# Patient Record
Sex: FEMALE | Race: WHITE | NOT HISPANIC OR LATINO
[De-identification: names, ages, dates, MRNs, and addresses within clinical notes are randomized per-mention and may not be internally consistent; named-entity substitution may affect disease eponyms.]

---

## 2017-09-11 ENCOUNTER — LABORATORY RESULT (OUTPATIENT)
Age: 47
End: 2017-09-11

## 2017-09-11 ENCOUNTER — RESULT REVIEW (OUTPATIENT)
Age: 47
End: 2017-09-11

## 2017-09-12 ENCOUNTER — APPOINTMENT (OUTPATIENT)
Dept: PLASTIC SURGERY | Facility: CLINIC | Age: 47
End: 2017-09-12
Payer: COMMERCIAL

## 2017-09-12 ENCOUNTER — APPOINTMENT (OUTPATIENT)
Dept: DERMATOLOGY | Facility: CLINIC | Age: 47
End: 2017-09-12

## 2017-09-12 VITALS
BODY MASS INDEX: 32.44 KG/M2 | WEIGHT: 214.03 LBS | TEMPERATURE: 98.4 F | DIASTOLIC BLOOD PRESSURE: 80 MMHG | SYSTOLIC BLOOD PRESSURE: 135 MMHG | RESPIRATION RATE: 16 BRPM | OXYGEN SATURATION: 98 % | HEART RATE: 96 BPM | HEIGHT: 68 IN

## 2017-09-12 PROCEDURE — 69100 BIOPSY OF EXTERNAL EAR: CPT

## 2017-09-13 ENCOUNTER — INPATIENT (INPATIENT)
Facility: HOSPITAL | Age: 47
LOS: 13 days | Discharge: HOME HEALTH | DRG: 872 | End: 2017-09-27
Attending: HOSPITALIST | Admitting: HOSPITALIST
Payer: COMMERCIAL

## 2017-09-13 VITALS
DIASTOLIC BLOOD PRESSURE: 70 MMHG | TEMPERATURE: 103 F | HEIGHT: 69 IN | SYSTOLIC BLOOD PRESSURE: 108 MMHG | HEART RATE: 126 BPM | WEIGHT: 199.96 LBS | OXYGEN SATURATION: 95 % | RESPIRATION RATE: 26 BRPM

## 2017-09-13 DIAGNOSIS — Z98.51 TUBAL LIGATION STATUS: Chronic | ICD-10-CM

## 2017-09-13 DIAGNOSIS — A41: ICD-10-CM

## 2017-09-13 DIAGNOSIS — A41.59 OTHER GRAM-NEGATIVE SEPSIS: ICD-10-CM

## 2017-09-13 DIAGNOSIS — Z90.49 ACQUIRED ABSENCE OF OTHER SPECIFIED PARTS OF DIGESTIVE TRACT: Chronic | ICD-10-CM

## 2017-09-13 DIAGNOSIS — N20.0 CALCULUS OF KIDNEY: Chronic | ICD-10-CM

## 2017-09-13 LAB
ALBUMIN SERPL ELPH-MCNC: 3.5 G/DL — SIGNIFICANT CHANGE UP (ref 3.3–5.2)
ALP SERPL-CCNC: 78 U/L — SIGNIFICANT CHANGE UP (ref 40–120)
ALT FLD-CCNC: 14 U/L — SIGNIFICANT CHANGE UP
ANION GAP SERPL CALC-SCNC: 19 MMOL/L — HIGH (ref 5–17)
ANISOCYTOSIS BLD QL: SLIGHT — SIGNIFICANT CHANGE UP
APPEARANCE UR: CLEAR — SIGNIFICANT CHANGE UP
AST SERPL-CCNC: 18 U/L — SIGNIFICANT CHANGE UP
BILIRUB SERPL-MCNC: 1.7 MG/DL — SIGNIFICANT CHANGE UP (ref 0.4–2)
BILIRUB UR-MCNC: NEGATIVE — SIGNIFICANT CHANGE UP
BUN SERPL-MCNC: 33 MG/DL — HIGH (ref 8–20)
CALCIUM SERPL-MCNC: 8.8 MG/DL — SIGNIFICANT CHANGE UP (ref 8.6–10.2)
CHLORIDE SERPL-SCNC: 96 MMOL/L — LOW (ref 98–107)
CO2 SERPL-SCNC: 19 MMOL/L — LOW (ref 22–29)
COLOR SPEC: YELLOW — SIGNIFICANT CHANGE UP
CREAT SERPL-MCNC: 3.07 MG/DL — HIGH (ref 0.5–1.3)
DIFF PNL FLD: ABNORMAL
GLUCOSE SERPL-MCNC: 120 MG/DL — HIGH (ref 70–115)
GLUCOSE UR QL: NEGATIVE MG/DL — SIGNIFICANT CHANGE UP
HCT VFR BLD CALC: 41.1 % — SIGNIFICANT CHANGE UP (ref 37–47)
HGB BLD-MCNC: 14.5 G/DL — SIGNIFICANT CHANGE UP (ref 12–16)
HYPERCHROMIA BLD QL AUTO: SLIGHT — SIGNIFICANT CHANGE UP
KETONES UR-MCNC: NEGATIVE — SIGNIFICANT CHANGE UP
LEUKOCYTE ESTERASE UR-ACNC: ABNORMAL
LYMPHOCYTES # BLD AUTO: 5 % — LOW (ref 20–55)
MACROCYTES BLD QL: SLIGHT — SIGNIFICANT CHANGE UP
MCHC RBC-ENTMCNC: 33.8 PG — HIGH (ref 27–31)
MCHC RBC-ENTMCNC: 35.3 G/DL — SIGNIFICANT CHANGE UP (ref 32–36)
MCV RBC AUTO: 95.8 FL — SIGNIFICANT CHANGE UP (ref 81–99)
MICROCYTES BLD QL: SLIGHT — SIGNIFICANT CHANGE UP
MONOCYTES NFR BLD AUTO: 1 % — LOW (ref 3–10)
NEUTROPHILS NFR BLD AUTO: 94 % — HIGH (ref 37–73)
NITRITE UR-MCNC: POSITIVE
PH UR: 5 — SIGNIFICANT CHANGE UP (ref 5–8)
PLAT MORPH BLD: NORMAL — SIGNIFICANT CHANGE UP
PLATELET # BLD AUTO: 162 K/UL — SIGNIFICANT CHANGE UP (ref 150–400)
POIKILOCYTOSIS BLD QL AUTO: SLIGHT — SIGNIFICANT CHANGE UP
POLYCHROMASIA BLD QL SMEAR: SLIGHT — SIGNIFICANT CHANGE UP
POTASSIUM SERPL-MCNC: 3.7 MMOL/L — SIGNIFICANT CHANGE UP (ref 3.5–5.3)
POTASSIUM SERPL-SCNC: 3.7 MMOL/L — SIGNIFICANT CHANGE UP (ref 3.5–5.3)
PROT SERPL-MCNC: 7.8 G/DL — SIGNIFICANT CHANGE UP (ref 6.6–8.7)
PROT UR-MCNC: 100 MG/DL
RBC # BLD: 4.29 M/UL — LOW (ref 4.4–5.2)
RBC # FLD: 13.1 % — SIGNIFICANT CHANGE UP (ref 11–15.6)
RBC BLD AUTO: ABNORMAL
SODIUM SERPL-SCNC: 134 MMOL/L — LOW (ref 135–145)
SP GR SPEC: 1.01 — SIGNIFICANT CHANGE UP (ref 1.01–1.02)
UROBILINOGEN FLD QL: NEGATIVE MG/DL — SIGNIFICANT CHANGE UP
WBC # BLD: 18.8 K/UL — HIGH (ref 4.8–10.8)
WBC # FLD AUTO: 18.8 K/UL — HIGH (ref 4.8–10.8)

## 2017-09-13 PROCEDURE — 71010: CPT | Mod: 26

## 2017-09-13 PROCEDURE — 99291 CRITICAL CARE FIRST HOUR: CPT

## 2017-09-13 RX ORDER — VANCOMYCIN HCL 1 G
1000 VIAL (EA) INTRAVENOUS ONCE
Qty: 0 | Refills: 0 | Status: COMPLETED | OUTPATIENT
Start: 2017-09-13 | End: 2017-09-13

## 2017-09-13 RX ORDER — PIPERACILLIN AND TAZOBACTAM 4; .5 G/20ML; G/20ML
3.38 INJECTION, POWDER, LYOPHILIZED, FOR SOLUTION INTRAVENOUS ONCE
Qty: 0 | Refills: 0 | Status: COMPLETED | OUTPATIENT
Start: 2017-09-13 | End: 2017-09-13

## 2017-09-13 RX ORDER — SODIUM CHLORIDE 9 MG/ML
2800 INJECTION INTRAMUSCULAR; INTRAVENOUS; SUBCUTANEOUS ONCE
Qty: 0 | Refills: 0 | Status: COMPLETED | OUTPATIENT
Start: 2017-09-13 | End: 2017-09-13

## 2017-09-13 RX ORDER — MORPHINE SULFATE 50 MG/1
4 CAPSULE, EXTENDED RELEASE ORAL ONCE
Qty: 0 | Refills: 0 | Status: DISCONTINUED | OUTPATIENT
Start: 2017-09-13 | End: 2017-09-13

## 2017-09-13 RX ORDER — METRONIDAZOLE 500 MG
500 TABLET ORAL ONCE
Qty: 0 | Refills: 0 | Status: COMPLETED | OUTPATIENT
Start: 2017-09-13 | End: 2017-09-13

## 2017-09-13 RX ORDER — FENTANYL CITRATE 50 UG/ML
25 INJECTION INTRAVENOUS ONCE
Qty: 0 | Refills: 0 | Status: DISCONTINUED | OUTPATIENT
Start: 2017-09-13 | End: 2017-09-14

## 2017-09-13 RX ORDER — ACETAMINOPHEN 500 MG
650 TABLET ORAL ONCE
Qty: 0 | Refills: 0 | Status: COMPLETED | OUTPATIENT
Start: 2017-09-13 | End: 2017-09-13

## 2017-09-13 RX ORDER — SODIUM CHLORIDE 9 MG/ML
3 INJECTION INTRAMUSCULAR; INTRAVENOUS; SUBCUTANEOUS ONCE
Qty: 0 | Refills: 0 | Status: COMPLETED | OUTPATIENT
Start: 2017-09-13 | End: 2017-09-13

## 2017-09-13 RX ORDER — SODIUM CHLORIDE 9 MG/ML
1000 INJECTION INTRAMUSCULAR; INTRAVENOUS; SUBCUTANEOUS ONCE
Qty: 0 | Refills: 0 | Status: COMPLETED | OUTPATIENT
Start: 2017-09-13 | End: 2017-09-13

## 2017-09-13 RX ORDER — ONDANSETRON 8 MG/1
4 TABLET, FILM COATED ORAL ONCE
Qty: 0 | Refills: 0 | Status: COMPLETED | OUTPATIENT
Start: 2017-09-13 | End: 2017-09-14

## 2017-09-13 RX ORDER — ONDANSETRON 8 MG/1
8 TABLET, FILM COATED ORAL ONCE
Qty: 0 | Refills: 0 | Status: COMPLETED | OUTPATIENT
Start: 2017-09-13 | End: 2017-09-13

## 2017-09-13 RX ORDER — VANCOMYCIN HCL 1 G
2300 VIAL (EA) INTRAVENOUS ONCE
Qty: 0 | Refills: 0 | Status: DISCONTINUED | OUTPATIENT
Start: 2017-09-13 | End: 2017-09-13

## 2017-09-13 RX ADMIN — Medication 250 MILLIGRAM(S): at 20:52

## 2017-09-13 RX ADMIN — SODIUM CHLORIDE 3 MILLILITER(S): 9 INJECTION INTRAMUSCULAR; INTRAVENOUS; SUBCUTANEOUS at 19:00

## 2017-09-13 RX ADMIN — ONDANSETRON 8 MILLIGRAM(S): 8 TABLET, FILM COATED ORAL at 20:07

## 2017-09-13 RX ADMIN — SODIUM CHLORIDE 1000 MILLILITER(S): 9 INJECTION INTRAMUSCULAR; INTRAVENOUS; SUBCUTANEOUS at 23:42

## 2017-09-13 RX ADMIN — SODIUM CHLORIDE 2800 MILLILITER(S): 9 INJECTION INTRAMUSCULAR; INTRAVENOUS; SUBCUTANEOUS at 21:56

## 2017-09-13 RX ADMIN — Medication 650 MILLIGRAM(S): at 19:14

## 2017-09-13 RX ADMIN — PIPERACILLIN AND TAZOBACTAM 200 GRAM(S): 4; .5 INJECTION, POWDER, LYOPHILIZED, FOR SOLUTION INTRAVENOUS at 19:14

## 2017-09-13 RX ADMIN — Medication 100 MILLIGRAM(S): at 22:12

## 2017-09-13 NOTE — ED PROVIDER NOTE - PSH
[facility]  H/O tubal ligation  (1996)  Kidney calculi    S/P cholecystectomy  (2013)  Tubal ligation  1995

## 2017-09-13 NOTE — ED PROVIDER NOTE - CARE PLAN
Principal Discharge DX:	Sepsis  Secondary Diagnosis:	Acute renal failure  Secondary Diagnosis:	Dehydration

## 2017-09-13 NOTE — ED PROVIDER NOTE - CHPI ED SYMPTOMS NEG
no vomiting/no decreased eating/drinking/no rash/no shortness of breath/no headache/no abdominal pain/no diarrhea

## 2017-09-13 NOTE — ED PROVIDER NOTE - OBJECTIVE STATEMENT
The patient is 46 year old female presents with fever for 2 days with chill and nausea.  The patient has cough and also back pain. No dysuria and No frequency, No insect bites, No travel, No CP, No SOB, No ABD Pain

## 2017-09-13 NOTE — ED PROVIDER NOTE - PMH
Arthropathy NOS - shoulder    GERD (gastroesophageal reflux disease)    Hyperlipidemia    Kidney polycystic disease    Kidney stones    Kidney stones    Obesity    Polycystic kidney disease

## 2017-09-13 NOTE — H&P ADULT - HISTORY OF PRESENT ILLNESS
47 y/o female smoker with h/o C. dif, polycystic kidney diease, s/p oral surgery 2 weeks ago followed by 1 week treatment with amoxicillin, started to have headache 2 days ago then fever, nausea, vomiting  and body aches yesterday. today, in the Er patient developed watery diarrhea. no blood in the stools.

## 2017-09-13 NOTE — H&P ADULT - PMH
Arthropathy NOS - shoulder    GERD (gastroesophageal reflux disease)    Hyperlipidemia    Kidney stones    Polycystic kidney disease

## 2017-09-13 NOTE — ED ADULT NURSE NOTE - OBJECTIVE STATEMENT
pt c/o fever with chills and nausea x 2 days. Pt states she had a temperature since yesterday and since her temperature was getting higher she came to ED. Pt states she only coughed a couple times, denies productive cough, c/o nausea and denies vomiting or diarrhea. Pt seen & evaluated by MD waiting for lab results.

## 2017-09-13 NOTE — H&P ADULT - FAMILY HISTORY
Family history of hypertension     Father  Still living? Unknown  Family history of heart disease, Age at diagnosis: Age Unknown

## 2017-09-13 NOTE — ED ADULT NURSE NOTE - PSH
[facility] [place] [facility]  H/O tubal ligation  (1996)  Kidney calculi    S/P cholecystectomy  (2013)  Tubal ligation  1995

## 2017-09-13 NOTE — H&P ADULT - PSH
[facility] [place] [facility]  H/O tubal ligation  (1996)  Kidney calculi    S/P cholecystectomy  (2013)  Tubal ligation  1995

## 2017-09-14 DIAGNOSIS — R19.7 DIARRHEA, UNSPECIFIED: ICD-10-CM

## 2017-09-14 DIAGNOSIS — N39.0 URINARY TRACT INFECTION, SITE NOT SPECIFIED: ICD-10-CM

## 2017-09-14 DIAGNOSIS — N28.9 DISORDER OF KIDNEY AND URETER, UNSPECIFIED: ICD-10-CM

## 2017-09-14 DIAGNOSIS — E87.6 HYPOKALEMIA: ICD-10-CM

## 2017-09-14 DIAGNOSIS — Q61.3 POLYCYSTIC KIDNEY, UNSPECIFIED: ICD-10-CM

## 2017-09-14 DIAGNOSIS — E83.42 HYPOMAGNESEMIA: ICD-10-CM

## 2017-09-14 LAB
ANION GAP SERPL CALC-SCNC: 16 MMOL/L — SIGNIFICANT CHANGE UP (ref 5–17)
BACTERIA # UR AUTO: ABNORMAL
BUN SERPL-MCNC: 37 MG/DL — HIGH (ref 8–20)
C DIFF BY PCR RESULT: DETECTED
C DIFF TOX GENS STL QL NAA+PROBE: SIGNIFICANT CHANGE UP
CALCIUM SERPL-MCNC: 7.4 MG/DL — LOW (ref 8.6–10.2)
CHLORIDE SERPL-SCNC: 104 MMOL/L — SIGNIFICANT CHANGE UP (ref 98–107)
CO2 SERPL-SCNC: 18 MMOL/L — LOW (ref 22–29)
COMMENT - URINE: SIGNIFICANT CHANGE UP
CREAT SERPL-MCNC: 3.6 MG/DL — HIGH (ref 0.5–1.3)
EPI CELLS # UR: ABNORMAL
GLUCOSE SERPL-MCNC: 156 MG/DL — HIGH (ref 70–115)
LACTATE SERPL-SCNC: 2.3 MMOL/L — HIGH (ref 0.5–2)
MAGNESIUM SERPL-MCNC: 1.5 MG/DL — LOW (ref 1.6–2.6)
PHOSPHATE SERPL-MCNC: 3.9 MG/DL — SIGNIFICANT CHANGE UP (ref 2.4–4.7)
POTASSIUM SERPL-MCNC: 3 MMOL/L — LOW (ref 3.5–5.3)
POTASSIUM SERPL-SCNC: 3 MMOL/L — LOW (ref 3.5–5.3)
RBC CASTS # UR COMP ASSIST: ABNORMAL /HPF (ref 0–4)
SODIUM SERPL-SCNC: 138 MMOL/L — SIGNIFICANT CHANGE UP (ref 135–145)
WBC UR QL: ABNORMAL

## 2017-09-14 PROCEDURE — 99233 SBSQ HOSP IP/OBS HIGH 50: CPT

## 2017-09-14 PROCEDURE — 99255 IP/OBS CONSLTJ NEW/EST HI 80: CPT

## 2017-09-14 PROCEDURE — 74176 CT ABD & PELVIS W/O CONTRAST: CPT | Mod: 26

## 2017-09-14 RX ORDER — MAGNESIUM OXIDE 400 MG ORAL TABLET 241.3 MG
400 TABLET ORAL
Qty: 0 | Refills: 0 | Status: COMPLETED | OUTPATIENT
Start: 2017-09-14 | End: 2017-09-16

## 2017-09-14 RX ORDER — MORPHINE SULFATE 50 MG/1
2 CAPSULE, EXTENDED RELEASE ORAL EVERY 6 HOURS
Qty: 0 | Refills: 0 | Status: DISCONTINUED | OUTPATIENT
Start: 2017-09-14 | End: 2017-09-16

## 2017-09-14 RX ORDER — SODIUM CHLORIDE 9 MG/ML
1000 INJECTION, SOLUTION INTRAVENOUS
Qty: 0 | Refills: 0 | Status: DISCONTINUED | OUTPATIENT
Start: 2017-09-14 | End: 2017-09-14

## 2017-09-14 RX ORDER — SODIUM BICARBONATE 1 MEQ/ML
0.25 SYRINGE (ML) INTRAVENOUS
Qty: 150 | Refills: 0 | Status: DISCONTINUED | OUTPATIENT
Start: 2017-09-14 | End: 2017-09-16

## 2017-09-14 RX ORDER — INFLUENZA VIRUS VACCINE 15; 15; 15; 15 UG/.5ML; UG/.5ML; UG/.5ML; UG/.5ML
0.5 SUSPENSION INTRAMUSCULAR ONCE
Qty: 0 | Refills: 0 | Status: COMPLETED | OUTPATIENT
Start: 2017-09-14 | End: 2017-09-14

## 2017-09-14 RX ORDER — ACETAMINOPHEN 500 MG
650 TABLET ORAL EVERY 6 HOURS
Qty: 0 | Refills: 0 | Status: DISCONTINUED | OUTPATIENT
Start: 2017-09-14 | End: 2017-09-27

## 2017-09-14 RX ORDER — CEFTRIAXONE 500 MG/1
1 INJECTION, POWDER, FOR SOLUTION INTRAMUSCULAR; INTRAVENOUS EVERY 24 HOURS
Qty: 0 | Refills: 0 | Status: DISCONTINUED | OUTPATIENT
Start: 2017-09-14 | End: 2017-09-15

## 2017-09-14 RX ORDER — VANCOMYCIN HCL 1 G
125 VIAL (EA) INTRAVENOUS EVERY 6 HOURS
Qty: 0 | Refills: 0 | Status: DISCONTINUED | OUTPATIENT
Start: 2017-09-14 | End: 2017-09-15

## 2017-09-14 RX ORDER — POTASSIUM CHLORIDE 20 MEQ
40 PACKET (EA) ORAL EVERY 4 HOURS
Qty: 0 | Refills: 0 | Status: COMPLETED | OUTPATIENT
Start: 2017-09-14 | End: 2017-09-14

## 2017-09-14 RX ORDER — DEXTROSE MONOHYDRATE, SODIUM CHLORIDE, AND POTASSIUM CHLORIDE 50; .745; 4.5 G/1000ML; G/1000ML; G/1000ML
1000 INJECTION, SOLUTION INTRAVENOUS
Qty: 0 | Refills: 0 | Status: DISCONTINUED | OUTPATIENT
Start: 2017-09-14 | End: 2017-09-14

## 2017-09-14 RX ORDER — ONDANSETRON 8 MG/1
4 TABLET, FILM COATED ORAL ONCE
Qty: 0 | Refills: 0 | Status: COMPLETED | OUTPATIENT
Start: 2017-09-14 | End: 2017-09-14

## 2017-09-14 RX ORDER — FAMOTIDINE 10 MG/ML
20 INJECTION INTRAVENOUS DAILY
Qty: 0 | Refills: 0 | Status: DISCONTINUED | OUTPATIENT
Start: 2017-09-14 | End: 2017-09-15

## 2017-09-14 RX ORDER — MAGNESIUM SULFATE 500 MG/ML
2 VIAL (ML) INJECTION ONCE
Qty: 0 | Refills: 0 | Status: COMPLETED | OUTPATIENT
Start: 2017-09-14 | End: 2017-09-14

## 2017-09-14 RX ORDER — ENOXAPARIN SODIUM 100 MG/ML
30 INJECTION SUBCUTANEOUS DAILY
Qty: 0 | Refills: 0 | Status: DISCONTINUED | OUTPATIENT
Start: 2017-09-14 | End: 2017-09-14

## 2017-09-14 RX ORDER — HEPARIN SODIUM 5000 [USP'U]/ML
5000 INJECTION INTRAVENOUS; SUBCUTANEOUS EVERY 12 HOURS
Qty: 0 | Refills: 0 | Status: DISCONTINUED | OUTPATIENT
Start: 2017-09-14 | End: 2017-09-27

## 2017-09-14 RX ADMIN — FENTANYL CITRATE 25 MICROGRAM(S): 50 INJECTION INTRAVENOUS at 00:10

## 2017-09-14 RX ADMIN — MORPHINE SULFATE 2 MILLIGRAM(S): 50 CAPSULE, EXTENDED RELEASE ORAL at 12:04

## 2017-09-14 RX ADMIN — Medication 650 MILLIGRAM(S): at 17:49

## 2017-09-14 RX ADMIN — Medication 150 MEQ/KG/HR: at 19:45

## 2017-09-14 RX ADMIN — DEXTROSE MONOHYDRATE, SODIUM CHLORIDE, AND POTASSIUM CHLORIDE 100 MILLILITER(S): 50; .745; 4.5 INJECTION, SOLUTION INTRAVENOUS at 04:00

## 2017-09-14 RX ADMIN — Medication 650 MILLIGRAM(S): at 23:41

## 2017-09-14 RX ADMIN — MORPHINE SULFATE 2 MILLIGRAM(S): 50 CAPSULE, EXTENDED RELEASE ORAL at 19:45

## 2017-09-14 RX ADMIN — Medication 40 MILLIEQUIVALENT(S): at 13:50

## 2017-09-14 RX ADMIN — Medication 650 MILLIGRAM(S): at 12:36

## 2017-09-14 RX ADMIN — CEFTRIAXONE 100 GRAM(S): 500 INJECTION, POWDER, FOR SOLUTION INTRAMUSCULAR; INTRAVENOUS at 12:00

## 2017-09-14 RX ADMIN — ONDANSETRON 4 MILLIGRAM(S): 8 TABLET, FILM COATED ORAL at 04:49

## 2017-09-14 RX ADMIN — Medication 650 MILLIGRAM(S): at 10:51

## 2017-09-14 RX ADMIN — MAGNESIUM OXIDE 400 MG ORAL TABLET 400 MILLIGRAM(S): 241.3 TABLET ORAL at 17:49

## 2017-09-14 RX ADMIN — Medication 650 MILLIGRAM(S): at 19:35

## 2017-09-14 RX ADMIN — ONDANSETRON 4 MILLIGRAM(S): 8 TABLET, FILM COATED ORAL at 00:10

## 2017-09-14 RX ADMIN — DEXTROSE MONOHYDRATE, SODIUM CHLORIDE, AND POTASSIUM CHLORIDE 100 MILLILITER(S): 50; .745; 4.5 INJECTION, SOLUTION INTRAVENOUS at 04:49

## 2017-09-14 RX ADMIN — MORPHINE SULFATE 2 MILLIGRAM(S): 50 CAPSULE, EXTENDED RELEASE ORAL at 12:36

## 2017-09-14 RX ADMIN — Medication 125 MILLIGRAM(S): at 23:41

## 2017-09-14 RX ADMIN — Medication 40 MILLIEQUIVALENT(S): at 17:49

## 2017-09-14 RX ADMIN — MORPHINE SULFATE 2 MILLIGRAM(S): 50 CAPSULE, EXTENDED RELEASE ORAL at 05:25

## 2017-09-14 RX ADMIN — MORPHINE SULFATE 2 MILLIGRAM(S): 50 CAPSULE, EXTENDED RELEASE ORAL at 20:20

## 2017-09-14 RX ADMIN — MORPHINE SULFATE 2 MILLIGRAM(S): 50 CAPSULE, EXTENDED RELEASE ORAL at 04:49

## 2017-09-14 RX ADMIN — FENTANYL CITRATE 25 MICROGRAM(S): 50 INJECTION INTRAVENOUS at 00:25

## 2017-09-14 RX ADMIN — Medication 125 MILLIGRAM(S): at 17:50

## 2017-09-14 NOTE — PROGRESS NOTE ADULT - SUBJECTIVE AND OBJECTIVE BOX
Renal :    EMR  Reviewed,    DX : C. Difficile colitis,  STEPHENIE    ADPKD      P : Continue current management,    MR Brain, TTE ( Exclude cerebral Aneurysm, Valvular Heart Disease )

## 2017-09-14 NOTE — ED ADULT NURSE REASSESSMENT NOTE - NS ED NURSE REASSESS COMMENT FT1
Pt refusing repeat rectal temp @ this time, pt with jacket on and blankets, pt educated on sepsis protocol asked to remove blankets, pt states "do not take them off of me", pt educated on risks and benefits of refusing rectal temp, pt verbalized understanding. IVF infusing, family @ bedside, pt baseline mental status. Will continue to monitor, pending admission orders, pending CT
Report received from offgoing RN, chart as noted, pt a&ox3 lying right side on stretcher with IVF infusing, #20G IV noted to Right AC, site asmyp. Zosyn infusing per MD orders. Pt c/o nausea and dry heaving, no emesis noted @ time of RN assessment, Pt medicated with IV zofran per MD orders, updated on POC, results pending, family @ bedside, rr even and unlabored, lungs CTA romeo. MAEx4. Will continue to monitor and reassess
Pt baseline mental status, c.o 6/10 pain to lower back, medicated per MD orders with IV Fentanyl and iv zofran, family @ bedside, lights dimmed to promote rest, Transport in ED to take pt to CT @ this time

## 2017-09-14 NOTE — PROGRESS NOTE ADULT - SUBJECTIVE AND OBJECTIVE BOX
Patient is a 46y old  Female who presents with a chief complaint of nausea, vomiting, diarrhea, fever and body aches  she is c/o swelling of her face , right flank pain this am   no overnight events   chart reviewed         Allergies    Demerol HCl (Hives)  Demerol HCl (Hives; Rash)  Phenergan (Hives; Rash)  Phenergan Fortis (Hives)    Intolerances        REVIEW OF SYSTEMS:      Vital Signs Last 24 Hrs  T(C): 37.2 (14 Sep 2017 04:27), Max: 39.5 (13 Sep 2017 18:32)  T(F): 99 (14 Sep 2017 04:27), Max: 103.1 (13 Sep 2017 18:32)  HR: 90 (14 Sep 2017 04:) (69 - 126)  BP: 119/62 (14 Sep 2017 04:) (89/56 - 119/62)  BP(mean): --  RR: 17 (14 Sep 2017 04:27) (16 - 26)  SpO2: 97% (14 Sep 2017 04:) (90% - 100%)    PHYSICAL EXAM:    GENERAL: NAD, well-groomed, well-developed  NECK: Supple, No JVD, Normal thyroid  CHEST/LUNG: Clear to percussion bilaterally; No rales, rhonchi, wheezing, or rubs  HEART: Regular rate and rhythm; No murmurs, rubs, or gallops  ABDOMEN: Soft, RUQ , right flank tenderness on percussion , Nondistended; Bowel sounds present  EXTREMITIES:  2+ Peripheral Pulses, No clubbing, cyanosis, or edema  SKIN: No rashes or lesions      LABS:                        14.5   18.8  )-----------( 162      ( 13 Sep 2017 19:01 )             41.1     -14    138  |  104  |  37.0<H>  ----------------------------<  156<H>  3.0<L>   |  18.0<L>  |  3.60<H>    Ca    7.4<L>      14 Sep 2017 09:02  Phos  3.9     -14  Mg     1.5     -    TPro  7.8  /  Alb  3.5  /  TBili  1.7  /  DBili  x   /  AST  18  /  ALT  14  /  AlkPhos  78        Urinalysis Basic - ( 13 Sep 2017 23:44 )    Color: Yellow / Appearance: Clear / S.015 / pH: x  Gluc: x / Ketone: Negative  / Bili: Negative / Urobili: Negative mg/dL   Blood: x / Protein: 100 mg/dL / Nitrite: Positive   Leuk Esterase: Moderate / RBC: 3-5 /HPF / WBC 6-10   Sq Epi: x / Non Sq Epi: Moderate / Bacteria: Few        RADIOLOGY & ADDITIONAL TESTS:

## 2017-09-15 DIAGNOSIS — A04.7 ENTEROCOLITIS DUE TO CLOSTRIDIUM DIFFICILE: ICD-10-CM

## 2017-09-15 DIAGNOSIS — N17.9 ACUTE KIDNEY FAILURE, UNSPECIFIED: ICD-10-CM

## 2017-09-15 DIAGNOSIS — G44.52 NEW DAILY PERSISTENT HEADACHE (NDPH): ICD-10-CM

## 2017-09-15 LAB
ALBUMIN SERPL ELPH-MCNC: 2.8 G/DL — LOW (ref 3.3–5.2)
ANION GAP SERPL CALC-SCNC: 15 MMOL/L — SIGNIFICANT CHANGE UP (ref 5–17)
ANION GAP SERPL CALC-SCNC: 16 MMOL/L — SIGNIFICANT CHANGE UP (ref 5–17)
APPEARANCE UR: CLEAR — SIGNIFICANT CHANGE UP
BACTERIA # UR AUTO: ABNORMAL
BILIRUB UR-MCNC: NEGATIVE — SIGNIFICANT CHANGE UP
BUN SERPL-MCNC: 27 MG/DL — HIGH (ref 8–20)
BUN SERPL-MCNC: 32 MG/DL — HIGH (ref 8–20)
CALCIUM SERPL-MCNC: 7.7 MG/DL — LOW (ref 8.6–10.2)
CALCIUM SERPL-MCNC: 8 MG/DL — LOW (ref 8.6–10.2)
CHLORIDE SERPL-SCNC: 102 MMOL/L — SIGNIFICANT CHANGE UP (ref 98–107)
CHLORIDE SERPL-SCNC: 99 MMOL/L — SIGNIFICANT CHANGE UP (ref 98–107)
CO2 SERPL-SCNC: 23 MMOL/L — SIGNIFICANT CHANGE UP (ref 22–29)
CO2 SERPL-SCNC: 24 MMOL/L — SIGNIFICANT CHANGE UP (ref 22–29)
COLOR SPEC: YELLOW — SIGNIFICANT CHANGE UP
CREAT SERPL-MCNC: 3.35 MG/DL — HIGH (ref 0.5–1.3)
CREAT SERPL-MCNC: 3.38 MG/DL — HIGH (ref 0.5–1.3)
DIFF PNL FLD: ABNORMAL
EPI CELLS # UR: SIGNIFICANT CHANGE UP
GLUCOSE SERPL-MCNC: 123 MG/DL — HIGH (ref 70–115)
GLUCOSE SERPL-MCNC: 139 MG/DL — HIGH (ref 70–115)
GLUCOSE UR QL: NEGATIVE MG/DL — SIGNIFICANT CHANGE UP
HCT VFR BLD CALC: 29.6 % — LOW (ref 37–47)
HCT VFR BLD CALC: 30.8 % — LOW (ref 37–47)
HGB BLD-MCNC: 10 G/DL — LOW (ref 12–16)
HGB BLD-MCNC: 10.4 G/DL — LOW (ref 12–16)
KETONES UR-MCNC: NEGATIVE — SIGNIFICANT CHANGE UP
LDH SERPL L TO P-CCNC: 143 U/L — SIGNIFICANT CHANGE UP (ref 98–192)
LDH SERPL L TO P-CCNC: 144 U/L — SIGNIFICANT CHANGE UP (ref 98–192)
LEUKOCYTE ESTERASE UR-ACNC: ABNORMAL
MCHC RBC-ENTMCNC: 32.6 PG — HIGH (ref 27–31)
MCHC RBC-ENTMCNC: 32.7 PG — HIGH (ref 27–31)
MCHC RBC-ENTMCNC: 33.8 G/DL — SIGNIFICANT CHANGE UP (ref 32–36)
MCHC RBC-ENTMCNC: 33.8 G/DL — SIGNIFICANT CHANGE UP (ref 32–36)
MCV RBC AUTO: 96.4 FL — SIGNIFICANT CHANGE UP (ref 81–99)
MCV RBC AUTO: 96.9 FL — SIGNIFICANT CHANGE UP (ref 81–99)
NITRITE UR-MCNC: NEGATIVE — SIGNIFICANT CHANGE UP
PH UR: 7 — SIGNIFICANT CHANGE UP (ref 5–8)
PHOSPHATE SERPL-MCNC: 2 MG/DL — LOW (ref 2.4–4.7)
PLATELET # BLD AUTO: 142 K/UL — LOW (ref 150–400)
PLATELET # BLD AUTO: 145 K/UL — LOW (ref 150–400)
POTASSIUM SERPL-MCNC: 3 MMOL/L — LOW (ref 3.5–5.3)
POTASSIUM SERPL-MCNC: 3 MMOL/L — LOW (ref 3.5–5.3)
POTASSIUM SERPL-SCNC: 3 MMOL/L — LOW (ref 3.5–5.3)
POTASSIUM SERPL-SCNC: 3 MMOL/L — LOW (ref 3.5–5.3)
PROT UR-MCNC: 30 MG/DL
RBC # BLD: 3.07 M/UL — LOW (ref 4.4–5.2)
RBC # BLD: 3.18 M/UL — LOW (ref 4.4–5.2)
RBC # FLD: 12.5 % — SIGNIFICANT CHANGE UP (ref 11–15.6)
RBC # FLD: 12.8 % — SIGNIFICANT CHANGE UP (ref 11–15.6)
RBC CASTS # UR COMP ASSIST: ABNORMAL /HPF (ref 0–4)
SODIUM SERPL-SCNC: 139 MMOL/L — SIGNIFICANT CHANGE UP (ref 135–145)
SODIUM SERPL-SCNC: 140 MMOL/L — SIGNIFICANT CHANGE UP (ref 135–145)
SP GR SPEC: 1 — LOW (ref 1.01–1.02)
UROBILINOGEN FLD QL: NEGATIVE MG/DL — SIGNIFICANT CHANGE UP
WBC # BLD: 10.3 K/UL — SIGNIFICANT CHANGE UP (ref 4.8–10.8)
WBC # BLD: 9.7 K/UL — SIGNIFICANT CHANGE UP (ref 4.8–10.8)
WBC # FLD AUTO: 10.3 K/UL — SIGNIFICANT CHANGE UP (ref 4.8–10.8)
WBC # FLD AUTO: 9.7 K/UL — SIGNIFICANT CHANGE UP (ref 4.8–10.8)
WBC UR QL: ABNORMAL

## 2017-09-15 PROCEDURE — 99233 SBSQ HOSP IP/OBS HIGH 50: CPT

## 2017-09-15 PROCEDURE — 93306 TTE W/DOPPLER COMPLETE: CPT | Mod: 26

## 2017-09-15 PROCEDURE — 70544 MR ANGIOGRAPHY HEAD W/O DYE: CPT | Mod: 26

## 2017-09-15 PROCEDURE — 76775 US EXAM ABDO BACK WALL LIM: CPT | Mod: 26

## 2017-09-15 PROCEDURE — 99223 1ST HOSP IP/OBS HIGH 75: CPT

## 2017-09-15 RX ORDER — PSEUDOEPHEDRINE HCL 30 MG
30 TABLET ORAL EVERY 6 HOURS
Qty: 0 | Refills: 0 | Status: DISCONTINUED | OUTPATIENT
Start: 2017-09-15 | End: 2017-09-15

## 2017-09-15 RX ORDER — FAMOTIDINE 10 MG/ML
20 INJECTION INTRAVENOUS DAILY
Qty: 0 | Refills: 0 | Status: DISCONTINUED | OUTPATIENT
Start: 2017-09-15 | End: 2017-09-27

## 2017-09-15 RX ORDER — AZTREONAM 2 G
2000 VIAL (EA) INJECTION ONCE
Qty: 0 | Refills: 0 | Status: COMPLETED | OUTPATIENT
Start: 2017-09-15 | End: 2017-09-15

## 2017-09-15 RX ORDER — SODIUM CHLORIDE 9 MG/ML
1000 INJECTION, SOLUTION INTRAVENOUS ONCE
Qty: 0 | Refills: 0 | Status: COMPLETED | OUTPATIENT
Start: 2017-09-15 | End: 2017-09-15

## 2017-09-15 RX ORDER — POTASSIUM CHLORIDE 20 MEQ
40 PACKET (EA) ORAL EVERY 4 HOURS
Qty: 0 | Refills: 0 | Status: COMPLETED | OUTPATIENT
Start: 2017-09-15 | End: 2017-09-15

## 2017-09-15 RX ORDER — AZTREONAM 2 G
VIAL (EA) INJECTION
Qty: 0 | Refills: 0 | Status: DISCONTINUED | OUTPATIENT
Start: 2017-09-15 | End: 2017-09-27

## 2017-09-15 RX ORDER — POTASSIUM CHLORIDE 20 MEQ
10 PACKET (EA) ORAL
Qty: 0 | Refills: 0 | Status: DISCONTINUED | OUTPATIENT
Start: 2017-09-15 | End: 2017-09-15

## 2017-09-15 RX ORDER — ACETAMINOPHEN 500 MG
1000 TABLET ORAL ONCE
Qty: 0 | Refills: 0 | Status: COMPLETED | OUTPATIENT
Start: 2017-09-15 | End: 2017-09-15

## 2017-09-15 RX ORDER — SODIUM CHLORIDE 9 MG/ML
500 INJECTION INTRAMUSCULAR; INTRAVENOUS; SUBCUTANEOUS ONCE
Qty: 0 | Refills: 0 | Status: COMPLETED | OUTPATIENT
Start: 2017-09-15 | End: 2017-09-15

## 2017-09-15 RX ORDER — AZTREONAM 2 G
2000 VIAL (EA) INJECTION EVERY 12 HOURS
Qty: 0 | Refills: 0 | Status: DISCONTINUED | OUTPATIENT
Start: 2017-09-16 | End: 2017-09-27

## 2017-09-15 RX ORDER — OXYCODONE AND ACETAMINOPHEN 5; 325 MG/1; MG/1
1 TABLET ORAL EVERY 4 HOURS
Qty: 0 | Refills: 0 | Status: DISCONTINUED | OUTPATIENT
Start: 2017-09-15 | End: 2017-09-21

## 2017-09-15 RX ORDER — SODIUM CHLORIDE 9 MG/ML
1000 INJECTION, SOLUTION INTRAVENOUS
Qty: 0 | Refills: 0 | Status: DISCONTINUED | OUTPATIENT
Start: 2017-09-15 | End: 2017-09-20

## 2017-09-15 RX ORDER — LACTOBACILLUS ACIDOPHILUS 100MM CELL
1 CAPSULE ORAL
Qty: 0 | Refills: 0 | Status: DISCONTINUED | OUTPATIENT
Start: 2017-09-15 | End: 2017-09-27

## 2017-09-15 RX ORDER — VANCOMYCIN HCL 1 G
250 VIAL (EA) INTRAVENOUS EVERY 6 HOURS
Qty: 0 | Refills: 0 | Status: DISCONTINUED | OUTPATIENT
Start: 2017-09-15 | End: 2017-09-27

## 2017-09-15 RX ADMIN — Medication 1 TABLET(S): at 18:08

## 2017-09-15 RX ADMIN — Medication 650 MILLIGRAM(S): at 00:04

## 2017-09-15 RX ADMIN — Medication 400 MILLIGRAM(S): at 15:11

## 2017-09-15 RX ADMIN — Medication 650 MILLIGRAM(S): at 22:39

## 2017-09-15 RX ADMIN — MORPHINE SULFATE 2 MILLIGRAM(S): 50 CAPSULE, EXTENDED RELEASE ORAL at 07:08

## 2017-09-15 RX ADMIN — Medication 250 MILLIGRAM(S): at 22:40

## 2017-09-15 RX ADMIN — MAGNESIUM OXIDE 400 MG ORAL TABLET 400 MILLIGRAM(S): 241.3 TABLET ORAL at 09:28

## 2017-09-15 RX ADMIN — SODIUM CHLORIDE 100 MILLILITER(S): 9 INJECTION, SOLUTION INTRAVENOUS at 22:38

## 2017-09-15 RX ADMIN — Medication 40 MILLIEQUIVALENT(S): at 09:28

## 2017-09-15 RX ADMIN — Medication 40 MILLIEQUIVALENT(S): at 14:31

## 2017-09-15 RX ADMIN — Medication 150 MEQ/KG/HR: at 15:35

## 2017-09-15 RX ADMIN — SODIUM CHLORIDE 1000 MILLILITER(S): 9 INJECTION, SOLUTION INTRAVENOUS at 16:43

## 2017-09-15 RX ADMIN — Medication 250 MILLIGRAM(S): at 18:09

## 2017-09-15 RX ADMIN — MORPHINE SULFATE 2 MILLIGRAM(S): 50 CAPSULE, EXTENDED RELEASE ORAL at 08:06

## 2017-09-15 RX ADMIN — MAGNESIUM OXIDE 400 MG ORAL TABLET 400 MILLIGRAM(S): 241.3 TABLET ORAL at 12:00

## 2017-09-15 RX ADMIN — OXYCODONE AND ACETAMINOPHEN 1 TABLET(S): 5; 325 TABLET ORAL at 14:44

## 2017-09-15 RX ADMIN — CEFTRIAXONE 100 GRAM(S): 500 INJECTION, POWDER, FOR SOLUTION INTRAMUSCULAR; INTRAVENOUS at 12:00

## 2017-09-15 RX ADMIN — Medication 150 MEQ/KG/HR: at 02:00

## 2017-09-15 RX ADMIN — Medication 1 TABLET(S): at 13:13

## 2017-09-15 RX ADMIN — Medication 125 MILLIGRAM(S): at 11:10

## 2017-09-15 RX ADMIN — OXYCODONE AND ACETAMINOPHEN 1 TABLET(S): 5; 325 TABLET ORAL at 15:38

## 2017-09-15 RX ADMIN — MAGNESIUM OXIDE 400 MG ORAL TABLET 400 MILLIGRAM(S): 241.3 TABLET ORAL at 18:08

## 2017-09-15 RX ADMIN — Medication 1 TABLET(S): at 11:09

## 2017-09-15 RX ADMIN — SODIUM CHLORIDE 1000 MILLILITER(S): 9 INJECTION INTRAMUSCULAR; INTRAVENOUS; SUBCUTANEOUS at 11:11

## 2017-09-15 RX ADMIN — Medication 650 MILLIGRAM(S): at 06:13

## 2017-09-15 RX ADMIN — Medication 125 MILLIGRAM(S): at 07:06

## 2017-09-15 RX ADMIN — Medication 100 MILLIGRAM(S): at 18:08

## 2017-09-15 RX ADMIN — OXYCODONE AND ACETAMINOPHEN 1 TABLET(S): 5; 325 TABLET ORAL at 21:08

## 2017-09-15 RX ADMIN — Medication 150 MEQ/KG/HR: at 22:38

## 2017-09-15 RX ADMIN — Medication 650 MILLIGRAM(S): at 05:18

## 2017-09-15 RX ADMIN — OXYCODONE AND ACETAMINOPHEN 1 TABLET(S): 5; 325 TABLET ORAL at 19:44

## 2017-09-15 RX ADMIN — FAMOTIDINE 20 MILLIGRAM(S): 10 INJECTION INTRAVENOUS at 11:10

## 2017-09-15 RX ADMIN — Medication 650 MILLIGRAM(S): at 23:48

## 2017-09-15 NOTE — PROGRESS NOTE ADULT - SUBJECTIVE AND OBJECTIVE BOX
Patient is a 46y old  Female who presents with a chief complaint of nausea, vomiting, diarrhea, fever and body aches, C diff positive , reports 4 loose BM overnight , denies fever,chills, + flank pain , c/o vertex headache intermittent since last week       Allergies    Demerol HCl (Hives)  Demerol HCl (Hives; Rash)  Phenergan (Hives; Rash)  Phenergan Fortis (Hives)    Intolerances        REVIEW OF SYSTEMS:      Vital Signs Last 24 Hrs  Vital Signs Last 24 Hrs  T(C): 37.8 (15 Sep 2017 09:31), Max: 37.8 (15 Sep 2017 09:31)  T(F): 100.1 (15 Sep 2017 09:31), Max: 100.1 (15 Sep 2017 09:31)  HR: 85 (15 Sep 2017 09:31) (71 - 85)  BP: 91/55 (15 Sep 2017 09:31) (91/55 - 107/60)  BP(mean): --  RR: 18 (15 Sep 2017 09:31) (17 - 20)  SpO2: 95% (15 Sep 2017 09:31) (95% - 97%)  PHYSICAL EXAM:    GENERAL: NAD, well-groomed, well-developed  NECK: Supple, No JVD, Normal thyroid  CHEST/LUNG: Clear to percussion bilaterally; No rales, rhonchi, wheezing, or rubs  HEART: Regular rate and rhythm; No murmurs, rubs, or gallops  ABDOMEN: Soft, RUQ , right flank tenderness on percussion , Nondistended; Bowel sounds present  EXTREMITIES:  2+ Peripheral Pulses, No clubbing, cyanosis, or edema  SKIN: No rashes or lesions      LABS:                        14.5   18.8  )-----------( 162      ( 13 Sep 2017 19:01 )             41.1   09-15    140  |  102  |  32.0<H>  ----------------------------<  123<H>  3.0<L>   |  23.0  |  3.38<H>    Ca    8.0<L>      15 Sep 2017 07:14  Phos  3.9     09-14  Mg     1.5     09-14    TPro  7.8  /  Alb  3.5  /  TBili  1.7  /  DBili  x   /  AST  18  /  ALT  14  /  AlkPhos  78  09-13     	  RADIOLOGY & ADDITIONAL TESTS:

## 2017-09-15 NOTE — PROGRESS NOTE ADULT - ASSESSMENT
47 y/o female with HTNm , s/p oral surgery 2 weeks ago admitted for gastroenteritis, ARF on cronic , stool for C diff PCR positive , electrolyte imbalance potassium and magnesium supplemented

## 2017-09-15 NOTE — CONSULT NOTE ADULT - ASSESSMENT
IMP  2 SEPERATE ISSUES  PRIMARY IS PROB INFECTED RENAL CYST WITH ECOLI - R.O ESBL  SXS OF FEVER 48 HRS C/W DX    CDIFF ON ADMIT - FEVER 48 HRS PRIOR TO DIARRHEA NOT C/W DISEASE    STEPHENIE - SEPSIS  C/W INFECTED CYST - CHK RENAL SONO  ? NEEDS INDIUM SCAN IF CONT FEBRILE  MEDS ADJ

## 2017-09-16 DIAGNOSIS — A41.9 SEPSIS, UNSPECIFIED ORGANISM: ICD-10-CM

## 2017-09-16 DIAGNOSIS — I67.1 CEREBRAL ANEURYSM, NONRUPTURED: ICD-10-CM

## 2017-09-16 LAB
-  AMIKACIN: SIGNIFICANT CHANGE UP
-  AMPICILLIN/SULBACTAM: SIGNIFICANT CHANGE UP
-  AMPICILLIN: SIGNIFICANT CHANGE UP
-  AZTREONAM: SIGNIFICANT CHANGE UP
-  CEFAZOLIN: SIGNIFICANT CHANGE UP
-  CEFEPIME: SIGNIFICANT CHANGE UP
-  CEFOXITIN: SIGNIFICANT CHANGE UP
-  CEFTAZIDIME: SIGNIFICANT CHANGE UP
-  CEFTRIAXONE: SIGNIFICANT CHANGE UP
-  CIPROFLOXACIN: SIGNIFICANT CHANGE UP
-  ERTAPENEM: SIGNIFICANT CHANGE UP
-  GENTAMICIN: SIGNIFICANT CHANGE UP
-  IMIPENEM: SIGNIFICANT CHANGE UP
-  LEVOFLOXACIN: SIGNIFICANT CHANGE UP
-  MEROPENEM: SIGNIFICANT CHANGE UP
-  NITROFURANTOIN: SIGNIFICANT CHANGE UP
-  PIPERACILLIN/TAZOBACTAM: SIGNIFICANT CHANGE UP
-  TOBRAMYCIN: SIGNIFICANT CHANGE UP
-  TRIMETHOPRIM/SULFAMETHOXAZOLE: SIGNIFICANT CHANGE UP
ANION GAP SERPL CALC-SCNC: 12 MMOL/L — SIGNIFICANT CHANGE UP (ref 5–17)
APPEARANCE UR: CLEAR — SIGNIFICANT CHANGE UP
BACTERIA # UR AUTO: ABNORMAL
BILIRUB UR-MCNC: NEGATIVE — SIGNIFICANT CHANGE UP
BUN SERPL-MCNC: 28 MG/DL — HIGH (ref 8–20)
CALCIUM SERPL-MCNC: 7.6 MG/DL — LOW (ref 8.6–10.2)
CHLORIDE SERPL-SCNC: 100 MMOL/L — SIGNIFICANT CHANGE UP (ref 98–107)
CO2 SERPL-SCNC: 25 MMOL/L — SIGNIFICANT CHANGE UP (ref 22–29)
COLOR SPEC: YELLOW — SIGNIFICANT CHANGE UP
CREAT SERPL-MCNC: 3.06 MG/DL — HIGH (ref 0.5–1.3)
CULTURE RESULTS: SIGNIFICANT CHANGE UP
DIFF PNL FLD: ABNORMAL
EPI CELLS # UR: SIGNIFICANT CHANGE UP
GLUCOSE SERPL-MCNC: 117 MG/DL — HIGH (ref 70–115)
GLUCOSE UR QL: NEGATIVE MG/DL — SIGNIFICANT CHANGE UP
HCG UR QL: NEGATIVE — SIGNIFICANT CHANGE UP
HCT VFR BLD CALC: 28.9 % — LOW (ref 37–47)
HGB BLD-MCNC: 9.6 G/DL — LOW (ref 12–16)
KETONES UR-MCNC: NEGATIVE — SIGNIFICANT CHANGE UP
LACTATE SERPL-SCNC: 1.8 MMOL/L — SIGNIFICANT CHANGE UP (ref 0.5–2)
LEUKOCYTE ESTERASE UR-ACNC: ABNORMAL
MCHC RBC-ENTMCNC: 32 PG — HIGH (ref 27–31)
MCHC RBC-ENTMCNC: 33.2 G/DL — SIGNIFICANT CHANGE UP (ref 32–36)
MCV RBC AUTO: 96.3 FL — SIGNIFICANT CHANGE UP (ref 81–99)
METHOD TYPE: SIGNIFICANT CHANGE UP
NITRITE UR-MCNC: NEGATIVE — SIGNIFICANT CHANGE UP
ORGANISM # SPEC MICROSCOPIC CNT: SIGNIFICANT CHANGE UP
ORGANISM # SPEC MICROSCOPIC CNT: SIGNIFICANT CHANGE UP
PH UR: 8 — SIGNIFICANT CHANGE UP (ref 5–8)
PLATELET # BLD AUTO: 134 K/UL — LOW (ref 150–400)
POTASSIUM SERPL-MCNC: 3 MMOL/L — LOW (ref 3.5–5.3)
POTASSIUM SERPL-SCNC: 3 MMOL/L — LOW (ref 3.5–5.3)
PROT UR-MCNC: 30 MG/DL
RBC # BLD: 3 M/UL — LOW (ref 4.4–5.2)
RBC # FLD: 13 % — SIGNIFICANT CHANGE UP (ref 11–15.6)
RBC CASTS # UR COMP ASSIST: ABNORMAL /HPF (ref 0–4)
SODIUM SERPL-SCNC: 137 MMOL/L — SIGNIFICANT CHANGE UP (ref 135–145)
SP GR SPEC: 1.01 — SIGNIFICANT CHANGE UP (ref 1.01–1.02)
SPECIMEN SOURCE: SIGNIFICANT CHANGE UP
UROBILINOGEN FLD QL: NEGATIVE MG/DL — SIGNIFICANT CHANGE UP
WBC # BLD: 8.8 K/UL — SIGNIFICANT CHANGE UP (ref 4.8–10.8)
WBC # FLD AUTO: 8.8 K/UL — SIGNIFICANT CHANGE UP (ref 4.8–10.8)
WBC UR QL: ABNORMAL

## 2017-09-16 PROCEDURE — 99233 SBSQ HOSP IP/OBS HIGH 50: CPT

## 2017-09-16 PROCEDURE — 74176 CT ABD & PELVIS W/O CONTRAST: CPT | Mod: 26

## 2017-09-16 RX ORDER — POTASSIUM PHOSPHATE, MONOBASIC POTASSIUM PHOSPHATE, DIBASIC 236; 224 MG/ML; MG/ML
15 INJECTION, SOLUTION INTRAVENOUS ONCE
Qty: 0 | Refills: 0 | Status: COMPLETED | OUTPATIENT
Start: 2017-09-16 | End: 2017-09-16

## 2017-09-16 RX ORDER — HYDROMORPHONE HYDROCHLORIDE 2 MG/ML
2 INJECTION INTRAMUSCULAR; INTRAVENOUS; SUBCUTANEOUS EVERY 4 HOURS
Qty: 0 | Refills: 0 | Status: DISCONTINUED | OUTPATIENT
Start: 2017-09-16 | End: 2017-09-23

## 2017-09-16 RX ORDER — POTASSIUM CHLORIDE 20 MEQ
40 PACKET (EA) ORAL EVERY 4 HOURS
Qty: 0 | Refills: 0 | Status: COMPLETED | OUTPATIENT
Start: 2017-09-16 | End: 2017-09-16

## 2017-09-16 RX ORDER — ACETAMINOPHEN 500 MG
1000 TABLET ORAL ONCE
Qty: 0 | Refills: 0 | Status: COMPLETED | OUTPATIENT
Start: 2017-09-16 | End: 2017-09-16

## 2017-09-16 RX ADMIN — MAGNESIUM OXIDE 400 MG ORAL TABLET 400 MILLIGRAM(S): 241.3 TABLET ORAL at 11:36

## 2017-09-16 RX ADMIN — OXYCODONE AND ACETAMINOPHEN 1 TABLET(S): 5; 325 TABLET ORAL at 11:36

## 2017-09-16 RX ADMIN — Medication 40 MILLIEQUIVALENT(S): at 18:52

## 2017-09-16 RX ADMIN — OXYCODONE AND ACETAMINOPHEN 1 TABLET(S): 5; 325 TABLET ORAL at 02:11

## 2017-09-16 RX ADMIN — HYDROMORPHONE HYDROCHLORIDE 2 MILLIGRAM(S): 2 INJECTION INTRAMUSCULAR; INTRAVENOUS; SUBCUTANEOUS at 12:45

## 2017-09-16 RX ADMIN — MORPHINE SULFATE 2 MILLIGRAM(S): 50 CAPSULE, EXTENDED RELEASE ORAL at 09:40

## 2017-09-16 RX ADMIN — OXYCODONE AND ACETAMINOPHEN 1 TABLET(S): 5; 325 TABLET ORAL at 22:38

## 2017-09-16 RX ADMIN — OXYCODONE AND ACETAMINOPHEN 1 TABLET(S): 5; 325 TABLET ORAL at 06:47

## 2017-09-16 RX ADMIN — Medication 40 MILLIEQUIVALENT(S): at 09:40

## 2017-09-16 RX ADMIN — OXYCODONE AND ACETAMINOPHEN 1 TABLET(S): 5; 325 TABLET ORAL at 03:00

## 2017-09-16 RX ADMIN — MAGNESIUM OXIDE 400 MG ORAL TABLET 400 MILLIGRAM(S): 241.3 TABLET ORAL at 08:25

## 2017-09-16 RX ADMIN — POTASSIUM PHOSPHATE, MONOBASIC POTASSIUM PHOSPHATE, DIBASIC 62.5 MILLIMOLE(S): 236; 224 INJECTION, SOLUTION INTRAVENOUS at 09:40

## 2017-09-16 RX ADMIN — Medication 1000 MILLIGRAM(S): at 21:00

## 2017-09-16 RX ADMIN — Medication 250 MILLIGRAM(S): at 22:38

## 2017-09-16 RX ADMIN — Medication 150 MEQ/KG/HR: at 06:47

## 2017-09-16 RX ADMIN — HYDROMORPHONE HYDROCHLORIDE 2 MILLIGRAM(S): 2 INJECTION INTRAMUSCULAR; INTRAVENOUS; SUBCUTANEOUS at 19:03

## 2017-09-16 RX ADMIN — Medication 250 MILLIGRAM(S): at 06:48

## 2017-09-16 RX ADMIN — Medication 100 MILLIGRAM(S): at 06:47

## 2017-09-16 RX ADMIN — HYDROMORPHONE HYDROCHLORIDE 2 MILLIGRAM(S): 2 INJECTION INTRAMUSCULAR; INTRAVENOUS; SUBCUTANEOUS at 12:29

## 2017-09-16 RX ADMIN — SODIUM CHLORIDE 100 MILLILITER(S): 9 INJECTION, SOLUTION INTRAVENOUS at 09:39

## 2017-09-16 RX ADMIN — OXYCODONE AND ACETAMINOPHEN 1 TABLET(S): 5; 325 TABLET ORAL at 23:15

## 2017-09-16 RX ADMIN — OXYCODONE AND ACETAMINOPHEN 1 TABLET(S): 5; 325 TABLET ORAL at 12:30

## 2017-09-16 RX ADMIN — Medication 250 MILLIGRAM(S): at 18:52

## 2017-09-16 RX ADMIN — OXYCODONE AND ACETAMINOPHEN 1 TABLET(S): 5; 325 TABLET ORAL at 07:25

## 2017-09-16 RX ADMIN — Medication 400 MILLIGRAM(S): at 20:30

## 2017-09-16 RX ADMIN — FAMOTIDINE 20 MILLIGRAM(S): 10 INJECTION INTRAVENOUS at 11:36

## 2017-09-16 RX ADMIN — HYDROMORPHONE HYDROCHLORIDE 2 MILLIGRAM(S): 2 INJECTION INTRAMUSCULAR; INTRAVENOUS; SUBCUTANEOUS at 18:52

## 2017-09-16 RX ADMIN — Medication 1 TABLET(S): at 08:25

## 2017-09-16 RX ADMIN — Medication 100 MILLIGRAM(S): at 18:52

## 2017-09-16 RX ADMIN — MORPHINE SULFATE 2 MILLIGRAM(S): 50 CAPSULE, EXTENDED RELEASE ORAL at 10:40

## 2017-09-16 NOTE — PROGRESS NOTE ADULT - ASSESSMENT
PYELONEPHRITIS AND POSS INFECTED CYST R   ULTRASOUND  POSS NC MRI RENAL TO R/O LARGE CYSTS WITH FLUID    CDIFF  BETTER  CONT CURRENT TX

## 2017-09-16 NOTE — PROGRESS NOTE ADULT - SUBJECTIVE AND OBJECTIVE BOX
Patient is a 46y old  Female who presents with diarrhea , fever chills right flank pain diagnosed with  C diff infection and UTI , febrile yesterday   She is c/o headache  and right abdominal flank pain , few loose BM today reported   MRI  result reviewed with the patient , discussed with Dr Alan   Allergies    Demerol HCl (Hives)  Demerol HCl (Hives; Rash)  Phenergan (Hives; Rash)  Phenergan Fortis (Hives)    Intolerances        REVIEW OF SYSTEMS:    Vital Signs Last 24 Hrs  T(C): 37.1 (16 Sep 2017 05:08), Max: 38.9 (15 Sep 2017 14:47)  T(F): 98.8 (16 Sep 2017 05:08), Max: 102 (15 Sep 2017 14:47)  HR: 65 (16 Sep 2017 05:08) (65 - 76)  BP: 108/66 (16 Sep 2017 05:08) (104/55 - 108/66)  BP(mean): --  RR: 18 (16 Sep 2017 05:08) (18 - 20)  SpO2: 96% (15 Sep 2017 22:48) (96% - 96%)  PHYSICAL EXAM:    GENERAL: NAD, well-groomed, well-developed  NECK: Supple, No JVD, Normal thyroid  CHEST/LUNG: Clear to percussion bilaterally; No rales, rhonchi, wheezing, or rubs  HEART: Regular rate and rhythm; No murmurs, rubs, or gallops  ABDOMEN: Soft, RUQ , right flank tenderness on percussion , Nondistended; Bowel sounds present  EXTREMITIES:  2+ Peripheral Pulses, No clubbing, cyanosis, or edema  SKIN: No rashes or lesions      LABS:                        9.6    8.8   )-----------( 134      ( 16 Sep 2017 06:32 )             28.9   09-16    137  |  100  |  28.0<H>  ----------------------------<  117<H>  3.0<L>   |  25.0  |  3.06<H>    Ca    7.6<L>      16 Sep 2017 06:32  Phos  2.0     09-15    TPro  x   /  Alb  2.8<L>  /  TBili  x   /  DBili  x   /  AST  x   /  ALT  x   /  AlkPhos  x   09-15

## 2017-09-16 NOTE — PROGRESS NOTE ADULT - SUBJECTIVE AND OBJECTIVE BOX
Renal :    Patient seen and examined    I&O's Summary    15 Sep 2017 07:01  -  16 Sep 2017 07:00  --------------------------------------------------------  IN: 340 mL / OUT: 0 mL / NET: 340 mL    REVIEW OF SYSTEMS:    GI : Less diarrhoea,  RLQ Pain,      CONSTITUTIONAL: In Distress, Restless,  RESPIRATORY: No cough or SOB  CARDIOVASCULAR: No CP/palpitations,    GASTROINTESTINAL: Distended,  GENITOURINARY: Dysuria,  NEUROLOGICAL: No headaches/wk/numbness  MUSCULOSKELETAL:  No joint pain/swelling;   EXTREMITIES : no swelling,    Vital Signs Last 24 Hrs  T(C): 37.3 (16 Sep 2017 11:56), Max: 38.9 (15 Sep 2017 14:47)  T(F): 99.1 (16 Sep 2017 11:56), Max: 102 (15 Sep 2017 14:47)  HR: 77 (16 Sep 2017 11:56) (65 - 77)  BP: 112/62 (16 Sep 2017 11:56) (104/55 - 112/62)  BP(mean): --  RR: 18 (16 Sep 2017 11:56) (18 - 20)  SpO2: 96% (16 Sep 2017 11:56) (96% - 96%)    PHYSICAL EXAM:    GENERAL: In Distress, Pale,  EYES:  conjunctiva and sclera clear  NECK: Supple, No JVD/Bruit  NERVOUS SYSTEM:  A/O x3,   CHEST:  CTA ,No rales or rhonchi  HEART:  RRR, No murmurs  ABDOMEN: Distended, Tender, Large kidneys,  EXTREMITIES:  No Edema;  SKIN: No rashes    LABS:                        9.6    8.8   )-----------( 134      ( 16 Sep 2017 06:32 )             28.9     09-16    137  |  100  |  28.0<H>  ----------------------------<  117<H>  3.0<L>   |  25.0  |  3.06<H>    Ca    7.6<L>      16 Sep 2017 06:32  Phos  2.0     09-15    TPro  x   /  Alb  2.8<L>  /  TBili  x   /  DBili  x   /  AST  x   /  ALT  x   /  AlkPhos  x   09-15      MEDICATIONS  (STANDING):  aluminum hydroxide/magnesium hydroxide/simethicone Suspension PRN  heparin  Injectable  acetaminophen   Tablet. PRN  famotidine    Tablet  acetaminophen 325 mG/butalbital 50 mG/caffeine 40 mG PRN  oxyCODONE    5 mG/acetaminophen 325 mG PRN  vancomycin    Solution  aztreonam  IVPB  lactated ringers  lactobacillus acidophilus  aztreonam  IVPB  potassium chloride    Tablet ER  HYDROmorphone  Injectable PRN  acetaminophen  IVPB.

## 2017-09-16 NOTE — PROGRESS NOTE ADULT - SUBJECTIVE AND OBJECTIVE BOX
[facility] [place] HILARIA WATKINS MD FACP   AUGUSTUS HARRIS MD  Diplomates American Board of Internal Medicine and Infectious Diseases      ROLLY MEDEIROS  [id number]  46y    INTERVAL HPI/OVERNIGHT EVENTS:  AFEBRILE  LESS DIARRHEA  SL LESS R FLANK APIN    US PENDING     Vital Signs Last 24 Hrs  T(C): 37.1 (16 Sep 2017 05:08), Max: 38.9 (15 Sep 2017 14:47)  T(F): 98.8 (16 Sep 2017 05:08), Max: 102 (15 Sep 2017 14:47)  HR: 65 (16 Sep 2017 05:08) (65 - 85)  BP: 108/66 (16 Sep 2017 05:08) (91/55 - 108/66)  BP(mean): --  RR: 18 (16 Sep 2017 05:08) (18 - 20)  SpO2: 96% (15 Sep 2017 22:48) (95% - 96%)    ANTIBIOTICS  vancomycin    Solution 250 milliGRAM(s) Oral every 6 hours  aztreonam  IVPB      aztreonam  IVPB 2000 milliGRAM(s) IV Intermittent every 12 hours      Allergies    Demerol HCl (Hives)  Demerol HCl (Hives; Rash)  Phenergan (Hives; Rash)  Phenergan Fortis (Hives)    Intolerances        REVIEW OF SYSTEMS:    PHYSICAL EXAM:  Vital Signs Last 24 Hrs  T(C): 37.1 (16 Sep 2017 05:08), Max: 38.9 (15 Sep 2017 14:47)  T(F): 98.8 (16 Sep 2017 05:08), Max: 102 (15 Sep 2017 14:47)  HR: 65 (16 Sep 2017 05:08) (65 - 85)  BP: 108/66 (16 Sep 2017 05:08) (91/55 - 108/66)  BP(mean): --  RR: 18 (16 Sep 2017 05:08) (18 - 20)  SpO2: 96% (15 Sep 2017 22:48) (95% - 96%)      GEN: NAD, pleasant  HEENT: normocephalic and atraumatic. EOMI. DWAYNE. Moist mucosa. Clear Posterior pharynx.  NECK: Supple. No carotid bruits.  No lymphadenopathy or thyromegaly.  LUNGS: Clear to auscultation.  HEART: Regular rate and rhythm without murmur.  ABDOMEN: Soft, nontender, and nondistended.  Positive bowel sounds.  No hepatosplenomegaly was noted.   EXTREMITIES: Without any cyanosis, clubbing, rash, lesions or edema.  NEUROLOGIC: Cranial nerves II through XII are grossly intact.  MUSCULOSKELETAL:  SKIN: No ulceration or induration present    LABS:                        9.6    8.8   )-----------( 134      ( 16 Sep 2017 06:32 )             28.9       09-16    137  |  100  |  28.0<H>  ----------------------------<  117<H>  3.0<L>   |  25.0  |  3.06<H>    Ca    7.6<L>      16 Sep 2017 06:32  Phos  2.0     09-15  Mg     1.5     09-14    TPro  x   /  Alb  2.8<L>  /  TBili  x   /  DBili  x   /  AST  x   /  ALT  x   /  AlkPhos  x   09-15        09-16 @ 06:32  hct 28.9 % hgb 9.6 g/dL    09-16 @ 06:32  plat 134 K/uL wbc 8.8 K/uL    09-15 @ 15:50  hct 29.6 % hgb 10.0 g/dL    09-15 @ 15:50  plat 145 K/uL wbc 10.3 K/uL    09-15 @ 07:14  hct 30.8 % hgb 10.4 g/dL    09-15 @ 07:14  plat 142 K/uL wbc 9.7 K/uL    09-13 @ 19:01  hct 41.1 % hgb 14.5 g/dL    09-13 @ 19:01  plat 162 K/uL wbc 18.8 K/uL      09-16-17  creat 3.06 mg/dL gfr 20 mL/min/1.73M2 bun 28.0 mg/dL k 3.0 mmol/L  09-15-17  creat 3.35 mg/dL gfr 18 mL/min/1.73M2 bun 27.0 mg/dL k 3.0 mmol/L  09-15-17  creat 3.38 mg/dL gfr 18 mL/min/1.73M2 bun 32.0 mg/dL k 3.0 mmol/L  09-14-17  creat 3.60 mg/dL gfr 17 mL/min/1.73M2 bun 37.0 mg/dL k 3.0 mmol/L  09-13-17  creat 3.07 mg/dL gfr 20 mL/min/1.73M2 bun 33.0 mg/dL k 3.7 mmol/L      MICROBIOLOGY:        RADIOLOGY & ADDITIONAL STUDIES:          JOSE WATKINS MD FACP

## 2017-09-16 NOTE — PROCEDURE NOTE - NSPROCDETAILS_GEN_ALL_CORE
secured in place/ultrasound utilization/dressing applied/location identified, draped/prepped, sterile technique used/blood seen on insertion/flushes easily/sterile technique, catheter placed

## 2017-09-16 NOTE — PROGRESS NOTE ADULT - ASSESSMENT
1.ADPKD ,  STEPHENIE ( C. Difficile Colitis )  2.CKD - Stage ?  3.STEPHENIE  4.Cerebral Aneurysm      P : ON IVF & Antibiotics,    Discussed w. the family @ Length,    Rec :  NS Opinion re : Aneurysm  , CT abdomen ( No IV C )    Conferred w. Dr. Murray,

## 2017-09-16 NOTE — CONSULT NOTE ADULT - SUBJECTIVE AND OBJECTIVE BOX
Renal :       Admission Date: 31-Mar-2015.   ·  Arrived From home .  ·  Source of Information  Patient.    Patient is a 46y old  Female who presents with a chief complaint of nausea, vomiting, diarrhea, fever and body aches  she is c/o swelling of her face , right flank pain this am     chart reviewed     Allergies    Demerol HCl (Hives)  Demerol HCl (Hives; Rash)  Phenergan (Hives; Rash)  Phenergan Fortis (Hives)    REVIEW OF SYSTEMS: See Below,    Vital Signs Last 24 Hrs  T(C): 37.2 (14 Sep 2017 04:27), Max: 39.5 (13 Sep 2017 18:32)  T(F): 99 (14 Sep 2017 04:27), Max: 103.1 (13 Sep 2017 18:32)  HR: 90 (14 Sep 2017 04:27) (69 - 126)  BP: 119/62 (14 Sep 2017 04:27) (89/56 - 119/62)  BP(mean): --  RR: 17 (14 Sep 2017 04:27) (16 - 26)  SpO2: 97% (14 Sep 2017 04:27) (90% - 100%)    PHYSICAL EXAM:    GENERAL: NAD, well-groomed, well-developed  NECK: Supple, No JVD, Normal thyroid  CHEST/LUNG: Clear to percussion bilaterally; No rales, rhonchi, wheezing, or rubs  HEART: Regular rate and rhythm; No murmurs, rubs, or gallops  ABDOMEN: Soft, RUQ , right flank tenderness on percussion , Nondistended; Bowel sounds present  EXTREMITIES:  2+ Peripheral Pulses, No clubbing, cyanosis, or edema  SKIN: No rashes or lesions    LABS:                        14.5   18.8  )-----------( 162      ( 13 Sep 2017 19:01 )             41.1     09-14    138  |  104  |  37.0<H>  ----------------------------<  156<H>  3.0<L>   |  18.0<L>  |  3.60<H>    Ca    7.4<L>      14 Sep 2017 09:02  Phos  3.9     -  Mg     1.5     -    TPro  7.8  /  Alb  3.5  /  TBili  1.7  /  DBili  x   /  AST  18  /  ALT  14  /  AlkPhos  78  09-13      Urinalysis Basic - ( 13 Sep 2017 23:44 )    Color: Yellow / Appearance: Clear / S.015 / pH: x  Gluc: x / Ketone: Negative  / Bili: Negative / Urobili: Negative mg/dL   Blood: x / Protein: 100 mg/dL / Nitrite: Positive   Leuk Esterase: Moderate / RBC: 3-5 /HPF / WBC 6-10   Sq Epi: x / Non Sq Epi: Moderate / Bacteria: Few    RADIOLOGY & ADDITIONAL TESTS:      Assessment and Plan:   		  45 y/o female with HTN , s/p oral surgery 2 weeks ago. no fever    Problem/Plan - 1:  ·  Problem: Diarrhea, unspecified type.  Plan: will start clear and if tolerates advance   c diff result +,    Problem/Plan - 2:  ·  Problem: Renal insufficiency.  Plan: continue iv fluid ; Modified,   repeat labs ordered.     Problem/Plan - 3:  ·  Problem: Polycystic kidney disease.  Plan: outpatient follow up - in the past ( Dr.Hsu Gibson  )     Problem/Plan - 4:  ·  Problem: Urinary tract infection without hematuria, site unspecified.  Plan: possible pyelo, add rocephin pending cx and stool studies.     Problem/Plan - 5:  ·  Problem: Hypomagnesemia.  Plan: replete iv mag.     Problem/Plan - 6:  Problem: Hypokalemia. Plan: replete ordered.  		  Home Medications:     * Patient Currently Takes Medications as of 31-Mar-2015 14:03 documented in Prescription Writer,    · 	acetaminophen-HYDROcodone 300 mg-5 mg oral tablet:  , 1 tab(s) orally every 8 hours  · 	allopurinol:  , 300 milligram(s) orally once a day  · 	fentaNYL:  , 25 microgram(s) transdermal every 3 days  · 	hydrochlorothiazide:  , 25 milligram(s) orally once a day  · 	PriLOSEC:  , 40 milligram(s) orally once a day  · 	potassium citrate 10 mEq oral tablet, extended release:  ,  orally 2 times a day      Past Medical History:  GERD (gastroesophageal reflux disease)    Kidney polycystic disease    Kidney stones.    Past Surgical History:  H/O tubal ligation  ()  Kidney calculi    S/P cholecystectomy  ().    Family History:  Mother  Still living? Yes, Estimated age: Age Unknown  Family history of heart disease, Age at diagnosis: Age Unknown     Father  Still living? Yes, Estimated age: Age Unknown  Family history of hypertension, Age at diagnosis: Age Unknown.    Social History:  ·  Marital Status		  ·  Occupation	RN	  ·  Lives With	children	  ·  Substance Use	caffeine	  ·  Caffeine Type	pop/soda	  ·  Caffeine Amount/Frequency	3-4 cups/cans per day	  ·  Caffeine Withdrawal Pattern	None	    Tobacco Usage:  ·  Tobacco Usage: Former smoker	  ·  Tobacco Type: cigarettes	  ·  Number of Packs per Day: 1.5	  ·  Number of yrs: 20	  ·  Pack yrs: 30	  ·  Longest Period Tobacco-Free: 4 days	        Transfusion History:  ·  Blood Avoidance/Restrictions	none	  ·  Previous Blood Transfusion	no	        Hepatitis C Screening:  ·  Hepatitis C Status	Negative	    HIV Status:  ·  HIV Status: Negative/Unknown	      Devices:  ·  Implants/Medical Devices	None	    Health Management:   Health Management:  ·  Health Management/Screening	mammogram	  ·  Last Mammogram	never	  ·  Pap smear test done in past 3 years	yes	  ·  PAP Smear Results	normal	    Reproductive, Female:  ·  Is Patient Pregnant?	no	  ·  Is Patient Lactating?	no	  ·  Last Menstrual Date	LMP 3-18-15	    Review of Systems:   ·  General	negative	  ·  Skin/Breast	negative	  ·  Ophthalmologic	negative	  ·  ENMT	negative	  ·  Respiratory and Thorax	negative	  ·  Cardiovascular	negative	  ·  Gastrointestinal Symptoms	nausea; vomiting; abdominal pain	  ·  Gastrointestinal Comments	with pain	  ·  Genitourinary	negative	  ·  Musculoskeletal	negative	  ·  Neurological	negative	  ·  Psychiatric	negative	  ·  Hematology/Lymphatics	negative	  ·  Endocrine	negative	  ·  Allergic/Immunologic	negative	      Height/Weight/BSA/BMI:  ·  Height (in)	68 Inch(s)	  ·  Dosing Weight  (lbs)	219 Pound(s)	  ·  Dosing Weight (Kg)	99.3 kg	    T/HR/RR/BP/SPO2:  ·  Temp (F)	98.2 Degrees F	  ·  Temp (C)	36.8 Degrees C	  ·  Heart Rate	79 /min	  ·  Respiration Rate (breaths/min)	16 /min	  ·  BP Systolic	141 mm Hg	  ·  BP Diastolic	94 mm Hg	  ·  BP Noninvasive Mean	109 mm Hg	    Physical Exam:  ·  Constitutional	Well-developed, well nourished	  ·  Eyes	EOMI; PERRL; no drainage or redness	  ·  ENMT	No oral lesions; no gross abnormalities	  ·  Neck	No bruits; no thyromegaly or nodules	  ·  Breasts	not examined	  ·  Back	No deformity or limitation of movement	  ·  Respiratory	Breath Sounds equal & clear to percussion & auscultation, no accessory muscle use	  ·  Cardiovascular	Regular rate & rhythm, normal S1, S2; no murmurs, gallops or rubs; no S3, S4	  ·  Gastrointestinal	detailed exam	  ·  GI Normal	soft; bowel sounds normal; no rebound tenderness	  ·  Gastrointestinal Comments	noted ventral hernia	  ·  Genitourinary	patient refused	  ·  Rectal	patient refused	  ·  Extremities	No cyanosis, clubbing or edema	  ·  Vascular	Equal and normal pulses (carotid, femoral, dorsalis pedis)	  ·  Neurological	Alert & oriented; no sensory, motor or coordination deficits, normal reflexes	  ·  Skin	No lesions; no rash	  ·  Lymph Nodes	No lymphadedenopathy	  ·  Musculoskeletal	No joint pain, swelling or deformity; no limitation of movement	  ·  Psychiatric	Affect and characteristics of appearance, verbalizations, behaviors are appropriate	      Diagnosis:        PMH:  · 	GERD (gastroesophageal reflux disease): Entered Date: 05-Sep-2014 03:39, Status: Active, Scope: General, Entered By: Luiz Manriquez, Last Modified By: Luiz Manriquez  · 	Kidney stones: Entered Date: 05-Sep-2014 03:39, Status: Active, Scope: General, Entered By: Luiz Manriquez, Last Modified By: Luiz Manriquez  · 	Kidney polycystic disease: Entered Date: 05-Sep-2014 03:39, Status: Active, Scope: General, Entered By: Luiz Manriquez, Last Modified By: Luiz Manriquez       PSH:  · 	Kidney calculi: Entered Date: 25-Mar-2015 10:35, Status: Active, Scope: General, Entered By: Jolene Fischer, Last Modified By: Jolene Fischer  · 	S/P cholecystectomy: Entered Date: 25-Mar-2015 10:35, Status: Active, Scope: General, Entered By: Jolene Fischer, Last Modified By: Jolene Fischer    Assessment and Plan:     ·  Plan: Laparoscopic ventral hernia repair with mesh possible open.     DX :  ADPKD ,    STEPHENIE
HISTORY OF PRESENT ILLNESS:   Patient is a 45 y/o F with a history of PCKD C. diff, 3 weeks s/p oral surgery on August 25th followed by 1 week treatment with amoxicillin. She was admitted 9/13/17 and found to have c.diff, UTI and acute on chronic renal failure. The patient reports that on Monday, 9/11, prior to admission, she was at work and started to develop a holocephalic pressure and band like headache. This persisted throughout the day and slightly progressed.    The headache has persisted since that time, maintaining at a 3 but severely exacerbated by valsalva maneuvers and positional changes. She says that now she can feel pressure behind her eyes and hears "swooshing sounds" bilaterally. She has tried tylenol and received morphine since admission without relief. She had percocet earlier and reports that gave her some mild relief. She denies any previous history of migraines or chronic headaches. She denies that the headache started prior to 9/11 and not following amoxicillin use or surgery. She has used amoxicillin before without issue. She denies vision changes, focal numbness or weakness. She had 12 bowel movements today but has been receiving fluids regularly. Otherwise, no new complaints.    Due to daily persistent headache, nephrology ordered MRA due to patient's risk for dissection, ruptured aneurysms, etc with her PCKD history. 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     PAST MEDICAL & SURGICAL HISTORY:  GERD (gastroesophageal reflux disease)  Polycystic kidney disease  Kidney stones  Arthropathy NOS - shoulder  Hyperlipidemia  H/O tubal ligation: (1996)  Kidney calculi  S/P cholecystectomy: (2013)  Tubal ligation: 1995  Ankle fracture - Left: [facility] barajas procedure 2000    FAMILY HISTORY:  Family history of hypertension  Family history of heart disease (Father)      SOCIAL HISTORY:  +tobacco  occasional alcohol    ALLERGIES  Demerol HCl (Hives)  Demerol HCl (Hives; Rash)  Phenergan (Hives; Rash)  Phenergan Fortis (Hives)        REVIEW OF SYSTEMS  ROS negative other than as stated in HPI      MEDICATIONS:  Antibiotics:  vancomycin    Solution 250 milliGRAM(s) Oral every 6 hours  aztreonam  IVPB      aztreonam  IVPB 2000 milliGRAM(s) IV Intermittent every 12 hours    Neuro:  acetaminophen   Tablet. 650 milliGRAM(s) Oral every 6 hours PRN  acetaminophen 325 mG/butalbital 50 mG/caffeine 40 mG 1 Tablet(s) Oral every 6 hours PRN  oxyCODONE    5 mG/acetaminophen 325 mG 1 Tablet(s) Oral every 4 hours PRN  HYDROmorphone  Injectable 2 milliGRAM(s) IV Push every 4 hours PRN  acetaminophen  IVPB. 1000 milliGRAM(s) IV Intermittent once    Anticoagulation:  heparin  Injectable 5000 Unit(s) SubCutaneous every 12 hours    OTHER:  aluminum hydroxide/magnesium hydroxide/simethicone Suspension 30 milliLiter(s) Oral every 4 hours PRN  famotidine    Tablet 20 milliGRAM(s) Oral daily  lactobacillus acidophilus 1 Tablet(s) Oral two times a day with meals    IVF:  lactated ringers 1000 milliLiter(s) IV Continuous <Continuous>  potassium chloride    Tablet ER 40 milliEquivalent(s) Oral every 4 hours      Vital Signs Last 24 Hrs  T(C): 37.7 (16 Sep 2017 16:17), Max: 37.7 (16 Sep 2017 16:17)  T(F): 99.8 (16 Sep 2017 16:17), Max: 99.8 (16 Sep 2017 16:17)  HR: 61 (16 Sep 2017 16:17) (61 - 77)  BP: 112/70 (16 Sep 2017 16:17) (106/58 - 112/70)  BP(mean): --  RR: 20 (16 Sep 2017 16:17) (18 - 20)  SpO2: 96% (16 Sep 2017 11:56) (96% - 96%)      PHYSICAL EXAM:  GENERAL: NAD, well-groomed, well-developed  HEAD:  Atraumatic, normocephalic  EYES: Conjunctiva and sclera clear  ENMT: moist mucous membranes, good dentition, no lesions  NECK: Supple, no JVD. Negative Kernig's or brudzinski's.  MENTAL STATUS: AAO x3; no noted aphasia, follows linear conversation, good attention, able to name high and low frequency objects.   CRANIAL NERVES: PERRL, EOMI, no noted facial asymmetry; facial sensation grossly intact to light touch b/l;  tongue midline  MOTOR: generalized weakness but symmetric  SENSATION: grossly intact to light touch all extremities  MUSCLE STRETCH REFLEXES: no Boucher's b/l; no clonus b/l  MUSCULOSKELETAL: paracervical spasm and tenderness to palpation  CHEST/LUNG: Clear to auscultation bilaterally  HEART: Regular rate and rhythm  ABDOMEN: Soft, nontender, nondistended  EXTREMITIES:  2+ peripheral pulses, no clubbing, cyanosis, or edema    LABS:                        9.6    8.8   )-----------( 134      ( 16 Sep 2017 06:32 )             28.9     09-16    137  |  100  |  28.0<H>  ----------------------------<  117<H>  3.0<L>   |  25.0  |  3.06<H>    Ca    7.6<L>      16 Sep 2017 06:32  Phos  2.0     09-15      RADIOLOGY & ADDITIONAL STUDIES:  MRA Head w/o Cont (09.15.17 @ 13:19)  IMPRESSION: Right cavernous carotid artery 3 mm aneurysm.
[facility] [place] HILARIA WATKINS MD FACP   AUGUSTUS HARRIS MD  Diplomates American Board of Internal Medicine and Infecctious Diseases      [id number]  ROLLY MEDEIROS is a 46y  Female     CC:  ADMITTED WITH FEVER X 48 HRS AND DIARRHEA ON ADMIT TO [facility]  H/O POLYCYSTIC KIDNEYS WITH COMPLICATED UTI IN PAST  ALSO C DIFF 2 YRS AGO  HX OF FEVERS X 48 HRS PTA WITH NO DIARRHEA  NO  SXS BUT R FLANK PAIN  ON ADMIT DEVELOPED DIARRHEA THAT IS CDIFF POS  TX MULT IV AND PO ABS AND STILL FEBRILE  URINE >100K ECOLI - SENSI PENDING  CT WITH L SIDED TERAN COLITIS (ON REVIEW WITH DIFFERENT RADIOLOGIST - ADDENDUM TO BE SENT    HAD DENTAL WORK END AUGUST AND TX PO AMOX AFTER X 7 DAYS    Past Medical & Surgical Hx:  PAST MEDICAL & SURGICAL HISTORY:  GERD (gastroesophageal reflux disease)  Polycystic kidney disease  Kidney stones  Arthropathy NOS - shoulder  Hyperlipidemia  H/O tubal ligation: ()  Kidney calculi  S/P cholecystectomy: ()  Tubal ligation:   Ankle fracture - Left: [facility] procedure       Problem List:  HEALTH ISSUES - PROBLEM Dx:  New daily persistent headache: New daily persistent headache  Acute renal failure superimposed on chronic kidney disease, unspecified CKD stage, unspecified acute renal failure type: Acute renal failure superimposed on chronic kidney disease, unspecified CKD stage, unspecified acute renal failure type  Clostridium difficile diarrhea: Clostridium difficile diarrhea  Hypokalemia: Hypokalemia  Hypomagnesemia: Hypomagnesemia  Urinary tract infection without hematuria, site unspecified: Urinary tract infection without hematuria, site unspecified  Polycystic kidney disease: Polycystic kidney disease  Renal insufficiency: Renal insufficiency  Diarrhea, unspecified type: Diarrhea, unspecified type            Allergies    Demerol HCl (Hives)  Demerol HCl (Hives; Rash)  Phenergan (Hives; Rash)  Phenergan Fortis (Hives)    Intolerances          ANTIBIOTICS:   vancomycin    Solution 250 milliGRAM(s) Oral every 6 hours  aztreonam  IVPB      aztreonam  IVPB 2000 milliGRAM(s) IV Intermittent Q 12     Review of Systems: - Negative except as mentioned below  R FLANK PAIN  MILD DIARRHEA    Physical Exam:    Vital Signs Last 24 Hrs  T(C): 38.1 (15 Sep 2017 16:16), Max: 38.9 (15 Sep 2017 14:47)  T(F): 100.6 (15 Sep 2017 16:16), Max: 102 (15 Sep 2017 14:47)  HR: 76 (15 Sep 2017 16:16) (72 - 85)  BP: 105/57 (15 Sep 2017 16:16) (91/55 - 107/60)  BP(mean): --  RR: 20 (15 Sep 2017 16:16) (17 - 20)  SpO2: 95% (15 Sep 2017 09:31) (95% - 97%)        GEN: NAD, pleasant  HEENT: normocephalic and atraumatic. EOMI. DWAYNE. Moist mucosa. Clear Posterior pharynx.  NECK: Supple. No carotid bruits.  No lymphadenopathy or thyromegaly.  LUNGS: Clear to auscultation.  HEART: Regular rate and rhythm without murmur.  ABDOMEN: R FLANK PAIN, ACTIVE BSS WITH MILD PAIN  EXTREMITIES: Without any cyanosis, clubbing, rash, lesions or edema.  NEUROLOGIC: Cranial nerves II through XII are grossly intact.  MUSCULOSKELETAL:  SKIN: No ulceration or induration present.      Labs:                        10.0   10.3  )-----------( 145      ( 15 Sep 2017 15:50 )             29.6     09-15    139  |  99  |  27.0<H>  ----------------------------<  139<H>  3.0<L>   |  24.0  |  3.35<H>    Ca    7.7<L>      15 Sep 2017 15:50  Phos  2.0     09-15  Mg     1.5         TPro  x   /  Alb  2.8<L>  /  TBili  x   /  DBili  x   /  AST  x   /  ALT  x   /  AlkPhos  x   09-15      Urinalysis Basic - ( 13 Sep 2017 23:44 )    Color: Yellow / Appearance: Clear / S.015 / pH: x  Gluc: x / Ketone: Negative  / Bili: Negative / Urobili: Negative mg/dL   Blood: x / Protein: 100 mg/dL / Nitrite: Positive   Leuk Esterase: Moderate / RBC: 3-5 /HPF / WBC 6-10   Sq Epi: x / Non Sq Epi: Moderate / Bacteria: Few        Hemoglobin: 10.0 g/dL (09-15 @ 15:50)  Platelet Count - Automated: 145 K/uL (09-15 @ 15:50)  WBC Count: 10.3 K/uL (09-15 @ 15:50)  Hematocrit: 29.6 % (09-15 @ 15:50)  Hematocrit: 30.8 % (09-15 @ 07:14)  Hemoglobin: 10.4 g/dL (09-15 @ 07:14)  Platelet Count - Automated: 142 K/uL (09-15 @ 07:14)  WBC Count: 9.7 K/uL (09-15 @ 07:14)  Hemoglobin: 14.5 g/dL ( @ 19:01)  Platelet Count - Automated: 162 K/uL ( @ 19:01)  WBC Count: 18.8 K/uL ( @ 19:01)  Hematocrit: 41.1 % ( 19:01)    Sodium, Serum: 139 mmol/L (09-15 @ 15:50)  Potassium, Serum: 3.0 mmol/L <L> (09-15 @ 15:50)  Blood Urea Nitrogen, Serum: 27.0 mg/dL <H> (09-15 @ 15:50)  Sodium, Serum: 140 mmol/L (09-15 @ 07:14)  Potassium, Serum: 3.0 mmol/L <L> (09-15 @ :14)  Blood Urea Nitrogen, Serum: 32.0 mg/dL <H> (09-15 @ 07:14)  Potassium, Serum: 3.0 mmol/L <L> ( @ 09:02)  Blood Urea Nitrogen, Serum: 37.0 mg/dL <H> ( @ 09:02)  Sodium, Serum: 138 mmol/L ( @ 09:02)  Potassium, Serum: 3.7 mmol/L ( @ 19:01)  Blood Urea Nitrogen, Serum: 33.0 mg/dL <H> ( @ 19:01)  Sodium, Serum: 134 mmol/L <L> ( @ 19:01)          MICROBIOLOGY        GEORGE WATKINS MD [facility]

## 2017-09-16 NOTE — CONSULT NOTE ADULT - ASSESSMENT
46 y/oF with PCKD presents with 5 days of persistent headache, band like, creating pressure behind the eyes, refractory to medications. MRA with R cavernous carotid artery 3mm aneurysm. Will need to rule out occult SAH. Other differentials include status migrainosis.    PLAN:  Discussed with Dr. Aceves  Will need to discuss with nephrology if we can order CTA H/N with patient's renal function. Team is unsure of baseline.  Lumbar puncture to r/o SAH  Headache management per team  Will continue to follow 46 y/oF with PCKD presents with 5 days of persistent headache, band like, creating pressure behind the eyes, refractory to medications. MRA with R cavernous carotid artery 3mm aneurysm. Will need to rule out occult SAH. Other differentials include status migrainosis.    PLAN:  Discussed with Dr. Aceves  Will need to discuss with nephrology if we can order CTA H/N with patient's renal function. Team is unsure of baseline.  Lumbar puncture to r/o SAH       - patient is hesitant about LP. Potentially can wait for CTA results to order LP if CTA is obtainable  Headache management per team  Will continue to follow

## 2017-09-17 DIAGNOSIS — N20.0 CALCULUS OF KIDNEY: ICD-10-CM

## 2017-09-17 DIAGNOSIS — E55.9 VITAMIN D DEFICIENCY, UNSPECIFIED: ICD-10-CM

## 2017-09-17 LAB
24R-OH-CALCIDIOL SERPL-MCNC: 9.7 NG/ML — LOW (ref 30–100)
ANION GAP SERPL CALC-SCNC: 13 MMOL/L — SIGNIFICANT CHANGE UP (ref 5–17)
APPEARANCE CSF: CLEAR — SIGNIFICANT CHANGE UP
BUN SERPL-MCNC: 26 MG/DL — HIGH (ref 8–20)
CALCIUM SERPL-MCNC: 8 MG/DL — LOW (ref 8.4–10.5)
CALCIUM SERPL-MCNC: 8.5 MG/DL — LOW (ref 8.6–10.2)
CHLORIDE SERPL-SCNC: 100 MMOL/L — SIGNIFICANT CHANGE UP (ref 98–107)
CO2 SERPL-SCNC: 27 MMOL/L — SIGNIFICANT CHANGE UP (ref 22–29)
COLOR CSF: SIGNIFICANT CHANGE UP
CREAT SERPL-MCNC: 2.83 MG/DL — HIGH (ref 0.5–1.3)
CULTURE RESULTS: NO GROWTH — SIGNIFICANT CHANGE UP
CULTURE RESULTS: NO GROWTH — SIGNIFICANT CHANGE UP
CULTURE RESULTS: SIGNIFICANT CHANGE UP
GLUCOSE CSF-MCNC: 55 MG/DL — SIGNIFICANT CHANGE UP (ref 40–70)
GLUCOSE SERPL-MCNC: 85 MG/DL — SIGNIFICANT CHANGE UP (ref 70–115)
GRAM STN FLD: SIGNIFICANT CHANGE UP
INR BLD: 1.16 RATIO — SIGNIFICANT CHANGE UP (ref 0.88–1.16)
MAGNESIUM SERPL-MCNC: 1.7 MG/DL — SIGNIFICANT CHANGE UP (ref 1.6–2.6)
NEUTROPHILS # CSF: SIGNIFICANT CHANGE UP %
NRBC NFR CSF: 0 /UL — SIGNIFICANT CHANGE UP (ref 0–5)
PHOSPHATE SERPL-MCNC: 3.1 MG/DL — SIGNIFICANT CHANGE UP (ref 2.4–4.7)
POTASSIUM SERPL-MCNC: 4.2 MMOL/L — SIGNIFICANT CHANGE UP (ref 3.5–5.3)
POTASSIUM SERPL-SCNC: 4.2 MMOL/L — SIGNIFICANT CHANGE UP (ref 3.5–5.3)
PROT CSF-MCNC: 20 MG/DL — SIGNIFICANT CHANGE UP (ref 15–45)
PROTHROM AB SERPL-ACNC: 12.8 SEC — HIGH (ref 9.8–12.7)
PTH-INTACT FLD-MCNC: 232 PG/ML — HIGH (ref 15–65)
RBC # CSF: 2 /CMM — HIGH (ref 0–1)
SODIUM SERPL-SCNC: 140 MMOL/L — SIGNIFICANT CHANGE UP (ref 135–145)
SPECIMEN SOURCE: SIGNIFICANT CHANGE UP
TUBE TYPE: SIGNIFICANT CHANGE UP

## 2017-09-17 PROCEDURE — 99233 SBSQ HOSP IP/OBS HIGH 50: CPT

## 2017-09-17 PROCEDURE — 99232 SBSQ HOSP IP/OBS MODERATE 35: CPT

## 2017-09-17 PROCEDURE — 62270 DX LMBR SPI PNXR: CPT

## 2017-09-17 PROCEDURE — 77003 FLUOROGUIDE FOR SPINE INJECT: CPT | Mod: 26

## 2017-09-17 RX ORDER — CALCITRIOL 0.5 UG/1
0.25 CAPSULE ORAL DAILY
Qty: 0 | Refills: 0 | Status: DISCONTINUED | OUTPATIENT
Start: 2017-09-17 | End: 2017-09-27

## 2017-09-17 RX ORDER — ERGOCALCIFEROL 1.25 MG/1
50000 CAPSULE ORAL
Qty: 0 | Refills: 0 | Status: DISCONTINUED | OUTPATIENT
Start: 2017-09-17 | End: 2017-09-27

## 2017-09-17 RX ORDER — ACETAMINOPHEN 500 MG
650 TABLET ORAL ONCE
Qty: 0 | Refills: 0 | Status: COMPLETED | OUTPATIENT
Start: 2017-09-17 | End: 2017-09-17

## 2017-09-17 RX ADMIN — HYDROMORPHONE HYDROCHLORIDE 2 MILLIGRAM(S): 2 INJECTION INTRAMUSCULAR; INTRAVENOUS; SUBCUTANEOUS at 15:27

## 2017-09-17 RX ADMIN — HYDROMORPHONE HYDROCHLORIDE 2 MILLIGRAM(S): 2 INJECTION INTRAMUSCULAR; INTRAVENOUS; SUBCUTANEOUS at 10:09

## 2017-09-17 RX ADMIN — HYDROMORPHONE HYDROCHLORIDE 2 MILLIGRAM(S): 2 INJECTION INTRAMUSCULAR; INTRAVENOUS; SUBCUTANEOUS at 06:35

## 2017-09-17 RX ADMIN — OXYCODONE AND ACETAMINOPHEN 1 TABLET(S): 5; 325 TABLET ORAL at 17:35

## 2017-09-17 RX ADMIN — HYDROMORPHONE HYDROCHLORIDE 2 MILLIGRAM(S): 2 INJECTION INTRAMUSCULAR; INTRAVENOUS; SUBCUTANEOUS at 21:11

## 2017-09-17 RX ADMIN — OXYCODONE AND ACETAMINOPHEN 1 TABLET(S): 5; 325 TABLET ORAL at 14:00

## 2017-09-17 RX ADMIN — Medication 650 MILLIGRAM(S): at 10:09

## 2017-09-17 RX ADMIN — Medication 650 MILLIGRAM(S): at 05:46

## 2017-09-17 RX ADMIN — OXYCODONE AND ACETAMINOPHEN 1 TABLET(S): 5; 325 TABLET ORAL at 05:11

## 2017-09-17 RX ADMIN — OXYCODONE AND ACETAMINOPHEN 1 TABLET(S): 5; 325 TABLET ORAL at 05:45

## 2017-09-17 RX ADMIN — Medication 1 TABLET(S): at 17:36

## 2017-09-17 RX ADMIN — HYDROMORPHONE HYDROCHLORIDE 2 MILLIGRAM(S): 2 INJECTION INTRAMUSCULAR; INTRAVENOUS; SUBCUTANEOUS at 20:56

## 2017-09-17 RX ADMIN — OXYCODONE AND ACETAMINOPHEN 1 TABLET(S): 5; 325 TABLET ORAL at 12:57

## 2017-09-17 RX ADMIN — SODIUM CHLORIDE 100 MILLILITER(S): 9 INJECTION, SOLUTION INTRAVENOUS at 05:11

## 2017-09-17 RX ADMIN — Medication 250 MILLIGRAM(S): at 23:31

## 2017-09-17 RX ADMIN — OXYCODONE AND ACETAMINOPHEN 1 TABLET(S): 5; 325 TABLET ORAL at 21:50

## 2017-09-17 RX ADMIN — Medication 250 MILLIGRAM(S): at 17:36

## 2017-09-17 RX ADMIN — Medication 100 MILLIGRAM(S): at 17:36

## 2017-09-17 RX ADMIN — OXYCODONE AND ACETAMINOPHEN 1 TABLET(S): 5; 325 TABLET ORAL at 18:13

## 2017-09-17 RX ADMIN — Medication 250 MILLIGRAM(S): at 05:11

## 2017-09-17 RX ADMIN — HYDROMORPHONE HYDROCHLORIDE 2 MILLIGRAM(S): 2 INJECTION INTRAMUSCULAR; INTRAVENOUS; SUBCUTANEOUS at 16:39

## 2017-09-17 RX ADMIN — ERGOCALCIFEROL 50000 UNIT(S): 1.25 CAPSULE ORAL at 17:36

## 2017-09-17 RX ADMIN — HYDROMORPHONE HYDROCHLORIDE 2 MILLIGRAM(S): 2 INJECTION INTRAMUSCULAR; INTRAVENOUS; SUBCUTANEOUS at 10:25

## 2017-09-17 RX ADMIN — OXYCODONE AND ACETAMINOPHEN 1 TABLET(S): 5; 325 TABLET ORAL at 22:50

## 2017-09-17 RX ADMIN — Medication 650 MILLIGRAM(S): at 11:00

## 2017-09-17 RX ADMIN — HYDROMORPHONE HYDROCHLORIDE 2 MILLIGRAM(S): 2 INJECTION INTRAMUSCULAR; INTRAVENOUS; SUBCUTANEOUS at 06:19

## 2017-09-17 RX ADMIN — Medication 100 MILLIGRAM(S): at 05:10

## 2017-09-17 NOTE — PROGRESS NOTE ADULT - ASSESSMENT
case d/w Dr Aceves and images reviewed   no immediate NSx intervention indicated at this time  neuro-checks q4 HR   HOB > 30 degrees  hold all AC/ ASA   SBP < 150mmHg  plan: LP w IR r/o xanthochromia; Dr Aceves d/w DR Aguilar and Dr Silva updated/ coags to be ordered  will f/y results/ further plan as per Attending

## 2017-09-17 NOTE — PROGRESS NOTE ADULT - SUBJECTIVE AND OBJECTIVE BOX
[facility] [place] HILARIA WATKINS MD FACP   AUGUSTUS HARRIS MD  Diplomates American Board of Internal Medicine and Infectious Diseases      ROLLY MEDEIROS  [id number]  46y    INTERVAL HPI/OVERNIGHT EVENTS:  T   MIN DIARRHEA  PAIN R FLANK    SONO WITH CYSTS  MRI POSS ANEURYSM - FOR LP  NO POS BLOODS    Vital Signs Last 24 Hrs  T(C): 37.7 (17 Sep 2017 09:56), Max: 38.3 (17 Sep 2017 05:24)  T(F): 99.9 (17 Sep 2017 09:56), Max: 101 (17 Sep 2017 05:24)  HR: 69 (17 Sep 2017 09:56) (61 - 77)  BP: 122/74 (17 Sep 2017 09:56) (112/62 - 125/54)  BP(mean): --  RR: 18 (17 Sep 2017 09:56) (18 - 20)  SpO2: 99% (17 Sep 2017 05:24) (96% - 99%)    ANTIBIOTICS  vancomycin    Solution 250 milliGRAM(s) Oral every 6 hours  aztreonam  IVPB      aztreonam  IVPB 2000 milliGRAM(s) IV Intermittent every 12 hours      Allergies    Demerol HCl (Hives)  Demerol HCl (Hives; Rash)  Phenergan (Hives; Rash)  Phenergan Fortis (Hives)    Intolerances        REVIEW OF SYSTEMS:PAIN R SIDE  HA    PHYSICAL EXAM:  Vital Signs Last 24 Hrs  T(C): 37.7 (17 Sep 2017 09:56), Max: 38.3 (17 Sep 2017 05:24)  T(F): 99.9 (17 Sep 2017 09:56), Max: 101 (17 Sep 2017 05:24)  HR: 69 (17 Sep 2017 09:56) (61 - 77)  BP: 122/74 (17 Sep 2017 09:56) (112/62 - 125/54)  BP(mean): --  RR: 18 (17 Sep 2017 09:56) (18 - 20)  SpO2: 99% (17 Sep 2017 05:24) (96% - 99%)      GEN: NAD, pleasant  HEENT: normocephalic and atraumatic. EOMI. DWAYNE. Moist mucosa. Clear Posterior pharynx.  NECK: Supple. No carotid bruits.  No lymphadenopathy or thyromegaly.  LUNGS: Clear to auscultation.  HEART: Regular rate and rhythm without murmur.  ABDOMEN: Soft, nontender, and nondistended.  Positive bowel sounds.  No hepatosplenomegaly was noted.  EXTREMITIES: Without any cyanosis, clubbing, rash, lesions or edema.  NEUROLOGIC: Cranial nerves II through XII are grossly intact.  MUSCULOSKELETAL:  SKIN: No ulceration or induration present    LABS:                        9.6    8.8   )-----------( 134      ( 16 Sep 2017 06:32 )             28.9       09-17    140  |  100  |  26.0<H>  ----------------------------<  85  4.2   |  27.0  |  2.83<H>    Ca    8.5<L>      17 Sep 2017 06:05  Phos  3.1     09-17  Mg     1.7     09-17    TPro  x   /  Alb  2.8<L>  /  TBili  x   /  DBili  x   /  AST  x   /  ALT  x   /  AlkPhos  x   09-15        09-16 @ 06:32  hct 28.9 % hgb 9.6 g/dL    09-16 @ 06:32  plat 134 K/uL wbc 8.8 K/uL    09-15 @ 15:50  hct 29.6 % hgb 10.0 g/dL    09-15 @ 15:50  plat 145 K/uL wbc 10.3 K/uL    09-15 @ 07:14  hct 30.8 % hgb 10.4 g/dL    09-15 @ 07:14  plat 142 K/uL wbc 9.7 K/uL      09-17-17  creat 2.83 mg/dL gfr 22 mL/min/1.73M2 bun 26.0 mg/dL k 4.2 mmol/L  09-16-17  creat 3.06 mg/dL gfr 20 mL/min/1.73M2 bun 28.0 mg/dL k 3.0 mmol/L  09-15-17  creat 3.35 mg/dL gfr 18 mL/min/1.73M2 bun 27.0 mg/dL k 3.0 mmol/L  09-15-17  creat 3.38 mg/dL gfr 18 mL/min/1.73M2 bun 32.0 mg/dL k 3.0 mmol/L      MICROBIOLOGY:  Culture - Urine (09.13.17 @ 23:45)    -  Amikacin: S <=16    -  Ampicillin: R >16    -  Ampicillin/Sulbactam: S <=8/4    -  Aztreonam: S <=4    -  Cefazolin: S <=8    -  Cefepime: S <=4    -  Cefoxitin: S <=8    -  Ceftazidime: S <=1    -  Ceftriaxone: S <=1    -  Ciprofloxacin: S <=1    -  Ertapenem: S <=1    -  Gentamicin: S <=4    -  Imipenem: S <=1    -  Levofloxacin: S <=2    -  Meropenem: S <=1    -  Nitrofurantoin: S <=32    -  Piperacillin/Tazobactam: S <=16    -  Tobramycin: S <=4    -  Trimethoprim/Sulfamethoxazole: S <=2/38    Specimen Source: .Urine Clean Catch (Midstream)    Culture Results:   >100,000 CFU/ml Klebsiella pneumoniae            RADIOLOGY & ADDITIONAL STUDIES:          JOSE WATKINS MD FACP

## 2017-09-17 NOTE — PROGRESS NOTE ADULT - SUBJECTIVE AND OBJECTIVE BOX
NEUROSURGERY PROGRESS NOTE:  17 Patient is a 45 y/o F with a history of PCKD C. diff, s/p oral surgery on  followed by 1 week treatment with amoxicillin.   She was admitted 17 and found to have c.diff, UTI and acute on chronic renal failure.   The patient reports that on Monday, , prior to admission, she was at work and started to develop a holocephalic pressure and band like headache. This persisted throughout the day and slightly progressed.  The headache has persisted since that time, maintaining at a 3 but severely exacerbated by valsalva maneuvers and positional changes. She says that now she can feel pressure behind her eyes and hears "swooshing sounds" bilaterally. She has tried tylenol and received morphine since admission without relief. She had percocet earlier and reports that gave her some mild relief. She denies any previous history of migraines or chronic headaches. She denies that the headache started prior to  and not following amoxicillin use or surgery. She has used amoxicillin before without issue. She denies vision changes, focal numbness or weakness. She had 12 bowel movements today but has been receiving fluids regularly. Otherwise, no new complaints.  Due to daily persistent headache, nephrology ordered MRA due to patient's risk for dissection, ruptured aneurysms, etc with her PCKD history. 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     PAST MEDICAL & SURGICAL HISTORY:  GERD (gastroesophageal reflux disease)  Polycystic kidney disease  Kidney stones  Arthropathy NOS - shoulder  Hyperlipidemia  H/O tubal ligation: ()  Kidney calculi  S/P cholecystectomy: ()  Ankle fracture - Left: [facility] procedure 17  pt seen and evaluated in bed w Dr Aceves, pt's mother at bedside.  pt c/o + headache 3/10 on the pain scale    - Nausea / - Vomiting today, + nausea yesterday  denies weakness  denies numbness/ tingling  denies visual changes, + mild photophobia   denies C/T/LS  Spine pain  +  void  + BM - loose stools decreased in frequency from ~10 to 3x today   + OOB  + diet, NPO since this AM - for poss IR/ LP   + venodynes b/l when in bed, OOB and refused heparin injections       MEDICATIONS  (STANDING):  heparin  Injectable 5000 Unit(s) SubCutaneous every 12 hours - refused since admit   famotidine    Tablet 20 milliGRAM(s) Oral daily  vancomycin    Solution 250 milliGRAM(s) Oral every 6 hours  aztreonam  IVPB      lactated ringers 1000 milliLiter(s) (100 mL/Hr) IV Continuous <Continuous>  lactobacillus acidophilus 1 Tablet(s) Oral two times a day with meals  aztreonam  IVPB 2000 milliGRAM(s) IV Intermittent every 12 hours  ergocalciferol 12763 Unit(s) Oral every week    MEDICATIONS  (PRN):  aluminum hydroxide/magnesium hydroxide/simethicone Suspension 30 milliLiter(s) Oral every 4 hours PRN Dyspepsia  acetaminophen   Tablet. 650 milliGRAM(s) Oral every 6 hours PRN Moderate Pain (4 - 6)  acetaminophen 325 mG/butalbital 50 mG/caffeine 40 mG 1 Tablet(s) Oral every 6 hours PRN Headache  oxyCODONE    5 mG/acetaminophen 325 mG 1 Tablet(s) Oral every 4 hours PRN pain  HYDROmorphone  Injectable 2 milliGRAM(s) IV Push every 4 hours PRN Severe Pain (7 - 10)      Allergies  Demerol HCl (Hives)  Demerol HCl (Hives; Rash)  Phenergan (Hives; Rash)  Phenergan Fortis (Hives)      Intolerances  Vital Signs Last 24 Hrs  T(C): 37.7 (17 Sep 2017 09:56), Max: 38.3 (17 Sep 2017 05:24)  T(F): 99.9 (17 Sep 2017 09:56), Max: 101 (17 Sep 2017 05:24)  HR: 69 (17 Sep 2017 09:56) (61 - 77)  BP: 122/74 (17 Sep 2017 09:56) (112/62 - 125/54)  BP(mean): --  RR: 18 (17 Sep 2017 09:56) (18 - 20)  SpO2: 99% (17 Sep 2017 05:24) (96% - 99%)    PHYSICAL EXAM:  GENERAL: NAD, well-groomed, well-developed  HEAD: Normocephalic  EYES: b/l EOMI, PERRLA, conjunctiva and sclera clear  NECK: Supple, nontender on palpation   NERVOUS SYSTEM:  Alert & Oriented X3, Good concentration, speech clear;  Motor Strength 5/5 B/L upper and lower extremities; sensation at baseline and no sensory changes reported. No pronators/ falcon's appreciated b/l.  CHEST/LUNG: Clear to auscultation bilaterally; No rales, rhonchi, wheezing, or rubs  HEART: Regular rate and rhythm; +S1 & +S2  EXTREMITIES: No clubbing, cyanosis, or edema      LABS:                        9.6    8.8   )-----------( 134      ( 16 Sep 2017 06:32 )             28.9         140  |  100  |  26.0<H>  ----------------------------<  85  4.2   |  27.0  |  2.83<H>    Ca    8.5<L>      17 Sep 2017 06:05  Phos  3.1       Mg     1.7         TPro  x   /  Alb  2.8<L>  /  TBili  x   /  DBili  x   /  AST  x   /  ALT  x   /  AlkPhos  x   09-15      Urinalysis Basic - ( 16 Sep 2017 13:20 )    Color: Yellow / Appearance: Clear / S.010 / pH: x  Gluc: x / Ketone: Negative  / Bili: Negative / Urobili: Negative mg/dL   Blood: x / Protein: 30 mg/dL / Nitrite: Negative   Leuk Esterase: Small / RBC: 11-25 /HPF / WBC 6-10   Sq Epi: x / Non Sq Epi: Few / Bacteria: Few        RADIOLOGY & ADDITIONAL TESTS:  < from: CT Abdomen and Pelvis w/ Oral Cont (17 @ 18:20) >   EXAM:  CT ABDOMEN AND PELVIS OC                        PROCEDURE DATE:  2017      INTERPRETATION:  Clinical information: Right lower quadrant pain which is   acute and severe. Nausea. Evaluate for kidney stones or colitis.  Comparison: CT scan of the abdomen pelvis dated 2017  PROCEDURE:   CT of the Abdomen and Pelvis was performed without intravenous contrast.  Oral contrast was administered.      FINDINGS:  LOWER CHEST: There is a partially imaged subpleural ground glass opacity   in the right upper lobe. There is been interval development of a   subpleural opacity in the right middle lobe which may be infectious or   inflammatory. There is atelectasis at the lung bases.    ABDOMEN:  LIVER: There is a 1.0 cm left lobe cyst.  BILE DUCTS: normal caliber.  GALLBLADDER: Removed.  PANCREAS: within normal limits.  SPLEEN: within normal limits.  ADRENALS: There is left adrenal gland thickening.  KIDNEYS: Enlarged polycystic kidneys again noted. There is a 5 mm   nonobstructing left upper pole renal calculus and 2 mm nonobstructing   left mid renal calculus. There are nonobstructing right renal calculi   measuring up to 3 mm. There is perinephric stranding involving the right   kidney new since the prior examination.    PELVIS:  REPRODUCTIVE ORGANS: no pelvic masses.  URETERS: within normal limits.  BLADDER: within normal limits.      BOWEL: There is mild thickening versus under distention of sigmoid colon which is unchanged. There is no bowel obstruction. Noenlarged mesenteric lymph nodes.  PERITONEUM: no ascites or free air, no fluid collection.  VESSELS: Atherosclerotic changes      RETROPERITONEUM: within normal limits.    ABDOMINAL WALL: There is a small fat-containing umbilical hernia.  BONES: within normal limits.    IMPRESSION: Bilateral polycystic kidneys. Non-obstructing bilateral renal calculi.  Since the prior examination dated 2017, interval development of nonspecific perinephric stranding involving the right kidney.   Mild thickening versus under distention of the sigmoid colon again noted. In the appropriate clinical setting, this could be associated with mild colitis.  Interval development of opacity right middle lobe which could be infectious or inflammatory.    GURWINDER GANT M.D., ATTENDING RADIOLOGIST  This document has been electronically signed. Sep 17 2017  8:34AM  < end of copied text >

## 2017-09-17 NOTE — PROGRESS NOTE ADULT - SUBJECTIVE AND OBJECTIVE BOX
CC : flank pain , abdominal pain   neurosurgery and ID input appreciated '  CT scan result lab result discussed with the patient     Demerol HCl (Hives)  Demerol HCl (Hives; Rash)  Phenergan (Hives; Rash)  Phenergan Fortis (Hives)    Intolerances        REVIEW OF SYSTEMS:  as above otherwise negative  Vital Signs Last 24 Hrs  Vital Signs Last 24 Hrs  T(C): 37.7 (17 Sep 2017 09:56), Max: 38.3 (17 Sep 2017 05:24)  T(F): 99.9 (17 Sep 2017 09:56), Max: 101 (17 Sep 2017 05:24)  HR: 69 (17 Sep 2017 09:56) (61 - 77)  BP: 122/74 (17 Sep 2017 09:56) (112/62 - 125/54)  BP(mean): --  RR: 18 (17 Sep 2017 09:56) (18 - 20)  SpO2: 99% (17 Sep 2017 05:24) (96% - 99%)  PHYSICAL EXAM:    GENERAL: NAD, well-groomed, well-developed  NECK: Supple, No JVD, Normal thyroid  CHEST/LUNG: Clear to auscultation  bilaterally; No rales, rhonchi, wheezing, or rubs  HEART: Regular rate and rhythm; No murmurs, rubs, or gallops  ABDOMEN: Soft, RUQ , right flank tenderness on percussion , Nondistended; Bowel sounds present.lower abdomen tenderness R>left  EXTREMITIES:  2+ Peripheral Pulses, No clubbing, cyanosis, or edema  SKIN: No rashes or lesions      LABS:                                         9.6    8.8   )-----------( 134      ( 16 Sep 2017 06:32 )             28.9   09-17    140  |  100  |  26.0<H>  ----------------------------<  85  4.2   |  27.0  |  2.83<H>    Ca    8.5<L>      17 Sep 2017 06:05  Phos  3.1     09-17  Mg     1.7     09-17    TPro  x   /  Alb  2.8<L>  /  TBili  x   /  DBili  x   /  AST  x   /  ALT  x   /  AlkPhos  x   09-15  < from: CT Abdomen and Pelvis w/ Oral Cont (09.16.17 @ 18:20) >  IMPRESSION: Bilateral polycystic kidneys. Non-obstructing bilateral renal   calculi.    Since the prior examination dated 9/14/2017, interval development of   nonspecific perinephric stranding involving the right kidney.     Mild thickening versus under distention of the sigmoid colon again noted.   In the appropriate clinical setting, this could be associated with mild   colitis.    Interval development of opacity right middle lobe which could be   infectious or inflammatory.    < end of copied text >

## 2017-09-17 NOTE — PROGRESS NOTE ADULT - SUBJECTIVE AND OBJECTIVE BOX
Renal :      Patient not in the room,    EMR  & clinical course reviewed,    ID , HM & NS  Evaluations reviewed,    Patient without any significant change, Still w. intermittent fever,    Vital Signs Last 24 Hrs  T(C): 36.8 (09-17-17 @ 13:44), Max: 38.3 (09-17-17 @ 05:24)  T(F): 98.2 (09-17-17 @ 13:44), Max: 101 (09-17-17 @ 05:24)  HR: 68 (09-17-17 @ 13:44) (61 - 76)  BP: 111/69 (09-17-17 @ 13:44) (111/69 - 125/54)  BP(mean): --  RR: 18 (09-17-17 @ 13:44) (18 - 20)  SpO2: 96% (09-17-17 @ 13:44) (96% - 99%)    Labs and Meds reviewed    Continue same treatment    17 Sep 2017 06:05    140    |  100    |  26.0   ----------------------------<  85     4.2     |  27.0   |  2.83     Ca    8.5        17 Sep 2017 06:05  Phos  3.1       17 Sep 2017 06:05  Mg     1.7       17 Sep 2017 06:05    TPro  x      /  Alb  2.8    /  TBili  x      /  DBili  x      /  AST  x      /  ALT  x      /  AlkPhos  x      15 Sep 2017 15:50                            9.6    8.8   )-----------( 134      ( 16 Sep 2017 06:32 )             28.9       47 y/o female with HTN , s/p oral surgery 2 weeks ago admitted for gastroenteritis, ARF - CKD ,C. Dfficele  PCR    positive , electrolyte imbalance , K + & Mg++   supplemented ,    CT of abdomen , renal calculi, colitis pt is with persistent fever , diarrhea  improving       MRI of kidney r/o infected cyst.

## 2017-09-17 NOTE — PROGRESS NOTE ADULT - ASSESSMENT
47 y/o female with HTNm , s/p oral surgery 2 weeks ago admitted for gastroenteritis, ARF on cronic , stool for C diff PCR positive , electrolyte imbalance potassium and magnesium supplemented   CT of abdomen , renal calculi, colitis pt is with persistent fever , diarrhea  improving

## 2017-09-17 NOTE — BRIEF OPERATIVE NOTE - POST-OP DX
Aneurysm of cavernous portion of right internal carotid artery  09/17/2017    Active  Gregorio Braun

## 2017-09-17 NOTE — BRIEF OPERATIVE NOTE - PROCEDURE
<<-----Click on this checkbox to enter Procedure Lumbar puncture with fluoroscopic guidance for drainage of cerebrospinal fluid  09/17/2017    Active  IR

## 2017-09-17 NOTE — PROGRESS NOTE ADULT - ASSESSMENT
IMP  PERSISTENT FEVER    R/O INFECTED RENAL CYST  CDIFF DIARRHEA - IMPROVING  CHOW WITH LP PER NS    PLAN  MRI KIDNEYS NC TO R/O FLUID IN CYSY  MAY NEED GALLIUM IF LP NEG AND MRI NEG AND FEVER PERSISTS  CONT CURRENT TX

## 2017-09-18 DIAGNOSIS — R50.9 FEVER, UNSPECIFIED: ICD-10-CM

## 2017-09-18 LAB
ANION GAP SERPL CALC-SCNC: 12 MMOL/L — SIGNIFICANT CHANGE UP (ref 5–17)
BUN SERPL-MCNC: 28 MG/DL — HIGH (ref 8–20)
CALCIUM SERPL-MCNC: 8.6 MG/DL — SIGNIFICANT CHANGE UP (ref 8.6–10.2)
CHLORIDE SERPL-SCNC: 100 MMOL/L — SIGNIFICANT CHANGE UP (ref 98–107)
CO2 SERPL-SCNC: 27 MMOL/L — SIGNIFICANT CHANGE UP (ref 22–29)
CREAT SERPL-MCNC: 2.84 MG/DL — HIGH (ref 0.5–1.3)
CRYPTOC AG CSF-ACNC: NEGATIVE — SIGNIFICANT CHANGE UP
CULTURE RESULTS: SIGNIFICANT CHANGE UP
CULTURE RESULTS: SIGNIFICANT CHANGE UP
GLUCOSE SERPL-MCNC: 100 MG/DL — SIGNIFICANT CHANGE UP (ref 70–115)
HCT VFR BLD CALC: 30.6 % — LOW (ref 37–47)
HGB BLD-MCNC: 9.8 G/DL — LOW (ref 12–16)
LACTATE SERPL-SCNC: 1.2 MMOL/L — SIGNIFICANT CHANGE UP (ref 0.5–2)
LDH SERPL L TO P-CCNC: 12 U/L — SIGNIFICANT CHANGE UP
MAGNESIUM SERPL-MCNC: 1.8 MG/DL — SIGNIFICANT CHANGE UP (ref 1.6–2.6)
MCHC RBC-ENTMCNC: 31.9 PG — HIGH (ref 27–31)
MCHC RBC-ENTMCNC: 32 G/DL — SIGNIFICANT CHANGE UP (ref 32–36)
MCV RBC AUTO: 99.7 FL — HIGH (ref 81–99)
NIGHT BLUE STAIN TISS: SIGNIFICANT CHANGE UP
PHOSPHATE SERPL-MCNC: 3 MG/DL — SIGNIFICANT CHANGE UP (ref 2.4–4.7)
PLATELET # BLD AUTO: 195 K/UL — SIGNIFICANT CHANGE UP (ref 150–400)
POTASSIUM SERPL-MCNC: 3.8 MMOL/L — SIGNIFICANT CHANGE UP (ref 3.5–5.3)
POTASSIUM SERPL-SCNC: 3.8 MMOL/L — SIGNIFICANT CHANGE UP (ref 3.5–5.3)
RBC # BLD: 3.07 M/UL — LOW (ref 4.4–5.2)
RBC # FLD: 12.5 % — SIGNIFICANT CHANGE UP (ref 11–15.6)
SODIUM SERPL-SCNC: 139 MMOL/L — SIGNIFICANT CHANGE UP (ref 135–145)
SPECIMEN SOURCE: SIGNIFICANT CHANGE UP
WBC # BLD: 11 K/UL — HIGH (ref 4.8–10.8)
WBC # FLD AUTO: 11 K/UL — HIGH (ref 4.8–10.8)

## 2017-09-18 PROCEDURE — 74181 MRI ABDOMEN W/O CONTRAST: CPT | Mod: 26

## 2017-09-18 PROCEDURE — 99233 SBSQ HOSP IP/OBS HIGH 50: CPT

## 2017-09-18 RX ORDER — OXYCODONE AND ACETAMINOPHEN 5; 325 MG/1; MG/1
1 TABLET ORAL ONCE
Qty: 0 | Refills: 0 | Status: DISCONTINUED | OUTPATIENT
Start: 2017-09-18 | End: 2017-09-18

## 2017-09-18 RX ORDER — ACETAMINOPHEN 500 MG
1000 TABLET ORAL ONCE
Qty: 0 | Refills: 0 | Status: COMPLETED | OUTPATIENT
Start: 2017-09-18 | End: 2017-09-18

## 2017-09-18 RX ORDER — ACETAMINOPHEN 500 MG
650 TABLET ORAL ONCE
Qty: 0 | Refills: 0 | Status: COMPLETED | OUTPATIENT
Start: 2017-09-18 | End: 2017-09-18

## 2017-09-18 RX ORDER — ACETYLCYSTEINE 200 MG/ML
1200 VIAL (ML) MISCELLANEOUS EVERY 12 HOURS
Qty: 0 | Refills: 0 | Status: DISCONTINUED | OUTPATIENT
Start: 2017-09-18 | End: 2017-09-19

## 2017-09-18 RX ORDER — OXYCODONE AND ACETAMINOPHEN 5; 325 MG/1; MG/1
2 TABLET ORAL EVERY 6 HOURS
Qty: 0 | Refills: 0 | Status: DISCONTINUED | OUTPATIENT
Start: 2017-09-18 | End: 2017-09-21

## 2017-09-18 RX ORDER — MAGNESIUM OXIDE 400 MG ORAL TABLET 241.3 MG
400 TABLET ORAL
Qty: 0 | Refills: 0 | Status: COMPLETED | OUTPATIENT
Start: 2017-09-18 | End: 2017-09-20

## 2017-09-18 RX ADMIN — HYDROMORPHONE HYDROCHLORIDE 2 MILLIGRAM(S): 2 INJECTION INTRAMUSCULAR; INTRAVENOUS; SUBCUTANEOUS at 15:01

## 2017-09-18 RX ADMIN — HYDROMORPHONE HYDROCHLORIDE 2 MILLIGRAM(S): 2 INJECTION INTRAMUSCULAR; INTRAVENOUS; SUBCUTANEOUS at 20:48

## 2017-09-18 RX ADMIN — OXYCODONE AND ACETAMINOPHEN 1 TABLET(S): 5; 325 TABLET ORAL at 11:15

## 2017-09-18 RX ADMIN — Medication 650 MILLIGRAM(S): at 05:00

## 2017-09-18 RX ADMIN — OXYCODONE AND ACETAMINOPHEN 2 TABLET(S): 5; 325 TABLET ORAL at 17:29

## 2017-09-18 RX ADMIN — Medication 250 MILLIGRAM(S): at 17:33

## 2017-09-18 RX ADMIN — Medication 250 MILLIGRAM(S): at 05:03

## 2017-09-18 RX ADMIN — MAGNESIUM OXIDE 400 MG ORAL TABLET 400 MILLIGRAM(S): 241.3 TABLET ORAL at 13:04

## 2017-09-18 RX ADMIN — OXYCODONE AND ACETAMINOPHEN 1 TABLET(S): 5; 325 TABLET ORAL at 13:04

## 2017-09-18 RX ADMIN — HYDROMORPHONE HYDROCHLORIDE 2 MILLIGRAM(S): 2 INJECTION INTRAMUSCULAR; INTRAVENOUS; SUBCUTANEOUS at 06:24

## 2017-09-18 RX ADMIN — OXYCODONE AND ACETAMINOPHEN 2 TABLET(S): 5; 325 TABLET ORAL at 18:41

## 2017-09-18 RX ADMIN — OXYCODONE AND ACETAMINOPHEN 1 TABLET(S): 5; 325 TABLET ORAL at 06:00

## 2017-09-18 RX ADMIN — Medication 100 MILLIGRAM(S): at 18:28

## 2017-09-18 RX ADMIN — Medication 1 TABLET(S): at 17:32

## 2017-09-18 RX ADMIN — MAGNESIUM OXIDE 400 MG ORAL TABLET 400 MILLIGRAM(S): 241.3 TABLET ORAL at 17:32

## 2017-09-18 RX ADMIN — OXYCODONE AND ACETAMINOPHEN 1 TABLET(S): 5; 325 TABLET ORAL at 12:04

## 2017-09-18 RX ADMIN — Medication 100 MILLIGRAM(S): at 05:03

## 2017-09-18 RX ADMIN — Medication 1 TABLET(S): at 08:38

## 2017-09-18 RX ADMIN — HYDROMORPHONE HYDROCHLORIDE 2 MILLIGRAM(S): 2 INJECTION INTRAMUSCULAR; INTRAVENOUS; SUBCUTANEOUS at 20:33

## 2017-09-18 RX ADMIN — SODIUM CHLORIDE 100 MILLILITER(S): 9 INJECTION, SOLUTION INTRAVENOUS at 18:28

## 2017-09-18 RX ADMIN — FAMOTIDINE 20 MILLIGRAM(S): 10 INJECTION INTRAVENOUS at 12:07

## 2017-09-18 RX ADMIN — Medication 250 MILLIGRAM(S): at 12:07

## 2017-09-18 RX ADMIN — HYDROMORPHONE HYDROCHLORIDE 2 MILLIGRAM(S): 2 INJECTION INTRAMUSCULAR; INTRAVENOUS; SUBCUTANEOUS at 06:41

## 2017-09-18 RX ADMIN — OXYCODONE AND ACETAMINOPHEN 1 TABLET(S): 5; 325 TABLET ORAL at 10:18

## 2017-09-18 RX ADMIN — Medication 400 MILLIGRAM(S): at 20:48

## 2017-09-18 RX ADMIN — CALCITRIOL 0.25 MICROGRAM(S): 0.5 CAPSULE ORAL at 12:07

## 2017-09-18 RX ADMIN — HYDROMORPHONE HYDROCHLORIDE 2 MILLIGRAM(S): 2 INJECTION INTRAMUSCULAR; INTRAVENOUS; SUBCUTANEOUS at 14:28

## 2017-09-18 RX ADMIN — OXYCODONE AND ACETAMINOPHEN 1 TABLET(S): 5; 325 TABLET ORAL at 05:00

## 2017-09-18 NOTE — PROGRESS NOTE ADULT - SUBJECTIVE AND OBJECTIVE BOX
[facility] [place] HILARIA WATKINS MD FACP   AUGUSTUS HARRIS MD  Diplomates American Board of Internal Medicine and Infectious Diseases      ROLLY MEDEIROS  [id number]  46y    INTERVAL HPI/OVERNIGHT EVENTS:  TEMP PERSISTS  DIARRHEA BETTER  CT ABD NEG EXCEPT FOR MILD COLITIS AND MULT RENAL CYSTS    Vital Signs Last 24 Hrs  T(C): 36.9 (18 Sep 2017 09:03), Max: 38.8 (18 Sep 2017 04:34)  T(F): 98.4 (18 Sep 2017 09:03), Max: 101.9 (18 Sep 2017 04:34)  HR: 72 (18 Sep 2017 04:34) (68 - 76)  BP: 116/62 (18 Sep 2017 04:34) (111/69 - 125/71)  BP(mean): --  RR: 18 (18 Sep 2017 04:34) (18 - 20)  SpO2: 92% (18 Sep 2017 04:34) (92% - 98%)    ANTIBIOTICS  vancomycin    Solution 250 milliGRAM(s) Oral every 6 hours  aztreonam  IVPB      aztreonam  IVPB 2000 milliGRAM(s) IV Intermittent every 12 hours      Allergies    Demerol HCl (Hives)  Demerol HCl (Hives; Rash)  Phenergan (Hives; Rash)  Phenergan Fortis (Hives)    Intolerances        REVIEW OF SYSTEMS:HA  NO  SXS  PHYSICAL EXAM:  Vital Signs Last 24 Hrs  T(C): 36.9 (18 Sep 2017 09:03), Max: 38.8 (18 Sep 2017 04:34)  T(F): 98.4 (18 Sep 2017 09:03), Max: 101.9 (18 Sep 2017 04:34)  HR: 72 (18 Sep 2017 04:34) (68 - 76)  BP: 116/62 (18 Sep 2017 04:34) (111/69 - 125/71)  BP(mean): --  RR: 18 (18 Sep 2017 04:34) (18 - 20)  SpO2: 92% (18 Sep 2017 04:34) (92% - 98%)      GEN: NAD, pleasant  HEENT: normocephalic and atraumatic. EOMI. DWAYNE. Moist mucosa. Clear Posterior pharynx.  NECK: Supple. No carotid bruits.  No lymphadenopathy or thyromegaly.  LUNGS: Clear to auscultation.  HEART: Regular rate and rhythm without murmur.  ABDOMEN: Soft, nontender, and nondistended.  Positive bowel sounds.  No hepatosplenomegaly was noted.  EXTREMITIES: Without any cyanosis, clubbing, rash, lesions or edema.  NEUROLOGIC: Cranial nerves II through XII are grossly intact.  MUSCULOSKELETAL:  SKIN: No ulceration or induration present    LABS:      09-17    140  |  100  |  26.0<H>  ----------------------------<  85  4.2   |  27.0  |  2.83<H>    Ca    8.5<L>      17 Sep 2017 06:05  Phos  3.1     09-17  Mg     1.7     09-17 09-16 @ 06:32  hct 28.9 % hgb 9.6 g/dL    09-16 @ 06:32  plat 134 K/uL wbc 8.8 K/uL    09-15 @ 15:50  hct 29.6 % hgb 10.0 g/dL    09-15 @ 15:50  plat 145 K/uL wbc 10.3 K/uL      09-17-17  creat 2.83 mg/dL gfr 22 mL/min/1.73M2 bun 26.0 mg/dL k 4.2 mmol/L  09-16-17  creat 3.06 mg/dL gfr 20 mL/min/1.73M2 bun 28.0 mg/dL k 3.0 mmol/L  09-15-17  creat 3.35 mg/dL gfr 18 mL/min/1.73M2 bun 27.0 mg/dL k 3.0 mmol/L      MICROBIOLOGY:  Culture - Urine (09.13.17 @ 23:45)    -  Amikacin: S <=16    -  Ampicillin: R >16    -  Ampicillin/Sulbactam: S <=8/4    -  Aztreonam: S <=4    -  Cefazolin: S <=8    -  Cefepime: S <=4    -  Cefoxitin: S <=8    -  Ceftazidime: S <=1    -  Ceftriaxone: S <=1    -  Ciprofloxacin: S <=1    -  Ertapenem: S <=1    -  Gentamicin: S <=4    -  Imipenem: S <=1    -  Levofloxacin: S <=2    -  Meropenem: S <=1    -  Nitrofurantoin: S <=32    -  Piperacillin/Tazobactam: S <=16    -  Tobramycin: S <=4    -  Trimethoprim/Sulfamethoxazole: S <=2/38    Specimen Source: .Urine Clean Catch (Midstream)    Culture Results:   >100,000 CFU/ml Klebsiella pneumoniae    Organism Identification: Klebsiella pneumoniae    Organism: Klebsiella pneumoniae    Method Type: TITI      BLOODS C/S NEG  F/UP URINE NEG    RADIOLOGY & ADDITIONAL STUDIES:          JOSE WATKINS MD FACP

## 2017-09-18 NOTE — PROGRESS NOTE ADULT - SUBJECTIVE AND OBJECTIVE BOX
Pt is seen examined , she is still having fever , flank pain and abdominal pain better   diarrhea improving x5 over 24 hours   She denies any new symptoms   discussed with neurosurgery and Dr Waqas Humphrey        REVIEW OF SYSTEMS:  as above otherwise negative  Vital Signs Last 24 Hrs  T(C): 36.9 (18 Sep 2017 09:03), Max: 38.8 (18 Sep 2017 04:34)  T(F): 98.4 (18 Sep 2017 09:03), Max: 101.9 (18 Sep 2017 04:34)  HR: 72 (18 Sep 2017 04:34) (68 - 76)  BP: 116/62 (18 Sep 2017 04:34) (111/69 - 125/71)  BP(mean): --  RR: 18 (18 Sep 2017 04:34) (18 - 20)  SpO2: 92% (18 Sep 2017 04:34) (92% - 98%)  GENERAL: NAD, well-groomed, well-developed  CHEST/LUNG: Clear to auscultation  bilaterally; No rales, rhonchi, wheezing, or rubs  HEART: Regular rate and rhythm; No murmurs, rubs, or gallops  ABDOMEN: Soft, RUQ , right flank tenderness on percussion , Nondistended; Bowel sounds present, no tenderness   EXTREMITIES:  2+ Peripheral Pulses, No clubbing, cyanosis, or edema  SKIN: No rashes or lesions      LABS:             pending today

## 2017-09-18 NOTE — PROGRESS NOTE ADULT - ASSESSMENT
46 y/oF with PCKD presents with 5 days of persistent headache, band like, creating pressure behind the eyes, refractory to medications. MRA with R cavernous carotid artery 3mm aneurysm. Underwent LP which showed no evidence of xanthochromia.   In light of absence of SAH, no further work up from our standpoint.     case d/w Dr. Aceves, Dr. Alan, Dr. Braun of Radiology and Dr. Aceves IM    PLAN:  Headache management per team  SBP < 150mmHg  no NSx intervention indicated at this time  neuro-checks q4 HR   HOB > 30 degrees  Recommend consulting with Cerebrovascular Neurosurgery [facility] for recommendations regarding acuity of CV work up.

## 2017-09-18 NOTE — PROGRESS NOTE ADULT - SUBJECTIVE AND OBJECTIVE BOX
Renal :    Discussed w. NS,    CTA  Brain w. Iodixanol , in AM,    In patients with significantly impaired renal function, the total clearance of iodixanol is reduced and the half-life in plasma phase is prolonged. In a study of 16 adult patients who were scheduled for renal transplant, the elimination of iodixanol 320 mg /mL was studied. The patients' baseline mean creatinine levels were 6.3 mg/dL (±1.5) and mean creatinine clearances were 13.61 mL/min (±4.67). In these patients, the plasma half-life was increased to 23 hours (normal t1/2 = 2 hours). In these patients, levels of iodixanol were detected 5 days after dosing.   Contrast enhancement time in kidneys increased from 6 hours to at least 24 hours.   Dose adjustments in patients with renal impairment have not been studied. (See PHARMACODYNAMICS section for renal failure and blood-brain barrier interactions.)    Visipaque has been shown to be dialyzable. In an in vitro hemodialysis study, after 4 hours of dialysis with a cellulose membrane, approximately 36% of iodixanol was removed from the plasma. After 4 hours of dialysis with polysulfone membranes, approximately 49% of iodixanol was removed.    Will optimize,

## 2017-09-18 NOTE — PROGRESS NOTE ADULT - SUBJECTIVE AND OBJECTIVE BOX
Renal :    Discussed @ Length Terri Gan &  Poonam RN,    CTA on Hold,    Will discuss w. the patient , this PM,

## 2017-09-18 NOTE — PROGRESS NOTE ADULT - ASSESSMENT
45 y/o female with HTNm , s/p oral surgery 2 weeks ago admitted for gastroenteritis, ARF on cronic baseline cr 1.5 in 2015 , diagnosed with C diff colitis , UTI , MRI of brain performed showed brain aneurism seen by neurosurgery s/p LP by IR radiology , renal function improving , recurrent fever despite iv therapy and po vanco , being followed by ID specialist daily, MRI of kidney ordered r/o cyst infection

## 2017-09-18 NOTE — PROGRESS NOTE ADULT - SUBJECTIVE AND OBJECTIVE BOX
Renal :    139    |  100    |  28.0<H>  ----------------------------<  100    Ca:8.6   (18 Sep 2017 10:48)  3.8     |  27.0   |  2.84<H>      eGFR if Non : 19 <L>  eGFR if : 22 <L>                          9.8<L>  11.0<H> )-----------( 195      ( 18 Sep 2017 10:49 )                  30.6<L>    Phos:3.0 mg/dL M.8 mg/dL PTH:-- Uric acid:-- Serum Osm:--  Ferritin:-- Iron:-- TIBC:-- Tsat:--  B12:-- TSH:-- ( @ 10:49)    Urinalysis Basic - ( 16 Sep 2017 13:20 )  Color: Yellow / Appearance: Clear / S.010 / pH: x  Gluc: x / Ketone: Negative  / Bili: Negative / Urobili: Negative mg/dL   Blood: x / Protein: 30 mg/dL<!> / Nitrite: Negative   Leuk Esterase: Small<!> / RBC: 11-25 /HPF<!> / WBC 6-10<!>   Sq Epi: x / Non Sq Epi: Few / Bacteria: Few<!>    Pt  seen examined , she is still having fever , flank  and abdominal pain better   diarrhea improving x 5 over 24 hours   She denies any new symptoms,  Son @ Bedside,     REVIEW OF SYSTEMS:  as above otherwise negative,    Vital Signs Last 24 Hrs  T(C): 36.9 (18 Sep 2017 09:03), Max: 38.8 (18 Sep 2017 04:34)  T(F): 98.4 (18 Sep 2017 09:03), Max: 101.9 (18 Sep 2017 04:34)  HR: 72 (18 Sep 2017 04:34) (68 - 76)  BP: 116/62 (18 Sep 2017 04:34) (111/69 - 125/71)  BP(mean): --  RR: 18 (18 Sep 2017 04:34) (18 - 20)  SpO2: 92% (18 Sep 2017 04:34) (92% - 98%)    GENERAL: NAD, well-groomed, well-developed  CHEST/LUNG: Clear to auscultation  bilaterally; No rales, rhonchi, wheezing, or rubs  HEART: Regular rate and rhythm; No murmurs, rubs, or gallops  ABDOMEN: Soft, RUQ , right flank tenderness on percussion , Nondistended; Bowel sounds present, no tenderness   EXTREMITIES:  2+ Peripheral Pulses, No clubbing, cyanosis, or edema  SKIN: No rashes or lesions    LABS: Reviewed,    MRI - P :             Assessment and Plan:     47 y/o female with HTN , s/p oral surgery 2 weeks ago admitted for gastroenteritis, ARF on CKD ( Baseline Scr 1.5 mg.,  in 2015 , diagnosed with C diff colitis , UTI , MRI of brain performed showed brain aneurism, seen by neurosurgery s/p LP by IR radiology , renal function improving , recurrent fever despite iv therapy and po vancomycin  , being followed by ID specialist daily,     MRI of kidney ordered r/o cyst infection       Problem/Plan - 1:    ·  Problem: Fever, unspecified fever cause.      Plan: repeat blood cx, ID follow up ,    continue iv aztreonam for UTI ,     MRI of kidney r/o cyst infection if negative may need  gadolinium scan.     Problem/Plan - 2:    ·  Problem: Clostridium difficile diarrhea.      Plan: continue po vancomycin ,diarrhea improving , tolerating diet.     Problem/Plan - 3:    ·  Problem: Urinary tract infection without hematuria, site unspecified.      Plan: continue aztreonam ,     MRI of kidney r/o infected cyst.

## 2017-09-18 NOTE — PROGRESS NOTE ADULT - SUBJECTIVE AND OBJECTIVE BOX
HPI   Patient is a 45 y/o F with a history of PCKD C. diff, s/p oral surgery on August 25th followed by 1 week treatment with amoxicillin.   She was admitted 9/13/17 and found to have c.diff, UTI and acute on chronic renal failure.   The patient reports that on Monday, 9/11, prior to admission, she was at work and started to develop a holocephalic pressure and band like headache. This persisted throughout the day and slightly progressed.  The headache has persisted since that time, maintaining at a 3 but severely exacerbated by valsalva maneuvers and positional changes. She says that now she can feel pressure behind her eyes and hears "swooshing sounds" bilaterally. She has tried tylenol and received morphine since admission without relief. She had percocet earlier and reports that gave her some mild relief. She denies any previous history of migraines or chronic headaches. She denies that the headache started prior to 9/11 and not following amoxicillin use or surgery. She has used amoxicillin before without issue. She denies vision changes, focal numbness or weakness. She had 12 bowel movements today but has been receiving fluids regularly. Otherwise, no new complaints.  Due to daily persistent headache, nephrology ordered MRA due to patient's risk for dissection, ruptured aneurysms, etc with her PCKD history. 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     SUBJECTIVE 9/18/17:  No events overnight. Woke up out of sleep with 10/10 headache of same distribution. Denies vision changes. "Swooshing" sounds persist. Relieved with percocet - 3/10 pain at time of my assessment. Only 3 BMs today. Tmax 101.9F, ID following.  denies weakness  denies numbness/ tingling  denies visual changes, + mild photophobia   denies C/T/LS  Spine pain      MEDICATIONS  (STANDING):  heparin  Injectable 5000 Unit(s) SubCutaneous every 12 hours - refused since admit   famotidine    Tablet 20 milliGRAM(s) Oral daily  vancomycin    Solution 250 milliGRAM(s) Oral every 6 hours  aztreonam  IVPB      lactated ringers 1000 milliLiter(s) (100 mL/Hr) IV Continuous <Continuous>  lactobacillus acidophilus 1 Tablet(s) Oral two times a day with meals  aztreonam  IVPB 2000 milliGRAM(s) IV Intermittent every 12 hours  ergocalciferol 19557 Unit(s) Oral every week    MEDICATIONS  (PRN):  aluminum hydroxide/magnesium hydroxide/simethicone Suspension 30 milliLiter(s) Oral every 4 hours PRN Dyspepsia  acetaminophen   Tablet. 650 milliGRAM(s) Oral every 6 hours PRN Moderate Pain (4 - 6)  acetaminophen 325 mG/butalbital 50 mG/caffeine 40 mG 1 Tablet(s) Oral every 6 hours PRN Headache  oxyCODONE    5 mG/acetaminophen 325 mG 1 Tablet(s) Oral every 4 hours PRN pain  HYDROmorphone  Injectable 2 milliGRAM(s) IV Push every 4 hours PRN Severe Pain (7 - 10)    Vital Signs Last 24 Hrs  T(C): 36.9 (18 Sep 2017 09:03), Max: 38.8 (18 Sep 2017 04:34)  T(F): 98.4 (18 Sep 2017 09:03), Max: 101.9 (18 Sep 2017 04:34)  HR: 72 (18 Sep 2017 04:34) (68 - 76)  BP: 116/62 (18 Sep 2017 04:34) (111/69 - 125/71)  RR: 18 (18 Sep 2017 04:34) (18 - 20)  SpO2: 92% (18 Sep 2017 04:34) (92% - 98%)    PHYSICAL EXAM:  GENERAL: NAD, well-groomed, well-developed  HEAD: Normocephalic  EYES: b/l EOMI, PERRLA, conjunctiva and sclera clear  NECK: Supple, nontender on palpation   NERVOUS SYSTEM:  Alert & Oriented X3, Good concentration, speech clear; EOMI, PERRL, no facial asymmetry, tongue is midline, facial sensation intact in upper and lower MN distributions. Motor Strength 5/5 B/L upper and lower extremities; sensation intact to light touch and temperature. No dysmetria.   HEART: Regular rate and rhythm  EXTREMITIES: No clubbing, cyanosis, or edema    LABS:  09-17  140  |  100  |  26.0<H>  ----------------------------<  85  4.2   |  27.0  |  2.83<H>  Ca    8.5<L>      17 Sep 2017 06:05  Phos  3.1     09-17  Mg     1.7     09-17    RADIOLOGY & ADDITIONAL TESTS:  CT Abdomen and Pelvis w/ Oral Cont (09.16.17 @ 18:20)  EXAM:  CT ABDOMEN AND PELVIS OC                        PROCEDURE DATE:  09/16/2017      FINDINGS:  LOWER CHEST: There is a partially imaged subpleural ground glass opacity   in the right upper lobe. There is been interval development of a   subpleural opacity in the right middle lobe which may be infectious or   inflammatory. There is atelectasis at the lung bases.    ABDOMEN:  LIVER: There is a 1.0 cm left lobe cyst.  BILE DUCTS: normal caliber.  GALLBLADDER: Removed.  PANCREAS: within normal limits.  SPLEEN: within normal limits.  ADRENALS: There is left adrenal gland thickening.  KIDNEYS: Enlarged polycystic kidneys again noted. There is a 5 mm   nonobstructing left upper pole renal calculus and 2 mm nonobstructing   left mid renal calculus. There are nonobstructing right renal calculi   measuring up to 3 mm. There is perinephric stranding involving the right   kidney new since the prior examination.    PELVIS:  REPRODUCTIVE ORGANS: no pelvic masses.  URETERS: within normal limits.  BLADDER: within normal limits.      BOWEL: There is mild thickening versus under distention of sigmoid colon which is unchanged. There is no bowel obstruction. Noenlarged mesenteric lymph nodes.  PERITONEUM: no ascites or free air, no fluid collection.  VESSELS: Atherosclerotic changes      RETROPERITONEUM: within normal limits.    ABDOMINAL WALL: There is a small fat-containing umbilical hernia.  BONES: within normal limits.    IMPRESSION: Bilateral polycystic kidneys. Non-obstructing bilateral renal calculi.  Since the prior examination dated 9/14/2017, interval development of nonspecific perinephric stranding involving the right kidney.   Mild thickening versus under distention of the sigmoid colon again noted. In the appropriate clinical setting, this could be associated with mild colitis.  Interval development of opacity right middle lobe which could be infectious or inflammatory.    GURWINDER GANT M.D., ATTENDING RADIOLOGIST

## 2017-09-18 NOTE — PROGRESS NOTE ADULT - ASSESSMENT
1. Discussed w. Radiology  ( MRA  Brain , CSF  Analysis Reviewed ) , Neurosurgery PA & Dr. Murray,    2. All The data reviewed w. the patient & the son (  EMR Trended data -  paul. w. the son @ the Computer terminal )    3. RN Arthur Covington' updated re : Conversations  w. the  Patient & family,      P : Continue current management & W.U Per ID,      ( Briefly went over the Course , prognosis & Treatment of PKD &  Renal Replacement Therapy  options in the future )

## 2017-09-18 NOTE — PROGRESS NOTE ADULT - ASSESSMENT
UNCLEAR SOURCE OF FEVER  POSS INF CYCST  MAY NEED GALLLIUM IF MRI UNREVEALING  CDIFF IMPROVING  NEVER BACTEREMIC    PLAN  MRI RENAL TODAY  IF FEBRILE AM GALLIUM  NO INDICATION FOR AB CHANGE - SENSITIVE

## 2017-09-19 DIAGNOSIS — A41.9 SEPSIS, UNSPECIFIED ORGANISM: ICD-10-CM

## 2017-09-19 LAB — VDRL CSF-TITR: NEGATIVE — SIGNIFICANT CHANGE UP

## 2017-09-19 PROCEDURE — 99233 SBSQ HOSP IP/OBS HIGH 50: CPT

## 2017-09-19 RX ORDER — IBUPROFEN 200 MG
400 TABLET ORAL ONCE
Qty: 0 | Refills: 0 | Status: DISCONTINUED | OUTPATIENT
Start: 2017-09-19 | End: 2017-09-20

## 2017-09-19 RX ORDER — DIPHENHYDRAMINE HCL 50 MG
25 CAPSULE ORAL ONCE
Qty: 0 | Refills: 0 | Status: COMPLETED | OUTPATIENT
Start: 2017-09-19 | End: 2017-09-19

## 2017-09-19 RX ORDER — ACETAMINOPHEN 500 MG
650 TABLET ORAL EVERY 6 HOURS
Qty: 0 | Refills: 0 | Status: DISCONTINUED | OUTPATIENT
Start: 2017-09-19 | End: 2017-09-27

## 2017-09-19 RX ADMIN — HYDROMORPHONE HYDROCHLORIDE 2 MILLIGRAM(S): 2 INJECTION INTRAMUSCULAR; INTRAVENOUS; SUBCUTANEOUS at 10:36

## 2017-09-19 RX ADMIN — Medication 250 MILLIGRAM(S): at 16:48

## 2017-09-19 RX ADMIN — HYDROMORPHONE HYDROCHLORIDE 2 MILLIGRAM(S): 2 INJECTION INTRAMUSCULAR; INTRAVENOUS; SUBCUTANEOUS at 03:48

## 2017-09-19 RX ADMIN — Medication 250 MILLIGRAM(S): at 00:32

## 2017-09-19 RX ADMIN — OXYCODONE AND ACETAMINOPHEN 2 TABLET(S): 5; 325 TABLET ORAL at 07:58

## 2017-09-19 RX ADMIN — Medication 1 TABLET(S): at 18:32

## 2017-09-19 RX ADMIN — Medication 1 TABLET(S): at 17:47

## 2017-09-19 RX ADMIN — HYDROMORPHONE HYDROCHLORIDE 2 MILLIGRAM(S): 2 INJECTION INTRAMUSCULAR; INTRAVENOUS; SUBCUTANEOUS at 17:00

## 2017-09-19 RX ADMIN — HYDROMORPHONE HYDROCHLORIDE 2 MILLIGRAM(S): 2 INJECTION INTRAMUSCULAR; INTRAVENOUS; SUBCUTANEOUS at 21:48

## 2017-09-19 RX ADMIN — OXYCODONE AND ACETAMINOPHEN 2 TABLET(S): 5; 325 TABLET ORAL at 12:59

## 2017-09-19 RX ADMIN — Medication 1 TABLET(S): at 09:40

## 2017-09-19 RX ADMIN — Medication 1 TABLET(S): at 16:48

## 2017-09-19 RX ADMIN — Medication 100 MILLIGRAM(S): at 06:33

## 2017-09-19 RX ADMIN — Medication 100 MILLIGRAM(S): at 16:48

## 2017-09-19 RX ADMIN — MAGNESIUM OXIDE 400 MG ORAL TABLET 400 MILLIGRAM(S): 241.3 TABLET ORAL at 16:48

## 2017-09-19 RX ADMIN — HYDROMORPHONE HYDROCHLORIDE 2 MILLIGRAM(S): 2 INJECTION INTRAMUSCULAR; INTRAVENOUS; SUBCUTANEOUS at 16:40

## 2017-09-19 RX ADMIN — Medication 250 MILLIGRAM(S): at 06:32

## 2017-09-19 RX ADMIN — Medication 250 MILLIGRAM(S): at 23:42

## 2017-09-19 RX ADMIN — MAGNESIUM OXIDE 400 MG ORAL TABLET 400 MILLIGRAM(S): 241.3 TABLET ORAL at 13:38

## 2017-09-19 RX ADMIN — OXYCODONE AND ACETAMINOPHEN 2 TABLET(S): 5; 325 TABLET ORAL at 00:31

## 2017-09-19 RX ADMIN — HYDROMORPHONE HYDROCHLORIDE 2 MILLIGRAM(S): 2 INJECTION INTRAMUSCULAR; INTRAVENOUS; SUBCUTANEOUS at 04:05

## 2017-09-19 RX ADMIN — OXYCODONE AND ACETAMINOPHEN 2 TABLET(S): 5; 325 TABLET ORAL at 13:30

## 2017-09-19 RX ADMIN — MAGNESIUM OXIDE 400 MG ORAL TABLET 400 MILLIGRAM(S): 241.3 TABLET ORAL at 09:40

## 2017-09-19 RX ADMIN — OXYCODONE AND ACETAMINOPHEN 1 TABLET(S): 5; 325 TABLET ORAL at 23:42

## 2017-09-19 RX ADMIN — HYDROMORPHONE HYDROCHLORIDE 2 MILLIGRAM(S): 2 INJECTION INTRAMUSCULAR; INTRAVENOUS; SUBCUTANEOUS at 22:03

## 2017-09-19 RX ADMIN — OXYCODONE AND ACETAMINOPHEN 2 TABLET(S): 5; 325 TABLET ORAL at 19:23

## 2017-09-19 RX ADMIN — FAMOTIDINE 20 MILLIGRAM(S): 10 INJECTION INTRAVENOUS at 12:58

## 2017-09-19 RX ADMIN — OXYCODONE AND ACETAMINOPHEN 2 TABLET(S): 5; 325 TABLET ORAL at 06:41

## 2017-09-19 RX ADMIN — HYDROMORPHONE HYDROCHLORIDE 2 MILLIGRAM(S): 2 INJECTION INTRAMUSCULAR; INTRAVENOUS; SUBCUTANEOUS at 11:00

## 2017-09-19 RX ADMIN — Medication 25 MILLIGRAM(S): at 23:43

## 2017-09-19 RX ADMIN — Medication 1 TABLET(S): at 10:40

## 2017-09-19 RX ADMIN — OXYCODONE AND ACETAMINOPHEN 2 TABLET(S): 5; 325 TABLET ORAL at 01:30

## 2017-09-19 RX ADMIN — CALCITRIOL 0.25 MICROGRAM(S): 0.5 CAPSULE ORAL at 12:58

## 2017-09-19 RX ADMIN — Medication 250 MILLIGRAM(S): at 12:58

## 2017-09-19 RX ADMIN — SODIUM CHLORIDE 100 MILLILITER(S): 9 INJECTION, SOLUTION INTRAVENOUS at 13:38

## 2017-09-19 RX ADMIN — OXYCODONE AND ACETAMINOPHEN 2 TABLET(S): 5; 325 TABLET ORAL at 18:53

## 2017-09-19 NOTE — PROGRESS NOTE ADULT - PROBLEM SELECTOR PLAN 1
repeat blood cx performed no growth    iv antibiotics regimen per ID   indium scan ordered to evaluate source of infection

## 2017-09-19 NOTE — PROGRESS NOTE ADULT - ASSESSMENT
UNCLEAR SOURCE OF FEVER  PROB INFECTED/HEMORRHAGIC CYSTS  MRI C/W DX  CDIFF IMPROVING  NEVER BACTEREMIC      IF FEBRILE AM INDIUM  - DOUBT ANY SIGNIF EFFICACY  NO INDICATION FOR AB CHANGE - SENSITIVE  D/W PT - CONT CURRENT TX

## 2017-09-19 NOTE — DIETITIAN INITIAL EVALUATION ADULT. - OTHER INFO
Pt admitted for sepsis and CDIFF. Currently tolerating low fiber diet, intake fair. Pt wants to continue with Ensure Clears as she tolerates well. No current n/v.

## 2017-09-19 NOTE — PROGRESS NOTE ADULT - SUBJECTIVE AND OBJECTIVE BOX
Renal :    139    |  100    |  28.0<H>  ----------------------------<  100    Ca:8.6   (18 Sep 2017 10:48)  3.8     |  27.0   |  2.84<H>    eGFR if Non : 19 <L>                          9.8<L>  11.0<H> )-----------( 195      ( 18 Sep 2017 10:49 )                  30.6<L>    Phos:3.0 mg/dL M.8 mg/dL PTH:-- Uric acid:-- Serum Osm:--  Ferritin:-- Iron:-- TIBC:-- Tsat:--  B12:-- TSH:-- ( @ 10:49)    Urinalysis Basic - ( 16 Sep 2017 13:20 )  Color: Yellow / Appearance: Clear / S.010 / pH: x  Gluc: x / Ketone: Negative  / Bili: Negative / Urobili: Negative mg/dL   Blood: x / Protein: 30 mg/dL<!> / Nitrite: Negative   Leuk Esterase: Small<!> / RBC: 11-25 /HPF<!> / WBC 6-10<!>   Sq Epi: x / Non Sq Epi: Few / Bacteria: Few<!>    Pt  seen examined , she is still having fever , flank  and abdominal pain  much better   Diarrhea improving x 5 over 24 hours   She denies any new symptoms,    Discussed w. Yobany Willett &  Terri,    REVIEW OF SYSTEMS:  as above otherwise negative,    Vital Signs Last 24 Hrs  T(C): 36.9 (18 Sep 2017 09:03), Max: 38.8 (18 Sep 2017 04:34)  T(F): 98.4 (18 Sep 2017 09:03), Max: 101.9 (18 Sep 2017 04:34)  HR: 72 (18 Sep 2017 04:34) (68 - 76)  BP: 116/62 (18 Sep 2017 04:34) (111/69 - 125/71)  BP(mean): --  RR: 18 (18 Sep 2017 04:34) (18 - 20)  SpO2: 92% (18 Sep 2017 04:34) (92% - 98%)    GENERAL: NAD, well-groomed, well-developed  CHEST/LUNG: Clear to auscultation  bilaterally; No rales, rhonchi, wheezing, or rubs  HEART: Regular rate and rhythm; No murmurs, rubs, or gallops  ABDOMEN: Soft, RUQ , right flank tenderness on percussion , Nondistended; Bowel sounds present, no tenderness   EXTREMITIES:  2+ Peripheral Pulses, No clubbing, cyanosis, or edema  SKIN: No rashes or lesions    LABS: Reviewed,    MRI Abdomen w/o Cont (17 @ 20:11)   EXAM:  ABDOMEN (MRI) W O CON                          PROCEDURE DATE:  2017      INTERPRETATION:  Exam Type: ABDOMEN (MRI) W O CON   Exam Date and Time:  2017 8:11 PM   Indication:  Abdominal pain, polycystic kidney disease, fever   Comparison: CT 2017, ultrasound 2017, MRI 2013     Procedure: MRI of the abdomen without contrast was performed on a 1.5   Lidia magnet. Contrast was not administered secondary to renal   insufficiency.    Findings:    Innumerable cystic lesions throughout bilateral enlarged kidneys as seen   on multiple prior exams compatible with known polycystic kidney disease.   Multiple bilateral hemorrhagic renal cyst identified. Limited evaluation   of solid masses given lack of IV contrast however no definite findings to   suggest solid lesions. Minimal to mild infiltrative stranding is seen   surrounding the proximal right ureter as seen on CT of prior day. No   hydronephrosis bilaterally. No bilateral perinephric collections.   Aortocaval lymph node measuring 1.4 cm unchanged. Several left renal   calcifications as seen on CT.    Several left hepatic cysts are unchanged. No hepatic steatosis.   Subcentimeter peggy hepatis and portacaval lymph nodes likely reactive in   nature. Spleen is enlarged measuring 14 cm. Prior cholecystectomy. No   intrahepatic or extra hepatic biliary dilatation. Pancreas is of normal   signal intensity with tiny less than 2 mm pancreatic body and pancreatic   tail cyst. Pancreatic duct is of normal caliber.  Thickening of the   bilateral adrenal glands without focal mass.    Patchy opacities in the right lung base may represent atelectasis or   pneumonia, clinically correlate. Small umbilical fat-containing hernia.   Mild degenerative changes.      IMPRESSION:    Findings compatible with polycystic kidney disease as seen on prior   exams. No hydronephrosis bilaterally. Minimal to mild infiltrative   changes surrounding the proximal right ureter as seen on recent CT. No   perinephric collections.    Patchy opacities in the right lung base may represent atelectasis or   pneumonia, clinically correlate      MONICA DUNLAP M.D., ATTENDING RADIOLOGIST  This document has been electronically signed. Sep 19 2017 10:45AM                     Assessment and Plan:     47 y/o female with HTN , s/p oral surgery 2 weeks ago admitted for gastroenteritis, ARF on CKD ( Baseline Scr 1.5 mg.,  in  , diagnosed with C diff colitis , UTI , MRI of brain performed showed brain aneurism, seen by neurosurgery s/p LP by IR radiology , renal function improving , recurrent fever despite iv therapy and po vancomycin  , being followed by ID,     MRI of kidney - Reviewed,      Problem/Plan - 1:    ·  Problem: Fever, unspecified fever cause.      Plan: repeat blood cx, ID follow up ,    continue iv aztreonam for UTI ,     MRI of kidney; Hemorrhagic     Problem/Plan - 2:    ·  Problem: Clostridium difficile diarrhea.  Improving, Never Bacteremic,    Plan: continue po vancomycin ,diarrhea improving , tolerating diet.     Problem/Plan - 3:    ·  Problem: ?  Urinary tract infection without hematuria, site unspecified.      Plan: continue aztreonam , [per ID,    ? INFECTED/HEMORRHAGIC CYSTS    Indium scan in AM,

## 2017-09-19 NOTE — CHART NOTE - NSCHARTNOTEFT_GEN_A_CORE
Called by RN to assess pt c/o pruritus.  Pt states she feels "itchy all over", no new meds as per RN.  No rash noted.  VSS.  Benadryl 25 mg po ordered.  Observe and reassess prn.

## 2017-09-19 NOTE — PROGRESS NOTE ADULT - SUBJECTIVE AND OBJECTIVE BOX
[facility] HILARIA WATKINS MD FACP   AUGUSTSU HARRIS MD  Diplomates American Board of Internal Medicine and Infectious Diseases      ROLLY MEDEIROS  [id number]  46y    INTERVAL HPI/OVERNIGHT EVENTS:  TEMP PERSISTS  LIZY  DIARRHEA GONE  MRI MULT HEMORRHAGIC CYSTSTS    Vital Signs Last 24 Hrs  T(C): 36.9 (18 Sep 2017 09:03), Max: 38.8 (18 Sep 2017 04:34)  T(F): 98.4 (18 Sep 2017 09:03), Max: 101.9 (18 Sep 2017 04:34)  HR: 72 (18 Sep 2017 04:34) (68 - 76)  BP: 116/62 (18 Sep 2017 04:34) (111/69 - 125/71)  BP(mean): --  RR: 18 (18 Sep 2017 04:34) (18 - 20)  SpO2: 92% (18 Sep 2017 04:34) (92% - 98%)    ANTIBIOTICS  vancomycin    Solution 250 milliGRAM(s) Oral every 6 hours  aztreonam  IVPB      aztreonam  IVPB 2000 milliGRAM(s) IV Intermittent every 12 hours      Allergies    Demerol HCl (Hives)  Demerol HCl (Hives; Rash)  Phenergan (Hives; Rash)  Phenergan Fortis (Hives)    Intolerances        REVIEW OF SYSTEMS:HA  NO  SXS  PHYSICAL EXAM:  Vital Signs Last 24 Hrs  T(C): 36.9 (18 Sep 2017 09:03), Max: 38.8 (18 Sep 2017 04:34)  T(F): 98.4 (18 Sep 2017 09:03), Max: 101.9 (18 Sep 2017 04:34)  HR: 72 (18 Sep 2017 04:34) (68 - 76)  BP: 116/62 (18 Sep 2017 04:34) (111/69 - 125/71)  BP(mean): --  RR: 18 (18 Sep 2017 04:34) (18 - 20)  SpO2: 92% (18 Sep 2017 04:34) (92% - 98%)      GEN: NAD, pleasant  HEENT: normocephalic and atraumatic. EOMI. DWAYNE. Moist mucosa. Clear Posterior pharynx.  NECK: Supple. No carotid bruits.  No lymphadenopathy or thyromegaly.  LUNGS: Clear to auscultation.  HEART: Regular rate and rhythm without murmur.  ABDOMEN: Soft, nontender, and nondistended.  Positive bowel sounds.  No hepatosplenomegaly was noted.  EXTREMITIES: Without any cyanosis, clubbing, rash, lesions or edema.  NEUROLOGIC: Cranial nerves II through XII are grossly intact.  MUSCULOSKELETAL:  SKIN: No ulceration or induration present    LABS:      09-17    140  |  100  |  26.0<H>  ----------------------------<  85  4.2   |  27.0  |  2.83<H>    Ca    8.5<L>      17 Sep 2017 06:05  Phos  3.1     09-17  Mg     1.7     09-17 09-16 @ 06:32  hct 28.9 % hgb 9.6 g/dL    09-16 @ 06:32  plat 134 K/uL wbc 8.8 K/uL    09-15 @ 15:50  hct 29.6 % hgb 10.0 g/dL    09-15 @ 15:50  plat 145 K/uL wbc 10.3 K/uL      09-17-17  creat 2.83 mg/dL gfr 22 mL/min/1.73M2 bun 26.0 mg/dL k 4.2 mmol/L  09-16-17  creat 3.06 mg/dL gfr 20 mL/min/1.73M2 bun 28.0 mg/dL k 3.0 mmol/L  09-15-17  creat 3.35 mg/dL gfr 18 mL/min/1.73M2 bun 27.0 mg/dL k 3.0 mmol/L      MICROBIOLOGY:  Culture - Urine (09.13.17 @ 23:45)    -  Amikacin: S <=16    -  Ampicillin: R >16    -  Ampicillin/Sulbactam: S <=8/4    -  Aztreonam: S <=4    -  Cefazolin: S <=8    -  Cefepime: S <=4    -  Cefoxitin: S <=8    -  Ceftazidime: S <=1    -  Ceftriaxone: S <=1    -  Ciprofloxacin: S <=1    -  Ertapenem: S <=1    -  Gentamicin: S <=4    -  Imipenem: S <=1    -  Levofloxacin: S <=2    -  Meropenem: S <=1    -  Nitrofurantoin: S <=32    -  Piperacillin/Tazobactam: S <=16    -  Tobramycin: S <=4    -  Trimethoprim/Sulfamethoxazole: S <=2/38    Specimen Source: .Urine Clean Catch (Midstream)    Culture Results:   >100,000 CFU/ml Klebsiella pneumoniae    Organism Identification: Klebsiella pneumoniae    Organism: Klebsiella pneumoniae    Method Type: TITI      BLOODS C/S NEG  F/UP URINE NEG    RADIOLOGY & ADDITIONAL STUDIES:          JOSE WATKINS MD FACP

## 2017-09-19 NOTE — PROGRESS NOTE ADULT - SUBJECTIVE AND OBJECTIVE BOX
Pt is seen examined , she is with recurrent fever , c/o right sided haeadche 4/10, mild flank and abdominal pain '  diarrhea improving less watery 2-3 BM   She denies any new symptoms   discussed Dr Waqas Humphrey        REVIEW OF SYSTEMS:  as above otherwise negative  Vital Signs Last 24 Hrs  T(C): 37.3 (19 Sep 2017 09:32), Max: 38.4 (18 Sep 2017 18:34)  T(F): 99.1 (19 Sep 2017 09:32), Max: 101.2 (18 Sep 2017 18:34)  HR: 91 (19 Sep 2017 09:32) (59 - 91)  BP: 109/65 (19 Sep 2017 09:32) (107/63 - 121/70)  BP(mean): --  RR: 14 (19 Sep 2017 09:32) (14 - 19)  SpO2: 97% (19 Sep 2017 09:32) (96% - 97%)  GENERAL: NAD, well-groomed, well-developed  CHEST/LUNG: Clear to auscultation  bilaterally; No rales, rhonchi, wheezing, or rubs  HEART: Regular rate and rhythm; No murmurs, rubs, or gallops  ABDOMEN: Soft, RUQ , right flank tenderness on percussion , Nondistended; Bowel sounds present, no tenderness   EXTREMITIES:  2+ Peripheral Pulses, No clubbing, cyanosis, or edema  SKIN: No rashes or lesions      LABS:             not ordered  today

## 2017-09-19 NOTE — PROGRESS NOTE ADULT - PROBLEM SELECTOR PLAN 4
neurosurgery follow up s/p LP for  xantchromia ruled out on admission due to UTI and C diff colitis   resolving , no organ involvement

## 2017-09-19 NOTE — PROGRESS NOTE ADULT - ASSESSMENT
47 y/o female with HTNm , s/p oral surgery 2 weeks ago admitted for gastroenteritis, ARF on cronic baseline cr 1.5 in 2015 , diagnosed with C diff colitis , UTI , MRI of brain performed showed brain aneurism seen by neurosurgery s/p LP by IR radiology , renal function improving , recurrent fever , MRI of kidney no abscess , on iv azreonam and po  vanco . 47 y/o female with HTNm , s/p oral surgery 2 weeks ago admitted for gastroenteritis, ARF on cronic baseline cr 1.5 in 2015 , diagnosed with C diff colitis , UTI , MRI of brain performed showed brain aneurism seen by neurosurgery s/p LP by IR radiology , renal function improving , recurrent fever , MRI of kidney no abscess , on iv azreonam and po  vanco

## 2017-09-19 NOTE — PROGRESS NOTE ADULT - PROBLEM SELECTOR PROBLEM 5
Acute renal failure superimposed on chronic kidney disease, unspecified CKD stage, unspecified acute renal failure type Brain aneurysm

## 2017-09-19 NOTE — DIETITIAN INITIAL EVALUATION ADULT. - ORAL INTAKE PTA
eats ~2 meals/day 2/2 constant nausea, gerd, likely due to enlarged cystic kidneys that are "pushing on stomach"/fair

## 2017-09-19 NOTE — PROGRESS NOTE ADULT - PROBLEM SELECTOR PROBLEM 6
Polycystic kidney disease Acute renal failure superimposed on chronic kidney disease, unspecified CKD stage, unspecified acute renal failure type

## 2017-09-19 NOTE — PROGRESS NOTE ADULT - ASSESSMENT
1. Discussed w. Radiology  ( MRA  Brain , CSF  Analysis Reviewed ) ,  & Dr. Murray,    2. All The data reviewed w. the patient & the son (  EMR Trended data -  paul. w. the son @ the Computer terminal )    3. RN Arthur Covington' updated re : Conversations  w. the  Patient & family,      P : Continue current management & W.U Per ID,      ( Briefly went over the Course , prognosis & Treatment of PKD &  Renal Replacement Therapy  options in the future )

## 2017-09-20 LAB
ANION GAP SERPL CALC-SCNC: 12 MMOL/L — SIGNIFICANT CHANGE UP (ref 5–17)
BUN SERPL-MCNC: 31 MG/DL — HIGH (ref 8–20)
CALCIUM SERPL-MCNC: 9.1 MG/DL — SIGNIFICANT CHANGE UP (ref 8.6–10.2)
CHLORIDE SERPL-SCNC: 96 MMOL/L — LOW (ref 98–107)
CO2 SERPL-SCNC: 27 MMOL/L — SIGNIFICANT CHANGE UP (ref 22–29)
CREAT SERPL-MCNC: 3.01 MG/DL — HIGH (ref 0.5–1.3)
CULTURE RESULTS: SIGNIFICANT CHANGE UP
CULTURE RESULTS: SIGNIFICANT CHANGE UP
GLUCOSE SERPL-MCNC: 86 MG/DL — SIGNIFICANT CHANGE UP (ref 70–115)
HCT VFR BLD CALC: 32.4 % — LOW (ref 37–47)
HGB BLD-MCNC: 10.3 G/DL — LOW (ref 12–16)
MCHC RBC-ENTMCNC: 31.8 G/DL — LOW (ref 32–36)
MCHC RBC-ENTMCNC: 31.9 PG — HIGH (ref 27–31)
MCV RBC AUTO: 100.3 FL — HIGH (ref 81–99)
PLATELET # BLD AUTO: 228 K/UL — SIGNIFICANT CHANGE UP (ref 150–400)
POTASSIUM SERPL-MCNC: 4.6 MMOL/L — SIGNIFICANT CHANGE UP (ref 3.5–5.3)
POTASSIUM SERPL-SCNC: 4.6 MMOL/L — SIGNIFICANT CHANGE UP (ref 3.5–5.3)
RBC # BLD: 3.23 M/UL — LOW (ref 4.4–5.2)
RBC # FLD: 12.5 % — SIGNIFICANT CHANGE UP (ref 11–15.6)
SODIUM SERPL-SCNC: 135 MMOL/L — SIGNIFICANT CHANGE UP (ref 135–145)
SPECIMEN SOURCE: SIGNIFICANT CHANGE UP
SPECIMEN SOURCE: SIGNIFICANT CHANGE UP
WBC # BLD: 10.8 K/UL — SIGNIFICANT CHANGE UP (ref 4.8–10.8)
WBC # FLD AUTO: 10.8 K/UL — SIGNIFICANT CHANGE UP (ref 4.8–10.8)

## 2017-09-20 PROCEDURE — 99233 SBSQ HOSP IP/OBS HIGH 50: CPT

## 2017-09-20 PROCEDURE — 99232 SBSQ HOSP IP/OBS MODERATE 35: CPT

## 2017-09-20 RX ORDER — HYDROXYZINE HCL 10 MG
25 TABLET ORAL EVERY 6 HOURS
Qty: 0 | Refills: 0 | Status: DISCONTINUED | OUTPATIENT
Start: 2017-09-20 | End: 2017-09-27

## 2017-09-20 RX ORDER — ALPRAZOLAM 0.25 MG
0.25 TABLET ORAL
Qty: 0 | Refills: 0 | Status: DISCONTINUED | OUTPATIENT
Start: 2017-09-20 | End: 2017-09-23

## 2017-09-20 RX ADMIN — HYDROMORPHONE HYDROCHLORIDE 2 MILLIGRAM(S): 2 INJECTION INTRAMUSCULAR; INTRAVENOUS; SUBCUTANEOUS at 05:11

## 2017-09-20 RX ADMIN — Medication 250 MILLIGRAM(S): at 18:01

## 2017-09-20 RX ADMIN — HYDROMORPHONE HYDROCHLORIDE 2 MILLIGRAM(S): 2 INJECTION INTRAMUSCULAR; INTRAVENOUS; SUBCUTANEOUS at 23:00

## 2017-09-20 RX ADMIN — MAGNESIUM OXIDE 400 MG ORAL TABLET 400 MILLIGRAM(S): 241.3 TABLET ORAL at 10:08

## 2017-09-20 RX ADMIN — Medication 100 MILLIGRAM(S): at 05:12

## 2017-09-20 RX ADMIN — Medication 0.25 MILLIGRAM(S): at 12:27

## 2017-09-20 RX ADMIN — CALCITRIOL 0.25 MICROGRAM(S): 0.5 CAPSULE ORAL at 12:27

## 2017-09-20 RX ADMIN — HYDROMORPHONE HYDROCHLORIDE 2 MILLIGRAM(S): 2 INJECTION INTRAMUSCULAR; INTRAVENOUS; SUBCUTANEOUS at 23:17

## 2017-09-20 RX ADMIN — HYDROMORPHONE HYDROCHLORIDE 2 MILLIGRAM(S): 2 INJECTION INTRAMUSCULAR; INTRAVENOUS; SUBCUTANEOUS at 18:50

## 2017-09-20 RX ADMIN — FAMOTIDINE 20 MILLIGRAM(S): 10 INJECTION INTRAVENOUS at 12:27

## 2017-09-20 RX ADMIN — HYDROMORPHONE HYDROCHLORIDE 2 MILLIGRAM(S): 2 INJECTION INTRAMUSCULAR; INTRAVENOUS; SUBCUTANEOUS at 15:02

## 2017-09-20 RX ADMIN — Medication 25 MILLIGRAM(S): at 20:10

## 2017-09-20 RX ADMIN — Medication 250 MILLIGRAM(S): at 23:00

## 2017-09-20 RX ADMIN — HYDROMORPHONE HYDROCHLORIDE 2 MILLIGRAM(S): 2 INJECTION INTRAMUSCULAR; INTRAVENOUS; SUBCUTANEOUS at 10:00

## 2017-09-20 RX ADMIN — OXYCODONE AND ACETAMINOPHEN 2 TABLET(S): 5; 325 TABLET ORAL at 13:22

## 2017-09-20 RX ADMIN — Medication 1 TABLET(S): at 18:01

## 2017-09-20 RX ADMIN — OXYCODONE AND ACETAMINOPHEN 1 TABLET(S): 5; 325 TABLET ORAL at 20:08

## 2017-09-20 RX ADMIN — HYDROMORPHONE HYDROCHLORIDE 2 MILLIGRAM(S): 2 INJECTION INTRAMUSCULAR; INTRAVENOUS; SUBCUTANEOUS at 10:22

## 2017-09-20 RX ADMIN — Medication 250 MILLIGRAM(S): at 05:13

## 2017-09-20 RX ADMIN — Medication 1 TABLET(S): at 11:02

## 2017-09-20 RX ADMIN — Medication 0.25 MILLIGRAM(S): at 23:16

## 2017-09-20 RX ADMIN — SODIUM CHLORIDE 100 MILLILITER(S): 9 INJECTION, SOLUTION INTRAVENOUS at 05:14

## 2017-09-20 RX ADMIN — Medication 250 MILLIGRAM(S): at 13:16

## 2017-09-20 RX ADMIN — HYDROMORPHONE HYDROCHLORIDE 2 MILLIGRAM(S): 2 INJECTION INTRAMUSCULAR; INTRAVENOUS; SUBCUTANEOUS at 05:26

## 2017-09-20 RX ADMIN — OXYCODONE AND ACETAMINOPHEN 1 TABLET(S): 5; 325 TABLET ORAL at 00:42

## 2017-09-20 RX ADMIN — HYDROMORPHONE HYDROCHLORIDE 2 MILLIGRAM(S): 2 INJECTION INTRAMUSCULAR; INTRAVENOUS; SUBCUTANEOUS at 14:44

## 2017-09-20 RX ADMIN — Medication 650 MILLIGRAM(S): at 07:41

## 2017-09-20 RX ADMIN — Medication 100 MILLIGRAM(S): at 18:01

## 2017-09-20 RX ADMIN — Medication 25 MILLIGRAM(S): at 13:24

## 2017-09-20 RX ADMIN — OXYCODONE AND ACETAMINOPHEN 2 TABLET(S): 5; 325 TABLET ORAL at 14:20

## 2017-09-20 RX ADMIN — OXYCODONE AND ACETAMINOPHEN 1 TABLET(S): 5; 325 TABLET ORAL at 21:00

## 2017-09-20 RX ADMIN — HYDROMORPHONE HYDROCHLORIDE 2 MILLIGRAM(S): 2 INJECTION INTRAMUSCULAR; INTRAVENOUS; SUBCUTANEOUS at 19:20

## 2017-09-20 RX ADMIN — Medication 1 TABLET(S): at 10:09

## 2017-09-20 RX ADMIN — Medication 1 TABLET(S): at 10:07

## 2017-09-20 NOTE — PROGRESS NOTE ADULT - SUBJECTIVE AND OBJECTIVE BOX
she is with recurrent fever , low grade this am, no diarrhea , flank pain and  headache , she is feeling anxious   c/o ithching of skin           REVIEW OF SYSTEMS:  as above otherwise negative    Vital Signs Last 24 Hrs  T(C): 38 (20 Sep 2017 07:37), Max: 38.8 (19 Sep 2017 18:58)  T(F): 100.4 (20 Sep 2017 07:37), Max: 101.8 (19 Sep 2017 18:58)  HR: 71 (20 Sep 2017 07:37) (66 - 75)  BP: 114/68 (20 Sep 2017 07:37) (106/65 - 131/83)  BP(mean): --  RR: 18 (20 Sep 2017 07:37) (17 - 20)  SpO2: 94% (20 Sep 2017 07:37) (94% - 97%)  GENERAL: NAD, well-groomed, well-developed  CHEST/LUNG: Clear to auscultation  bilaterally; No rales, rhonchi, wheezing, or rubs  HEART: Regular rate and rhythm; No murmurs, rubs, or gallops  ABDOMEN: Soft, RUQ , right flank tenderness on percussion , Nondistended; Bowel sounds present, no tenderness   EXTREMITIES:  2+ Peripheral Pulses, No clubbing, cyanosis, or edema  SKIN: No rashes or lesions      LABS:                                   10.3   10.8  )-----------( 228      ( 20 Sep 2017 05:54 )             32.4   09-20    135  |  96<L>  |  31.0<H>  ----------------------------<  86  4.6   |  27.0  |  3.01<H>    Ca    9.1      20 Sep 2017 05:53

## 2017-09-20 NOTE — PROGRESS NOTE ADULT - ASSESSMENT
PROB INFECTED/HEMORRHAGIC CYSTS  MRI C/W DX  CDIFF IMPROVING  NEVER BACTEREMIC      IF FEBRILE AM INDIUM  - DOUBT ANY SIGNIF EFFICACY  NO INDICATION FOR AB CHANGE - SENSITIVE  D/W PT - CONT CURRENT TX  ONCE AFEBRILE - CAN CHANGE TO PO PLUS VANCO FOR 2 WEEKS POST HSP TX

## 2017-09-20 NOTE — PROGRESS NOTE ADULT - SUBJECTIVE AND OBJECTIVE BOX
[facility] [place] HILARIA WATKINS MD FACP   AUGUSTUS HARRIS MD  Diplomates American Board of Internal Medicine and Infectious Diseases      ROLLY MEDEIROS  [id number]  46y    INTERVAL HPI/OVERNIGHT EVENTS:  TEMP PERSISTS  MAX AND LOWER - FEELS BETTER  DIARRHEA GONE  MRI MULT HEMORRHAGIC CYSTSTS    Vital Signs Last 24 Hrs  T(C): 36.9 (18 Sep 2017 09:03), Max: 38.8 (18 Sep 2017 04:34)  T(F): 98.4 (18 Sep 2017 09:03), Max: 101.9 (18 Sep 2017 04:34)  HR: 72 (18 Sep 2017 04:34) (68 - 76)  BP: 116/62 (18 Sep 2017 04:34) (111/69 - 125/71)  BP(mean): --  RR: 18 (18 Sep 2017 04:34) (18 - 20)  SpO2: 92% (18 Sep 2017 04:34) (92% - 98%)    ANTIBIOTICS  vancomycin    Solution 250 milliGRAM(s) Oral every 6 hours  aztreonam  IVPB      aztreonam  IVPB 2000 milliGRAM(s) IV Intermittent every 12 hours      Allergies    Demerol HCl (Hives)  Demerol HCl (Hives; Rash)  Phenergan (Hives; Rash)  Phenergan Fortis (Hives)    Intolerances        REVIEW OF SYSTEMS:HA  NO  SXS  PHYSICAL EXAM:  Vital Signs Last 24 Hrs  T(C): 36.9 (18 Sep 2017 09:03), Max: 38.8 (18 Sep 2017 04:34)  T(F): 98.4 (18 Sep 2017 09:03), Max: 101.9 (18 Sep 2017 04:34)  HR: 72 (18 Sep 2017 04:34) (68 - 76)  BP: 116/62 (18 Sep 2017 04:34) (111/69 - 125/71)  BP(mean): --  RR: 18 (18 Sep 2017 04:34) (18 - 20)  SpO2: 92% (18 Sep 2017 04:34) (92% - 98%)      GEN: NAD, pleasant  HEENT: normocephalic and atraumatic. EOMI. DWAYNE. Moist mucosa. Clear Posterior pharynx.  NECK: Supple. No carotid bruits.  No lymphadenopathy or thyromegaly.  LUNGS: Clear to auscultation.  HEART: Regular rate and rhythm without murmur.  ABDOMEN: Soft, nontender, and nondistended.  Positive bowel sounds.  No hepatosplenomegaly was noted.  EXTREMITIES: Without any cyanosis, clubbing, rash, lesions or edema.  NEUROLOGIC: Cranial nerves II through XII are grossly intact.  MUSCULOSKELETAL:  SKIN: No ulceration or induration present    LABS:      09-17    140  |  100  |  26.0<H>  ----------------------------<  85  4.2   |  27.0  |  2.83<H>    Ca    8.5<L>      17 Sep 2017 06:05  Phos  3.1     09-17  Mg     1.7     09-17 09-16 @ 06:32  hct 28.9 % hgb 9.6 g/dL    09-16 @ 06:32  plat 134 K/uL wbc 8.8 K/uL    09-15 @ 15:50  hct 29.6 % hgb 10.0 g/dL    09-15 @ 15:50  plat 145 K/uL wbc 10.3 K/uL      09-17-17  creat 2.83 mg/dL gfr 22 mL/min/1.73M2 bun 26.0 mg/dL k 4.2 mmol/L  09-16-17  creat 3.06 mg/dL gfr 20 mL/min/1.73M2 bun 28.0 mg/dL k 3.0 mmol/L  09-15-17  creat 3.35 mg/dL gfr 18 mL/min/1.73M2 bun 27.0 mg/dL k 3.0 mmol/L      MICROBIOLOGY:  Culture - Urine (09.13.17 @ 23:45)    -  Amikacin: S <=16    -  Ampicillin: R >16    -  Ampicillin/Sulbactam: S <=8/4    -  Aztreonam: S <=4    -  Cefazolin: S <=8    -  Cefepime: S <=4    -  Cefoxitin: S <=8    -  Ceftazidime: S <=1    -  Ceftriaxone: S <=1    -  Ciprofloxacin: S <=1    -  Ertapenem: S <=1    -  Gentamicin: S <=4    -  Imipenem: S <=1    -  Levofloxacin: S <=2    -  Meropenem: S <=1    -  Nitrofurantoin: S <=32    -  Piperacillin/Tazobactam: S <=16    -  Tobramycin: S <=4    -  Trimethoprim/Sulfamethoxazole: S <=2/38    Specimen Source: .Urine Clean Catch (Midstream)    Culture Results:   >100,000 CFU/ml Klebsiella pneumoniae    Organism Identification: Klebsiella pneumoniae    Organism: Klebsiella pneumoniae    Method Type: TITI      BLOODS C/S NEG  F/UP URINE NEG    RADIOLOGY & ADDITIONAL STUDIES:          JOSE WATKINS MD FACP

## 2017-09-20 NOTE — PROGRESS NOTE ADULT - ASSESSMENT
47 y/o female with HTN , s/p oral surgery 2 weeks ago admitted for gastroenteritis, ARF on CKD ( Baseline Scr 1.5 mg.,  in 2015 , diagnosed with C diff colitis , UTI , MRI of brain performed showed brain aneurism, seen by neurosurgery s/p LP by IR radiology , renal function improving , recurrent fever despite iv therapy and po vancomycin  , being followed by ID,     MRI of kidney - Reviewed,      Problem/Plan - 1:    ·  Problem: Fever, unspecified fever cause.      Plan: repeat blood cx, ID follow up ,    continue iv aztreonam for UTI ,     MRI of kidney; Hemorrhagic     Problem/Plan - 2:    ·  Problem: Clostridium difficile diarrhea.  Improving, Never Bacteremic,    Plan: continue po vancomycin ,diarrhea improving , tolerating diet.     Problem/Plan - 3:    ·  Problem: ?  Urinary tract infection without hematuria, site unspecified.      Plan: continue aztreonam , per ID,    Indium scan in AM,        P : Continue current management & W.U Per ID,      ( Briefly went over the Course , prognosis & Treatment of ADPKD &  Renal Replacement Therapy  options in the future )

## 2017-09-20 NOTE — PROGRESS NOTE ADULT - ASSESSMENT
47 y/o female with HTNm , s/p oral surgery 2 weeks ago admitted for gastroenteritis, ARF on cronic baseline cr 1.5 in 2015 , diagnosed with C diff colitis , UTI , MRI of brain performed showed brain aneurism seen by neurosurgery s/p LP by IR radiology , renal function improving , recurrent fever , MRI of kidney no abscess , on iv azreonam and po  vanco , intermittent fever , creatinine  3 stable , blood cx negative

## 2017-09-21 LAB
CULTURE RESULTS: SIGNIFICANT CHANGE UP
SPECIMEN SOURCE: SIGNIFICANT CHANGE UP

## 2017-09-21 PROCEDURE — 78807: CPT | Mod: 26

## 2017-09-21 PROCEDURE — 99233 SBSQ HOSP IP/OBS HIGH 50: CPT

## 2017-09-21 PROCEDURE — 78806: CPT | Mod: 26

## 2017-09-21 RX ORDER — OXYCODONE HYDROCHLORIDE 5 MG/1
5 TABLET ORAL EVERY 4 HOURS
Qty: 0 | Refills: 0 | Status: DISCONTINUED | OUTPATIENT
Start: 2017-09-21 | End: 2017-09-24

## 2017-09-21 RX ORDER — OXYCODONE HYDROCHLORIDE 5 MG/1
10 TABLET ORAL EVERY 4 HOURS
Qty: 0 | Refills: 0 | Status: DISCONTINUED | OUTPATIENT
Start: 2017-09-21 | End: 2017-09-24

## 2017-09-21 RX ADMIN — HYDROMORPHONE HYDROCHLORIDE 2 MILLIGRAM(S): 2 INJECTION INTRAMUSCULAR; INTRAVENOUS; SUBCUTANEOUS at 11:33

## 2017-09-21 RX ADMIN — FAMOTIDINE 20 MILLIGRAM(S): 10 INJECTION INTRAVENOUS at 11:10

## 2017-09-21 RX ADMIN — Medication 0.25 MILLIGRAM(S): at 20:29

## 2017-09-21 RX ADMIN — HYDROMORPHONE HYDROCHLORIDE 2 MILLIGRAM(S): 2 INJECTION INTRAMUSCULAR; INTRAVENOUS; SUBCUTANEOUS at 06:42

## 2017-09-21 RX ADMIN — CALCITRIOL 0.25 MICROGRAM(S): 0.5 CAPSULE ORAL at 11:10

## 2017-09-21 RX ADMIN — HYDROMORPHONE HYDROCHLORIDE 2 MILLIGRAM(S): 2 INJECTION INTRAMUSCULAR; INTRAVENOUS; SUBCUTANEOUS at 16:17

## 2017-09-21 RX ADMIN — OXYCODONE HYDROCHLORIDE 10 MILLIGRAM(S): 5 TABLET ORAL at 18:38

## 2017-09-21 RX ADMIN — Medication 1 TABLET(S): at 10:17

## 2017-09-21 RX ADMIN — HYDROMORPHONE HYDROCHLORIDE 2 MILLIGRAM(S): 2 INJECTION INTRAMUSCULAR; INTRAVENOUS; SUBCUTANEOUS at 03:01

## 2017-09-21 RX ADMIN — Medication 650 MILLIGRAM(S): at 06:42

## 2017-09-21 RX ADMIN — Medication 250 MILLIGRAM(S): at 18:06

## 2017-09-21 RX ADMIN — HYDROMORPHONE HYDROCHLORIDE 2 MILLIGRAM(S): 2 INJECTION INTRAMUSCULAR; INTRAVENOUS; SUBCUTANEOUS at 03:30

## 2017-09-21 RX ADMIN — HYDROMORPHONE HYDROCHLORIDE 2 MILLIGRAM(S): 2 INJECTION INTRAMUSCULAR; INTRAVENOUS; SUBCUTANEOUS at 20:45

## 2017-09-21 RX ADMIN — Medication 100 MILLIGRAM(S): at 06:42

## 2017-09-21 RX ADMIN — Medication 650 MILLIGRAM(S): at 14:27

## 2017-09-21 RX ADMIN — Medication 1 TABLET(S): at 07:23

## 2017-09-21 RX ADMIN — HYDROMORPHONE HYDROCHLORIDE 2 MILLIGRAM(S): 2 INJECTION INTRAMUSCULAR; INTRAVENOUS; SUBCUTANEOUS at 16:01

## 2017-09-21 RX ADMIN — Medication 250 MILLIGRAM(S): at 06:42

## 2017-09-21 RX ADMIN — Medication 25 MILLIGRAM(S): at 20:52

## 2017-09-21 RX ADMIN — HYDROMORPHONE HYDROCHLORIDE 2 MILLIGRAM(S): 2 INJECTION INTRAMUSCULAR; INTRAVENOUS; SUBCUTANEOUS at 20:29

## 2017-09-21 RX ADMIN — OXYCODONE AND ACETAMINOPHEN 1 TABLET(S): 5; 325 TABLET ORAL at 11:15

## 2017-09-21 RX ADMIN — OXYCODONE HYDROCHLORIDE 10 MILLIGRAM(S): 5 TABLET ORAL at 15:10

## 2017-09-21 RX ADMIN — Medication 100 MILLIGRAM(S): at 18:05

## 2017-09-21 RX ADMIN — Medication 250 MILLIGRAM(S): at 11:10

## 2017-09-21 RX ADMIN — Medication 1 TABLET(S): at 11:00

## 2017-09-21 RX ADMIN — Medication 1 TABLET(S): at 18:05

## 2017-09-21 RX ADMIN — HYDROMORPHONE HYDROCHLORIDE 2 MILLIGRAM(S): 2 INJECTION INTRAMUSCULAR; INTRAVENOUS; SUBCUTANEOUS at 11:12

## 2017-09-21 RX ADMIN — HYDROMORPHONE HYDROCHLORIDE 2 MILLIGRAM(S): 2 INJECTION INTRAMUSCULAR; INTRAVENOUS; SUBCUTANEOUS at 07:12

## 2017-09-21 RX ADMIN — OXYCODONE HYDROCHLORIDE 10 MILLIGRAM(S): 5 TABLET ORAL at 19:38

## 2017-09-21 RX ADMIN — OXYCODONE HYDROCHLORIDE 10 MILLIGRAM(S): 5 TABLET ORAL at 14:33

## 2017-09-21 RX ADMIN — OXYCODONE AND ACETAMINOPHEN 1 TABLET(S): 5; 325 TABLET ORAL at 10:16

## 2017-09-21 RX ADMIN — Medication 0.25 MILLIGRAM(S): at 12:06

## 2017-09-21 NOTE — PROGRESS NOTE ADULT - SUBJECTIVE AND OBJECTIVE BOX
she is tired , low grade fever, still having right flank pain , no diarrhea   denies nausea, tolerating diet             REVIEW OF SYSTEMS:  as above otherwise negative  Vital Signs Last 24 Hrs  T(C): 37.3 (21 Sep 2017 07:20), Max: 38.1 (21 Sep 2017 06:40)  T(F): 99.1 (21 Sep 2017 07:20), Max: 100.6 (21 Sep 2017 06:40)  HR: 73 (21 Sep 2017 07:20) (58 - 73)  BP: 109/63 (21 Sep 2017 07:20) (102/63 - 118/67)  BP(mean): --  RR: 18 (21 Sep 2017 07:20) (17 - 18)  SpO2: 95% (21 Sep 2017 07:20) (95% - 96%)    GENERAL: NAD, well-groomed, well-developed  CHEST/LUNG: Clear to auscultation  bilaterally; No rales, rhonchi, wheezing, or rubs  HEART: Regular rate and rhythm; No murmurs, rubs, or gallops  ABDOMEN: Soft, RUQ , right flank tenderness on percussion , Nondistended; Bowel sounds present, no tenderness   EXTREMITIES:  2+ Peripheral Pulses, No clubbing, cyanosis, or edema  SKIN: No rashes or lesions      LABS:                                  10.3   10.8  )-----------( 228      ( 20 Sep 2017 05:54 )             32.4   09-20    135  |  96<L>  |  31.0<H>  ----------------------------<  86  4.6   |  27.0  |  3.01<H>    Ca    9.1      20 Sep 2017 05:53

## 2017-09-21 NOTE — PROGRESS NOTE ADULT - ASSESSMENT
45 y/o female with HTNm , s/p oral surgery 2 weeks ago admitted for gastroenteritis, ARF on cronic baseline cr 1.5 in 2015 , diagnosed with C diff colitis , UTI , MRI of brain performed showed brain aneurism seen by neurosurgery s/p LP by IR radiology , renal function improving , recurrent fever , MRI of kidney no abscess , on iv azreonam and po  vanco , intermittent fever , creatinine  3 stable , blood cx negative , indium scan pending result

## 2017-09-22 DIAGNOSIS — A49.8 OTHER BACTERIAL INFECTIONS OF UNSPECIFIED SITE: ICD-10-CM

## 2017-09-22 DIAGNOSIS — N10 ACUTE PYELONEPHRITIS: ICD-10-CM

## 2017-09-22 LAB
ANION GAP SERPL CALC-SCNC: 16 MMOL/L — SIGNIFICANT CHANGE UP (ref 5–17)
BASOPHILS # BLD AUTO: 0 K/UL — SIGNIFICANT CHANGE UP (ref 0–0.2)
BASOPHILS NFR BLD AUTO: 0.2 % — SIGNIFICANT CHANGE UP (ref 0–2)
BUN SERPL-MCNC: 35 MG/DL — HIGH (ref 8–20)
CALCIUM SERPL-MCNC: 9.2 MG/DL — SIGNIFICANT CHANGE UP (ref 8.6–10.2)
CHLORIDE SERPL-SCNC: 98 MMOL/L — SIGNIFICANT CHANGE UP (ref 98–107)
CO2 SERPL-SCNC: 24 MMOL/L — SIGNIFICANT CHANGE UP (ref 22–29)
CREAT SERPL-MCNC: 3.66 MG/DL — HIGH (ref 0.5–1.3)
EOSINOPHIL # BLD AUTO: 0.3 K/UL — SIGNIFICANT CHANGE UP (ref 0–0.5)
EOSINOPHIL NFR BLD AUTO: 2.6 % — SIGNIFICANT CHANGE UP (ref 0–6)
GLUCOSE SERPL-MCNC: 93 MG/DL — SIGNIFICANT CHANGE UP (ref 70–115)
HCT VFR BLD CALC: 32.3 % — LOW (ref 37–47)
HGB BLD-MCNC: 10.4 G/DL — LOW (ref 12–16)
LACTATE BLDV-MCNC: 1.3 MMOL/L — SIGNIFICANT CHANGE UP (ref 0.5–2)
LYMPHOCYTES # BLD AUTO: 1.5 K/UL — SIGNIFICANT CHANGE UP (ref 1–4.8)
LYMPHOCYTES # BLD AUTO: 13.8 % — LOW (ref 20–55)
MAGNESIUM SERPL-MCNC: 2.1 MG/DL — SIGNIFICANT CHANGE UP (ref 1.6–2.6)
MCHC RBC-ENTMCNC: 31.9 PG — HIGH (ref 27–31)
MCHC RBC-ENTMCNC: 32.2 G/DL — SIGNIFICANT CHANGE UP (ref 32–36)
MCV RBC AUTO: 99.1 FL — HIGH (ref 81–99)
MONOCYTES # BLD AUTO: 0.8 K/UL — SIGNIFICANT CHANGE UP (ref 0–0.8)
MONOCYTES NFR BLD AUTO: 7.6 % — SIGNIFICANT CHANGE UP (ref 3–10)
NEUTROPHILS # BLD AUTO: 8 K/UL — SIGNIFICANT CHANGE UP (ref 1.8–8)
NEUTROPHILS NFR BLD AUTO: 75.2 % — HIGH (ref 37–73)
PLATELET # BLD AUTO: 274 K/UL — SIGNIFICANT CHANGE UP (ref 150–400)
POTASSIUM SERPL-MCNC: 4.2 MMOL/L — SIGNIFICANT CHANGE UP (ref 3.5–5.3)
POTASSIUM SERPL-SCNC: 4.2 MMOL/L — SIGNIFICANT CHANGE UP (ref 3.5–5.3)
PROCALCITONIN SERPL-MCNC: 0.83 NG/ML — HIGH (ref 0–0.04)
RBC # BLD: 3.26 M/UL — LOW (ref 4.4–5.2)
RBC # FLD: 13 % — SIGNIFICANT CHANGE UP (ref 11–15.6)
SODIUM SERPL-SCNC: 138 MMOL/L — SIGNIFICANT CHANGE UP (ref 135–145)
WBC # BLD: 10.6 K/UL — SIGNIFICANT CHANGE UP (ref 4.8–10.8)
WBC # FLD AUTO: 10.6 K/UL — SIGNIFICANT CHANGE UP (ref 4.8–10.8)

## 2017-09-22 PROCEDURE — 99233 SBSQ HOSP IP/OBS HIGH 50: CPT

## 2017-09-22 RX ORDER — IBUPROFEN 200 MG
400 TABLET ORAL ONCE
Qty: 0 | Refills: 0 | Status: COMPLETED | OUTPATIENT
Start: 2017-09-22 | End: 2017-09-22

## 2017-09-22 RX ORDER — SODIUM CHLORIDE 9 MG/ML
1000 INJECTION INTRAMUSCULAR; INTRAVENOUS; SUBCUTANEOUS
Qty: 0 | Refills: 0 | Status: DISCONTINUED | OUTPATIENT
Start: 2017-09-22 | End: 2017-09-27

## 2017-09-22 RX ADMIN — OXYCODONE HYDROCHLORIDE 10 MILLIGRAM(S): 5 TABLET ORAL at 19:43

## 2017-09-22 RX ADMIN — Medication 100 MILLIGRAM(S): at 06:12

## 2017-09-22 RX ADMIN — HYDROMORPHONE HYDROCHLORIDE 2 MILLIGRAM(S): 2 INJECTION INTRAMUSCULAR; INTRAVENOUS; SUBCUTANEOUS at 03:05

## 2017-09-22 RX ADMIN — Medication 1 TABLET(S): at 09:46

## 2017-09-22 RX ADMIN — CALCITRIOL 0.25 MICROGRAM(S): 0.5 CAPSULE ORAL at 11:08

## 2017-09-22 RX ADMIN — OXYCODONE HYDROCHLORIDE 10 MILLIGRAM(S): 5 TABLET ORAL at 16:18

## 2017-09-22 RX ADMIN — OXYCODONE HYDROCHLORIDE 10 MILLIGRAM(S): 5 TABLET ORAL at 15:18

## 2017-09-22 RX ADMIN — Medication 400 MILLIGRAM(S): at 01:34

## 2017-09-22 RX ADMIN — Medication 100 MILLIGRAM(S): at 17:02

## 2017-09-22 RX ADMIN — Medication 25 MILLIGRAM(S): at 17:10

## 2017-09-22 RX ADMIN — HYDROMORPHONE HYDROCHLORIDE 2 MILLIGRAM(S): 2 INJECTION INTRAMUSCULAR; INTRAVENOUS; SUBCUTANEOUS at 22:00

## 2017-09-22 RX ADMIN — Medication 250 MILLIGRAM(S): at 06:12

## 2017-09-22 RX ADMIN — HYDROMORPHONE HYDROCHLORIDE 2 MILLIGRAM(S): 2 INJECTION INTRAMUSCULAR; INTRAVENOUS; SUBCUTANEOUS at 02:50

## 2017-09-22 RX ADMIN — OXYCODONE HYDROCHLORIDE 10 MILLIGRAM(S): 5 TABLET ORAL at 20:30

## 2017-09-22 RX ADMIN — Medication 250 MILLIGRAM(S): at 00:03

## 2017-09-22 RX ADMIN — OXYCODONE HYDROCHLORIDE 10 MILLIGRAM(S): 5 TABLET ORAL at 11:08

## 2017-09-22 RX ADMIN — Medication 250 MILLIGRAM(S): at 11:08

## 2017-09-22 RX ADMIN — Medication 0.25 MILLIGRAM(S): at 19:52

## 2017-09-22 RX ADMIN — Medication 650 MILLIGRAM(S): at 00:05

## 2017-09-22 RX ADMIN — OXYCODONE HYDROCHLORIDE 10 MILLIGRAM(S): 5 TABLET ORAL at 23:47

## 2017-09-22 RX ADMIN — OXYCODONE HYDROCHLORIDE 10 MILLIGRAM(S): 5 TABLET ORAL at 06:21

## 2017-09-22 RX ADMIN — OXYCODONE HYDROCHLORIDE 10 MILLIGRAM(S): 5 TABLET ORAL at 01:04

## 2017-09-22 RX ADMIN — SODIUM CHLORIDE 85 MILLILITER(S): 9 INJECTION INTRAMUSCULAR; INTRAVENOUS; SUBCUTANEOUS at 11:08

## 2017-09-22 RX ADMIN — OXYCODONE HYDROCHLORIDE 10 MILLIGRAM(S): 5 TABLET ORAL at 00:04

## 2017-09-22 RX ADMIN — Medication 650 MILLIGRAM(S): at 15:18

## 2017-09-22 RX ADMIN — FAMOTIDINE 20 MILLIGRAM(S): 10 INJECTION INTRAVENOUS at 11:08

## 2017-09-22 RX ADMIN — Medication 250 MILLIGRAM(S): at 17:02

## 2017-09-22 RX ADMIN — HYDROMORPHONE HYDROCHLORIDE 2 MILLIGRAM(S): 2 INJECTION INTRAMUSCULAR; INTRAVENOUS; SUBCUTANEOUS at 16:12

## 2017-09-22 RX ADMIN — Medication 1 TABLET(S): at 17:02

## 2017-09-22 RX ADMIN — OXYCODONE HYDROCHLORIDE 10 MILLIGRAM(S): 5 TABLET ORAL at 07:18

## 2017-09-22 RX ADMIN — HYDROMORPHONE HYDROCHLORIDE 2 MILLIGRAM(S): 2 INJECTION INTRAMUSCULAR; INTRAVENOUS; SUBCUTANEOUS at 16:45

## 2017-09-22 RX ADMIN — Medication 250 MILLIGRAM(S): at 23:47

## 2017-09-22 RX ADMIN — Medication 650 MILLIGRAM(S): at 21:44

## 2017-09-22 RX ADMIN — HYDROMORPHONE HYDROCHLORIDE 2 MILLIGRAM(S): 2 INJECTION INTRAMUSCULAR; INTRAVENOUS; SUBCUTANEOUS at 21:44

## 2017-09-22 RX ADMIN — OXYCODONE HYDROCHLORIDE 10 MILLIGRAM(S): 5 TABLET ORAL at 12:08

## 2017-09-22 NOTE — PROGRESS NOTE ADULT - SUBJECTIVE AND OBJECTIVE BOX
[facility]  INFECTIOUS DISEASES AND INTERNAL MEDICINE OF [place]  =======================================================  Thomas Pope MD  Diplomates American Board of Internal Medicine and Infectious Diseases  =======================================================    ROLLY MEDEIROS [id number]    Follow up: Pyelonephritis, infected PCKD cyst, C Diff colitis     Still with fevers and Rt flank pain    Allergies:  Demerol HCl (Hives)  Demerol HCl (Hives; Rash)  Phenergan (Hives; Rash)  Phenergan Fortis (Hives)      Antibiotics:  vancomycin    Solution 250 milliGRAM(s) Oral every 6 hours  aztreonam  IVPB 2000 milliGRAM(s) IV Intermittent every 12 hours       REVIEW OF SYSTEMS:  CONSTITUTIONAL:  + Fever or chills  HEENT:   No diplopia or blurred vision.  No earache, sore throat or runny nose.  CARDIOVASCULAR:  No pressure, squeezing, strangling, tightness, heaviness or aching about the chest, neck, axilla or epigastrium.  RESPIRATORY:  No cough, shortness of breath  GASTROINTESTINAL:  No nausea, vomiting or diarrhea.  GENITOURINARY:  No dysuria, frequency or urgency. + Rt flank pain  MUSCULOSKELETAL:  no joint aches, no muscle pain  SKIN:  No change in skin, hair or nails.  NEUROLOGIC:  No paresthesias, fasciculations  PSYCHIATRIC:  No disorder of thought or mood.  ENDOCRINE:  No heat or cold intolerance  HEMATOLOGICAL:  No easy bruising or bleeding.       Physical Exam:  Vital Signs Last 24 Hrs  T(C): 37.4 (22 Sep 2017 09:44), Max: 39.2 (21 Sep 2017 23:59)  T(F): 99.3 (22 Sep 2017 09:44), Max: 102.5 (21 Sep 2017 23:59)  HR: 64 (22 Sep 2017 09:44) (55 - 84)  BP: 114/65 (22 Sep 2017 09:44) (101/50 - 116/60)  RR: 18 (22 Sep 2017 09:44) (16 - 18)  SpO2: 20% (22 Sep 2017 09:44) (20% - 96%)      GEN: NAD, pleasant  HEENT: normocephalic and atraumatic. EOMI. PERRL.    NECK: Supple.   LUNGS: Clear to auscultation.  HEART: Regular rate and rhythm   ABDOMEN: Soft, nontender, and nondistended.  Positive bowel sounds.    : + Rt CVA tenderness  EXTREMITIES: Without any edema.  MSK: no joint swelling  NEUROLOGIC: Cranial nerves II through XII are grossly intact.  PSYCHIATRIC: Appropriate affect .  SKIN: No ulceration or induration present.        Labs:  09-22    138  |  98  |  35.0<H>  ----------------------------<  93  4.2   |  24.0  |  3.66<H>    Ca    9.2      22 Sep 2017 08:23  Mg     2.1     09-22                10.4   10.6  )-----------( 274      ( 22 Sep 2017 08:23 )             32.3         RECENT CULTURES:  Culture - Acid Fast - CSF (09.18.17 @ 12:18)    Specimen Source: .CSF CSF    Acid Fast Bacilli Smear:   No acid fast bacilli seen by fluorochrome stain      Culture - Blood (09.18.17 @ 10:50)    Specimen Source: .Blood Blood-Peripheral    Culture Results:   No growth at 48 hours      Culture - Blood (09.18.17 @ 10:03)    Specimen Source: .Blood Blood-Peripheral    Culture Results:   No growth at 48 hours      Culture - Fungal, CSF (09.17.17 @ 14:52)    Specimen Source: .CSF CSF    Culture Results:   No growth to date  Culture in progress      Culture - CSF with Gram Stain . (09.17.17 @ 14:48)    Gram Stain:   No White blood cells  No organisms seen    Specimen Source: .CSF CSF    Culture Results:   No growth at 3 days.      Culture - Urine (09.16.17 @ 13:20)    Specimen Source: .Urine Clean Catch (Midstream)    Culture Results:   No growth      Culture - Urine (09.15.17 @ 19:43)    Specimen Source: .Urine Clean Catch (Midstream)    Culture Results:   No growth      Culture - Blood (09.15.17 @ 17:27)    Specimen Source: .Blood Blood-Peripheral    Culture Results:   No growth at 5 days.      Culture - Blood (09.15.17 @ 15:55)    Specimen Source: .Blood Blood-Peripheral    Culture Results:   No growth at 5 days.      Culture - Urine (09.13.17 @ 23:45)    -  Amikacin: S <=16    -  Ampicillin: R >16    -  Ampicillin/Sulbactam: S <=8/4    -  Aztreonam: S <=4    -  Cefazolin: S <=8    -  Cefepime: S <=4    -  Cefoxitin: S <=8    -  Ceftazidime: S <=1    -  Ceftriaxone: S <=1    -  Ciprofloxacin: S <=1    -  Ertapenem: S <=1    -  Gentamicin: S <=4    -  Imipenem: S <=1    -  Levofloxacin: S <=2    -  Meropenem: S <=1    -  Nitrofurantoin: S <=32    -  Piperacillin/Tazobactam: S <=16    -  Tobramycin: S <=4    -  Trimethoprim/Sulfamethoxazole: S <=2/38    Specimen Source: .Urine Clean Catch (Midstream)    Culture Results:   >100,000 CFU/ml Klebsiella pneumoniae    Organism Identification: Klebsiella pneumoniae    Organism: Klebsiella pneumoniae    Method Type: TITI        EXAM:  ABDOMEN (MRI) W O CON                        PROCEDURE DATE:  09/18/2017    INTERPRETATION:  Exam Type: ABDOMEN (MRI) W O CON   Exam Date and Time:  9/18/2017 8:11 PM   Indication:  Abdominal pain, polycystic kidney disease, fever   Comparison: CT 9/16/2017, ultrasound 9/13/2017, MRI 8/18/2013   Procedure: MRI of the abdomen without contrast was performed on a 1.5   Lidia magnet. Contrast was not administered secondary to renal   insufficiency.  Findings:  Innumerable cystic lesions throughout bilateral enlarged kidneys as seen   on multiple prior exams compatible with known polycystic kidney disease.   Multiple bilateral hemorrhagic renal cyst identified. Limited evaluation   of solid masses given lack of IV contrast however no definite findings to   suggest solid lesions. Minimal to mild infiltrative stranding is seen   surrounding the proximal right ureter as seen on CT of prior day. No   hydronephrosis bilaterally. No bilateral perinephric collections.   Aortocaval lymph node measuring 1.4 cm unchanged. Several left renal   calcifications as seen on CT.  Several left hepatic cysts are unchanged. No hepatic steatosis.   Subcentimeter peggy hepatis and portacaval lymph nodes likely reactive in   nature. Spleen is enlarged measuring 14 cm. Prior cholecystectomy. No   intrahepatic or extra hepatic biliary dilatation. Pancreas is of normal   signal intensity with tiny less than 2 mm pancreatic body and pancreatic   tail cyst. Pancreatic duct is of normal caliber.  Thickening of the   bilateral adrenal glands without focal mass.  Patchy opacities in the right lung base may represent atelectasis or   pneumonia, clinically correlate. Small umbilical fat-containing hernia.   Mild degenerative changes.  IMPRESSION:  Findings compatible with polycystic kidney disease as seen on prior   exams. No hydronephrosis bilaterally. Minimal to mild infiltrative   changes surrounding the proximal right ureter as seen on recent CT. No   perinephric collections.  Patchy opacities in the right lung base may represent atelectasis or   pneumonia, clinically correlate      EXAM:  NM MULTI DAY PROCEDURE                        EXAM:  NM INFLAMMATORY LOC WBC WB                        EXAM:  NM INFLAMMATORY LOC SPECT                        PROCEDURE DATE:  09/21/2017    INTERPRETATION:  RADIOPHARMACEUTICAL:  0.5 mCi indium labeled autologous   leukocytes, I.V. injected on 9/20/2017.  CLINICAL INFORMATION: 46-year-old female with history of polycystic   kidney disease, presents with recurrent fever, UTI, C. difficile   infection, and abdominal pain, to evaluate for infection.  TECHNIQUE:  Whole-body images were obtained in the anterior and posterior   projections approximately 24 hours after radiotracer administration.   SPECT of the abdomen was also performed.  COMPARISON: No prior labeled leukocyte study available.  FINDINGS: There is labeled leukocyte accumulation in the right mid   abdomen, in the region of the right kidney.  IMPRESSION:   Abnormal indium labeled leukocyte scan.  Findings suspicious for infection of the right kidney

## 2017-09-22 NOTE — PROCEDURE NOTE - NSSITEPREP_SKIN_A_CORE
Adherence to aseptic technique: hand hygiene prior to donning barriers (gown, gloves), don cap and mask, sterile drape over patient/alcohol/chlorhexidine

## 2017-09-22 NOTE — PROGRESS NOTE ADULT - SUBJECTIVE AND OBJECTIVE BOX
Renal :    Chart reviewed ,overnight events Temp 102 last night noted   Patient feels frustrated, worried , still having right flank pain , diarrhea resolved    SCR.,  up to 3.66 mg.,  today     REVIEW OF SYSTEMS:  as above otherwise negative,    Vital Signs Last 24 Hrs  T(C): 37.4 (22 Sep 2017 09:44), Max: 39.2 (21 Sep 2017 23:59)  T(F): 99.3 (22 Sep 2017 09:44), Max: 102.5 (21 Sep 2017 23:59)  HR: 64 (22 Sep 2017 09:44) (55 - 84)  BP: 114/65 (22 Sep 2017 09:44) (101/50 - 116/60)  BP(mean): --  RR: 18 (22 Sep 2017 09:44) (16 - 18)  SpO2: 20% (22 Sep 2017 09:44) (20% - 96%)    GENERAL: NAD, well-groomed, well-developed  CHEST/LUNG: Clear to auscultation  bilaterally; No rales, rhonchi, wheezing, or rubs  HEART: Regular rate and rhythm; No murmurs, rubs, or gallops  ABDOMEN: Soft,  right flank tenderness on percussion , Nondistended; Bowel sounds present, no abdominal tenderness   EXTREMITIES:  2+ Peripheral Pulses, No clubbing, cyanosis, or edema  SKIN: No rashes or lesions      LABS:                                      10.4   10.6  )-----------( 274      ( 22 Sep 2017 08:23 )             32.3   09-22    138  |  98  |  35.0<H>  ----------------------------<  93  4.2   |  24.0  |  3.66<H>    Ca    9.2      22 Sep 2017 08:23  Mg     2.1     09-22        Assessment and Plan:     47 y/o female with HTN , s/p oral surgery 2 weeks ago admitted for gastroenteritis, ARF on CKD -  baseline cr 1.5 mg.,  in 2015 , diagnosed with C diff colitis , UTI , MRI of brain performed showed brain aneurism seen by neurosurgery s/p LP by IR xanthochromia ruled out , LP fluid culture no growth . Patient is with persistent flank pain and   recurrent fever ; repeat CT of abdomen ordered mild colitis , MRI of kidney performed to r/o kidney abscess negative  .Per DR Willett continue aztreonam, leukocytosis  improved, diarrhea gradually resolved , on iv aztreonam and po  vancomycin  blood cx all negative, repeat  urine cx negative. Due to recurrent fever indium scan done + Right kidney infection . She is with cr 3.66 now iv fluid reordered, ID follow up re antibiotics regimen need to change the treatment       Problem/Plan - 1:  ·  Problem: Fever, unspecified fever cause.  Plan: indium scan showed R kidney infection , Reevaluate antibiotics regimen.     Problem/Plan - 2:  ·  Problem: Clostridium difficile diarrhea.  Plan: continue po vancomycin  duration per ID   diarrhea resolved.     Problem/Plan - 3:  ·  Problem: Urinary tract infection without hematuria, site unspecified.  Plan: with kidney infection on iv antibiotics , ID follow up closely.     Problem/Plan - 4:  ·  Problem: Sepsis, due to unspecified organism.  Plan: on admission due to UTI and C diff colitis   resolved , no organ involvement.     Problem/Plan - 5:  ·  Problem: Brain aneurysm.  Plan: neurosurgery follow up s/p LP : xanthochromia  ruled out  follow up outpatient.     Problem/Plan - 6:  Problem: Acute renal failure superimposed on chronic kidney disease, unspecified CKD stage 5, unspecified acute renal failure type. Plan: worsening today ,  will restart iv fuid.

## 2017-09-22 NOTE — PROCEDURE NOTE - ADDITIONAL PROCEDURE DETAILS
Patient felt warm to touch, called RN, temp>102, tylenol administered. Arm bands cut on left wrist, new ones transferred by RN. Dr Sethi to request midline.

## 2017-09-22 NOTE — PROGRESS NOTE ADULT - PROBLEM SELECTOR PLAN 1
Patient has Flank pain and findings of  pyelonephritis vs infected renal cysts  Continue Aztreonam  Urine cx with E coli sensitive to Aztreonam  Blood cultures no growth  If continues to have fever will need repeat blood cultures

## 2017-09-22 NOTE — PROGRESS NOTE ADULT - ASSESSMENT
47 y/o female with HTNm , s/p oral surgery 2 weeks ago admitted for gastroenteritis, ARF on cronic baseline cr 1.5 in 2015 , diagnosed with C diff colitis , UTI , MRI of brain performed showed brain aneurism seen by neurosurgery s/p LP by IR xanthocromia ruled out , LP fluid culture no growth . Patient is with persistent flank pain and   recurrent fever ; repeat CT of abdomen ordered mild colitis , MRI of kidney performed to r/o kidney abscess negative  .Per DR Willett continue aztreonam, leukocystosis improved, diarrhea gradually resolved , on iv azreonam and po  vanco blood cx all negative, repaet urine cx negative. Due to recurrent fever indium scan done + Right kidney infection . She is with cr 3.66 now iv fluid reordered, ID follow up re antibiotics regimen need to change the treatment

## 2017-09-22 NOTE — PROGRESS NOTE ADULT - SUBJECTIVE AND OBJECTIVE BOX
Chart reviewed ,overnight events Temp 102 last night noted   Patient feels frustrated, worried , still having right flank pain , diarrhea resolved    CR up to 3.66 today   discussed with Dr Villavicencio and Dr Alan             REVIEW OF SYSTEMS:  as above otherwise negative  Vital Signs Last 24 Hrs  T(C): 37.4 (22 Sep 2017 09:44), Max: 39.2 (21 Sep 2017 23:59)  T(F): 99.3 (22 Sep 2017 09:44), Max: 102.5 (21 Sep 2017 23:59)  HR: 64 (22 Sep 2017 09:44) (55 - 84)  BP: 114/65 (22 Sep 2017 09:44) (101/50 - 116/60)  BP(mean): --  RR: 18 (22 Sep 2017 09:44) (16 - 18)  SpO2: 20% (22 Sep 2017 09:44) (20% - 96%)  GENERAL: NAD, well-groomed, well-developed  CHEST/LUNG: Clear to auscultation  bilaterally; No rales, rhonchi, wheezing, or rubs  HEART: Regular rate and rhythm; No murmurs, rubs, or gallops  ABDOMEN: Soft,  right flank tenderness on percussion , Nondistended; Bowel sounds present, no abdominal tenderness   EXTREMITIES:  2+ Peripheral Pulses, No clubbing, cyanosis, or edema  SKIN: No rashes or lesions      LABS:                                      10.4   10.6  )-----------( 274      ( 22 Sep 2017 08:23 )             32.3   09-22    138  |  98  |  35.0<H>  ----------------------------<  93  4.2   |  24.0  |  3.66<H>    Ca    9.2      22 Sep 2017 08:23  Mg     2.1     09-22

## 2017-09-23 LAB
ANION GAP SERPL CALC-SCNC: 15 MMOL/L — SIGNIFICANT CHANGE UP (ref 5–17)
BASOPHILS # BLD AUTO: 0 K/UL — SIGNIFICANT CHANGE UP (ref 0–0.2)
BASOPHILS NFR BLD AUTO: 0.2 % — SIGNIFICANT CHANGE UP (ref 0–2)
BUN SERPL-MCNC: 32 MG/DL — HIGH (ref 8–20)
CALCIUM SERPL-MCNC: 8.4 MG/DL — LOW (ref 8.6–10.2)
CHLORIDE SERPL-SCNC: 98 MMOL/L — SIGNIFICANT CHANGE UP (ref 98–107)
CO2 SERPL-SCNC: 22 MMOL/L — SIGNIFICANT CHANGE UP (ref 22–29)
CREAT SERPL-MCNC: 3.29 MG/DL — HIGH (ref 0.5–1.3)
CULTURE RESULTS: SIGNIFICANT CHANGE UP
CULTURE RESULTS: SIGNIFICANT CHANGE UP
EOSINOPHIL # BLD AUTO: 0.1 K/UL — SIGNIFICANT CHANGE UP (ref 0–0.5)
EOSINOPHIL NFR BLD AUTO: 1.3 % — SIGNIFICANT CHANGE UP (ref 0–6)
GLUCOSE SERPL-MCNC: 119 MG/DL — HIGH (ref 70–115)
HCT VFR BLD CALC: 28.4 % — LOW (ref 37–47)
HGB BLD-MCNC: 9.3 G/DL — LOW (ref 12–16)
LYMPHOCYTES # BLD AUTO: 0.9 K/UL — LOW (ref 1–4.8)
LYMPHOCYTES # BLD AUTO: 8.9 % — LOW (ref 20–55)
MAGNESIUM SERPL-MCNC: 1.9 MG/DL — SIGNIFICANT CHANGE UP (ref 1.6–2.6)
MCHC RBC-ENTMCNC: 32 PG — HIGH (ref 27–31)
MCHC RBC-ENTMCNC: 32.7 G/DL — SIGNIFICANT CHANGE UP (ref 32–36)
MCV RBC AUTO: 97.6 FL — SIGNIFICANT CHANGE UP (ref 81–99)
MONOCYTES # BLD AUTO: 0.8 K/UL — SIGNIFICANT CHANGE UP (ref 0–0.8)
MONOCYTES NFR BLD AUTO: 7.7 % — SIGNIFICANT CHANGE UP (ref 3–10)
NEUTROPHILS # BLD AUTO: 8.6 K/UL — HIGH (ref 1.8–8)
NEUTROPHILS NFR BLD AUTO: 81.4 % — HIGH (ref 37–73)
PHOSPHATE SERPL-MCNC: 3.7 MG/DL — SIGNIFICANT CHANGE UP (ref 2.4–4.7)
PLATELET # BLD AUTO: 260 K/UL — SIGNIFICANT CHANGE UP (ref 150–400)
POTASSIUM SERPL-MCNC: 3.7 MMOL/L — SIGNIFICANT CHANGE UP (ref 3.5–5.3)
POTASSIUM SERPL-SCNC: 3.7 MMOL/L — SIGNIFICANT CHANGE UP (ref 3.5–5.3)
RBC # BLD: 2.91 M/UL — LOW (ref 4.4–5.2)
RBC # FLD: 13 % — SIGNIFICANT CHANGE UP (ref 11–15.6)
SODIUM SERPL-SCNC: 135 MMOL/L — SIGNIFICANT CHANGE UP (ref 135–145)
SPECIMEN SOURCE: SIGNIFICANT CHANGE UP
SPECIMEN SOURCE: SIGNIFICANT CHANGE UP
WBC # BLD: 10.6 K/UL — SIGNIFICANT CHANGE UP (ref 4.8–10.8)
WBC # FLD AUTO: 10.6 K/UL — SIGNIFICANT CHANGE UP (ref 4.8–10.8)

## 2017-09-23 PROCEDURE — 99233 SBSQ HOSP IP/OBS HIGH 50: CPT

## 2017-09-23 RX ORDER — HYDROMORPHONE HYDROCHLORIDE 2 MG/ML
2 INJECTION INTRAMUSCULAR; INTRAVENOUS; SUBCUTANEOUS EVERY 4 HOURS
Qty: 0 | Refills: 0 | Status: DISCONTINUED | OUTPATIENT
Start: 2017-09-23 | End: 2017-09-27

## 2017-09-23 RX ADMIN — Medication 0.25 MILLIGRAM(S): at 05:24

## 2017-09-23 RX ADMIN — HYDROMORPHONE HYDROCHLORIDE 2 MILLIGRAM(S): 2 INJECTION INTRAMUSCULAR; INTRAVENOUS; SUBCUTANEOUS at 14:39

## 2017-09-23 RX ADMIN — Medication 100 MILLIGRAM(S): at 17:00

## 2017-09-23 RX ADMIN — Medication 650 MILLIGRAM(S): at 16:27

## 2017-09-23 RX ADMIN — OXYCODONE HYDROCHLORIDE 10 MILLIGRAM(S): 5 TABLET ORAL at 18:16

## 2017-09-23 RX ADMIN — HYDROMORPHONE HYDROCHLORIDE 2 MILLIGRAM(S): 2 INJECTION INTRAMUSCULAR; INTRAVENOUS; SUBCUTANEOUS at 20:31

## 2017-09-23 RX ADMIN — Medication 1 TABLET(S): at 09:02

## 2017-09-23 RX ADMIN — HYDROMORPHONE HYDROCHLORIDE 2 MILLIGRAM(S): 2 INJECTION INTRAMUSCULAR; INTRAVENOUS; SUBCUTANEOUS at 17:32

## 2017-09-23 RX ADMIN — OXYCODONE HYDROCHLORIDE 10 MILLIGRAM(S): 5 TABLET ORAL at 05:24

## 2017-09-23 RX ADMIN — HYDROMORPHONE HYDROCHLORIDE 2 MILLIGRAM(S): 2 INJECTION INTRAMUSCULAR; INTRAVENOUS; SUBCUTANEOUS at 17:02

## 2017-09-23 RX ADMIN — FAMOTIDINE 20 MILLIGRAM(S): 10 INJECTION INTRAVENOUS at 11:18

## 2017-09-23 RX ADMIN — OXYCODONE HYDROCHLORIDE 10 MILLIGRAM(S): 5 TABLET ORAL at 19:15

## 2017-09-23 RX ADMIN — Medication 250 MILLIGRAM(S): at 05:25

## 2017-09-23 RX ADMIN — OXYCODONE HYDROCHLORIDE 10 MILLIGRAM(S): 5 TABLET ORAL at 11:18

## 2017-09-23 RX ADMIN — HYDROMORPHONE HYDROCHLORIDE 2 MILLIGRAM(S): 2 INJECTION INTRAMUSCULAR; INTRAVENOUS; SUBCUTANEOUS at 06:54

## 2017-09-23 RX ADMIN — HYDROMORPHONE HYDROCHLORIDE 2 MILLIGRAM(S): 2 INJECTION INTRAMUSCULAR; INTRAVENOUS; SUBCUTANEOUS at 20:45

## 2017-09-23 RX ADMIN — Medication 250 MILLIGRAM(S): at 17:00

## 2017-09-23 RX ADMIN — Medication 0.25 MILLIGRAM(S): at 20:26

## 2017-09-23 RX ADMIN — Medication 100 MILLIGRAM(S): at 05:24

## 2017-09-23 RX ADMIN — HYDROMORPHONE HYDROCHLORIDE 2 MILLIGRAM(S): 2 INJECTION INTRAMUSCULAR; INTRAVENOUS; SUBCUTANEOUS at 06:35

## 2017-09-23 RX ADMIN — SODIUM CHLORIDE 85 MILLILITER(S): 9 INJECTION INTRAMUSCULAR; INTRAVENOUS; SUBCUTANEOUS at 05:16

## 2017-09-23 RX ADMIN — Medication 1 TABLET(S): at 17:02

## 2017-09-23 RX ADMIN — CALCITRIOL 0.25 MICROGRAM(S): 0.5 CAPSULE ORAL at 11:18

## 2017-09-23 RX ADMIN — OXYCODONE HYDROCHLORIDE 10 MILLIGRAM(S): 5 TABLET ORAL at 00:45

## 2017-09-23 RX ADMIN — OXYCODONE HYDROCHLORIDE 10 MILLIGRAM(S): 5 TABLET ORAL at 12:18

## 2017-09-23 RX ADMIN — Medication 250 MILLIGRAM(S): at 11:18

## 2017-09-23 RX ADMIN — SODIUM CHLORIDE 85 MILLILITER(S): 9 INJECTION INTRAMUSCULAR; INTRAVENOUS; SUBCUTANEOUS at 17:01

## 2017-09-23 RX ADMIN — HYDROMORPHONE HYDROCHLORIDE 2 MILLIGRAM(S): 2 INJECTION INTRAMUSCULAR; INTRAVENOUS; SUBCUTANEOUS at 13:08

## 2017-09-23 RX ADMIN — OXYCODONE HYDROCHLORIDE 10 MILLIGRAM(S): 5 TABLET ORAL at 06:15

## 2017-09-23 RX ADMIN — Medication 30 MILLILITER(S): at 17:18

## 2017-09-23 NOTE — PROGRESS NOTE ADULT - ASSESSMENT
47 y/o female with PMH of PCKD, HTN , s/p oral surgery 2 weeks ago admitted for Pyelonephritis, infected PCKD cyst, C Diff colitis. MRI of brain performed showed brain aneurism seen by neurosurgery s/p LP by IR xanthocromia/ SAH ruled out , LP fluid culture no growth . Patient is with persistent flank pain and   recurrent fever ; repeat CT of abdomen ordered-mild colitis , MRI of kidney performed to r/o kidney abscess .Per DR Willett continue aztreonam, leukocystosis improved, diarrhea gradually resolved , on iv azreonam and po  vanco blood cx all negative, repaet urine cx negative. Due to recurrent fever indium scan done + Right kidney infection . She is with cr 3.66 now iv fluid reordered, ID follow up re antibiotics regimen need to change the treatment

## 2017-09-23 NOTE — PROGRESS NOTE ADULT - ASSESSMENT
P : Discussed w. ID,    2 week course of antibiotics,    Hold Radical approach for now ( R - Nephrectomy )      Advanced CKD - 5, STEPHENIE    Cerebral Aneurysm,    HTN

## 2017-09-23 NOTE — PROGRESS NOTE ADULT - PROBLEM SELECTOR PLAN 1
due to pyelonephritis vs infected renal cysts  Continue Aztreonam  Urine cx with Klebsiella pansensitive to Aztreonam  Blood cultures no growth  Repeat blood cx ordered in light of Tmax of 102 yesterday, If continues to have fever will need repeat blood cultures.   Indium scan showed R kidney infection , discussed with ID to reevaluate antibiotics regimen

## 2017-09-23 NOTE — PROGRESS NOTE ADULT - SUBJECTIVE AND OBJECTIVE BOX
[facility]  INFECTIOUS DISEASES AND INTERNAL MEDICINE OF [place]  =======================================================  Thomas Pope MD  Diplomates American Board of Internal Medicine and Infectious Diseases  =======================================================    ROLLY MEDEIROS [id number]    Follow up: Pyelonephritis, infected PCKD cyst, C Diff colitis     Still with fevers and Rt flank pain    Allergies:  Demerol HCl (Hives)  Demerol HCl (Hives; Rash)  Phenergan (Hives; Rash)  Phenergan Fortis (Hives)      Antibiotics:  vancomycin    Solution 250 milliGRAM(s) Oral every 6 hours  aztreonam  IVPB 2000 milliGRAM(s) IV Intermittent every 12 hours       REVIEW OF SYSTEMS:  CONSTITUTIONAL:  + Fever or chills  HEENT:   No diplopia or blurred vision.  No earache, sore throat or runny nose.  CARDIOVASCULAR:  No pressure, squeezing, strangling, tightness, heaviness or aching about the chest, neck, axilla or epigastrium.  RESPIRATORY:  No cough, shortness of breath  GASTROINTESTINAL:  No nausea, vomiting or diarrhea.  GENITOURINARY:  No dysuria, frequency or urgency. + Rt flank pain  MUSCULOSKELETAL:  no joint aches, no muscle pain  SKIN:  No change in skin, hair or nails.  NEUROLOGIC:  No paresthesias, fasciculations  PSYCHIATRIC:  No disorder of thought or mood.  ENDOCRINE:  No heat or cold intolerance  HEMATOLOGICAL:  No easy bruising or bleeding.       Physical Exam:  Vital Signs Last 24 Hrs  T(C): 39.1 (23 Sep 2017 16:18), Max: 39.1 (23 Sep 2017 16:18)  T(F): 102.3 (23 Sep 2017 16:18), Max: 102.3 (23 Sep 2017 16:18)  HR: 92 (23 Sep 2017 16:18) (67 - 92)  BP: 108/63 (23 Sep 2017 16:18) (103/54 - 124/64)  RR: 19 (23 Sep 2017 16:18) (17 - 97)  SpO2: 99% (23 Sep 2017 08:58) (18% - 99%)    GEN: NAD, pleasant  HEENT: normocephalic and atraumatic. EOMI. PERRL.    NECK: Supple.   LUNGS: Clear to auscultation.  HEART: Regular rate and rhythm   ABDOMEN: Soft, nontender, and nondistended.  Positive bowel sounds.    : + Rt CVA tenderness  EXTREMITIES: Without any edema.  MSK: no joint swelling  NEUROLOGIC: Cranial nerves II through XII are grossly intact.  PSYCHIATRIC: Appropriate affect .  SKIN: No ulceration or induration present.        Labs:  09-23    135  |  98  |  32.0<H>  ----------------------------<  119<H>  3.7   |  22.0  |  3.29<H>    Ca    8.4<L>      23 Sep 2017 11:01  Phos  3.7     09-23  Mg     1.9     09-23                            9.3    10.6  )-----------( 260      ( 23 Sep 2017 11:02 )             28.4           RECENT CULTURES:  Culture - Acid Fast - CSF (09.18.17 @ 12:18)    Specimen Source: .CSF CSF    Acid Fast Bacilli Smear:   No acid fast bacilli seen by fluorochrome stain      Culture - Blood (09.18.17 @ 10:50)    Specimen Source: .Blood Blood-Peripheral    Culture Results:   No growth at 5 days.      Culture - Blood (09.18.17 @ 10:03)    Specimen Source: .Blood Blood-Peripheral    Culture Results:   No growth at 5 days.      Culture - Fungal, CSF (09.17.17 @ 14:52)    Specimen Source: .CSF CSF    Culture Results:   No growth to date  Culture in progress      Culture - CSF with Gram Stain . (09.17.17 @ 14:48)    Gram Stain:   No White blood cells  No organisms seen    Specimen Source: .CSF CSF    Culture Results:   No growth at 3 days.      Culture - Urine (09.16.17 @ 13:20)    Specimen Source: .Urine Clean Catch (Midstream)    Culture Results:   No growth      Culture - Urine (09.15.17 @ 19:43)    Specimen Source: .Urine Clean Catch (Midstream)    Culture Results:   No growth      Culture - Blood (09.15.17 @ 17:27)    Specimen Source: .Blood Blood-Peripheral    Culture Results:   No growth at 5 days.      Culture - Blood (09.15.17 @ 15:55)    Specimen Source: .Blood Blood-Peripheral    Culture Results:   No growth at 5 days.      Culture - Urine (09.13.17 @ 23:45)    -  Amikacin: S <=16    -  Ampicillin: R >16    -  Ampicillin/Sulbactam: S <=8/4    -  Aztreonam: S <=4    -  Cefazolin: S <=8    -  Cefepime: S <=4    -  Cefoxitin: S <=8    -  Ceftazidime: S <=1    -  Ceftriaxone: S <=1    -  Ciprofloxacin: S <=1    -  Ertapenem: S <=1    -  Gentamicin: S <=4    -  Imipenem: S <=1    -  Levofloxacin: S <=2    -  Meropenem: S <=1    -  Nitrofurantoin: S <=32    -  Piperacillin/Tazobactam: S <=16    -  Tobramycin: S <=4    -  Trimethoprim/Sulfamethoxazole: S <=2/38    Specimen Source: .Urine Clean Catch (Midstream)    Culture Results:   >100,000 CFU/ml Klebsiella pneumoniae    Organism Identification: Klebsiella pneumoniae    Organism: Klebsiella pneumoniae    Method Type: TITI        EXAM:  ABDOMEN (MRI) W O CON                        PROCEDURE DATE:  09/18/2017    INTERPRETATION:  Exam Type: ABDOMEN (MRI) W O CON   Exam Date and Time:  9/18/2017 8:11 PM   Indication:  Abdominal pain, polycystic kidney disease, fever   Comparison: CT 9/16/2017, ultrasound 9/13/2017, MRI 8/18/2013   Procedure: MRI of the abdomen without contrast was performed on a 1.5   Ldiia magnet. Contrast was not administered secondary to renal   insufficiency.  Findings:  Innumerable cystic lesions throughout bilateral enlarged kidneys as seen   on multiple prior exams compatible with known polycystic kidney disease.   Multiple bilateral hemorrhagic renal cyst identified. Limited evaluation   of solid masses given lack of IV contrast however no definite findings to   suggest solid lesions. Minimal to mild infiltrative stranding is seen   surrounding the proximal right ureter as seen on CT of prior day. No   hydronephrosis bilaterally. No bilateral perinephric collections.   Aortocaval lymph node measuring 1.4 cm unchanged. Several left renal   calcifications as seen on CT.  Several left hepatic cysts are unchanged. No hepatic steatosis.   Subcentimeter peggy hepatis and portacaval lymph nodes likely reactive in   nature. Spleen is enlarged measuring 14 cm. Prior cholecystectomy. No   intrahepatic or extra hepatic biliary dilatation. Pancreas is of normal   signal intensity with tiny less than 2 mm pancreatic body and pancreatic   tail cyst. Pancreatic duct is of normal caliber.  Thickening of the   bilateral adrenal glands without focal mass.  Patchy opacities in the right lung base may represent atelectasis or   pneumonia, clinically correlate. Small umbilical fat-containing hernia.   Mild degenerative changes.  IMPRESSION:  Findings compatible with polycystic kidney disease as seen on prior   exams. No hydronephrosis bilaterally. Minimal to mild infiltrative   changes surrounding the proximal right ureter as seen on recent CT. No   perinephric collections.  Patchy opacities in the right lung base may represent atelectasis or   pneumonia, clinically correlate      EXAM:  NM MULTI DAY PROCEDURE                        EXAM:  NM INFLAMMATORY LOC WBC WB                        EXAM:  NM INFLAMMATORY LOC SPECT                        PROCEDURE DATE:  09/21/2017    INTERPRETATION:  RADIOPHARMACEUTICAL:  0.5 mCi indium labeled autologous   leukocytes, I.V. injected on 9/20/2017.  CLINICAL INFORMATION: 46-year-old female with history of polycystic   kidney disease, presents with recurrent fever, UTI, C. difficile   infection, and abdominal pain, to evaluate for infection.  TECHNIQUE:  Whole-body images were obtained in the anterior and posterior   projections approximately 24 hours after radiotracer administration.   SPECT of the abdomen was also performed.  COMPARISON: No prior labeled leukocyte study available.  FINDINGS: There is labeled leukocyte accumulation in the right mid   abdomen, in the region of the right kidney.  IMPRESSION:   Abnormal indium labeled leukocyte scan.  Findings suspicious for infection of the right kidney

## 2017-09-23 NOTE — PROGRESS NOTE ADULT - SUBJECTIVE AND OBJECTIVE BOX
Renal :    138    |  98     |  35.0<H>  ----------------------------<  93     Ca:9.2   (22 Sep 2017 08:23)  4.2     |  24.0   |  3.66<H>                        10.4<L>  10.6  )-----------( 274      ( 22 Sep 2017 08:23 )             32.3<L>    Phos:-- M.1 mg/dL PTH:-- Uric acid:-- Serum Osm:--  Ferritin:-- Iron:-- TIBC:-- Tsat:--  B12:-- TSH:-- ( @ 08:23)      F.U :    Hemorrhagic  , ? infected   ADPKD  cysts - R Kidney,     C Diff colitis     Still with fever and Rt flank pain    Allergies:  Demerol HCl (Hives)  Demerol HCl (Hives; Rash)  Phenergan (Hives; Rash)  Phenergan Fortis (Hives)    Antibiotics:  vancomycin    Solution 250 milliGRAM(s) Oral every 6 hours  aztreonam  IVPB 2000 milliGRAM(s) IV Intermittent every 12 hours       REVIEW OF SYSTEMS:  CONSTITUTIONAL:  + Fever or chills  HEENT:   No diplopia or blurred vision.  No earache, sore throat or runny nose.  CARDIOVASCULAR:  No pressure, squeezing, strangling, tightness, heaviness or aching about the chest, neck, axilla or epigastrium.  RESPIRATORY:  No cough, shortness of breath  GASTROINTESTINAL:  No nausea, vomiting or diarrhea.  GENITOURINARY:  No dysuria, frequency or urgency. + Rt flank pain  MUSCULOSKELETAL:  no joint aches, no muscle pain  SKIN:  No change in skin, hair or nails.  NEUROLOGIC:  No paresthesias, fasciculations  PSYCHIATRIC:  No disorder of thought or mood.  ENDOCRINE:  No heat or cold intolerance  HEMATOLOGICAL:  No easy bruising or bleeding.     Physical Exam:  Vital Signs Last 24 Hrs  T(C): 37.4 (22 Sep 2017 09:44), Max: 39.2 (21 Sep 2017 23:59)  T(F): 99.3 (22 Sep 2017 09:44), Max: 102.5 (21 Sep 2017 23:59)  HR: 64 (22 Sep 2017 09:44) (55 - 84)  BP: 114/65 (22 Sep 2017 09:44) (101/50 - 116/60)  RR: 18 (22 Sep 2017 09:44) (16 - 18)  SpO2: 20% (22 Sep 2017 09:44) (20% - 96%)    GEN: NAD, pleasant  HEENT: normocephalic and atraumatic. EOMI. PERRL.    NECK: Supple.   LUNGS: Clear to auscultation.  HEART: Regular rate and rhythm   ABDOMEN: Soft, palpably enlarged Kidneys , Positive bowel sounds.    : + Rt CVA tenderness  EXTREMITIES: Without any edema.  MSK: no joint swelling  NEUROLOGIC: Cranial nerves II through XII are grossly intact.  PSYCHIATRIC: Appropriate affect .  SKIN: No ulceration or induration present.    Labs:        138  |  98  |  35.0<H>  ----------------------------<  93  4.2   |  24.0  |  3.66<H>    Ca    9.2      22 Sep 2017 08:23  Mg     2.1                     10.4   10.6  )-----------( 274      ( 22 Sep 2017 08:23 )             32.3     RECENT CULTURES:  Culture - Acid Fast - CSF (17 @ 12:18)    Specimen Source: .CSF CSF    Acid Fast Bacilli Smear:   No acid fast bacilli seen by fluorochrome stain    Culture - Blood (17 @ 10:50)    Specimen Source: .Blood Blood-Peripheral    Culture Results:   No growth at 48 hours      Culture - Blood (17 @ 10:03)    Specimen Source: .Blood Blood-Peripheral    Culture Results:   No growth at 48 hours      Culture - Fungal, CSF (17 @ 14:52)    Specimen Source: .CSF CSF    Culture Results:   No growth to date  Culture in progress      Culture - CSF with Gram Stain . (17 @ 14:48)    Gram Stain:   No White blood cells  No organisms seen    Specimen Source: .CSF CSF    Culture Results:   No growth at 3 days.      Culture - Urine (17 @ 13:20)    Specimen Source: .Urine Clean Catch (Midstream)    Culture Results:   No growth      Culture - Urine (09.15.17 @ 19:43)    Specimen Source: .Urine Clean Catch (Midstream)    Culture Results:   No growth      Culture - Blood (09.15.17 @ 17:27)    Specimen Source: .Blood Blood-Peripheral    Culture Results:   No growth at 5 days.      Culture - Blood (09.15.17 @ 15:55)    Specimen Source: .Blood Blood-Peripheral    Culture Results:   No growth at 5 days.      Culture - Urine (17 @ 23:45)    -  Amikacin: S <=16    -  Ampicillin: R >16    -  Ampicillin/Sulbactam: S <=8/4    -  Aztreonam: S <=4    -  Cefazolin: S <=8    -  Cefepime: S <=4    -  Cefoxitin: S <=8    -  Ceftazidime: S <=1    -  Ceftriaxone: S <=1    -  Ciprofloxacin: S <=1    -  Ertapenem: S <=1    -  Gentamicin: S <=4    -  Imipenem: S <=1    -  Levofloxacin: S <=2    -  Meropenem: S <=1    -  Nitrofurantoin: S <=32    -  Piperacillin/Tazobactam: S <=16    -  Tobramycin: S <=4    -  Trimethoprim/Sulfamethoxazole: S <=2/38    Specimen Source: .Urine Clean Catch (Midstream)    Culture Results:   >100,000 CFU/ml Klebsiella pneumoniae    Organism Identification: Klebsiella pneumoniae    Organism: Klebsiella pneumoniae    Method Type: TITI    PROCEDURE DATE:  2017    INTERPRETATION:  Exam Type: ABDOMEN (MRI) W O CON   Exam Date and Time:  2017 8:11 PM   Indication:  Abdominal pain, polycystic kidney disease, fever   Comparison: CT 2017, ultrasound 2017, MRI 2013   Procedure: MRI of the abdomen without contrast was performed on a 1.5   Lidia magnet. Contrast was not administered secondary to renal   insufficiency.  Findings:  Innumerable cystic lesions throughout bilateral enlarged kidneys as seen   on multiple prior exams compatible with known polycystic kidney disease.   Multiple bilateral hemorrhagic renal cyst identified. Limited evaluation   of solid masses given lack of IV contrast however no definite findings to   suggest solid lesions. Minimal to mild infiltrative stranding is seen   surrounding the proximal right ureter as seen on CT of prior day. No   hydronephrosis bilaterally. No bilateral perinephric collections.   Aortocaval lymph node measuring 1.4 cm unchanged. Several left renal   calcifications as seen on CT.  Several left hepatic cysts are unchanged. No hepatic steatosis.   Subcentimeter peggy hepatis and portacaval lymph nodes likely reactive in   nature. Spleen is enlarged measuring 14 cm. Prior cholecystectomy. No   intrahepatic or extra hepatic biliary dilatation. Pancreas is of normal   signal intensity with tiny less than 2 mm pancreatic body and pancreatic   tail cyst. Pancreatic duct is of normal caliber.  Thickening of the   bilateral adrenal glands without focal mass.  Patchy opacities in the right lung base may represent atelectasis or   pneumonia, clinically correlate. Small umbilical fat-containing hernia.   Mild degenerative changes.    IMPRESSION:  Findings compatible with polycystic kidney disease as seen on prior   exams. No hydronephrosis bilaterally. Minimal to mild infiltrative   changes surrounding the proximal right ureter as seen on recent CT. No   perinephric collections.  Patchy opacities in the right lung base may represent atelectasis or   pneumonia, clinically correlate      EXAM:  NM MULTI DAY PROCEDURE                        EXAM:  NM INFLAMMATORY LOC WBC WB                                              PROCEDURE DATE:  2017      INTERPRETATION:      RADIOPHARMACEUTICAL:  0.5 mCi indium labeled autologous   leukocytes, I.V. injected on 2017.  CLINICAL INFORMATION: 46-year-old female with history of polycystic   kidney disease, presents with recurrent fever, UTI, C. difficile   infection, and abdominal pain, to evaluate for infection.  TECHNIQUE:  Whole-body images were obtained in the anterior and posterior   projections approximately 24 hours after radiotracer administration.   SPECT of the abdomen was also performed.  COMPARISON: No prior labeled leukocyte study available.  FINDINGS: There is labeled leukocyte accumulation in the right mid   abdomen, in the region of the right kidney.    IMPRESSION:     Abnormal indium labeled leukocyte scan.  Findings suspicious for infection of the right kidney        Assessment and Plan:   47 y/o F with Pyelonephritis, infected PCKD cyst, C Diff colitis    Problem/Plan - 1:  ·  Problem: E coli infection.  Plan: Patient has Flank pain and findings of  pyelonephritis vs infected renal cysts  Continue Aztreonam  Urine cx with E coli sensitive to Aztreonam  Blood cultures no growth  If continues to have fever will need repeat blood cultures.     Problem/Plan - 2:  ·  Problem: Pyelonephritis, acute.  Plan: Continue antibiotics as above.     Problem/Plan - 3:  ·  Problem: Clostridium difficile diarrhea.  Plan: Continue PO Vancomycin  No Diarrhea.     Problem/Plan - 4:  ·  Problem: Polycystic kidney disease.  Plan: Antibiotics adjusted to renal dosing.

## 2017-09-23 NOTE — PROGRESS NOTE ADULT - PROBLEM SELECTOR PLAN 5
s/p LP : xantchromia/ SAH ruled out  In light of absence of SAH, no further work up as per neurosurgery  Headache- tylenol  SBP < 150mmHg  no NSx intervention indicated at this time  neuro-checks q4 HR   Recommend consulting with Cerebrovascular Neurosurgery [facility] for recommendations regarding acuity of CV work up.

## 2017-09-23 NOTE — PROGRESS NOTE ADULT - PROBLEM SELECTOR PLAN 1
Patient has Flank pain and findings of  pyelonephritis vs Hemorrhagic cysts vs infected renal cysts  D/W Dr Alan will treat for full course of pyelonephritis before considering hemorrhagic or infected cysts since those diagnosis will require more invasive and risky treatments and end up with patient needing HD.   Continue Aztreonam  Urine cx with E coli sensitive to Aztreonam  Blood cultures no growth  If continues to have fever will need repeat blood cultures

## 2017-09-23 NOTE — PROGRESS NOTE ADULT - SUBJECTIVE AND OBJECTIVE BOX
CHIEF COMPLAINT/INTERVAL HISTORY:    Patient is a 46y old  Female who presents with a chief complaint of nausea, vomiting, diarrhea, fever and body aches (13 Sep 2017 22:54)      HPI:  47 y/o female smoker with h/o C. dif, polycystic kidney diease, s/p oral surgery 2 weeks ago followed by 1 week treatment with amoxicillin, started to have headache 2 days ago then fever, nausea, vomiting  and body aches yesterday. today, in the Er patient developed watery diarrhea. no blood in the stools. (13 Sep 2017 22:54)      SUBJECTIVE & OBJECTIVE: Pt seen and examined at bedside. c/o 3/10 band like HA & nausea, although better than before. Right flank pain persists. Had Tmax of 102.9 yesterday. No chest pain, palpitations, sob, light headedness/dizziness, difficulty breathing/cough, fevers/chills, abdominal pain, diarrhea/constipation, dysuria or increased urinary frequency. Diarrhea has resolved. last 3 BMs have been formed    ICU Vital Signs Last 24 Hrs  T(C): 37.6 (23 Sep 2017 11:16), Max: 39.4 (22 Sep 2017 15:29)  T(F): 99.7 (23 Sep 2017 11:16), Max: 102.9 (22 Sep 2017 15:29)  HR: 68 (23 Sep 2017 08:58) (67 - 82)  BP: 103/54 (23 Sep 2017 08:58) (103/54 - 124/64)  BP(mean): --  ABP: --  ABP(mean): --  RR: 18 (23 Sep 2017 08:58) (17 - 97)  SpO2: 99% (23 Sep 2017 08:58) (18% - 99%)        MEDICATIONS  (STANDING):  heparin  Injectable 5000 Unit(s) SubCutaneous every 12 hours  famotidine    Tablet 20 milliGRAM(s) Oral daily  vancomycin    Solution 250 milliGRAM(s) Oral every 6 hours  aztreonam  IVPB      lactobacillus acidophilus 1 Tablet(s) Oral two times a day with meals  aztreonam  IVPB 2000 milliGRAM(s) IV Intermittent every 12 hours  ergocalciferol [id number] Unit(s) Oral every week  calcitriol   Capsule 0.25 MICROGram(s) Oral daily  sodium chloride 0.9%. 1000 milliLiter(s) (85 mL/Hr) IV Continuous <Continuous>    MEDICATIONS  (PRN):  aluminum hydroxide/magnesium hydroxide/simethicone Suspension 30 milliLiter(s) Oral every 4 hours PRN Dyspepsia  acetaminophen   Tablet. 650 milliGRAM(s) Oral every 6 hours PRN Moderate Pain (4 - 6)  acetaminophen 325 mG/butalbital 50 mG/caffeine 40 mG 1 Tablet(s) Oral every 6 hours PRN Headache  HYDROmorphone  Injectable 2 milliGRAM(s) IV Push every 4 hours PRN Severe Pain (7 - 10)  acetaminophen   Tablet 650 milliGRAM(s) Oral every 6 hours PRN For Temp greater than 38 C (100.4 F)  ALPRAZolam 0.25 milliGRAM(s) Oral two times a day PRN anxiety  hydrOXYzine hydrochloride 25 milliGRAM(s) Oral every 6 hours PRN Itching  oxyCODONE    IR 5 milliGRAM(s) Oral every 4 hours PRN Moderate Pain (4 - 6)  oxyCODONE    IR 10 milliGRAM(s) Oral every 4 hours PRN Severe Pain (7 - 10)      LABS:                        9.3    10.6  )-----------( 260      ( 23 Sep 2017 11:02 )             28.4     09-23    135  |  98  |  32.0<H>  ----------------------------<  119<H>  3.7   |  22.0  |  3.29<H>    Ca    8.4<L>      23 Sep 2017 11:01  Phos  3.7     09-23  Mg     1.9     09-23            CAPILLARY BLOOD GLUCOSE          RECENT CULTURES:      RADIOLOGY & ADDITIONAL TESTS:      PHYSICAL EXAM:  GENERAL: NAD, well-groomed, well-developed  CHEST/LUNG: Clear to auscultation  bilaterally; No rales, rhonchi, wheezing, or rubs  HEART: Regular rate and rhythm; No murmurs, rubs, or gallops  ABDOMEN: Soft,  right flank tenderness, Nondistended; Bowel sounds present, no abdominal tenderness   EXTREMITIES:  2+ Peripheral Pulses, No clubbing, cyanosis, or edema  SKIN: No rashes or lesions

## 2017-09-24 LAB
ANION GAP SERPL CALC-SCNC: 14 MMOL/L — SIGNIFICANT CHANGE UP (ref 5–17)
BASOPHILS # BLD AUTO: 0 K/UL — SIGNIFICANT CHANGE UP (ref 0–0.2)
BASOPHILS NFR BLD AUTO: 0.2 % — SIGNIFICANT CHANGE UP (ref 0–2)
BUN SERPL-MCNC: 32 MG/DL — HIGH (ref 8–20)
CALCIUM SERPL-MCNC: 8.8 MG/DL — SIGNIFICANT CHANGE UP (ref 8.6–10.2)
CHLORIDE SERPL-SCNC: 99 MMOL/L — SIGNIFICANT CHANGE UP (ref 98–107)
CO2 SERPL-SCNC: 24 MMOL/L — SIGNIFICANT CHANGE UP (ref 22–29)
CREAT SERPL-MCNC: 3.32 MG/DL — HIGH (ref 0.5–1.3)
EOSINOPHIL # BLD AUTO: 0.2 K/UL — SIGNIFICANT CHANGE UP (ref 0–0.5)
EOSINOPHIL NFR BLD AUTO: 1.4 % — SIGNIFICANT CHANGE UP (ref 0–6)
GLUCOSE SERPL-MCNC: 107 MG/DL — SIGNIFICANT CHANGE UP (ref 70–115)
HCT VFR BLD CALC: 32.7 % — LOW (ref 37–47)
HGB BLD-MCNC: 10.2 G/DL — LOW (ref 12–16)
LYMPHOCYTES # BLD AUTO: 1.4 K/UL — SIGNIFICANT CHANGE UP (ref 1–4.8)
LYMPHOCYTES # BLD AUTO: 10.4 % — LOW (ref 20–55)
MAGNESIUM SERPL-MCNC: 2 MG/DL — SIGNIFICANT CHANGE UP (ref 1.6–2.6)
MCHC RBC-ENTMCNC: 31.2 G/DL — LOW (ref 32–36)
MCHC RBC-ENTMCNC: 31.2 PG — HIGH (ref 27–31)
MCV RBC AUTO: 100 FL — HIGH (ref 81–99)
MONOCYTES # BLD AUTO: 0.9 K/UL — HIGH (ref 0–0.8)
MONOCYTES NFR BLD AUTO: 6.6 % — SIGNIFICANT CHANGE UP (ref 3–10)
NEUTROPHILS # BLD AUTO: 10.7 K/UL — HIGH (ref 1.8–8)
NEUTROPHILS NFR BLD AUTO: 80.9 % — HIGH (ref 37–73)
PHOSPHATE SERPL-MCNC: 3.7 MG/DL — SIGNIFICANT CHANGE UP (ref 2.4–4.7)
PLATELET # BLD AUTO: 336 K/UL — SIGNIFICANT CHANGE UP (ref 150–400)
POTASSIUM SERPL-MCNC: 4 MMOL/L — SIGNIFICANT CHANGE UP (ref 3.5–5.3)
POTASSIUM SERPL-SCNC: 4 MMOL/L — SIGNIFICANT CHANGE UP (ref 3.5–5.3)
RBC # BLD: 3.27 M/UL — LOW (ref 4.4–5.2)
RBC # FLD: 12.7 % — SIGNIFICANT CHANGE UP (ref 11–15.6)
SODIUM SERPL-SCNC: 137 MMOL/L — SIGNIFICANT CHANGE UP (ref 135–145)
WBC # BLD: 13.2 K/UL — HIGH (ref 4.8–10.8)
WBC # FLD AUTO: 13.2 K/UL — HIGH (ref 4.8–10.8)

## 2017-09-24 PROCEDURE — 99233 SBSQ HOSP IP/OBS HIGH 50: CPT

## 2017-09-24 RX ORDER — METOCLOPRAMIDE HCL 10 MG
10 TABLET ORAL EVERY 8 HOURS
Qty: 0 | Refills: 0 | Status: COMPLETED | OUTPATIENT
Start: 2017-09-24 | End: 2017-09-25

## 2017-09-24 RX ORDER — OXYCODONE AND ACETAMINOPHEN 5; 325 MG/1; MG/1
2 TABLET ORAL EVERY 4 HOURS
Qty: 0 | Refills: 0 | Status: DISCONTINUED | OUTPATIENT
Start: 2017-09-24 | End: 2017-09-25

## 2017-09-24 RX ORDER — ERYTHROPOIETIN 10000 [IU]/ML
10000 INJECTION, SOLUTION INTRAVENOUS; SUBCUTANEOUS
Qty: 0 | Refills: 0 | Status: DISCONTINUED | OUTPATIENT
Start: 2017-09-24 | End: 2017-09-27

## 2017-09-24 RX ADMIN — OXYCODONE HYDROCHLORIDE 10 MILLIGRAM(S): 5 TABLET ORAL at 12:22

## 2017-09-24 RX ADMIN — OXYCODONE HYDROCHLORIDE 10 MILLIGRAM(S): 5 TABLET ORAL at 11:12

## 2017-09-24 RX ADMIN — CALCITRIOL 0.25 MICROGRAM(S): 0.5 CAPSULE ORAL at 12:52

## 2017-09-24 RX ADMIN — Medication 250 MILLIGRAM(S): at 05:45

## 2017-09-24 RX ADMIN — OXYCODONE AND ACETAMINOPHEN 2 TABLET(S): 5; 325 TABLET ORAL at 14:52

## 2017-09-24 RX ADMIN — Medication 1 TABLET(S): at 18:00

## 2017-09-24 RX ADMIN — OXYCODONE HYDROCHLORIDE 10 MILLIGRAM(S): 5 TABLET ORAL at 06:40

## 2017-09-24 RX ADMIN — Medication 250 MILLIGRAM(S): at 17:59

## 2017-09-24 RX ADMIN — Medication 250 MILLIGRAM(S): at 00:02

## 2017-09-24 RX ADMIN — FAMOTIDINE 20 MILLIGRAM(S): 10 INJECTION INTRAVENOUS at 11:12

## 2017-09-24 RX ADMIN — OXYCODONE HYDROCHLORIDE 10 MILLIGRAM(S): 5 TABLET ORAL at 00:02

## 2017-09-24 RX ADMIN — HYDROMORPHONE HYDROCHLORIDE 2 MILLIGRAM(S): 2 INJECTION INTRAMUSCULAR; INTRAVENOUS; SUBCUTANEOUS at 09:30

## 2017-09-24 RX ADMIN — OXYCODONE AND ACETAMINOPHEN 2 TABLET(S): 5; 325 TABLET ORAL at 19:57

## 2017-09-24 RX ADMIN — HYDROMORPHONE HYDROCHLORIDE 2 MILLIGRAM(S): 2 INJECTION INTRAMUSCULAR; INTRAVENOUS; SUBCUTANEOUS at 17:59

## 2017-09-24 RX ADMIN — HYDROMORPHONE HYDROCHLORIDE 2 MILLIGRAM(S): 2 INJECTION INTRAMUSCULAR; INTRAVENOUS; SUBCUTANEOUS at 04:20

## 2017-09-24 RX ADMIN — Medication 1 TABLET(S): at 09:15

## 2017-09-24 RX ADMIN — SODIUM CHLORIDE 85 MILLILITER(S): 9 INJECTION INTRAMUSCULAR; INTRAVENOUS; SUBCUTANEOUS at 18:00

## 2017-09-24 RX ADMIN — OXYCODONE HYDROCHLORIDE 10 MILLIGRAM(S): 5 TABLET ORAL at 05:44

## 2017-09-24 RX ADMIN — Medication 100 MILLIGRAM(S): at 17:59

## 2017-09-24 RX ADMIN — HYDROMORPHONE HYDROCHLORIDE 2 MILLIGRAM(S): 2 INJECTION INTRAMUSCULAR; INTRAVENOUS; SUBCUTANEOUS at 09:15

## 2017-09-24 RX ADMIN — HYDROMORPHONE HYDROCHLORIDE 2 MILLIGRAM(S): 2 INJECTION INTRAMUSCULAR; INTRAVENOUS; SUBCUTANEOUS at 18:17

## 2017-09-24 RX ADMIN — HYDROMORPHONE HYDROCHLORIDE 2 MILLIGRAM(S): 2 INJECTION INTRAMUSCULAR; INTRAVENOUS; SUBCUTANEOUS at 22:50

## 2017-09-24 RX ADMIN — OXYCODONE AND ACETAMINOPHEN 2 TABLET(S): 5; 325 TABLET ORAL at 15:15

## 2017-09-24 RX ADMIN — OXYCODONE AND ACETAMINOPHEN 2 TABLET(S): 5; 325 TABLET ORAL at 20:30

## 2017-09-24 RX ADMIN — Medication 100 MILLIGRAM(S): at 05:41

## 2017-09-24 RX ADMIN — HYDROMORPHONE HYDROCHLORIDE 2 MILLIGRAM(S): 2 INJECTION INTRAMUSCULAR; INTRAVENOUS; SUBCUTANEOUS at 22:21

## 2017-09-24 RX ADMIN — Medication 10 MILLIGRAM(S): at 22:21

## 2017-09-24 RX ADMIN — Medication 250 MILLIGRAM(S): at 11:11

## 2017-09-24 RX ADMIN — HYDROMORPHONE HYDROCHLORIDE 2 MILLIGRAM(S): 2 INJECTION INTRAMUSCULAR; INTRAVENOUS; SUBCUTANEOUS at 04:03

## 2017-09-24 RX ADMIN — OXYCODONE HYDROCHLORIDE 10 MILLIGRAM(S): 5 TABLET ORAL at 01:00

## 2017-09-24 RX ADMIN — SODIUM CHLORIDE 85 MILLILITER(S): 9 INJECTION INTRAMUSCULAR; INTRAVENOUS; SUBCUTANEOUS at 05:41

## 2017-09-24 RX ADMIN — Medication 10 MILLIGRAM(S): at 12:57

## 2017-09-24 RX ADMIN — ERGOCALCIFEROL 50000 UNIT(S): 1.25 CAPSULE ORAL at 12:52

## 2017-09-24 NOTE — PROGRESS NOTE ADULT - ASSESSMENT
45 y/o female with PMH of PCKD, HTN , s/p oral surgery 2 weeks ago admitted for Pyelonephritis, infected PCKD cyst, C Diff colitis. MRI of brain performed showed brain aneurism seen by neurosurgery s/p LP by IR xanthocromia/ SAH ruled out , LP fluid culture no growth . Patient is with persistent flank pain and   recurrent fever ; repeat CT of abdomen ordered-mild colitis , MRI of kidney performed to r/o kidney abscess .Per DR Willett continue aztreonam, leukocystosis improved, diarrhea gradually resolved , on iv azreonam and po  vanco blood cx all negative, repaet urine cx negative. Due to recurrent fever indium scan done + Right kidney infection . She is with cr 3.66 now iv fluid reordered, ID follow up re antibiotics regimen need to change the treatment

## 2017-09-24 NOTE — PROGRESS NOTE ADULT - PROBLEM SELECTOR PLAN 5
s/p LP : xantchromia/ SAH ruled out  In light of absence of SAH, no further work up as per neurosurgery  Headache- tylenol  SBP < 150mmHg  no NSx intervention indicated at this time  neuro-checks   Recommend consulting with Cerebrovascular Neurosurgery [facility] for recommendations regarding acuity of CV work up. s/p LP : xantchromia/ SAH ruled out  In light of absence of SAH, no further work up as per neurosurgery  Headache/Migraine- brain bleed ruled out, no change in HA since admission, tylenol, opiates not working, cannot given triptans, will try reglan IV  SBP < 150mmHg  no NSx intervention indicated at this time  neuro-checks   Recommend consulting with Cerebrovascular Neurosurgery [facility] for recommendations regarding acuity of CV work up.

## 2017-09-24 NOTE — PROGRESS NOTE ADULT - PROBLEM SELECTOR PLAN 1
due to pyelonephritis vs Hemorrhagic cysts vs infected renal cysts  c/w treatement of pyelonephritis total 14 days, before considering hemorrhagic or infected cysts since those diagnosis will require more invasive and risky treatments and end up with patient needing HD.   Continue Aztreonam per ID  Urine cx with Klebsiella pansensitive to Aztreonam  Blood cultures no growth  Repeat blood cx ordered in light of Tmax of 102 yesterday  Indium scan showed R kidney infection , discussed with ID to reevaluate antibiotics regimen due to pyelonephritis vs Hemorrhagic cysts vs infected renal cysts  c/w treatement of pyelonephritis total 14 days likely, before considering hemorrhagic or infected cysts since those diagnosis will require more invasive and risky treatments and end up with patient needing HD.   Continue Aztreonam per ID  Urine cx with Klebsiella pansensitive to Aztreonam  Blood cultures no growth  Repeat blood cx ordered in light of Tmax of 102 yesterday  Indium scan showed R kidney infection , discussed with ID to reevaluate antibiotics regimen

## 2017-09-24 NOTE — PROGRESS NOTE ADULT - SUBJECTIVE AND OBJECTIVE BOX
CHIEF COMPLAINT/INTERVAL HISTORY:    Patient is a 46y old  Female who presents with a chief complaint of nausea, vomiting, diarrhea, fever and body aches (13 Sep 2017 22:54)      HPI:  45 y/o female smoker with h/o C. dif, polycystic kidney diease, s/p oral surgery 2 weeks ago followed by 1 week treatment with amoxicillin, started to have headache 2 days ago then fever, nausea, vomiting  and body aches yesterday. today, in the Er patient developed watery diarrhea. no blood in the stools. (13 Sep 2017 22:54)      SUBJECTIVE & OBJECTIVE: Pt seen and examined at bedside. c/o unremitting b/l pounding HA, with photophobia / phonophobia & nausea. THis has been present on admission as per her and has not got worse, but not any better either. No chest pain, palpitations, sob, light headedness/dizziness, difficulty breathing/cough, fevers/chills, diarrhea/constipation, dysuria or increased urinary frequency, focal deficits.  c/o mild right flank pain.     ICU Vital Signs Last 24 Hrs  T(C): 37.1 (24 Sep 2017 09:22), Max: 39.1 (23 Sep 2017 16:18)  T(F): 98.8 (24 Sep 2017 09:22), Max: 102.3 (23 Sep 2017 16:18)  HR: 67 (24 Sep 2017 09:22) (66 - 92)  BP: 113/61 (24 Sep 2017 09:22) (104/60 - 114/58)  BP(mean): --  ABP: --  ABP(mean): --  RR: 18 (24 Sep 2017 09:22) (16 - 19)  SpO2: 96% (24 Sep 2017 09:22) (95% - 96%)        MEDICATIONS  (STANDING):  heparin  Injectable 5000 Unit(s) SubCutaneous every 12 hours  famotidine    Tablet 20 milliGRAM(s) Oral daily  vancomycin    Solution 250 milliGRAM(s) Oral every 6 hours  aztreonam  IVPB      lactobacillus acidophilus 1 Tablet(s) Oral two times a day with meals  aztreonam  IVPB 2000 milliGRAM(s) IV Intermittent every 12 hours  ergocalciferol 47276 Unit(s) Oral every week  calcitriol   Capsule 0.25 MICROGram(s) Oral daily  sodium chloride 0.9%. 1000 milliLiter(s) (85 mL/Hr) IV Continuous <Continuous>  epoetin tutu Injectable 06019 Unit(s) SubCutaneous <User Schedule>  metoclopramide Injectable 10 milliGRAM(s) IV Push every 8 hours    MEDICATIONS  (PRN):  aluminum hydroxide/magnesium hydroxide/simethicone Suspension 30 milliLiter(s) Oral every 4 hours PRN Dyspepsia  acetaminophen   Tablet. 650 milliGRAM(s) Oral every 6 hours PRN Moderate Pain (4 - 6)  acetaminophen 325 mG/butalbital 50 mG/caffeine 40 mG 1 Tablet(s) Oral every 6 hours PRN Headache  acetaminophen   Tablet 650 milliGRAM(s) Oral every 6 hours PRN For Temp greater than 38 C (100.4 F)  ALPRAZolam 0.25 milliGRAM(s) Oral two times a day PRN anxiety  hydrOXYzine hydrochloride 25 milliGRAM(s) Oral every 6 hours PRN Itching  oxyCODONE    IR 5 milliGRAM(s) Oral every 4 hours PRN Moderate Pain (4 - 6)  oxyCODONE    IR 10 milliGRAM(s) Oral every 4 hours PRN Severe Pain (7 - 10)  HYDROmorphone  Injectable 2 milliGRAM(s) IV Push every 4 hours PRN Severe Pain (7 - 10)      LABS:                        10.2   13.2  )-----------( 336      ( 24 Sep 2017 05:38 )             32.7     09-24    137  |  99  |  32.0<H>  ----------------------------<  107  4.0   |  24.0  |  3.32<H>    Ca    8.8      24 Sep 2017 05:38  Phos  3.7     09-24  Mg     2.0     09-24            CAPILLARY BLOOD GLUCOSE          RECENT CULTURES:      RADIOLOGY & ADDITIONAL TESTS:      PHYSICAL EXAM:    PHYSICAL EXAM:  GENERAL: NAD, well-groomed, well-developed  NECK: supple  CHEST/LUNG: Clear to auscultation  bilaterally; No rales, rhonchi, wheezing, or rubs  HEART: Regular rate and rhythm; No murmurs, rubs, or gallops  ABDOMEN: Soft,  right flank tenderness, Nondistended; Bowel sounds present  EXTREMITIES:  2+ Peripheral Pulses, No clubbing, cyanosis, or edema  SKIN: No rashes or lesions

## 2017-09-25 DIAGNOSIS — B96.1 KLEBSIELLA PNEUMONIAE [K. PNEUMONIAE] AS THE CAUSE OF DISEASES CLASSIFIED ELSEWHERE: ICD-10-CM

## 2017-09-25 LAB
ANION GAP SERPL CALC-SCNC: 14 MMOL/L — SIGNIFICANT CHANGE UP (ref 5–17)
BASOPHILS # BLD AUTO: 0 K/UL — SIGNIFICANT CHANGE UP (ref 0–0.2)
BASOPHILS NFR BLD AUTO: 0.2 % — SIGNIFICANT CHANGE UP (ref 0–2)
BUN SERPL-MCNC: 31 MG/DL — HIGH (ref 8–20)
CALCIUM SERPL-MCNC: 8.7 MG/DL — SIGNIFICANT CHANGE UP (ref 8.6–10.2)
CHLORIDE SERPL-SCNC: 103 MMOL/L — SIGNIFICANT CHANGE UP (ref 98–107)
CO2 SERPL-SCNC: 23 MMOL/L — SIGNIFICANT CHANGE UP (ref 22–29)
CREAT SERPL-MCNC: 3.01 MG/DL — HIGH (ref 0.5–1.3)
EOSINOPHIL # BLD AUTO: 0.2 K/UL — SIGNIFICANT CHANGE UP (ref 0–0.5)
EOSINOPHIL NFR BLD AUTO: 2.3 % — SIGNIFICANT CHANGE UP (ref 0–6)
FERRITIN SERPL-MCNC: 555.7 NG/ML — HIGH (ref 11–306)
GLUCOSE SERPL-MCNC: 98 MG/DL — SIGNIFICANT CHANGE UP (ref 70–115)
HCT VFR BLD CALC: 28.7 % — LOW (ref 37–47)
HGB BLD-MCNC: 9.2 G/DL — LOW (ref 12–16)
IRON SATN MFR SERPL: 11 % — LOW (ref 14–50)
IRON SATN MFR SERPL: 18 UG/DL — LOW (ref 37–145)
LYMPHOCYTES # BLD AUTO: 1 K/UL — SIGNIFICANT CHANGE UP (ref 1–4.8)
LYMPHOCYTES # BLD AUTO: 11.2 % — LOW (ref 20–55)
MAGNESIUM SERPL-MCNC: 1.9 MG/DL — SIGNIFICANT CHANGE UP (ref 1.6–2.6)
MCHC RBC-ENTMCNC: 31.3 PG — HIGH (ref 27–31)
MCHC RBC-ENTMCNC: 32.1 G/DL — SIGNIFICANT CHANGE UP (ref 32–36)
MCV RBC AUTO: 97.6 FL — SIGNIFICANT CHANGE UP (ref 81–99)
MONOCYTES # BLD AUTO: 0.7 K/UL — SIGNIFICANT CHANGE UP (ref 0–0.8)
MONOCYTES NFR BLD AUTO: 8 % — SIGNIFICANT CHANGE UP (ref 3–10)
NEUTROPHILS # BLD AUTO: 7.1 K/UL — SIGNIFICANT CHANGE UP (ref 1.8–8)
NEUTROPHILS NFR BLD AUTO: 77.9 % — HIGH (ref 37–73)
PHOSPHATE SERPL-MCNC: 4.2 MG/DL — SIGNIFICANT CHANGE UP (ref 2.4–4.7)
PLATELET # BLD AUTO: 291 K/UL — SIGNIFICANT CHANGE UP (ref 150–400)
POTASSIUM SERPL-MCNC: 4 MMOL/L — SIGNIFICANT CHANGE UP (ref 3.5–5.3)
POTASSIUM SERPL-SCNC: 4 MMOL/L — SIGNIFICANT CHANGE UP (ref 3.5–5.3)
RBC # BLD: 2.94 M/UL — LOW (ref 4.4–5.2)
RBC # FLD: 13 % — SIGNIFICANT CHANGE UP (ref 11–15.6)
SODIUM SERPL-SCNC: 140 MMOL/L — SIGNIFICANT CHANGE UP (ref 135–145)
TIBC SERPL-MCNC: 167 UG/DL — LOW (ref 220–430)
TRANSFERRIN SERPL-MCNC: 117 MG/DL — LOW (ref 192–382)
WBC # BLD: 9.1 K/UL — SIGNIFICANT CHANGE UP (ref 4.8–10.8)
WBC # FLD AUTO: 9.1 K/UL — SIGNIFICANT CHANGE UP (ref 4.8–10.8)

## 2017-09-25 PROCEDURE — 99233 SBSQ HOSP IP/OBS HIGH 50: CPT

## 2017-09-25 RX ORDER — IRON SUCROSE 20 MG/ML
100 INJECTION, SOLUTION INTRAVENOUS DAILY
Qty: 0 | Refills: 0 | Status: DISCONTINUED | OUTPATIENT
Start: 2017-09-25 | End: 2017-09-27

## 2017-09-25 RX ORDER — OXYCODONE AND ACETAMINOPHEN 5; 325 MG/1; MG/1
2 TABLET ORAL EVERY 4 HOURS
Qty: 0 | Refills: 0 | Status: DISCONTINUED | OUTPATIENT
Start: 2017-09-25 | End: 2017-09-27

## 2017-09-25 RX ADMIN — OXYCODONE AND ACETAMINOPHEN 2 TABLET(S): 5; 325 TABLET ORAL at 23:21

## 2017-09-25 RX ADMIN — Medication 1 TABLET(S): at 07:24

## 2017-09-25 RX ADMIN — Medication 250 MILLIGRAM(S): at 00:14

## 2017-09-25 RX ADMIN — Medication 250 MILLIGRAM(S): at 11:38

## 2017-09-25 RX ADMIN — HYDROMORPHONE HYDROCHLORIDE 2 MILLIGRAM(S): 2 INJECTION INTRAMUSCULAR; INTRAVENOUS; SUBCUTANEOUS at 20:20

## 2017-09-25 RX ADMIN — HYDROMORPHONE HYDROCHLORIDE 2 MILLIGRAM(S): 2 INJECTION INTRAMUSCULAR; INTRAVENOUS; SUBCUTANEOUS at 06:20

## 2017-09-25 RX ADMIN — Medication 1 TABLET(S): at 08:18

## 2017-09-25 RX ADMIN — OXYCODONE AND ACETAMINOPHEN 2 TABLET(S): 5; 325 TABLET ORAL at 23:50

## 2017-09-25 RX ADMIN — Medication 650 MILLIGRAM(S): at 17:34

## 2017-09-25 RX ADMIN — SODIUM CHLORIDE 85 MILLILITER(S): 9 INJECTION INTRAMUSCULAR; INTRAVENOUS; SUBCUTANEOUS at 23:21

## 2017-09-25 RX ADMIN — Medication 10 MILLIGRAM(S): at 13:35

## 2017-09-25 RX ADMIN — Medication 250 MILLIGRAM(S): at 23:22

## 2017-09-25 RX ADMIN — OXYCODONE AND ACETAMINOPHEN 2 TABLET(S): 5; 325 TABLET ORAL at 16:03

## 2017-09-25 RX ADMIN — Medication 250 MILLIGRAM(S): at 17:33

## 2017-09-25 RX ADMIN — HYDROMORPHONE HYDROCHLORIDE 2 MILLIGRAM(S): 2 INJECTION INTRAMUSCULAR; INTRAVENOUS; SUBCUTANEOUS at 19:49

## 2017-09-25 RX ADMIN — OXYCODONE AND ACETAMINOPHEN 2 TABLET(S): 5; 325 TABLET ORAL at 01:50

## 2017-09-25 RX ADMIN — IRON SUCROSE 210 MILLIGRAM(S): 20 INJECTION, SOLUTION INTRAVENOUS at 22:12

## 2017-09-25 RX ADMIN — HYDROMORPHONE HYDROCHLORIDE 2 MILLIGRAM(S): 2 INJECTION INTRAMUSCULAR; INTRAVENOUS; SUBCUTANEOUS at 11:51

## 2017-09-25 RX ADMIN — CALCITRIOL 0.25 MICROGRAM(S): 0.5 CAPSULE ORAL at 11:38

## 2017-09-25 RX ADMIN — Medication 250 MILLIGRAM(S): at 05:51

## 2017-09-25 RX ADMIN — OXYCODONE AND ACETAMINOPHEN 2 TABLET(S): 5; 325 TABLET ORAL at 00:17

## 2017-09-25 RX ADMIN — Medication 650 MILLIGRAM(S): at 18:08

## 2017-09-25 RX ADMIN — HYDROMORPHONE HYDROCHLORIDE 2 MILLIGRAM(S): 2 INJECTION INTRAMUSCULAR; INTRAVENOUS; SUBCUTANEOUS at 12:27

## 2017-09-25 RX ADMIN — Medication 100 MILLIGRAM(S): at 05:50

## 2017-09-25 RX ADMIN — ERYTHROPOIETIN 10000 UNIT(S): 10000 INJECTION, SOLUTION INTRAVENOUS; SUBCUTANEOUS at 10:21

## 2017-09-25 RX ADMIN — Medication 10 MILLIGRAM(S): at 05:48

## 2017-09-25 RX ADMIN — SODIUM CHLORIDE 85 MILLILITER(S): 9 INJECTION INTRAMUSCULAR; INTRAVENOUS; SUBCUTANEOUS at 11:38

## 2017-09-25 RX ADMIN — Medication 100 MILLIGRAM(S): at 17:34

## 2017-09-25 RX ADMIN — OXYCODONE AND ACETAMINOPHEN 2 TABLET(S): 5; 325 TABLET ORAL at 15:27

## 2017-09-25 RX ADMIN — Medication 1 TABLET(S): at 17:34

## 2017-09-25 RX ADMIN — FAMOTIDINE 20 MILLIGRAM(S): 10 INJECTION INTRAVENOUS at 11:38

## 2017-09-25 RX ADMIN — OXYCODONE AND ACETAMINOPHEN 2 TABLET(S): 5; 325 TABLET ORAL at 10:21

## 2017-09-25 RX ADMIN — HYDROMORPHONE HYDROCHLORIDE 2 MILLIGRAM(S): 2 INJECTION INTRAMUSCULAR; INTRAVENOUS; SUBCUTANEOUS at 05:47

## 2017-09-25 RX ADMIN — OXYCODONE AND ACETAMINOPHEN 2 TABLET(S): 5; 325 TABLET ORAL at 11:00

## 2017-09-25 NOTE — PROVIDER CONTACT NOTE (MEDICATION) - ACTION/TREATMENT ORDERED:
As per MD Busby, current Percocet order is for breakthrough pain for headache, ok to give following Dilaudid as a breakthrough pain medication

## 2017-09-25 NOTE — CHART NOTE - NSCHARTNOTEFT_GEN_A_CORE
Source: Patient [x ]  Family [ ]   other [ ]    Current Diet:   Regular    Patient reports [ ] nausea  [ ] vomiting [ ] diarrhea [ ] constipation  [ ]chewing problems [ ] swallowing issues  [ ] other:     PO intake:  < 50% [ ]   50-75%  [ ]   %  [x ]  other :    Source for PO intake [ x] Patient [ ] family [ ] chart [ ] staff [ ] other    Enteral /Parenteral Nutrition:     Current Weight:     % Weight Change     Pertinent Medications: MEDICATIONS  (STANDING):  heparin  Injectable 5000 Unit(s) SubCutaneous every 12 hours  famotidine    Tablet 20 milliGRAM(s) Oral daily  vancomycin    Solution 250 milliGRAM(s) Oral every 6 hours  aztreonam  IVPB      lactobacillus acidophilus 1 Tablet(s) Oral two times a day with meals  aztreonam  IVPB 2000 milliGRAM(s) IV Intermittent every 12 hours  ergocalciferol 28785 Unit(s) Oral every week  calcitriol   Capsule 0.25 MICROGram(s) Oral daily  sodium chloride 0.9%. 1000 milliLiter(s) (85 mL/Hr) IV Continuous <Continuous>  epoetin tutu Injectable 46657 Unit(s) SubCutaneous <User Schedule>    MEDICATIONS  (PRN):  aluminum hydroxide/magnesium hydroxide/simethicone Suspension 30 milliLiter(s) Oral every 4 hours PRN Dyspepsia  acetaminophen   Tablet. 650 milliGRAM(s) Oral every 6 hours PRN Moderate Pain (4 - 6)  acetaminophen 325 mG/butalbital 50 mG/caffeine 40 mG 1 Tablet(s) Oral every 6 hours PRN Headache  acetaminophen   Tablet 650 milliGRAM(s) Oral every 6 hours PRN For Temp greater than 38 C (100.4 F)  ALPRAZolam 0.25 milliGRAM(s) Oral two times a day PRN anxiety  hydrOXYzine hydrochloride 25 milliGRAM(s) Oral every 6 hours PRN Itching  HYDROmorphone  Injectable 2 milliGRAM(s) IV Push every 4 hours PRN Severe Pain (7 - 10)  oxyCODONE    5 mG/acetaminophen 325 mG 2 Tablet(s) Oral every 4 hours PRN Moderate breakthrough Pain (4 - 6)    Pertinent Labs: CBC Full  -  ( 25 Sep 2017 06:30 )  WBC Count : 9.1 K/uL  Hemoglobin : 9.2 g/dL  Hematocrit : 28.7 %  Platelet Count - Automated : 291 K/uL  Mean Cell Volume : 97.6 fl  Mean Cell Hemoglobin : 31.3 pg  Mean Cell Hemoglobin Concentration : 32.1 g/dL  Auto Neutrophil # : 7.1 K/uL  Auto Lymphocyte # : 1.0 K/uL  Auto Monocyte # : 0.7 K/uL  Auto Eosinophil # : 0.2 K/uL  Auto Basophil # : 0.0 K/uL  Auto Neutrophil % : 77.9 %  Auto Lymphocyte % : 11.2 %  Auto Monocyte % : 8.0 %  Auto Eosinophil % : 2.3 %  Auto Basophil % : 0.2 %          Skin: intact  09-25 Na140 mmol/L Glu 98 mg/dL K+ 4.0 mmol/L Cr  3.01 mg/dL<H> BUN 31.0 mg/dL<H> Phos 4.2 mg/dL Alb n/a   PAB n/a           Nutrition focused physical exam conducted - found signs of malnutrition [x ]absent [ ]present    Subcutaneous fat loss: [ ] Orbital fat pads region, [ ]Buccal fat region, [ ]Triceps region,  [ ]Ribs region    Muscle wasting: [ ]Temples region, [ ]Clavicle region, [ ]Shoulder region, [ ]Scapula region, [ ]Interosseous region,  [ ]thigh region, [ ]Calf region    Estimated Needs:   [x ] no change since previous assessment  [ ] recalculated:     Current Nutrition Diagnosis:  Pt with impaired nutrient utilization related to renal failure as evidenced by high BUN, high Creat. Pt now c diff negative. Pt tolerating regular diet well. Stools now more formed and diarrhea improved. Isolation now d/c'd. Spoke with pt today regarding service recovery 2/2 pt felt no one wanted to enter room due to isolation. Apologies were made and card provided with phone number. Pt no longer on isolation so pt feels better.   Recommendations: Continue regular diet.    Monitoring and Evaluation:   [x ] PO intake [x ] Tolerance to diet prescription [X] Weights  [X] Follow up per protocol [X] Labs:

## 2017-09-25 NOTE — PROGRESS NOTE ADULT - SUBJECTIVE AND OBJECTIVE BOX
CHIEF COMPLAINT/INTERVAL HISTORY:    Patient is a 46y old  Female who presents with a chief complaint of nausea, vomiting, diarrhea, fever and body aches (13 Sep 2017 22:54)      HPI:  45 y/o female smoker with h/o C. dif, polycystic kidney diease, s/p oral surgery 2 weeks ago followed by 1 week treatment with amoxicillin, started to have headache 2 days ago then fever, nausea, vomiting  and body aches yesterday. today, in the Er patient developed watery diarrhea. no blood in the stools. (13 Sep 2017 22:54)      SUBJECTIVE & OBJECTIVE: Pt seen and examined at bedside. c/o 4/10 HA today. Percocet does help. Afebrile x 24 hrs. No diarrhea. No chest pain, palpitations, sob, light headedness/dizziness, difficulty breathing/cough, fevers/chills, abdominal pain, n/v, diarrhea/constipation, dysuria or increased urinary frequency.     ICU Vital Signs Last 24 Hrs  T(C): 37.2 (25 Sep 2017 08:31), Max: 37.5 (25 Sep 2017 04:30)  T(F): 99 (25 Sep 2017 08:31), Max: 99.5 (25 Sep 2017 04:30)  HR: 53 (25 Sep 2017 08:31) (53 - 67)  BP: 100/51 (25 Sep 2017 08:31) (99/50 - 104/61)  BP(mean): --  ABP: --  ABP(mean): --  RR: 20 (25 Sep 2017 08:31) (18 - 20)  SpO2: 96% (25 Sep 2017 08:31) (96% - 96%)        MEDICATIONS  (STANDING):  heparin  Injectable 5000 Unit(s) SubCutaneous every 12 hours  famotidine    Tablet 20 milliGRAM(s) Oral daily  vancomycin    Solution 250 milliGRAM(s) Oral every 6 hours  aztreonam  IVPB      lactobacillus acidophilus 1 Tablet(s) Oral two times a day with meals  aztreonam  IVPB 2000 milliGRAM(s) IV Intermittent every 12 hours  ergocalciferol 46139 Unit(s) Oral every week  calcitriol   Capsule 0.25 MICROGram(s) Oral daily  sodium chloride 0.9%. 1000 milliLiter(s) (85 mL/Hr) IV Continuous <Continuous>  epoetin tutu Injectable 75073 Unit(s) SubCutaneous <User Schedule>  metoclopramide Injectable 10 milliGRAM(s) IV Push every 8 hours    MEDICATIONS  (PRN):  aluminum hydroxide/magnesium hydroxide/simethicone Suspension 30 milliLiter(s) Oral every 4 hours PRN Dyspepsia  acetaminophen   Tablet. 650 milliGRAM(s) Oral every 6 hours PRN Moderate Pain (4 - 6)  acetaminophen 325 mG/butalbital 50 mG/caffeine 40 mG 1 Tablet(s) Oral every 6 hours PRN Headache  acetaminophen   Tablet 650 milliGRAM(s) Oral every 6 hours PRN For Temp greater than 38 C (100.4 F)  ALPRAZolam 0.25 milliGRAM(s) Oral two times a day PRN anxiety  hydrOXYzine hydrochloride 25 milliGRAM(s) Oral every 6 hours PRN Itching  HYDROmorphone  Injectable 2 milliGRAM(s) IV Push every 4 hours PRN Severe Pain (7 - 10)  oxyCODONE    5 mG/acetaminophen 325 mG 2 Tablet(s) Oral every 4 hours PRN Moderate breakthrough Pain (4 - 6)      LABS:                        9.2    9.1   )-----------( 291      ( 25 Sep 2017 06:30 )             28.7     09-25    140  |  103  |  31.0<H>  ----------------------------<  98  4.0   |  23.0  |  3.01<H>    Ca    8.7      25 Sep 2017 06:30  Phos  4.2     09-25  Mg     1.9     09-25            CAPILLARY BLOOD GLUCOSE          RECENT CULTURES:      RADIOLOGY & ADDITIONAL TESTS:      PHYSICAL EXAM:    GENERAL: NAD, well-groomed, well-developed  HEAD:  Atraumatic, Normocephalic  EYES: EOMI, PERRLA, conjunctiva and sclera clear  ENMT: Moist mucous membranes  NECK: Supple, No JVD  NERVOUS SYSTEM:  Alert & Oriented X3, Motor Strength 5/5 B/L upper and lower extremities; DTRs 2+ intact and symmetric  CHEST/LUNG: Clear to auscultation bilaterally; No rales, rhonchi, wheezing, or rubs  HEART: Regular rate and rhythm; No murmurs, rubs, or gallops  ABDOMEN: Soft, Nontender, Nondistended; Bowel sounds present  EXTREMITIES:  2+ Peripheral Pulses, No clubbing, cyanosis, or edema        DVT/GI ppx  Discussed with pt @ bedside CHIEF COMPLAINT/INTERVAL HISTORY:    Patient is a 46y old  Female who presents with a chief complaint of nausea, vomiting, diarrhea, fever and body aches (13 Sep 2017 22:54)      HPI:  45 y/o female smoker with h/o C. dif, polycystic kidney diease, s/p oral surgery 2 weeks ago followed by 1 week treatment with amoxicillin, started to have headache 2 days ago then fever, nausea, vomiting  and body aches yesterday. today, in the Er patient developed watery diarrhea. no blood in the stools. (13 Sep 2017 22:54)      SUBJECTIVE & OBJECTIVE: Pt seen and examined at bedside. c/o 4/10 HA today. Percocet does help. Afebrile x 24 hrs. No diarrhea. No chest pain, palpitations, sob, light headedness/dizziness, difficulty breathing/cough, fevers/chills, abdominal pain, n/v, diarrhea/constipation, dysuria or increased urinary frequency.     ICU Vital Signs Last 24 Hrs  T(C): 37.2 (25 Sep 2017 08:31), Max: 37.5 (25 Sep 2017 04:30)  T(F): 99 (25 Sep 2017 08:31), Max: 99.5 (25 Sep 2017 04:30)  HR: 53 (25 Sep 2017 08:31) (53 - 67)  BP: 100/51 (25 Sep 2017 08:31) (99/50 - 104/61)  BP(mean): --  ABP: --  ABP(mean): --  RR: 20 (25 Sep 2017 08:31) (18 - 20)  SpO2: 96% (25 Sep 2017 08:31) (96% - 96%)        MEDICATIONS  (STANDING):  heparin  Injectable 5000 Unit(s) SubCutaneous every 12 hours  famotidine    Tablet 20 milliGRAM(s) Oral daily  vancomycin    Solution 250 milliGRAM(s) Oral every 6 hours  aztreonam  IVPB      lactobacillus acidophilus 1 Tablet(s) Oral two times a day with meals  aztreonam  IVPB 2000 milliGRAM(s) IV Intermittent every 12 hours  ergocalciferol 78237 Unit(s) Oral every week  calcitriol   Capsule 0.25 MICROGram(s) Oral daily  sodium chloride 0.9%. 1000 milliLiter(s) (85 mL/Hr) IV Continuous <Continuous>  epoetin tutu Injectable 24881 Unit(s) SubCutaneous <User Schedule>  metoclopramide Injectable 10 milliGRAM(s) IV Push every 8 hours    MEDICATIONS  (PRN):  aluminum hydroxide/magnesium hydroxide/simethicone Suspension 30 milliLiter(s) Oral every 4 hours PRN Dyspepsia  acetaminophen   Tablet. 650 milliGRAM(s) Oral every 6 hours PRN Moderate Pain (4 - 6)  acetaminophen 325 mG/butalbital 50 mG/caffeine 40 mG 1 Tablet(s) Oral every 6 hours PRN Headache  acetaminophen   Tablet 650 milliGRAM(s) Oral every 6 hours PRN For Temp greater than 38 C (100.4 F)  ALPRAZolam 0.25 milliGRAM(s) Oral two times a day PRN anxiety  hydrOXYzine hydrochloride 25 milliGRAM(s) Oral every 6 hours PRN Itching  HYDROmorphone  Injectable 2 milliGRAM(s) IV Push every 4 hours PRN Severe Pain (7 - 10)  oxyCODONE    5 mG/acetaminophen 325 mG 2 Tablet(s) Oral every 4 hours PRN Moderate breakthrough Pain (4 - 6)      LABS:                        9.2    9.1   )-----------( 291      ( 25 Sep 2017 06:30 )             28.7     09-25    140  |  103  |  31.0<H>  ----------------------------<  98  4.0   |  23.0  |  3.01<H>    Ca    8.7      25 Sep 2017 06:30  Phos  4.2     09-25  Mg     1.9     09-25            CAPILLARY BLOOD GLUCOSE          RECENT CULTURES:      RADIOLOGY & ADDITIONAL TESTS:    PHYSICAL EXAM:  GENERAL: NAD, well-groomed, well-developed  NECK: supple  CHEST/LUNG: Clear to auscultation  bilaterally; No rales, rhonchi, wheezing, or rubs  HEART: Regular rate and rhythm; No murmurs, rubs, or gallops  ABDOMEN: Soft,  right flank tenderness, Nondistended; Bowel sounds present  EXTREMITIES:  2+ Peripheral Pulses, No clubbing, cyanosis, or edema  SKIN: No rashes or lesions

## 2017-09-25 NOTE — PROVIDER CONTACT NOTE (MEDICATION) - SITUATION
made aware pt requesting percocet 2 tabs for headache as Fioricet is not effective; MD verbalized understanding and states her order for percocet mod pain 4-6. Q4H PRN is for breakthrough

## 2017-09-25 NOTE — PROGRESS NOTE ADULT - PROBLEM SELECTOR PLAN 1
Patient has Flank pain and findings of  pyelonephritis vs Hemorrhagic cysts vs infected renal cysts  D/W Dr Alan will treat for full course of pyelonephritis before considering hemorrhagic or infected cysts since those diagnosis will require more invasive and risky treatments and end up with patient needing HD.   Continue Aztreonam  Urine cx with Klebsiella Pneumoniae sensitive to Aztreonam  Blood cultures no growth  fevers trending down  2 weeks of antibiotics will be 9/27/17

## 2017-09-25 NOTE — PROGRESS NOTE ADULT - PROBLEM SELECTOR PLAN 5
s/p LP : xantchromia/ SAH ruled out  In light of absence of SAH, no further work up as per neurosurgery  Headache/Migraine- brain bleed ruled out, no change in HA since admission, tylenol, opiates not working, cannot given triptans, will try reglan IV  SBP < 150mmHg  no NSx intervention indicated at this time  neuro-checks   Recommend consulting with [facility]erebrovascular Neurosurgery [facility] for recommendations regarding acuity of CV work up.

## 2017-09-25 NOTE — PROGRESS NOTE ADULT - PROBLEM SELECTOR PLAN 1
due to pyelonephritis vs Hemorrhagic cysts vs infected renal cysts  c/w treatement of pyelonephritis total 14 days likely, before considering hemorrhagic or infected cysts since those diagnosis will require more invasive and risky treatments and end up with patient needing HD.   Indium scan showed R kidney infection , discussed with ID to reevaluate antibiotics regimen  Urine cx with Klebsiella pansensitive to Aztreonam  Blood cultures no growth  f/u Repeat blood cx   Afebrile x 24 hrs  Continue Aztreonam per ID, 2 weeks of antibiotics will be 9/27/17.

## 2017-09-26 ENCOUNTER — APPOINTMENT (OUTPATIENT)
Dept: PLASTIC SURGERY | Facility: CLINIC | Age: 47
End: 2017-09-26

## 2017-09-26 LAB
ANION GAP SERPL CALC-SCNC: 14 MMOL/L — SIGNIFICANT CHANGE UP (ref 5–17)
BASOPHILS # BLD AUTO: 0 K/UL — SIGNIFICANT CHANGE UP (ref 0–0.2)
BASOPHILS NFR BLD AUTO: 0.4 % — SIGNIFICANT CHANGE UP (ref 0–2)
BUN SERPL-MCNC: 35 MG/DL — HIGH (ref 8–20)
CALCIUM SERPL-MCNC: 8.5 MG/DL — LOW (ref 8.6–10.2)
CHLORIDE SERPL-SCNC: 106 MMOL/L — SIGNIFICANT CHANGE UP (ref 98–107)
CO2 SERPL-SCNC: 21 MMOL/L — LOW (ref 22–29)
CREAT SERPL-MCNC: 3.06 MG/DL — HIGH (ref 0.5–1.3)
EOSINOPHIL # BLD AUTO: 0.2 K/UL — SIGNIFICANT CHANGE UP (ref 0–0.5)
EOSINOPHIL NFR BLD AUTO: 2.1 % — SIGNIFICANT CHANGE UP (ref 0–6)
GLUCOSE SERPL-MCNC: 87 MG/DL — SIGNIFICANT CHANGE UP (ref 70–115)
HCT VFR BLD CALC: 28.3 % — LOW (ref 37–47)
HGB BLD-MCNC: 9.2 G/DL — LOW (ref 12–16)
LYMPHOCYTES # BLD AUTO: 1.4 K/UL — SIGNIFICANT CHANGE UP (ref 1–4.8)
LYMPHOCYTES # BLD AUTO: 17 % — LOW (ref 20–55)
MAGNESIUM SERPL-MCNC: 2 MG/DL — SIGNIFICANT CHANGE UP (ref 1.6–2.6)
MCHC RBC-ENTMCNC: 31.9 PG — HIGH (ref 27–31)
MCHC RBC-ENTMCNC: 32.5 G/DL — SIGNIFICANT CHANGE UP (ref 32–36)
MCV RBC AUTO: 98.3 FL — SIGNIFICANT CHANGE UP (ref 81–99)
MONOCYTES # BLD AUTO: 0.4 K/UL — SIGNIFICANT CHANGE UP (ref 0–0.8)
MONOCYTES NFR BLD AUTO: 5.3 % — SIGNIFICANT CHANGE UP (ref 3–10)
NEUTROPHILS # BLD AUTO: 6.4 K/UL — SIGNIFICANT CHANGE UP (ref 1.8–8)
NEUTROPHILS NFR BLD AUTO: 74.8 % — HIGH (ref 37–73)
PHOSPHATE SERPL-MCNC: 4.7 MG/DL — SIGNIFICANT CHANGE UP (ref 2.4–4.7)
PLATELET # BLD AUTO: 292 K/UL — SIGNIFICANT CHANGE UP (ref 150–400)
POTASSIUM SERPL-MCNC: 4.3 MMOL/L — SIGNIFICANT CHANGE UP (ref 3.5–5.3)
POTASSIUM SERPL-SCNC: 4.3 MMOL/L — SIGNIFICANT CHANGE UP (ref 3.5–5.3)
RBC # BLD: 2.88 M/UL — LOW (ref 4.4–5.2)
RBC # FLD: 13 % — SIGNIFICANT CHANGE UP (ref 11–15.6)
SODIUM SERPL-SCNC: 141 MMOL/L — SIGNIFICANT CHANGE UP (ref 135–145)
WBC # BLD: 8.5 K/UL — SIGNIFICANT CHANGE UP (ref 4.8–10.8)
WBC # FLD AUTO: 8.5 K/UL — SIGNIFICANT CHANGE UP (ref 4.8–10.8)

## 2017-09-26 PROCEDURE — 99233 SBSQ HOSP IP/OBS HIGH 50: CPT

## 2017-09-26 RX ADMIN — Medication 250 MILLIGRAM(S): at 23:19

## 2017-09-26 RX ADMIN — OXYCODONE AND ACETAMINOPHEN 2 TABLET(S): 5; 325 TABLET ORAL at 05:00

## 2017-09-26 RX ADMIN — HYDROMORPHONE HYDROCHLORIDE 2 MILLIGRAM(S): 2 INJECTION INTRAMUSCULAR; INTRAVENOUS; SUBCUTANEOUS at 17:23

## 2017-09-26 RX ADMIN — SODIUM CHLORIDE 85 MILLILITER(S): 9 INJECTION INTRAMUSCULAR; INTRAVENOUS; SUBCUTANEOUS at 12:58

## 2017-09-26 RX ADMIN — HYDROMORPHONE HYDROCHLORIDE 2 MILLIGRAM(S): 2 INJECTION INTRAMUSCULAR; INTRAVENOUS; SUBCUTANEOUS at 01:40

## 2017-09-26 RX ADMIN — OXYCODONE AND ACETAMINOPHEN 2 TABLET(S): 5; 325 TABLET ORAL at 16:15

## 2017-09-26 RX ADMIN — HYDROMORPHONE HYDROCHLORIDE 2 MILLIGRAM(S): 2 INJECTION INTRAMUSCULAR; INTRAVENOUS; SUBCUTANEOUS at 01:08

## 2017-09-26 RX ADMIN — Medication 250 MILLIGRAM(S): at 13:00

## 2017-09-26 RX ADMIN — OXYCODONE AND ACETAMINOPHEN 2 TABLET(S): 5; 325 TABLET ORAL at 04:29

## 2017-09-26 RX ADMIN — HYDROMORPHONE HYDROCHLORIDE 2 MILLIGRAM(S): 2 INJECTION INTRAMUSCULAR; INTRAVENOUS; SUBCUTANEOUS at 05:07

## 2017-09-26 RX ADMIN — OXYCODONE AND ACETAMINOPHEN 2 TABLET(S): 5; 325 TABLET ORAL at 15:38

## 2017-09-26 RX ADMIN — OXYCODONE AND ACETAMINOPHEN 2 TABLET(S): 5; 325 TABLET ORAL at 10:40

## 2017-09-26 RX ADMIN — IRON SUCROSE 210 MILLIGRAM(S): 20 INJECTION, SOLUTION INTRAVENOUS at 12:59

## 2017-09-26 RX ADMIN — OXYCODONE AND ACETAMINOPHEN 2 TABLET(S): 5; 325 TABLET ORAL at 23:19

## 2017-09-26 RX ADMIN — Medication 1 TABLET(S): at 17:10

## 2017-09-26 RX ADMIN — HYDROMORPHONE HYDROCHLORIDE 2 MILLIGRAM(S): 2 INJECTION INTRAMUSCULAR; INTRAVENOUS; SUBCUTANEOUS at 12:55

## 2017-09-26 RX ADMIN — Medication 250 MILLIGRAM(S): at 17:10

## 2017-09-26 RX ADMIN — CALCITRIOL 0.25 MICROGRAM(S): 0.5 CAPSULE ORAL at 12:59

## 2017-09-26 RX ADMIN — HYDROMORPHONE HYDROCHLORIDE 2 MILLIGRAM(S): 2 INJECTION INTRAMUSCULAR; INTRAVENOUS; SUBCUTANEOUS at 13:10

## 2017-09-26 RX ADMIN — Medication 100 MILLIGRAM(S): at 17:10

## 2017-09-26 RX ADMIN — FAMOTIDINE 20 MILLIGRAM(S): 10 INJECTION INTRAVENOUS at 12:59

## 2017-09-26 RX ADMIN — Medication 250 MILLIGRAM(S): at 05:10

## 2017-09-26 RX ADMIN — HYDROMORPHONE HYDROCHLORIDE 2 MILLIGRAM(S): 2 INJECTION INTRAMUSCULAR; INTRAVENOUS; SUBCUTANEOUS at 05:40

## 2017-09-26 RX ADMIN — HYDROMORPHONE HYDROCHLORIDE 2 MILLIGRAM(S): 2 INJECTION INTRAMUSCULAR; INTRAVENOUS; SUBCUTANEOUS at 17:09

## 2017-09-26 RX ADMIN — Medication 100 MILLIGRAM(S): at 05:10

## 2017-09-26 RX ADMIN — HYDROMORPHONE HYDROCHLORIDE 2 MILLIGRAM(S): 2 INJECTION INTRAMUSCULAR; INTRAVENOUS; SUBCUTANEOUS at 21:17

## 2017-09-26 RX ADMIN — HYDROMORPHONE HYDROCHLORIDE 2 MILLIGRAM(S): 2 INJECTION INTRAMUSCULAR; INTRAVENOUS; SUBCUTANEOUS at 21:32

## 2017-09-26 RX ADMIN — OXYCODONE AND ACETAMINOPHEN 2 TABLET(S): 5; 325 TABLET ORAL at 11:25

## 2017-09-26 RX ADMIN — Medication 1 TABLET(S): at 07:58

## 2017-09-26 NOTE — PHYSICAL THERAPY INITIAL EVALUATION ADULT - ADDITIONAL COMMENTS
Pt lives with son in a split level house with 3 steps to enter.  Independent with all PTA, without devices.

## 2017-09-26 NOTE — PROGRESS NOTE ADULT - SUBJECTIVE AND OBJECTIVE BOX
[facility]  INFECTIOUS DISEASES AND INTERNAL MEDICINE OF [place]  =======================================================  Thomas Pope MD  Diplomates American Board of Internal Medicine and Infectious Diseases  =======================================================      ROLLY MEDEIROS [id number]    Follow up: Pyelonephritis, infected PCKD cyst, C Diff colitis     no fevers  still with flank pain    Allergies:  Demerol HCl (Hives)  Demerol HCl (Hives; Rash)  Phenergan (Hives; Rash)  Phenergan Fortis (Hives)      Antibiotics:  vancomycin    Solution 250 milliGRAM(s) Oral every 6 hours  aztreonam  IVPB 2000 milliGRAM(s) IV Intermittent every 12 hours       REVIEW OF SYSTEMS:  CONSTITUTIONAL:  No Fever, + chills  HEENT:   No diplopia or blurred vision.  No earache, sore throat or runny nose.  CARDIOVASCULAR:  No pressure, squeezing, strangling, tightness, heaviness or aching about the chest, neck, axilla or epigastrium.  RESPIRATORY:  No cough, shortness of breath  GASTROINTESTINAL:  No nausea, vomiting or diarrhea.  GENITOURINARY:  No dysuria, frequency or urgency. + Rt flank pain  MUSCULOSKELETAL:  no joint aches, no muscle pain  SKIN:  No change in skin, hair or nails.  NEUROLOGIC:  No paresthesias, fasciculations  PSYCHIATRIC:  No disorder of thought or mood.  ENDOCRINE:  No heat or cold intolerance  HEMATOLOGICAL:  No easy bruising or bleeding.       Physical Exam:  Vital Signs Last 24 Hrs  T(C): 37.2 (25 Sep 2017 08:31), Max: 37.5 (25 Sep 2017 04:30)  T(F): 99 (25 Sep 2017 08:31), Max: 99.5 (25 Sep 2017 04:30)  HR: 53 (25 Sep 2017 08:31) (53 - 67)  BP: 100/51 (25 Sep 2017 08:31) (99/50 - 104/61)  RR: 20 (25 Sep 2017 08:31) (18 - 20)  SpO2: 96% (25 Sep 2017 08:31) (96% - 96%)      GEN: NAD, pleasant  HEENT: normocephalic and atraumatic. EOMI. PERRL.    NECK: Supple.   LUNGS: Clear to auscultation.  HEART: Regular rate and rhythm   ABDOMEN: Soft, nontender, and nondistended.  Positive bowel sounds.    : + Rt CVA tenderness  EXTREMITIES: Without any edema.  MSK: no joint swelling  NEUROLOGIC: Cranial nerves II through XII are grossly intact.  PSYCHIATRIC: Appropriate affect .  SKIN: No ulceration or induration present.      Labs:  09-26    141  |  106  |  35.0<H>  ----------------------------<  87  4.3   |  21.0<L>  |  3.06<H>    Ca    8.5<L>      26 Sep 2017 05:34  Phos  4.7     09-26  Mg     2.0     09-26                            9.2    8.5   )-----------( 292      ( 26 Sep 2017 05:34 )             28.3         RECENT CULTURES:  Culture - Blood (09.22.17 @ 19:06)    Specimen Source: .Blood Blood-Peripheral    Culture Results:   No growth at 48 hours      Culture - Blood (09.22.17 @ 17:07)    Specimen Source: .Blood Blood-Peripheral    Culture Results:   No growth at 48 hours      Culture - Acid Fast - CSF (09.18.17 @ 12:18)    Specimen Source: .CSF CSF    Acid Fast Bacilli Smear:   No acid fast bacilli seen by fluorochrome stain      Culture - Blood (09.18.17 @ 10:50)    Specimen Source: .Blood Blood-Peripheral    Culture Results:   No growth at 5 days.      Culture - Blood (09.18.17 @ 10:03)    Specimen Source: .Blood Blood-Peripheral    Culture Results:   No growth at 5 days.      Culture - Fungal, CSF (09.17.17 @ 14:52)    Specimen Source: .CSF CSF    Culture Results:   No growth to date  Culture in progress      Culture - CSF with Gram Stain . (09.17.17 @ 14:48)    Gram Stain:   No White blood cells  No organisms seen    Specimen Source: .CSF CSF    Culture Results:   No growth at 3 days.      Culture - Urine (09.16.17 @ 13:20)    Specimen Source: .Urine Clean Catch (Midstream)    Culture Results:   No growth      Culture - Urine (09.15.17 @ 19:43)    Specimen Source: .Urine Clean Catch (Midstream)    Culture Results:   No growth      Culture - Blood (09.15.17 @ 17:27)    Specimen Source: .Blood Blood-Peripheral    Culture Results:   No growth at 5 days.      Culture - Blood (09.15.17 @ 15:55)    Specimen Source: .Blood Blood-Peripheral    Culture Results:   No growth at 5 days.      Culture - Urine (09.13.17 @ 23:45)    -  Amikacin: S <=16    -  Ampicillin: R >16    -  Ampicillin/Sulbactam: S <=8/4    -  Aztreonam: S <=4    -  Cefazolin: S <=8    -  Cefepime: S <=4    -  Cefoxitin: S <=8    -  Ceftazidime: S <=1    -  Ceftriaxone: S <=1    -  Ciprofloxacin: S <=1    -  Ertapenem: S <=1    -  Gentamicin: S <=4    -  Imipenem: S <=1    -  Levofloxacin: S <=2    -  Meropenem: S <=1    -  Nitrofurantoin: S <=32    -  Piperacillin/Tazobactam: S <=16    -  Tobramycin: S <=4    -  Trimethoprim/Sulfamethoxazole: S <=2/38    Specimen Source: .Urine Clean Catch (Midstream)    Culture Results:   >100,000 CFU/ml Klebsiella pneumoniae    Organism Identification: Klebsiella pneumoniae    Organism: Klebsiella pneumoniae    Method Type: TITI        EXAM:  ABDOMEN (MRI) W O CON                        PROCEDURE DATE:  09/18/2017    INTERPRETATION:  Exam Type: ABDOMEN (MRI) W O CON   Exam Date and Time:  9/18/2017 8:11 PM   Indication:  Abdominal pain, polycystic kidney disease, fever   Comparison: CT 9/16/2017, ultrasound 9/13/2017, MRI 8/18/2013   Procedure: MRI of the abdomen without contrast was performed on a 1.5   Lidia magnet. Contrast was not administered secondary to renal   insufficiency.  Findings:  Innumerable cystic lesions throughout bilateral enlarged kidneys as seen   on multiple prior exams compatible with known polycystic kidney disease.   Multiple bilateral hemorrhagic renal cyst identified. Limited evaluation   of solid masses given lack of IV contrast however no definite findings to   suggest solid lesions. Minimal to mild infiltrative stranding is seen   surrounding the proximal right ureter as seen on CT of prior day. No   hydronephrosis bilaterally. No bilateral perinephric collections.   Aortocaval lymph node measuring 1.4 cm unchanged. Several left renal   calcifications as seen on CT.  Several left hepatic cysts are unchanged. No hepatic steatosis.   Subcentimeter peggy hepatis and portacaval lymph nodes likely reactive in   nature. Spleen is enlarged measuring 14 cm. Prior cholecystectomy. No   intrahepatic or extra hepatic biliary dilatation. Pancreas is of normal   signal intensity with tiny less than 2 mm pancreatic body and pancreatic   tail cyst. Pancreatic duct is of normal caliber.  Thickening of the   bilateral adrenal glands without focal mass.  Patchy opacities in the right lung base may represent atelectasis or   pneumonia, clinically correlate. Small umbilical fat-containing hernia.   Mild degenerative changes.  IMPRESSION:  Findings compatible with polycystic kidney disease as seen on prior   exams. No hydronephrosis bilaterally. Minimal to mild infiltrative   changes surrounding the proximal right ureter as seen on recent CT. No   perinephric collections.  Patchy opacities in the right lung base may represent atelectasis or   pneumonia, clinically correlate      EXAM:  NM MULTI DAY PROCEDURE                        EXAM:  NM INFLAMMATORY LOC WBC WB                        EXAM:  NM INFLAMMATORY LOC SPECT                        PROCEDURE DATE:  09/21/2017    INTERPRETATION:  RADIOPHARMACEUTICAL:  0.5 mCi indium labeled autologous   leukocytes, I.V. injected on 9/20/2017.  CLINICAL INFORMATION: 46-year-old female with history of polycystic   kidney disease, presents with recurrent fever, UTI, C. difficile   infection, and abdominal pain, to evaluate for infection.  TECHNIQUE:  Whole-body images were obtained in the anterior and posterior   projections approximately 24 hours after radiotracer administration.   SPECT of the abdomen was also performed.  COMPARISON: No prior labeled leukocyte study available.  FINDINGS: There is labeled leukocyte accumulation in the right mid   abdomen, in the region of the right kidney.  IMPRESSION:   Abnormal indium labeled leukocyte scan.  Findings suspicious for infection of the right kidney

## 2017-09-26 NOTE — PROGRESS NOTE ADULT - SUBJECTIVE AND OBJECTIVE BOX
Renal :    Follow up:  Pyelonephritis, infected  ADFPKD cysts, C Diff colitis , CKD -4, Renal Anemia,    Still with fever and Rt flank pain    Allergies:  Demerol HCl (Hives)  Phenergan (Hives; Rash)    Antibiotics:  vancomycin    Solution 250 milliGRAM(s) Oral every 6 hours  aztreonam  IVPB 2000 milliGRAM(s) IV Intermittent every 12 hours     REVIEW OF SYSTEMS:  CONSTITUTIONAL:  No Fever, + chills  HEENT:   No diplopia or blurred vision.  No earache, sore throat or runny nose.  CARDIOVASCULAR:  No pressure, squeezing, strangling, tightness, heaviness or aching about the chest, neck, axilla or epigastrium.  RESPIRATORY:  No cough, shortness of breath  GASTROINTESTINAL:  No nausea, vomiting or diarrhea.  GENITOURINARY:  No dysuria, frequency or urgency. + Rt flank pain  MUSCULOSKELETAL:  no joint aches, no muscle pain  SKIN:  No change in skin, hair or nails.  NEUROLOGIC:  No paresthesias, fasciculations  PSYCHIATRIC:  No disorder of thought or mood.  ENDOCRINE:  No heat or cold intolerance  HEMATOLOGICAL:  No easy bruising or bleeding.     Physical Exam:  Vital Signs Last 24 Hrs  T(C): 37.2 (25 Sep 2017 08:31), Max: 37.5 (25 Sep 2017 04:30)  T(F): 99 (25 Sep 2017 08:31), Max: 99.5 (25 Sep 2017 04:30)  HR: 53 (25 Sep 2017 08:31) (53 - 67)  BP: 100/51 (25 Sep 2017 08:31) (99/50 - 104/61)  RR: 20 (25 Sep 2017 08:31) (18 - 20)  SpO2: 96% (25 Sep 2017 08:31) (96% - 96%)    GEN: NAD, pleasant  HEENT: normocephalic and atraumatic. EOMI. PERRL.    NECK: Supple.   LUNGS: Clear to auscultation.  HEART: Regular rate and rhythm   ABDOMEN: Soft, nontender, and nondistended.  Positive bowel sounds.    : + Rt CVA tenderness  EXTREMITIES: Without any edema.  MSK: no joint swelling  NEUROLOGIC: Cranial nerves II through XII are grossly intact.  PSYCHIATRIC: Appropriate affect .  SKIN: No ulceration or induration present.    Labs:  09-25    140  |  103  |  31.0<H>  ----------------------------<  98  4.0   |  23.0  |  3.01<H>    Ca    8.7      25 Sep 2017 06:30  Phos  4.2     09-25  Mg     1.9     09-25                        9.2    9.1   )-----------( 291      ( 25 Sep 2017 06:30 )             28.7       RECENT CULTURES:  Culture - Blood (09.22.17 @ 19:06)    Specimen Source: .Blood Blood-Peripheral    Culture Results:   No growth at 48 hours      Culture - Blood (09.22.17 @ 17:07)    Specimen Source: .Blood Blood-Peripheral    Culture Results:   No growth at 48 hours      Culture - Acid Fast - CSF (09.18.17 @ 12:18)    Specimen Source: .CSF CSF    Acid Fast Bacilli Smear:   No acid fast bacilli seen by fluorochrome stain      Culture - Blood (09.18.17 @ 10:50)    Specimen Source: .Blood Blood-Peripheral    Culture Results:   No growth at 5 days.      Culture - Blood (09.18.17 @ 10:03)    Specimen Source: .Blood Blood-Peripheral    Culture Results:   No growth at 5 days.      Culture - Fungal, CSF (09.17.17 @ 14:52)    Specimen Source: .CSF CSF    Culture Results:   No growth to date  Culture in progress      Culture - CSF with Gram Stain . (09.17.17 @ 14:48)    Gram Stain:   No White blood cells  No organisms seen    Specimen Source: .CSF CSF    Culture Results:   No growth at 3 days.      Culture - Urine (09.16.17 @ 13:20)    Specimen Source: .Urine Clean Catch (Midstream)    Culture Results:   No growth      Culture - Urine (09.15.17 @ 19:43)    Specimen Source: .Urine Clean Catch (Midstream)    Culture Results:   No growth      Culture - Blood (09.15.17 @ 17:27)    Specimen Source: .Blood Blood-Peripheral    Culture Results:   No growth at 5 days.      Culture - Blood (09.15.17 @ 15:55)    Specimen Source: .Blood Blood-Peripheral    Culture Results:   No growth at 5 days.      Culture - Urine (09.13.17 @ 23:45)    -  Amikacin: S <=16    -  Ampicillin: R >16    -  Ampicillin/Sulbactam: S <=8/4    -  Aztreonam: S <=4    -  Cefazolin: S <=8    -  Cefepime: S <=4    -  Cefoxitin: S <=8    -  Ceftazidime: S <=1    -  Ceftriaxone: S <=1    -  Ciprofloxacin: S <=1    -  Ertapenem: S <=1    -  Gentamicin: S <=4    -  Imipenem: S <=1    -  Levofloxacin: S <=2    -  Meropenem: S <=1    -  Nitrofurantoin: S <=32    -  Piperacillin/Tazobactam: S <=16    -  Tobramycin: S <=4    -  Trimethoprim/Sulfamethoxazole: S <=2/38    Specimen Source: .Urine Clean Catch (Midstream)    Culture Results:   >100,000 CFU/ml Klebsiella pneumoniae    Organism Identification: Klebsiella pneumoniae    Organism: Klebsiella pneumoniae    Method Type: TITI        EXAM:  ABDOMEN (MRI) W O CON                          INTERPRETATION:  Exam Type: ABDOMEN (MRI) W O CON     Indication:  Abdominal pain, polycystic kidney disease, fever   Comparison: CT 9/16/2017, ultrasound 9/13/2017, MRI 8/18/2013   Procedure: MRI of the abdomen without contrast was performed on a 1.5   Lidia magnet. Contrast was not administered secondary to renal   insufficiency.  Findings:  Innumerable cystic lesions throughout bilateral enlarged kidneys as seen   on multiple prior exams compatible with known polycystic kidney disease.   Multiple bilateral hemorrhagic renal cyst identified. Limited evaluation   of solid masses given lack of IV contrast however no definite findings to   suggest solid lesions. Minimal to mild infiltrative stranding is seen   surrounding the proximal right ureter as seen on CT of prior day. No   hydronephrosis bilaterally. No bilateral perinephric collections.   Aortocaval lymph node measuring 1.4 cm unchanged. Several left renal   calcifications as seen on CT.  Several left hepatic cysts are unchanged. No hepatic steatosis.   Subcentimeter peggy hepatis and portacaval lymph nodes likely reactive in   nature. Spleen is enlarged measuring 14 cm. Prior cholecystectomy. No   intrahepatic or extra hepatic biliary dilatation. Pancreas is of normal   signal intensity with tiny less than 2 mm pancreatic body and pancreatic   tail cyst. Pancreatic duct is of normal caliber.  Thickening of the   bilateral adrenal glands without focal mass.  Patchy opacities in the right lung base may represent atelectasis or   pneumonia, clinically correlate. Small umbilical fat-containing hernia.   Mild degenerative changes.  IMPRESSION:  Findings compatible with polycystic kidney disease as seen on prior   exams. No hydronephrosis bilaterally. Minimal to mild infiltrative   changes surrounding the proximal right ureter as seen on recent CT. No   perinephric collections.  Patchy opacities in the right lung base may represent atelectasis or   pneumonia, clinically correlate    PROCEDURE DATE:  09/21/2017    INTERPRETATION:    RADIOPHARMACEUTICAL:    0.5 mCi indium labeled autologous  leukocytes, I.V. injected on 9/20/2017.    CLINICAL INFORMATION: 46-year-old female with history of polycystic   kidney disease, presents with recurrent fever, UTI, C. difficile   infection, and abdominal pain, to evaluate for infection.  TECHNIQUE:  Whole-body images were obtained in the anterior and posterior   projections approximately 24 hours after radiotracer administration.   SPECT of the abdomen was also performed.  COMPARISON: No prior labeled leukocyte study available.  FINDINGS: There is labeled leukocyte accumulation in the right mid   abdomen, in the region of the right kidney.  IMPRESSION:   Abnormal indium labeled leukocyte scan.  Findings suspicious for infection of the right kidney        Assessment :    45 y/o F with Pyelonephritis, infected PCKD cysts, C Diff colitis    Problem/Plan - 1:  ·  Problem: Klebsiella pneumoniae infection.  Plan: Patient has Flank pain and findings of  pyelonephritis vs Hemorrhagic cysts vs infected renal cysts    Urine cx with Klebsiella Pneumoniae sensitive to Aztreonam  Blood cultures no growth  2 weeks of antibiotics will be 9/27/17.     ( Discussed w. Dr. Pope @ [facility] )    Problem/Plan - 2:  ·  Problem: Pyelonephritis, acute.  Plan: Culture with Klebsiella pneumoniae sensitive to Aztreonam  Continue antibiotics as above.     Problem/Plan - 3:  ·  Problem: Clostridium difficile diarrhea.  Plan: Continue PO Vancomycin  No Diarrhea.     Problem/Plan - 4:  ·  Problem: Polycystic kidney disease.  Plan: Antibiotics adjusted to renal dosing.

## 2017-09-26 NOTE — PROGRESS NOTE ADULT - PROBLEM SELECTOR PLAN 8
ergocalciferol weekly
iv tylenol
replete po supplement ordered
ergocalciferol weekly

## 2017-09-26 NOTE — PROGRESS NOTE ADULT - PROBLEM SELECTOR PLAN 1
due to pyelonephritis vs Hemorrhagic cysts vs infected renal cysts  c/w treatement of pyelonephritis total 14 days likely, before considering hemorrhagic or infected cysts since those diagnosis will require more invasive and risky treatments and end up with patient needing HD.   Indium scan showed R kidney infection , discussed with ID to reevaluate antibiotics regimen  Urine cx with Klebsiella pansensitive to Aztreonam  Blood cultures no growth  Afebrile x 48 hrs  Continue Aztreonam per ID, 2 weeks of antibiotics will be 9/27/17.

## 2017-09-26 NOTE — PROGRESS NOTE ADULT - SUBJECTIVE AND OBJECTIVE BOX
CHIEF COMPLAINT/INTERVAL HISTORY:    Patient is a 46y old  Female who presents with a chief complaint of nausea, vomiting, diarrhea, fever and body aches (13 Sep 2017 22:54)      HPI:  45 y/o female smoker with h/o C. dif, polycystic kidney diease, s/p oral surgery 2 weeks ago followed by 1 week treatment with amoxicillin, started to have headache 2 days ago then fever, nausea, vomiting  and body aches yesterday. today, in the Er patient developed watery diarrhea. no blood in the stools. (13 Sep 2017 22:54)      SUBJECTIVE & OBJECTIVE: Pt seen and examined at bedside. No chest pain, palpitations, sob, light headedness/dizziness, difficulty breathing/cough, fevers/chills, abdominal pain, n/v, diarrhea/constipation, dysuria or increased urinary frequency.     ICU Vital Signs Last 24 Hrs  T(C): 36.8 (26 Sep 2017 08:05), Max: 37.3 (25 Sep 2017 16:30)  T(F): 98.2 (26 Sep 2017 08:05), Max: 99.1 (25 Sep 2017 16:30)  HR: 52 (26 Sep 2017 08:05) (52 - 62)  BP: 106/58 (26 Sep 2017 08:05) (97/61 - 112/65)  BP(mean): --  ABP: --  ABP(mean): --  RR: 16 (26 Sep 2017 08:05) (16 - 18)  SpO2: 96% (26 Sep 2017 04:32) (95% - 96%)        MEDICATIONS  (STANDING):  heparin  Injectable 5000 Unit(s) SubCutaneous every 12 hours  famotidine    Tablet 20 milliGRAM(s) Oral daily  vancomycin    Solution 250 milliGRAM(s) Oral every 6 hours  aztreonam  IVPB      lactobacillus acidophilus 1 Tablet(s) Oral two times a day with meals  aztreonam  IVPB 2000 milliGRAM(s) IV Intermittent every 12 hours  ergocalciferol 76080 Unit(s) Oral every week  calcitriol   Capsule 0.25 MICROGram(s) Oral daily  sodium chloride 0.9%. 1000 milliLiter(s) (85 mL/Hr) IV Continuous <Continuous>  epoetin tutu Injectable 42692 Unit(s) SubCutaneous <User Schedule>  iron sucrose IVPB 100 milliGRAM(s) IV Intermittent daily    MEDICATIONS  (PRN):  aluminum hydroxide/magnesium hydroxide/simethicone Suspension 30 milliLiter(s) Oral every 4 hours PRN Dyspepsia  acetaminophen   Tablet. 650 milliGRAM(s) Oral every 6 hours PRN Moderate Pain (4 - 6)  acetaminophen 325 mG/butalbital 50 mG/caffeine 40 mG 1 Tablet(s) Oral every 6 hours PRN Headache  acetaminophen   Tablet 650 milliGRAM(s) Oral every 6 hours PRN For Temp greater than 38 C (100.4 F)  ALPRAZolam 0.25 milliGRAM(s) Oral two times a day PRN anxiety  hydrOXYzine hydrochloride 25 milliGRAM(s) Oral every 6 hours PRN Itching  HYDROmorphone  Injectable 2 milliGRAM(s) IV Push every 4 hours PRN Severe Pain (7 - 10)  oxyCODONE    5 mG/acetaminophen 325 mG 2 Tablet(s) Oral every 4 hours PRN Moderate breakthrough Pain (4 - 6)      LABS:                        9.2    8.5   )-----------( 292      ( 26 Sep 2017 05:34 )             28.3     09-26    141  |  106  |  35.0<H>  ----------------------------<  87  4.3   |  21.0<L>  |  3.06<H>    Ca    8.5<L>      26 Sep 2017 05:34  Phos  4.7     09-26  Mg     2.0     09-26            CAPILLARY BLOOD GLUCOSE          RECENT CULTURES:      RADIOLOGY & ADDITIONAL TESTS:    PHYSICAL EXAM:  GENERAL: NAD, well-groomed, well-developed  NECK: supple  CHEST/LUNG: Clear to auscultation  bilaterally; No rales, rhonchi, wheezing, or rubs  HEART: Regular rate and rhythm; No murmurs, rubs, or gallops  ABDOMEN: Soft,  right flank tenderness, Nondistended; Bowel sounds present  EXTREMITIES:  2+ Peripheral Pulses, No clubbing, cyanosis, or edema  SKIN: No rashes or lesions

## 2017-09-26 NOTE — PROGRESS NOTE ADULT - PROBLEM SELECTOR PROBLEM 8
Vitamin D deficiency
Hypokalemia
New daily persistent headache
Vitamin D deficiency

## 2017-09-27 ENCOUNTER — TRANSCRIPTION ENCOUNTER (OUTPATIENT)
Age: 47
End: 2017-09-27

## 2017-09-27 VITALS
TEMPERATURE: 99 F | SYSTOLIC BLOOD PRESSURE: 122 MMHG | HEART RATE: 54 BPM | DIASTOLIC BLOOD PRESSURE: 66 MMHG | RESPIRATION RATE: 20 BRPM

## 2017-09-27 LAB
ANION GAP SERPL CALC-SCNC: 14 MMOL/L — SIGNIFICANT CHANGE UP (ref 5–17)
BASOPHILS # BLD AUTO: 0 K/UL — SIGNIFICANT CHANGE UP (ref 0–0.2)
BASOPHILS NFR BLD AUTO: 0.3 % — SIGNIFICANT CHANGE UP (ref 0–2)
BUN SERPL-MCNC: 35 MG/DL — HIGH (ref 8–20)
CALCIUM SERPL-MCNC: 8.7 MG/DL — SIGNIFICANT CHANGE UP (ref 8.6–10.2)
CHLORIDE SERPL-SCNC: 105 MMOL/L — SIGNIFICANT CHANGE UP (ref 98–107)
CO2 SERPL-SCNC: 20 MMOL/L — LOW (ref 22–29)
CREAT SERPL-MCNC: 2.87 MG/DL — HIGH (ref 0.5–1.3)
CULTURE RESULTS: SIGNIFICANT CHANGE UP
CULTURE RESULTS: SIGNIFICANT CHANGE UP
EOSINOPHIL # BLD AUTO: 0.2 K/UL — SIGNIFICANT CHANGE UP (ref 0–0.5)
EOSINOPHIL NFR BLD AUTO: 2.5 % — SIGNIFICANT CHANGE UP (ref 0–6)
GLUCOSE SERPL-MCNC: 94 MG/DL — SIGNIFICANT CHANGE UP (ref 70–115)
HCT VFR BLD CALC: 26.8 % — LOW (ref 37–47)
HGB BLD-MCNC: 8.8 G/DL — LOW (ref 12–16)
LYMPHOCYTES # BLD AUTO: 1.3 K/UL — SIGNIFICANT CHANGE UP (ref 1–4.8)
LYMPHOCYTES # BLD AUTO: 14.5 % — LOW (ref 20–55)
MAGNESIUM SERPL-MCNC: 1.7 MG/DL — SIGNIFICANT CHANGE UP (ref 1.6–2.6)
MCHC RBC-ENTMCNC: 31.8 PG — HIGH (ref 27–31)
MCHC RBC-ENTMCNC: 32.8 G/DL — SIGNIFICANT CHANGE UP (ref 32–36)
MCV RBC AUTO: 96.8 FL — SIGNIFICANT CHANGE UP (ref 81–99)
MONOCYTES # BLD AUTO: 0.5 K/UL — SIGNIFICANT CHANGE UP (ref 0–0.8)
MONOCYTES NFR BLD AUTO: 5.9 % — SIGNIFICANT CHANGE UP (ref 3–10)
NEUTROPHILS # BLD AUTO: 6.8 K/UL — SIGNIFICANT CHANGE UP (ref 1.8–8)
NEUTROPHILS NFR BLD AUTO: 76.5 % — HIGH (ref 37–73)
PHOSPHATE SERPL-MCNC: 4.5 MG/DL — SIGNIFICANT CHANGE UP (ref 2.4–4.7)
PLATELET # BLD AUTO: 296 K/UL — SIGNIFICANT CHANGE UP (ref 150–400)
POTASSIUM SERPL-MCNC: 4.2 MMOL/L — SIGNIFICANT CHANGE UP (ref 3.5–5.3)
POTASSIUM SERPL-SCNC: 4.2 MMOL/L — SIGNIFICANT CHANGE UP (ref 3.5–5.3)
RBC # BLD: 2.77 M/UL — LOW (ref 4.4–5.2)
RBC # FLD: 12.9 % — SIGNIFICANT CHANGE UP (ref 11–15.6)
SODIUM SERPL-SCNC: 139 MMOL/L — SIGNIFICANT CHANGE UP (ref 135–145)
SPECIMEN SOURCE: SIGNIFICANT CHANGE UP
SPECIMEN SOURCE: SIGNIFICANT CHANGE UP
WBC # BLD: 8.8 K/UL — SIGNIFICANT CHANGE UP (ref 4.8–10.8)
WBC # FLD AUTO: 8.8 K/UL — SIGNIFICANT CHANGE UP (ref 4.8–10.8)

## 2017-09-27 PROCEDURE — 96375 TX/PRO/DX INJ NEW DRUG ADDON: CPT

## 2017-09-27 PROCEDURE — 84466 ASSAY OF TRANSFERRIN: CPT

## 2017-09-27 PROCEDURE — 83735 ASSAY OF MAGNESIUM: CPT

## 2017-09-27 PROCEDURE — 86592 SYPHILIS TEST NON-TREP QUAL: CPT

## 2017-09-27 PROCEDURE — 87116 MYCOBACTERIA CULTURE: CPT

## 2017-09-27 PROCEDURE — 99233 SBSQ HOSP IP/OBS HIGH 50: CPT

## 2017-09-27 PROCEDURE — 93005 ELECTROCARDIOGRAM TRACING: CPT

## 2017-09-27 PROCEDURE — 99285 EMERGENCY DEPT VISIT HI MDM: CPT | Mod: 25

## 2017-09-27 PROCEDURE — 87186 SC STD MICRODIL/AGAR DIL: CPT

## 2017-09-27 PROCEDURE — A9570: CPT

## 2017-09-27 PROCEDURE — 82945 GLUCOSE OTHER FLUID: CPT

## 2017-09-27 PROCEDURE — 84145 PROCALCITONIN (PCT): CPT

## 2017-09-27 PROCEDURE — 82728 ASSAY OF FERRITIN: CPT

## 2017-09-27 PROCEDURE — 87102 FUNGUS ISOLATION CULTURE: CPT

## 2017-09-27 PROCEDURE — 86403 PARTICLE AGGLUT ANTBDY SCRN: CPT

## 2017-09-27 PROCEDURE — 74176 CT ABD & PELVIS W/O CONTRAST: CPT

## 2017-09-27 PROCEDURE — 82306 VITAMIN D 25 HYDROXY: CPT

## 2017-09-27 PROCEDURE — 81001 URINALYSIS AUTO W/SCOPE: CPT

## 2017-09-27 PROCEDURE — 80048 BASIC METABOLIC PNL TOTAL CA: CPT

## 2017-09-27 PROCEDURE — 96374 THER/PROPH/DIAG INJ IV PUSH: CPT

## 2017-09-27 PROCEDURE — 84157 ASSAY OF PROTEIN OTHER: CPT

## 2017-09-27 PROCEDURE — 87493 C DIFF AMPLIFIED PROBE: CPT

## 2017-09-27 PROCEDURE — 82310 ASSAY OF CALCIUM: CPT

## 2017-09-27 PROCEDURE — 87046 STOOL CULTR AEROBIC BACT EA: CPT

## 2017-09-27 PROCEDURE — 87086 URINE CULTURE/COLONY COUNT: CPT

## 2017-09-27 PROCEDURE — 80069 RENAL FUNCTION PANEL: CPT

## 2017-09-27 PROCEDURE — 84100 ASSAY OF PHOSPHORUS: CPT

## 2017-09-27 PROCEDURE — 81025 URINE PREGNANCY TEST: CPT

## 2017-09-27 PROCEDURE — 85027 COMPLETE CBC AUTOMATED: CPT

## 2017-09-27 PROCEDURE — 87070 CULTURE OTHR SPECIMN AEROBIC: CPT

## 2017-09-27 PROCEDURE — 85610 PROTHROMBIN TIME: CPT

## 2017-09-27 PROCEDURE — 87040 BLOOD CULTURE FOR BACTERIA: CPT

## 2017-09-27 PROCEDURE — 70544 MR ANGIOGRAPHY HEAD W/O DYE: CPT

## 2017-09-27 PROCEDURE — 76775 US EXAM ABDO BACK WALL LIM: CPT

## 2017-09-27 PROCEDURE — 89051 BODY FLUID CELL COUNT: CPT

## 2017-09-27 PROCEDURE — 87205 SMEAR GRAM STAIN: CPT

## 2017-09-27 PROCEDURE — 74181 MRI ABDOMEN W/O CONTRAST: CPT

## 2017-09-27 PROCEDURE — 93306 TTE W/DOPPLER COMPLETE: CPT

## 2017-09-27 PROCEDURE — 83605 ASSAY OF LACTIC ACID: CPT

## 2017-09-27 PROCEDURE — 78802 RP LOCLZJ TUM WHBDY 1 D IMG: CPT

## 2017-09-27 PROCEDURE — 77003 FLUOROGUIDE FOR SPINE INJECT: CPT

## 2017-09-27 PROCEDURE — 80053 COMPREHEN METABOLIC PANEL: CPT

## 2017-09-27 PROCEDURE — 87045 FECES CULTURE AEROBIC BACT: CPT

## 2017-09-27 PROCEDURE — 83970 ASSAY OF PARATHORMONE: CPT

## 2017-09-27 PROCEDURE — 78803 RP LOCLZJ TUM SPECT 1 AREA: CPT

## 2017-09-27 PROCEDURE — 83615 LACTATE (LD) (LDH) ENZYME: CPT

## 2017-09-27 PROCEDURE — 83550 IRON BINDING TEST: CPT

## 2017-09-27 PROCEDURE — 71045 X-RAY EXAM CHEST 1 VIEW: CPT

## 2017-09-27 PROCEDURE — 97163 PT EVAL HIGH COMPLEX 45 MIN: CPT

## 2017-09-27 PROCEDURE — 36415 COLL VENOUS BLD VENIPUNCTURE: CPT

## 2017-09-27 RX ORDER — CEFPODOXIME PROXETIL 100 MG
1 TABLET ORAL
Qty: 14 | Refills: 0 | OUTPATIENT
Start: 2017-09-27 | End: 2017-10-04

## 2017-09-27 RX ORDER — FERROUS SULFATE 325(65) MG
1 TABLET ORAL
Qty: 90 | Refills: 0 | OUTPATIENT
Start: 2017-09-27 | End: 2017-10-27

## 2017-09-27 RX ORDER — VANCOMYCIN HCL 1 G
1 VIAL (EA) INTRAVENOUS
Qty: 48 | Refills: 0 | OUTPATIENT
Start: 2017-09-27 | End: 2017-10-09

## 2017-09-27 RX ORDER — ERGOCALCIFEROL 1.25 MG/1
1 CAPSULE ORAL
Qty: 4 | Refills: 0 | OUTPATIENT
Start: 2017-09-27 | End: 2017-10-27

## 2017-09-27 RX ORDER — ACETAMINOPHEN 500 MG
2 TABLET ORAL
Qty: 0 | Refills: 0 | COMMUNITY
Start: 2017-09-27

## 2017-09-27 RX ORDER — CALCITRIOL 0.5 UG/1
1 CAPSULE ORAL
Qty: 30 | Refills: 0 | OUTPATIENT
Start: 2017-09-27 | End: 2017-10-27

## 2017-09-27 RX ORDER — LACTOBACILLUS ACIDOPHILUS 100MM CELL
1 CAPSULE ORAL
Qty: 24 | Refills: 0 | OUTPATIENT
Start: 2017-09-27 | End: 2017-10-09

## 2017-09-27 RX ORDER — FAMOTIDINE 10 MG/ML
1 INJECTION INTRAVENOUS
Qty: 30 | Refills: 0 | OUTPATIENT
Start: 2017-09-27 | End: 2017-10-27

## 2017-09-27 RX ORDER — FERROUS SULFATE 325(65) MG
1 TABLET ORAL
Qty: 0 | Refills: 0 | COMMUNITY
Start: 2017-09-27 | End: 2017-10-27

## 2017-09-27 RX ADMIN — HYDROMORPHONE HYDROCHLORIDE 2 MILLIGRAM(S): 2 INJECTION INTRAMUSCULAR; INTRAVENOUS; SUBCUTANEOUS at 07:42

## 2017-09-27 RX ADMIN — IRON SUCROSE 210 MILLIGRAM(S): 20 INJECTION, SOLUTION INTRAVENOUS at 11:26

## 2017-09-27 RX ADMIN — HYDROMORPHONE HYDROCHLORIDE 2 MILLIGRAM(S): 2 INJECTION INTRAMUSCULAR; INTRAVENOUS; SUBCUTANEOUS at 07:55

## 2017-09-27 RX ADMIN — SODIUM CHLORIDE 85 MILLILITER(S): 9 INJECTION INTRAMUSCULAR; INTRAVENOUS; SUBCUTANEOUS at 05:07

## 2017-09-27 RX ADMIN — OXYCODONE AND ACETAMINOPHEN 2 TABLET(S): 5; 325 TABLET ORAL at 11:25

## 2017-09-27 RX ADMIN — Medication 100 MILLIGRAM(S): at 05:07

## 2017-09-27 RX ADMIN — ERYTHROPOIETIN 10000 UNIT(S): 10000 INJECTION, SOLUTION INTRAVENOUS; SUBCUTANEOUS at 11:26

## 2017-09-27 RX ADMIN — Medication 250 MILLIGRAM(S): at 17:12

## 2017-09-27 RX ADMIN — OXYCODONE AND ACETAMINOPHEN 2 TABLET(S): 5; 325 TABLET ORAL at 10:46

## 2017-09-27 RX ADMIN — OXYCODONE AND ACETAMINOPHEN 2 TABLET(S): 5; 325 TABLET ORAL at 00:19

## 2017-09-27 RX ADMIN — Medication 250 MILLIGRAM(S): at 11:27

## 2017-09-27 RX ADMIN — Medication 100 MILLIGRAM(S): at 17:12

## 2017-09-27 RX ADMIN — Medication 1 TABLET(S): at 17:12

## 2017-09-27 RX ADMIN — OXYCODONE AND ACETAMINOPHEN 2 TABLET(S): 5; 325 TABLET ORAL at 04:44

## 2017-09-27 RX ADMIN — Medication 1 TABLET(S): at 07:43

## 2017-09-27 RX ADMIN — CALCITRIOL 0.25 MICROGRAM(S): 0.5 CAPSULE ORAL at 11:26

## 2017-09-27 RX ADMIN — Medication 250 MILLIGRAM(S): at 05:07

## 2017-09-27 RX ADMIN — OXYCODONE AND ACETAMINOPHEN 2 TABLET(S): 5; 325 TABLET ORAL at 17:13

## 2017-09-27 RX ADMIN — OXYCODONE AND ACETAMINOPHEN 2 TABLET(S): 5; 325 TABLET ORAL at 05:44

## 2017-09-27 RX ADMIN — FAMOTIDINE 20 MILLIGRAM(S): 10 INJECTION INTRAVENOUS at 11:26

## 2017-09-27 NOTE — DISCHARGE NOTE ADULT - SECONDARY DIAGNOSIS.
Pyelonephritis, acute Clostridium difficile diarrhea Acute renal failure superimposed on chronic kidney disease, unspecified CKD stage, unspecified acute renal failure type Brain aneurysm Polycystic kidney disease New daily persistent headache

## 2017-09-27 NOTE — DISCHARGE NOTE ADULT - MEDICATION SUMMARY - MEDICATIONS TO TAKE
I will START or STAY ON the medications listed below when I get home from the hospital:    oxyCODONE-acetaminophen 5 mg-325 mg oral tablet  -- 1 tab(s) by mouth every 6 hours, As Needed -Moderate breakthrough Pain (4 - 6) MDD:4 tabs/ day, do not exceed acetaminophen > 4 gms/day  -- Indication: For Pain    acetaminophen 325 mg oral tablet  -- 2 tab(s) by mouth every 6 hours, As needed, Moderate Pain (4 - 6)     do not exceed acetaminophen > 4 gms/day  -- Indication: For Pain    cefpodoxime 200 mg oral tablet  -- 1 tab(s) by mouth every 12 hours starting from 9/28/17  -- Finish all this medication unless otherwise directed by prescriber.  Take with food or milk.    -- Indication: For Pyelonephritis, acute    vancomycin 250 mg oral capsule  -- 1 cap(s) by mouth every 6 hours  -- Indication: For C.diff    famotidine 20 mg oral tablet  -- 1 tab(s) by mouth once a day  -- Indication: For gerd    ferrous sulfate 325 mg (65 mg elemental iron) oral tablet  -- 1 tab(s) by mouth 3 times a day   -- Check with your doctor before becoming pregnant.  Do not chew, break, or crush.  May discolor urine or feces.    -- Indication: For Anemia    lactobacillus acidophilus oral capsule  -- 1 tab(s) by mouth 2 times a day   -- Indication: For C.diff    calcitriol 0.25 mcg oral capsule  -- 1 cap(s) by mouth once a day  -- Indication: For Ckd    ergocalciferol 50,000 intl units (1.25 mg) oral capsule  -- 1 cap(s) by mouth once a week  -- Indication: For Ckd/anemia

## 2017-09-27 NOTE — PROGRESS NOTE ADULT - NSHPATTENDINGPLANDISCUSS_GEN_ALL_CORE
Dr Perrin and Dr Alan
Dr Alan
Dr Aceves
Dr Perrin
Dr Perrin
pt, rn, renal
pt, rn, renal
all specialist and the patient
pt, rn, renal
pt, rn, renal

## 2017-09-27 NOTE — PROGRESS NOTE ADULT - PROBLEM SELECTOR PROBLEM 3
Clostridium difficile diarrhea
Clostridium difficile diarrhea
Urinary tract infection without hematuria, site unspecified
Polycystic kidney disease
Urinary tract infection without hematuria, site unspecified
Acute renal failure superimposed on chronic kidney disease, unspecified CKD stage, unspecified acute renal failure type
Brain aneurysm
Clostridium difficile diarrhea
Polycystic kidney disease
Urinary tract infection without hematuria, site unspecified
Clostridium difficile diarrhea
Urinary tract infection without hematuria, site unspecified
Urinary tract infection without hematuria, site unspecified

## 2017-09-27 NOTE — DISCHARGE NOTE ADULT - CARE PROVIDERS DIRECT ADDRESSES
,[email or web address]

## 2017-09-27 NOTE — PROGRESS NOTE ADULT - PROBLEM SELECTOR PLAN 2
Continue antibiotics as above
continue iv fluid ;renal consult called    repeat labs ordered
continue po vancomycin   diarrhea resolved  D/c contact isolation, 2 formed BMs reported
Continue antibiotics as above
Culture with Klebsiella pneumoniae sensitive to Aztreonam  Continue antibiotics as above
Culture with Klebsiella pneumoniae sensitive to Aztreonam  Continue antibiotics as above
continue aztreonam , Id input appreciated
continue aztreonam , Id input appreciated  MRI of kidney r/o infected cyst
continue po vancomycin   diarrhea resolved  D/c contact isolation, 3 formed BMs reported
continue po vancomycin  duration per ID   diarrhea resolved
continue po vancomycin ,diarrhea better
continue po vancomycin ,diarrhea imroving   tolerating diet
continue po vancomycin ,diarrhea imroving   tolerating diet
continue po vancomycin day 12/14  diarrhea resolved
renal consult appreciated , cont iv hydration   monitor labs daily
Culture with Klebsiella pneumoniae sensitive to Aztreonam  Continue antibiotics as above
continue po vancomycin ,diarrhea resolved
continue po vancomycin day 11/14  diarrhea resolved  D/c contact isolation, 3 formed BMs reported

## 2017-09-27 NOTE — PROGRESS NOTE ADULT - ATTENDING COMMENTS
Will Sign off. Please call PRN.
adjust pain medication s for better control
Will follow
HA- c/w pain meds as ordered
HA- c/w pain meds as ordered    D/c in am if stable
emotional support given xanax ordered for anxiety

## 2017-09-27 NOTE — DISCHARGE NOTE ADULT - PATIENT PORTAL LINK FT
“You can access the [facility] Patient Portal, offered by [facility], by registering with the following website: [email or web address]”

## 2017-09-27 NOTE — PROGRESS NOTE ADULT - PROBLEM SELECTOR PROBLEM 2
Pyelonephritis, acute
Acute renal failure superimposed on chronic kidney disease, unspecified CKD stage, unspecified acute renal failure type
Clostridium difficile diarrhea
Clostridium difficile diarrhea
Renal insufficiency
Acute renal failure superimposed on chronic kidney disease, unspecified CKD stage, unspecified acute renal failure type
Clostridium difficile diarrhea
Pyelonephritis, acute
Urinary tract infection without hematuria, site unspecified
Urinary tract infection without hematuria, site unspecified
Pyelonephritis, acute
Clostridium difficile diarrhea
Clostridium difficile diarrhea

## 2017-09-27 NOTE — PROGRESS NOTE ADULT - PROBLEM SELECTOR PROBLEM 1
E coli infection
Diarrhea, unspecified type
Fever, unspecified fever cause
Acute renal failure superimposed on chronic kidney disease, unspecified CKD stage, unspecified acute renal failure type
Brain aneurysm
Clostridium difficile diarrhea
E coli infection
Fever, unspecified fever cause
Klebsiella pneumoniae infection
Fever, unspecified fever cause
Fever, unspecified fever cause

## 2017-09-27 NOTE — DISCHARGE NOTE ADULT - HOSPITAL COURSE
45 y/o female with PMH of PCKD, HTN , s/p oral surgery 2 weeks ago admitted for Pyelonephritis, infected PCKD cyst, C Diff colitis. MRI of brain performed showed brain aneurism seen by neurosurgery s/p LP by IR xanthocromia/ SAH ruled out , LP fluid culture no growth . Patient is with persistent flank pain and   recurrent fever ; repeat CT of abdomen ordered-mild colitis , MRI of kidney performed to r/o kidney abscess .Per DR Willett continue aztreonam, leukocystosis improved, diarrhea gradually resolved , on iv azreonam and po  vanco blood cx all negative, repaet urine cx negative. Due to recurrent fever indium scan done + Right kidney infection . She is with cr 3.66 now iv fluid reordered, ID follow up re antibiotics regimen need to change the treatment        Problem/Plan - 1:  ·  Problem: Fever, unspecified fever cause.  Plan: due to pyelonephritis vs Hemorrhagic cysts vs infected renal cysts  Indium scan showed R kidney infection , discussed with ID to reevaluate antibiotics regimen  Urine cx with Klebsiella pansensitive to Aztreonam  Blood cultures no growth  Afebrile x 72 hrs  Continue Aztreonam per ID, 2 weeks of antibiotics will be 9/27/17, after which d/c home on PO vantin x 1 week     Problem/Plan - 2:  ·  Problem: Clostridium difficile diarrhea.  Plan: continue po vancomycin day 13/14  diarrhea resolved.      Problem/Plan - 3:  ·  Problem: Urinary tract infection without hematuria, site unspecified.  Plan: as above.      Problem/Plan - 4:  ·  Problem: Sepsis, due to unspecified organism.  Plan: on admission due to pyelo and C diff colitis   as above.      Problem/Plan - 5:  ·  Problem: Brain aneurysm.  Plan: s/p LP : xantchromia/ SAH ruled out  In light of absence of SAH, no further work up as per neurosurgery  Headache/Migraine- brain bleed ruled out, improved HA since admission, cannot given triptans,  reglan IV, tylenol, percocet given with some improvement  SBP < 150mmHg  no NSx intervention indicated at this time  Recommend consulting with Cerebrovascular Neurosurgery [facility] for recommendations regarding acuity of CV work up.      Problem/Plan - 6:  Problem: Acute renal failure superimposed on chronic kidney disease, unspecified CKD stage, unspecified acute renal failure type. Plan: STEPHENIE on CKD due to pyelo &  PCKD  Plan for HD in future as per renal (as outpatient).     Problem/Plan - 7:  ·  Problem: Polycystic kidney disease.  Plan: renal follow up post discharge.      Problem/Plan - 8:  ·  Problem: Vitamin D deficiency.  Plan: ergocalciferol weekly.     Anemia due to CKD- IV venofer given, d/c home on PO iron      PHYSICAL EXAM:  GENERAL: NAD, well-groomed, well-developed  NECK: supple  CHEST/LUNG: Clear to auscultation  bilaterally; No rales, rhonchi, wheezing, or rubs  HEART: Regular rate and rhythm; No murmurs, rubs, or gallops  ABDOMEN: Soft,  right flank tenderness, Nondistended; Bowel sounds present  EXTREMITIES:  2+ Peripheral Pulses, No clubbing, cyanosis, or edema  SKIN: No rashes or lesions    VSS. Pt medically stable for d/c. Discussed with ID & Renal. Pt to be d/ranjan home after last dose of Azactam toay

## 2017-09-27 NOTE — PROGRESS NOTE ADULT - PROVIDER SPECIALTY LIST ADULT
Hospitalist
Infectious Disease
Nephrology
Neurosurgery
Nephrology
Neurosurgery
Infectious Disease
Hospitalist
Infectious Disease
Hospitalist

## 2017-09-27 NOTE — PROGRESS NOTE ADULT - PROBLEM SELECTOR PLAN 3
Continue PO Vancomycin  No Diarrhea
as above
outpatient follow up as needed with nephrology
Continue PO Vancomycin  No Diarrhea
Continue PO Vancomycin  No Diarrhea
Continue PO Vancomycin 5 days after discontinuation of antibiotics  No Diarrhea
as above
as above
continue aztreonam , Id follow up   MRI of kidney completed
continue aztreonam , Id input appreciated  MRI of kidney r/o infected cyst
continue aztreonam iv still febrile
neurosurgery consult apprecaited , for LP by IR   DR pulido is aware   CSF studies ordered
outpatient follow up as needed with nephrology
renal follow up appreciated , improving   cont iv hydration   monitor labs daily
with kidney infection on iv antibiotics , ID follow up closely
Continue PO Vancomycin till 10/9/17  No Diarrhea
continue aztreonam iv
as above

## 2017-09-27 NOTE — PROGRESS NOTE ADULT - PROBLEM SELECTOR PLAN 1
Patient has Flank pain and findings of  pyelonephritis vs Hemorrhagic cysts  D/W Dr Alan will treat for full course of pyelonephritis with IV aztreonam and complete 1 weeks of Vantin 200mg BID starting tomorrow  Continue Aztreonam, last dose today  Urine cx with Klebsiella Pneumoniae sensitive to Aztreonam  Blood cultures no growth  fevers resolved  2 weeks of antibiotics will be 9/27/17  Vantin till 10/4/17

## 2017-09-27 NOTE — DISCHARGE NOTE ADULT - CARE PLAN
Principal Discharge DX:	Klebsiella pneumoniae infection  Goal:	resolved  Instructions for follow-up, activity and diet:	sepsis due to Klebsiella bacteremia due to acute pyelonephritis. Complete your meds as directed. Follow up with PMD within 1 week of discharge. Follow up with  ID in 1 week & Renal Dr. Alan in 2-3 weeks  Secondary Diagnosis:	Pyelonephritis, acute  Instructions for follow-up, activity and diet:	Complete your meds as directed. Follow up with PMD within 1 week of discharge. Follow up with  ID in 1 week & Renal Dr. Alan in 2-3 weeks  Secondary Diagnosis:	Clostridium difficile diarrhea  Instructions for follow-up, activity and diet:	Complete your meds as directed. Follow up with PMD within 1 week of discharge. Follow up with  ID in 1 week  Secondary Diagnosis:	Acute renal failure superimposed on chronic kidney disease, unspecified CKD stage, unspecified acute renal failure type  Instructions for follow-up, activity and diet:	Complete your meds as directed. Follow up with PMD within 1 week of discharge. Follow up with Renal Dr. Alan in 2-3 weeks. Pls take your oral iron as directed.  Secondary Diagnosis:	Brain aneurysm  Instructions for follow-up, activity and diet:	Keep SBP < 150mmHg  no NSx intervention indicated at this time  Recommend consulting with Cerebrovascular Neurosurgery [facility] for recommendations regarding acuity of CV work up.   If worsened headache or new onset weakness, slurred speech, neurological deficits then go to the nearest ER immediately  Secondary Diagnosis:	Polycystic kidney disease  Instructions for follow-up, activity and diet:	Follow up with PMD within 1 week of discharge. Follow up with Renal Dr. Alan in 2-3 weeks. Pls take your oral iron as directed.  Secondary Diagnosis:	New daily persistent headache  Instructions for follow-up, activity and diet:	Use percocet as needed for a short period, then OTC tylenol. If worsened headache or new onset weakness, slurred speech, neurological deficits then go to the nearest ER immediately

## 2017-09-27 NOTE — PROGRESS NOTE ADULT - SUBJECTIVE AND OBJECTIVE BOX
[facility]  INFECTIOUS DISEASES AND INTERNAL MEDICINE OF [place]  =======================================================  Thomas Pope MD  Diplomates American Board of Internal Medicine and Infectious Diseases  =======================================================      ROLLY MEDEIROS [id number]    Follow up: Pyelonephritis, infected PCKD cyst, C Diff colitis     no fevers  still with flank pain    Allergies:  Demerol HCl (Hives)  Demerol HCl (Hives; Rash)  Phenergan (Hives; Rash)  Phenergan Fortis (Hives)      Antibiotics:  vancomycin    Solution 250 milliGRAM(s) Oral every 6 hours  aztreonam  IVPB 2000 milliGRAM(s) IV Intermittent every 12 hours       REVIEW OF SYSTEMS:  CONSTITUTIONAL:  No Fever, + chills  HEENT:   No diplopia or blurred vision.  No earache, sore throat or runny nose.  CARDIOVASCULAR:  No pressure, squeezing, strangling, tightness, heaviness or aching about the chest, neck, axilla or epigastrium.  RESPIRATORY:  No cough, shortness of breath  GASTROINTESTINAL:  No nausea, vomiting or diarrhea.  GENITOURINARY:  No dysuria, frequency or urgency. + Rt flank pain  MUSCULOSKELETAL:  no joint aches, no muscle pain  SKIN:  No change in skin, hair or nails.  NEUROLOGIC:  No paresthesias, fasciculations  PSYCHIATRIC:  No disorder of thought or mood.  ENDOCRINE:  No heat or cold intolerance  HEMATOLOGICAL:  No easy bruising or bleeding.       Physical Exam:  Vital Signs Last 24 Hrs  T(C): 37.1 (27 Sep 2017 10:26), Max: 37.4 (26 Sep 2017 16:14)  T(F): 98.7 (27 Sep 2017 10:26), Max: 99.4 (27 Sep 2017 05:09)  HR: 55 (27 Sep 2017 10:26) (55 - 61)  BP: 107/52 (27 Sep 2017 10:26) (106/60 - 120/60)  RR: 20 (27 Sep 2017 10:26) (18 - 20)  SpO2: 97% (27 Sep 2017 05:09) (96% - 97%)      GEN: NAD, pleasant  HEENT: normocephalic and atraumatic. EOMI. PERRL.    NECK: Supple.   LUNGS: Clear to auscultation.  HEART: Regular rate and rhythm   ABDOMEN: Soft, nontender, and nondistended.  Positive bowel sounds.    : + Rt CVA tenderness  EXTREMITIES: Without any edema.  MSK: no joint swelling  NEUROLOGIC: Cranial nerves II through XII are grossly intact.  PSYCHIATRIC: Appropriate affect .  SKIN: No ulceration or induration present.      Labs:  09-26    141  |  106  |  35.0<H>  ----------------------------<  87  4.3   |  21.0<L>  |  3.06<H>    Ca    8.5<L>      26 Sep 2017 05:34  Phos  4.7     09-26  Mg     2.0     09-26                 9.2    8.5   )-----------( 292      ( 26 Sep 2017 05:34 )             28.3         RECENT CULTURES:  Culture - Blood in AM (09.24.17 @ 05:38)    Specimen Source: .Blood Blood    Culture Results:   No growth at 48 hours      Culture - Blood (09.22.17 @ 19:06)    Specimen Source: .Blood Blood-Peripheral    Culture Results:   No growth at 48 hours      Culture - Blood (09.22.17 @ 17:07)    Specimen Source: .Blood Blood-Peripheral    Culture Results:   No growth at 48 hours      Culture - Acid Fast - CSF (09.18.17 @ 12:18)    Specimen Source: .CSF CSF    Acid Fast Bacilli Smear:   No acid fast bacilli seen by fluorochrome stain      Culture - Blood (09.18.17 @ 10:50)    Specimen Source: .Blood Blood-Peripheral    Culture Results:   No growth at 5 days.      Culture - Blood (09.18.17 @ 10:03)    Specimen Source: .Blood Blood-Peripheral    Culture Results:   No growth at 5 days.      Culture - Fungal, CSF (09.17.17 @ 14:52)    Specimen Source: .CSF CSF    Culture Results:   No fungus isolated at 1 week.  Culture in progress      Culture - CSF with Gram Stain . (09.17.17 @ 14:48)    Gram Stain:   No White blood cells  No organisms seen    Specimen Source: .CSF CSF    Culture Results:   No growth at 3 days.      Culture - Urine (09.16.17 @ 13:20)    Specimen Source: .Urine Clean Catch (Midstream)    Culture Results:   No growth      Culture - Urine (09.15.17 @ 19:43)    Specimen Source: .Urine Clean Catch (Midstream)    Culture Results:   No growth      Culture - Blood (09.15.17 @ 17:27)    Specimen Source: .Blood Blood-Peripheral    Culture Results:   No growth at 5 days.      Culture - Blood (09.15.17 @ 15:55)    Specimen Source: .Blood Blood-Peripheral    Culture Results:   No growth at 5 days.      Culture - Urine (09.13.17 @ 23:45)    -  Amikacin: S <=16    -  Ampicillin: R >16    -  Ampicillin/Sulbactam: S <=8/4    -  Aztreonam: S <=4    -  Cefazolin: S <=8    -  Cefepime: S <=4    -  Cefoxitin: S <=8    -  Ceftazidime: S <=1    -  Ceftriaxone: S <=1    -  Ciprofloxacin: S <=1    -  Ertapenem: S <=1    -  Gentamicin: S <=4    -  Imipenem: S <=1    -  Levofloxacin: S <=2    -  Meropenem: S <=1    -  Nitrofurantoin: S <=32    -  Piperacillin/Tazobactam: S <=16    -  Tobramycin: S <=4    -  Trimethoprim/Sulfamethoxazole: S <=2/38    Specimen Source: .Urine Clean Catch (Midstream)    Culture Results:   >100,000 CFU/ml Klebsiella pneumoniae    Organism Identification: Klebsiella pneumoniae    Organism: Klebsiella pneumoniae    Method Type: TITI        EXAM:  ABDOMEN (MRI) W O CON                        PROCEDURE DATE:  09/18/2017    INTERPRETATION:  Exam Type: ABDOMEN (MRI) W O CON   Exam Date and Time:  9/18/2017 8:11 PM   Indication:  Abdominal pain, polycystic kidney disease, fever   Comparison: CT 9/16/2017, ultrasound 9/13/2017, MRI 8/18/2013   Procedure: MRI of the abdomen without contrast was performed on a 1.5   Lidia magnet. Contrast was not administered secondary to renal   insufficiency.  Findings:  Innumerable cystic lesions throughout bilateral enlarged kidneys as seen   on multiple prior exams compatible with known polycystic kidney disease.   Multiple bilateral hemorrhagic renal cyst identified. Limited evaluation   of solid masses given lack of IV contrast however no definite findings to   suggest solid lesions. Minimal to mild infiltrative stranding is seen   surrounding the proximal right ureter as seen on CT of prior day. No   hydronephrosis bilaterally. No bilateral perinephric collections.   Aortocaval lymph node measuring 1.4 cm unchanged. Several left renal   calcifications as seen on CT.  Several left hepatic cysts are unchanged. No hepatic steatosis.   Subcentimeter peggy hepatis and portacaval lymph nodes likely reactive in   nature. Spleen is enlarged measuring 14 cm. Prior cholecystectomy. No   intrahepatic or extra hepatic biliary dilatation. Pancreas is of normal   signal intensity with tiny less than 2 mm pancreatic body and pancreatic   tail cyst. Pancreatic duct is of normal caliber.  Thickening of the   bilateral adrenal glands without focal mass.  Patchy opacities in the right lung base may represent atelectasis or   pneumonia, clinically correlate. Small umbilical fat-containing hernia.   Mild degenerative changes.  IMPRESSION:  Findings compatible with polycystic kidney disease as seen on prior   exams. No hydronephrosis bilaterally. Minimal to mild infiltrative   changes surrounding the proximal right ureter as seen on recent CT. No   perinephric collections.  Patchy opacities in the right lung base may represent atelectasis or   pneumonia, clinically correlate      EXAM:  NM MULTI DAY PROCEDURE                        EXAM:  NM INFLAMMATORY LOC WBC WB                        EXAM:  NM INFLAMMATORY LOC SPECT                        PROCEDURE DATE:  09/21/2017    INTERPRETATION:  RADIOPHARMACEUTICAL:  0.5 mCi indium labeled autologous   leukocytes, I.V. injected on 9/20/2017.  CLINICAL INFORMATION: 46-year-old female with history of polycystic   kidney disease, presents with recurrent fever, UTI, C. difficile   infection, and abdominal pain, to evaluate for infection.  TECHNIQUE:  Whole-body images were obtained in the anterior and posterior   projections approximately 24 hours after radiotracer administration.   SPECT of the abdomen was also performed.  COMPARISON: No prior labeled leukocyte study available.  FINDINGS: There is labeled leukocyte accumulation in the right mid   abdomen, in the region of the right kidney.  IMPRESSION:   Abnormal indium labeled leukocyte scan.  Findings suspicious for infection of the right kidney

## 2017-09-27 NOTE — DISCHARGE NOTE ADULT - PLAN OF CARE
resolved sepsis due to Klebsiella bacteremia due to acute pyelonephritis. Complete your meds as directed. Follow up with PMD within 1 week of discharge. Follow up with  ID in 1 week & Renal Dr. Alan in 2-3 weeks Complete your meds as directed. Follow up with PMD within 1 week of discharge. Follow up with  ID in 1 week & Renal Dr. Alan in 2-3 weeks Complete your meds as directed. Follow up with PMD within 1 week of discharge. Follow up with  ID in 1 week Complete your meds as directed. Follow up with PMD within 1 week of discharge. Follow up with Renal Dr. Alan in 2-3 weeks. Pls take your oral iron as directed. Keep SBP < 150mmHg  no NSx intervention indicated at this time  Recommend consulting with Cerebrovascular Neurosurgery NS[facility] for recommendations regarding acuity of CV work up.   If worsened headache or new onset weakness, slurred speech, neurological deficits then go to the nearest ER immediately Follow up with PMD within 1 week of discharge. Follow up with Renal Dr. Alan in 2-3 weeks. Pls take your oral iron as directed. Use percocet as needed for a short period, then OTC tylenol. If worsened headache or new onset weakness, slurred speech, neurological deficits then go to the nearest ER immediately

## 2017-09-27 NOTE — PROGRESS NOTE ADULT - ASSESSMENT
cleared for discharge,    OP F.U    CKD -5,    Plans : Prepare for RRT,    Anemia management,    Cerebral Aneurysm F.U

## 2017-09-27 NOTE — DISCHARGE NOTE ADULT - CARE PROVIDER_API CALL
Idalmis Alan), Internal Medicine; Nephrology  [address] [place] [address]  Phone: [phone]  Fax: [phone]    Elli Serrato), Neurosurgery  [address] [address]  Phone: [phone]  Fax: [phone]    Omayra Pope), Infectious Disease; Internal Medicine  [address] [place] [id number]  Phone: [phone]  Fax: [phone]

## 2017-09-29 LAB
CULTURE RESULTS: SIGNIFICANT CHANGE UP
SPECIMEN SOURCE: SIGNIFICANT CHANGE UP

## 2017-10-04 ENCOUNTER — APPOINTMENT (OUTPATIENT)
Dept: FAMILY MEDICINE | Facility: CLINIC | Age: 47
End: 2017-10-04
Payer: COMMERCIAL

## 2017-10-04 VITALS
OXYGEN SATURATION: 98 % | HEIGHT: 68 IN | BODY MASS INDEX: 30.62 KG/M2 | DIASTOLIC BLOOD PRESSURE: 77 MMHG | TEMPERATURE: 98.3 F | SYSTOLIC BLOOD PRESSURE: 134 MMHG | WEIGHT: 202 LBS | HEART RATE: 92 BPM

## 2017-10-04 PROCEDURE — 36415 COLL VENOUS BLD VENIPUNCTURE: CPT

## 2017-10-04 PROCEDURE — 99496 TRANSJ CARE MGMT HIGH F2F 7D: CPT | Mod: 25

## 2017-10-05 ENCOUNTER — APPOINTMENT (OUTPATIENT)
Dept: NEPHROLOGY | Facility: CLINIC | Age: 47
End: 2017-10-05
Payer: COMMERCIAL

## 2017-10-05 VITALS
OXYGEN SATURATION: 98 % | HEART RATE: 92 BPM | BODY MASS INDEX: 30.62 KG/M2 | HEIGHT: 68 IN | SYSTOLIC BLOOD PRESSURE: 132 MMHG | WEIGHT: 202 LBS | DIASTOLIC BLOOD PRESSURE: 76 MMHG

## 2017-10-05 PROCEDURE — 99245 OFF/OP CONSLTJ NEW/EST HI 55: CPT

## 2017-10-06 ENCOUNTER — APPOINTMENT (OUTPATIENT)
Dept: INTERNAL MEDICINE | Facility: CLINIC | Age: 47
End: 2017-10-06
Payer: COMMERCIAL

## 2017-10-06 LAB
25(OH)D3 SERPL-MCNC: 22.5 NG/ML
ALBUMIN SERPL ELPH-MCNC: 4.2 G/DL
ALP BLD-CCNC: 82 U/L
ALT SERPL-CCNC: 22 U/L
ANION GAP SERPL CALC-SCNC: 22 MMOL/L
APPEARANCE: CLEAR
AST SERPL-CCNC: 16 U/L
BACTERIA: NEGATIVE
BASOPHILS # BLD AUTO: 0.07 K/UL
BASOPHILS NFR BLD AUTO: 0.4 %
BILIRUB SERPL-MCNC: 0.2 MG/DL
BILIRUBIN URINE: NEGATIVE
BLOOD URINE: NEGATIVE
BUN SERPL-MCNC: 46 MG/DL
CALCIUM SERPL-MCNC: 10 MG/DL
CHLORIDE SERPL-SCNC: 101 MMOL/L
CHOLEST SERPL-MCNC: 154 MG/DL
CHOLEST/HDLC SERPL: 8.6 RATIO
CO2 SERPL-SCNC: 18 MMOL/L
COLOR: YELLOW
CREAT SERPL-MCNC: 3.45 MG/DL
CREAT SPEC-SCNC: 74 MG/DL
CREAT SPEC-SCNC: 97 MG/DL
CREAT/PROT UR: 0.6 RATIO
EOSINOPHIL # BLD AUTO: 0.18 K/UL
EOSINOPHIL NFR BLD AUTO: 1.1 %
FERRITIN SERPL-MCNC: 494 NG/ML
FOLATE SERPL-MCNC: 3.7 NG/ML
GLUCOSE QUALITATIVE U: NEGATIVE MG/DL
GLUCOSE SERPL-MCNC: 95 MG/DL
HCT VFR BLD CALC: 39.9 %
HDLC SERPL-MCNC: 18 MG/DL
HGB BLD-MCNC: 13.6 G/DL
HYALINE CASTS: 0 /LPF
IMM GRANULOCYTES NFR BLD AUTO: 0.4 %
IRON SATN MFR SERPL: 14 %
IRON SERPL-MCNC: 39 UG/DL
KETONES URINE: NEGATIVE
LDLC SERPL CALC-MCNC: 61 MG/DL
LEUKOCYTE ESTERASE URINE: NEGATIVE
LYMPHOCYTES # BLD AUTO: 2.37 K/UL
LYMPHOCYTES NFR BLD AUTO: 14.4 %
MAGNESIUM SERPL-MCNC: 2.2 MG/DL
MAN DIFF?: NORMAL
MCHC RBC-ENTMCNC: 33.6 PG
MCHC RBC-ENTMCNC: 34.1 GM/DL
MCV RBC AUTO: 98.5 FL
MICROALBUMIN 24H UR DL<=1MG/L-MCNC: 13.7 MG/DL
MICROALBUMIN/CREAT 24H UR-RTO: 141 MG/G
MICROSCOPIC-UA: NORMAL
MONOCYTES # BLD AUTO: 0.97 K/UL
MONOCYTES NFR BLD AUTO: 5.9 %
NEUTROPHILS # BLD AUTO: 12.76 K/UL
NEUTROPHILS NFR BLD AUTO: 77.8 %
NITRITE URINE: NEGATIVE
PH URINE: 5.5
PLATELET # BLD AUTO: 472 K/UL
POTASSIUM SERPL-SCNC: 3.7 MMOL/L
PROT SERPL-MCNC: 9.2 G/DL
PROT UR-MCNC: 41 MG/DL
PROTEIN URINE: 30 MG/DL
RBC # BLD: 4.05 M/UL
RBC # FLD: 14 %
RED BLOOD CELLS URINE: 4 /HPF
SODIUM SERPL-SCNC: 141 MMOL/L
SPECIFIC GRAVITY URINE: 1.01
SQUAMOUS EPITHELIAL CELLS: 4 /HPF
T3FREE SERPL-MCNC: 2.36 PG/ML
TIBC SERPL-MCNC: 271 UG/DL
TRIGL SERPL-MCNC: 377 MG/DL
TSH SERPL-ACNC: 1.94 UIU/ML
UIBC SERPL-MCNC: 232 UG/DL
UROBILINOGEN URINE: NEGATIVE MG/DL
VIT B12 SERPL-MCNC: 655 PG/ML
WBC # FLD AUTO: 16.41 K/UL
WHITE BLOOD CELLS URINE: 3 /HPF

## 2017-10-06 PROCEDURE — 99214 OFFICE O/P EST MOD 30 MIN: CPT

## 2017-10-09 LAB
CULTURE RESULTS: SIGNIFICANT CHANGE UP
SPECIMEN SOURCE: SIGNIFICANT CHANGE UP

## 2017-10-12 ENCOUNTER — APPOINTMENT (OUTPATIENT)
Dept: NEUROSURGERY | Facility: CLINIC | Age: 47
End: 2017-10-12
Payer: COMMERCIAL

## 2017-10-12 VITALS
HEIGHT: 68 IN | BODY MASS INDEX: 30.31 KG/M2 | SYSTOLIC BLOOD PRESSURE: 121 MMHG | DIASTOLIC BLOOD PRESSURE: 79 MMHG | HEART RATE: 74 BPM | TEMPERATURE: 98.6 F | OXYGEN SATURATION: 98 % | WEIGHT: 200 LBS

## 2017-10-12 PROCEDURE — 99205 OFFICE O/P NEW HI 60 MIN: CPT

## 2017-10-12 RX ORDER — VANCOMYCIN HYDROCHLORIDE 250 MG/1
250 CAPSULE ORAL 4 TIMES DAILY
Refills: 0 | Status: DISCONTINUED | COMMUNITY
Start: 2017-10-04 | End: 2017-10-12

## 2017-10-12 RX ORDER — CHLORHEXIDINE GLUCONATE 4 %
325 (65 FE) LIQUID (ML) TOPICAL 3 TIMES DAILY
Refills: 0 | Status: DISCONTINUED | COMMUNITY
Start: 2017-10-04 | End: 2017-10-12

## 2017-10-12 RX ORDER — ACETAMINOPHEN 160 MG
TABLET,DISINTEGRATING ORAL
Qty: 90 | Refills: 0 | Status: DISCONTINUED | COMMUNITY
Start: 2017-10-04 | End: 2017-10-12

## 2017-10-12 RX ORDER — CEFPODOXIME PROXETIL 200 MG/1
200 TABLET, FILM COATED ORAL
Refills: 0 | Status: DISCONTINUED | COMMUNITY
Start: 2017-10-04 | End: 2017-10-12

## 2017-11-01 ENCOUNTER — APPOINTMENT (OUTPATIENT)
Dept: INTERNAL MEDICINE | Facility: CLINIC | Age: 47
End: 2017-11-01
Payer: COMMERCIAL

## 2017-11-01 PROCEDURE — 99213 OFFICE O/P EST LOW 20 MIN: CPT

## 2017-11-07 ENCOUNTER — APPOINTMENT (OUTPATIENT)
Dept: VASCULAR SURGERY | Facility: CLINIC | Age: 47
End: 2017-11-07
Payer: COMMERCIAL

## 2017-11-07 VITALS
BODY MASS INDEX: 30.31 KG/M2 | SYSTOLIC BLOOD PRESSURE: 149 MMHG | DIASTOLIC BLOOD PRESSURE: 94 MMHG | HEIGHT: 68 IN | WEIGHT: 200 LBS | HEART RATE: 78 BPM | TEMPERATURE: 98 F

## 2017-11-07 PROCEDURE — 99204 OFFICE O/P NEW MOD 45 MIN: CPT

## 2017-11-07 PROCEDURE — G0365: CPT

## 2017-11-08 ENCOUNTER — APPOINTMENT (OUTPATIENT)
Dept: TRANSPLANT | Facility: CLINIC | Age: 47
End: 2017-11-08
Payer: COMMERCIAL

## 2017-11-08 ENCOUNTER — LABORATORY RESULT (OUTPATIENT)
Age: 47
End: 2017-11-08

## 2017-11-08 ENCOUNTER — APPOINTMENT (OUTPATIENT)
Dept: NEPHROLOGY | Facility: CLINIC | Age: 47
End: 2017-11-08
Payer: COMMERCIAL

## 2017-11-08 VITALS
TEMPERATURE: 97.5 F | WEIGHT: 208 LBS | SYSTOLIC BLOOD PRESSURE: 147 MMHG | OXYGEN SATURATION: 99 % | BODY MASS INDEX: 31.52 KG/M2 | DIASTOLIC BLOOD PRESSURE: 79 MMHG | RESPIRATION RATE: 14 BRPM | HEIGHT: 68 IN | HEART RATE: 80 BPM

## 2017-11-08 LAB
CULTURE RESULTS: SIGNIFICANT CHANGE UP
SPECIMEN SOURCE: SIGNIFICANT CHANGE UP

## 2017-11-08 PROCEDURE — 99205 OFFICE O/P NEW HI 60 MIN: CPT

## 2017-11-08 PROCEDURE — 99204 OFFICE O/P NEW MOD 45 MIN: CPT

## 2017-11-09 ENCOUNTER — APPOINTMENT (OUTPATIENT)
Dept: NEPHROLOGY | Facility: CLINIC | Age: 47
End: 2017-11-09
Payer: COMMERCIAL

## 2017-11-09 ENCOUNTER — RESULT REVIEW (OUTPATIENT)
Age: 47
End: 2017-11-09

## 2017-11-09 VITALS
BODY MASS INDEX: 31.52 KG/M2 | DIASTOLIC BLOOD PRESSURE: 70 MMHG | HEIGHT: 68 IN | SYSTOLIC BLOOD PRESSURE: 148 MMHG | WEIGHT: 208 LBS

## 2017-11-09 PROCEDURE — 99215 OFFICE O/P EST HI 40 MIN: CPT

## 2017-11-10 ENCOUNTER — OUTPATIENT (OUTPATIENT)
Dept: OUTPATIENT SERVICES | Facility: HOSPITAL | Age: 47
LOS: 1 days | End: 2017-11-10
Payer: COMMERCIAL

## 2017-11-10 ENCOUNTER — APPOINTMENT (OUTPATIENT)
Dept: MAMMOGRAPHY | Facility: CLINIC | Age: 47
End: 2017-11-10
Payer: COMMERCIAL

## 2017-11-10 DIAGNOSIS — N20.0 CALCULUS OF KIDNEY: Chronic | ICD-10-CM

## 2017-11-10 DIAGNOSIS — Z90.49 ACQUIRED ABSENCE OF OTHER SPECIFIED PARTS OF DIGESTIVE TRACT: Chronic | ICD-10-CM

## 2017-11-10 DIAGNOSIS — Z98.51 TUBAL LIGATION STATUS: Chronic | ICD-10-CM

## 2017-11-10 DIAGNOSIS — Z00.8 ENCOUNTER FOR OTHER GENERAL EXAMINATION: ICD-10-CM

## 2017-11-10 LAB
APPEARANCE: CLEAR
BACTERIA: NEGATIVE
BILIRUBIN URINE: NEGATIVE
BLOOD URINE: ABNORMAL
COLOR: YELLOW
GLUCOSE QUALITATIVE U: NEGATIVE MG/DL
HYALINE CASTS: 1 /LPF
KETONES URINE: NEGATIVE
LEUKOCYTE ESTERASE URINE: NEGATIVE
MICROSCOPIC-UA: NORMAL
NITRITE URINE: NEGATIVE
PH URINE: 5.5
PROTEIN URINE: 30 MG/DL
RED BLOOD CELLS URINE: 4 /HPF
SPECIFIC GRAVITY URINE: 1.01
SQUAMOUS EPITHELIAL CELLS: 2 /HPF
UROBILINOGEN URINE: NEGATIVE MG/DL
WHITE BLOOD CELLS URINE: 2 /HPF

## 2017-11-10 PROCEDURE — G0202: CPT | Mod: 26

## 2017-11-10 PROCEDURE — 77063 BREAST TOMOSYNTHESIS BI: CPT | Mod: 26

## 2017-11-10 PROCEDURE — 77067 SCR MAMMO BI INCL CAD: CPT

## 2017-11-10 PROCEDURE — 77063 BREAST TOMOSYNTHESIS BI: CPT

## 2017-11-11 LAB — BACTERIA UR CULT: NORMAL

## 2017-11-13 LAB
ABO + RH PNL BLD: NORMAL
ADJUSTED MITOGEN: >10 IU/ML
ADJUSTED TB AG: -0.01 IU/ML
ALBUMIN SERPL ELPH-MCNC: 4.1 G/DL
ALP BLD-CCNC: 72 U/L
ALT SERPL-CCNC: 24 U/L
ANION GAP SERPL CALC-SCNC: 20 MMOL/L
APPEARANCE: ABNORMAL
AST SERPL-CCNC: 18 U/L
BACTERIA: ABNORMAL
BASOPHILS # BLD AUTO: 0.04 K/UL
BASOPHILS NFR BLD AUTO: 0.4 %
BILIRUB SERPL-MCNC: 0.6 MG/DL
BILIRUBIN URINE: NEGATIVE
BLOOD URINE: ABNORMAL
BUN SERPL-MCNC: 28 MG/DL
CALCIUM SERPL-MCNC: 9.9 MG/DL
CHLORIDE SERPL-SCNC: 102 MMOL/L
CMV IGG SERPL QL: <0.2 U/ML
CMV IGG SERPL-IMP: NEGATIVE
CO2 SERPL-SCNC: 21 MMOL/L
COLOR: YELLOW
CREAT SERPL-MCNC: 2.82 MG/DL
CREAT SPEC-SCNC: 83 MG/DL
CREAT/PROT UR: 0.4 RATIO
EBV DNA SERPL NAA+PROBE-ACNC: NOT DETECTED IU/ML
EBV EA AB SER IA-ACNC: <5 U/ML
EBV EA AB TITR SER IF: POSITIVE
EBV EA IGG SER QL IA: >600 U/ML
EBV EA IGG SER-ACNC: NEGATIVE
EBV EA IGM SER IA-ACNC: NEGATIVE
EBV PATRN SPEC IB-IMP: NORMAL
EBV VCA IGG SER IA-ACNC: 182 U/ML
EBV VCA IGM SER QL IA: <10 U/ML
EBVPCR LOG: NOT DETECTED LOGIU/ML
EOSINOPHIL # BLD AUTO: 0.3 K/UL
EOSINOPHIL NFR BLD AUTO: 2.7 %
EPSTEIN-BARR VIRUS CAPSID ANTIGEN IGG: POSITIVE
GLUCOSE QUALITATIVE U: NEGATIVE MG/DL
GLUCOSE SERPL-MCNC: 94 MG/DL
HAV IGG+IGM SER QL: NONREACTIVE
HBA1C MFR BLD HPLC: 5.2 %
HBV CORE IGG+IGM SER QL: NONREACTIVE
HBV SURFACE AB SER QL: REACTIVE
HBV SURFACE AG SER QL: NONREACTIVE
HCG SERPL-MCNC: <1 MIU/ML
HCT VFR BLD CALC: 44.2 %
HCV AB SER QL: NONREACTIVE
HCV S/CO RATIO: 0.15 S/CO
HGB BLD-MCNC: 14.3 G/DL
HIV1+2 AB SPEC QL IA.RAPID: NONREACTIVE
HSV 1+2 IGG SER IA-IMP: NEGATIVE
HSV 1+2 IGG SER IA-IMP: POSITIVE
HSV1 IGG SER QL: 42.7 INDEX
HSV2 IGG SER QL: 0.13 INDEX
HYALINE CASTS: 2 /LPF
IMM GRANULOCYTES NFR BLD AUTO: 0.2 %
KETONES URINE: NEGATIVE
LEUKOCYTE ESTERASE URINE: NEGATIVE
LYMPHOCYTES # BLD AUTO: 2.61 K/UL
LYMPHOCYTES NFR BLD AUTO: 23.9 %
M TB IFN-G BLD-IMP: NEGATIVE
MAGNESIUM SERPL-MCNC: 2.1 MG/DL
MAN DIFF?: NORMAL
MCHC RBC-ENTMCNC: 32.4 GM/DL
MCHC RBC-ENTMCNC: 32.8 PG
MCV RBC AUTO: 101.4 FL
MICROSCOPIC-UA: NORMAL
MONOCYTES # BLD AUTO: 0.53 K/UL
MONOCYTES NFR BLD AUTO: 4.9 %
NEUTROPHILS # BLD AUTO: 7.41 K/UL
NEUTROPHILS NFR BLD AUTO: 67.9 %
NITRITE URINE: NEGATIVE
PH URINE: 5.5
PHOSPHATE SERPL-MCNC: 3.9 MG/DL
PLATELET # BLD AUTO: 261 K/UL
POTASSIUM SERPL-SCNC: 3.6 MMOL/L
PROT SERPL-MCNC: 7.7 G/DL
PROT UR-MCNC: 30 MG/DL
PROTEIN URINE: 30 MG/DL
QUANTIFERON GOLD NIL: 0.04 IU/ML
RBC # BLD: 4.36 M/UL
RBC # FLD: 15 %
RED BLOOD CELLS URINE: 7 /HPF
RUBV IGG FLD-ACNC: 11.7 INDEX
RUBV IGG SER-IMP: POSITIVE
SODIUM SERPL-SCNC: 143 MMOL/L
SPECIFIC GRAVITY URINE: 1.01
SQUAMOUS EPITHELIAL CELLS: 16 /HPF
T GONDII AB SER-IMP: NEGATIVE
T GONDII IGG SER QL: <3 IU/ML
URATE SERPL-MCNC: 8 MG/DL
UROBILINOGEN URINE: NEGATIVE MG/DL
VZV AB TITR SER: POSITIVE
VZV IGG SER IF-ACNC: 3621 INDEX
WBC # FLD AUTO: 10.91 K/UL
WHITE BLOOD CELLS URINE: 4 /HPF

## 2017-11-15 ENCOUNTER — APPOINTMENT (OUTPATIENT)
Dept: FAMILY MEDICINE | Facility: CLINIC | Age: 47
End: 2017-11-15
Payer: COMMERCIAL

## 2017-11-15 VITALS
TEMPERATURE: 98.4 F | SYSTOLIC BLOOD PRESSURE: 118 MMHG | BODY MASS INDEX: 31.52 KG/M2 | WEIGHT: 208 LBS | OXYGEN SATURATION: 95 % | DIASTOLIC BLOOD PRESSURE: 74 MMHG | HEART RATE: 59 BPM | HEIGHT: 68 IN

## 2017-11-15 PROCEDURE — 99214 OFFICE O/P EST MOD 30 MIN: CPT | Mod: 25

## 2017-11-15 PROCEDURE — 99406 BEHAV CHNG SMOKING 3-10 MIN: CPT

## 2017-11-15 RX ORDER — OXYCODONE HYDROCHLORIDE AND ACETAMINOPHEN 5; 325 MG/1; MG/1
5-325 TABLET ORAL
Refills: 0 | Status: DISCONTINUED | COMMUNITY
End: 2017-11-15

## 2017-11-21 ENCOUNTER — APPOINTMENT (OUTPATIENT)
Dept: CARDIOLOGY | Facility: CLINIC | Age: 47
End: 2017-11-21
Payer: COMMERCIAL

## 2017-11-21 VITALS
DIASTOLIC BLOOD PRESSURE: 80 MMHG | HEIGHT: 68 IN | WEIGHT: 210 LBS | SYSTOLIC BLOOD PRESSURE: 146 MMHG | TEMPERATURE: 98.4 F | OXYGEN SATURATION: 99 % | BODY MASS INDEX: 31.83 KG/M2 | HEART RATE: 94 BPM

## 2017-11-21 PROCEDURE — 78452 HT MUSCLE IMAGE SPECT MULT: CPT

## 2017-11-21 PROCEDURE — 93015 CV STRESS TEST SUPVJ I&R: CPT

## 2017-11-21 PROCEDURE — 99204 OFFICE O/P NEW MOD 45 MIN: CPT

## 2017-11-21 PROCEDURE — A9500: CPT

## 2017-11-30 ENCOUNTER — CHART COPY (OUTPATIENT)
Age: 47
End: 2017-11-30

## 2017-12-04 ENCOUNTER — RX RENEWAL (OUTPATIENT)
Age: 47
End: 2017-12-04

## 2017-12-06 ENCOUNTER — APPOINTMENT (OUTPATIENT)
Dept: VASCULAR SURGERY | Facility: HOSPITAL | Age: 47
End: 2017-12-06

## 2017-12-13 ENCOUNTER — OTHER (OUTPATIENT)
Age: 47
End: 2017-12-13

## 2018-01-05 ENCOUNTER — APPOINTMENT (OUTPATIENT)
Dept: FAMILY MEDICINE | Facility: CLINIC | Age: 48
End: 2018-01-05
Payer: COMMERCIAL

## 2018-01-05 ENCOUNTER — LABORATORY RESULT (OUTPATIENT)
Age: 48
End: 2018-01-05

## 2018-01-05 VITALS
HEART RATE: 97 BPM | BODY MASS INDEX: 32.74 KG/M2 | WEIGHT: 216 LBS | SYSTOLIC BLOOD PRESSURE: 130 MMHG | DIASTOLIC BLOOD PRESSURE: 78 MMHG | TEMPERATURE: 98.1 F | HEIGHT: 68 IN | OXYGEN SATURATION: 95 %

## 2018-01-05 PROCEDURE — 99214 OFFICE O/P EST MOD 30 MIN: CPT

## 2018-01-05 RX ORDER — AMOXICILLIN 500 MG/1
500 CAPSULE ORAL
Qty: 21 | Refills: 0 | Status: DISCONTINUED | COMMUNITY
Start: 2017-08-25

## 2018-01-08 ENCOUNTER — RX RENEWAL (OUTPATIENT)
Age: 48
End: 2018-01-08

## 2018-01-08 LAB
ALBUMIN SERPL ELPH-MCNC: 4.3 G/DL
ANION GAP SERPL CALC-SCNC: 18 MMOL/L
BASOPHILS # BLD AUTO: 0.05 K/UL
BASOPHILS NFR BLD AUTO: 0.4 %
BUN SERPL-MCNC: 37 MG/DL
CALCIUM SERPL-MCNC: 10.4 MG/DL
CHLORIDE SERPL-SCNC: 101 MMOL/L
CO2 SERPL-SCNC: 23 MMOL/L
CREAT SERPL-MCNC: 3.06 MG/DL
EOSINOPHIL # BLD AUTO: 0.56 K/UL
EOSINOPHIL NFR BLD AUTO: 4.3 %
GLUCOSE SERPL-MCNC: 96 MG/DL
HCT VFR BLD CALC: 45.3 %
HGB BLD-MCNC: 15.4 G/DL
IMM GRANULOCYTES NFR BLD AUTO: 0.3 %
LYMPHOCYTES # BLD AUTO: 2.29 K/UL
LYMPHOCYTES NFR BLD AUTO: 17.7 %
MAN DIFF?: NORMAL
MCHC RBC-ENTMCNC: 33.6 PG
MCHC RBC-ENTMCNC: 34 GM/DL
MCV RBC AUTO: 98.9 FL
MONOCYTES # BLD AUTO: 0.84 K/UL
MONOCYTES NFR BLD AUTO: 6.5 %
NEUTROPHILS # BLD AUTO: 9.15 K/UL
NEUTROPHILS NFR BLD AUTO: 70.8 %
PHOSPHATE SERPL-MCNC: 3.9 MG/DL
PLATELET # BLD AUTO: 202 K/UL
POTASSIUM SERPL-SCNC: 3.4 MMOL/L
RBC # BLD: 4.58 M/UL
RBC # FLD: 13.5 %
SODIUM SERPL-SCNC: 142 MMOL/L
WBC # FLD AUTO: 12.93 K/UL

## 2018-01-09 ENCOUNTER — OUTPATIENT (OUTPATIENT)
Dept: OUTPATIENT SERVICES | Facility: HOSPITAL | Age: 48
LOS: 1 days | End: 2018-01-09
Payer: COMMERCIAL

## 2018-01-09 ENCOUNTER — OTHER (OUTPATIENT)
Age: 48
End: 2018-01-09

## 2018-01-09 VITALS
TEMPERATURE: 98 F | SYSTOLIC BLOOD PRESSURE: 125 MMHG | OXYGEN SATURATION: 97 % | HEIGHT: 69 IN | RESPIRATION RATE: 16 BRPM | HEART RATE: 81 BPM | DIASTOLIC BLOOD PRESSURE: 82 MMHG | WEIGHT: 216.05 LBS

## 2018-01-09 DIAGNOSIS — N20.0 CALCULUS OF KIDNEY: Chronic | ICD-10-CM

## 2018-01-09 DIAGNOSIS — Z90.49 ACQUIRED ABSENCE OF OTHER SPECIFIED PARTS OF DIGESTIVE TRACT: Chronic | ICD-10-CM

## 2018-01-09 DIAGNOSIS — Z98.890 OTHER SPECIFIED POSTPROCEDURAL STATES: Chronic | ICD-10-CM

## 2018-01-09 DIAGNOSIS — Z98.51 TUBAL LIGATION STATUS: Chronic | ICD-10-CM

## 2018-01-09 DIAGNOSIS — Z01.818 ENCOUNTER FOR OTHER PREPROCEDURAL EXAMINATION: ICD-10-CM

## 2018-01-09 DIAGNOSIS — N18.4 CHRONIC KIDNEY DISEASE, STAGE 4 (SEVERE): ICD-10-CM

## 2018-01-09 DIAGNOSIS — N18.9 CHRONIC KIDNEY DISEASE, UNSPECIFIED: ICD-10-CM

## 2018-01-09 LAB
ANION GAP SERPL CALC-SCNC: 17 MMOL/L — SIGNIFICANT CHANGE UP (ref 5–17)
BUN SERPL-MCNC: 28 MG/DL — HIGH (ref 7–23)
CALCIUM SERPL-MCNC: 8.8 MG/DL — SIGNIFICANT CHANGE UP (ref 8.4–10.5)
CHLORIDE SERPL-SCNC: 102 MMOL/L — SIGNIFICANT CHANGE UP (ref 96–108)
CO2 SERPL-SCNC: 20 MMOL/L — LOW (ref 22–31)
CREAT SERPL-MCNC: 2.95 MG/DL — HIGH (ref 0.5–1.3)
GLUCOSE SERPL-MCNC: 113 MG/DL — HIGH (ref 70–99)
HCT VFR BLD CALC: 40.9 % — SIGNIFICANT CHANGE UP (ref 34.5–45)
HGB BLD-MCNC: 13.8 G/DL — SIGNIFICANT CHANGE UP (ref 11.5–15.5)
MCHC RBC-ENTMCNC: 32.9 PG — SIGNIFICANT CHANGE UP (ref 27–34)
MCHC RBC-ENTMCNC: 33.7 GM/DL — SIGNIFICANT CHANGE UP (ref 32–36)
MCV RBC AUTO: 97.6 FL — SIGNIFICANT CHANGE UP (ref 80–100)
PLATELET # BLD AUTO: 200 K/UL — SIGNIFICANT CHANGE UP (ref 150–400)
POTASSIUM SERPL-MCNC: 3.5 MMOL/L — SIGNIFICANT CHANGE UP (ref 3.5–5.3)
POTASSIUM SERPL-SCNC: 3.5 MMOL/L — SIGNIFICANT CHANGE UP (ref 3.5–5.3)
RBC # BLD: 4.19 M/UL — SIGNIFICANT CHANGE UP (ref 3.8–5.2)
RBC # FLD: 13.6 % — SIGNIFICANT CHANGE UP (ref 10.3–14.5)
SODIUM SERPL-SCNC: 139 MMOL/L — SIGNIFICANT CHANGE UP (ref 135–145)
WBC # BLD: 12.18 K/UL — HIGH (ref 3.8–10.5)
WBC # FLD AUTO: 12.18 K/UL — HIGH (ref 3.8–10.5)

## 2018-01-09 PROCEDURE — 85027 COMPLETE CBC AUTOMATED: CPT

## 2018-01-09 PROCEDURE — 80048 BASIC METABOLIC PNL TOTAL CA: CPT

## 2018-01-09 PROCEDURE — G0463: CPT

## 2018-01-09 RX ORDER — LIDOCAINE HCL 20 MG/ML
0.2 VIAL (ML) INJECTION ONCE
Qty: 0 | Refills: 0 | Status: DISCONTINUED | OUTPATIENT
Start: 2018-01-10 | End: 2018-01-10

## 2018-01-09 RX ORDER — CEFAZOLIN SODIUM 1 G
2000 VIAL (EA) INJECTION ONCE
Qty: 0 | Refills: 0 | Status: DISCONTINUED | OUTPATIENT
Start: 2018-01-10 | End: 2018-01-25

## 2018-01-09 RX ORDER — SODIUM CHLORIDE 9 MG/ML
3 INJECTION INTRAMUSCULAR; INTRAVENOUS; SUBCUTANEOUS EVERY 8 HOURS
Qty: 0 | Refills: 0 | Status: DISCONTINUED | OUTPATIENT
Start: 2018-01-10 | End: 2018-01-10

## 2018-01-09 NOTE — H&P PST ADULT - HISTORY OF PRESENT ILLNESS
44 year old white female who states that she developed this ventral hernia 2 years ago after a laparoscopic cholecystectomy and she started having pain only last week    47 y/o female smoker with h/o C. dif, polycystic kidney diease, s/p oral surgery 2 weeks ago followed by 1 week treatment with amoxicillin, started to have headache 2 days ago then fever, nausea, vomiting  and body aches yesterday. today, in the Er patient developed watery diarrhea. no blood in the stools. 47 year old female with PMH of kidney stones, brain aneurysm ( seen by neuro surgery no need for sx at this time), polycystic kidney diease, C-diff colitis, sepsis UTI 9/2017/ CKD 4 ( on the transplant list) planned for right upper extremity AV fistula radiocephalic, possible basilic transposition.

## 2018-01-09 NOTE — H&P PST ADULT - PMH
Anxiety    Arthropathy NOS - shoulder    GERD (gastroesophageal reflux disease)    Hyperlipidemia    Kidney stones    Polycystic kidney disease Anxiety    Arthropathy NOS - shoulder    CKD (chronic kidney disease) stage 4, GFR 15-29 ml/min  on transplant list as per patient  GERD (gastroesophageal reflux disease)    Hyperlipidemia    Kidney stones    Polycystic kidney disease

## 2018-01-09 NOTE — H&P PST ADULT - GENERAL GENITOURINARY SYMPTOMS
flank pain L/flank pain R/hx of multiple stones hx of chronic multiple stones, Sepsis UTI 9/2017/flank pain L/flank pain R

## 2018-01-09 NOTE — H&P PST ADULT - PSH
Ankle fracture - Left  brumstein barajas procedure 2000  H/O tubal ligation  (1996)  History of ventral hernia repair    Kidney calculi  cysto/lithotripsy multiple  S/P cholecystectomy  (2013)  Tubal ligation  1995

## 2018-01-09 NOTE — H&P PST ADULT - NSANTHOSAYNRD_GEN_A_CORE
No. ANDRIY screening performed.  STOP BANG Legend: 0-2 = LOW Risk; 3-4 = INTERMEDIATE Risk; 5-8 = HIGH Risk

## 2018-01-09 NOTE — H&P PST ADULT - PROBLEM SELECTOR PLAN 1
planned for right upper extremity AV fistula radiocephalic, possible basilic transposition.   PST labs send ( repeated labs due to leukocytosis and hypokalemia noted on 1/5/18 lab results on [employer]).   Preprocedure surgical scrub instructions discussed   Pepcid am of procedure with small sips of water

## 2018-01-09 NOTE — H&P PST ADULT - FAMILY HISTORY
Mother  Still living? Yes, Estimated age: Age Unknown  Family history of heart disease, Age at diagnosis: Age Unknown     Father  Still living? Yes, Estimated age: Age Unknown  Family history of hypertension, Age at diagnosis: Age Unknown

## 2018-01-10 ENCOUNTER — OUTPATIENT (OUTPATIENT)
Dept: OUTPATIENT SERVICES | Facility: HOSPITAL | Age: 48
LOS: 1 days | End: 2018-01-10
Payer: COMMERCIAL

## 2018-01-10 ENCOUNTER — APPOINTMENT (OUTPATIENT)
Dept: VASCULAR SURGERY | Facility: HOSPITAL | Age: 48
End: 2018-01-10

## 2018-01-10 VITALS
DIASTOLIC BLOOD PRESSURE: 79 MMHG | HEART RATE: 82 BPM | RESPIRATION RATE: 20 BRPM | HEIGHT: 69 IN | OXYGEN SATURATION: 98 % | SYSTOLIC BLOOD PRESSURE: 122 MMHG | WEIGHT: 216.05 LBS | TEMPERATURE: 98 F

## 2018-01-10 VITALS
OXYGEN SATURATION: 95 % | SYSTOLIC BLOOD PRESSURE: 112 MMHG | HEART RATE: 72 BPM | DIASTOLIC BLOOD PRESSURE: 62 MMHG | RESPIRATION RATE: 16 BRPM | TEMPERATURE: 98 F

## 2018-01-10 DIAGNOSIS — N18.9 CHRONIC KIDNEY DISEASE, UNSPECIFIED: ICD-10-CM

## 2018-01-10 DIAGNOSIS — Z98.890 OTHER SPECIFIED POSTPROCEDURAL STATES: Chronic | ICD-10-CM

## 2018-01-10 DIAGNOSIS — N20.0 CALCULUS OF KIDNEY: Chronic | ICD-10-CM

## 2018-01-10 DIAGNOSIS — Z90.49 ACQUIRED ABSENCE OF OTHER SPECIFIED PARTS OF DIGESTIVE TRACT: Chronic | ICD-10-CM

## 2018-01-10 DIAGNOSIS — Z98.51 TUBAL LIGATION STATUS: Chronic | ICD-10-CM

## 2018-01-10 LAB
POTASSIUM SERPL-MCNC: 3.4 MMOL/L — LOW (ref 3.5–5.3)
POTASSIUM SERPL-SCNC: 3.4 MMOL/L — LOW (ref 3.5–5.3)

## 2018-01-10 PROCEDURE — C1889: CPT

## 2018-01-10 PROCEDURE — 84132 ASSAY OF SERUM POTASSIUM: CPT

## 2018-01-10 PROCEDURE — 36820 AV FUSION/FOREARM VEIN: CPT | Mod: RT

## 2018-01-10 RX ORDER — OXYCODONE HYDROCHLORIDE 5 MG/1
5 TABLET ORAL ONCE
Qty: 0 | Refills: 0 | Status: DISCONTINUED | OUTPATIENT
Start: 2018-01-10 | End: 2018-01-10

## 2018-01-10 RX ORDER — HYDROMORPHONE HYDROCHLORIDE 2 MG/ML
0.25 INJECTION INTRAMUSCULAR; INTRAVENOUS; SUBCUTANEOUS
Qty: 0 | Refills: 0 | Status: DISCONTINUED | OUTPATIENT
Start: 2018-01-10 | End: 2018-01-10

## 2018-01-10 RX ORDER — OXYCODONE HYDROCHLORIDE 5 MG/1
1 TABLET ORAL
Qty: 10 | Refills: 0 | OUTPATIENT
Start: 2018-01-10

## 2018-01-10 RX ADMIN — HYDROMORPHONE HYDROCHLORIDE 0.25 MILLIGRAM(S): 2 INJECTION INTRAMUSCULAR; INTRAVENOUS; SUBCUTANEOUS at 09:28

## 2018-01-10 RX ADMIN — HYDROMORPHONE HYDROCHLORIDE 0.25 MILLIGRAM(S): 2 INJECTION INTRAMUSCULAR; INTRAVENOUS; SUBCUTANEOUS at 09:45

## 2018-01-10 RX ADMIN — HYDROMORPHONE HYDROCHLORIDE 0.25 MILLIGRAM(S): 2 INJECTION INTRAMUSCULAR; INTRAVENOUS; SUBCUTANEOUS at 10:00

## 2018-01-10 RX ADMIN — OXYCODONE HYDROCHLORIDE 5 MILLIGRAM(S): 5 TABLET ORAL at 10:47

## 2018-01-10 NOTE — ASU DISCHARGE PLAN (ADULT/PEDIATRIC). - NOTIFY
Swelling that continues/Pain not relieved by Medications/Fever greater than 101/Bleeding that does not stop

## 2018-01-10 NOTE — BRIEF OPERATIVE NOTE - PROCEDURE
<<-----Click on this checkbox to enter Procedure AV fistula placement  01/10/2018  Right arm  Active  [id number]

## 2018-01-10 NOTE — ASU DISCHARGE PLAN (ADULT/PEDIATRIC). - MEDICATION SUMMARY - MEDICATIONS TO TAKE
I will START or STAY ON the medications listed below when I get home from the hospital:    acetaminophen-oxyCODONE 325 mg-5 mg oral tablet  -- 1-2 tab(s) by mouth every 6 hours, As Needed -for severe pain MDD:8  -- Caution federal law prohibits the transfer of this drug to any person other  than the person for whom it was prescribed.  May cause drowsiness.  Alcohol may intensify this effect.  Use care when operating dangerous machinery.  This prescription cannot be refilled.  This product contains acetaminophen.  Do not use  with any other product containing acetaminophen to prevent possible liver damage.  Using more of this medication than prescribed may cause serious breathing problems.    -- Indication: For Pain Control    Cozaar 50 mg oral tablet  -- orally once a day (at bedtime)  -- Indication: For Home Medication    KlonoPIN 1 mg oral tablet  -- 1 tab(s) by mouth once a day, As Needed  -- Indication: For Home Medication    famotidine 20 mg oral tablet  -- 1 tab(s) by mouth once a day  -- Indication: For Home Medication    ferrous sulfate 325 mg (65 mg elemental iron) oral tablet  -- 1 tab(s) by mouth once a day  -- Check with your doctor before becoming pregnant.  Do not chew, break, or crush.  May discolor urine or feces.    -- Indication: For Home Medication    ergocalciferol  -- orally once a week  -- Indication: For Home Medication    calcitriol 0.25 mcg oral capsule  -- 1 cap(s) by mouth once a day  -- Indication: For Home Medication I will START or STAY ON the medications listed below when I get home from the hospital:    acetaminophen-oxyCODONE 325 mg-5 mg oral tablet  -- 1-2 tab(s) by mouth every 6 hours, As Needed -for severe pain MDD:8  -- Caution federal law prohibits the transfer of this drug to any person other  than the person for whom it was prescribed.  May cause drowsiness.  Alcohol may intensify this effect.  Use care when operating dangerous machinery.  This prescription cannot be refilled.  This product contains acetaminophen.  Do not use  with any other product containing acetaminophen to prevent possible liver damage.  Using more of this medication than prescribed may cause serious breathing problems.    -- Indication: For pain    Cozaar 50 mg oral tablet  -- orally once a day (at bedtime)  -- Indication: For Home Medication    KlonoPIN 1 mg oral tablet  -- 1 tab(s) by mouth once a day, As Needed  -- Indication: For Home Medication    famotidine 20 mg oral tablet  -- 1 tab(s) by mouth once a day  -- Indication: For Home Medication    ferrous sulfate 325 mg (65 mg elemental iron) oral tablet  -- 1 tab(s) by mouth once a day  -- Check with your doctor before becoming pregnant.  Do not chew, break, or crush.  May discolor urine or feces.    -- Indication: For Home Medication    ergocalciferol  -- orally once a week  -- Indication: For Home Medication    calcitriol 0.25 mcg oral capsule  -- 1 cap(s) by mouth once a day  -- Indication: For Home Medication

## 2018-01-12 LAB — COMPREHENSIVE SCREEN URINE: NORMAL

## 2018-01-13 ENCOUNTER — TRANSCRIPTION ENCOUNTER (OUTPATIENT)
Age: 48
End: 2018-01-13

## 2018-01-25 ENCOUNTER — APPOINTMENT (OUTPATIENT)
Dept: VASCULAR SURGERY | Facility: CLINIC | Age: 48
End: 2018-01-25
Payer: COMMERCIAL

## 2018-01-25 PROCEDURE — 99406 BEHAV CHNG SMOKING 3-10 MIN: CPT

## 2018-01-25 PROCEDURE — 93990 DOPPLER FLOW TESTING: CPT

## 2018-01-25 PROCEDURE — 99024 POSTOP FOLLOW-UP VISIT: CPT

## 2018-02-03 NOTE — PROGRESS NOTE ADULT - PROBLEM SELECTOR PROBLEM 6
none Acute renal failure superimposed on chronic kidney disease, unspecified CKD stage, unspecified acute renal failure type

## 2018-02-06 ENCOUNTER — APPOINTMENT (OUTPATIENT)
Dept: NEPHROLOGY | Facility: CLINIC | Age: 48
End: 2018-02-06

## 2018-02-07 ENCOUNTER — APPOINTMENT (OUTPATIENT)
Dept: NEPHROLOGY | Facility: CLINIC | Age: 48
End: 2018-02-07
Payer: COMMERCIAL

## 2018-02-07 VITALS
HEIGHT: 68 IN | BODY MASS INDEX: 32.13 KG/M2 | WEIGHT: 212 LBS | SYSTOLIC BLOOD PRESSURE: 120 MMHG | DIASTOLIC BLOOD PRESSURE: 80 MMHG

## 2018-02-07 PROCEDURE — 99215 OFFICE O/P EST HI 40 MIN: CPT | Mod: 25

## 2018-02-07 PROCEDURE — 36415 COLL VENOUS BLD VENIPUNCTURE: CPT

## 2018-02-08 LAB
ALBUMIN SERPL ELPH-MCNC: 4.1 G/DL
ANION GAP SERPL CALC-SCNC: 19 MMOL/L
APPEARANCE: CLEAR
BACTERIA: ABNORMAL
BASOPHILS # BLD AUTO: 0.03 K/UL
BASOPHILS NFR BLD AUTO: 0.3 %
BILIRUBIN URINE: NEGATIVE
BLOOD URINE: ABNORMAL
BUN SERPL-MCNC: 31 MG/DL
CALCIUM SERPL-MCNC: 9.4 MG/DL
CALCIUM SERPL-MCNC: 9.5 MG/DL
CHLORIDE SERPL-SCNC: 104 MMOL/L
CO2 SERPL-SCNC: 19 MMOL/L
COLOR: YELLOW
CREAT SERPL-MCNC: 3.33 MG/DL
CREAT SPEC-SCNC: 57 MG/DL
CREAT/PROT UR: 0.3 RATIO
EOSINOPHIL # BLD AUTO: 0.51 K/UL
EOSINOPHIL NFR BLD AUTO: 4.8 %
GLUCOSE QUALITATIVE U: NEGATIVE MG/DL
GLUCOSE SERPL-MCNC: 101 MG/DL
HCT VFR BLD CALC: 44 %
HGB BLD-MCNC: 14.5 G/DL
HYALINE CASTS: 0 /LPF
IMM GRANULOCYTES NFR BLD AUTO: 0.3 %
KETONES URINE: NEGATIVE
LEUKOCYTE ESTERASE URINE: NEGATIVE
LYMPHOCYTES # BLD AUTO: 2 K/UL
LYMPHOCYTES NFR BLD AUTO: 19 %
MAN DIFF?: NORMAL
MCHC RBC-ENTMCNC: 33 GM/DL
MCHC RBC-ENTMCNC: 33.4 PG
MCV RBC AUTO: 101.4 FL
MICROSCOPIC-UA: NORMAL
MONOCYTES # BLD AUTO: 0.69 K/UL
MONOCYTES NFR BLD AUTO: 6.6 %
NEUTROPHILS # BLD AUTO: 7.26 K/UL
NEUTROPHILS NFR BLD AUTO: 69 %
NITRITE URINE: NEGATIVE
PARATHYROID HORMONE INTACT: 83 PG/ML
PH URINE: 5.5
PHOSPHATE SERPL-MCNC: 3.1 MG/DL
PLATELET # BLD AUTO: 192 K/UL
POTASSIUM SERPL-SCNC: 3.5 MMOL/L
PROT UR-MCNC: 18 MG/DL
PROTEIN URINE: ABNORMAL MG/DL
RBC # BLD: 4.34 M/UL
RBC # FLD: 13.4 %
RED BLOOD CELLS URINE: 3 /HPF
SODIUM SERPL-SCNC: 142 MMOL/L
SPECIFIC GRAVITY URINE: 1.01
SQUAMOUS EPITHELIAL CELLS: 27 /HPF
URINE COMMENTS: NORMAL
UROBILINOGEN URINE: NEGATIVE MG/DL
WBC # FLD AUTO: 10.52 K/UL
WHITE BLOOD CELLS URINE: 16 /HPF

## 2018-02-09 ENCOUNTER — OUTPATIENT (OUTPATIENT)
Dept: OUTPATIENT SERVICES | Facility: HOSPITAL | Age: 48
LOS: 1 days | End: 2018-02-09

## 2018-02-09 DIAGNOSIS — Z98.51 TUBAL LIGATION STATUS: Chronic | ICD-10-CM

## 2018-02-09 DIAGNOSIS — Z98.890 OTHER SPECIFIED POSTPROCEDURAL STATES: Chronic | ICD-10-CM

## 2018-02-09 DIAGNOSIS — Z90.49 ACQUIRED ABSENCE OF OTHER SPECIFIED PARTS OF DIGESTIVE TRACT: Chronic | ICD-10-CM

## 2018-02-09 DIAGNOSIS — N20.0 CALCULUS OF KIDNEY: Chronic | ICD-10-CM

## 2018-02-11 LAB — 25(OH)D3 SERPL-MCNC: 21.4 NG/ML

## 2018-02-12 ENCOUNTER — APPOINTMENT (OUTPATIENT)
Dept: FAMILY MEDICINE | Facility: CLINIC | Age: 48
End: 2018-02-12
Payer: COMMERCIAL

## 2018-02-12 VITALS
BODY MASS INDEX: 32.28 KG/M2 | WEIGHT: 213 LBS | TEMPERATURE: 98.1 F | HEIGHT: 68 IN | SYSTOLIC BLOOD PRESSURE: 125 MMHG | DIASTOLIC BLOOD PRESSURE: 71 MMHG | HEART RATE: 82 BPM | OXYGEN SATURATION: 97 %

## 2018-02-12 PROCEDURE — 99214 OFFICE O/P EST MOD 30 MIN: CPT

## 2018-02-13 LAB — RAPID RVP RESULT: NOT DETECTED

## 2018-03-12 ENCOUNTER — TRANSCRIPTION ENCOUNTER (OUTPATIENT)
Age: 48
End: 2018-03-12

## 2018-03-14 ENCOUNTER — OTHER (OUTPATIENT)
Age: 48
End: 2018-03-14

## 2018-03-20 ENCOUNTER — OUTPATIENT (OUTPATIENT)
Dept: OUTPATIENT SERVICES | Facility: HOSPITAL | Age: 48
LOS: 1 days | End: 2018-03-20
Payer: COMMERCIAL

## 2018-03-20 ENCOUNTER — APPOINTMENT (OUTPATIENT)
Dept: TRANSPLANT | Facility: CLINIC | Age: 48
End: 2018-03-20
Payer: COMMERCIAL

## 2018-03-20 VITALS
WEIGHT: 210.1 LBS | RESPIRATION RATE: 20 BRPM | DIASTOLIC BLOOD PRESSURE: 77 MMHG | HEART RATE: 70 BPM | HEIGHT: 69 IN | OXYGEN SATURATION: 97 % | SYSTOLIC BLOOD PRESSURE: 124 MMHG | TEMPERATURE: 99 F

## 2018-03-20 VITALS
TEMPERATURE: 97.9 F | SYSTOLIC BLOOD PRESSURE: 122 MMHG | WEIGHT: 208 LBS | RESPIRATION RATE: 14 BRPM | DIASTOLIC BLOOD PRESSURE: 77 MMHG | OXYGEN SATURATION: 99 % | HEART RATE: 68 BPM | BODY MASS INDEX: 31.52 KG/M2 | HEIGHT: 68 IN

## 2018-03-20 DIAGNOSIS — N18.6 END STAGE RENAL DISEASE: ICD-10-CM

## 2018-03-20 DIAGNOSIS — F41.9 ANXIETY DISORDER, UNSPECIFIED: ICD-10-CM

## 2018-03-20 DIAGNOSIS — N18.6 END STAGE RENAL DISEASE: Chronic | ICD-10-CM

## 2018-03-20 DIAGNOSIS — Z98.51 TUBAL LIGATION STATUS: Chronic | ICD-10-CM

## 2018-03-20 DIAGNOSIS — Z90.49 ACQUIRED ABSENCE OF OTHER SPECIFIED PARTS OF DIGESTIVE TRACT: Chronic | ICD-10-CM

## 2018-03-20 DIAGNOSIS — Z98.890 OTHER SPECIFIED POSTPROCEDURAL STATES: Chronic | ICD-10-CM

## 2018-03-20 DIAGNOSIS — N20.0 CALCULUS OF KIDNEY: Chronic | ICD-10-CM

## 2018-03-20 DIAGNOSIS — I10 ESSENTIAL (PRIMARY) HYPERTENSION: ICD-10-CM

## 2018-03-20 DIAGNOSIS — I77.0 ARTERIOVENOUS FISTULA, ACQUIRED: Chronic | ICD-10-CM

## 2018-03-20 DIAGNOSIS — K21.9 GASTRO-ESOPHAGEAL REFLUX DISEASE WITHOUT ESOPHAGITIS: ICD-10-CM

## 2018-03-20 LAB
BLD GP AB SCN SERPL QL: NEGATIVE — SIGNIFICANT CHANGE UP
RH IG SCN BLD-IMP: POSITIVE — SIGNIFICANT CHANGE UP

## 2018-03-20 PROCEDURE — 86900 BLOOD TYPING SEROLOGIC ABO: CPT

## 2018-03-20 PROCEDURE — G0463: CPT

## 2018-03-20 PROCEDURE — 86850 RBC ANTIBODY SCREEN: CPT

## 2018-03-20 PROCEDURE — 86901 BLOOD TYPING SEROLOGIC RH(D): CPT

## 2018-03-20 PROCEDURE — 99214 OFFICE O/P EST MOD 30 MIN: CPT

## 2018-03-20 PROCEDURE — 87086 URINE CULTURE/COLONY COUNT: CPT

## 2018-03-20 RX ORDER — ERGOCALCIFEROL 1.25 MG/1
0 CAPSULE ORAL
Qty: 0 | Refills: 0 | COMMUNITY

## 2018-03-20 NOTE — H&P PST ADULT - PSH
Ankle fracture - Left  brumstein barajas procedure 2000  AV fistula  right forearm extremity AV fistula radiocephalic  H/O shoulder surgery  left  H/O tubal ligation  (1996)  History of ventral hernia repair    Kidney calculi  cysto/lithotripsy multiple  S/P cholecystectomy  (2013)  Tubal ligation  1995 Ankle fracture - Left  brumstein barajas procedure 2000  AV fistula  right forearm extremity AV fistula radiocephalic ( 2018)  H/O shoulder surgery  left  H/O tubal ligation  (1996)  History of ventral hernia repair    Kidney calculi  cysto/lithotripsy multiple  S/P cholecystectomy  (2013)  Tubal ligation  1995

## 2018-03-20 NOTE — H&P PST ADULT - PROBLEM SELECTOR PLAN 1
[facility] kidney Transplant recipient ,Possible Left ,bilateral Open nephrectomy on 4/2/2018  -Pre- op instructions discussed   -Type and screen sent  -labs be will drawn by transplant center Right [facility] kidney Transplant recipient ,Possible Left ,bilateral Open nephrectomy on 4/2/2018  -Pre- op instructions discussed   -Type and screen ,urine culture sent  -labs be will drawn by transplant center

## 2018-03-20 NOTE — H&P PST ADULT - ATTENDING COMMENTS
Rolly Medeiros was seen and evaluated today.  As documented, Ms. Medeiros is a 47y Female with PCKD causing end-stage renal disease. She has had no major illnesses or hospitalizations since her last hospital visit.    We discussed the risks and benefits of kidney transplantation in depth.  Surgical risks included but were not limited to bleeding, infection, graft thrombosis, delayed graft function, urine leak, and potential need for re operation postoperatively.  We also discussed the risks of postoperative immunosuppression including but not limited to increased risk of infection, cancer, weight gain, new onset or worsening of diabetes or hypertension on a temporary or permanent basis, water retention, back pain, constipation, diarrhea, dizziness, headache, joint pain, loss of appetite, nausea, stomach pain or upset, trouble sleeping, vomiting, and/or mental or mood changes were fully disclosed. ROLLY MEDEIROS understands these risks and is willing to proceed with kidney transplantation. We discussed the differences in quality of life and patient survival between remaining on dialysis versus receiving a kidney transplant.   We also discussed the significant risk of delayed graft function and its risk of requiring hemodialysis and catheter placement in the setting of bilateral nephrectomy.  She remains aware of these risks and signed surgical consent.

## 2018-03-20 NOTE — H&P PST ADULT - PMH
Anxiety    Arthropathy NOS - shoulder    CKD (chronic kidney disease) stage 4, GFR 15-29 ml/min  no dialysis  ESRD (end stage renal disease)    GERD (gastroesophageal reflux disease)    Hyperlipidemia    Kidney stones    Polycystic kidney disease

## 2018-03-20 NOTE — H&P PST ADULT - TOBACCO USE
Detail Level: Detailed
Quality 130: Documentation Of Current Medications In The Medical Record: Current Medications Documented
Former smoker

## 2018-03-20 NOTE — H&P PST ADULT - HISTORY OF PRESENT ILLNESS
47 year old female with PMH of HTN, kidney stones, brain aneurysm ( seen by neuro surgery no need for sx at this time), polycystic kidney diease, C-diff colitis, sepsis UTI 9/2017/ ,ESRD not on dialysis ,left arm av fistula . Presents to PST for scheduled [facility] kidney Transplant recipient ,Possible Left ,bilateral Open nephrectomy on 4/2/2018. 47 year old female with PMH of HTN, kidney stones, brain aneurysm ( seen by neuro surgery no need for sx at this time), polycystic kidney diease, C-diff colitis, sepsis UTI 9/2017(resolved) , ESRD not on dialysis ,left arm AV fistula . Presents to PST for scheduled [facility] kidney Transplant recipient ,Possible Left ,bilateral Open nephrectomy on 4/2/2018.

## 2018-03-21 LAB
CULTURE RESULTS: NO GROWTH — SIGNIFICANT CHANGE UP
SPECIMEN SOURCE: SIGNIFICANT CHANGE UP

## 2018-04-02 ENCOUNTER — RESULT REVIEW (OUTPATIENT)
Age: 48
End: 2018-04-02

## 2018-04-02 ENCOUNTER — APPOINTMENT (OUTPATIENT)
Dept: TRANSPLANT | Facility: CLINIC | Age: 48
End: 2018-04-02

## 2018-04-02 ENCOUNTER — APPOINTMENT (OUTPATIENT)
Dept: TRANSPLANT | Facility: HOSPITAL | Age: 48
End: 2018-04-02

## 2018-04-02 ENCOUNTER — INPATIENT (INPATIENT)
Facility: HOSPITAL | Age: 48
LOS: 10 days | Discharge: HOME | DRG: 652 | End: 2018-04-13
Attending: TRANSPLANT SURGERY | Admitting: TRANSPLANT SURGERY
Payer: COMMERCIAL

## 2018-04-02 VITALS — HEIGHT: 69 IN | WEIGHT: 210.1 LBS

## 2018-04-02 DIAGNOSIS — N18.6 END STAGE RENAL DISEASE: ICD-10-CM

## 2018-04-02 DIAGNOSIS — Z98.890 OTHER SPECIFIED POSTPROCEDURAL STATES: Chronic | ICD-10-CM

## 2018-04-02 DIAGNOSIS — Z98.51 TUBAL LIGATION STATUS: Chronic | ICD-10-CM

## 2018-04-02 DIAGNOSIS — Z90.49 ACQUIRED ABSENCE OF OTHER SPECIFIED PARTS OF DIGESTIVE TRACT: Chronic | ICD-10-CM

## 2018-04-02 DIAGNOSIS — I77.0 ARTERIOVENOUS FISTULA, ACQUIRED: Chronic | ICD-10-CM

## 2018-04-02 DIAGNOSIS — N20.0 CALCULUS OF KIDNEY: Chronic | ICD-10-CM

## 2018-04-02 LAB
ALBUMIN SERPL ELPH-MCNC: 2 G/DL — LOW (ref 3.3–5)
ALP SERPL-CCNC: 32 U/L — LOW (ref 40–120)
ALT FLD-CCNC: 52 U/L RC — HIGH (ref 10–45)
ANION GAP SERPL CALC-SCNC: 16 MMOL/L — SIGNIFICANT CHANGE UP (ref 5–17)
ANION GAP SERPL CALC-SCNC: 23 MMOL/L — HIGH (ref 5–17)
APTT BLD: 28.1 SEC — SIGNIFICANT CHANGE UP (ref 27.5–37.4)
APTT BLD: 33.9 SEC — SIGNIFICANT CHANGE UP (ref 27.5–37.4)
AST SERPL-CCNC: 71 U/L — HIGH (ref 10–40)
BILIRUB SERPL-MCNC: 1 MG/DL — SIGNIFICANT CHANGE UP (ref 0.2–1.2)
BUN SERPL-MCNC: 32 MG/DL — HIGH (ref 7–23)
BUN SERPL-MCNC: 32 MG/DL — HIGH (ref 7–23)
CALCIUM SERPL-MCNC: 15.9 MG/DL — CRITICAL HIGH (ref 8.4–10.5)
CALCIUM SERPL-MCNC: 8.3 MG/DL — LOW (ref 8.4–10.5)
CHLORIDE SERPL-SCNC: 106 MMOL/L — SIGNIFICANT CHANGE UP (ref 96–108)
CHLORIDE SERPL-SCNC: 109 MMOL/L — HIGH (ref 96–108)
CO2 SERPL-SCNC: 14 MMOL/L — LOW (ref 22–31)
CO2 SERPL-SCNC: 17 MMOL/L — LOW (ref 22–31)
CREAT SERPL-MCNC: 3.37 MG/DL — HIGH (ref 0.5–1.3)
CREAT SERPL-MCNC: 3.51 MG/DL — HIGH (ref 0.5–1.3)
FIBRINOGEN PPP-MCNC: 296 MG/DL — LOW (ref 310–510)
GAS PNL BLDA: SIGNIFICANT CHANGE UP
GLUCOSE SERPL-MCNC: 214 MG/DL — HIGH (ref 70–99)
GLUCOSE SERPL-MCNC: 226 MG/DL — HIGH (ref 70–99)
HCT VFR BLD CALC: 25.1 % — LOW (ref 34.5–45)
HCT VFR BLD CALC: 40.1 % — SIGNIFICANT CHANGE UP (ref 34.5–45)
HCT VFR BLD CALC: 44 % — SIGNIFICANT CHANGE UP (ref 34.5–45)
HGB BLD-MCNC: 13.9 G/DL — SIGNIFICANT CHANGE UP (ref 11.5–15.5)
HGB BLD-MCNC: 15.3 G/DL — SIGNIFICANT CHANGE UP (ref 11.5–15.5)
HGB BLD-MCNC: 8.9 G/DL — LOW (ref 11.5–15.5)
INR BLD: 1.11 RATIO — SIGNIFICANT CHANGE UP (ref 0.88–1.16)
INR BLD: 1.3 RATIO — HIGH (ref 0.88–1.16)
MAGNESIUM SERPL-MCNC: 1.7 MG/DL — SIGNIFICANT CHANGE UP (ref 1.6–2.6)
MCHC RBC-ENTMCNC: 32.9 PG — SIGNIFICANT CHANGE UP (ref 27–34)
MCHC RBC-ENTMCNC: 33.3 PG — SIGNIFICANT CHANGE UP (ref 27–34)
MCHC RBC-ENTMCNC: 34.4 PG — HIGH (ref 27–34)
MCHC RBC-ENTMCNC: 34.7 GM/DL — SIGNIFICANT CHANGE UP (ref 32–36)
MCHC RBC-ENTMCNC: 34.9 GM/DL — SIGNIFICANT CHANGE UP (ref 32–36)
MCHC RBC-ENTMCNC: 35.5 GM/DL — SIGNIFICANT CHANGE UP (ref 32–36)
MCV RBC AUTO: 94.8 FL — SIGNIFICANT CHANGE UP (ref 80–100)
MCV RBC AUTO: 95.5 FL — SIGNIFICANT CHANGE UP (ref 80–100)
MCV RBC AUTO: 96.9 FL — SIGNIFICANT CHANGE UP (ref 80–100)
PHOSPHATE SERPL-MCNC: 7.1 MG/DL — HIGH (ref 2.5–4.5)
PLATELET # BLD AUTO: 161 K/UL — SIGNIFICANT CHANGE UP (ref 150–400)
PLATELET # BLD AUTO: 184 K/UL — SIGNIFICANT CHANGE UP (ref 150–400)
PLATELET # BLD AUTO: 188 K/UL — SIGNIFICANT CHANGE UP (ref 150–400)
POTASSIUM SERPL-MCNC: 3.1 MMOL/L — LOW (ref 3.5–5.3)
POTASSIUM SERPL-MCNC: 4 MMOL/L — SIGNIFICANT CHANGE UP (ref 3.5–5.3)
POTASSIUM SERPL-MCNC: 4 MMOL/L — SIGNIFICANT CHANGE UP (ref 3.5–5.3)
POTASSIUM SERPL-SCNC: 3.1 MMOL/L — LOW (ref 3.5–5.3)
POTASSIUM SERPL-SCNC: 4 MMOL/L — SIGNIFICANT CHANGE UP (ref 3.5–5.3)
POTASSIUM SERPL-SCNC: 4 MMOL/L — SIGNIFICANT CHANGE UP (ref 3.5–5.3)
PROT SERPL-MCNC: 4.1 G/DL — LOW (ref 6–8.3)
PROTHROM AB SERPL-ACNC: 12.1 SEC — SIGNIFICANT CHANGE UP (ref 9.8–12.7)
PROTHROM AB SERPL-ACNC: 14.2 SEC — HIGH (ref 9.8–12.7)
RBC # BLD: 2.59 M/UL — LOW (ref 3.8–5.2)
RBC # BLD: 4.24 M/UL — SIGNIFICANT CHANGE UP (ref 3.8–5.2)
RBC # BLD: 4.61 M/UL — SIGNIFICANT CHANGE UP (ref 3.8–5.2)
RBC # FLD: 12 % — SIGNIFICANT CHANGE UP (ref 10.3–14.5)
RBC # FLD: 12.6 % — SIGNIFICANT CHANGE UP (ref 10.3–14.5)
RBC # FLD: 12.7 % — SIGNIFICANT CHANGE UP (ref 10.3–14.5)
RH IG SCN BLD-IMP: POSITIVE — SIGNIFICANT CHANGE UP
SODIUM SERPL-SCNC: 142 MMOL/L — SIGNIFICANT CHANGE UP (ref 135–145)
SODIUM SERPL-SCNC: 143 MMOL/L — SIGNIFICANT CHANGE UP (ref 135–145)
WBC # BLD: 21.6 K/UL — HIGH (ref 3.8–10.5)
WBC # BLD: 24.2 K/UL — HIGH (ref 3.8–10.5)
WBC # BLD: 9.8 K/UL — SIGNIFICANT CHANGE UP (ref 3.8–10.5)
WBC # FLD AUTO: 21.6 K/UL — HIGH (ref 3.8–10.5)
WBC # FLD AUTO: 24.2 K/UL — HIGH (ref 3.8–10.5)
WBC # FLD AUTO: 9.8 K/UL — SIGNIFICANT CHANGE UP (ref 3.8–10.5)

## 2018-04-02 PROCEDURE — 76776 US EXAM K TRANSPL W/DOPPLER: CPT | Mod: 26,RT

## 2018-04-02 PROCEDURE — 74018 RADEX ABDOMEN 1 VIEW: CPT | Mod: 26

## 2018-04-02 PROCEDURE — 50325 PREP DONOR RENAL GRAFT: CPT

## 2018-04-02 PROCEDURE — 50360 RNL ALTRNSPLJ W/O RCP NFRCT: CPT | Mod: GC

## 2018-04-02 PROCEDURE — 71045 X-RAY EXAM CHEST 1 VIEW: CPT | Mod: 26,77

## 2018-04-02 PROCEDURE — 88307 TISSUE EXAM BY PATHOLOGIST: CPT | Mod: 26

## 2018-04-02 PROCEDURE — 99291 CRITICAL CARE FIRST HOUR: CPT

## 2018-04-02 PROCEDURE — 76998 US GUIDE INTRAOP: CPT | Mod: 26,GC

## 2018-04-02 PROCEDURE — 71045 X-RAY EXAM CHEST 1 VIEW: CPT | Mod: 26

## 2018-04-02 PROCEDURE — 50340 RECIPIENT NEPHRECTOMY: CPT | Mod: 50,22,GC

## 2018-04-02 RX ORDER — CEFAZOLIN SODIUM 1 G
2000 VIAL (EA) INJECTION ONCE
Qty: 0 | Refills: 0 | Status: DISCONTINUED | OUTPATIENT
Start: 2018-04-02 | End: 2018-04-02

## 2018-04-02 RX ORDER — SODIUM CHLORIDE 9 MG/ML
500 INJECTION INTRAMUSCULAR; INTRAVENOUS; SUBCUTANEOUS ONCE
Qty: 0 | Refills: 0 | Status: COMPLETED | OUTPATIENT
Start: 2018-04-02 | End: 2018-04-02

## 2018-04-02 RX ORDER — PANTOPRAZOLE SODIUM 20 MG/1
40 TABLET, DELAYED RELEASE ORAL DAILY
Qty: 0 | Refills: 0 | Status: DISCONTINUED | OUTPATIENT
Start: 2018-04-02 | End: 2018-04-03

## 2018-04-02 RX ORDER — CHLORHEXIDINE GLUCONATE 213 G/1000ML
15 SOLUTION TOPICAL
Qty: 0 | Refills: 0 | Status: DISCONTINUED | OUTPATIENT
Start: 2018-04-02 | End: 2018-04-04

## 2018-04-02 RX ORDER — DEXMEDETOMIDINE HYDROCHLORIDE IN 0.9% SODIUM CHLORIDE 4 UG/ML
0.2 INJECTION INTRAVENOUS
Qty: 200 | Refills: 0 | Status: DISCONTINUED | OUTPATIENT
Start: 2018-04-02 | End: 2018-04-02

## 2018-04-02 RX ORDER — LIDOCAINE HCL 20 MG/ML
0.2 VIAL (ML) INJECTION ONCE
Qty: 0 | Refills: 0 | Status: COMPLETED | OUTPATIENT
Start: 2018-04-02 | End: 2018-04-02

## 2018-04-02 RX ORDER — SODIUM CHLORIDE 9 MG/ML
1000 INJECTION INTRAMUSCULAR; INTRAVENOUS; SUBCUTANEOUS
Qty: 0 | Refills: 0 | Status: DISCONTINUED | OUTPATIENT
Start: 2018-04-02 | End: 2018-04-03

## 2018-04-02 RX ORDER — MAGNESIUM SULFATE 500 MG/ML
2 VIAL (ML) INJECTION ONCE
Qty: 0 | Refills: 0 | Status: COMPLETED | OUTPATIENT
Start: 2018-04-02 | End: 2018-04-02

## 2018-04-02 RX ORDER — DEXMEDETOMIDINE HYDROCHLORIDE IN 0.9% SODIUM CHLORIDE 4 UG/ML
0.2 INJECTION INTRAVENOUS
Qty: 200 | Refills: 0 | Status: DISCONTINUED | OUTPATIENT
Start: 2018-04-02 | End: 2018-04-03

## 2018-04-02 RX ORDER — HYDROMORPHONE HYDROCHLORIDE 2 MG/ML
0.5 INJECTION INTRAMUSCULAR; INTRAVENOUS; SUBCUTANEOUS
Qty: 0 | Refills: 0 | Status: DISCONTINUED | OUTPATIENT
Start: 2018-04-02 | End: 2018-04-03

## 2018-04-02 RX ORDER — DIPHENHYDRAMINE HCL 50 MG
50 CAPSULE ORAL ONCE
Qty: 0 | Refills: 0 | Status: DISCONTINUED | OUTPATIENT
Start: 2018-04-02 | End: 2018-04-02

## 2018-04-02 RX ORDER — PHENYLEPHRINE HYDROCHLORIDE 10 MG/ML
0.4 INJECTION INTRAVENOUS
Qty: 80 | Refills: 0 | Status: DISCONTINUED | OUTPATIENT
Start: 2018-04-02 | End: 2018-04-03

## 2018-04-02 RX ORDER — SODIUM CHLORIDE 9 MG/ML
3 INJECTION INTRAMUSCULAR; INTRAVENOUS; SUBCUTANEOUS EVERY 8 HOURS
Qty: 0 | Refills: 0 | Status: DISCONTINUED | OUTPATIENT
Start: 2018-04-02 | End: 2018-04-02

## 2018-04-02 RX ORDER — FAMOTIDINE 10 MG/ML
20 INJECTION INTRAVENOUS ONCE
Qty: 0 | Refills: 0 | Status: COMPLETED | OUTPATIENT
Start: 2018-04-02 | End: 2018-04-02

## 2018-04-02 RX ORDER — BASILIXIMAB 20 MG/5ML
20 INJECTION, POWDER, FOR SOLUTION INTRAVENOUS ONCE
Qty: 0 | Refills: 0 | Status: DISCONTINUED | OUTPATIENT
Start: 2018-04-02 | End: 2018-04-02

## 2018-04-02 RX ADMIN — HYDROMORPHONE HYDROCHLORIDE 0.5 MILLIGRAM(S): 2 INJECTION INTRAMUSCULAR; INTRAVENOUS; SUBCUTANEOUS at 22:38

## 2018-04-02 RX ADMIN — SODIUM CHLORIDE 2000 MILLILITER(S): 9 INJECTION INTRAMUSCULAR; INTRAVENOUS; SUBCUTANEOUS at 21:49

## 2018-04-02 RX ADMIN — Medication 0.2 MILLILITER(S): at 07:51

## 2018-04-02 RX ADMIN — CHLORHEXIDINE GLUCONATE 15 MILLILITER(S): 213 SOLUTION TOPICAL at 21:39

## 2018-04-02 RX ADMIN — Medication 50 GRAM(S): at 21:40

## 2018-04-02 RX ADMIN — SODIUM CHLORIDE 3 MILLILITER(S): 9 INJECTION INTRAMUSCULAR; INTRAVENOUS; SUBCUTANEOUS at 07:50

## 2018-04-02 RX ADMIN — FAMOTIDINE 20 MILLIGRAM(S): 10 INJECTION INTRAVENOUS at 08:03

## 2018-04-02 RX ADMIN — HYDROMORPHONE HYDROCHLORIDE 0.5 MILLIGRAM(S): 2 INJECTION INTRAMUSCULAR; INTRAVENOUS; SUBCUTANEOUS at 22:23

## 2018-04-02 RX ADMIN — PANTOPRAZOLE SODIUM 40 MILLIGRAM(S): 20 TABLET, DELAYED RELEASE ORAL at 21:39

## 2018-04-02 NOTE — BRIEF OPERATIVE NOTE - COMMENTS
-received 500mg solumedrol and 20mg simulect intraop  -persistently hypotensive on dopmaine and levophed intraop depsite aggressive fluid resuscitation; JENNA performed showing IBAN physiology, changed to neosynephrine and vasopressin with good response  -received 80mg IV lasix and 12.5 g mannitol

## 2018-04-02 NOTE — PATIENT PROFILE ADULT. - PSH
Ankle fracture - Left  brumstein barajas procedure 2000  AV fistula  right forearm extremity AV fistula radiocephalic ( 2018)  H/O shoulder surgery  left  H/O tubal ligation  (1996)  History of ventral hernia repair    Kidney calculi  cysto/lithotripsy multiple  S/P cholecystectomy  (2013)  Tubal ligation  1995

## 2018-04-02 NOTE — CONSULT NOTE ADULT - SUBJECTIVE AND OBJECTIVE BOX
SICU Consultation Note  =====================================================  HPI: 47y female with ESRD (not on HD but has a non-functional RUE AVF) 2/2 polycystic kidney disease is now s/p bilateral nephrectomies and right living donor kidney transplant. Warm ischemia time was 41 minutes. Patient required pressors throughout the case, initially on levo and dopamine, then switched to alisha and vaso once a JENNA revealed IBAN and severe MR. Preop ECHO was unremarkable.     Patient was received in the SICU on 0.5 of alisha only.      Surgery Information  OR time: 9 hours     EBL: 1500      UOP:              IV Fluids:       Blood Products: 5U pRBCs            PAST MEDICAL & SURGICAL HISTORY:  ESRD (end stage renal disease)  CKD (chronic kidney disease) stage 4, GFR 15-29 ml/min: no dialysis  Anxiety  GERD (gastroesophageal reflux disease)  Polycystic kidney disease  Kidney stones  Arthropathy NOS - shoulder  Hyperlipidemia  AV fistula: right forearm extremity AV fistula radiocephalic (2018)  H/O shoulder surgery: left  History of ventral hernia repair  H/O tubal ligation: (1996)  Kidney calculi: cysto/lithotripsy multiple  S/P cholecystectomy: (2013)  Tubal ligation: 1995  Ankle fracture - Left: [facility] barajas procedure 2000    Home Meds:   Allergies: Demerol HCl (Hives)  Phenergan (Hives; Rash)    Soc:   Advanced Directives: Presumed Full Code     ROS:    General: Non-Contributory  Skin/Breast: Non-Contributory  Ophthalmologic: Non-Contributory  ENMT: Non-Contributory  Respiratory and Thorax: Non-Contributory  Cardiovascular: Non-Contributory  Gastrointestinal: Non-Contributory  Genitourinary: Non-Contributory  Musculoskeletal: Non-Contributory  Neurological: Non-Contributory  Psychiatric: Non-Contributory  Hematology/Lymphatics: Non-Contributory  Endocrine: Non-Contributory  Allergic/Immunologic: Non-Contributory    CURRENT MEDICATIONS:   --------------------------------------------------------------------------------------  Neurologic Medications    Respiratory Medications    Cardiovascular Medications    Gastrointestinal Medications    Genitourinary Medications    Hematologic/Oncologic Medications    Antimicrobial/Immunologic Medications  ceFAZolin   IVPB 2000 milliGRAM(s) IV Intermittent once    Endocrine/Metabolic Medications    Topical/Other Medications    --------------------------------------------------------------------------------------    VITAL SIGNS, INS/OUTS (last 24 hours):  --------------------------------------------------------------------------------------  ((Insert SICU Vitals / Is+Os here)) ***  --------------------------------------------------------------------------------------    EXAM:  General/Neuro  RASS:   GCS:   Exam: Normal, NAD, alert, oriented x 3, no focal deficits. PERRLA  ***    Respiratory  Exam: Lungs clear to auscultation, Normal expansion/effort.  ***  [] Tracheostomy   [] Intubated  Mechanical Ventilation:     Cardiovascular  Exam: S1, S2.  Regular rate and rhythm.  Peripheral edema  ***  Cardiac Rhythm: Normal Sinus Rhythm  ECHO:     GI  Exam: Abdomen soft, Non-tender, Non-distended.  Gastrostomy / Jejunostomy tube in place.  Nasogastric tube in place.  Colostomy / Ileostomy.  ***  Wound:   ***  Current Diet:  NPO***      Tubes/Lines/Drains  ***  [x] Peripheral IV  [] Central Venous Line     	[] R	[] L	[] IJ	[] Fem	[] SC        Type:	    Date Placed:   [] Arterial Line		[] R	[] L	[] Fem	[] Rad	[] Ax	Date Placed:   [] PICC:         	[] Midline		[] Mediport           [] Urinary Catheter		Date Placed:     Extremities  Exam: Extremities warm, pink, well-perfused.        Derm:  Exam: Good skin turgor, no skin breakdown.      :   Exam: Roberts catheter in place.     LABS  --------------------------------------------------------------------------------------  ((Insert SICU Labs here))***  --------------------------------------------------------------------------------------    OTHER LABS    IMAGING RESULTS      ASSESSMENT:  47y Female ***    PLAN:   Neurologic:   Respiratory:   Cardiovascular:   Gastrointestinal/Nutrition:   Renal/Genitourinary:   Hematologic:   Infectious Disease:   Lines/Tubes:  Endocrine:   Disposition:     --------------------------------------------------------------------------------------    Critical Care Diagnoses: SICU Consultation Note  =====================================================  HPI: 47y female with ESRD (not on HD but has a non-functional RUE AVF) 2/2 polycystic kidney disease is now s/p bilateral nephrectomies and right living donor kidney transplant. Warm ischemia time was 41 minutes. Patient required pressors throughout the case, initially on levo and dopamine, but remained persistently hypotensive. She was then switched to alisha and vaso once an intraop JENNA revealed IBAN and severe MR. Given a total of 3g CaCl and 3 amps of bicarb. Preop ECHO was unremarkable. She also received 500 mg Solumedrol, 20mg Simulect, 80mg Lasix, 12.5mg mannitol intraop.     Patient was received in the SICU still intubated, on 0.5 of alisha.       Surgery Information  OR time: 9 hours     EBL: 1500      UOP: 300 mL             IV Fluids: 11 000 mL, 500 albumin      Blood Products: 5U pRBCs            PAST MEDICAL & SURGICAL HISTORY:  ESRD (end stage renal disease)  CKD (chronic kidney disease) stage 4, GFR 15-29 ml/min: no dialysis  Anxiety  GERD (gastroesophageal reflux disease)  Polycystic kidney disease  Kidney stones  Arthropathy NOS - shoulder  Hyperlipidemia  AV fistula: right forearm extremity AV fistula radiocephalic (2018)  H/O shoulder surgery: left  History of ventral hernia repair  H/O tubal ligation: (1996)  Kidney calculi: cysto/lithotripsy multiple  S/P cholecystectomy: (2013)  Tubal ligation: 1995  [facility]    Home Medications:  calcium carbonate 1250 mg (500 mg elemental calcium) oral tablet: 1 tab(s) orally 3 times a day (02 Apr 2018 07:19)  Cozaar 50 mg oral tablet: orally once a day (at bedtime) (02 Apr 2018 07:19)  ferrous sulfate 325 mg (65 mg elemental iron) oral tablet: 1 tab(s) orally once a day (02 Apr 2018 07:19)  KlonoPIN 1 mg oral tablet: 1 tab(s) orally once a day, As Needed (02 Apr 2018 07:19)  Vitamin D3 50,000 intl units oral capsule: 1 cap(s) orally once a week (02 Apr 2018 07:19)    Allergies: Demerol HCl (Hives)  Phenergan (Hives; Rash)    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    Advanced Directives: Presumed Full Code     ROS:    General: Non-Contributory  Skin/Breast: Non-Contributory  Ophthalmologic: Non-Contributory  ENMT: Non-Contributory  Respiratory and Thorax: Non-Contributory  Cardiovascular: Non-Contributory  Gastrointestinal: Non-Contributory  Genitourinary: Non-Contributory  Musculoskeletal: Non-Contributory  Neurological: Non-Contributory  Psychiatric: Non-Contributory  Hematology/Lymphatics: Non-Contributory  Endocrine: Non-Contributory  Allergic/Immunologic: Non-Contributory    CURRENT MEDICATIONS:   --------------------------------------------------------------------------------------  Neurologic Medications:  dexmedetomidine Infusion 0.2 MICROgram(s)/kG/Hr IV Continuous <Continuous>  HYDROmorphone  Injectable 0.5 milliGRAM(s) IV Push every 3 hours PRN    Respiratory Medications:    Cardiovascular Medications:  phenylephrine    Infusion 0.4 MICROgram(s)/kG/Min IV Continuous <Continuous>    Gastrointestinal Medications:  pantoprazole  Injectable 40 milliGRAM(s) IV Push daily  sodium chloride 0.9%. 1000 milliLiter(s) IV Continuous <Continuous>  sodium chloride 0.9%. 1000 milliLiter(s) IV Continuous <Continuous>    Genitourinary Medications:    Hematologic/Oncologic Medications:    Antimicrobials/Immunologic Medications:  ceFAZolin   IVPB 2000 milliGRAM(s) IV Intermittent once    Endocrine/Metabolic Medications:    Topical/Other Medications:  chlorhexidine 0.12% Liquid 15 milliLiter(s) Swish and Spit two times a day    --------------------------------------------------------------------------------------    VITAL SIGNS, INS/OUTS (last 24 hours):  --------------------------------------------------------------------------------------  Vital Signs Last 24 Hrs  T(C): 37.1 (02 Apr 2018 19:30), Max: 37.1 (02 Apr 2018 19:30)  T(F): 98.8 (02 Apr 2018 19:30), Max: 98.8 (02 Apr 2018 19:30)  HR: 81 (02 Apr 2018 20:15) (73 - 86)  BP: 98/56 (02 Apr 2018 20:00) (98/56 - 106/70)  BP(mean): 73 (02 Apr 2018 20:00) (73 - 73)  RR: 18 (02 Apr 2018 20:15) (7 - 18)  SpO2: 100% (02 Apr 2018 20:15) (96% - 100%)    I&O's Detail    02 Apr 2018 07:01  -  02 Apr 2018 20:35  --------------------------------------------------------  IN:    dexmedetomidine Infusion: 7.1 mL    phenylephrine   Infusion: 17.8 mL    sodium chloride 0.9%.: 125 mL  Total IN: 149.9 mL    OUT:    Bulb: 90 mL    Bulb: 90 mL    Indwelling Catheter - Urethral: 40 mL  Total OUT: 220 mL    Total NET: -70.1 mL    --------------------------------------------------------------------------------------    EXAM:  General/Neuro  RASS: 0  Exam: Sedated on Precedex    Respiratory  Exam: Lungs clear to auscultation, Normal expansion/effort.    [] Tracheostomy   [x] Intubated  Mechanical Ventilation: Mode: AC/ CMV (Assist Control/ Continuous Mandatory Ventilation)  RR (machine): 18  TV (machine): 500  FiO2: 50  PEEP: 5  ITime: 1  MAP: 9  PIP: 19      Cardiovascular  Exam: S1, S2.  Regular rate and rhythm.    Cardiac Rhythm: Normal Sinus Rhythm    GI  Exam: Abdomen soft, Non-tender, Non-distended.  PACO x 2 serosanguinous  Wound: Midline incision with honeycomb dressing in place  Current Diet:  NPO***      Tubes/Lines/Drains    [x] Peripheral IV  [x] Central Venous Line     	[] R	[] L	[] IJ	[] Fem	[] SC        Type:	    Date Placed:   [x] Arterial Line		[] R	[] L	[] Fem	[] Rad	[] Ax	Date Placed:   [] PICC:         	[] Midline		[] Mediport           [x] Urinary Catheter		Date Placed:     Extremities  Exam: Extremities warm, pink, well-perfused.        Derm:  Exam: Good skin turgor, no skin breakdown.      :   Exam: Roberts catheter in place.     LABS  --------------------------------------------------------------------------------------  CBC Full  -  ( 02 Apr 2018 20:11 )  WBC Count : 21.6 K/uL  Hemoglobin : 13.9 g/dL  Hematocrit : 40.1 %  Platelet Count - Automated : 184 K/uL  Mean Cell Volume : 94.8 fl  Mean Cell Hemoglobin : 32.9 pg  Mean Cell Hemoglobin Concentration : 34.7 gm/dL    04-02    142  |  109<H>  |  32<H>  ----------------------------<  226<H>  4.0   |  17<L>  |  3.37<H>    Ca    15.9<HH>      02 Apr 2018 18:42    TPro  4.1<L>  /  Alb  2.0<L>  /  TBili  1.0  /  DBili  x   /  AST  71<H>  /  ALT  52<H>  /  AlkPhos  32<L>  04-02    LIVER FUNCTIONS - ( 02 Apr 2018 18:42 )  Alb: 2.0 g/dL / Pro: 4.1 g/dL / ALK PHOS: 32 U/L / ALT: 52 U/L RC / AST: 71 U/L / GGT: x           PT/INR - ( 02 Apr 2018 18:42 )   PT: 12.1 sec;   INR: 1.11 ratio         PTT - ( 02 Apr 2018 18:42 )  PTT:28.1 sec    --------------------------------------------------------------------------------------    OTHER LABS    IMAGING RESULTS SICU Consultation Note  =====================================================  HPI: 47y female with ESRD (not on HD but has a non-functional RUE AVF) 2/2 polycystic kidney disease is now s/p bilateral nephrectomies and right living donor kidney transplant. Warm ischemia time was 41 minutes. Patient required pressors throughout the case, initially on levo and dopamine, but remained persistently hypotensive. She was then switched to alisha and vaso once an intraop JENNA revealed IBAN and severe MR. Given a total of 3g CaCl and 3 amps of bicarb. Preop ECHO was unremarkable. She also received 500 mg Solumedrol, 20mg Simulect, 80mg Lasix, 12.5mg mannitol intraop.     Patient was received in the SICU still intubated, on 0.5 of alisha.       Surgery Information  OR time: 9 hours     EBL: 1500      UOP: 300 mL             IV Fluids: 11 000 mL, 500 albumin      Blood Products: 5U pRBCs            PAST MEDICAL & SURGICAL HISTORY:  ESRD (end stage renal disease)  CKD (chronic kidney disease) stage 4, GFR 15-29 ml/min: no dialysis  Anxiety  GERD (gastroesophageal reflux disease)  Polycystic kidney disease  Kidney stones  Arthropathy NOS - shoulder  Hyperlipidemia  AV fistula: right forearm extremity AV fistula radiocephalic (2018)  H/O shoulder surgery: left  History of ventral hernia repair  H/O tubal ligation: (1996)  Kidney calculi: cysto/lithotripsy multiple  S/P cholecystectomy: (2013)  Tubal ligation: 1995  [facility]    Home Medications:  calcium carbonate 1250 mg (500 mg elemental calcium) oral tablet: 1 tab(s) orally 3 times a day (02 Apr 2018 07:19)  Cozaar 50 mg oral tablet: orally once a day (at bedtime) (02 Apr 2018 07:19)  ferrous sulfate 325 mg (65 mg elemental iron) oral tablet: 1 tab(s) orally once a day (02 Apr 2018 07:19)  KlonoPIN 1 mg oral tablet: 1 tab(s) orally once a day, As Needed (02 Apr 2018 07:19)  Vitamin D3 50,000 intl units oral capsule: 1 cap(s) orally once a week (02 Apr 2018 07:19)    Allergies: Demerol HCl (Hives)  Phenergan (Hives; Rash)    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    Advanced Directives: Presumed Full Code     ROS:    General: Non-Contributory  Skin/Breast: Non-Contributory  Ophthalmologic: Non-Contributory  ENMT: Non-Contributory  Respiratory and Thorax: Non-Contributory  Cardiovascular: Non-Contributory  Gastrointestinal: Non-Contributory  Genitourinary: Non-Contributory  Musculoskeletal: Non-Contributory  Neurological: Non-Contributory  Psychiatric: Non-Contributory  Hematology/Lymphatics: Non-Contributory  Endocrine: Non-Contributory  Allergic/Immunologic: Non-Contributory    CURRENT MEDICATIONS:   --------------------------------------------------------------------------------------  Neurologic Medications:  dexmedetomidine Infusion 0.2 MICROgram(s)/kG/Hr IV Continuous <Continuous>  HYDROmorphone  Injectable 0.5 milliGRAM(s) IV Push every 3 hours PRN    Respiratory Medications:    Cardiovascular Medications:  phenylephrine    Infusion 0.4 MICROgram(s)/kG/Min IV Continuous <Continuous>    Gastrointestinal Medications:  pantoprazole  Injectable 40 milliGRAM(s) IV Push daily  sodium chloride 0.9%. 1000 milliLiter(s) IV Continuous <Continuous>  sodium chloride 0.9%. 1000 milliLiter(s) IV Continuous <Continuous>    Genitourinary Medications:    Hematologic/Oncologic Medications:    Antimicrobials/Immunologic Medications:  ceFAZolin   IVPB 2000 milliGRAM(s) IV Intermittent once    Endocrine/Metabolic Medications:    Topical/Other Medications:  chlorhexidine 0.12% Liquid 15 milliLiter(s) Swish and Spit two times a day    --------------------------------------------------------------------------------------    VITAL SIGNS, INS/OUTS (last 24 hours):  --------------------------------------------------------------------------------------  Vital Signs Last 24 Hrs  T(C): 37.1 (02 Apr 2018 19:30), Max: 37.1 (02 Apr 2018 19:30)  T(F): 98.8 (02 Apr 2018 19:30), Max: 98.8 (02 Apr 2018 19:30)  HR: 81 (02 Apr 2018 20:15) (73 - 86)  BP: 98/56 (02 Apr 2018 20:00) (98/56 - 106/70)  BP(mean): 73 (02 Apr 2018 20:00) (73 - 73)  RR: 18 (02 Apr 2018 20:15) (7 - 18)  SpO2: 100% (02 Apr 2018 20:15) (96% - 100%)    I&O's Detail    02 Apr 2018 07:01  -  02 Apr 2018 20:35  --------------------------------------------------------  IN:    dexmedetomidine Infusion: 7.1 mL    phenylephrine   Infusion: 17.8 mL    sodium chloride 0.9%.: 125 mL  Total IN: 149.9 mL    OUT:    Bulb: 90 mL    Bulb: 90 mL    Indwelling Catheter - Urethral: 40 mL  Total OUT: 220 mL    Total NET: -70.1 mL    --------------------------------------------------------------------------------------    EXAM:  General/Neuro  RASS: 0  Exam: Sedated on Precedex    Respiratory  Exam: Lungs clear to auscultation, Normal expansion/effort.    [] Tracheostomy   [x] Intubated  Mechanical Ventilation: Mode: AC/ CMV (Assist Control/ Continuous Mandatory Ventilation)  RR (machine): 18  TV (machine): 500  FiO2: 50  PEEP: 5  ITime: 1  MAP: 9  PIP: 19      Cardiovascular  Exam: S1, S2.  Regular rate and rhythm.    Cardiac Rhythm: Normal Sinus Rhythm    GI  Exam: Abdomen soft, Non-tender, Non-distended.  PACO x 2 serosanguinous  Wound: Midline incision with honeycomb dressing in place  Current Diet:  NPO***      Tubes/Lines/Drains    [x] Peripheral IV  [x] Central Venous Line     	[x] R	[] L	[x] IJ	[] Fem	[] SC        Type:	    Date Placed: 4/2  [x] Arterial Line		[] R	[x] L	[] Fem	[x] Rad	[] Ax	Date Placed: 4/2  [] PICC:         	[] Midline		[] Mediport           [x] Urinary Catheter		Date Placed: 4/2    Extremities  Exam: Extremities warm, pink, well-perfused.        Derm:  Exam: Good skin turgor, no skin breakdown.      :   Exam: Roberts catheter in place.     LABS  --------------------------------------------------------------------------------------  CBC Full  -  ( 02 Apr 2018 20:11 )  WBC Count : 21.6 K/uL  Hemoglobin : 13.9 g/dL  Hematocrit : 40.1 %  Platelet Count - Automated : 184 K/uL  Mean Cell Volume : 94.8 fl  Mean Cell Hemoglobin : 32.9 pg  Mean Cell Hemoglobin Concentration : 34.7 gm/dL    04-02    142  |  109<H>  |  32<H>  ----------------------------<  226<H>  4.0   |  17<L>  |  3.37<H>    Ca    15.9<HH>      02 Apr 2018 18:42    TPro  4.1<L>  /  Alb  2.0<L>  /  TBili  1.0  /  DBili  x   /  AST  71<H>  /  ALT  52<H>  /  AlkPhos  32<L>  04-02    LIVER FUNCTIONS - ( 02 Apr 2018 18:42 )  Alb: 2.0 g/dL / Pro: 4.1 g/dL / ALK PHOS: 32 U/L / ALT: 52 U/L RC / AST: 71 U/L / GGT: x           PT/INR - ( 02 Apr 2018 18:42 )   PT: 12.1 sec;   INR: 1.11 ratio         PTT - ( 02 Apr 2018 18:42 )  PTT:28.1 sec    --------------------------------------------------------------------------------------

## 2018-04-02 NOTE — BRIEF OPERATIVE NOTE - OPERATION/FINDINGS
-bilateral open transabdominal nephrectomies; very large, cystic kidneys removed  -living donor renal transplant to right external iliac vessels and bladder; good doppler signal in transplant at end of case; kidney secured intraperitoneally with Evicel  -warm ischemia time 41 minutes

## 2018-04-02 NOTE — BRIEF OPERATIVE NOTE - PROCEDURE
<<-----Click on this checkbox to enter Procedure Transplant kidney  04/02/2018    Active  [id number]  Nephrectomy of both native kidneys before transplant  04/02/2018    Active  [id number]

## 2018-04-02 NOTE — CONSULT NOTE ADULT - ASSESSMENT
ASSESSMENT:  47y Female s/p bilateral nephrectomies for PCKD and right living donor renal transplant.    PLAN:   Neurologic:   Respiratory:   Cardiovascular:   Gastrointestinal/Nutrition:   Renal/Genitourinary:   Hematologic:   Infectious Disease:   Lines/Tubes:  Endocrine:   Disposition: SICU ASSESSMENT:  47y Female s/p bilateral nephrectomies for PCKD and right living donor renal transplant.    PLAN:   Neurologic: Sedation while intubated  - Precedex and Dilaudid PRN    Respiratory: Remains intubated in the setting of lactic acidosis and hypotension. No evidence of pulmonary edema.  - Continue with mechanical ventilation  - SBT in AM    Cardiovascular: Lactic acidosis. Pressor requirements remain stable.  - Continue with NS @ 125. Trend lactate. May require additional fluid boluses  - Receiving 500 mL NS bolus    Gastrointestinal/Nutrition: No acute issues.   - OGT decompression  - NPO    Renal/Genitourinary: s/p living donor nephrectomy. Received 80mg Lasix intraop  - NS @ 125  - Replete electrolytes PRN  - Postop renal Duplex demonstrates good flow  - Start immunosuppression tomorrow per transplant team    Hematologic: No acute issues. s/p 5units pRBCs intraop  - Holding chemical VTE ppx    Infectious Disease: s/p preop Ancef  - Monitor for fevers    Lines/Tubes: Roberts, RIJ central line, L radial arterial line    Endocrine: No acute issues. Received solumedrol intraop  - Monitor glucose on BMP    Disposition: SICU

## 2018-04-02 NOTE — BRIEF OPERATIVE NOTE - POST-OP DX
ESRD (end stage renal disease)  04/02/2018    Active  Milana Wen  Polycystic kidney disease  04/02/2018    Active  Milana Wen

## 2018-04-03 ENCOUNTER — MOBILE ON CALL (OUTPATIENT)
Age: 48
End: 2018-04-03

## 2018-04-03 ENCOUNTER — TRANSCRIPTION ENCOUNTER (OUTPATIENT)
Age: 48
End: 2018-04-03

## 2018-04-03 DIAGNOSIS — D89.9 DISORDER INVOLVING THE IMMUNE MECHANISM, UNSPECIFIED: ICD-10-CM

## 2018-04-03 DIAGNOSIS — E87.2 ACIDOSIS: ICD-10-CM

## 2018-04-03 DIAGNOSIS — I95.9 HYPOTENSION, UNSPECIFIED: ICD-10-CM

## 2018-04-03 DIAGNOSIS — Z94.0 KIDNEY TRANSPLANT STATUS: ICD-10-CM

## 2018-04-03 LAB
ALBUMIN SERPL ELPH-MCNC: 2.2 G/DL — LOW (ref 3.3–5)
ALBUMIN SERPL ELPH-MCNC: 2.5 G/DL — LOW (ref 3.3–5)
ALP SERPL-CCNC: 25 U/L — LOW (ref 40–120)
ALP SERPL-CCNC: 25 U/L — LOW (ref 40–120)
ALT FLD-CCNC: 35 U/L RC — SIGNIFICANT CHANGE UP (ref 10–45)
ALT FLD-CCNC: 40 U/L RC — SIGNIFICANT CHANGE UP (ref 10–45)
ANION GAP SERPL CALC-SCNC: 12 MMOL/L — SIGNIFICANT CHANGE UP (ref 5–17)
ANION GAP SERPL CALC-SCNC: 15 MMOL/L — SIGNIFICANT CHANGE UP (ref 5–17)
ANION GAP SERPL CALC-SCNC: 21 MMOL/L — HIGH (ref 5–17)
APTT BLD: 28 SEC — SIGNIFICANT CHANGE UP (ref 27.5–37.4)
APTT BLD: 28.5 SEC — SIGNIFICANT CHANGE UP (ref 27.5–37.4)
AST SERPL-CCNC: 47 U/L — HIGH (ref 10–40)
AST SERPL-CCNC: 52 U/L — HIGH (ref 10–40)
BILIRUB DIRECT SERPL-MCNC: 0.1 MG/DL — SIGNIFICANT CHANGE UP (ref 0–0.2)
BILIRUB DIRECT SERPL-MCNC: 0.1 MG/DL — SIGNIFICANT CHANGE UP (ref 0–0.2)
BILIRUB INDIRECT FLD-MCNC: 0.5 MG/DL — SIGNIFICANT CHANGE UP (ref 0.2–1)
BILIRUB INDIRECT FLD-MCNC: 0.5 MG/DL — SIGNIFICANT CHANGE UP (ref 0.2–1)
BILIRUB SERPL-MCNC: 0.6 MG/DL — SIGNIFICANT CHANGE UP (ref 0.2–1.2)
BILIRUB SERPL-MCNC: 0.6 MG/DL — SIGNIFICANT CHANGE UP (ref 0.2–1.2)
BUN SERPL-MCNC: 34 MG/DL — HIGH (ref 7–23)
BUN SERPL-MCNC: 38 MG/DL — HIGH (ref 7–23)
BUN SERPL-MCNC: 41 MG/DL — HIGH (ref 7–23)
CALCIUM SERPL-MCNC: 7.5 MG/DL — LOW (ref 8.4–10.5)
CALCIUM SERPL-MCNC: 7.7 MG/DL — LOW (ref 8.4–10.5)
CALCIUM SERPL-MCNC: 8 MG/DL — LOW (ref 8.4–10.5)
CHLORIDE SERPL-SCNC: 109 MMOL/L — HIGH (ref 96–108)
CHLORIDE SERPL-SCNC: 110 MMOL/L — HIGH (ref 96–108)
CHLORIDE SERPL-SCNC: 111 MMOL/L — HIGH (ref 96–108)
CO2 SERPL-SCNC: 13 MMOL/L — LOW (ref 22–31)
CO2 SERPL-SCNC: 18 MMOL/L — LOW (ref 22–31)
CO2 SERPL-SCNC: 19 MMOL/L — LOW (ref 22–31)
CREAT SERPL-MCNC: 3.7 MG/DL — HIGH (ref 0.5–1.3)
CREAT SERPL-MCNC: 3.78 MG/DL — HIGH (ref 0.5–1.3)
CREAT SERPL-MCNC: 3.8 MG/DL — HIGH (ref 0.5–1.3)
FIBRINOGEN PPP-MCNC: 415 MG/DL — SIGNIFICANT CHANGE UP (ref 310–510)
GAS PNL BLDA: SIGNIFICANT CHANGE UP
GLUCOSE SERPL-MCNC: 151 MG/DL — HIGH (ref 70–99)
GLUCOSE SERPL-MCNC: 159 MG/DL — HIGH (ref 70–99)
GLUCOSE SERPL-MCNC: 184 MG/DL — HIGH (ref 70–99)
HCT VFR BLD CALC: 32.9 % — LOW (ref 34.5–45)
HCT VFR BLD CALC: 33.9 % — LOW (ref 34.5–45)
HCT VFR BLD CALC: 38 % — SIGNIFICANT CHANGE UP (ref 34.5–45)
HGB BLD-MCNC: 11.6 G/DL — SIGNIFICANT CHANGE UP (ref 11.5–15.5)
HGB BLD-MCNC: 12 G/DL — SIGNIFICANT CHANGE UP (ref 11.5–15.5)
HGB BLD-MCNC: 13.5 G/DL — SIGNIFICANT CHANGE UP (ref 11.5–15.5)
INR BLD: 1.08 RATIO — SIGNIFICANT CHANGE UP (ref 0.88–1.16)
INR BLD: 1.12 RATIO — SIGNIFICANT CHANGE UP (ref 0.88–1.16)
LACTATE BLDV-MCNC: 2 MMOL/L — SIGNIFICANT CHANGE UP (ref 0.7–2)
LDH SERPL L TO P-CCNC: 263 U/L — HIGH (ref 50–242)
MAGNESIUM SERPL-MCNC: 1.9 MG/DL — SIGNIFICANT CHANGE UP (ref 1.6–2.6)
MAGNESIUM SERPL-MCNC: 2 MG/DL — SIGNIFICANT CHANGE UP (ref 1.6–2.6)
MCHC RBC-ENTMCNC: 33.1 PG — SIGNIFICANT CHANGE UP (ref 27–34)
MCHC RBC-ENTMCNC: 33.2 PG — SIGNIFICANT CHANGE UP (ref 27–34)
MCHC RBC-ENTMCNC: 33.4 PG — SIGNIFICANT CHANGE UP (ref 27–34)
MCHC RBC-ENTMCNC: 35.2 GM/DL — SIGNIFICANT CHANGE UP (ref 32–36)
MCHC RBC-ENTMCNC: 35.3 GM/DL — SIGNIFICANT CHANGE UP (ref 32–36)
MCHC RBC-ENTMCNC: 35.6 GM/DL — SIGNIFICANT CHANGE UP (ref 32–36)
MCV RBC AUTO: 93.7 FL — SIGNIFICANT CHANGE UP (ref 80–100)
MCV RBC AUTO: 93.9 FL — SIGNIFICANT CHANGE UP (ref 80–100)
MCV RBC AUTO: 94.3 FL — SIGNIFICANT CHANGE UP (ref 80–100)
PHOSPHATE SERPL-MCNC: 3.1 MG/DL — SIGNIFICANT CHANGE UP (ref 2.5–4.5)
PHOSPHATE SERPL-MCNC: 4.1 MG/DL — SIGNIFICANT CHANGE UP (ref 2.5–4.5)
PHOSPHATE SERPL-MCNC: 4.3 MG/DL — SIGNIFICANT CHANGE UP (ref 2.5–4.5)
PLATELET # BLD AUTO: 116 K/UL — LOW (ref 150–400)
PLATELET # BLD AUTO: 120 K/UL — LOW (ref 150–400)
PLATELET # BLD AUTO: 151 K/UL — SIGNIFICANT CHANGE UP (ref 150–400)
POTASSIUM SERPL-MCNC: 3.7 MMOL/L — SIGNIFICANT CHANGE UP (ref 3.5–5.3)
POTASSIUM SERPL-MCNC: 3.8 MMOL/L — SIGNIFICANT CHANGE UP (ref 3.5–5.3)
POTASSIUM SERPL-MCNC: 3.8 MMOL/L — SIGNIFICANT CHANGE UP (ref 3.5–5.3)
POTASSIUM SERPL-SCNC: 3.7 MMOL/L — SIGNIFICANT CHANGE UP (ref 3.5–5.3)
POTASSIUM SERPL-SCNC: 3.8 MMOL/L — SIGNIFICANT CHANGE UP (ref 3.5–5.3)
POTASSIUM SERPL-SCNC: 3.8 MMOL/L — SIGNIFICANT CHANGE UP (ref 3.5–5.3)
PROT SERPL-MCNC: 4.4 G/DL — LOW (ref 6–8.3)
PROT SERPL-MCNC: 4.5 G/DL — LOW (ref 6–8.3)
PROTHROM AB SERPL-ACNC: 11.8 SEC — SIGNIFICANT CHANGE UP (ref 9.8–12.7)
PROTHROM AB SERPL-ACNC: 12.1 SEC — SIGNIFICANT CHANGE UP (ref 9.8–12.7)
RBC # BLD: 3.51 M/UL — LOW (ref 3.8–5.2)
RBC # BLD: 3.6 M/UL — LOW (ref 3.8–5.2)
RBC # BLD: 4.06 M/UL — SIGNIFICANT CHANGE UP (ref 3.8–5.2)
RBC # FLD: 13 % — SIGNIFICANT CHANGE UP (ref 10.3–14.5)
RBC # FLD: 13.1 % — SIGNIFICANT CHANGE UP (ref 10.3–14.5)
RBC # FLD: 13.2 % — SIGNIFICANT CHANGE UP (ref 10.3–14.5)
SODIUM SERPL-SCNC: 142 MMOL/L — SIGNIFICANT CHANGE UP (ref 135–145)
SODIUM SERPL-SCNC: 143 MMOL/L — SIGNIFICANT CHANGE UP (ref 135–145)
SODIUM SERPL-SCNC: 143 MMOL/L — SIGNIFICANT CHANGE UP (ref 135–145)
WBC # BLD: 13.7 K/UL — HIGH (ref 3.8–10.5)
WBC # BLD: 14.4 K/UL — HIGH (ref 3.8–10.5)
WBC # BLD: 16.1 K/UL — HIGH (ref 3.8–10.5)
WBC # FLD AUTO: 13.7 K/UL — HIGH (ref 3.8–10.5)
WBC # FLD AUTO: 14.4 K/UL — HIGH (ref 3.8–10.5)
WBC # FLD AUTO: 16.1 K/UL — HIGH (ref 3.8–10.5)

## 2018-04-03 PROCEDURE — 99222 1ST HOSP IP/OBS MODERATE 55: CPT | Mod: GC

## 2018-04-03 PROCEDURE — 71045 X-RAY EXAM CHEST 1 VIEW: CPT | Mod: 26

## 2018-04-03 PROCEDURE — 99291 CRITICAL CARE FIRST HOUR: CPT

## 2018-04-03 RX ORDER — TACROLIMUS 5 MG/1
2 CAPSULE ORAL ONCE
Qty: 0 | Refills: 0 | Status: COMPLETED | OUTPATIENT
Start: 2018-04-03 | End: 2018-04-03

## 2018-04-03 RX ORDER — MAGNESIUM SULFATE 500 MG/ML
2 VIAL (ML) INJECTION ONCE
Qty: 0 | Refills: 0 | Status: COMPLETED | OUTPATIENT
Start: 2018-04-03 | End: 2018-04-03

## 2018-04-03 RX ORDER — SODIUM CHLORIDE 9 MG/ML
1000 INJECTION, SOLUTION INTRAVENOUS
Qty: 0 | Refills: 0 | Status: DISCONTINUED | OUTPATIENT
Start: 2018-04-03 | End: 2018-04-04

## 2018-04-03 RX ORDER — NYSTATIN 500MM UNIT
500000 POWDER (EA) MISCELLANEOUS
Qty: 0 | Refills: 0 | Status: DISCONTINUED | OUTPATIENT
Start: 2018-04-03 | End: 2018-04-13

## 2018-04-03 RX ORDER — ACETAMINOPHEN 500 MG
975 TABLET ORAL EVERY 6 HOURS
Qty: 0 | Refills: 0 | Status: DISCONTINUED | OUTPATIENT
Start: 2018-04-03 | End: 2018-04-05

## 2018-04-03 RX ORDER — TACROLIMUS 5 MG/1
2 CAPSULE ORAL
Qty: 0 | Refills: 0 | Status: DISCONTINUED | OUTPATIENT
Start: 2018-04-03 | End: 2018-04-07

## 2018-04-03 RX ORDER — HYDROMORPHONE HYDROCHLORIDE 2 MG/ML
0.5 INJECTION INTRAMUSCULAR; INTRAVENOUS; SUBCUTANEOUS
Qty: 0 | Refills: 0 | Status: DISCONTINUED | OUTPATIENT
Start: 2018-04-03 | End: 2018-04-10

## 2018-04-03 RX ORDER — CEFAZOLIN SODIUM 1 G
1000 VIAL (EA) INJECTION EVERY 8 HOURS
Qty: 0 | Refills: 0 | Status: DISCONTINUED | OUTPATIENT
Start: 2018-04-03 | End: 2018-04-04

## 2018-04-03 RX ORDER — ONDANSETRON 8 MG/1
4 TABLET, FILM COATED ORAL EVERY 6 HOURS
Qty: 0 | Refills: 0 | Status: DISCONTINUED | OUTPATIENT
Start: 2018-04-03 | End: 2018-04-05

## 2018-04-03 RX ORDER — SENNA PLUS 8.6 MG/1
2 TABLET ORAL AT BEDTIME
Qty: 0 | Refills: 0 | Status: DISCONTINUED | OUTPATIENT
Start: 2018-04-03 | End: 2018-04-09

## 2018-04-03 RX ORDER — OXYCODONE HYDROCHLORIDE 5 MG/1
5 TABLET ORAL EVERY 6 HOURS
Qty: 0 | Refills: 0 | Status: DISCONTINUED | OUTPATIENT
Start: 2018-04-03 | End: 2018-04-05

## 2018-04-03 RX ORDER — FAMOTIDINE 10 MG/ML
20 INJECTION INTRAVENOUS DAILY
Qty: 0 | Refills: 0 | Status: DISCONTINUED | OUTPATIENT
Start: 2018-04-03 | End: 2018-04-13

## 2018-04-03 RX ORDER — SODIUM CHLORIDE 9 MG/ML
1000 INJECTION, SOLUTION INTRAVENOUS
Qty: 0 | Refills: 0 | Status: DISCONTINUED | OUTPATIENT
Start: 2018-04-03 | End: 2018-04-03

## 2018-04-03 RX ORDER — MYCOPHENOLATE MOFETIL 250 MG/1
1000 CAPSULE ORAL ONCE
Qty: 0 | Refills: 0 | Status: COMPLETED | OUTPATIENT
Start: 2018-04-03 | End: 2018-04-03

## 2018-04-03 RX ORDER — CEFAZOLIN SODIUM 1 G
VIAL (EA) INJECTION
Qty: 0 | Refills: 0 | Status: DISCONTINUED | OUTPATIENT
Start: 2018-04-03 | End: 2018-04-04

## 2018-04-03 RX ORDER — NALOXONE HYDROCHLORIDE 4 MG/.1ML
0.1 SPRAY NASAL
Qty: 0 | Refills: 0 | Status: DISCONTINUED | OUTPATIENT
Start: 2018-04-03 | End: 2018-04-05

## 2018-04-03 RX ORDER — SODIUM CHLORIDE 9 MG/ML
500 INJECTION INTRAMUSCULAR; INTRAVENOUS; SUBCUTANEOUS ONCE
Qty: 0 | Refills: 0 | Status: COMPLETED | OUTPATIENT
Start: 2018-04-03 | End: 2018-04-03

## 2018-04-03 RX ORDER — MYCOPHENOLATE MOFETIL 250 MG/1
1000 CAPSULE ORAL
Qty: 0 | Refills: 0 | Status: DISCONTINUED | OUTPATIENT
Start: 2018-04-03 | End: 2018-04-07

## 2018-04-03 RX ORDER — HYDROMORPHONE HYDROCHLORIDE 2 MG/ML
30 INJECTION INTRAMUSCULAR; INTRAVENOUS; SUBCUTANEOUS
Qty: 0 | Refills: 0 | Status: DISCONTINUED | OUTPATIENT
Start: 2018-04-03 | End: 2018-04-05

## 2018-04-03 RX ORDER — CEFAZOLIN SODIUM 1 G
1000 VIAL (EA) INJECTION ONCE
Qty: 0 | Refills: 0 | Status: COMPLETED | OUTPATIENT
Start: 2018-04-03 | End: 2018-04-03

## 2018-04-03 RX ORDER — HYDROMORPHONE HYDROCHLORIDE 2 MG/ML
0.5 INJECTION INTRAMUSCULAR; INTRAVENOUS; SUBCUTANEOUS
Qty: 0 | Refills: 0 | Status: DISCONTINUED | OUTPATIENT
Start: 2018-04-03 | End: 2018-04-05

## 2018-04-03 RX ORDER — DOCUSATE SODIUM 100 MG
100 CAPSULE ORAL THREE TIMES A DAY
Qty: 0 | Refills: 0 | Status: DISCONTINUED | OUTPATIENT
Start: 2018-04-03 | End: 2018-04-09

## 2018-04-03 RX ORDER — VALGANCICLOVIR 450 MG/1
450 TABLET, FILM COATED ORAL DAILY
Qty: 0 | Refills: 0 | Status: DISCONTINUED | OUTPATIENT
Start: 2018-04-03 | End: 2018-04-13

## 2018-04-03 RX ORDER — BASILIXIMAB 20 MG/5ML
20 INJECTION, POWDER, FOR SOLUTION INTRAVENOUS ONCE
Qty: 0 | Refills: 0 | Status: COMPLETED | OUTPATIENT
Start: 2018-04-06 | End: 2018-04-06

## 2018-04-03 RX ADMIN — Medication 100 MILLIGRAM(S): at 18:02

## 2018-04-03 RX ADMIN — HYDROMORPHONE HYDROCHLORIDE 0.5 MILLIGRAM(S): 2 INJECTION INTRAMUSCULAR; INTRAVENOUS; SUBCUTANEOUS at 16:53

## 2018-04-03 RX ADMIN — OXYCODONE HYDROCHLORIDE 5 MILLIGRAM(S): 5 TABLET ORAL at 19:15

## 2018-04-03 RX ADMIN — Medication 500000 UNIT(S): at 18:01

## 2018-04-03 RX ADMIN — SODIUM CHLORIDE 125 MILLILITER(S): 9 INJECTION, SOLUTION INTRAVENOUS at 09:23

## 2018-04-03 RX ADMIN — HYDROMORPHONE HYDROCHLORIDE 0.5 MILLIGRAM(S): 2 INJECTION INTRAMUSCULAR; INTRAVENOUS; SUBCUTANEOUS at 07:46

## 2018-04-03 RX ADMIN — HYDROMORPHONE HYDROCHLORIDE 0.5 MILLIGRAM(S): 2 INJECTION INTRAMUSCULAR; INTRAVENOUS; SUBCUTANEOUS at 23:30

## 2018-04-03 RX ADMIN — Medication 100 MILLIGRAM(S): at 21:05

## 2018-04-03 RX ADMIN — CHLORHEXIDINE GLUCONATE 15 MILLILITER(S): 213 SOLUTION TOPICAL at 05:40

## 2018-04-03 RX ADMIN — HYDROMORPHONE HYDROCHLORIDE 0.5 MILLIGRAM(S): 2 INJECTION INTRAMUSCULAR; INTRAVENOUS; SUBCUTANEOUS at 20:14

## 2018-04-03 RX ADMIN — SODIUM CHLORIDE 999 MILLILITER(S): 9 INJECTION INTRAMUSCULAR; INTRAVENOUS; SUBCUTANEOUS at 05:45

## 2018-04-03 RX ADMIN — CHLORHEXIDINE GLUCONATE 15 MILLILITER(S): 213 SOLUTION TOPICAL at 18:01

## 2018-04-03 RX ADMIN — MYCOPHENOLATE MOFETIL 1000 MILLIGRAM(S): 250 CAPSULE ORAL at 10:12

## 2018-04-03 RX ADMIN — PHENYLEPHRINE HYDROCHLORIDE 14.29 MICROGRAM(S)/KG/MIN: 10 INJECTION INTRAVENOUS at 06:48

## 2018-04-03 RX ADMIN — Medication 125 MILLIGRAM(S): at 10:11

## 2018-04-03 RX ADMIN — SODIUM CHLORIDE 125 MILLILITER(S): 9 INJECTION INTRAMUSCULAR; INTRAVENOUS; SUBCUTANEOUS at 05:45

## 2018-04-03 RX ADMIN — Medication 975 MILLIGRAM(S): at 22:50

## 2018-04-03 RX ADMIN — Medication 975 MILLIGRAM(S): at 23:20

## 2018-04-03 RX ADMIN — Medication 50 GRAM(S): at 11:29

## 2018-04-03 RX ADMIN — Medication 500000 UNIT(S): at 11:30

## 2018-04-03 RX ADMIN — DEXMEDETOMIDINE HYDROCHLORIDE IN 0.9% SODIUM CHLORIDE 4.76 MICROGRAM(S)/KG/HR: 4 INJECTION INTRAVENOUS at 09:24

## 2018-04-03 RX ADMIN — TACROLIMUS 2 MILLIGRAM(S): 5 CAPSULE ORAL at 10:11

## 2018-04-03 RX ADMIN — Medication 125 MILLIGRAM(S): at 18:01

## 2018-04-03 RX ADMIN — Medication 100 MILLIGRAM(S): at 10:11

## 2018-04-03 RX ADMIN — HYDROMORPHONE HYDROCHLORIDE 0.5 MILLIGRAM(S): 2 INJECTION INTRAMUSCULAR; INTRAVENOUS; SUBCUTANEOUS at 13:23

## 2018-04-03 RX ADMIN — HYDROMORPHONE HYDROCHLORIDE 30 MILLILITER(S): 2 INJECTION INTRAMUSCULAR; INTRAVENOUS; SUBCUTANEOUS at 23:45

## 2018-04-03 RX ADMIN — HYDROMORPHONE HYDROCHLORIDE 0.5 MILLIGRAM(S): 2 INJECTION INTRAMUSCULAR; INTRAVENOUS; SUBCUTANEOUS at 16:36

## 2018-04-03 RX ADMIN — PHENYLEPHRINE HYDROCHLORIDE 14.29 MICROGRAM(S)/KG/MIN: 10 INJECTION INTRAVENOUS at 09:23

## 2018-04-03 RX ADMIN — DEXMEDETOMIDINE HYDROCHLORIDE IN 0.9% SODIUM CHLORIDE 4.76 MICROGRAM(S)/KG/HR: 4 INJECTION INTRAVENOUS at 05:45

## 2018-04-03 RX ADMIN — TACROLIMUS 2 MILLIGRAM(S): 5 CAPSULE ORAL at 18:01

## 2018-04-03 RX ADMIN — OXYCODONE HYDROCHLORIDE 5 MILLIGRAM(S): 5 TABLET ORAL at 18:18

## 2018-04-03 RX ADMIN — Medication 1 TABLET(S): at 11:30

## 2018-04-03 RX ADMIN — HYDROMORPHONE HYDROCHLORIDE 0.5 MILLIGRAM(S): 2 INJECTION INTRAMUSCULAR; INTRAVENOUS; SUBCUTANEOUS at 07:31

## 2018-04-03 RX ADMIN — SODIUM CHLORIDE 2000 MILLILITER(S): 9 INJECTION INTRAMUSCULAR; INTRAVENOUS; SUBCUTANEOUS at 03:30

## 2018-04-03 RX ADMIN — SODIUM CHLORIDE 125 MILLILITER(S): 9 INJECTION, SOLUTION INTRAVENOUS at 18:02

## 2018-04-03 RX ADMIN — SENNA PLUS 2 TABLET(S): 8.6 TABLET ORAL at 21:05

## 2018-04-03 RX ADMIN — PANTOPRAZOLE SODIUM 40 MILLIGRAM(S): 20 TABLET, DELAYED RELEASE ORAL at 11:30

## 2018-04-03 RX ADMIN — MYCOPHENOLATE MOFETIL 1000 MILLIGRAM(S): 250 CAPSULE ORAL at 18:02

## 2018-04-03 RX ADMIN — HYDROMORPHONE HYDROCHLORIDE 0.5 MILLIGRAM(S): 2 INJECTION INTRAMUSCULAR; INTRAVENOUS; SUBCUTANEOUS at 20:30

## 2018-04-03 RX ADMIN — VALGANCICLOVIR 450 MILLIGRAM(S): 450 TABLET, FILM COATED ORAL at 11:30

## 2018-04-03 RX ADMIN — HYDROMORPHONE HYDROCHLORIDE 0.5 MILLIGRAM(S): 2 INJECTION INTRAMUSCULAR; INTRAVENOUS; SUBCUTANEOUS at 23:45

## 2018-04-03 RX ADMIN — FAMOTIDINE 20 MILLIGRAM(S): 10 INJECTION INTRAVENOUS at 11:30

## 2018-04-03 RX ADMIN — HYDROMORPHONE HYDROCHLORIDE 0.5 MILLIGRAM(S): 2 INJECTION INTRAMUSCULAR; INTRAVENOUS; SUBCUTANEOUS at 13:38

## 2018-04-03 NOTE — PROGRESS NOTE ADULT - SUBJECTIVE AND OBJECTIVE BOX
47y F admitted for a living related kidney transplant on 4/2/18    Outpatient medication reconciliation reviewed and will be re-started appropriately.  Plan discussed with multidisciplinary team.     Continue current immunosuppressive regimen.  Induction:  Simulect 20 mg on post-operative day 0 (intra-operative) and POD4  Methylprednisolone taper (500 mg, 125 mg twice daily, 60 mg twice daily, 30 mg twice daily) then to PO prednisone taper    Maintenance:  Tacrolimus adjusted to trough (8-10 ng/mL)  Mycophenolate mofetil 1000 mg q12h  Prednisone taper starting POD4 (20 mg twice daily, 10 mg twice daily, 5 mg twice daily, 5 mg daily)    Anti-infective prophylaxis:  Bactrim SS daily  Valcyte (adjusted to renal function)  Nystatin swish and swallow four times daily     GI/DVT ppx:  Famotidine 20 mg daily

## 2018-04-03 NOTE — DIETITIAN INITIAL EVALUATION ADULT. - NS FNS WEIGHT USED FOR CALC
calorie needs based on dosing wt 95.3Kg; protein needs based on IBW 65.9Kg. Fluids: defer to team/other (specify)

## 2018-04-03 NOTE — DIETITIAN INITIAL EVALUATION ADULT. - PERTINENT MEDS FT
Ancef, nystatin, bactrim, valcyte, IV NaCl, pepcid, Solu-Medrol, CellCept, Prograf, precedex, dilaudid, dilaudid, protonix, Ancef, nystatin, bactrim, valcyte, IV NaCl, pepcid, Solu-Medrol, CellCept, Prograf, precedex, dilaudid, protonix, phenylephrine

## 2018-04-03 NOTE — PROGRESS NOTE ADULT - SUBJECTIVE AND OBJECTIVE BOX
Transplant Surgery Daily Progress Note    SUBJECTIVE:  Patient transferred to ICU following surgery on 4/2 for hypotension. Remained intubated and on pressor support overnight. The patient also acidotic overnight, prompting bicarb with IVF.     OBJECTIVE:     ** VITAL SIGNS / I&O's **    T(C): 37.3 (04-03-18 @ 11:00), Max: 37.7 (04-03-18 @ 07:15)  T(F): 99.1 (04-03-18 @ 11:00), Max: 99.9 (04-03-18 @ 07:15)  HR: 68 (04-03-18 @ 11:17) (68 - 86)  BP: 102/58 (04-03-18 @ 11:15) (98/56 - 122/59)  RR: 22 (04-03-18 @ 11:15) (7 - 31)  SpO2: 98% (04-03-18 @ 11:17) (97% - 100%)      02 Apr 2018 07:01  -  03 Apr 2018 07:00  --------------------------------------------------------  IN:    dexmedetomidine Infusion: 100 mL    dexmedetomidine Infusion: 7.1 mL    phenylephrine   Infusion: 195.8 mL    sodium chloride 0.9%: 1500 mL    sodium chloride 0.9%: 365 mL    Sodium Chloride 0.9% IV Bolus: 500 mL  Total IN: 2667.9 mL    OUT:    Bulb: 300 mL    Bulb: 360 mL    Indwelling Catheter - Urethral: 915 mL    Nasoenteral Tube: 100 mL  Total OUT: 1675 mL    Total NET: 992.9 mL      03 Apr 2018 07:01  -  03 Apr 2018 11:30  --------------------------------------------------------  IN:    dexmedetomidine Infusion: 47.6 mL    phenylephrine   Infusion: 48.1 mL    sodium chloride 0.45%: 250 mL    sodium chloride 0.9%: 250 mL  Total IN: 595.7 mL    OUT:    Bulb: 120 mL    Bulb: 100 mL    Indwelling Catheter - Urethral: 255 mL  Total OUT: 475 mL    Total NET: 120.7 mL          ** PHYSICAL EXAM **    -- CONSTITUTIONAL: sedated   -- HEENT: ETT  -- CARDIOVASCULAR: regular rate, borderline hypotension on pressor support, sinus rhythm on monitor  -- RESPIRATORY: clear to auscultation, no wheeze   -- ABDOMEN: soft, nondistended, midline incision with clean dressing, PACO x2 serosanguinous  -- : clear-yellow urine in Roberts   -- VASCULAR: palpable DP pulses bilaterally     ** LABS **                 12.0   13.7   )----------(  120       ( 03 Apr 2018 09:59 )               33.9      143    |  110    |  38     ----------------------------<  151        ( 03 Apr 2018 09:59 )  3.8     |  18     |  3.80     Ca    7.7        ( 03 Apr 2018 09:59 )  Phos  4.1       ( 03 Apr 2018 09:59 )  Mg     1.9       ( 03 Apr 2018 09:59 )    TPro  4.4    /  Alb  2.2    /  TBili  0.6    /  DBili  0.1    /  AST  52     /  ALT  35     /  AlkPhos  25     ( 03 Apr 2018 09:59 )    PT/INR -  12.1 sec / 1.12 ratio   ( 03 Apr 2018 09:59 )       PTT -  28.0 sec   ( 03 Apr 2018 09:59 )  CAPILLARY BLOOD GLUCOSE    US Transplant Kidney (4/2): Status post right lower quadrant renal transplant with peak systolic   velocities and resistive indices as described.

## 2018-04-03 NOTE — CONSULT NOTE ADULT - PROBLEM SELECTOR RECOMMENDATION 2
PT s/p Simulect on 4/2/2018. NExt dose on 4/6/2018  COntinue solumedrol 125mg today with taper  Continue cellcept 1000gm BID   Continue prograf 2mg BID  Check FK level 30min prior to prograf dosing  COntinue nystatin, bactrim and valcyte for ppx

## 2018-04-03 NOTE — PROGRESS NOTE ADULT - SUBJECTIVE AND OBJECTIVE BOX
Transplant Surgery - Multidisciplinary Rounds  --------------------------------------------------------------   LDRT/ Bilateral nephrectomy POD# 1    Present: Currently in SICU intubated    Potential Discharge date: TBD    Education: Deferred    Plan of care:  see below    MEDICATIONS  (STANDING):  ceFAZolin   IVPB 1000 milliGRAM(s) IV Intermittent every 8 hours  ceFAZolin   IVPB      chlorhexidine 0.12% Liquid 15 milliLiter(s) Swish and Spit two times a day  dexmedetomidine Infusion 0.2 MICROgram(s)/kG/Hr (4.765 mL/Hr) IV Continuous <Continuous>  famotidine    Tablet 20 milliGRAM(s) Oral daily  methylPREDNISolone sodium succinate Injectable 125 milliGRAM(s) IV Push once  mycophenolate mofetil 1000 milliGRAM(s) Oral two times a day  nystatin    Suspension [id number] Unit(s) Oral four times a day  pantoprazole  Injectable 40 milliGRAM(s) IV Push daily  phenylephrine    Infusion 0.4 MICROgram(s)/kG/Min (14.295 mL/Hr) IV Continuous <Continuous>  sodium chloride 0.45% 1000 milliLiter(s) (125 mL/Hr) IV Continuous <Continuous>  tacrolimus 2 milliGRAM(s) Oral two times a day  trimethoprim   80 mG/sulfamethoxazole 400 mG 1 Tablet(s) Oral daily  valGANciclovir 450 milliGRAM(s) Oral daily    MEDICATIONS  (PRN):  HYDROmorphone  Injectable 0.5 milliGRAM(s) IV Push every 3 hours PRN Severe Pain (7 - 10)      PAST MEDICAL & SURGICAL HISTORY:  ESRD (end stage renal disease)  CKD (chronic kidney disease) stage 4, GFR 15-29 ml/min: no dialysis  Anxiety  GERD (gastroesophageal reflux disease)  Polycystic kidney disease  Kidney stones  Arthropathy NOS - shoulder  Hyperlipidemia  AV fistula: right forearm extremity AV fistula radiocephalic ( 2018)  H/O shoulder surgery: left  History of ventral hernia repair  H/O tubal ligation: (1996)  Kidney calculi: cysto/lithotripsy multiple  S/P cholecystectomy: (2013)  Tubal ligation: 1995  [facility] [place] [facility]      Vital Signs Last 24 Hrs  T(C): 36.6 (03 Apr 2018 15:00), Max: 37.7 (03 Apr 2018 07:15)  T(F): 97.9 (03 Apr 2018 15:00), Max: 99.9 (03 Apr 2018 07:15)  HR: 71 (03 Apr 2018 15:15) (60 - 86)  BP: 95/52 (03 Apr 2018 15:00) (95/51 - 122/59)  BP(mean): 70 (03 Apr 2018 15:00) (70 - 85)  RR: 13 (03 Apr 2018 15:15) (7 - 31)  SpO2: 96% (03 Apr 2018 15:15) (96% - 100%)    I&O's Summary    02 Apr 2018 07:01  -  03 Apr 2018 07:00  --------------------------------------------------------  IN: 2667.9 mL / OUT: 1675 mL / NET: 992.9 mL    03 Apr 2018 07:01  -  03 Apr 2018 15:21  --------------------------------------------------------  IN: 1256.3 mL / OUT: 705 mL / NET: 551.3 mL                              11.6   14.4  )-----------( 116      ( 03 Apr 2018 13:53 )             32.9     04-03    142  |  111<H>  |  41<H>  ----------------------------<  159<H>  3.7   |  19<L>  |  3.70<H>    Ca    7.5<L>      03 Apr 2018 13:53  Phos  3.1     04-03  Mg     1.9     04-03    TPro  4.4<L>  /  Alb  2.2<L>  /  TBili  0.6  /  DBili  0.1  /  AST  52<H>  /  ALT  35  /  AlkPhos  25<L>  04-03

## 2018-04-03 NOTE — PROGRESS NOTE ADULT - ASSESSMENT
ASSESSMENT:  47y Female s/p bilateral nephrectomies for PCKD and right living donor renal transplant.    PLAN:   Neurologic: Sedation while intubated  - Precedex and Dilaudid PRN    Respiratory: Remains intubated in the setting of lactic acidosis and hypotension. No evidence of pulmonary edema on CXR.  - Continue with mechanical ventilation  - Sedation vacation and SBT in AM.     Cardiovascular: Lactic acidosis s/p 1L total of NS boluses. Pressor requirements remain stable.  - Continue with NS @ 125. Trend lactate. May require additional fluid boluses  - Will perform bedside ultrasound    Gastrointestinal/Nutrition: No acute issues.   - OGT decompression  - NPO    Renal/Genitourinary: s/p living donor nephrectomy. Received 80mg Lasix intraop. Postop renal Duplex demonstrates good flow.   - NS @ 125  - Replete electrolytes PRN  - Start immunosuppression per transplant team    Hematologic: No acute issues. s/p 5units pRBCs intraop  - Holding chemical VTE ppx    Infectious Disease: s/p preop Ancef  - Monitor for fevers    Lines/Tubes: Roberts, RIJ central line, L radial arterial line    Endocrine: No acute issues. Received solumedrol intraop  - Monitor glucose on BMP    Disposition: SICU

## 2018-04-03 NOTE — DIETITIAN INITIAL EVALUATION ADULT. - FACTORS AFF FOOD INTAKE
change in sense of smell or taste/Pt NPO, intubated, on vasopressors, with OGT to low wall suction; 24-hour output = 100ml. No BM noted. Pt NPO, intubated, on vasopressors, with NGT to low wall suction; 24-hour output = 100ml. No BM noted.

## 2018-04-03 NOTE — DIETITIAN INITIAL EVALUATION ADULT. - PHYSICAL APPEARANCE
well nourished/obese/BMI 31Kg/m2. Pt appears well developed with no signs of muscle or fat depletion.

## 2018-04-03 NOTE — PROGRESS NOTE ADULT - SUBJECTIVE AND OBJECTIVE BOX
SICU PROGRESS NOTE    SICU Day #2        POD#1    24 HOUR EVENTS:  Arrived to SICU yesterday evening intubated, on 0.5 alisha. Was given a total of 1L of NS boluses overnight, and lactate went from 6.7 to 5.2. Postop renal Duplex was performed and demonstrated good arterial flow and venous patency.     HISTORY  47y female with ESRD (not on HD but has a non-functional RUE AVF) 2/2 polycystic kidney disease is now s/p bilateral nephrectomies and right living donor kidney transplant. Warm ischemia time was 41 minutes. Patient required pressors throughout the case, initially on levo and dopamine, but remained persistently hypotensive. She was then switched to alisha and vaso once an intraop JENNA revealed IBAN and severe MR. Given a total of 3g CaCl and 3 amps of bicarb. Preop ECHO was unremarkable. She also received 500 mg Solumedrol, 20mg Simulect, 80mg Lasix, 12.5mg mannitol intraop.     Patient was received in the SICU still intubated, on 0.5 of alisha.         SUBJECTIVE/ROS:  [ ] A ten-point review of systems was otherwise negative except as noted.  [x] Due to altered mental status/intubation, subjective information were not able to be obtained from the patient. History was obtained, to the extent possible, from review of the chart and collateral sources of information.      NEURO  RASS: 0  Exam: Sedated   Meds: dexmedetomidine Infusion 0.2 MICROgram(s)/kG/Hr IV Continuous <Continuous>  HYDROmorphone  Injectable 0.5 milliGRAM(s) IV Push every 3 hours PRN Severe Pain (7 - 10)    [x] Adequacy of sedation and pain control has been assessed and adjusted        RESPIRATORY  RR: 22 (04-03-18 @ 03:15) (7 - 24)  SpO2: 100% (04-03-18 @ 03:15) (96% - 100%)  Exam: unlabored, clear to auscultation bilaterally  Mechanical Ventilation: Mode: AC/ CMV (Assist Control/ Continuous Mandatory Ventilation), RR (machine): 22, RR (patient): 23, TV (machine): 500, FiO2: 30, PEEP: 5, ITime: 1, MAP: 10, PIP: 17  ABG - ( 03 Apr 2018 02:28 )  pH: 7.28  /  pCO2: 32    /  pO2: 113   / HCO3: 15    / Base Excess: -10.7 /  SaO2: 98            CARDIOVASCULAR  HR: 76 (04-03-18 @ 03:15) (73 - 86)  BP: 98/56 (04-02-18 @ 20:00) (98/56 - 106/70)  BP(mean): 73 (04-02-18 @ 20:00) (73 - 73)  ABP: 120/61 (04-03-18 @ 03:15) (3/-8 - 131/66)  ABP(mean): 81 (04-03-18 @ 03:15) (-2 - 86)    Exam: regular rate and rhythm  Cardiac Rhythm: sinus  Perfusion     [x]Adequate   [ ]Inadequate  Mentation   [x]Normal       [ ]Reduced  Extremities  [x]Warm         [ ]Cool  Volume Status [ ]Hypervolemic [x]Euvolemic [ ]Hypovolemic  Meds: phenylephrine    Infusion 0.4 MICROgram(s)/kG/Min IV Continuous <Continuous>        GI/NUTRITION  Exam: Abdomen soft, Non-tender, Non-distended.  PACO x 2 serosanguinous  Wound: Midline incision with honeycomb dressing in place  Diet: NPO  Meds: pantoprazole  Injectable 40 milliGRAM(s) IV Push daily        GENITOURINARY  I&O's Detail    04-02 @ 07:01  -  04-03 @ 03:33  --------------------------------------------------------  IN:    dexmedetomidine Infusion: 52.4 mL    dexmedetomidine Infusion: 7.1 mL    phenylephrine   Infusion: 142.4 mL    Sodium Chloride 0.9% IV Bolus: 500 mL    sodium chloride 0.9%.: 1000 mL    sodium chloride 0.9%.: 290 mL  Total IN: 1991.9 mL    OUT:    Bulb: 250 mL    Bulb: 230 mL    Indwelling Catheter - Urethral: 665 mL  Total OUT: 1145 mL    Total NET: 846.9 mL      Weight (kg): 95.3 (04-02 @ 07:36)  04-03    143  |  109<H>  |  34<H>  ----------------------------<  184<H>  3.8   |  13<L>  |  3.78<H>    Ca    8.0<L>      03 Apr 2018 02:32  Phos  4.3     04-03  Mg     2.0     04-03    TPro  4.5<L>  /  Alb  2.5<L>  /  TBili  0.6  /  DBili  0.1  /  AST  47<H>  /  ALT  40  /  AlkPhos  25<L>  04-03    Meds: sodium chloride 0.9%. 1000 milliLiter(s) IV Continuous <Continuous>  sodium chloride 0.9%. 1000 milliLiter(s) IV Continuous <Continuous>          HEMATOLOGIC  Meds:   [x] VTE Prophylaxis                        13.5   16.1  )-----------( 151      ( 03 Apr 2018 02:32 )             38.0     PT/INR - ( 03 Apr 2018 02:32 )   PT: 11.8 sec;   INR: 1.08 ratio         PTT - ( 03 Apr 2018 02:32 )  PTT:28.5 sec  Transfusion     [ ] PRBC   [ ] Platelets   [ ] FFP   [ ] Cryoprecipitate        INFECTIOUS DISEASES  WBC Count: 16.1 K/uL (04-03 @ 02:32)  WBC Count: 21.6 K/uL (04-02 @ 20:11)  WBC Count: 24.2 K/uL (04-02 @ 18:42)  WBC Count: 9.8 K/uL (04-02 @ 15:13)        MUSCULOSKELETAL  Exam: All extremities moving spontaneously        ACCESS DEVICES:  [x] Peripheral IV  [x] Central Venous Line     	[x] R	[] L	[x] IJ	[] Fem	[] SC        Type:	    Date Placed: 4/2  [x] Arterial Line		[] R	[x] L	[] Fem	[x] Rad	[] Ax	Date Placed: 4/2  [] PICC:         	[] Midline		[] Mediport           [x] Urinary Catheter		Date Placed: 4/2      OTHER MEDICATIONS:  chlorhexidine 0.12% Liquid 15 milliLiter(s) Swish and Spit two times a day

## 2018-04-03 NOTE — AIRWAY REMOVAL NOTE  ADULT & PEDS - ARTIFICAL AIRWAY REMOVAL COMMENTS
Written order for extubation verified. The patient was identified by full name and birth date compared to the identification band. Present during the procedure was Augustin BADILLO.

## 2018-04-03 NOTE — CONSULT NOTE ADULT - SUBJECTIVE AND OBJECTIVE BOX
[facility] DIVISION OF KIDNEY DISEASES AND HYPERTENSION -- [phone]  -- INITIAL CONSULT NOTE  --------------------------------------------------------------------------------  HPI:  47 y.o. woman with history of PCKD and ESRD who is POD1 from bilateral nephrectomies, and living donor renal transplant on 4/2/2018.   Received solumedrol and simulect intraoperatively. Also received 5 units of pRBC during the case. Pt currently in SICU, intubated and hypotensive requiring pressor support.     PAST HISTORY  --------------------------------------------------------------------------------  PAST MEDICAL & SURGICAL HISTORY:  ESRD (end stage renal disease)  CKD (chronic kidney disease) stage 4, GFR 15-29 ml/min: no dialysis  Anxiety  GERD (gastroesophageal reflux disease)  Polycystic kidney disease  Kidney stones  Arthropathy NOS - shoulder  Hyperlipidemia  AV fistula: right forearm extremity AV fistula radiocephalic ( 2018)  H/O shoulder surgery: left  History of ventral hernia repair  H/O tubal ligation: (1996)  Kidney calculi: cysto/lithotripsy multiple  S/P cholecystectomy: (2013)  Tubal ligation: 1995  Ankle fracture - Left: [facility] barajas procedure 2000    FAMILY HISTORY:    PAST SOCIAL HISTORY:    ALLERGIES & MEDICATIONS  --------------------------------------------------------------------------------  Allergies    Demerol HCl (Hives)  Phenergan (Hives; Rash)    Intolerances      Standing Inpatient Medications  ceFAZolin   IVPB 1000 milliGRAM(s) IV Intermittent every 8 hours  ceFAZolin   IVPB      chlorhexidine 0.12% Liquid 15 milliLiter(s) Swish and Spit two times a day  dexmedetomidine Infusion 0.2 MICROgram(s)/kG/Hr IV Continuous <Continuous>  famotidine    Tablet 20 milliGRAM(s) Oral daily  methylPREDNISolone sodium succinate Injectable 125 milliGRAM(s) IV Push once  mycophenolate mofetil 1000 milliGRAM(s) Oral two times a day  nystatin    Suspension [id number] Unit(s) Oral four times a day  pantoprazole  Injectable 40 milliGRAM(s) IV Push daily  phenylephrine    Infusion 0.4 MICROgram(s)/kG/Min IV Continuous <Continuous>  sodium chloride 0.45% 1000 milliLiter(s) IV Continuous <Continuous>  tacrolimus 2 milliGRAM(s) Oral two times a day  trimethoprim   80 mG/sulfamethoxazole 400 mG 1 Tablet(s) Oral daily  valGANciclovir 450 milliGRAM(s) Oral daily    PRN Inpatient Medications  HYDROmorphone  Injectable 0.5 milliGRAM(s) IV Push every 3 hours PRN      REVIEW OF SYSTEMS  --------------------------------------------------------------------------------  unable to obtain , pt intubated        VITALS/PHYSICAL EXAM  --------------------------------------------------------------------------------  T(C): 37.3 (04-03-18 @ 11:00), Max: 37.7 (04-03-18 @ 07:15)  HR: 68 (04-03-18 @ 12:00) (68 - 86)  BP: 105/58 (04-03-18 @ 11:45) (98/56 - 122/59)  RR: 22 (04-03-18 @ 12:00) (7 - 31)  SpO2: 98% (04-03-18 @ 12:00) (97% - 100%)  Wt(kg): --  Height (cm): 175.26 (04-02-18 @ 07:36)  Weight (kg): 95.3 (04-02-18 @ 07:36)  BMI (kg/m2): 31 (04-02-18 @ 07:36)  BSA (m2): 2.11 (04-02-18 @ 07:36)      04-02-18 @ 07:01  -  04-03-18 @ 07:00  --------------------------------------------------------  IN: 2667.9 mL / OUT: 1675 mL / NET: 992.9 mL    04-03-18 @ 07:01  -  04-03-18 @ 12:15  --------------------------------------------------------  IN: 743.3 mL / OUT: 525 mL / NET: 218.3 mL      Physical Exam:  	Gen: NAD  	HEENT: +ETT  	Pulm: Coarse breath sounds B/L  	CV: S1S2  	Abd: Soft, +BS   	Ext: No LE edema B/L  	Neuro: sedated  	Skin: Warm and dry  	Milad pinon present    LABS/STUDIES  --------------------------------------------------------------------------------              12.0   13.7  >-----------<  120      [04-03-18 @ 09:59]              33.9     143  |  110  |  38  ----------------------------<  151      [04-03-18 @ 09:59]  3.8   |  18  |  3.80        Ca     7.7     [04-03-18 @ 09:59]      Mg     1.9     [04-03-18 @ 09:59]      Phos  4.1     [04-03-18 @ 09:59]    TPro  4.4  /  Alb  2.2  /  TBili  0.6  /  DBili  0.1  /  AST  52  /  ALT  35  /  AlkPhos  25  [04-03-18 @ 09:59]    PT/INR: PT 12.1 , INR 1.12       [04-03-18 @ 09:59]  PTT: 28.0       [04-03-18 @ 09:59]          [04-03-18 @ 02:32]    Creatinine Trend:  SCr 3.80 [04-03 @ 09:59]  SCr 3.78 [04-03 @ 02:32]  SCr 3.51 [04-02 @ 20:11]  SCr 3.37 [04-02 @ 18:42]    Urinalysis - [09-16-17 @ 13:20]      Color Yellow / Appearance Clear / SG 1.010 / pH 8.0      Gluc Negative / Ketone Negative  / Bili Negative / Urobili Negative       Blood Moderate / Protein 30 / Leuk Est Small / Nitrite Negative      RBC 11-25 / WBC 6-10 / Hyaline  / Gran  / Sq Epi  / Non Sq Epi Few / Bacteria Few      Iron 18, TIBC 167, %sat 11      [09-25-17 @ 06:30]  Ferritin 555.7      [09-25-17 @ 06:30]  PTH -- (Ca 8.0)      [09-16-17 @ 23:21]   232  Vitamin D (25OH) 9.7      [09-16-17 @ 23:21]

## 2018-04-03 NOTE — PROGRESS NOTE ADULT - ASSESSMENT
LDRT/ Bilateral POD #1    Plan:  Vent to be weaned if acidosis improves , Continue immunosuppression protocol

## 2018-04-03 NOTE — DIETITIAN INITIAL EVALUATION ADULT. - ADHERENCE
Unable to assess adherence to Renal diet. Per chart, pt with ESRD not on HD./n/a n/a/Unable to assess adherence to Renal diet. Per chart, pt with ESRD not on HD. RD will follow up with subjective interview when pt is extubated.

## 2018-04-03 NOTE — CONSULT NOTE ADULT - ATTENDING COMMENTS
Pt evaluated with Dr Henry in SICU   47y Female s/p bilateral nephrectomies for PCKD and right living donor renal transplant.  Acute resp failure post op  Metabolic lactic acidosis, oliguria  Hypotension through out the case    Vent adj to correct resp component of acidosis, f/U ABG  Hypovolemic, will give small fluid bolus, monitor I/O  Flow Track to assess volume responsiveness, titrate Levo  HCT stable after multiple transfusion  Re eval for extubation in AM
PT POD 1 s/p LURT and b/l nephrectomy c/b hypotension and ATN.  UO improving, pt acidotic, change IVF to 1/2 NS + 75meq NaHCO3.  Being started on tacro, MMF and on solumedrol taper.  Wean off pressors   Lactate improving.  SICU team to possibly extubate today.

## 2018-04-03 NOTE — DIETITIAN INITIAL EVALUATION ADULT. - NS AS NUTRI INTERV MEALS SNACK
General/healthful diet/Pending extubation and tolerance to clear liquids, advance to Regular diet; encourage intake of high protein, nutrient dense foods

## 2018-04-03 NOTE — DIETITIAN INITIAL EVALUATION ADULT. - OTHER INFO
Nutrition Assessment warranted for status post kidney transplant. Per chart, pt with with history of PCKD and ESRD who is POD1 from bilateral nephrectomies, and living donor renal transplant to the right EIA. Pt found to have IBAN and severe MR on JENNA; pt remains on vasopressors. Pt with increased lactate and acidosis. Lactate clearing this am. Acidosis requiring bicarb in maintenance fluids. Urine output x 24-hours = 915ml; current UO = 50-100ml/hr.

## 2018-04-03 NOTE — CHART NOTE - NSCHARTNOTEFT_GEN_A_CORE
Patient seen & examined in SICU  Intubated, sedated w/ precedex  Arousable and responsive to commands  Remains on alisha gtt    Vital Signs Last 24 Hrs  T(C): 37.3 (02 Apr 2018 23:00), Max: 37.3 (02 Apr 2018 23:00)  T(F): 99.1 (02 Apr 2018 23:00), Max: 99.1 (02 Apr 2018 23:00)  HR: 80 (03 Apr 2018 01:30) (73 - 86)  BP: 98/56 (02 Apr 2018 20:00) (98/56 - 106/70)  BP(mean): 73 (02 Apr 2018 20:00) (73 - 73)  RR: 22 (03 Apr 2018 01:30) (7 - 24)  SpO2: 100% (03 Apr 2018 01:30) (96% - 100%)  I&O's Detail    02 Apr 2018 07:01  -  03 Apr 2018 01:36  --------------------------------------------------------  IN:    dexmedetomidine Infusion: 7.1 mL    dexmedetomidine Infusion: 28.6 mL    phenylephrine   Infusion: 106.8 mL    Sodium Chloride 0.9% IV Bolus: 500 mL    sodium chloride 0.9%.: 750 mL    sodium chloride 0.9%.: 215 mL  Total IN: 1607.5 mL    OUT:    Bulb: 90 mL    Bulb: 90 mL    Indwelling Catheter - Urethral: 465 mL  Total OUT: 645 mL    Total NET: 962.5 mL    MEDICATIONS  (STANDING):  chlorhexidine 0.12% Liquid 15 milliLiter(s) Swish and Spit two times a day  dexmedetomidine Infusion 0.2 MICROgram(s)/kG/Hr (4.765 mL/Hr) IV Continuous <Continuous>  pantoprazole  Injectable 40 milliGRAM(s) IV Push daily  phenylephrine    Infusion 0.4 MICROgram(s)/kG/Min (14.295 mL/Hr) IV Continuous <Continuous>  sodium chloride 0.9%. 1000 milliLiter(s) (125 mL/Hr) IV Continuous <Continuous>  sodium chloride 0.9%. 1000 milliLiter(s) (999 mL/Hr) IV Continuous <Continuous>    Lactate 6.5 <- 6.7 <- 6.6    < from: US Trans Kidney w/ Doppler, Right (04.02.18 @ 21:51) >    Renal Transplant: 10.8 cm. No hydronephrosis, obvious renal stone or   perinephric fluid collection.    Color and spectral Doppler reveals homogeneous flow throughout the   transplant.    Urinary bladder: Collapsed around Pinon catheter.    Peak iliac artery velocity is 266.4 cm/sec pre-anastomosis, 254.6 cm/sec   at the anastomosis, and 254.6 cm/sec post anastomosis.    Transplant Renal Artery:   Peak systolic velocity is 158.4 cm/sec anastomosis, 114.3 cm/sec   proximal, 69 cm/sec mid, 110.3 cm/sec distal and 78 cm/sec hilum.   Resistive Indices Range:   0.48-0.55 in the segmental arteries.  0.45-0.56 in the lobar arteries.  0.43-0.60 the arcuate arteries.    Transplant Renal Vein: Patent.    IMPRESSION:     Status post right lower quadrant renal transplant with peak systolic   velocities and resistive indices as described.    < end of copied text >    MEDICATIONS  (PRN):  HYDROmorphone  Injectable 0.5 milliGRAM(s) IV Push every 3 hours PRN Severe Pain (7 - 10)    Physical Exam  NAD, sedated  NGT with bilious output  ETT  CV: RRR  soft, mild distention, midline incision c/d/i.  b/l PACO's draining serosanguinous  pinon secured, draining clear urine    A/P: 47y Female s/p b/l open nephrectomies, living donor renal transplant to R EIA vessels    - Diet: NPO  - NGT  - Wean vent, sedation  - Activity: Bedrest  - Labs: AM, trend lactate  - Post-op pain analgesia   - DVT ppx    Blue  [phone]

## 2018-04-03 NOTE — PROGRESS NOTE ADULT - ASSESSMENT
Angeline Vanegas is a 47 y.o. woman with history of PCKD and ESRD who is POD1 from bilateral nephrectomies, and living donor renal transplant to the right EIA. Warm ischemia time 41 minutes. Received solumedrol and simulect intraoperatively. Also received 5 units of pRBC during the case. Post-operative duplex of transplant kidney showed good flow.     Hypotensive intraoperatively; found to have IBAN and severe MR on JENNA. Started on alisha and vaso intraoperatively. Remains on alisha, but weaning this am.     Also found to have increased lactate and acidosis. Lactate clearing this am. Acidosis requiring bicarb in maintenance fluids.

## 2018-04-03 NOTE — CONSULT NOTE ADULT - ASSESSMENT
47 y.o. woman with history of PCKD and ESRD from bilateral nephrectomies, and living donor renal transplant on 4/2/2018.

## 2018-04-03 NOTE — DIETITIAN INITIAL EVALUATION ADULT. - PERTINENT LABORATORY DATA
WBC 14.4 <H>, RBC 3.51 <L>, Hct 32.9 <L>, BUN 41 <H>, Creatine 3.70 <H>, glucose 159 <H>, calcium 7.5 <L>, GRF 14 <L>

## 2018-04-03 NOTE — PROGRESS NOTE ADULT - PROBLEM SELECTOR PLAN 1
- Infectious prophylaxis: valganciclovir 450 mg QD, nystatin oral rinse, ancef 1 g Q8H, bactrim 80/400 mg QD  - Steroid taper - Solumedrol: 125 mg today, 60 mg BID on 4/4, 30 mg BID on 4/5  - Next Simulect 4/6  - Cellcept 1 g BID  - Tacrolimus 2g BID

## 2018-04-03 NOTE — CONSULT NOTE ADULT - PROBLEM SELECTOR RECOMMENDATION 9
Pt with hemodynamically mediated STEPHENIE in setting of anemia, hypotension.   Pt with Scr 3.8 today ( 2.9 in 1/9/2018). Pt non-oliguric  Monitor BMP, strict I/O avoid nephrotoxcis, NSAIds RCA  Advise IVF

## 2018-04-03 NOTE — DIETITIAN INITIAL EVALUATION ADULT. - ENERGY NEEDS
ht: 5 feet 9 inches, wt: 210 pounds, BMI: 31 Kg/m2, IBW: 145 pounds (+/- 10%), 145% IBW. Edema: 2= generalized; Skin: no pressure injuries.     Estimated calorie needs for intubated pt per [employer] Equation (PSU) 2003b: 1989cal/day (21cal/Kg)

## 2018-04-03 NOTE — DIETITIAN INITIAL EVALUATION ADULT. - ORAL INTAKE PTA
Unable to assess. Per chart, pt takes iron sulfate, vit D3, and calcium carbonate; reports NKFA. Unable to assess at this time. Per chart, pt takes iron sulfate, vit D3, and calcium carbonate; reports NKFA.

## 2018-04-03 NOTE — DIETITIAN INITIAL EVALUATION ADULT. - NS AS NUTRI INTERV ED CONTENT
RD will follow up with transplant nutrition therapy and food safety education when pt is extubated and receptive/Other (specify)

## 2018-04-04 LAB
ALBUMIN SERPL ELPH-MCNC: 2 G/DL — LOW (ref 3.3–5)
ALP SERPL-CCNC: 26 U/L — LOW (ref 40–120)
ALT FLD-CCNC: 28 U/L RC — SIGNIFICANT CHANGE UP (ref 10–45)
ANION GAP SERPL CALC-SCNC: 12 MMOL/L — SIGNIFICANT CHANGE UP (ref 5–17)
APTT BLD: 27.8 SEC — SIGNIFICANT CHANGE UP (ref 27.5–37.4)
AST SERPL-CCNC: 39 U/L — SIGNIFICANT CHANGE UP (ref 10–40)
BILIRUB DIRECT SERPL-MCNC: 0.1 MG/DL — SIGNIFICANT CHANGE UP (ref 0–0.2)
BILIRUB INDIRECT FLD-MCNC: 0.2 MG/DL — SIGNIFICANT CHANGE UP (ref 0.2–1)
BILIRUB SERPL-MCNC: 0.3 MG/DL — SIGNIFICANT CHANGE UP (ref 0.2–1.2)
BUN SERPL-MCNC: 43 MG/DL — HIGH (ref 7–23)
CALCIUM SERPL-MCNC: 7.4 MG/DL — LOW (ref 8.4–10.5)
CHLORIDE SERPL-SCNC: 108 MMOL/L — SIGNIFICANT CHANGE UP (ref 96–108)
CO2 SERPL-SCNC: 19 MMOL/L — LOW (ref 22–31)
CREAT SERPL-MCNC: 3.34 MG/DL — HIGH (ref 0.5–1.3)
GAS PNL BLDV: SIGNIFICANT CHANGE UP
GLUCOSE SERPL-MCNC: 153 MG/DL — HIGH (ref 70–99)
HCT VFR BLD CALC: 29.5 % — LOW (ref 34.5–45)
HGB BLD-MCNC: 10.5 G/DL — LOW (ref 11.5–15.5)
INR BLD: 1.03 RATIO — SIGNIFICANT CHANGE UP (ref 0.88–1.16)
LDH SERPL L TO P-CCNC: 376 U/L — HIGH (ref 50–242)
MAGNESIUM SERPL-MCNC: 2.6 MG/DL — SIGNIFICANT CHANGE UP (ref 1.6–2.6)
MCHC RBC-ENTMCNC: 33.5 PG — SIGNIFICANT CHANGE UP (ref 27–34)
MCHC RBC-ENTMCNC: 35.4 GM/DL — SIGNIFICANT CHANGE UP (ref 32–36)
MCV RBC AUTO: 94.6 FL — SIGNIFICANT CHANGE UP (ref 80–100)
PHOSPHATE SERPL-MCNC: 3.1 MG/DL — SIGNIFICANT CHANGE UP (ref 2.5–4.5)
PLATELET # BLD AUTO: 86 K/UL — LOW (ref 150–400)
POTASSIUM SERPL-MCNC: 3.4 MMOL/L — LOW (ref 3.5–5.3)
POTASSIUM SERPL-SCNC: 3.4 MMOL/L — LOW (ref 3.5–5.3)
PROT SERPL-MCNC: 4.3 G/DL — LOW (ref 6–8.3)
PROTHROM AB SERPL-ACNC: 11.1 SEC — SIGNIFICANT CHANGE UP (ref 9.8–12.7)
RBC # BLD: 3.12 M/UL — LOW (ref 3.8–5.2)
RBC # FLD: 13.2 % — SIGNIFICANT CHANGE UP (ref 10.3–14.5)
SODIUM SERPL-SCNC: 139 MMOL/L — SIGNIFICANT CHANGE UP (ref 135–145)
TACROLIMUS SERPL-MCNC: 7.6 NG/ML — SIGNIFICANT CHANGE UP
WBC # BLD: 14.2 K/UL — HIGH (ref 3.8–10.5)
WBC # FLD AUTO: 14.2 K/UL — HIGH (ref 3.8–10.5)

## 2018-04-04 PROCEDURE — 71045 X-RAY EXAM CHEST 1 VIEW: CPT | Mod: 26

## 2018-04-04 PROCEDURE — 99232 SBSQ HOSP IP/OBS MODERATE 35: CPT | Mod: GC

## 2018-04-04 RX ORDER — FUROSEMIDE 40 MG
60 TABLET ORAL ONCE
Qty: 0 | Refills: 0 | Status: COMPLETED | OUTPATIENT
Start: 2018-04-04 | End: 2018-04-04

## 2018-04-04 RX ORDER — SODIUM BICARBONATE 650 MG/1
650 TABLET ORAL 3 TIMES DAILY
Qty: 3 | Refills: 1 | Status: DISCONTINUED | COMMUNITY
Start: 2018-02-08 | End: 2018-04-04

## 2018-04-04 RX ORDER — SODIUM CHLORIDE 9 MG/ML
1000 INJECTION, SOLUTION INTRAVENOUS
Qty: 0 | Refills: 0 | Status: DISCONTINUED | OUTPATIENT
Start: 2018-04-04 | End: 2018-04-07

## 2018-04-04 RX ORDER — SODIUM CHLORIDE 9 MG/ML
1000 INJECTION INTRAMUSCULAR; INTRAVENOUS; SUBCUTANEOUS
Qty: 0 | Refills: 0 | Status: DISCONTINUED | OUTPATIENT
Start: 2018-04-04 | End: 2018-04-04

## 2018-04-04 RX ORDER — LOSARTAN POTASSIUM 50 MG/1
50 TABLET, FILM COATED ORAL
Qty: 90 | Refills: 3 | Status: DISCONTINUED | COMMUNITY
Start: 2017-11-09 | End: 2018-04-04

## 2018-04-04 RX ORDER — POTASSIUM CHLORIDE 1500 MG/1
20 TABLET, FILM COATED, EXTENDED RELEASE ORAL
Qty: 90 | Refills: 3 | Status: DISCONTINUED | COMMUNITY
Start: 2018-01-08 | End: 2018-04-04

## 2018-04-04 RX ORDER — CHLORHEXIDINE GLUCONATE 4 %
325 (65 FE) LIQUID (ML) TOPICAL
Refills: 0 | Status: DISCONTINUED | COMMUNITY
End: 2018-04-04

## 2018-04-04 RX ORDER — ACETAMINOPHEN 325 MG/1
TABLET, FILM COATED ORAL
Refills: 0 | Status: DISCONTINUED | COMMUNITY
End: 2018-04-04

## 2018-04-04 RX ORDER — ERGOCALCIFEROL 1.25 MG/1
1.25 MG CAPSULE, LIQUID FILLED ORAL
Qty: 12 | Refills: 3 | Status: DISCONTINUED | COMMUNITY
Start: 2017-10-04 | End: 2018-04-04

## 2018-04-04 RX ORDER — SODIUM CHLORIDE 9 MG/ML
1000 INJECTION INTRAMUSCULAR; INTRAVENOUS; SUBCUTANEOUS
Qty: 0 | Refills: 0 | Status: DISCONTINUED | OUTPATIENT
Start: 2018-04-04 | End: 2018-04-05

## 2018-04-04 RX ORDER — POTASSIUM CHLORIDE 20 MEQ
20 PACKET (EA) ORAL
Qty: 0 | Refills: 0 | Status: COMPLETED | OUTPATIENT
Start: 2018-04-04 | End: 2018-04-04

## 2018-04-04 RX ORDER — CALCITRIOL 0.25 UG/1
0.25 CAPSULE, LIQUID FILLED ORAL
Qty: 90 | Refills: 0 | Status: DISCONTINUED | COMMUNITY
Start: 2017-10-04 | End: 2018-04-04

## 2018-04-04 RX ORDER — LOSARTAN POTASSIUM 50 MG/1
50 TABLET, FILM COATED ORAL DAILY
Qty: 90 | Refills: 3 | Status: DISCONTINUED | COMMUNITY
Start: 2017-12-04 | End: 2018-04-04

## 2018-04-04 RX ORDER — CLONAZEPAM 1 MG/1
1 TABLET ORAL
Qty: 60 | Refills: 0 | Status: DISCONTINUED | COMMUNITY
Start: 2018-01-05 | End: 2018-04-04

## 2018-04-04 RX ADMIN — Medication 100 MILLIGRAM(S): at 21:18

## 2018-04-04 RX ADMIN — Medication 20 MILLIEQUIVALENT(S): at 05:43

## 2018-04-04 RX ADMIN — Medication 500000 UNIT(S): at 05:44

## 2018-04-04 RX ADMIN — TACROLIMUS 2 MILLIGRAM(S): 5 CAPSULE ORAL at 05:43

## 2018-04-04 RX ADMIN — Medication 1 TABLET(S): at 12:22

## 2018-04-04 RX ADMIN — Medication 100 MILLIGRAM(S): at 05:44

## 2018-04-04 RX ADMIN — VALGANCICLOVIR 450 MILLIGRAM(S): 450 TABLET, FILM COATED ORAL at 12:22

## 2018-04-04 RX ADMIN — HYDROMORPHONE HYDROCHLORIDE 30 MILLILITER(S): 2 INJECTION INTRAMUSCULAR; INTRAVENOUS; SUBCUTANEOUS at 07:25

## 2018-04-04 RX ADMIN — Medication 500000 UNIT(S): at 17:37

## 2018-04-04 RX ADMIN — TACROLIMUS 2 MILLIGRAM(S): 5 CAPSULE ORAL at 17:36

## 2018-04-04 RX ADMIN — ONDANSETRON 4 MILLIGRAM(S): 8 TABLET, FILM COATED ORAL at 09:05

## 2018-04-04 RX ADMIN — Medication 20 MILLIEQUIVALENT(S): at 10:33

## 2018-04-04 RX ADMIN — Medication 500000 UNIT(S): at 12:22

## 2018-04-04 RX ADMIN — Medication 100 MILLIGRAM(S): at 01:22

## 2018-04-04 RX ADMIN — Medication 60 MILLIGRAM(S): at 17:37

## 2018-04-04 RX ADMIN — Medication 60 MILLIGRAM(S): at 10:33

## 2018-04-04 RX ADMIN — MYCOPHENOLATE MOFETIL 1000 MILLIGRAM(S): 250 CAPSULE ORAL at 17:36

## 2018-04-04 RX ADMIN — Medication 500000 UNIT(S): at 00:02

## 2018-04-04 RX ADMIN — FAMOTIDINE 20 MILLIGRAM(S): 10 INJECTION INTRAVENOUS at 12:22

## 2018-04-04 RX ADMIN — Medication 60 MILLIGRAM(S): at 05:43

## 2018-04-04 RX ADMIN — SODIUM CHLORIDE 20 MILLILITER(S): 9 INJECTION INTRAMUSCULAR; INTRAVENOUS; SUBCUTANEOUS at 08:23

## 2018-04-04 RX ADMIN — SODIUM CHLORIDE 125 MILLILITER(S): 9 INJECTION, SOLUTION INTRAVENOUS at 08:22

## 2018-04-04 RX ADMIN — SENNA PLUS 2 TABLET(S): 8.6 TABLET ORAL at 21:18

## 2018-04-04 RX ADMIN — Medication 20 MILLIEQUIVALENT(S): at 08:23

## 2018-04-04 RX ADMIN — MYCOPHENOLATE MOFETIL 1000 MILLIGRAM(S): 250 CAPSULE ORAL at 05:44

## 2018-04-04 RX ADMIN — Medication 100 MILLIGRAM(S): at 12:22

## 2018-04-04 NOTE — PROGRESS NOTE ADULT - ASSESSMENT
Angeline Vanegas is a 47 y.o. woman with history of PCKD and ESRD who is POD1 from bilateral nephrectomies, and living donor renal transplant to the right EIA. Warm ischemia time 41 minutes. Received solumedrol and simulect intraoperatively. Also received 5 units of pRBC during the case. Post-operative duplex of transplant kidney showed good flow.     Hypotensive intraoperatively; found to have IBAN and severe MR on EJNNA. Started on alisha and vaso intraoperatively. Remains on alisha, but weaning this am.     Also found to have increased lactate and acidosis. Lactate clearing this am and overnight. Acidosis requiring bicarb in maintenance fluids.     Successfully extubated on 4/3. Off all pressors. 47F POD#2 s/p bilateral nephrectomies and living donor renal transplant. She remained intubated post-operatively for hemodynamic instability requiring large volume fluid resuscitation and pressor support. She was extubated on the evening of POD#1 and is off pressors. For transfer to [facility] today.    Patient seen and discussed on multidisciplinary transplant rounds. Plan as below.    ASH Wen, R3  [id number]

## 2018-04-04 NOTE — PHYSICAL THERAPY INITIAL EVALUATION ADULT - PLANNED THERAPY INTERVENTIONS, PT EVAL
gait training/transfer training/bed mobility training/stair training: Goal: Improve to 6 steps I with single rail, step to pattern by 4 weeks.

## 2018-04-04 NOTE — PROGRESS NOTE ADULT - SUBJECTIVE AND OBJECTIVE BOX
Day 1\0 of Anesthesia Pain Management Service    SUBJECTIVE: Patient is doing well with IV PCA    Pain Scale Score:	[X] Refer to charted pain scores    THERAPY:    [ ] IV PCA Morphine		[ ] 5 mg/mL	[ ] 1 mg/mL  [X] IV PCA Hydromorphone	[ ] 5 mg/mL	[X] 1 mg/mL  [ ] IV PCA Fentanyl		[ ] 50 micrograms/mL    Demand dose: 0.2 mg     Lockout: 6 minutes   Continuous Rate: 0 mg/hr  4 Hour Limit: 4 mg    MEDICATIONS  (STANDING):  ceFAZolin   IVPB 1000 milliGRAM(s) IV Intermittent every 8 hours  ceFAZolin   IVPB      chlorhexidine 0.12% Liquid 15 milliLiter(s) Swish and Spit two times a day  docusate sodium 100 milliGRAM(s) Oral three times a day  famotidine    Tablet 20 milliGRAM(s) Oral daily  furosemide   Injectable 60 milliGRAM(s) IV Push once  HYDROmorphone PCA (1 mG/mL) 30 milliLiter(s) PCA Continuous PCA Continuous  methylPREDNISolone sodium succinate Injectable 60 milliGRAM(s) IV Push two times a day  mycophenolate mofetil 1000 milliGRAM(s) Oral two times a day  nystatin    Suspension [id number] Unit(s) Oral four times a day  potassium chloride    Tablet ER 20 milliEquivalent(s) Oral every 2 hours  senna 2 Tablet(s) Oral at bedtime  sodium chloride 0.45% 1000 milliLiter(s) (75 mL/Hr) IV Continuous <Continuous>  sodium chloride 0.9%. 1000 milliLiter(s) (10 mL/Hr) IV Continuous <Continuous>  tacrolimus 2 milliGRAM(s) Oral two times a day  trimethoprim   80 mG/sulfamethoxazole 400 mG 1 Tablet(s) Oral daily  valGANciclovir 450 milliGRAM(s) Oral daily    MEDICATIONS  (PRN):  acetaminophen   Tablet. 975 milliGRAM(s) Oral every 6 hours PRN Mild Pain (1 - 3)  HYDROmorphone  Injectable 0.5 milliGRAM(s) IV Push every 3 hours PRN Severe Breakthrough Pain  HYDROmorphone PCA (1 mG/mL) Rescue Clinician Bolus 0.5 milliGRAM(s) IV Push every 15 minutes PRN for Pain Scale GREATER THAN 6  naloxone Injectable 0.1 milliGRAM(s) IV Push every 3 minutes PRN For ANY of the following changes in patient status:  ATasha RR LESS THAN 10 breaths per minute, B. Oxygen saturation LESS THAN 90%, C. Sedation score of 6  ondansetron Injectable 4 milliGRAM(s) IV Push every 6 hours PRN Nausea  oxyCODONE    IR 5 milliGRAM(s) Oral every 6 hours PRN Moderate Pain (4 - 6)      OBJECTIVE:    Sedation Score:	[ X] Alert	[ ] Drowsy 	[ ] Arousable	[ ] Asleep	[ ] Unresponsive    Side Effects:	[X ] None	[ ] Nausea	[ ] Vomiting	[ ] Pruritus  		[ ] Other:    Vital Signs Last 24 Hrs  T(C): 36.4 (04 Apr 2018 07:00), Max: 37.3 (03 Apr 2018 11:00)  T(F): 97.5 (04 Apr 2018 07:00), Max: 99.1 (03 Apr 2018 11:00)  HR: 71 (04 Apr 2018 09:00) (60 - 76)  BP: 98/55 (04 Apr 2018 09:00) (86/47 - 114/54)  BP(mean): 73 (04 Apr 2018 09:00) (62 - 80)  RR: 14 (04 Apr 2018 09:00) (5 - 22)  SpO2: 95% (04 Apr 2018 09:00) (92% - 98%)    ASSESSMENT/ PLAN    Therapy to  be:               [X] Continued   [ ] Discontinued   [ ] Changed to PRN Analgesics    Documentation and Verification of current medications:   [X] Done	[ ] Not done, not eligible    Comments: OOB in chair. Using 2-6x/hr. Reeducated to use.

## 2018-04-04 NOTE — CHART NOTE - NSCHARTNOTEFT_GEN_A_CORE
Transplant Surgery Transfer Note    SUBJECTIVE:  Doing well.   Pain well controlled.   Tolerating sips of water and ice chips.   No flatus or BM.   Walking with PT and sitting in her chair most of the day prior to transfer from SICU.     OBJECTIVE:   MEDICATIONS  (STANDING):  docusate sodium 100 milliGRAM(s) Oral three times a day  famotidine    Tablet 20 milliGRAM(s) Oral daily  HYDROmorphone PCA (1 mG/mL) 30 milliLiter(s) PCA Continuous PCA Continuous  methylPREDNISolone sodium succinate Injectable 60 milliGRAM(s) IV Push two times a day  mycophenolate mofetil 1000 milliGRAM(s) Oral two times a day  nystatin    Suspension [id number] Unit(s) Oral four times a day  senna 2 Tablet(s) Oral at bedtime  sodium chloride 0.45% 1000 milliLiter(s) (75 mL/Hr) IV Continuous <Continuous>  sodium chloride 0.9%. 1000 milliLiter(s) (10 mL/Hr) IV Continuous <Continuous>  tacrolimus 2 milliGRAM(s) Oral two times a day  trimethoprim   80 mG/sulfamethoxazole 400 mG 1 Tablet(s) Oral daily  valGANciclovir 450 milliGRAM(s) Oral daily    MEDICATIONS  (PRN):  acetaminophen   Tablet. 975 milliGRAM(s) Oral every 6 hours PRN Mild Pain (1 - 3)  HYDROmorphone  Injectable 0.5 milliGRAM(s) IV Push every 3 hours PRN Severe Breakthrough Pain  HYDROmorphone PCA (1 mG/mL) Rescue Clinician Bolus 0.5 milliGRAM(s) IV Push every 15 minutes PRN for Pain Scale GREATER THAN 6  naloxone Injectable 0.1 milliGRAM(s) IV Push every 3 minutes PRN For ANY of the following changes in patient status:  A. RR LESS THAN 10 breaths per minute, B. Oxygen saturation LESS THAN 90%, C. Sedation score of 6  ondansetron Injectable 4 milliGRAM(s) IV Push every 6 hours PRN Nausea  oxyCODONE    IR 5 milliGRAM(s) Oral every 6 hours PRN Moderate Pain (4 - 6)    ** VITAL SIGNS / I&O's **    T(C): 36.8 (04-04-18 @ 14:45), Max: 36.8 (04-04-18 @ 14:45)  T(F): 98.3 (04-04-18 @ 14:45), Max: 98.3 (04-04-18 @ 14:45)  HR: 75 (04-04-18 @ 14:45) (64 - 81)  BP: 110/67 (04-04-18 @ 14:45) (86/47 - 117/55)  RR: 18 (04-04-18 @ 14:45) (5 - 21)  SpO2: 95% (04-04-18 @ 14:45) (92% - 97%)      03 Apr 2018 07:01  -  04 Apr 2018 07:00  --------------------------------------------------------  IN:    dexmedetomidine Infusion: 83.3 mL    IV PiggyBack: 150 mL    Oral Fluid: 200 mL    phenylephrine   Infusion: 73 mL    sodium chloride 0.45%: 2750 mL    sodium chloride 0.9%: 160 mL    sodium chloride 0.9%: 250 mL  Total IN: 3666.3 mL    OUT:    Bulb: 580 mL    Bulb: 500 mL    Indwelling Catheter - Urethral: 1425 mL    Nasoenteral Tube: 50 mL  Total OUT: 2555 mL    Total NET: 1111.3 mL      04 Apr 2018 07:01  -  04 Apr 2018 15:43  --------------------------------------------------------  IN:    sodium chloride 0.45%: 125 mL    sodium chloride 0.45%: 525 mL    sodium chloride 0.9%: 20 mL    sodium chloride 0.9%.: 70 mL  Total IN: 740 mL    OUT:    Bulb: 120 mL    Bulb: 70 mL    Indwelling Catheter - Urethral: 695 mL  Total OUT: 885 mL    Total NET: -145 mL          ** PHYSICAL EXAM **    -- CONSTITUTIONAL: AOx3. NAD.  -- HEENT: abrasion to low lip    -- NECK: clean dressing on right neck (central line removal site)  -- CARDIOVASCULAR: normotensive, regular rate   -- RESPIRATORY: breathing comfortably, clear to auscultation   -- ABDOMEN: soft, mild kira-incisional tenderness, nondistended, PACO x2 serosanguinous, midline incision CDI with staples in place   -- EXTREMITIES: warm, well perfused   -- VASCULAR: palpable DP pulses b/l    ** LABS **                 10.5   14.2   )----------(  86        ( 04 Apr 2018 02:45 )               29.5      139    |  108    |  43     ----------------------------<  153        ( 04 Apr 2018 02:45 )  3.4     |  19     |  3.34     Ca    7.4        ( 04 Apr 2018 02:45 )  Phos  3.1       ( 04 Apr 2018 02:45 )  Mg     2.6       ( 04 Apr 2018 02:45 )    TPro  4.3    /  Alb  2.0    /  TBili  0.3    /  DBili  0.1    /  AST  39     /  ALT  28     /  AlkPhos  26     ( 04 Apr 2018 02:45 )    PT/INR -  11.1 sec / 1.03 ratio   ( 04 Apr 2018 02:45 )       PTT -  27.8 sec   ( 04 Apr 2018 02:45 )  CAPILLARY BLOOD GLUCOSE    47F POD#2 s/p bilateral nephrectomies and living donor renal transplant. She remained intubated post-operatively for hemodynamic instability requiring large volume fluid resuscitation and pressor support. She was extubated on the evening of POD#1 and is off pressors. Now stable on 6 Joe.

## 2018-04-04 NOTE — PROGRESS NOTE ADULT - PROBLEM SELECTOR PLAN 1
- Infectious prophylaxis: valganciclovir 450 mg QD, nystatin oral rinse, ancef 1 g Q8H, bactrim 80/400 mg QD  - Steroid taper - Solumedrol: 125 mg today, 60 mg BID on 4/4, 30 mg BID on 4/5  - Next Simulect 4/6  - Cellcept 1 g BID  - Tacrolimus 2g BID. -pain control with PCA  -NPO for now, await GI fxn  -continue Roberts, strict I/O  -SCDs for DVT ppx, encourage OOB, ambulation, IS  -d/c periop antibiotics today  -transfer to [facility]

## 2018-04-04 NOTE — PHYSICAL THERAPY INITIAL EVALUATION ADULT - GENERAL OBSERVATIONS, REHAB EVAL
Pt seated, NAD, +ICU monitoring, +[place], +GENNARO, +kathrin, Pt seated, NAD, +ICU monitoring, +RIJ, +pinon, +PCA, mother present t/o.

## 2018-04-04 NOTE — PROGRESS NOTE ADULT - PROBLEM SELECTOR PLAN 2
PT s/p Simulect on 4/2/2018. Next dose on 4/6/2018  Continue steroids with taper  Continue cellcept 1000gm BID   Continue prograf 2mg BID  Check FK level 30min prior to prograf dosing  Continue nystatin, bactrim and valcyte for ppx PT s/p Simulect on 4/2/2018. Next dose on 4/6/2018  Continue steroids with taper  Continue cellcept 1000gm BID   Continue prograf 2mg BID  Check FK level 30min prior to prograf dosing ( 7.6 on 4/4, )  Continue nystatin, bactrim and valcyte for ppx

## 2018-04-04 NOTE — PROGRESS NOTE ADULT - SUBJECTIVE AND OBJECTIVE BOX
SICU Daily Progress Note  =====================================================  Interval/Overnight Events:   Patient's lactate trended down to normal throughout the day. She made adequate urine output and was successfully extubated. Pain management called for the addition of a PCA for pain control. Bicarbonate added to maintenance fluids.    POD # 2         	SICU Day #    3    HPI:  47y female with ESRD (not on HD but has a non-functional RUE AVF) 2/2 polycystic kidney disease is now s/p bilateral nephrectomies and right living donor kidney transplant. Warm ischemia time was 41 minutes. Patient required pressors throughout the case, initially on levo and dopamine, but remained persistently hypotensive. She was then switched to alisha and vaso once an intraop JENNA revealed IBAN and severe MR. Given a total of 3g CaCl and 3 amps of bicarb. Preop ECHO was unremarkable. She also received 500 mg Solumedrol, 20mg Simulect, 80mg Lasix, 12.5mg mannitol intraop.     Allergies: Demerol HCl (Hives)  Phenergan (Hives; Rash)      MEDICATIONS:   --------------------------------------------------------------------------------------  Neurologic Medications  acetaminophen   Tablet. 975 milliGRAM(s) Oral every 6 hours PRN Mild Pain (1 - 3)  HYDROmorphone  Injectable 0.5 milliGRAM(s) IV Push every 3 hours PRN Severe Breakthrough Pain  HYDROmorphone PCA (1 mG/mL) 30 milliLiter(s) PCA Continuous PCA Continuous  HYDROmorphone PCA (1 mG/mL) Rescue Clinician Bolus 0.5 milliGRAM(s) IV Push every 15 minutes PRN for Pain Scale GREATER THAN 6  ondansetron Injectable 4 milliGRAM(s) IV Push every 6 hours PRN Nausea  oxyCODONE    IR 5 milliGRAM(s) Oral every 6 hours PRN Moderate Pain (4 - 6)    Respiratory Medications    Cardiovascular Medications    Gastrointestinal Medications  docusate sodium 100 milliGRAM(s) Oral three times a day  famotidine    Tablet 20 milliGRAM(s) Oral daily  senna 2 Tablet(s) Oral at bedtime  sodium chloride 0.45% 1000 milliLiter(s) IV Continuous <Continuous>    Genitourinary Medications    Hematologic/Oncologic Medications  mycophenolate mofetil 1000 milliGRAM(s) Oral two times a day  tacrolimus 2 milliGRAM(s) Oral two times a day    Antimicrobial/Immunologic Medications  ceFAZolin   IVPB 1000 milliGRAM(s) IV Intermittent every 8 hours  ceFAZolin   IVPB      nystatin    Suspension 056261 Unit(s) Oral four times a day  trimethoprim   80 mG/sulfamethoxazole 400 mG 1 Tablet(s) Oral daily  valGANciclovir 450 milliGRAM(s) Oral daily    Endocrine/Metabolic Medications  methylPREDNISolone sodium succinate Injectable 60 milliGRAM(s) IV Push two times a day    Topical/Other Medications  chlorhexidine 0.12% Liquid 15 milliLiter(s) Swish and Spit two times a day  naloxone Injectable 0.1 milliGRAM(s) IV Push every 3 minutes PRN For ANY of the following changes in patient status:  A. RR LESS THAN 10 breaths per minute, B. Oxygen saturation LESS THAN 90%, C. Sedation score of 6    --------------------------------------------------------------------------------------    VITAL SIGNS, INS/OUTS (last 24 hours):  --------------------------------------------------------------------------------------  ((Insert SICU Vitals/Is+Os here))***  --------------------------------------------------------------------------------------    EXAM  NEUROLOGY  RASS:   	GCS:    Exam: Normal, NAD, alert, oriented x3, no focal deficits.   HEENT  Exam: Normocephalic, atraumatic, EOMI.    RESPIRATORY  Exam: Lungs clear to auscultation, Normal expansion/effort  CARDIOVASCULAR  Exam: S1, S2.  Regular rate and rhythm.     GI/NUTRITION  Exam: Abdomen softly distended, some tenderness  VASCULAR  Exam: Extremities warm, pink, well-perfused.   MUSCULOSKELETAL  Exam: All extremities moving spontaneously without limitations.  SKIN  Exam: Good skin turgor, no skin breakdown.     METABOLIC/FLUIDS/ELECTROLYTES  sodium chloride 0.45% 1000 milliLiter(s) IV Continuous <Continuous>      HEMATOLOGIC  [x] VTE Prophylaxis:   Transfusions:	[] PRBC	[] Platelets		[] FFP	[] Cryoprecipitate    INFECTIOUS DISEASE  Antimicrobials/Immunologic Medications:  ceFAZolin   IVPB 1000 milliGRAM(s) IV Intermittent every 8 hours  ceFAZolin   IVPB      mycophenolate mofetil 1000 milliGRAM(s) Oral two times a day  nystatin    Suspension 102017 Unit(s) Oral four times a day  tacrolimus 2 milliGRAM(s) Oral two times a day  trimethoprim   80 mG/sulfamethoxazole 400 mG 1 Tablet(s) Oral daily  valGANciclovir 450 milliGRAM(s) Oral daily    Day #      of     ***    Tubes/Lines/Drains  ***  [x] Peripheral IV  [] Central Venous Line     	[] R	[] L	[] IJ	[] Fem	[] SC	Date Placed:   [] Arterial Line		[] R	[] L	[] Fem	[] Rad	[] Ax	Date Placed:   [] PICC		[] Midline		[] Mediport  [] Urinary Catheter		Date Placed:   [x] Necessity of urinary, arterial, and venous catheters discussed    LABS  --------------------------------------------------------------------------------------  ((Insert SICU Labs here))***  --------------------------------------------------------------------------------------    OTHER LABORATORY:     IMAGING STUDIES:   CXR:     ASSESSMENT:  47y Female s/p bilateral nephrectomies for PCKD and right living donor renal transplant.    PLAN:     Neurologic: pain control  -PCA for pain    Respiratory: Remained intubated in the setting of lactic acidosis and hypotension. No evidence of pulmonary edema on CXR.  - now extubated on nasal cannula  -wean O2 as tolerated    Cardiovascular: Lactic acidosis s/p 1L total of NS boluses. Pressor requirements remain stable.  - Continue with1/2 NS w/ 75 of bicarb.  -lactate normalized   - Will perform bedside ultrasound    Gastrointestinal/Nutrition: No acute issues.   - sips for now  -consider advancing diet when having GI function    Renal/Genitourinary: s/p living donor nephrectomy. Received 80mg Lasix intraop. Postop renal Duplex demonstrates good flow.   - Continue with1/2 NS w/ 75 of bicarb.  - Replete electrolytes PRN  - Start immunosuppression per transplant team    Hematologic: No acute issues. s/p 5units pRBCs intraop  - Holding chemical VTE ppx, discussed with transplant team    Infectious Disease: s/p preop Ancef  - Monitor for fevers    Lines/Tubes: GENNARO Roberts central line, L radial arterial line    Endocrine: No acute issues. Received solumedrol intraop  - Monitor glucose on BMP    Disposition: SICU, possible transplant floor     --------------------------------------------------------------------------------------    Critical Care Diagnoses: SICU Daily Progress Note  =====================================================  Interval/Overnight Events:   Patient's lactate trended down to normal throughout the day. She made adequate urine output and was successfully extubated. Pain management called for the addition of a PCA for pain control. Bicarbonate added to maintenance fluids.    POD # 2         	SICU Day #    3    HPI:  47y female with ESRD (not on HD but has a non-functional RUE AVF) 2/2 polycystic kidney disease is now s/p bilateral nephrectomies and right living donor kidney transplant. Warm ischemia time was 41 minutes. Patient required pressors throughout the case, initially on levo and dopamine, but remained persistently hypotensive. She was then switched to alisha and vaso once an intraop JENNA revealed IBAN and severe MR. Given a total of 3g CaCl and 3 amps of bicarb. Preop ECHO was unremarkable. She also received 500 mg Solumedrol, 20mg Simulect, 80mg Lasix, 12.5mg mannitol intraop.     Allergies: Demerol HCl (Hives)  Phenergan (Hives; Rash)      MEDICATIONS:   --------------------------------------------------------------------------------------  Neurologic Medications  acetaminophen   Tablet. 975 milliGRAM(s) Oral every 6 hours PRN Mild Pain (1 - 3)  HYDROmorphone  Injectable 0.5 milliGRAM(s) IV Push every 3 hours PRN Severe Breakthrough Pain  HYDROmorphone PCA (1 mG/mL) 30 milliLiter(s) PCA Continuous PCA Continuous  HYDROmorphone PCA (1 mG/mL) Rescue Clinician Bolus 0.5 milliGRAM(s) IV Push every 15 minutes PRN for Pain Scale GREATER THAN 6  ondansetron Injectable 4 milliGRAM(s) IV Push every 6 hours PRN Nausea  oxyCODONE    IR 5 milliGRAM(s) Oral every 6 hours PRN Moderate Pain (4 - 6)    Respiratory Medications    Cardiovascular Medications    Gastrointestinal Medications  docusate sodium 100 milliGRAM(s) Oral three times a day  famotidine    Tablet 20 milliGRAM(s) Oral daily  senna 2 Tablet(s) Oral at bedtime  sodium chloride 0.45% 1000 milliLiter(s) IV Continuous <Continuous>    Genitourinary Medications    Hematologic/Oncologic Medications  mycophenolate mofetil 1000 milliGRAM(s) Oral two times a day  tacrolimus 2 milliGRAM(s) Oral two times a day    Antimicrobial/Immunologic Medications  ceFAZolin   IVPB 1000 milliGRAM(s) IV Intermittent every 8 hours  ceFAZolin   IVPB      nystatin    Suspension 070854 Unit(s) Oral four times a day  trimethoprim   80 mG/sulfamethoxazole 400 mG 1 Tablet(s) Oral daily  valGANciclovir 450 milliGRAM(s) Oral daily    Endocrine/Metabolic Medications  methylPREDNISolone sodium succinate Injectable 60 milliGRAM(s) IV Push two times a day    Topical/Other Medications  chlorhexidine 0.12% Liquid 15 milliLiter(s) Swish and Spit two times a day  naloxone Injectable 0.1 milliGRAM(s) IV Push every 3 minutes PRN For ANY of the following changes in patient status:  A. RR LESS THAN 10 breaths per minute, B. Oxygen saturation LESS THAN 90%, C. Sedation score of 6    --------------------------------------------------------------------------------------    VITAL SIGNS, INS/OUTS (last 24 hours):  --------------------------------------------------------------------------------------  T(C): 36.5 (04-03-18 @ 23:00), Max: 37.7 (04-03-18 @ 07:15)  HR: 64 (04-04-18 @ 03:00) (60 - 80)  BP: 104/59 (04-04-18 @ 03:00) (86/47 - 122/59)  BP(mean): 77 (04-04-18 @ 03:00) (62 - 85)  ABP: 74/58 (04-03-18 @ 15:30) (74/58 - 131/65)  ABP(mean): 67 (04-03-18 @ 15:30) (61 - 98)  RR: 11 (04-04-18 @ 03:00) (10 - 25)  SpO2: 92% (04-04-18 @ 03:00) (92% - 99%)  Wt(kg): --  CVP(mm Hg): 11 (04-04-18 @ 03:00) (7 - 19)  CI: 2.5 (04-03-18 @ 15:00) (0.6 - 2.5)  CAPILLARY BLOOD GLUCOSE       N/A      04-02 @ 07:01  -  04-03 @ 07:00  --------------------------------------------------------  IN:    dexmedetomidine Infusion: 100 mL    dexmedetomidine Infusion: 7.1 mL    phenylephrine   Infusion: 195.8 mL    sodium chloride 0.9%: 1500 mL    sodium chloride 0.9%: 365 mL    Sodium Chloride 0.9% IV Bolus: 500 mL  Total IN: 2667.9 mL    OUT:    Bulb: 300 mL    Bulb: 360 mL    Indwelling Catheter - Urethral: 915 mL    Nasoenteral Tube: 100 mL  Total OUT: 1675 mL    Total NET: 992.9 mL      04-03 @ 07:01  -  04-04 @ 05:31  --------------------------------------------------------  IN:    dexmedetomidine Infusion: 83.3 mL    IV PiggyBack: 150 mL    phenylephrine   Infusion: 73 mL    sodium chloride 0.45%: 2250 mL    sodium chloride 0.9%: 250 mL  Total IN: 2806.3 mL    OUT:    Bulb: 510 mL    Bulb: 450 mL    Indwelling Catheter - Urethral: 1165 mL    Nasoenteral Tube: 50 mL  Total OUT: 2175 mL    Total NET: 631.3 mL        --------------------------------------------------------------------------------------    EXAM  NEUROLOGY  RASS:   	GCS:    Exam: Normal, NAD, alert, oriented x3, no focal deficits.   HEENT  Exam: Normocephalic, atraumatic, EOMI.    RESPIRATORY  Exam: Lungs clear to auscultation, Normal expansion/effort  CARDIOVASCULAR  Exam: S1, S2.  Regular rate and rhythm.     GI/NUTRITION  Exam: Abdomen softly distended, some tenderness  VASCULAR  Exam: Extremities warm, pink, well-perfused.   MUSCULOSKELETAL  Exam: All extremities moving spontaneously without limitations.  SKIN  Exam: Good skin turgor, no skin breakdown.     METABOLIC/FLUIDS/ELECTROLYTES  sodium chloride 0.45% 1000 milliLiter(s) IV Continuous <Continuous>      HEMATOLOGIC  [x] VTE Prophylaxis:   Transfusions:	[] PRBC	[] Platelets		[] FFP	[] Cryoprecipitate    INFECTIOUS DISEASE  Antimicrobials/Immunologic Medications:  ceFAZolin   IVPB 1000 milliGRAM(s) IV Intermittent every 8 hours  ceFAZolin   IVPB      mycophenolate mofetil 1000 milliGRAM(s) Oral two times a day  nystatin    Suspension [id number] Unit(s) Oral four times a day  tacrolimus 2 milliGRAM(s) Oral two times a day  trimethoprim   80 mG/sulfamethoxazole 400 mG 1 Tablet(s) Oral daily  valGANciclovir 450 milliGRAM(s) Oral daily    Day #      of     ***    Tubes/Lines/Drains  ***  [x] Peripheral IV  [] Central Venous Line     	[] R	[] L	[] IJ	[] Fem	[] SC	Date Placed:   [] Arterial Line		[] R	[] L	[] Fem	[] Rad	[] Ax	Date Placed:   [] PICC		[] Midline		[] Mediport  [] Urinary Catheter		Date Placed:   [x] Necessity of urinary, arterial, and venous catheters discussed    LABS  --------------------------------------------------------------------------------------  CBC Full  -  ( 04 Apr 2018 02:45 )  WBC Count : 14.2 K/uL  Hemoglobin : 10.5 g/dL  Hematocrit : 29.5 %  Platelet Count - Automated : 86 K/uL  Mean Cell Volume : 94.6 fl  Mean Cell Hemoglobin : 33.5 pg  Mean Cell Hemoglobin Concentration : 35.4 gm/dL      04-04    139  |  108  |  43<H>  ----------------------------<  153<H>  3.4<L>   |  19<L>  |  3.34<H>    Ca    7.4<L>      04 Apr 2018 02:45  Phos  3.1     04-04  Mg     2.6     04-04    TPro  4.3<L>  /  Alb  2.0<L>  /  TBili  0.3  /  DBili  0.1  /  AST  39  /  ALT  28  /  AlkPhos  26<L>  04-04    LIVER FUNCTIONS - ( 04 Apr 2018 02:45 )  Alb: 2.0 g/dL / Pro: 4.3 g/dL / ALK PHOS: 26 U/L / ALT: 28 U/L RC / AST: 39 U/L / GGT: x           CAPILLARY BLOOD GLUCOSE          PT/INR - ( 04 Apr 2018 02:45 )   PT: 11.1 sec;   INR: 1.03 ratio         PTT - ( 04 Apr 2018 02:45 )  PTT:27.8 sec  --------------------------------------------------------------------------------------    OTHER LABORATORY:     IMAGING STUDIES:   CXR:     ASSESSMENT:  47y Female s/p bilateral nephrectomies for PCKD and right living donor renal transplant.    PLAN:     Neurologic: pain control  -PCA for pain    Respiratory: Remained intubated in the setting of lactic acidosis and hypotension. No evidence of pulmonary edema on CXR.  - now extubated on nasal cannula  -wean O2 as tolerated    Cardiovascular: Lactic acidosis s/p 1L total of NS boluses. Pressor requirements remain stable.  - Continue with1/2 NS w/ 75 of bicarb.  -lactate normalized   - Will perform bedside ultrasound    Gastrointestinal/Nutrition: No acute issues.   - sips for now  -consider advancing diet when having GI function    Renal/Genitourinary: s/p living donor nephrectomy. Received 80mg Lasix intraop. Postop renal Duplex demonstrates good flow.   - Continue with1/2 NS w/ 75 of bicarb.  - Replete electrolytes PRN  - Start immunosuppression per transplant team    Hematologic: No acute issues. s/p 5units pRBCs intraop  - Holding chemical VTE ppx, discussed with transplant team    Infectious Disease: s/p preop Ancef  - Monitor for fevers    Lines/Tubes: GENNARO Roberts central line, L radial arterial line    Endocrine: No acute issues. Received solumedrol intraop  - Monitor glucose on BMP    Disposition: SICU, possible transplant floor     --------------------------------------------------------------------------------------    Critical Care Diagnoses:

## 2018-04-04 NOTE — PROGRESS NOTE ADULT - SUBJECTIVE AND OBJECTIVE BOX
Transplant Surgery - Multidisciplinary Rounds  --------------------------------------------------------------   LDRT/ Bilateral nephrectomy POD# 2    Present: Currently in SICU; extubated on 4/3. PCA added for pain control. Bicard added to IVF for acidosis.     Potential Discharge date: TBD    Education: Deferred    Plan of care:  see below    MEDICATIONS  (STANDING):  ceFAZolin   IVPB 1000 milliGRAM(s) IV Intermittent every 8 hours  ceFAZolin   IVPB      chlorhexidine 0.12% Liquid 15 milliLiter(s) Swish and Spit two times a day  docusate sodium 100 milliGRAM(s) Oral three times a day  famotidine    Tablet 20 milliGRAM(s) Oral daily  HYDROmorphone PCA (1 mG/mL) 30 milliLiter(s) PCA Continuous PCA Continuous  methylPREDNISolone sodium succinate Injectable 60 milliGRAM(s) IV Push two times a day  mycophenolate mofetil 1000 milliGRAM(s) Oral two times a day  nystatin    Suspension 489496 Unit(s) Oral four times a day  potassium chloride    Tablet ER 20 milliEquivalent(s) Oral every 2 hours  senna 2 Tablet(s) Oral at bedtime  sodium chloride 0.45% 1000 milliLiter(s) (125 mL/Hr) IV Continuous <Continuous>  sodium chloride 0.9%. 1000 milliLiter(s) (20 mL/Hr) IV Continuous <Continuous>  tacrolimus 2 milliGRAM(s) Oral two times a day  trimethoprim   80 mG/sulfamethoxazole 400 mG 1 Tablet(s) Oral daily  valGANciclovir 450 milliGRAM(s) Oral daily    MEDICATIONS  (PRN):  acetaminophen   Tablet. 975 milliGRAM(s) Oral every 6 hours PRN Mild Pain (1 - 3)  HYDROmorphone  Injectable 0.5 milliGRAM(s) IV Push every 3 hours PRN Severe Breakthrough Pain  HYDROmorphone PCA (1 mG/mL) Rescue Clinician Bolus 0.5 milliGRAM(s) IV Push every 15 minutes PRN for Pain Scale GREATER THAN 6  naloxone Injectable 0.1 milliGRAM(s) IV Push every 3 minutes PRN For ANY of the following changes in patient status:  A. RR LESS THAN 10 breaths per minute, B. Oxygen saturation LESS THAN 90%, C. Sedation score of 6  ondansetron Injectable 4 milliGRAM(s) IV Push every 6 hours PRN Nausea  oxyCODONE    IR 5 milliGRAM(s) Oral every 6 hours PRN Moderate Pain (4 - 6)    PAST MEDICAL & SURGICAL HISTORY:  ESRD (end stage renal disease)  CKD (chronic kidney disease) stage 4, GFR 15-29 ml/min: no dialysis  Anxiety  GERD (gastroesophageal reflux disease)  Polycystic kidney disease  Kidney stones  Arthropathy NOS - shoulder  Hyperlipidemia  AV fistula: right forearm extremity AV fistula radiocephalic ( 2018)  H/O shoulder surgery: left  History of ventral hernia repair  H/O tubal ligation: (1996)  Kidney calculi: cysto/lithotripsy multiple  S/P cholecystectomy: (2013)  Tubal ligation: 1995  Ankle fracture - Left: [facility] procedure 2000    ICU Vital Signs Last 24 Hrs  T(C): 36.5 (03 Apr 2018 23:00), Max: 37.7 (03 Apr 2018 07:15)  T(F): 97.7 (03 Apr 2018 23:00), Max: 99.9 (03 Apr 2018 07:15)  HR: 64 (04 Apr 2018 03:00) (60 - 77)  BP: 104/59 (04 Apr 2018 03:00) (86/47 - 122/59)  BP(mean): 77 (04 Apr 2018 03:00) (62 - 85)  ABP: 74/58 (03 Apr 2018 15:30) (74/58 - 131/65)  ABP(mean): 67 (03 Apr 2018 15:30) (61 - 98)  RR: 11 (04 Apr 2018 03:00) (10 - 25)  SpO2: 92% (04 Apr 2018 03:00) (92% - 99%)    I&O's Summary    02 Apr 2018 07:01  -  03 Apr 2018 07:00  --------------------------------------------------------  IN: 2667.9 mL / OUT: 1675 mL / NET: 992.9 mL    03 Apr 2018 07:01  -  04 Apr 2018 05:52  --------------------------------------------------------  IN: 2806.3 mL / OUT: 2175 mL / NET: 631.3 mL                          10.5   14.2  )-----------( 86       ( 04 Apr 2018 02:45 )             29.5     04-04    139  |  108  |  43<H>  ----------------------------<  153<H>  3.4<L>   |  19<L>  |  3.34<H>    Ca    7.4<L>      04 Apr 2018 02:45  Phos  3.1     04-04  Mg     2.6     04-04    TPro  4.3<L>  /  Alb  2.0<L>  /  TBili  0.3  /  DBili  0.1  /  AST  39  /  ALT  28  /  AlkPhos  26<L>  04-04    PT/INR - ( 04 Apr 2018 02:45 )   PT: 11.1 sec;   INR: 1.03 ratio      PTT - ( 04 Apr 2018 02:45 )  PTT:27.8 sec    ABG - ( 03 Apr 2018 15:16 )  pH: 7.36  /  pCO2: 34    /  pO2: 75    / HCO3: 18    / Base Excess: -5.9  /  SaO2: 95 TRANSPLANT SURGERY PROGRESS NOTE    STATUS POST: s/p LDRT and bilateral nephrectomy  POST-OPERATIVE DAY #2      SUBJECTIVE    INTERVAL HPI/OVERNIGHT EVENTS: Currently in SICU; extubated 4/3 PM. NGT removed PCA added for pain control. On IVF with bicarb for acidosis. Feels well, pain controlled with PCA. OOB to chair this morning. Denies flatus or BM.    All other systems reviewed and negative except as noted above.    MEDICATIONS  MEDICATIONS  (STANDING):  ceFAZolin   IVPB 1000 milliGRAM(s) IV Intermittent every 8 hours  ceFAZolin   IVPB      chlorhexidine 0.12% Liquid 15 milliLiter(s) Swish and Spit two times a day  docusate sodium 100 milliGRAM(s) Oral three times a day  famotidine    Tablet 20 milliGRAM(s) Oral daily  furosemide   Injectable 60 milliGRAM(s) IV Push once  HYDROmorphone PCA (1 mG/mL) 30 milliLiter(s) PCA Continuous PCA Continuous  methylPREDNISolone sodium succinate Injectable 60 milliGRAM(s) IV Push two times a day  mycophenolate mofetil 1000 milliGRAM(s) Oral two times a day  nystatin    Suspension 625225 Unit(s) Oral four times a day  potassium chloride    Tablet ER 20 milliEquivalent(s) Oral every 2 hours  senna 2 Tablet(s) Oral at bedtime  sodium chloride 0.45% 1000 milliLiter(s) (75 mL/Hr) IV Continuous <Continuous>  sodium chloride 0.9%. 1000 milliLiter(s) (10 mL/Hr) IV Continuous <Continuous>  tacrolimus 2 milliGRAM(s) Oral two times a day  trimethoprim   80 mG/sulfamethoxazole 400 mG 1 Tablet(s) Oral daily  valGANciclovir 450 milliGRAM(s) Oral daily    MEDICATIONS  (PRN):  acetaminophen   Tablet. 975 milliGRAM(s) Oral every 6 hours PRN Mild Pain (1 - 3)  HYDROmorphone  Injectable 0.5 milliGRAM(s) IV Push every 3 hours PRN Severe Breakthrough Pain  HYDROmorphone PCA (1 mG/mL) Rescue Clinician Bolus 0.5 milliGRAM(s) IV Push every 15 minutes PRN for Pain Scale GREATER THAN 6  naloxone Injectable 0.1 milliGRAM(s) IV Push every 3 minutes PRN For ANY of the following changes in patient status:  A. RR LESS THAN 10 breaths per minute, B. Oxygen saturation LESS THAN 90%, C. Sedation score of 6  ondansetron Injectable 4 milliGRAM(s) IV Push every 6 hours PRN Nausea  oxyCODONE    IR 5 milliGRAM(s) Oral every 6 hours PRN Moderate Pain (4 - 6)      OBJECTIVE    PHYSICAL EXAM  Vital Signs Last 24 Hrs  T(C): 36.4 (04 Apr 2018 07:00), Max: 37.3 (03 Apr 2018 11:00)  T(F): 97.5 (04 Apr 2018 07:00), Max: 99.1 (03 Apr 2018 11:00)  HR: 71 (04 Apr 2018 09:00) (60 - 76)  BP: 98/55 (04 Apr 2018 09:00) (86/47 - 114/54)  BP(mean): 73 (04 Apr 2018 09:00) (62 - 80)  RR: 14 (04 Apr 2018 09:00) (5 - 25)  SpO2: 95% (04 Apr 2018 09:00) (92% - 98%)    Paramjit drains 450/510  Roberts 1165    Constitutional: well developed, well nourished  Neuro: AAOx3  Eyes: anicteric, PERRLA  ENMT: nc/at  Neck: central line in place, dressing C/D/I, no hematoma  Respiratory: CTA B/L  Cardiovascular: RRR  Gastrointestinal: soft abdomen, mildly distended, appropriate kira-incisional tenderness, dressing intact, drains serosanguinous  Genitourinary: urinary catheter in place with clear urine  Extremities: SCD's in place and working bilaterally  Vascular: equal DP pulses bilaterally  Musculoskeletal: Moving all extremities    LABS                        10.5   14.2  )-----------( 86       ( 04 Apr 2018 02:45 )             29.5     04-04    139  |  108  |  43<H>  ----------------------------<  153<H>  3.4<L>   |  19<L>  |  3.34<H>    Ca    7.4<L>      04 Apr 2018 02:45  Phos  3.1     04-04  Mg     2.6     04-04    TPro  4.3<L>  /  Alb  2.0<L>  /  TBili  0.3  /  DBili  0.1  /  AST  39  /  ALT  28  /  AlkPhos  26<L>  04-04    PT/INR - ( 04 Apr 2018 02:45 )   PT: 11.1 sec;   INR: 1.03 ratio      PTT - ( 04 Apr 2018 02:45 )  PTT:27.8 sec    ABG - ( 03 Apr 2018 15:16 )  pH: 7.36  /  pCO2: 34    /  pO2: 75    / HCO3: 18    / Base Excess: -5.9  /  SaO2: 95

## 2018-04-04 NOTE — PROGRESS NOTE ADULT - PROBLEM SELECTOR PLAN 1
Pt with hemodynamically mediated STEPHENIE in setting of anemia, hypotension.   Pt Scr improving  ( 2.9 in 1/9/2018). Pt non-oliguric  Monitor BMP, strict I/O avoid nephrotoxcis, NSAIds RCA

## 2018-04-04 NOTE — PROGRESS NOTE ADULT - SUBJECTIVE AND OBJECTIVE BOX
[facility] DIVISION OF KIDNEY DISEASES AND HYPERTENSION -- [phone]   FOLLOW UP NOTE  --------------------------------------------------------------------------------  HPI:  47 y.o. woman with history of PCKD and ESRD who is POD1 from bilateral nephrectomies, and living donor renal transplant on 4/2/2018.   Received solumedrol and simulect intraoperatively. Also received 5 units of pRBC during the case. Pt currently in SICU, extubated on 4/3, off pressor support.     PAST HISTORY  --------------------------------------------------------------------------------  No significant changes to PMH, PSH, FHx, SHx, unless otherwise noted    ALLERGIES & MEDICATIONS  --------------------------------------------------------------------------------  Allergies    Demerol HCl (Hives)  Phenergan (Hives; Rash)    Intolerances      Standing Inpatient Medications  ceFAZolin   IVPB 1000 milliGRAM(s) IV Intermittent every 8 hours  ceFAZolin   IVPB      chlorhexidine 0.12% Liquid 15 milliLiter(s) Swish and Spit two times a day  docusate sodium 100 milliGRAM(s) Oral three times a day  famotidine    Tablet 20 milliGRAM(s) Oral daily  furosemide   Injectable 60 milliGRAM(s) IV Push once  HYDROmorphone PCA (1 mG/mL) 30 milliLiter(s) PCA Continuous PCA Continuous  methylPREDNISolone sodium succinate Injectable 60 milliGRAM(s) IV Push two times a day  mycophenolate mofetil 1000 milliGRAM(s) Oral two times a day  nystatin    Suspension 773849 Unit(s) Oral four times a day  potassium chloride    Tablet ER 20 milliEquivalent(s) Oral every 2 hours  senna 2 Tablet(s) Oral at bedtime  sodium chloride 0.45% 1000 milliLiter(s) IV Continuous <Continuous>  sodium chloride 0.9%. 1000 milliLiter(s) IV Continuous <Continuous>  tacrolimus 2 milliGRAM(s) Oral two times a day  trimethoprim   80 mG/sulfamethoxazole 400 mG 1 Tablet(s) Oral daily  valGANciclovir 450 milliGRAM(s) Oral daily    PRN Inpatient Medications  acetaminophen   Tablet. 975 milliGRAM(s) Oral every 6 hours PRN  HYDROmorphone  Injectable 0.5 milliGRAM(s) IV Push every 3 hours PRN  HYDROmorphone PCA (1 mG/mL) Rescue Clinician Bolus 0.5 milliGRAM(s) IV Push every 15 minutes PRN  naloxone Injectable 0.1 milliGRAM(s) IV Push every 3 minutes PRN  ondansetron Injectable 4 milliGRAM(s) IV Push every 6 hours PRN  oxyCODONE    IR 5 milliGRAM(s) Oral every 6 hours PRN      REVIEW OF SYSTEMS  --------------------------------------------------------------------------------  General: no fever  CVS: no chest pain  RESP: no sob, + cough  ABD: abdominal tenderness   MSK: no edema     VITALS/PHYSICAL EXAM  --------------------------------------------------------------------------------  T(C): 36.4 (04-04-18 @ 07:00), Max: 37.3 (04-03-18 @ 11:00)  HR: 71 (04-04-18 @ 09:00) (60 - 76)  BP: 98/55 (04-04-18 @ 09:00) (86/47 - 114/54)  RR: 14 (04-04-18 @ 09:00) (5 - 25)  SpO2: 95% (04-04-18 @ 09:00) (92% - 98%)  Wt(kg): --        04-03-18 @ 07:01  -  04-04-18 @ 07:00  --------------------------------------------------------  IN: 3666.3 mL / OUT: 2555 mL / NET: 1111.3 mL    04-04-18 @ 07:01  -  04-04-18 @ 09:14  --------------------------------------------------------  IN: 230 mL / OUT: 260 mL / NET: -30 mL      Physical Exam:  	Gen: NAD  	HEENT:   	Pulm: CTAB  	CV: S1S2  	Abd: Soft, +BS , incision c/d/i  	Ext: No LE edema B/L  	Neuro: awake  	Skin: Warm and dry  	Milad pinon present      LABS/STUDIES  --------------------------------------------------------------------------------              10.5   14.2  >-----------<  86       [04-04-18 @ 02:45]              29.5     139  |  108  |  43  ----------------------------<  153      [04-04-18 @ 02:45]  3.4   |  19  |  3.34        Ca     7.4     [04-04-18 @ 02:45]      Mg     2.6     [04-04-18 @ 02:45]      Phos  3.1     [04-04-18 @ 02:45]    TPro  4.3  /  Alb  2.0  /  TBili  0.3  /  DBili  0.1  /  AST  39  /  ALT  28  /  AlkPhos  26  [04-04-18 @ 02:45]    PT/INR: PT 11.1 , INR 1.03       [04-04-18 @ 02:45]  PTT: 27.8       [04-04-18 @ 02:45]          [04-04-18 @ 02:45]    Creatinine Trend:  SCr 3.34 [04-04 @ 02:45]  SCr 3.70 [04-03 @ 13:53]  SCr 3.80 [04-03 @ 09:59]  SCr 3.78 [04-03 @ 02:32]  SCr 3.51 [04-02 @ 20:11]        Iron 18, TIBC 167, %sat 11      [09-25-17 @ 06:30]  Ferritin 555.7      [09-25-17 @ 06:30]  PTH -- (Ca 8.0)      [09-16-17 @ 23:21]   232  Vitamin D (25OH) 9.7      [09-16-17 @ 23:21]

## 2018-04-04 NOTE — PROGRESS NOTE ADULT - PROBLEM SELECTOR PLAN 2
- Continue to monitor lactate  - Continue to monitor UOP   - Bicarb in IVF -continue solumedrol through 4/5, prednisone to start 4/6  -continue cellcept, tacrolimus; simulect on 4/6  -f/u tacrolimus level, adjust dose PRN  -continue anti-infectives (valcyte, bactrim, nystatin S+S)

## 2018-04-04 NOTE — PHYSICAL THERAPY INITIAL EVALUATION ADULT - ADDITIONAL COMMENTS
PTA pt lived in pvt home with son (works/school FT), 4 steps to enter +HR, 6 steps inside to bedroom +HR, fully independent without AD. Pt also has option to live with parents who have 3 steps to enter, no steps inside, mother can assist with ADLs.

## 2018-04-04 NOTE — PHYSICAL THERAPY INITIAL EVALUATION ADULT - PERTINENT HX OF CURRENT PROBLEM, REHAB EVAL
Pt is a 47y female with ESRD (not on HD but has a non-functional RUE AVF) 2/2 polycystic kidney disease is now s/p bilateral nephrectomies and right living donor kidney transplant. Patient required pressors throughout the case, initially on levo and dopamine, but remained persistently hypotensive. She was then switched to alisha and vaso once an intraop JENNA revealed IBAN and severe MR. Pt now extubated on NC.

## 2018-04-05 DIAGNOSIS — R09.02 HYPOXEMIA: ICD-10-CM

## 2018-04-05 DIAGNOSIS — Z79.899 OTHER LONG TERM (CURRENT) DRUG THERAPY: ICD-10-CM

## 2018-04-05 LAB
ALBUMIN SERPL ELPH-MCNC: 2.3 G/DL — LOW (ref 3.3–5)
ALP SERPL-CCNC: 28 U/L — LOW (ref 40–120)
ALT FLD-CCNC: 20 U/L RC — SIGNIFICANT CHANGE UP (ref 10–45)
ANION GAP SERPL CALC-SCNC: 14 MMOL/L — SIGNIFICANT CHANGE UP (ref 5–17)
APPEARANCE UR: CLEAR — SIGNIFICANT CHANGE UP
APTT BLD: 19.7 SEC — LOW (ref 27.5–37.4)
AST SERPL-CCNC: 24 U/L — SIGNIFICANT CHANGE UP (ref 10–40)
BASOPHILS # BLD AUTO: 0 K/UL — SIGNIFICANT CHANGE UP (ref 0–0.2)
BASOPHILS NFR BLD AUTO: 0.3 % — SIGNIFICANT CHANGE UP (ref 0–2)
BILIRUB SERPL-MCNC: 0.3 MG/DL — SIGNIFICANT CHANGE UP (ref 0.2–1.2)
BILIRUB UR-MCNC: NEGATIVE — SIGNIFICANT CHANGE UP
BUN SERPL-MCNC: 48 MG/DL — HIGH (ref 7–23)
CA-I BLD-SCNC: 1.08 MMOL/L — LOW (ref 1.12–1.3)
CALCIUM SERPL-MCNC: 7.5 MG/DL — LOW (ref 8.4–10.5)
CHLORIDE SERPL-SCNC: 105 MMOL/L — SIGNIFICANT CHANGE UP (ref 96–108)
CO2 SERPL-SCNC: 22 MMOL/L — SIGNIFICANT CHANGE UP (ref 22–31)
COLOR SPEC: SIGNIFICANT CHANGE UP
CREAT SERPL-MCNC: 2.92 MG/DL — HIGH (ref 0.5–1.3)
DIFF PNL FLD: ABNORMAL
EOSINOPHIL # BLD AUTO: 0 K/UL — SIGNIFICANT CHANGE UP (ref 0–0.5)
EOSINOPHIL NFR BLD AUTO: 0.1 % — SIGNIFICANT CHANGE UP (ref 0–6)
GLUCOSE SERPL-MCNC: 104 MG/DL — HIGH (ref 70–99)
GLUCOSE UR QL: NEGATIVE — SIGNIFICANT CHANGE UP
HCT VFR BLD CALC: 27.8 % — LOW (ref 34.5–45)
HGB BLD-MCNC: 9.9 G/DL — LOW (ref 11.5–15.5)
INR BLD: 0.94 RATIO — SIGNIFICANT CHANGE UP (ref 0.88–1.16)
KETONES UR-MCNC: NEGATIVE — SIGNIFICANT CHANGE UP
LEUKOCYTE ESTERASE UR-ACNC: NEGATIVE — SIGNIFICANT CHANGE UP
LYMPHOCYTES # BLD AUTO: 1.2 K/UL — SIGNIFICANT CHANGE UP (ref 1–3.3)
LYMPHOCYTES # BLD AUTO: 8.6 % — LOW (ref 13–44)
MAGNESIUM SERPL-MCNC: 2.6 MG/DL — SIGNIFICANT CHANGE UP (ref 1.6–2.6)
MCHC RBC-ENTMCNC: 33.6 PG — SIGNIFICANT CHANGE UP (ref 27–34)
MCHC RBC-ENTMCNC: 35.6 GM/DL — SIGNIFICANT CHANGE UP (ref 32–36)
MCV RBC AUTO: 94.3 FL — SIGNIFICANT CHANGE UP (ref 80–100)
MONOCYTES # BLD AUTO: 0.7 K/UL — SIGNIFICANT CHANGE UP (ref 0–0.9)
MONOCYTES NFR BLD AUTO: 4.6 % — SIGNIFICANT CHANGE UP (ref 2–14)
NEUTROPHILS # BLD AUTO: 12.2 K/UL — HIGH (ref 1.8–7.4)
NEUTROPHILS NFR BLD AUTO: 86.4 % — HIGH (ref 43–77)
NITRITE UR-MCNC: NEGATIVE — SIGNIFICANT CHANGE UP
PH UR: 6 — SIGNIFICANT CHANGE UP (ref 5–8)
PHOSPHATE SERPL-MCNC: 4.4 MG/DL — SIGNIFICANT CHANGE UP (ref 2.5–4.5)
PLATELET # BLD AUTO: 77 K/UL — LOW (ref 150–400)
POTASSIUM SERPL-MCNC: 3.7 MMOL/L — SIGNIFICANT CHANGE UP (ref 3.5–5.3)
POTASSIUM SERPL-SCNC: 3.7 MMOL/L — SIGNIFICANT CHANGE UP (ref 3.5–5.3)
PROT SERPL-MCNC: 4.6 G/DL — LOW (ref 6–8.3)
PROT UR-MCNC: SIGNIFICANT CHANGE UP
PROTHROM AB SERPL-ACNC: 10.2 SEC — SIGNIFICANT CHANGE UP (ref 9.8–12.7)
RBC # BLD: 2.94 M/UL — LOW (ref 3.8–5.2)
RBC # FLD: 12.8 % — SIGNIFICANT CHANGE UP (ref 10.3–14.5)
SODIUM SERPL-SCNC: 141 MMOL/L — SIGNIFICANT CHANGE UP (ref 135–145)
SP GR SPEC: 1.02 — SIGNIFICANT CHANGE UP (ref 1.01–1.02)
TACROLIMUS SERPL-MCNC: 10.2 NG/ML — SIGNIFICANT CHANGE UP
UROBILINOGEN FLD QL: NEGATIVE — SIGNIFICANT CHANGE UP
WBC # BLD: 14.1 K/UL — HIGH (ref 3.8–10.5)
WBC # FLD AUTO: 14.1 K/UL — HIGH (ref 3.8–10.5)

## 2018-04-05 PROCEDURE — 71045 X-RAY EXAM CHEST 1 VIEW: CPT | Mod: 26

## 2018-04-05 PROCEDURE — 99232 SBSQ HOSP IP/OBS MODERATE 35: CPT | Mod: GC

## 2018-04-05 RX ORDER — ACETAMINOPHEN 500 MG
325 TABLET ORAL EVERY 4 HOURS
Qty: 0 | Refills: 0 | Status: DISCONTINUED | OUTPATIENT
Start: 2018-04-05 | End: 2018-04-07

## 2018-04-05 RX ORDER — OXYCODONE AND ACETAMINOPHEN 5; 325 MG/1; MG/1
1 TABLET ORAL EVERY 6 HOURS
Qty: 0 | Refills: 0 | Status: DISCONTINUED | OUTPATIENT
Start: 2018-04-05 | End: 2018-04-06

## 2018-04-05 RX ORDER — ACETAMINOPHEN 500 MG
650 TABLET ORAL EVERY 6 HOURS
Qty: 0 | Refills: 0 | Status: DISCONTINUED | OUTPATIENT
Start: 2018-04-05 | End: 2018-04-07

## 2018-04-05 RX ORDER — GLYCERIN ADULT
2 SUPPOSITORY, RECTAL RECTAL ONCE
Qty: 0 | Refills: 0 | Status: COMPLETED | OUTPATIENT
Start: 2018-04-05 | End: 2018-04-05

## 2018-04-05 RX ORDER — LANOLIN ALCOHOL/MO/W.PET/CERES
3 CREAM (GRAM) TOPICAL AT BEDTIME
Qty: 0 | Refills: 0 | Status: DISCONTINUED | OUTPATIENT
Start: 2018-04-05 | End: 2018-04-13

## 2018-04-05 RX ADMIN — Medication 100 MILLIGRAM(S): at 06:29

## 2018-04-05 RX ADMIN — OXYCODONE AND ACETAMINOPHEN 1 TABLET(S): 5; 325 TABLET ORAL at 09:31

## 2018-04-05 RX ADMIN — OXYCODONE AND ACETAMINOPHEN 1 TABLET(S): 5; 325 TABLET ORAL at 10:01

## 2018-04-05 RX ADMIN — Medication 650 MILLIGRAM(S): at 13:54

## 2018-04-05 RX ADMIN — HYDROMORPHONE HYDROCHLORIDE 0.5 MILLIGRAM(S): 2 INJECTION INTRAMUSCULAR; INTRAVENOUS; SUBCUTANEOUS at 20:09

## 2018-04-05 RX ADMIN — Medication 500000 UNIT(S): at 06:29

## 2018-04-05 RX ADMIN — Medication 500000 UNIT(S): at 17:52

## 2018-04-05 RX ADMIN — Medication 500000 UNIT(S): at 00:22

## 2018-04-05 RX ADMIN — OXYCODONE AND ACETAMINOPHEN 1 TABLET(S): 5; 325 TABLET ORAL at 15:47

## 2018-04-05 RX ADMIN — OXYCODONE AND ACETAMINOPHEN 1 TABLET(S): 5; 325 TABLET ORAL at 22:20

## 2018-04-05 RX ADMIN — VALGANCICLOVIR 450 MILLIGRAM(S): 450 TABLET, FILM COATED ORAL at 12:53

## 2018-04-05 RX ADMIN — MYCOPHENOLATE MOFETIL 1000 MILLIGRAM(S): 250 CAPSULE ORAL at 17:52

## 2018-04-05 RX ADMIN — HYDROMORPHONE HYDROCHLORIDE 0.5 MILLIGRAM(S): 2 INJECTION INTRAMUSCULAR; INTRAVENOUS; SUBCUTANEOUS at 20:40

## 2018-04-05 RX ADMIN — Medication 3 MILLIGRAM(S): at 23:38

## 2018-04-05 RX ADMIN — MYCOPHENOLATE MOFETIL 1000 MILLIGRAM(S): 250 CAPSULE ORAL at 06:29

## 2018-04-05 RX ADMIN — Medication 100 MILLIGRAM(S): at 21:35

## 2018-04-05 RX ADMIN — FAMOTIDINE 20 MILLIGRAM(S): 10 INJECTION INTRAVENOUS at 12:53

## 2018-04-05 RX ADMIN — Medication 1 TABLET(S): at 12:53

## 2018-04-05 RX ADMIN — OXYCODONE AND ACETAMINOPHEN 1 TABLET(S): 5; 325 TABLET ORAL at 21:48

## 2018-04-05 RX ADMIN — HYDROMORPHONE HYDROCHLORIDE 0.5 MILLIGRAM(S): 2 INJECTION INTRAMUSCULAR; INTRAVENOUS; SUBCUTANEOUS at 23:40

## 2018-04-05 RX ADMIN — Medication 30 MILLIGRAM(S): at 06:30

## 2018-04-05 RX ADMIN — HYDROMORPHONE HYDROCHLORIDE 0.5 MILLIGRAM(S): 2 INJECTION INTRAMUSCULAR; INTRAVENOUS; SUBCUTANEOUS at 23:10

## 2018-04-05 RX ADMIN — SENNA PLUS 2 TABLET(S): 8.6 TABLET ORAL at 21:35

## 2018-04-05 RX ADMIN — TACROLIMUS 2 MILLIGRAM(S): 5 CAPSULE ORAL at 06:30

## 2018-04-05 RX ADMIN — Medication 650 MILLIGRAM(S): at 12:54

## 2018-04-05 RX ADMIN — Medication 500000 UNIT(S): at 23:06

## 2018-04-05 RX ADMIN — Medication 2 SUPPOSITORY(S): at 09:33

## 2018-04-05 RX ADMIN — Medication 30 MILLIGRAM(S): at 17:52

## 2018-04-05 RX ADMIN — OXYCODONE AND ACETAMINOPHEN 1 TABLET(S): 5; 325 TABLET ORAL at 17:18

## 2018-04-05 RX ADMIN — Medication 100 MILLIGRAM(S): at 12:53

## 2018-04-05 RX ADMIN — TACROLIMUS 2 MILLIGRAM(S): 5 CAPSULE ORAL at 17:52

## 2018-04-05 RX ADMIN — Medication 500000 UNIT(S): at 12:53

## 2018-04-05 NOTE — PROGRESS NOTE ADULT - PROBLEM SELECTOR PLAN 2
-continue solumedrol through 4/5, prednisone to start 4/6  -continue cellcept, tacrolimus; simulect on 4/6  -f/u tacrolimus level, adjust dose PRN  -continue anti-infectives (valcyte, bactrim, nystatin S+S)

## 2018-04-05 NOTE — PROGRESS NOTE ADULT - PROBLEM SELECTOR PLAN 1
Pt with hemodynamically mediated STEPHENIE in setting of anemia, hypotension.   Pt Scr improving.  Pt non-oliguric  Monitor BMP, strict I/O avoid nephrotoxcis, NSAIds RCA

## 2018-04-05 NOTE — PROGRESS NOTE ADULT - SUBJECTIVE AND OBJECTIVE BOX
Day 2 of Anesthesia Pain Management Service    SUBJECTIVE: Patient is doing well with IV PCA    Pain Scale Score:	[X] Refer to charted pain scores    THERAPY:    [ ] IV PCA Morphine		[ ] 5 mg/mL	[ ] 1 mg/mL  [X] IV PCA Hydromorphone	[ ] 5 mg/mL	[X] 1 mg/mL  [ ] IV PCA Fentanyl		[ ] 50 micrograms/mL    Demand dose: 0.2 mg     Lockout: 6 minutes   Continuous Rate: 0 mg/hr  4 Hour Limit: 4 mg    MEDICATIONS  (STANDING):  docusate sodium 100 milliGRAM(s) Oral three times a day  famotidine    Tablet 20 milliGRAM(s) Oral daily  methylPREDNISolone sodium succinate Injectable 30 milliGRAM(s) IV Push two times a day  mycophenolate mofetil 1000 milliGRAM(s) Oral two times a day  nystatin    Suspension 771806 Unit(s) Oral four times a day  senna 2 Tablet(s) Oral at bedtime  sodium chloride 0.45% 1000 milliLiter(s) (75 mL/Hr) IV Continuous <Continuous>  tacrolimus 2 milliGRAM(s) Oral two times a day  trimethoprim   80 mG/sulfamethoxazole 400 mG 1 Tablet(s) Oral daily  valGANciclovir 450 milliGRAM(s) Oral daily    MEDICATIONS  (PRN):  acetaminophen   Tablet. 325 milliGRAM(s) Oral every 4 hours PRN Mild Pain (1 - 3)  acetaminophen   Tablet. 650 milliGRAM(s) Oral every 6 hours PRN Moderate Pain (4 - 6)  HYDROmorphone  Injectable 0.5 milliGRAM(s) IV Push every 3 hours PRN Severe Breakthrough Pain  oxyCODONE    5 mG/acetaminophen 325 mG 1 Tablet(s) Oral every 6 hours PRN Severe Pain (7 - 10)      OBJECTIVE:    Sedation Score:	[ X] Alert	[ ] Drowsy 	[ ] Arousable	[ ] Asleep	[ ] Unresponsive    Side Effects:	[X ] None	[ ] Nausea	[ ] Vomiting	[ ] Pruritus  		[ ] Other:    Vital Signs Last 24 Hrs  T(C): 36.7 (05 Apr 2018 09:28), Max: 36.9 (04 Apr 2018 21:00)  T(F): 98.1 (05 Apr 2018 09:28), Max: 98.5 (04 Apr 2018 21:00)  HR: 73 (05 Apr 2018 09:28) (66 - 75)  BP: 109/64 (05 Apr 2018 09:28) (94/57 - 117/68)  BP(mean): 76 (04 Apr 2018 13:00) (73 - 78)  RR: 18 (05 Apr 2018 09:28) (6 - 18)  SpO2: 95% (05 Apr 2018 09:28) (88% - 96%)    ASSESSMENT/ PLAN    Therapy to  be:               [ ] Continued   [X ] Discontinued   [X ] Changed to PRN Analgesics    Documentation and Verification of current medications:   [X] Done	[ ] Not done, not eligible    Comments:

## 2018-04-05 NOTE — PROGRESS NOTE ADULT - ASSESSMENT
47F POD#3 s/p bilateral nephrectomies and living donor renal transplant. Transferred to the floor yesterday. She was extubated on the evening of POD# 1 and is off pressors.     Patient seen and discussed on multidisciplinary transplant rounds. Plan as below.    Blue  [phone]

## 2018-04-05 NOTE — PROGRESS NOTE ADULT - PROBLEM SELECTOR PLAN 1
-pain control with PCA  -NPO for now, await GI fxn  -continue Roberts, strict I/O  -SCDs for DVT ppx, encourage OOB, ambulation, IS -d/c PCA, PRN pain medications  -clear liquid diet, await GI fxn  -glycerine suppository  -continue Roberts, strict I/O  -SCDs for DVT ppx

## 2018-04-05 NOTE — PROGRESS NOTE ADULT - PROBLEM SELECTOR PLAN 2
PT s/p Simulect on 4/2/2018. Next dose on 4/6/2018  Continue steroids with taper  Continue cellcept 1000gm BID   Continue prograf 2mg BID  Check FK level 30min prior to prograf dosing ( 7.6 on 4/4, )  Continue nystatin, bactrim and valcyte for ppx

## 2018-04-05 NOTE — PROGRESS NOTE ADULT - SUBJECTIVE AND OBJECTIVE BOX
[facility] DIVISION OF KIDNEY DISEASES AND HYPERTENSION -- [phone]   FOLLOW UP NOTE  --------------------------------------------------------------------------------  HPI:  47 y.o. woman with history of PCKD and ESRD who is POD3 from bilateral nephrectomies, and living donor renal transplant on 4/2/2018.   Received solumedrol and simulect intraoperatively. Also received 5 units of pRBC during the case.  Pt was  extubated on 4/3, off pressor support. Pt transferred out of SICU to floor.  Pt seen and examined at bedside.     PAST HISTORY  --------------------------------------------------------------------------------  No significant changes to PMH, PSH, FHx, SHx, unless otherwise noted    ALLERGIES & MEDICATIONS  --------------------------------------------------------------------------------  Allergies    Demerol HCl (Hives)  Phenergan (Hives; Rash)    Intolerances      Standing Inpatient Medications  docusate sodium 100 milliGRAM(s) Oral three times a day  famotidine    Tablet 20 milliGRAM(s) Oral daily  glycerin Suppository - Adult 2 Suppository(s) Rectal once  methylPREDNISolone sodium succinate Injectable 30 milliGRAM(s) IV Push two times a day  mycophenolate mofetil 1000 milliGRAM(s) Oral two times a day  nystatin    Suspension [id number] Unit(s) Oral four times a day  senna 2 Tablet(s) Oral at bedtime  sodium chloride 0.45% 1000 milliLiter(s) IV Continuous <Continuous>  tacrolimus 2 milliGRAM(s) Oral two times a day  trimethoprim   80 mG/sulfamethoxazole 400 mG 1 Tablet(s) Oral daily  valGANciclovir 450 milliGRAM(s) Oral daily    PRN Inpatient Medications  acetaminophen   Tablet. 325 milliGRAM(s) Oral every 4 hours PRN  acetaminophen   Tablet. 650 milliGRAM(s) Oral every 6 hours PRN  HYDROmorphone  Injectable 0.5 milliGRAM(s) IV Push every 3 hours PRN  oxyCODONE    5 mG/acetaminophen 325 mG 1 Tablet(s) Oral every 6 hours PRN      REVIEW OF SYSTEMS  --------------------------------------------------------------------------------  General: no fever  CVS: no chest pain  RESP: + sob, + cough  ABD: no abdominal pain  : no dysuria,  MSK: + edema     VITALS/PHYSICAL EXAM  --------------------------------------------------------------------------------  T(C): 36.9 (04-05-18 @ 05:19), Max: 36.9 (04-04-18 @ 21:00)  HR: 72 (04-05-18 @ 05:19) (66 - 81)  BP: 117/68 (04-05-18 @ 05:19) (94/57 - 117/68)  RR: 18 (04-05-18 @ 05:19) (6 - 18)  SpO2: 95% (04-05-18 @ 05:19) (88% - 96%)  Wt(kg): --        04-04-18 @ 07:01  -  04-05-18 @ 07:00  --------------------------------------------------------  IN: 2200 mL / OUT: 2880 mL / NET: -680 mL    04-05-18 @ 07:01  -  04-05-18 @ 09:16  --------------------------------------------------------  IN: 400 mL / OUT: 160 mL / NET: 240 mL      Physical Exam:  	Gen: NAD  	HEENT:   	Pulm: CTAB  	CV: S1S2  	Abd: Soft, +BS , incision c/d/i  	Ext: dependent edema  	Neuro: awake  	Skin: Warm and dry  	Milad pinon present  LABS/STUDIES  --------------------------------------------------------------------------------              9.9    14.1  >-----------<  77       [04-05-18 @ 06:18]              27.8     141  |  105  |  48  ----------------------------<  104      [04-05-18 @ 06:19]  3.7   |  22  |  2.92        Ca     7.5     [04-05-18 @ 06:19]      iCa    1.08     [04-05 @ 06:19]      Mg     2.6     [04-05-18 @ 06:19]      Phos  4.4     [04-05-18 @ 06:19]    TPro  4.6  /  Alb  2.3  /  TBili  0.3  /  DBili  x   /  AST  24  /  ALT  20  /  AlkPhos  28  [04-05-18 @ 06:19]    PT/INR: PT 10.2 , INR 0.94       [04-05-18 @ 06:18]  PTT: 19.7       [04-05-18 @ 06:18]          [04-04-18 @ 02:45]    Creatinine Trend:  SCr 2.92 [04-05 @ 06:19]  SCr 3.34 [04-04 @ 02:45]  SCr 3.70 [04-03 @ 13:53]  SCr 3.80 [04-03 @ 09:59]  SCr 3.78 [04-03 @ 02:32]    Urinalysis - [04-05-18 @ 06:19]      Color PL Yellow / Appearance Clear / SG 1.016 / pH 6.0      Gluc Negative / Ketone Negative  / Bili Negative / Urobili Negative       Blood Moderate / Protein Trace / Leuk Est Negative / Nitrite Negative      RBC 2-5 / WBC 2-5 / Hyaline  / Gran  / Sq Epi  / Non Sq Epi  / Bacteria       Iron 18, TIBC 167, %sat 11      [09-25-17 @ 06:30]  Ferritin 555.7      [09-25-17 @ 06:30]  PTH -- (Ca 8.0)      [09-16-17 @ 23:21]   232  Vitamin D (25OH) 9.7      [09-16-17 @ 23:21]

## 2018-04-06 LAB
ALBUMIN SERPL ELPH-MCNC: 2.2 G/DL — LOW (ref 3.3–5)
ALP SERPL-CCNC: 31 U/L — LOW (ref 40–120)
ALT FLD-CCNC: 18 U/L RC — SIGNIFICANT CHANGE UP (ref 10–45)
ANION GAP SERPL CALC-SCNC: 10 MMOL/L — SIGNIFICANT CHANGE UP (ref 5–17)
APPEARANCE UR: CLEAR — SIGNIFICANT CHANGE UP
APTT BLD: 24.4 SEC — LOW (ref 27.5–37.4)
AST SERPL-CCNC: 17 U/L — SIGNIFICANT CHANGE UP (ref 10–40)
BILIRUB SERPL-MCNC: 0.4 MG/DL — SIGNIFICANT CHANGE UP (ref 0.2–1.2)
BILIRUB UR-MCNC: NEGATIVE — SIGNIFICANT CHANGE UP
BUN SERPL-MCNC: 44 MG/DL — HIGH (ref 7–23)
CA-I BLD-SCNC: 1.08 MMOL/L — LOW (ref 1.12–1.3)
CALCIUM SERPL-MCNC: 7.7 MG/DL — LOW (ref 8.4–10.5)
CHLORIDE SERPL-SCNC: 105 MMOL/L — SIGNIFICANT CHANGE UP (ref 96–108)
CO2 SERPL-SCNC: 25 MMOL/L — SIGNIFICANT CHANGE UP (ref 22–31)
COLOR SPEC: YELLOW — SIGNIFICANT CHANGE UP
CREAT SERPL-MCNC: 2.34 MG/DL — HIGH (ref 0.5–1.3)
DIFF PNL FLD: ABNORMAL
GLUCOSE SERPL-MCNC: 103 MG/DL — HIGH (ref 70–99)
GLUCOSE UR QL: NEGATIVE — SIGNIFICANT CHANGE UP
HCT VFR BLD CALC: 27.1 % — LOW (ref 34.5–45)
HGB BLD-MCNC: 9.6 G/DL — LOW (ref 11.5–15.5)
INR BLD: 0.94 RATIO — SIGNIFICANT CHANGE UP (ref 0.88–1.16)
KETONES UR-MCNC: NEGATIVE — SIGNIFICANT CHANGE UP
LEUKOCYTE ESTERASE UR-ACNC: NEGATIVE — SIGNIFICANT CHANGE UP
MAGNESIUM SERPL-MCNC: 2.5 MG/DL — SIGNIFICANT CHANGE UP (ref 1.6–2.6)
MCHC RBC-ENTMCNC: 33.7 PG — SIGNIFICANT CHANGE UP (ref 27–34)
MCHC RBC-ENTMCNC: 35.5 GM/DL — SIGNIFICANT CHANGE UP (ref 32–36)
MCV RBC AUTO: 94.8 FL — SIGNIFICANT CHANGE UP (ref 80–100)
NITRITE UR-MCNC: NEGATIVE — SIGNIFICANT CHANGE UP
PH UR: 6.5 — SIGNIFICANT CHANGE UP (ref 5–8)
PHOSPHATE SERPL-MCNC: 3.9 MG/DL — SIGNIFICANT CHANGE UP (ref 2.5–4.5)
PLATELET # BLD AUTO: 90 K/UL — LOW (ref 150–400)
POTASSIUM SERPL-MCNC: 3.6 MMOL/L — SIGNIFICANT CHANGE UP (ref 3.5–5.3)
POTASSIUM SERPL-SCNC: 3.6 MMOL/L — SIGNIFICANT CHANGE UP (ref 3.5–5.3)
PROT SERPL-MCNC: 4.7 G/DL — LOW (ref 6–8.3)
PROT UR-MCNC: 30 MG/DL
PROTHROM AB SERPL-ACNC: 10.1 SEC — SIGNIFICANT CHANGE UP (ref 9.8–12.7)
RBC # BLD: 2.86 M/UL — LOW (ref 3.8–5.2)
RBC # FLD: 12.4 % — SIGNIFICANT CHANGE UP (ref 10.3–14.5)
SODIUM SERPL-SCNC: 140 MMOL/L — SIGNIFICANT CHANGE UP (ref 135–145)
SP GR SPEC: 1.02 — SIGNIFICANT CHANGE UP (ref 1.01–1.02)
TACROLIMUS SERPL-MCNC: 6 NG/ML — SIGNIFICANT CHANGE UP
UROBILINOGEN FLD QL: NEGATIVE — SIGNIFICANT CHANGE UP
WBC # BLD: 10.1 K/UL — SIGNIFICANT CHANGE UP (ref 3.8–10.5)
WBC # FLD AUTO: 10.1 K/UL — SIGNIFICANT CHANGE UP (ref 3.8–10.5)

## 2018-04-06 PROCEDURE — 99232 SBSQ HOSP IP/OBS MODERATE 35: CPT | Mod: GC

## 2018-04-06 RX ORDER — ONDANSETRON 8 MG/1
4 TABLET, FILM COATED ORAL ONCE
Qty: 0 | Refills: 0 | Status: COMPLETED | OUTPATIENT
Start: 2018-04-06 | End: 2018-04-06

## 2018-04-06 RX ORDER — ONDANSETRON 8 MG/1
4 TABLET, FILM COATED ORAL EVERY 8 HOURS
Qty: 0 | Refills: 0 | Status: COMPLETED | OUTPATIENT
Start: 2018-04-06 | End: 2018-04-06

## 2018-04-06 RX ORDER — METOCLOPRAMIDE HCL 10 MG
5 TABLET ORAL
Qty: 0 | Refills: 0 | Status: DISCONTINUED | OUTPATIENT
Start: 2018-04-06 | End: 2018-04-07

## 2018-04-06 RX ORDER — HYDROMORPHONE HYDROCHLORIDE 2 MG/ML
0.5 INJECTION INTRAMUSCULAR; INTRAVENOUS; SUBCUTANEOUS EVERY 6 HOURS
Qty: 0 | Refills: 0 | Status: DISCONTINUED | OUTPATIENT
Start: 2018-04-06 | End: 2018-04-11

## 2018-04-06 RX ADMIN — Medication 500000 UNIT(S): at 12:38

## 2018-04-06 RX ADMIN — Medication 5 MILLIGRAM(S): at 17:11

## 2018-04-06 RX ADMIN — Medication 3 MILLIGRAM(S): at 21:48

## 2018-04-06 RX ADMIN — HYDROMORPHONE HYDROCHLORIDE 0.5 MILLIGRAM(S): 2 INJECTION INTRAMUSCULAR; INTRAVENOUS; SUBCUTANEOUS at 02:45

## 2018-04-06 RX ADMIN — Medication 20 MILLIGRAM(S): at 08:29

## 2018-04-06 RX ADMIN — Medication 500000 UNIT(S): at 17:58

## 2018-04-06 RX ADMIN — HYDROMORPHONE HYDROCHLORIDE 0.5 MILLIGRAM(S): 2 INJECTION INTRAMUSCULAR; INTRAVENOUS; SUBCUTANEOUS at 21:47

## 2018-04-06 RX ADMIN — HYDROMORPHONE HYDROCHLORIDE 0.5 MILLIGRAM(S): 2 INJECTION INTRAMUSCULAR; INTRAVENOUS; SUBCUTANEOUS at 06:36

## 2018-04-06 RX ADMIN — HYDROMORPHONE HYDROCHLORIDE 0.5 MILLIGRAM(S): 2 INJECTION INTRAMUSCULAR; INTRAVENOUS; SUBCUTANEOUS at 18:08

## 2018-04-06 RX ADMIN — MYCOPHENOLATE MOFETIL 1000 MILLIGRAM(S): 250 CAPSULE ORAL at 17:59

## 2018-04-06 RX ADMIN — ONDANSETRON 4 MILLIGRAM(S): 8 TABLET, FILM COATED ORAL at 08:29

## 2018-04-06 RX ADMIN — Medication 1 TABLET(S): at 12:38

## 2018-04-06 RX ADMIN — MYCOPHENOLATE MOFETIL 1000 MILLIGRAM(S): 250 CAPSULE ORAL at 06:39

## 2018-04-06 RX ADMIN — TACROLIMUS 2 MILLIGRAM(S): 5 CAPSULE ORAL at 06:39

## 2018-04-06 RX ADMIN — HYDROMORPHONE HYDROCHLORIDE 0.5 MILLIGRAM(S): 2 INJECTION INTRAMUSCULAR; INTRAVENOUS; SUBCUTANEOUS at 14:59

## 2018-04-06 RX ADMIN — ONDANSETRON 4 MILLIGRAM(S): 8 TABLET, FILM COATED ORAL at 06:11

## 2018-04-06 RX ADMIN — Medication 100 MILLIGRAM(S): at 06:39

## 2018-04-06 RX ADMIN — HYDROMORPHONE HYDROCHLORIDE 0.5 MILLIGRAM(S): 2 INJECTION INTRAMUSCULAR; INTRAVENOUS; SUBCUTANEOUS at 12:14

## 2018-04-06 RX ADMIN — HYDROMORPHONE HYDROCHLORIDE 0.5 MILLIGRAM(S): 2 INJECTION INTRAMUSCULAR; INTRAVENOUS; SUBCUTANEOUS at 08:29

## 2018-04-06 RX ADMIN — VALGANCICLOVIR 450 MILLIGRAM(S): 450 TABLET, FILM COATED ORAL at 12:38

## 2018-04-06 RX ADMIN — Medication 20 MILLIGRAM(S): at 18:01

## 2018-04-06 RX ADMIN — HYDROMORPHONE HYDROCHLORIDE 0.5 MILLIGRAM(S): 2 INJECTION INTRAMUSCULAR; INTRAVENOUS; SUBCUTANEOUS at 10:00

## 2018-04-06 RX ADMIN — Medication 500000 UNIT(S): at 06:39

## 2018-04-06 RX ADMIN — BASILIXIMAB 100 MILLIGRAM(S): 20 INJECTION, POWDER, FOR SOLUTION INTRAVENOUS at 11:44

## 2018-04-06 RX ADMIN — ONDANSETRON 4 MILLIGRAM(S): 8 TABLET, FILM COATED ORAL at 10:44

## 2018-04-06 RX ADMIN — HYDROMORPHONE HYDROCHLORIDE 0.5 MILLIGRAM(S): 2 INJECTION INTRAMUSCULAR; INTRAVENOUS; SUBCUTANEOUS at 20:10

## 2018-04-06 RX ADMIN — HYDROMORPHONE HYDROCHLORIDE 0.5 MILLIGRAM(S): 2 INJECTION INTRAMUSCULAR; INTRAVENOUS; SUBCUTANEOUS at 15:29

## 2018-04-06 RX ADMIN — HYDROMORPHONE HYDROCHLORIDE 0.5 MILLIGRAM(S): 2 INJECTION INTRAMUSCULAR; INTRAVENOUS; SUBCUTANEOUS at 07:06

## 2018-04-06 RX ADMIN — HYDROMORPHONE HYDROCHLORIDE 0.5 MILLIGRAM(S): 2 INJECTION INTRAMUSCULAR; INTRAVENOUS; SUBCUTANEOUS at 22:20

## 2018-04-06 RX ADMIN — FAMOTIDINE 20 MILLIGRAM(S): 10 INJECTION INTRAVENOUS at 12:38

## 2018-04-06 RX ADMIN — HYDROMORPHONE HYDROCHLORIDE 0.5 MILLIGRAM(S): 2 INJECTION INTRAMUSCULAR; INTRAVENOUS; SUBCUTANEOUS at 17:10

## 2018-04-06 RX ADMIN — Medication 5 MILLIGRAM(S): at 20:19

## 2018-04-06 RX ADMIN — TACROLIMUS 2 MILLIGRAM(S): 5 CAPSULE ORAL at 17:59

## 2018-04-06 RX ADMIN — HYDROMORPHONE HYDROCHLORIDE 0.5 MILLIGRAM(S): 2 INJECTION INTRAMUSCULAR; INTRAVENOUS; SUBCUTANEOUS at 11:44

## 2018-04-06 RX ADMIN — HYDROMORPHONE HYDROCHLORIDE 0.5 MILLIGRAM(S): 2 INJECTION INTRAMUSCULAR; INTRAVENOUS; SUBCUTANEOUS at 02:13

## 2018-04-06 RX ADMIN — HYDROMORPHONE HYDROCHLORIDE 0.5 MILLIGRAM(S): 2 INJECTION INTRAMUSCULAR; INTRAVENOUS; SUBCUTANEOUS at 20:40

## 2018-04-06 NOTE — CHART NOTE - NSCHARTNOTEFT_GEN_A_CORE
Pt seen for nutrition follow up S/P kidney transplant, as per departmental protocol.     Source: Patient [X ]    Family [X ]     other [X ]; medical record    Hospital Course: Pt S/P LDRT and bilateral nephrectomy POD #4.     Pt noted with 3160ml urine output in past 24-hours; current UO = 100-200ml/hr. Magnesium, sodium, potassium and phosphorus WNL.     Reviewed food safety guidelines for transplant recipients. Reviewed recommendations to avoid grapefruit and pomegranate while taking immunosuppressant medication. Pt and family were receptive and expressed understanding. Provided nutrition handout: USDA Food Safety for Transplant Recipients booklet.    Diet : clear liquids    GI: Pt reports nausea, not resolved with zofran; Reglan added. Pt reports GI discomfort and diarrhea.      PO intake:  < 50% [X ] 50-75% [ ]   % [ ]  other : Pt reports she has eating almost nothing for 7 days, currently only tolerating sips of ginger ale and juice. Pt is encouraged to try Promote supplement and high protein jello.      Source for PO intake [X ] Patient [X ] family [ ] chart [ ] staff [ ] other     Enteral /Parenteral Nutrition: n/a    Current Weight: Weight (kg): 103.2Kg (4/6), 95.3Kg (4/2)  Edema: 2+ generalized    Pertinent Medications: MEDICATIONS  (STANDING):  docusate sodium 100 milliGRAM(s) Oral three times a day  famotidine    Tablet 20 milliGRAM(s) Oral daily  melatonin 3 milliGRAM(s) Oral at bedtime  mycophenolate mofetil 1000 milliGRAM(s) Oral two times a day  nystatin    Suspension [id number] Unit(s) Oral four times a day  predniSONE   Tablet 20 milliGRAM(s) Oral once  senna 2 Tablet(s) Oral at bedtime  sodium chloride 0.45% 1000 milliLiter(s) (75 mL/Hr) IV Continuous <Continuous>  tacrolimus 2 milliGRAM(s) Oral two times a day  trimethoprim   80 mG/sulfamethoxazole 400 mG 1 Tablet(s) Oral daily  valGANciclovir 450 milliGRAM(s) Oral daily    MEDICATIONS  (PRN):  acetaminophen   Tablet. 325 milliGRAM(s) Oral every 4 hours PRN Mild Pain (1 - 3)  acetaminophen   Tablet. 650 milliGRAM(s) Oral every 6 hours PRN Moderate Pain (4 - 6)  HYDROmorphone  Injectable 0.5 milliGRAM(s) IV Push every 3 hours PRN Severe Breakthrough Pain  HYDROmorphone  Injectable 0.5 milliGRAM(s) IV Push every 6 hours PRN Severe Pain (7 - 10)  metoclopramide 5 milliGRAM(s) Oral two times a day PRN nausea    Pertinent Labs:  04-06 Na140 mmol/L Glu 103 mg/dL<H> K+ 3.6 mmol/L Cr  2.34 mg/dL<H> BUN 44 mg/dL<H> 04-06 Phos 3.9 mg/dL 04-06 Alb 2.2 g/dL<L>    Skin: no pressure injuries    Estimated Needs:   [X ] no change since previous assessment  [ ] recalculated:     Previous Nutrition Diagnosis:   [X ] Increased Nutrient Needs    Nutrition Diagnosis is [X ] ongoing; addressed with clear liquid diet    New Nutrition Diagnosis: [X ] not applicable    Interventions:     Recommend:  1) Advance diet as tolerated to Regular; encourage intake of high protein, nutrient dense foods   2) Recommend Ensure Clear (200Kcal, 7gm protein) tid if pt unable to tolerate solid food  3) Monitor weight, lab values, skin, po intake and GI tolerance  4) Reinforce therapeutic diet education as able    Monitoring and Evaluation:   Follow up per protocol  RD to remain available for further nutritional interventions as indicated.   Alina Reed, MS RD [facility], #[phone].

## 2018-04-06 NOTE — PROGRESS NOTE ADULT - ASSESSMENT
47F POD#4 s/p bilateral nephrectomies and living donor renal transplant. She was extubated on the evening of POD# 1 and is off pressors.  Currently on the floor recovering.    Patient seen and discussed on multidisciplinary transplant rounds. Plan as below.    Blue  [phone]

## 2018-04-06 NOTE — PROGRESS NOTE ADULT - SUBJECTIVE AND OBJECTIVE BOX
Transplant Surgery - Multidisciplinary Rounds  --------------------------------------------------------------  STATUS POST: s/p LDRT and bilateral nephrectomy  POST-OPERATIVE DAY #4      Patient seen & examined in AM.  PCA discontinued, started on oral and IV PRN pain medication  c/o some discomfort overnight  OOB and Ambulated yesterday x 1  Tolerating some sips of clears. Denies flatus or BM.  No N/V. Had some vaginal bleeding in the afternoon (small amount of old blood).  Denies CP or SOB.    All remaining systems reviewed and negative except as noted above.    MEDICATIONS  (STANDING):  basiliximab  IVPB 20 milliGRAM(s) IV Intermittent once  docusate sodium 100 milliGRAM(s) Oral three times a day  famotidine    Tablet 20 milliGRAM(s) Oral daily  melatonin 3 milliGRAM(s) Oral at bedtime  mycophenolate mofetil 1000 milliGRAM(s) Oral two times a day  nystatin    Suspension 687300 Unit(s) Oral four times a day  predniSONE   Tablet 20 milliGRAM(s) Oral once  senna 2 Tablet(s) Oral at bedtime  sodium chloride 0.45% 1000 milliLiter(s) (75 mL/Hr) IV Continuous <Continuous>  tacrolimus 2 milliGRAM(s) Oral two times a day  trimethoprim   80 mG/sulfamethoxazole 400 mG 1 Tablet(s) Oral daily  valGANciclovir 450 milliGRAM(s) Oral daily    MEDICATIONS  (PRN):  acetaminophen   Tablet. 325 milliGRAM(s) Oral every 4 hours PRN Mild Pain (1 - 3)  acetaminophen   Tablet. 650 milliGRAM(s) Oral every 6 hours PRN Moderate Pain (4 - 6)  HYDROmorphone  Injectable 0.5 milliGRAM(s) IV Push every 3 hours PRN Severe Breakthrough Pain  HYDROmorphone  Injectable 0.5 milliGRAM(s) IV Push every 6 hours PRN Severe Pain (7 - 10)  metoclopramide 5 milliGRAM(s) Oral two times a day PRN nausea  ondansetron Injectable 4 milliGRAM(s) IV Push every 8 hours PRN Nausea    PAST MEDICAL & SURGICAL HISTORY:  ESRD (end stage renal disease)  CKD (chronic kidney disease) stage 4, GFR 15-29 ml/min: no dialysis  Anxiety  GERD (gastroesophageal reflux disease)  Polycystic kidney disease  Kidney stones  Arthropathy NOS - shoulder  Hyperlipidemia  AV fistula: right forearm extremity AV fistula radiocephalic ( 2018)  H/O shoulder surgery: left  History of ventral hernia repair  H/O tubal ligation: (1996)  Kidney calculi: cysto/lithotripsy multiple  S/P cholecystectomy: (2013)  Tubal ligation: 1995  Ankle fracture - Left: [facility] barajas procedure 2000    Vital Signs Last 24 Hrs  T(C): 36.5 (06 Apr 2018 06:24), Max: 36.9 (05 Apr 2018 17:06)  T(F): 97.7 (06 Apr 2018 06:24), Max: 98.5 (05 Apr 2018 17:06)  HR: 60 (06 Apr 2018 06:24) (60 - 73)  BP: 120/71 (06 Apr 2018 06:24) (109/64 - 127/71)  BP(mean): --  RR: 18 (06 Apr 2018 06:24) (18 - 18)  SpO2: 92% (06 Apr 2018 06:24) (92% - 95%)    I&O's Summary    05 Apr 2018 07:01  -  06 Apr 2018 07:00  --------------------------------------------------------  IN: 3615 mL / OUT: 4340 mL / NET: -725 mL                              9.6    10.1  )-----------( 90       ( 06 Apr 2018 06:28 )             27.1     04-06    140  |  105  |  44<H>  ----------------------------<  103<H>  3.6   |  25  |  2.34<H>    Ca    7.7<L>      06 Apr 2018 06:28  Phos  3.9     04-06  Mg     2.5     04-06    TPro  4.7<L>  /  Alb  2.2<L>  /  TBili  0.4  /  DBili  x   /  AST  17  /  ALT  18  /  AlkPhos  31<L>  04-06    Tacrolimus (), Serum: 10.2 ng/mL (04-05 @ 07:37) Transplant Surgery - Multidisciplinary Rounds  --------------------------------------------------------------  STATUS POST: s/p LDRT and bilateral nephrectomy  POST-OPERATIVE DAY #4    Patient seen & examined in AM.  PCA discontinued, started on oral and IV PRN pain medication  c/o some discomfort overnight  OOB and Ambulated yesterday x 1  Nauseated overnight, with some emesis this morning  Denies flatus or BM.  Had some vaginal bleeding in the afternoon (small amount of old blood).     All remaining systems reviewed and negative except as noted above.    MEDICATIONS  (STANDING):  basiliximab  IVPB 20 milliGRAM(s) IV Intermittent once  docusate sodium 100 milliGRAM(s) Oral three times a day  famotidine    Tablet 20 milliGRAM(s) Oral daily  melatonin 3 milliGRAM(s) Oral at bedtime  mycophenolate mofetil 1000 milliGRAM(s) Oral two times a day  nystatin    Suspension 730249 Unit(s) Oral four times a day  predniSONE   Tablet 20 milliGRAM(s) Oral once  senna 2 Tablet(s) Oral at bedtime  sodium chloride 0.45% 1000 milliLiter(s) (75 mL/Hr) IV Continuous <Continuous>  tacrolimus 2 milliGRAM(s) Oral two times a day  trimethoprim   80 mG/sulfamethoxazole 400 mG 1 Tablet(s) Oral daily  valGANciclovir 450 milliGRAM(s) Oral daily    MEDICATIONS  (PRN):  acetaminophen   Tablet. 325 milliGRAM(s) Oral every 4 hours PRN Mild Pain (1 - 3)  acetaminophen   Tablet. 650 milliGRAM(s) Oral every 6 hours PRN Moderate Pain (4 - 6)  HYDROmorphone  Injectable 0.5 milliGRAM(s) IV Push every 3 hours PRN Severe Breakthrough Pain  HYDROmorphone  Injectable 0.5 milliGRAM(s) IV Push every 6 hours PRN Severe Pain (7 - 10)  metoclopramide 5 milliGRAM(s) Oral two times a day PRN nausea  ondansetron Injectable 4 milliGRAM(s) IV Push every 8 hours PRN Nausea    PAST MEDICAL & SURGICAL HISTORY:  ESRD (end stage renal disease)  CKD (chronic kidney disease) stage 4, GFR 15-29 ml/min: no dialysis  Anxiety  GERD (gastroesophageal reflux disease)  Polycystic kidney disease  Kidney stones  Arthropathy NOS - shoulder  Hyperlipidemia  AV fistula: right forearm extremity AV fistula radiocephalic ( 2018)  H/O shoulder surgery: left  History of ventral hernia repair  H/O tubal ligation: (1996)  Kidney calculi: cysto/lithotripsy multiple  S/P cholecystectomy: (2013)  Tubal ligation: 1995  Ankle fracture - Left: [facility] barajas procedure 2000    Vital Signs Last 24 Hrs  T(C): 36.5 (06 Apr 2018 06:24), Max: 36.9 (05 Apr 2018 17:06)  T(F): 97.7 (06 Apr 2018 06:24), Max: 98.5 (05 Apr 2018 17:06)  HR: 60 (06 Apr 2018 06:24) (60 - 73)  BP: 120/71 (06 Apr 2018 06:24) (109/64 - 127/71)  BP(mean): --  RR: 18 (06 Apr 2018 06:24) (18 - 18)  SpO2: 92% (06 Apr 2018 06:24) (92% - 95%)    Physical Exam  Constitutional: well developed, well nourished  Neuro: AAOx3  Eyes: anicteric, PERRLA  ENMT: nc/at  Neck: clean dressing on R neck, no hematoma  Respiratory: decreased breath sounds on L  Cardiovascular: RRR  Gastrointestinal: soft abdomen, mildly distended, appropriate kira-incisional tenderness, incision C/D/I with staples, drains serosanguinous  Genitourinary: urinary catheter in place with clear urine  Extremities: SCD's in place and working bilaterally  Vascular: equal DP pulses bilaterally  Musculoskeletal: Moving all extremities    I&O's Summary    05 Apr 2018 07:01  -  06 Apr 2018 07:00  --------------------------------------------------------  IN: 3615 mL / OUT: 4340 mL / NET: -725 mL                        9.6    10.1  )-----------( 90       ( 06 Apr 2018 06:28 )             27.1     04-06    140  |  105  |  44<H>  ----------------------------<  103<H>  3.6   |  25  |  2.34<H>    Ca    7.7<L>      06 Apr 2018 06:28  Phos  3.9     04-06  Mg     2.5     04-06    TPro  4.7<L>  /  Alb  2.2<L>  /  TBili  0.4  /  DBili  x   /  AST  17  /  ALT  18  /  AlkPhos  31<L>  04-06    Tacrolimus (), Serum: 10.2 ng/mL (04-05 @ 07:37)

## 2018-04-06 NOTE — PROGRESS NOTE ADULT - PROBLEM SELECTOR PLAN 1
Pt with hemodynamically mediated STEPHENIE in setting of anemia, hypotension.   Pt Scr improving 2.3 today.  Pt non-oliguric  Monitor BMP, strict I/O avoid nephrotoxcis, NSAIds RCA

## 2018-04-06 NOTE — PROGRESS NOTE ADULT - PROBLEM SELECTOR PLAN 2
PT s/p Simulect on 4/2/2018. Next dose today on  4/6/2018  Continue steroids with taper  Continue cellcept 1000gm BID   Continue prograf 2mg BID  Check FK level 30min prior to prograf dosing ( 10.2  on 4/5 )  Continue nystatin, bactrim and valcyte for ppx

## 2018-04-06 NOTE — PROGRESS NOTE ADULT - SUBJECTIVE AND OBJECTIVE BOX
[facility] DIVISION OF KIDNEY DISEASES AND HYPERTENSION -- [phone]   FOLLOW UP NOTE  --------------------------------------------------------------------------------  HPI:  47 y.o. woman with history of PCKD and ESRD who is POD3 from bilateral nephrectomies, and living donor renal transplant on 4/2/2018.   Received solumedrol and simulect intraoperatively. Also received 5 units of pRBC during the case.  Pt was  extubated on 4/3, off pressor support. Pt transferred out of SICU to floor.  Pt POD # 4. Pt seen and examined at bedside. Continues to complaint of soreness and nausea.     PAST HISTORY  --------------------------------------------------------------------------------  No significant changes to PMH, PSH, FHx, SHx, unless otherwise noted    ALLERGIES & MEDICATIONS  --------------------------------------------------------------------------------  Allergies    Demerol HCl (Hives)  Phenergan (Hives; Rash)    Intolerances      Standing Inpatient Medications  basiliximab  IVPB 20 milliGRAM(s) IV Intermittent once  docusate sodium 100 milliGRAM(s) Oral three times a day  famotidine    Tablet 20 milliGRAM(s) Oral daily  melatonin 3 milliGRAM(s) Oral at bedtime  mycophenolate mofetil 1000 milliGRAM(s) Oral two times a day  nystatin    Suspension [id number] Unit(s) Oral four times a day  predniSONE   Tablet 20 milliGRAM(s) Oral once  senna 2 Tablet(s) Oral at bedtime  sodium chloride 0.45% 1000 milliLiter(s) IV Continuous <Continuous>  tacrolimus 2 milliGRAM(s) Oral two times a day  trimethoprim   80 mG/sulfamethoxazole 400 mG 1 Tablet(s) Oral daily  valGANciclovir 450 milliGRAM(s) Oral daily    PRN Inpatient Medications  acetaminophen   Tablet. 325 milliGRAM(s) Oral every 4 hours PRN  acetaminophen   Tablet. 650 milliGRAM(s) Oral every 6 hours PRN  HYDROmorphone  Injectable 0.5 milliGRAM(s) IV Push every 3 hours PRN  HYDROmorphone  Injectable 0.5 milliGRAM(s) IV Push every 6 hours PRN  metoclopramide 5 milliGRAM(s) Oral two times a day PRN  ondansetron Injectable 4 milliGRAM(s) IV Push every 8 hours PRN      REVIEW OF SYSTEMS  --------------------------------------------------------------------------------  General: no fever  CVS: no chest pain  RESP: no sob, no cough  ABD: no abdominal pain  : no dysuria,  MSK: + edema     VITALS/PHYSICAL EXAM  --------------------------------------------------------------------------------  T(C): 36.8 (04-06-18 @ 10:23), Max: 36.9 (04-05-18 @ 17:06)  HR: 72 (04-06-18 @ 10:23) (60 - 72)  BP: 131/77 (04-06-18 @ 10:23) (110/60 - 131/77)  RR: 18 (04-06-18 @ 10:23) (18 - 18)  SpO2: 92% (04-06-18 @ 10:23) (92% - 94%)  Wt(kg): --        04-05-18 @ 07:01  -  04-06-18 @ 07:00  --------------------------------------------------------  IN: 3615 mL / OUT: 4340 mL / NET: -725 mL    04-06-18 @ 07:01  -  04-06-18 @ 10:40  --------------------------------------------------------  IN: 0 mL / OUT: 270 mL / NET: -270 mL      Physical Exam:  	Gen: NAD  	HEENT:   	Pulm: CTAB  	CV: S1S2  	Abd: Soft, +BS , incision c/d/i  	Ext: dependent edema  	Neuro: awake  	Skin: Warm and dry  	Milad pinon present  LABS/STUDIES  --------------------------------------------------------------------------------              9.6    10.1  >-----------<  90       [04-06-18 @ 06:28]              27.1     140  |  105  |  44  ----------------------------<  103      [04-06-18 @ 06:28]  3.6   |  25  |  2.34        Ca     7.7     [04-06-18 @ 06:28]      iCa    1.08     [04-06 @ 06:33]      Mg     2.5     [04-06-18 @ 06:28]      Phos  3.9     [04-06-18 @ 06:28]    TPro  4.7  /  Alb  2.2  /  TBili  0.4  /  DBili  x   /  AST  17  /  ALT  18  /  AlkPhos  31  [04-06-18 @ 06:28]    PT/INR: PT 10.1 , INR 0.94       [04-06-18 @ 06:28]  PTT: 24.4       [04-06-18 @ 06:28]      Creatinine Trend:  SCr 2.34 [04-06 @ 06:28]  SCr 2.92 [04-05 @ 06:19]  SCr 3.34 [04-04 @ 02:45]  SCr 3.70 [04-03 @ 13:53]  SCr 3.80 [04-03 @ 09:59]    Urinalysis - [04-06-18 @ 06:28]      Color Yellow / Appearance Clear / SG 1.018 / pH 6.5      Gluc Negative / Ketone Negative  / Bili Negative / Urobili Negative       Blood Moderate / Protein 30 / Leuk Est Negative / Nitrite Negative      RBC 10-25 / WBC 2-5 / Hyaline 2-5 / Gran  / Sq Epi  / Non Sq Epi Occasional / Bacteria       Iron 18, TIBC 167, %sat 11      [09-25-17 @ 06:30]  Ferritin 555.7      [09-25-17 @ 06:30]  PTH -- (Ca 8.0)      [09-16-17 @ 23:21]   232  Vitamin D (25OH) 9.7      [09-16-17 @ 23:21] [facility] DIVISION OF KIDNEY DISEASES AND HYPERTENSION -- [phone]   FOLLOW UP NOTE  --------------------------------------------------------------------------------  HPI:  47 y.o. woman with history of PCKD and ESRD who is POD4 from bilateral nephrectomies, and living donor renal transplant on 4/2/2018.   Received solumedrol and simulect intraoperatively. Also received 5 units of pRBC during the case.  Pt was  extubated on 4/3, off pressor support. Pt transferred out of SICU to floor.  Pt POD # 4. Pt seen and examined at bedside. Continues to complaint of soreness and nausea.     PAST HISTORY  --------------------------------------------------------------------------------  No significant changes to PMH, PSH, FHx, SHx, unless otherwise noted    ALLERGIES & MEDICATIONS  --------------------------------------------------------------------------------  Allergies    Demerol HCl (Hives)  Phenergan (Hives; Rash)    Intolerances      Standing Inpatient Medications  basiliximab  IVPB 20 milliGRAM(s) IV Intermittent once  docusate sodium 100 milliGRAM(s) Oral three times a day  famotidine    Tablet 20 milliGRAM(s) Oral daily  melatonin 3 milliGRAM(s) Oral at bedtime  mycophenolate mofetil 1000 milliGRAM(s) Oral two times a day  nystatin    Suspension [id number] Unit(s) Oral four times a day  predniSONE   Tablet 20 milliGRAM(s) Oral once  senna 2 Tablet(s) Oral at bedtime  sodium chloride 0.45% 1000 milliLiter(s) IV Continuous <Continuous>  tacrolimus 2 milliGRAM(s) Oral two times a day  trimethoprim   80 mG/sulfamethoxazole 400 mG 1 Tablet(s) Oral daily  valGANciclovir 450 milliGRAM(s) Oral daily    PRN Inpatient Medications  acetaminophen   Tablet. 325 milliGRAM(s) Oral every 4 hours PRN  acetaminophen   Tablet. 650 milliGRAM(s) Oral every 6 hours PRN  HYDROmorphone  Injectable 0.5 milliGRAM(s) IV Push every 3 hours PRN  HYDROmorphone  Injectable 0.5 milliGRAM(s) IV Push every 6 hours PRN  metoclopramide 5 milliGRAM(s) Oral two times a day PRN  ondansetron Injectable 4 milliGRAM(s) IV Push every 8 hours PRN      REVIEW OF SYSTEMS  --------------------------------------------------------------------------------  General: no fever  CVS: no chest pain  RESP: no sob, no cough  ABD: no abdominal pain  : no dysuria,  MSK: + edema     VITALS/PHYSICAL EXAM  --------------------------------------------------------------------------------  T(C): 36.8 (04-06-18 @ 10:23), Max: 36.9 (04-05-18 @ 17:06)  HR: 72 (04-06-18 @ 10:23) (60 - 72)  BP: 131/77 (04-06-18 @ 10:23) (110/60 - 131/77)  RR: 18 (04-06-18 @ 10:23) (18 - 18)  SpO2: 92% (04-06-18 @ 10:23) (92% - 94%)  Wt(kg): --        04-05-18 @ 07:01  -  04-06-18 @ 07:00  --------------------------------------------------------  IN: 3615 mL / OUT: 4340 mL / NET: -725 mL    04-06-18 @ 07:01  -  04-06-18 @ 10:40  --------------------------------------------------------  IN: 0 mL / OUT: 270 mL / NET: -270 mL      Physical Exam:  	Gen: NAD  	HEENT:   	Pulm: CTAB  	CV: S1S2  	Abd: Soft, +BS , incision c/d/i  	Ext: dependent edema  	Neuro: awake  	Skin: Warm and dry  	Milad pinon present  LABS/STUDIES  --------------------------------------------------------------------------------              9.6    10.1  >-----------<  90       [04-06-18 @ 06:28]              27.1     140  |  105  |  44  ----------------------------<  103      [04-06-18 @ 06:28]  3.6   |  25  |  2.34        Ca     7.7     [04-06-18 @ 06:28]      iCa    1.08     [04-06 @ 06:33]      Mg     2.5     [04-06-18 @ 06:28]      Phos  3.9     [04-06-18 @ 06:28]    TPro  4.7  /  Alb  2.2  /  TBili  0.4  /  DBili  x   /  AST  17  /  ALT  18  /  AlkPhos  31  [04-06-18 @ 06:28]    PT/INR: PT 10.1 , INR 0.94       [04-06-18 @ 06:28]  PTT: 24.4       [04-06-18 @ 06:28]      Creatinine Trend:  SCr 2.34 [04-06 @ 06:28]  SCr 2.92 [04-05 @ 06:19]  SCr 3.34 [04-04 @ 02:45]  SCr 3.70 [04-03 @ 13:53]  SCr 3.80 [04-03 @ 09:59]    Urinalysis - [04-06-18 @ 06:28]      Color Yellow / Appearance Clear / SG 1.018 / pH 6.5      Gluc Negative / Ketone Negative  / Bili Negative / Urobili Negative       Blood Moderate / Protein 30 / Leuk Est Negative / Nitrite Negative      RBC 10-25 / WBC 2-5 / Hyaline 2-5 / Gran  / Sq Epi  / Non Sq Epi Occasional / Bacteria       Iron 18, TIBC 167, %sat 11      [09-25-17 @ 06:30]  Ferritin 555.7      [09-25-17 @ 06:30]  PTH -- (Ca 8.0)      [09-16-17 @ 23:21]   232  Vitamin D (25OH) 9.7      [09-16-17 @ 23:21]

## 2018-04-06 NOTE — PROGRESS NOTE ADULT - PROBLEM SELECTOR PLAN 1
-d/c PCA, PRN pain medications  -clear liquid diet, await GI fxn  -glycerine suppository  -d/c pinon, encourage voiding  -SCDs for DVT ppx

## 2018-04-07 LAB
ALBUMIN SERPL ELPH-MCNC: 2.1 G/DL — LOW (ref 3.3–5)
ALP SERPL-CCNC: 30 U/L — LOW (ref 40–120)
ALT FLD-CCNC: 28 U/L RC — SIGNIFICANT CHANGE UP (ref 10–45)
ANION GAP SERPL CALC-SCNC: 10 MMOL/L — SIGNIFICANT CHANGE UP (ref 5–17)
AST SERPL-CCNC: 16 U/L — SIGNIFICANT CHANGE UP (ref 10–40)
BILIRUB SERPL-MCNC: 0.5 MG/DL — SIGNIFICANT CHANGE UP (ref 0.2–1.2)
BUN SERPL-MCNC: 42 MG/DL — HIGH (ref 7–23)
CALCIUM SERPL-MCNC: 7.3 MG/DL — LOW (ref 8.4–10.5)
CHLORIDE SERPL-SCNC: 103 MMOL/L — SIGNIFICANT CHANGE UP (ref 96–108)
CO2 SERPL-SCNC: 24 MMOL/L — SIGNIFICANT CHANGE UP (ref 22–31)
CREAT SERPL-MCNC: 1.98 MG/DL — HIGH (ref 0.5–1.3)
GLUCOSE SERPL-MCNC: 106 MG/DL — HIGH (ref 70–99)
HCT VFR BLD CALC: 27.3 % — LOW (ref 34.5–45)
HGB BLD-MCNC: 9.7 G/DL — LOW (ref 11.5–15.5)
MAGNESIUM SERPL-MCNC: 2.5 MG/DL — SIGNIFICANT CHANGE UP (ref 1.6–2.6)
MCHC RBC-ENTMCNC: 33.4 PG — SIGNIFICANT CHANGE UP (ref 27–34)
MCHC RBC-ENTMCNC: 35.4 GM/DL — SIGNIFICANT CHANGE UP (ref 32–36)
MCV RBC AUTO: 94.3 FL — SIGNIFICANT CHANGE UP (ref 80–100)
PHOSPHATE SERPL-MCNC: 2.9 MG/DL — SIGNIFICANT CHANGE UP (ref 2.5–4.5)
PLATELET # BLD AUTO: 99 K/UL — LOW (ref 150–400)
POTASSIUM SERPL-MCNC: 3.5 MMOL/L — SIGNIFICANT CHANGE UP (ref 3.5–5.3)
POTASSIUM SERPL-SCNC: 3.5 MMOL/L — SIGNIFICANT CHANGE UP (ref 3.5–5.3)
PROT SERPL-MCNC: 4.4 G/DL — LOW (ref 6–8.3)
RBC # BLD: 2.9 M/UL — LOW (ref 3.8–5.2)
RBC # FLD: 12 % — SIGNIFICANT CHANGE UP (ref 10.3–14.5)
SODIUM SERPL-SCNC: 137 MMOL/L — SIGNIFICANT CHANGE UP (ref 135–145)
TACROLIMUS SERPL-MCNC: 5 NG/ML — SIGNIFICANT CHANGE UP
WBC # BLD: 10.3 K/UL — SIGNIFICANT CHANGE UP (ref 3.8–10.5)
WBC # FLD AUTO: 10.3 K/UL — SIGNIFICANT CHANGE UP (ref 3.8–10.5)

## 2018-04-07 PROCEDURE — 99233 SBSQ HOSP IP/OBS HIGH 50: CPT | Mod: GC

## 2018-04-07 PROCEDURE — 99232 SBSQ HOSP IP/OBS MODERATE 35: CPT | Mod: GC

## 2018-04-07 RX ORDER — ACETAMINOPHEN 500 MG
1000 TABLET ORAL ONCE
Qty: 0 | Refills: 0 | Status: COMPLETED | OUTPATIENT
Start: 2018-04-07 | End: 2018-04-07

## 2018-04-07 RX ORDER — DIPHENHYDRAMINE HCL 50 MG
25 CAPSULE ORAL AT BEDTIME
Qty: 0 | Refills: 0 | Status: DISCONTINUED | OUTPATIENT
Start: 2018-04-07 | End: 2018-04-13

## 2018-04-07 RX ORDER — ACETAMINOPHEN 500 MG
650 TABLET ORAL EVERY 6 HOURS
Qty: 0 | Refills: 0 | Status: DISCONTINUED | OUTPATIENT
Start: 2018-04-07 | End: 2018-04-13

## 2018-04-07 RX ORDER — POTASSIUM CHLORIDE 20 MEQ
40 PACKET (EA) ORAL ONCE
Qty: 0 | Refills: 0 | Status: COMPLETED | OUTPATIENT
Start: 2018-04-07 | End: 2018-04-07

## 2018-04-07 RX ORDER — METOCLOPRAMIDE HCL 10 MG
5 TABLET ORAL EVERY 6 HOURS
Qty: 0 | Refills: 0 | Status: DISCONTINUED | OUTPATIENT
Start: 2018-04-07 | End: 2018-04-11

## 2018-04-07 RX ORDER — ACETAMINOPHEN 500 MG
1000 TABLET ORAL ONCE
Qty: 0 | Refills: 0 | Status: COMPLETED | OUTPATIENT
Start: 2018-04-08 | End: 2018-04-08

## 2018-04-07 RX ORDER — SIMETHICONE 80 MG/1
80 TABLET, CHEWABLE ORAL ONCE
Qty: 0 | Refills: 0 | Status: COMPLETED | OUTPATIENT
Start: 2018-04-07 | End: 2018-04-07

## 2018-04-07 RX ORDER — METOCLOPRAMIDE HCL 10 MG
5 TABLET ORAL EVERY 6 HOURS
Qty: 0 | Refills: 0 | Status: DISCONTINUED | OUTPATIENT
Start: 2018-04-07 | End: 2018-04-07

## 2018-04-07 RX ORDER — METOCLOPRAMIDE HCL 10 MG
10 TABLET ORAL EVERY 6 HOURS
Qty: 0 | Refills: 0 | Status: DISCONTINUED | OUTPATIENT
Start: 2018-04-07 | End: 2018-04-07

## 2018-04-07 RX ORDER — ONDANSETRON 8 MG/1
8 TABLET, FILM COATED ORAL EVERY 8 HOURS
Qty: 0 | Refills: 0 | Status: COMPLETED | OUTPATIENT
Start: 2018-04-07 | End: 2018-04-07

## 2018-04-07 RX ORDER — POTASSIUM CHLORIDE 20 MEQ
10 PACKET (EA) ORAL
Qty: 0 | Refills: 0 | Status: DISCONTINUED | OUTPATIENT
Start: 2018-04-07 | End: 2018-04-07

## 2018-04-07 RX ORDER — TACROLIMUS 5 MG/1
3 CAPSULE ORAL
Qty: 0 | Refills: 0 | Status: DISCONTINUED | OUTPATIENT
Start: 2018-04-07 | End: 2018-04-08

## 2018-04-07 RX ORDER — ONDANSETRON 8 MG/1
4 TABLET, FILM COATED ORAL ONCE
Qty: 0 | Refills: 0 | Status: COMPLETED | OUTPATIENT
Start: 2018-04-07 | End: 2018-04-07

## 2018-04-07 RX ADMIN — TACROLIMUS 3 MILLIGRAM(S): 5 CAPSULE ORAL at 18:41

## 2018-04-07 RX ADMIN — Medication 5 MILLIGRAM(S): at 01:58

## 2018-04-07 RX ADMIN — Medication 1000 MILLIGRAM(S): at 17:35

## 2018-04-07 RX ADMIN — Medication 500000 UNIT(S): at 18:41

## 2018-04-07 RX ADMIN — Medication 1000 MILLIGRAM(S): at 22:00

## 2018-04-07 RX ADMIN — Medication 3 MILLIGRAM(S): at 21:32

## 2018-04-07 RX ADMIN — MYCOPHENOLATE MOFETIL 1000 MILLIGRAM(S): 250 CAPSULE ORAL at 06:12

## 2018-04-07 RX ADMIN — SIMETHICONE 80 MILLIGRAM(S): 80 TABLET, CHEWABLE ORAL at 20:06

## 2018-04-07 RX ADMIN — Medication 500000 UNIT(S): at 06:12

## 2018-04-07 RX ADMIN — HYDROMORPHONE HYDROCHLORIDE 0.5 MILLIGRAM(S): 2 INJECTION INTRAMUSCULAR; INTRAVENOUS; SUBCUTANEOUS at 01:57

## 2018-04-07 RX ADMIN — HYDROMORPHONE HYDROCHLORIDE 0.5 MILLIGRAM(S): 2 INJECTION INTRAMUSCULAR; INTRAVENOUS; SUBCUTANEOUS at 14:35

## 2018-04-07 RX ADMIN — Medication 500000 UNIT(S): at 14:08

## 2018-04-07 RX ADMIN — Medication 500000 UNIT(S): at 01:57

## 2018-04-07 RX ADMIN — HYDROMORPHONE HYDROCHLORIDE 0.5 MILLIGRAM(S): 2 INJECTION INTRAMUSCULAR; INTRAVENOUS; SUBCUTANEOUS at 06:14

## 2018-04-07 RX ADMIN — TACROLIMUS 2 MILLIGRAM(S): 5 CAPSULE ORAL at 06:12

## 2018-04-07 RX ADMIN — FAMOTIDINE 20 MILLIGRAM(S): 10 INJECTION INTRAVENOUS at 16:16

## 2018-04-07 RX ADMIN — Medication 25 MILLIGRAM(S): at 21:59

## 2018-04-07 RX ADMIN — VALGANCICLOVIR 450 MILLIGRAM(S): 450 TABLET, FILM COATED ORAL at 16:16

## 2018-04-07 RX ADMIN — Medication 5 MILLIGRAM(S): at 23:41

## 2018-04-07 RX ADMIN — Medication 40 MILLIEQUIVALENT(S): at 10:39

## 2018-04-07 RX ADMIN — Medication 400 MILLIGRAM(S): at 17:20

## 2018-04-07 RX ADMIN — Medication 5 MILLIGRAM(S): at 06:12

## 2018-04-07 RX ADMIN — Medication 1 TABLET(S): at 16:16

## 2018-04-07 RX ADMIN — Medication 400 MILLIGRAM(S): at 11:15

## 2018-04-07 RX ADMIN — Medication 10 MILLIGRAM(S): at 18:41

## 2018-04-07 RX ADMIN — HYDROMORPHONE HYDROCHLORIDE 0.5 MILLIGRAM(S): 2 INJECTION INTRAMUSCULAR; INTRAVENOUS; SUBCUTANEOUS at 02:30

## 2018-04-07 RX ADMIN — HYDROMORPHONE HYDROCHLORIDE 0.5 MILLIGRAM(S): 2 INJECTION INTRAMUSCULAR; INTRAVENOUS; SUBCUTANEOUS at 06:45

## 2018-04-07 RX ADMIN — HYDROMORPHONE HYDROCHLORIDE 0.5 MILLIGRAM(S): 2 INJECTION INTRAMUSCULAR; INTRAVENOUS; SUBCUTANEOUS at 14:20

## 2018-04-07 RX ADMIN — Medication 10 MILLIGRAM(S): at 06:12

## 2018-04-07 RX ADMIN — ONDANSETRON 8 MILLIGRAM(S): 8 TABLET, FILM COATED ORAL at 10:40

## 2018-04-07 RX ADMIN — ONDANSETRON 4 MILLIGRAM(S): 8 TABLET, FILM COATED ORAL at 14:08

## 2018-04-07 RX ADMIN — Medication 400 MILLIGRAM(S): at 21:32

## 2018-04-07 RX ADMIN — Medication 5 MILLIGRAM(S): at 18:42

## 2018-04-07 RX ADMIN — Medication 1000 MILLIGRAM(S): at 11:30

## 2018-04-07 NOTE — PROGRESS NOTE ADULT - PROBLEM SELECTOR PLAN 2
PT s/p Simulect on 4/2/2018 and  4/6/2018  Continue steroids with taper  Continue cellcept 1000gm BID   Continue prograf 2mg BID  Check FK level 30min prior to prograf dosing ( 10.2  on 4/5 )  Continue nystatin, bactrim and valcyte for ppx

## 2018-04-07 NOTE — PROGRESS NOTE ADULT - SUBJECTIVE AND OBJECTIVE BOX
Transplant Surgery - Multidisciplinary Rounds  --------------------------------------------------------------    Patient seen & examined in AM  No acute events overnight  Remains nauseated, but no vomiting. Pain is moderately controlled.  OOB and Ambulating some.  Voiding without issue    MEDICATIONS  (STANDING):  docusate sodium 100 milliGRAM(s) Oral three times a day  famotidine    Tablet 20 milliGRAM(s) Oral daily  melatonin 3 milliGRAM(s) Oral at bedtime  mycophenolate mofetil 1000 milliGRAM(s) Oral two times a day  nystatin    Suspension 369444 Unit(s) Oral four times a day  predniSONE   Tablet 10 milliGRAM(s) Oral two times a day  senna 2 Tablet(s) Oral at bedtime  sodium chloride 0.45% 1000 milliLiter(s) (75 mL/Hr) IV Continuous <Continuous>  tacrolimus 2 milliGRAM(s) Oral two times a day  trimethoprim   80 mG/sulfamethoxazole 400 mG 1 Tablet(s) Oral daily  valGANciclovir 450 milliGRAM(s) Oral daily    MEDICATIONS  (PRN):  acetaminophen   Tablet. 325 milliGRAM(s) Oral every 4 hours PRN Mild Pain (1 - 3)  acetaminophen   Tablet. 650 milliGRAM(s) Oral every 6 hours PRN Moderate Pain (4 - 6)  HYDROmorphone  Injectable 0.5 milliGRAM(s) IV Push every 3 hours PRN Severe Breakthrough Pain  HYDROmorphone  Injectable 0.5 milliGRAM(s) IV Push every 6 hours PRN Severe Pain (7 - 10)  metoclopramide 5 milliGRAM(s) Oral two times a day PRN nausea    PAST MEDICAL & SURGICAL HISTORY:  ESRD (end stage renal disease)  CKD (chronic kidney disease) stage 4, GFR 15-29 ml/min: no dialysis  Anxiety  GERD (gastroesophageal reflux disease)  Polycystic kidney disease  Kidney stones  Arthropathy NOS - shoulder  Hyperlipidemia  AV fistula: right forearm extremity AV fistula radiocephalic ( 2018)  H/O shoulder surgery: left  History of ventral hernia repair  H/O tubal ligation: (1996)  Kidney calculi: cysto/lithotripsy multiple  S/P cholecystectomy: (2013)  Tubal ligation: 1995  Ankle fracture - Left: [facility] procedure 2000    Vital Signs Last 24 Hrs  T(C): 36.9 (07 Apr 2018 05:09), Max: 37 (06 Apr 2018 21:59)  T(F): 98.4 (07 Apr 2018 05:09), Max: 98.6 (06 Apr 2018 21:59)  HR: 61 (07 Apr 2018 05:09) (59 - 78)  BP: 131/74 (07 Apr 2018 05:09) (131/74 - 146/80)  BP(mean): --  RR: 18 (07 Apr 2018 05:09) (18 - 18)  SpO2: 98% (07 Apr 2018 05:09) (92% - 98%)    Physical Exam  Constitutional: well developed, well nourished  Neuro: AAOx3  Eyes: anicteric, PERRLA  ENMT: nc/at  Neck: no swelling, supple  Respiratory: mild decreased breath sounds on L  Cardiovascular: RRR  Gastrointestinal: soft abdomen, mildly distended, appropriate kira-incisional tenderness, incision C/D/I with staples, drains serosanguinous  Extremities: SCD's in place and working bilaterally  Vascular: equal DP pulses bilaterally  Musculoskeletal: Moving all extremities    I&O's Summary    06 Apr 2018 07:01  -  07 Apr 2018 07:00  --------------------------------------------------------  IN: 3030 mL / OUT: 3770 mL / NET: -740 mL    07 Apr 2018 07:01  -  07 Apr 2018 08:37  --------------------------------------------------------  IN: 75 mL / OUT: 290 mL / NET: -215 mL                        9.7    10.3  )-----------( 99       ( 07 Apr 2018 05:59 )             27.3     04-07    137  |  103  |  42<H>  ----------------------------<  106<H>  3.5   |  24  |  1.98<H>    Ca    7.3<L>      07 Apr 2018 05:59  Phos  2.9     04-07  Mg     2.5     04-07    TPro  4.4<L>  /  Alb  2.1<L>  /  TBili  0.5  /  DBili  x   /  AST  16  /  ALT  28  /  AlkPhos  30<L>  04-07    Tacrolimus (), Serum: 6.0 ng/mL (04-06 @ 07:44)

## 2018-04-07 NOTE — PROGRESS NOTE ADULT - PROBLEM SELECTOR PLAN 1
Pt with b/l nephrectomies, LDRT POD 5.  Pt Scr improving 1.9 today.  Pt non-oliguric  Monitor BMP, strict I/O avoid nephrotoxcis, NSAIds RCA Pt with b/l nephrectomies, LDRT POD 5.  Pt Scr improving 1.9 today.  Pt non-oliguric;   Monitor BMP, strict I/O avoid nephrotoxcis, NSAIds RCA

## 2018-04-07 NOTE — PROGRESS NOTE ADULT - PROBLEM SELECTOR PLAN 1
-d/c PCA, PRN pain medications  -clear liquid diet, await GI fxn  -glycerine suppository  -encourage voiding  -SCDs for DVT ppx

## 2018-04-07 NOTE — PROGRESS NOTE ADULT - ASSESSMENT
47F POD#5 s/p bilateral nephrectomies and living donor renal transplant. She was extubated on the evening of POD# 1 and is off pressors.  Currently on the floor recovering.      Patient seen and discussed on multidisciplinary transplant rounds. Plan as below.    Blue  [phone]

## 2018-04-07 NOTE — PROGRESS NOTE ADULT - SUBJECTIVE AND OBJECTIVE BOX
[facility] DIVISION OF KIDNEY DISEASES AND HYPERTENSION -- FOLLOW UP NOTE  --------------------------------------------------------------------------------  Chief Complaint: renal transplant recipient     24 hour events/subjective:  complaining of abdominal pain, nausea, vomiting and diarrhea.      PAST HISTORY  --------------------------------------------------------------------------------  No significant changes to PMH, PSH, FHx, SHx, unless otherwise noted    ALLERGIES & MEDICATIONS  --------------------------------------------------------------------------------  Allergies    Demerol HCl (Hives)  Phenergan (Hives; Rash)    Intolerances      Standing Inpatient Medications  acetaminophen  IVPB. 1000 milliGRAM(s) IV Intermittent once  acetaminophen  IVPB. 1000 milliGRAM(s) IV Intermittent once  acetaminophen  IVPB. 1000 milliGRAM(s) IV Intermittent once  docusate sodium 100 milliGRAM(s) Oral three times a day  famotidine    Tablet 20 milliGRAM(s) Oral daily  melatonin 3 milliGRAM(s) Oral at bedtime  mycophenolate mofetil 1000 milliGRAM(s) Oral two times a day  nystatin    Suspension 804876 Unit(s) Oral four times a day  potassium chloride    Tablet ER 40 milliEquivalent(s) Oral once  predniSONE   Tablet 10 milliGRAM(s) Oral two times a day  senna 2 Tablet(s) Oral at bedtime  tacrolimus 2 milliGRAM(s) Oral two times a day  trimethoprim   80 mG/sulfamethoxazole 400 mG 1 Tablet(s) Oral daily  valGANciclovir 450 milliGRAM(s) Oral daily    PRN Inpatient Medications  acetaminophen   Tablet. 650 milliGRAM(s) Oral every 6 hours PRN  HYDROmorphone  Injectable 0.5 milliGRAM(s) IV Push every 3 hours PRN  HYDROmorphone  Injectable 0.5 milliGRAM(s) IV Push every 6 hours PRN  metoclopramide 5 milliGRAM(s) Oral every 6 hours PRN  ondansetron Injectable 8 milliGRAM(s) IV Push every 8 hours PRN      REVIEW OF SYSTEMS  --------------------------------------------------------------------------------  Gen: No weight changes, fatigue, fevers/chills, weakness  Skin: No rashes  Head/Eyes/Ears/Mouth: No headache; Normal hearing; Normal vision w/o blurriness; No sinus pain/discomfort, sore throat  Respiratory: No dyspnea, cough, wheezing, hemoptysis  CV: No chest pain, PND, orthopnea  GI:  abdominal pain, diarrhea,  nausea, vomiting+  : No increased frequency, dysuria, hematuria, nocturia  MSK: edema      All other systems were reviewed and are negative, except as noted.    VITALS/PHYSICAL EXAM  --------------------------------------------------------------------------------  T(C): 36.8 (04-07-18 @ 09:04), Max: 37 (04-06-18 @ 21:59)  HR: 63 (04-07-18 @ 09:04) (59 - 78)  BP: 129/72 (04-07-18 @ 09:04) (129/72 - 146/80)  RR: 18 (04-07-18 @ 09:04) (18 - 18)  SpO2: 93% (04-07-18 @ 09:04) (92% - 98%)  Wt(kg): --        04-06-18 @ 07:01  -  04-07-18 @ 07:00  --------------------------------------------------------  IN: 3030 mL / OUT: 3770 mL / NET: -740 mL    04-07-18 @ 07:01  -  04-07-18 @ 10:38  --------------------------------------------------------  IN: 315 mL / OUT: 580 mL / NET: -265 mL      Physical Exam:  	Gen: NAD, well-appearing  	HEENT: PERRL, supple neck, clear oropharynx  	Pulm: CTA B/L  	CV: RRR, S1S2; no rub  	Back: No spinal or CVA tenderness; no sacral edema  	Abd: +BS, soft, nontender/nondistended  	: No suprapubic tenderness  	UE: Warm, FROM, no clubbing, intact strength; no edema; no asterixis  	LE: Warm, FROM, no clubbing, intact strength; no edema  	Neuro: No focal deficits, intact gait  	Psych: Normal affect and mood  	Skin: Warm, without rashes  	Vascular access:    LABS/STUDIES  --------------------------------------------------------------------------------              9.7    10.3  >-----------<  99       [04-07-18 @ 05:59]              27.3     137  |  103  |  42  ----------------------------<  106      [04-07-18 @ 05:59]  3.5   |  24  |  1.98        Ca     7.3     [04-07-18 @ 05:59]      iCa    1.08     [04-06 @ 06:33]      Mg     2.5     [04-07-18 @ 05:59]      Phos  2.9     [04-07-18 @ 05:59]    TPro  4.4  /  Alb  2.1  /  TBili  0.5  /  DBili  x   /  AST  16  /  ALT  28  /  AlkPhos  30  [04-07-18 @ 05:59]    PT/INR: PT 10.1 , INR 0.94       [04-06-18 @ 06:28]  PTT: 24.4       [04-06-18 @ 06:28]      Creatinine Trend:  SCr 1.98 [04-07 @ 05:59]  SCr 2.34 [04-06 @ 06:28]  SCr 2.92 [04-05 @ 06:19]  SCr 3.34 [04-04 @ 02:45]  SCr 3.70 [04-03 @ 13:53]    Urinalysis - [04-06-18 @ 06:28]      Color Yellow / Appearance Clear / SG 1.018 / pH 6.5      Gluc Negative / Ketone Negative  / Bili Negative / Urobili Negative       Blood Moderate / Protein 30 / Leuk Est Negative / Nitrite Negative      RBC 10-25 / WBC 2-5 / Hyaline 2-5 / Gran  / Sq Epi  / Non Sq Epi Occasional / Bacteria       Iron 18, TIBC 167, %sat 11      [09-25-17 @ 06:30]  Ferritin 555.7      [09-25-17 @ 06:30]  PTH -- (Ca 8.0)      [09-16-17 @ 23:21]   232  Vitamin D (25OH) 9.7      [09-16-17 @ 23:21] [facility] DIVISION OF KIDNEY DISEASES AND HYPERTENSION -- FOLLOW UP NOTE  --------------------------------------------------------------------------------  Chief Complaint: renal transplant recipient     24 hour events/subjective:  complaining of abdominal pain, nausea, vomiting and diarrhea.      PAST HISTORY  --------------------------------------------------------------------------------  No significant changes to PMH, PSH, FHx, SHx, unless otherwise noted    ALLERGIES & MEDICATIONS  --------------------------------------------------------------------------------  Allergies    Demerol HCl (Hives)  Phenergan (Hives; Rash)    Intolerances      Standing Inpatient Medications  acetaminophen  IVPB. 1000 milliGRAM(s) IV Intermittent once  acetaminophen  IVPB. 1000 milliGRAM(s) IV Intermittent once  acetaminophen  IVPB. 1000 milliGRAM(s) IV Intermittent once  docusate sodium 100 milliGRAM(s) Oral three times a day  famotidine    Tablet 20 milliGRAM(s) Oral daily  melatonin 3 milliGRAM(s) Oral at bedtime  mycophenolate mofetil 1000 milliGRAM(s) Oral two times a day  nystatin    Suspension 102012 Unit(s) Oral four times a day  potassium chloride    Tablet ER 40 milliEquivalent(s) Oral once  predniSONE   Tablet 10 milliGRAM(s) Oral two times a day  senna 2 Tablet(s) Oral at bedtime  tacrolimus 2 milliGRAM(s) Oral two times a day  trimethoprim   80 mG/sulfamethoxazole 400 mG 1 Tablet(s) Oral daily  valGANciclovir 450 milliGRAM(s) Oral daily    PRN Inpatient Medications  acetaminophen   Tablet. 650 milliGRAM(s) Oral every 6 hours PRN  HYDROmorphone  Injectable 0.5 milliGRAM(s) IV Push every 3 hours PRN  HYDROmorphone  Injectable 0.5 milliGRAM(s) IV Push every 6 hours PRN  metoclopramide 5 milliGRAM(s) Oral every 6 hours PRN  ondansetron Injectable 8 milliGRAM(s) IV Push every 8 hours PRN      REVIEW OF SYSTEMS  --------------------------------------------------------------------------------  Gen: No weight changes, fatigue, fevers/chills, weakness  Skin: No rashes  Head/Eyes/Ears/Mouth: No headache; Normal hearing; Normal vision w/o blurriness; No sinus pain/discomfort, sore throat  Respiratory: No dyspnea, cough, wheezing, hemoptysis  CV: No chest pain, PND, orthopnea  GI:  abdominal pain, diarrhea,  nausea, vomiting+  : No increased frequency, dysuria, hematuria, nocturia  MSK: edema      All other systems were reviewed and are negative, except as noted.    VITALS/PHYSICAL EXAM  --------------------------------------------------------------------------------  T(C): 36.8 (04-07-18 @ 09:04), Max: 37 (04-06-18 @ 21:59)  HR: 63 (04-07-18 @ 09:04) (59 - 78)  BP: 129/72 (04-07-18 @ 09:04) (129/72 - 146/80)  RR: 18 (04-07-18 @ 09:04) (18 - 18)  SpO2: 93% (04-07-18 @ 09:04) (92% - 98%)  Wt(kg): --        04-06-18 @ 07:01  -  04-07-18 @ 07:00  --------------------------------------------------------  IN: 3030 mL / OUT: 3770 mL / NET: -740 mL    04-07-18 @ 07:01  -  04-07-18 @ 10:38  --------------------------------------------------------  IN: 315 mL / OUT: 580 mL / NET: -265 mL      Physical Exam:  	Gen: obese woman sitting in recliner  	Pulm: crackles at left lung base  	CV: S1S2  	Abd: Soft, +BS , incision c/d/i  	Ext: b/l LE 2+ edema  	Skin: Warm and dry  	Gu :kathrin     LABS/STUDIES  --------------------------------------------------------------------------------              9.7    10.3  >-----------<  99       [04-07-18 @ 05:59]              27.3     137  |  103  |  42  ----------------------------<  106      [04-07-18 @ 05:59]  3.5   |  24  |  1.98        Ca     7.3     [04-07-18 @ 05:59]      iCa    1.08     [04-06 @ 06:33]      Mg     2.5     [04-07-18 @ 05:59]      Phos  2.9     [04-07-18 @ 05:59]    TPro  4.4  /  Alb  2.1  /  TBili  0.5  /  DBili  x   /  AST  16  /  ALT  28  /  AlkPhos  30  [04-07-18 @ 05:59]    PT/INR: PT 10.1 , INR 0.94       [04-06-18 @ 06:28]  PTT: 24.4       [04-06-18 @ 06:28]      Creatinine Trend:  SCr 1.98 [04-07 @ 05:59]  SCr 2.34 [04-06 @ 06:28]  SCr 2.92 [04-05 @ 06:19]  SCr 3.34 [04-04 @ 02:45]  SCr 3.70 [04-03 @ 13:53]    Urinalysis - [04-06-18 @ 06:28]      Color Yellow / Appearance Clear / SG 1.018 / pH 6.5      Gluc Negative / Ketone Negative  / Bili Negative / Urobili Negative       Blood Moderate / Protein 30 / Leuk Est Negative / Nitrite Negative      RBC 10-25 / WBC 2-5 / Hyaline 2-5 / Gran  / Sq Epi  / Non Sq Epi Occasional / Bacteria       Iron 18, TIBC 167, %sat 11      [09-25-17 @ 06:30]  Ferritin 555.7      [09-25-17 @ 06:30]  PTH -- (Ca 8.0)      [09-16-17 @ 23:21]   232  Vitamin D (25OH) 9.7      [09-16-17 @ 23:21] [facility] DIVISION OF KIDNEY DISEASES AND HYPERTENSION -- FOLLOW UP NOTE  --------------------------------------------------------------------------------  Chief Complaint: renal transplant recipient     24 hour events/subjective:  complaining of abdominal pain, nausea, vomiting and diarrhea.      PAST HISTORY  --------------------------------------------------------------------------------  No significant changes to PMH, PSH, FHx, SHx, unless otherwise noted    ALLERGIES & MEDICATIONS  --------------------------------------------------------------------------------  Allergies    Demerol HCl (Hives)  Phenergan (Hives; Rash)    Intolerances      Standing Inpatient Medications  acetaminophen  IVPB. 1000 milliGRAM(s) IV Intermittent once  acetaminophen  IVPB. 1000 milliGRAM(s) IV Intermittent once  acetaminophen  IVPB. 1000 milliGRAM(s) IV Intermittent once  docusate sodium 100 milliGRAM(s) Oral three times a day  famotidine    Tablet 20 milliGRAM(s) Oral daily  melatonin 3 milliGRAM(s) Oral at bedtime  mycophenolate mofetil 1000 milliGRAM(s) Oral two times a day  nystatin    Suspension 625311 Unit(s) Oral four times a day  potassium chloride    Tablet ER 40 milliEquivalent(s) Oral once  predniSONE   Tablet 10 milliGRAM(s) Oral two times a day  senna 2 Tablet(s) Oral at bedtime  tacrolimus 2 milliGRAM(s) Oral two times a day  trimethoprim   80 mG/sulfamethoxazole 400 mG 1 Tablet(s) Oral daily  valGANciclovir 450 milliGRAM(s) Oral daily    PRN Inpatient Medications  acetaminophen   Tablet. 650 milliGRAM(s) Oral every 6 hours PRN  HYDROmorphone  Injectable 0.5 milliGRAM(s) IV Push every 3 hours PRN  HYDROmorphone  Injectable 0.5 milliGRAM(s) IV Push every 6 hours PRN  metoclopramide 5 milliGRAM(s) Oral every 6 hours PRN  ondansetron Injectable 8 milliGRAM(s) IV Push every 8 hours PRN      REVIEW OF SYSTEMS  --------------------------------------------------------------------------------  Gen: No weight changes, fatigue, fevers/chills, weakness  Skin: No rashes  Head/Eyes/Ears/Mouth: No headache; Normal hearing; Normal vision w/o blurriness; No sinus pain/discomfort, sore throat  Respiratory: No dyspnea, cough, wheezing, hemoptysis  CV: No chest pain, PND, orthopnea  GI:  abdominal pain, diarrhea,  nausea, vomiting+  : No increased frequency, dysuria, hematuria, nocturia  MSK: edema      All other systems were reviewed and are negative, except as noted.    VITALS/PHYSICAL EXAM  --------------------------------------------------------------------------------  T(C): 36.8 (04-07-18 @ 09:04), Max: 37 (04-06-18 @ 21:59)  HR: 63 (04-07-18 @ 09:04) (59 - 78)  BP: 129/72 (04-07-18 @ 09:04) (129/72 - 146/80)  RR: 18 (04-07-18 @ 09:04) (18 - 18)  SpO2: 93% (04-07-18 @ 09:04) (92% - 98%)  Wt(kg): --        04-06-18 @ 07:01  -  04-07-18 @ 07:00  --------------------------------------------------------  IN: 3030 mL / OUT: 3770 mL / NET: -740 mL    04-07-18 @ 07:01  -  04-07-18 @ 10:38  --------------------------------------------------------  IN: 315 mL / OUT: 580 mL / NET: -265 mL      Physical Exam:  	Gen: obese woman sitting in recliner  	Pulm: crackles at left lung base  	CV: S1S2  	Abd: Soft, +BS , incision c/d/i  	Ext: b/l LE 2+ edema  	Skin: Warm and dry  	Gu :  LABS/STUDIES  --------------------------------------------------------------------------------              9.7    10.3  >-----------<  99       [04-07-18 @ 05:59]              27.3     137  |  103  |  42  ----------------------------<  106      [04-07-18 @ 05:59]  3.5   |  24  |  1.98        Ca     7.3     [04-07-18 @ 05:59]      iCa    1.08     [04-06 @ 06:33]      Mg     2.5     [04-07-18 @ 05:59]      Phos  2.9     [04-07-18 @ 05:59]    TPro  4.4  /  Alb  2.1  /  TBili  0.5  /  DBili  x   /  AST  16  /  ALT  28  /  AlkPhos  30  [04-07-18 @ 05:59]    PT/INR: PT 10.1 , INR 0.94       [04-06-18 @ 06:28]  PTT: 24.4       [04-06-18 @ 06:28]      Creatinine Trend:  SCr 1.98 [04-07 @ 05:59]  SCr 2.34 [04-06 @ 06:28]  SCr 2.92 [04-05 @ 06:19]  SCr 3.34 [04-04 @ 02:45]  SCr 3.70 [04-03 @ 13:53]    Urinalysis - [04-06-18 @ 06:28]      Color Yellow / Appearance Clear / SG 1.018 / pH 6.5      Gluc Negative / Ketone Negative  / Bili Negative / Urobili Negative       Blood Moderate / Protein 30 / Leuk Est Negative / Nitrite Negative      RBC 10-25 / WBC 2-5 / Hyaline 2-5 / Gran  / Sq Epi  / Non Sq Epi Occasional / Bacteria       Iron 18, TIBC 167, %sat 11      [09-25-17 @ 06:30]  Ferritin 555.7      [09-25-17 @ 06:30]  PTH -- (Ca 8.0)      [09-16-17 @ 23:21]   232  Vitamin D (25OH) 9.7      [09-16-17 @ 23:21]

## 2018-04-08 LAB
ALBUMIN SERPL ELPH-MCNC: 2.4 G/DL — LOW (ref 3.3–5)
ALP SERPL-CCNC: 33 U/L — LOW (ref 40–120)
ALT FLD-CCNC: 19 U/L RC — SIGNIFICANT CHANGE UP (ref 10–45)
ANION GAP SERPL CALC-SCNC: 10 MMOL/L — SIGNIFICANT CHANGE UP (ref 5–17)
AST SERPL-CCNC: 16 U/L — SIGNIFICANT CHANGE UP (ref 10–40)
BILIRUB SERPL-MCNC: 0.6 MG/DL — SIGNIFICANT CHANGE UP (ref 0.2–1.2)
BUN SERPL-MCNC: 35 MG/DL — HIGH (ref 7–23)
C DIFF GDH STL QL: SIGNIFICANT CHANGE UP
C DIFF GDH STL QL: SIGNIFICANT CHANGE UP
CALCIUM SERPL-MCNC: 7.9 MG/DL — LOW (ref 8.4–10.5)
CHLORIDE SERPL-SCNC: 107 MMOL/L — SIGNIFICANT CHANGE UP (ref 96–108)
CO2 SERPL-SCNC: 23 MMOL/L — SIGNIFICANT CHANGE UP (ref 22–31)
CREAT SERPL-MCNC: 1.95 MG/DL — HIGH (ref 0.5–1.3)
GLUCOSE SERPL-MCNC: 113 MG/DL — HIGH (ref 70–99)
HCT VFR BLD CALC: 28.2 % — LOW (ref 34.5–45)
HGB BLD-MCNC: 10 G/DL — LOW (ref 11.5–15.5)
MAGNESIUM SERPL-MCNC: 2.4 MG/DL — SIGNIFICANT CHANGE UP (ref 1.6–2.6)
MCHC RBC-ENTMCNC: 33.6 PG — SIGNIFICANT CHANGE UP (ref 27–34)
MCHC RBC-ENTMCNC: 35.6 GM/DL — SIGNIFICANT CHANGE UP (ref 32–36)
MCV RBC AUTO: 94.4 FL — SIGNIFICANT CHANGE UP (ref 80–100)
PHOSPHATE SERPL-MCNC: 2.4 MG/DL — LOW (ref 2.5–4.5)
PLATELET # BLD AUTO: 130 K/UL — LOW (ref 150–400)
POTASSIUM SERPL-MCNC: 3.4 MMOL/L — LOW (ref 3.5–5.3)
POTASSIUM SERPL-SCNC: 3.4 MMOL/L — LOW (ref 3.5–5.3)
PROT SERPL-MCNC: 4.8 G/DL — LOW (ref 6–8.3)
RBC # BLD: 2.98 M/UL — LOW (ref 3.8–5.2)
RBC # FLD: 12.1 % — SIGNIFICANT CHANGE UP (ref 10.3–14.5)
SODIUM SERPL-SCNC: 140 MMOL/L — SIGNIFICANT CHANGE UP (ref 135–145)
TACROLIMUS SERPL-MCNC: 3.3 NG/ML — SIGNIFICANT CHANGE UP
WBC # BLD: 10.7 K/UL — HIGH (ref 3.8–10.5)
WBC # FLD AUTO: 10.7 K/UL — HIGH (ref 3.8–10.5)

## 2018-04-08 PROCEDURE — 99232 SBSQ HOSP IP/OBS MODERATE 35: CPT | Mod: GC

## 2018-04-08 PROCEDURE — 99233 SBSQ HOSP IP/OBS HIGH 50: CPT | Mod: GC

## 2018-04-08 RX ORDER — TACROLIMUS 5 MG/1
5 CAPSULE ORAL
Qty: 0 | Refills: 0 | Status: DISCONTINUED | OUTPATIENT
Start: 2018-04-08 | End: 2018-04-09

## 2018-04-08 RX ORDER — VANCOMYCIN HCL 1 G
125 VIAL (EA) INTRAVENOUS EVERY 6 HOURS
Qty: 0 | Refills: 0 | Status: DISCONTINUED | OUTPATIENT
Start: 2018-04-08 | End: 2018-04-13

## 2018-04-08 RX ORDER — SIMETHICONE 80 MG/1
80 TABLET, CHEWABLE ORAL EVERY 6 HOURS
Qty: 0 | Refills: 0 | Status: DISCONTINUED | OUTPATIENT
Start: 2018-04-08 | End: 2018-04-09

## 2018-04-08 RX ADMIN — VALGANCICLOVIR 450 MILLIGRAM(S): 450 TABLET, FILM COATED ORAL at 11:28

## 2018-04-08 RX ADMIN — Medication 5 MILLIGRAM(S): at 06:05

## 2018-04-08 RX ADMIN — Medication 650 MILLIGRAM(S): at 22:33

## 2018-04-08 RX ADMIN — Medication 500000 UNIT(S): at 06:05

## 2018-04-08 RX ADMIN — SIMETHICONE 80 MILLIGRAM(S): 80 TABLET, CHEWABLE ORAL at 22:33

## 2018-04-08 RX ADMIN — Medication 650 MILLIGRAM(S): at 03:10

## 2018-04-08 RX ADMIN — FAMOTIDINE 20 MILLIGRAM(S): 10 INJECTION INTRAVENOUS at 11:26

## 2018-04-08 RX ADMIN — Medication 5 MILLIGRAM(S): at 17:56

## 2018-04-08 RX ADMIN — Medication 1 TABLET(S): at 11:29

## 2018-04-08 RX ADMIN — Medication 500000 UNIT(S): at 23:33

## 2018-04-08 RX ADMIN — Medication 5 MILLIGRAM(S): at 17:57

## 2018-04-08 RX ADMIN — Medication 125 MILLIGRAM(S): at 22:38

## 2018-04-08 RX ADMIN — Medication 500000 UNIT(S): at 11:26

## 2018-04-08 RX ADMIN — TACROLIMUS 5 MILLIGRAM(S): 5 CAPSULE ORAL at 18:01

## 2018-04-08 RX ADMIN — SIMETHICONE 80 MILLIGRAM(S): 80 TABLET, CHEWABLE ORAL at 02:19

## 2018-04-08 RX ADMIN — Medication 5 MILLIGRAM(S): at 11:28

## 2018-04-08 RX ADMIN — Medication 500000 UNIT(S): at 17:57

## 2018-04-08 RX ADMIN — Medication 650 MILLIGRAM(S): at 02:11

## 2018-04-08 RX ADMIN — Medication 25 MILLIGRAM(S): at 22:33

## 2018-04-08 RX ADMIN — Medication 400 MILLIGRAM(S): at 06:05

## 2018-04-08 RX ADMIN — TACROLIMUS 3 MILLIGRAM(S): 5 CAPSULE ORAL at 06:05

## 2018-04-08 RX ADMIN — Medication 500000 UNIT(S): at 00:02

## 2018-04-08 RX ADMIN — Medication 3 MILLIGRAM(S): at 22:33

## 2018-04-08 RX ADMIN — Medication 650 MILLIGRAM(S): at 23:00

## 2018-04-08 NOTE — PROGRESS NOTE ADULT - PROBLEM SELECTOR PLAN 1
-d/c PCA, PRN pain medications  -regular diet  -glycerine suppository PRN  -encourage voiding  -SCDs for DVT ppx -PRN pain medications  -regular diet  -glycerine suppository PRN  -encourage voiding  -SCDs for DVT ppx

## 2018-04-08 NOTE — PROGRESS NOTE ADULT - PROBLEM SELECTOR PROBLEM 5
Immunosuppressive management encounter following kidney transplant

## 2018-04-08 NOTE — PROGRESS NOTE ADULT - SUBJECTIVE AND OBJECTIVE BOX
Transplant Surgery - Multidisciplinary Rounds  --------------------------------------------------------------    No acute events overnight  Still has some nausea, no vomiting  Tolerating some regular diet  Passing flatus and BM  Edema decreasing in legs    MEDICATIONS  (STANDING):  docusate sodium 100 milliGRAM(s) Oral three times a day  famotidine    Tablet 20 milliGRAM(s) Oral daily  melatonin 3 milliGRAM(s) Oral at bedtime  metoclopramide Injectable 5 milliGRAM(s) IV Push every 6 hours  nystatin    Suspension 060974 Unit(s) Oral four times a day  predniSONE   Tablet 5 milliGRAM(s) Oral two times a day  senna 2 Tablet(s) Oral at bedtime  tacrolimus 3 milliGRAM(s) Oral two times a day  trimethoprim   80 mG/sulfamethoxazole 400 mG 1 Tablet(s) Oral daily  valGANciclovir 450 milliGRAM(s) Oral daily    MEDICATIONS  (PRN):  acetaminophen   Tablet. 650 milliGRAM(s) Oral every 6 hours PRN Mild Pain (1 - 3)  diphenhydrAMINE   Capsule 25 milliGRAM(s) Oral at bedtime PRN Insomnia  HYDROmorphone  Injectable 0.5 milliGRAM(s) IV Push every 3 hours PRN Severe Breakthrough Pain  HYDROmorphone  Injectable 0.5 milliGRAM(s) IV Push every 6 hours PRN Severe Pain (7 - 10)  simethicone 80 milliGRAM(s) Chew every 6 hours PRN Gas      PAST MEDICAL & SURGICAL HISTORY:  ESRD (end stage renal disease)  CKD (chronic kidney disease) stage 4, GFR 15-29 ml/min: no dialysis  Anxiety  GERD (gastroesophageal reflux disease)  Polycystic kidney disease  Kidney stones  Arthropathy NOS - shoulder  Hyperlipidemia  AV fistula: right forearm extremity AV fistula radiocephalic ( 2018)  H/O shoulder surgery: left  History of ventral hernia repair  H/O tubal ligation: (1996)  Kidney calculi: cysto/lithotripsy multiple  S/P cholecystectomy: (2013)  Tubal ligation: 1995  Ankle fracture - Left: brumstein barajas procedure 2000      Vital Signs Last 24 Hrs  T(C): 37.2 (08 Apr 2018 06:00), Max: 37.6 (07 Apr 2018 21:26)  T(F): 98.9 (08 Apr 2018 06:00), Max: 99.7 (07 Apr 2018 21:26)  HR: 61 (08 Apr 2018 06:00) (59 - 66)  BP: 135/80 (08 Apr 2018 06:00) (135/72 - 145/79)  BP(mean): --  RR: 18 (08 Apr 2018 06:00) (17 - 18)  SpO2: 95% (08 Apr 2018 06:00) (95% - 97%)    Physical Exam  Constitutional: well developed, well nourished  Neuro: AAOx3  Eyes: anicteric, PERRLA  ENMT: nc/at  Neck: no swelling, supple  Respiratory: mild decreased breath sounds on L  Cardiovascular: RRR  Gastrointestinal: soft abdomen, mildly distended, appropriate kira-incisional tenderness, incision C/D/I with staples, drains serosanguinous  Extremities: SCD's in place and working bilaterally  Vascular: equal DP pulses bilaterally  Musculoskeletal: Moving all extremities    I&O's Summary    07 Apr 2018 07:01  -  08 Apr 2018 07:00  --------------------------------------------------------  IN: 1435 mL / OUT: 4275 mL / NET: -2840 mL    08 Apr 2018 07:01  -  08 Apr 2018 10:01  --------------------------------------------------------  IN: 120 mL / OUT: 386 mL / NET: -266 mL                              10.0   10.7  )-----------( 130      ( 08 Apr 2018 05:38 )             28.2     04-08    140  |  107  |  35<H>  ----------------------------<  113<H>  3.4<L>   |  23  |  1.95<H>    Ca    7.9<L>      08 Apr 2018 05:38  Phos  2.4     04-08  Mg     2.4     04-08    TPro  4.8<L>  /  Alb  2.4<L>  /  TBili  0.6  /  DBili  x   /  AST  16  /  ALT  19  /  AlkPhos  33<L>  04-08    Tacrolimus (), Serum: 5.0 ng/mL (04-07 @ 07:02)

## 2018-04-08 NOTE — PROGRESS NOTE ADULT - SUBJECTIVE AND OBJECTIVE BOX
[facility] DIVISION OF KIDNEY DISEASES AND HYPERTENSION -- FOLLOW UP NOTE  --------------------------------------------------------------------------------  Chief Complaint: renal transplant recipient     24 hour events/subjective:  no overnight events.        PAST HISTORY  --------------------------------------------------------------------------------  No significant changes to PMH, PSH, FHx, SHx, unless otherwise noted    ALLERGIES & MEDICATIONS  --------------------------------------------------------------------------------  Allergies    Demerol HCl (Hives)  Phenergan (Hives; Rash)    Intolerances      Standing Inpatient Medications  docusate sodium 100 milliGRAM(s) Oral three times a day  famotidine    Tablet 20 milliGRAM(s) Oral daily  melatonin 3 milliGRAM(s) Oral at bedtime  metoclopramide Injectable 5 milliGRAM(s) IV Push every 6 hours  nystatin    Suspension 223504 Unit(s) Oral four times a day  predniSONE   Tablet 5 milliGRAM(s) Oral two times a day  senna 2 Tablet(s) Oral at bedtime  tacrolimus 5 milliGRAM(s) Oral two times a day  trimethoprim   80 mG/sulfamethoxazole 400 mG 1 Tablet(s) Oral daily  valGANciclovir 450 milliGRAM(s) Oral daily    PRN Inpatient Medications  acetaminophen   Tablet. 650 milliGRAM(s) Oral every 6 hours PRN  diphenhydrAMINE   Capsule 25 milliGRAM(s) Oral at bedtime PRN  HYDROmorphone  Injectable 0.5 milliGRAM(s) IV Push every 3 hours PRN  HYDROmorphone  Injectable 0.5 milliGRAM(s) IV Push every 6 hours PRN  simethicone 80 milliGRAM(s) Chew every 6 hours PRN      REVIEW OF SYSTEMS  --------------------------------------------------------------------------------  Gen: No weight changes, fatigue, fevers/chills, weakness  Skin: No rashes  Head/Eyes/Ears/Mouth: No headache; Normal hearing; Normal vision w/o blurriness; No sinus pain/discomfort, sore throat  Respiratory: No dyspnea, cough, wheezing, hemoptysis  CV: No chest pain, PND, orthopnea  GI:  abdominal pain, diarrhea,  nausea, vomiting+  : No increased frequency, dysuria, hematuria, nocturia  MSK: edema    VITALS/PHYSICAL EXAM  --------------------------------------------------------------------------------  T(C): 37.2 (04-08-18 @ 06:00), Max: 37.6 (04-07-18 @ 21:26)  HR: 61 (04-08-18 @ 06:00) (59 - 66)  BP: 135/80 (04-08-18 @ 06:00) (135/72 - 145/79)  RR: 18 (04-08-18 @ 06:00) (17 - 18)  SpO2: 95% (04-08-18 @ 06:00) (95% - 97%)  Wt(kg): --        04-07-18 @ 07:01  -  04-08-18 @ 07:00  --------------------------------------------------------  IN: 1435 mL / OUT: 4275 mL / NET: -2840 mL    04-08-18 @ 07:01  -  04-08-18 @ 15:37  --------------------------------------------------------  IN: 600 mL / OUT: 686 mL / NET: -86 mL      Physical Exam:  	Gen: obese woman sitting in recliner  	Pulm: crackles at left lung base  	CV: S1S2  	Abd: Soft, +BS , incision c/d/i  	Ext: b/l LE 2+ edema  	Skin: Warm and dry  	Gu :kathrin       LABS/STUDIES  --------------------------------------------------------------------------------              10.0   10.7  >-----------<  130      [04-08-18 @ 05:38]              28.2     140  |  107  |  35  ----------------------------<  113      [04-08-18 @ 05:38]  3.4   |  23  |  1.95        Ca     7.9     [04-08-18 @ 05:38]      Mg     2.4     [04-08-18 @ 05:38]      Phos  2.4     [04-08-18 @ 05:38]    TPro  4.8  /  Alb  2.4  /  TBili  0.6  /  DBili  x   /  AST  16  /  ALT  19  /  AlkPhos  33  [04-08-18 @ 05:38]          Creatinine Trend:  SCr 1.95 [04-08 @ 05:38]  SCr 1.98 [04-07 @ 05:59]  SCr 2.34 [04-06 @ 06:28]  SCr 2.92 [04-05 @ 06:19]  SCr 3.34 [04-04 @ 02:45]    Urinalysis - [04-06-18 @ 06:28]      Color Yellow / Appearance Clear / SG 1.018 / pH 6.5      Gluc Negative / Ketone Negative  / Bili Negative / Urobili Negative       Blood Moderate / Protein 30 / Leuk Est Negative / Nitrite Negative      RBC 10-25 / WBC 2-5 / Hyaline 2-5 / Gran  / Sq Epi  / Non Sq Epi Occasional / Bacteria       Iron 18, TIBC 167, %sat 11      [09-25-17 @ 06:30]  Ferritin 555.7      [09-25-17 @ 06:30]  PTH -- (Ca 8.0)      [09-16-17 @ 23:21]   232  Vitamin D (25OH) 9.7      [09-16-17 @ 23:21] [facility] DIVISION OF KIDNEY DISEASES AND HYPERTENSION -- FOLLOW UP NOTE  --------------------------------------------------------------------------------  Chief Complaint: renal transplant recipient     24 hour events/subjective:  no overnight events.  still c/o diarrhea, no nausea or vomiting          PAST HISTORY  --------------------------------------------------------------------------------  No significant changes to PMH, PSH, FHx, SHx, unless otherwise noted    ALLERGIES & MEDICATIONS  --------------------------------------------------------------------------------  Allergies    Demerol HCl (Hives)  Phenergan (Hives; Rash)    Intolerances      Standing Inpatient Medications  docusate sodium 100 milliGRAM(s) Oral three times a day  famotidine    Tablet 20 milliGRAM(s) Oral daily  melatonin 3 milliGRAM(s) Oral at bedtime  metoclopramide Injectable 5 milliGRAM(s) IV Push every 6 hours  nystatin    Suspension 496642 Unit(s) Oral four times a day  predniSONE   Tablet 5 milliGRAM(s) Oral two times a day  senna 2 Tablet(s) Oral at bedtime  tacrolimus 5 milliGRAM(s) Oral two times a day  trimethoprim   80 mG/sulfamethoxazole 400 mG 1 Tablet(s) Oral daily  valGANciclovir 450 milliGRAM(s) Oral daily    PRN Inpatient Medications  acetaminophen   Tablet. 650 milliGRAM(s) Oral every 6 hours PRN  diphenhydrAMINE   Capsule 25 milliGRAM(s) Oral at bedtime PRN  HYDROmorphone  Injectable 0.5 milliGRAM(s) IV Push every 3 hours PRN  HYDROmorphone  Injectable 0.5 milliGRAM(s) IV Push every 6 hours PRN  simethicone 80 milliGRAM(s) Chew every 6 hours PRN      REVIEW OF SYSTEMS  --------------------------------------------------------------------------------  Gen: No weight changes, fatigue, fevers/chills, weakness  Skin: No rashes  Head/Eyes/Ears/Mouth: No headache; Normal hearing; Normal vision w/o blurriness; No sinus pain/discomfort, sore throat  Respiratory: No dyspnea, cough, wheezing, hemoptysis  CV: No chest pain, PND, orthopnea  GI:  abdominal pain, diarrhea,  nausea, vomiting+  : No increased frequency, dysuria, hematuria, nocturia  MSK: edema    VITALS/PHYSICAL EXAM  --------------------------------------------------------------------------------  T(C): 37.2 (04-08-18 @ 06:00), Max: 37.6 (04-07-18 @ 21:26)  HR: 61 (04-08-18 @ 06:00) (59 - 66)  BP: 135/80 (04-08-18 @ 06:00) (135/72 - 145/79)  RR: 18 (04-08-18 @ 06:00) (17 - 18)  SpO2: 95% (04-08-18 @ 06:00) (95% - 97%)  Wt(kg): --        04-07-18 @ 07:01  -  04-08-18 @ 07:00  --------------------------------------------------------  IN: 1435 mL / OUT: 4275 mL / NET: -2840 mL    04-08-18 @ 07:01  -  04-08-18 @ 15:37  --------------------------------------------------------  IN: 600 mL / OUT: 686 mL / NET: -86 mL      Physical Exam:  	Gen: obese woman sitting in recliner  	Pulm: crackles at left lung base  	CV: S1S2  	Abd: Soft, +BS , incision c/d/i  	Ext: b/l LE 1+ edema  	Skin: Warm and dry  	    LABS/STUDIES  --------------------------------------------------------------------------------              10.0   10.7  >-----------<  130      [04-08-18 @ 05:38]              28.2     140  |  107  |  35  ----------------------------<  113      [04-08-18 @ 05:38]  3.4   |  23  |  1.95        Ca     7.9     [04-08-18 @ 05:38]      Mg     2.4     [04-08-18 @ 05:38]      Phos  2.4     [04-08-18 @ 05:38]    TPro  4.8  /  Alb  2.4  /  TBili  0.6  /  DBili  x   /  AST  16  /  ALT  19  /  AlkPhos  33  [04-08-18 @ 05:38]          Creatinine Trend:  SCr 1.95 [04-08 @ 05:38]  SCr 1.98 [04-07 @ 05:59]  SCr 2.34 [04-06 @ 06:28]  SCr 2.92 [04-05 @ 06:19]  SCr 3.34 [04-04 @ 02:45]    Urinalysis - [04-06-18 @ 06:28]      Color Yellow / Appearance Clear / SG 1.018 / pH 6.5      Gluc Negative / Ketone Negative  / Bili Negative / Urobili Negative       Blood Moderate / Protein 30 / Leuk Est Negative / Nitrite Negative      RBC 10-25 / WBC 2-5 / Hyaline 2-5 / Gran  / Sq Epi  / Non Sq Epi Occasional / Bacteria       Iron 18, TIBC 167, %sat 11      [09-25-17 @ 06:30]  Ferritin 555.7      [09-25-17 @ 06:30]  PTH -- (Ca 8.0)      [09-16-17 @ 23:21]   232  Vitamin D (25OH) 9.7      [09-16-17 @ 23:21]

## 2018-04-08 NOTE — PROGRESS NOTE ADULT - PROBLEM SELECTOR PLAN 1
Pt with b/l nephrectomies, LDRT POD 5.  Pt Scr improving 1.95 today.  Pt non-oliguric.  Monitor BMP, strict I/O avoid nephrotoxcis, NSAIds RCA

## 2018-04-08 NOTE — PROGRESS NOTE ADULT - ASSESSMENT
47F POD#6 s/p bilateral nephrectomies and living donor renal transplant. She was extubated on the evening of POD# 1 and is off pressors.  Currently on the floor recovering.      Patient seen and discussed on multidisciplinary transplant rounds. Plan as below.    Blue  [phone]

## 2018-04-09 DIAGNOSIS — A09 INFECTIOUS GASTROENTERITIS AND COLITIS, UNSPECIFIED: ICD-10-CM

## 2018-04-09 LAB
ALBUMIN SERPL ELPH-MCNC: 2.3 G/DL — LOW (ref 3.3–5)
ALP SERPL-CCNC: 30 U/L — LOW (ref 40–120)
ALT FLD-CCNC: 16 U/L RC — SIGNIFICANT CHANGE UP (ref 10–45)
ANION GAP SERPL CALC-SCNC: 10 MMOL/L — SIGNIFICANT CHANGE UP (ref 5–17)
AST SERPL-CCNC: 10 U/L — SIGNIFICANT CHANGE UP (ref 10–40)
BILIRUB SERPL-MCNC: 0.5 MG/DL — SIGNIFICANT CHANGE UP (ref 0.2–1.2)
BUN SERPL-MCNC: 31 MG/DL — HIGH (ref 7–23)
CALCIUM SERPL-MCNC: 7.5 MG/DL — LOW (ref 8.4–10.5)
CHLORIDE SERPL-SCNC: 107 MMOL/L — SIGNIFICANT CHANGE UP (ref 96–108)
CO2 SERPL-SCNC: 22 MMOL/L — SIGNIFICANT CHANGE UP (ref 22–31)
CREAT SERPL-MCNC: 2.27 MG/DL — HIGH (ref 0.5–1.3)
GLUCOSE SERPL-MCNC: 132 MG/DL — HIGH (ref 70–99)
HCT VFR BLD CALC: 26.9 % — LOW (ref 34.5–45)
HGB BLD-MCNC: 9.5 G/DL — LOW (ref 11.5–15.5)
MAGNESIUM SERPL-MCNC: 2.4 MG/DL — SIGNIFICANT CHANGE UP (ref 1.6–2.6)
MCHC RBC-ENTMCNC: 33.6 PG — SIGNIFICANT CHANGE UP (ref 27–34)
MCHC RBC-ENTMCNC: 35.3 GM/DL — SIGNIFICANT CHANGE UP (ref 32–36)
MCV RBC AUTO: 95.2 FL — SIGNIFICANT CHANGE UP (ref 80–100)
PHOSPHATE SERPL-MCNC: 2.9 MG/DL — SIGNIFICANT CHANGE UP (ref 2.5–4.5)
PLATELET # BLD AUTO: 132 K/UL — LOW (ref 150–400)
POTASSIUM SERPL-MCNC: 3.4 MMOL/L — LOW (ref 3.5–5.3)
POTASSIUM SERPL-SCNC: 3.4 MMOL/L — LOW (ref 3.5–5.3)
PROT SERPL-MCNC: 4.8 G/DL — LOW (ref 6–8.3)
RBC # BLD: 2.82 M/UL — LOW (ref 3.8–5.2)
RBC # FLD: 12.2 % — SIGNIFICANT CHANGE UP (ref 10.3–14.5)
SODIUM SERPL-SCNC: 139 MMOL/L — SIGNIFICANT CHANGE UP (ref 135–145)
TACROLIMUS SERPL-MCNC: 4.9 NG/ML — SIGNIFICANT CHANGE UP
WBC # BLD: 12.2 K/UL — HIGH (ref 3.8–10.5)
WBC # FLD AUTO: 12.2 K/UL — HIGH (ref 3.8–10.5)

## 2018-04-09 PROCEDURE — 99232 SBSQ HOSP IP/OBS MODERATE 35: CPT | Mod: GC

## 2018-04-09 RX ORDER — SODIUM CHLORIDE 9 MG/ML
1000 INJECTION, SOLUTION INTRAVENOUS ONCE
Qty: 0 | Refills: 0 | Status: COMPLETED | OUTPATIENT
Start: 2018-04-09 | End: 2018-04-09

## 2018-04-09 RX ORDER — SODIUM BICARBONATE 1 MEQ/ML
0.1 SYRINGE (ML) INTRAVENOUS
Qty: 75 | Refills: 0 | Status: DISCONTINUED | OUTPATIENT
Start: 2018-04-09 | End: 2018-04-10

## 2018-04-09 RX ORDER — TACROLIMUS 5 MG/1
6 CAPSULE ORAL
Qty: 0 | Refills: 0 | Status: DISCONTINUED | OUTPATIENT
Start: 2018-04-09 | End: 2018-04-13

## 2018-04-09 RX ORDER — SIMETHICONE 80 MG/1
80 TABLET, CHEWABLE ORAL ONCE
Qty: 0 | Refills: 0 | Status: COMPLETED | OUTPATIENT
Start: 2018-04-09 | End: 2018-04-09

## 2018-04-09 RX ADMIN — VALGANCICLOVIR 450 MILLIGRAM(S): 450 TABLET, FILM COATED ORAL at 12:16

## 2018-04-09 RX ADMIN — FAMOTIDINE 20 MILLIGRAM(S): 10 INJECTION INTRAVENOUS at 12:16

## 2018-04-09 RX ADMIN — Medication 1 TABLET(S): at 12:17

## 2018-04-09 RX ADMIN — Medication 5 MILLIGRAM(S): at 12:16

## 2018-04-09 RX ADMIN — Medication 25 MILLIGRAM(S): at 22:52

## 2018-04-09 RX ADMIN — HYDROMORPHONE HYDROCHLORIDE 0.5 MILLIGRAM(S): 2 INJECTION INTRAMUSCULAR; INTRAVENOUS; SUBCUTANEOUS at 07:45

## 2018-04-09 RX ADMIN — Medication 500000 UNIT(S): at 17:53

## 2018-04-09 RX ADMIN — Medication 5 MILLIGRAM(S): at 00:01

## 2018-04-09 RX ADMIN — HYDROMORPHONE HYDROCHLORIDE 0.5 MILLIGRAM(S): 2 INJECTION INTRAMUSCULAR; INTRAVENOUS; SUBCUTANEOUS at 16:01

## 2018-04-09 RX ADMIN — HYDROMORPHONE HYDROCHLORIDE 0.5 MILLIGRAM(S): 2 INJECTION INTRAMUSCULAR; INTRAVENOUS; SUBCUTANEOUS at 08:15

## 2018-04-09 RX ADMIN — Medication 500000 UNIT(S): at 06:00

## 2018-04-09 RX ADMIN — Medication 125 MEQ/KG/HR: at 09:56

## 2018-04-09 RX ADMIN — Medication 3 MILLIGRAM(S): at 22:52

## 2018-04-09 RX ADMIN — Medication 125 MILLIGRAM(S): at 22:52

## 2018-04-09 RX ADMIN — HYDROMORPHONE HYDROCHLORIDE 0.5 MILLIGRAM(S): 2 INJECTION INTRAMUSCULAR; INTRAVENOUS; SUBCUTANEOUS at 12:15

## 2018-04-09 RX ADMIN — Medication 500000 UNIT(S): at 22:52

## 2018-04-09 RX ADMIN — Medication 5 MILLIGRAM(S): at 17:53

## 2018-04-09 RX ADMIN — HYDROMORPHONE HYDROCHLORIDE 0.5 MILLIGRAM(S): 2 INJECTION INTRAMUSCULAR; INTRAVENOUS; SUBCUTANEOUS at 02:42

## 2018-04-09 RX ADMIN — HYDROMORPHONE HYDROCHLORIDE 0.5 MILLIGRAM(S): 2 INJECTION INTRAMUSCULAR; INTRAVENOUS; SUBCUTANEOUS at 19:30

## 2018-04-09 RX ADMIN — Medication 5 MILLIGRAM(S): at 06:00

## 2018-04-09 RX ADMIN — HYDROMORPHONE HYDROCHLORIDE 0.5 MILLIGRAM(S): 2 INJECTION INTRAMUSCULAR; INTRAVENOUS; SUBCUTANEOUS at 12:45

## 2018-04-09 RX ADMIN — SIMETHICONE 80 MILLIGRAM(S): 80 TABLET, CHEWABLE ORAL at 22:52

## 2018-04-09 RX ADMIN — Medication 125 MILLIGRAM(S): at 17:53

## 2018-04-09 RX ADMIN — SODIUM CHLORIDE 2000 MILLILITER(S): 9 INJECTION, SOLUTION INTRAVENOUS at 07:46

## 2018-04-09 RX ADMIN — TACROLIMUS 5 MILLIGRAM(S): 5 CAPSULE ORAL at 06:00

## 2018-04-09 RX ADMIN — Medication 125 MILLIGRAM(S): at 12:17

## 2018-04-09 RX ADMIN — HYDROMORPHONE HYDROCHLORIDE 0.5 MILLIGRAM(S): 2 INJECTION INTRAMUSCULAR; INTRAVENOUS; SUBCUTANEOUS at 19:39

## 2018-04-09 RX ADMIN — Medication 125 MILLIGRAM(S): at 05:59

## 2018-04-09 RX ADMIN — Medication 500000 UNIT(S): at 12:16

## 2018-04-09 RX ADMIN — HYDROMORPHONE HYDROCHLORIDE 0.5 MILLIGRAM(S): 2 INJECTION INTRAMUSCULAR; INTRAVENOUS; SUBCUTANEOUS at 15:31

## 2018-04-09 RX ADMIN — TACROLIMUS 6 MILLIGRAM(S): 5 CAPSULE ORAL at 17:55

## 2018-04-09 RX ADMIN — Medication 5 MILLIGRAM(S): at 05:59

## 2018-04-09 RX ADMIN — HYDROMORPHONE HYDROCHLORIDE 0.5 MILLIGRAM(S): 2 INJECTION INTRAMUSCULAR; INTRAVENOUS; SUBCUTANEOUS at 03:13

## 2018-04-09 NOTE — PROGRESS NOTE ADULT - PROBLEM SELECTOR PLAN 1
Pt with hemodynamically mediated STEPHENIE in setting of anemia, hypotension.   Pt Scr trended up to 2.27 today likely secondary to diarrhea.   Pt non-oliguric  Monitor BMP, strict I/O avoid nephrotoxics, NSAIds RCA

## 2018-04-09 NOTE — PROGRESS NOTE ADULT - PROBLEM SELECTOR PLAN 2
On Tacrolimus, steroid, Simulect induction, Nystatin S&S, bactrim, and Valcyte  Hold cellcept due to GI symptoms  F/U Tacrolimus level daily, tacro goal 8-10  Continue PRN Tylenol and IV dilauded pain, Monitor and manage pain  Adequate urine output,   One liter IV LR bolus x1, started on Bicarb drip @125ml/hr  Monitor closely.

## 2018-04-09 NOTE — PROGRESS NOTE ADULT - PROBLEM SELECTOR PLAN 1
Doing well.  Po diet opal well.    Pt encouraged to ambulate  Dressing removed site intact.   Renal US today

## 2018-04-09 NOTE — CHART NOTE - NSCHARTNOTEFT_GEN_A_CORE
Pt seen for nutrition follow up S/P kidney transplant, as per departmental protocol.     Source: Patient [X ]    Family [X ]     other [X ]; medical record    Hospital Course: Pt S/P LDRT and bilateral nephrectomy 4/2/18. Pt now C. diff positive, with uptrending Creatinine.    Pt noted with 2250ml urine output in past 24-hours; current UO = 100-300ml/hr. Magnesium, sodium, and phosphorus WNL; potassium low (3.4). RD will add PowerAde electrolyte drink to meal trays to help meet increased post-Txp fluid needs of ~3L.    Reviewed food safety guidelines for transplant recipients. Reviewed recommendations to avoid grapefruit and pomegranate while taking immunosuppressant medication. Reviewed post-transplant nutrition therapy recommendations to limit sodium and sugar intake due to risk of hypertension and hyperglycemia. Pt reports high intake of soda and juice; advised to limit intake due to risk of developing diabetes on steroids.    Diet : Regular + Ensure Clear (200Kcal, 7gm protein) tid     GI: Pt with abdominal discomfort secondary to C diff. Pt noted with >4 liquid BMs today.     PO intake:  Pt reports she tolerated grilled chicken last night and eggs with reddy today. Pt requests berry flavored supplements; she has not consumed any apple flavor Ensure clear.     Source for PO intake [X ] Patient [ X] family [ ] chart [ ] staff [ ] other     Enteral /Parenteral Nutrition: n/a    Current Weight: Weight (kg): 101Lg 94/9), 95.3Kg (4/2 dosing)  Edema: 1+ generalized, 2+ R/L ankle    Pertinent Medications: MEDICATIONS  (STANDING):  famotidine    Tablet 20 milliGRAM(s) Oral daily  melatonin 3 milliGRAM(s) Oral at bedtime  metoclopramide Injectable 5 milliGRAM(s) IV Push every 6 hours  nystatin    Suspension 967904 Unit(s) Oral four times a day  predniSONE   Tablet 5 milliGRAM(s) Oral daily  sodium bicarbonate  Infusion 0.098 mEq/kG/Hr (125 mL/Hr) IV Continuous <Continuous>  tacrolimus 6 milliGRAM(s) Oral two times a day  trimethoprim   80 mG/sulfamethoxazole 400 mG 1 Tablet(s) Oral daily  valGANciclovir 450 milliGRAM(s) Oral daily  vancomycin    Solution 125 milliGRAM(s) Oral every 6 hours    MEDICATIONS  (PRN):  acetaminophen   Tablet. 650 milliGRAM(s) Oral every 6 hours PRN Mild Pain (1 - 3)  diphenhydrAMINE   Capsule 25 milliGRAM(s) Oral at bedtime PRN Insomnia  HYDROmorphone  Injectable 0.5 milliGRAM(s) IV Push every 3 hours PRN Severe Breakthrough Pain  HYDROmorphone  Injectable 0.5 milliGRAM(s) IV Push every 6 hours PRN Severe Pain (7 - 10)    Pertinent Labs:  04-09 Na139 mmol/L Glu 132 mg/dL<H> K+ 3.4 mmol/L<L> Cr  2.27 mg/dL<H> BUN 31 mg/dL<H> 04-09 Phos 2.9 mg/dL 04-09 Alb 2.3 g/dL<L>    Skin: no pressure injuries    Estimated Needs:   [X ] no change since previous assessment  [ ] recalculated:     Previous Nutrition Diagnosis:   [X ] Increased Nutrient Needs    Nutrition Diagnosis is [X ] ongoing; addressed with Regular diet + Ensure Clear and PowerAde electrolyte drink    New Nutrition Diagnosis: [X ] not applicable    Interventions:     Recommend:  1) Continue Regular diet and Ensure clear; encourage intake of high protein, nutrient dense foods and PowerAde electrolyte drink  2) Monitor weight, lab values, skin, po intake and GI tolerance  3) Reinforce therapeutic diet education as able    Monitoring and Evaluation:   Follow up per protocol  RD to remain available for further nutritional interventions as indicated.   Alina Reed, MS RD CDN [facility], #[phone].

## 2018-04-09 NOTE — CHART NOTE - NSCHARTNOTEFT_GEN_A_CORE
Called for patient brief rigors/chills resolved within 30 minutes, comfortable on exam with no signs of illness. Temp normal throughout event (37) and only sense of distention otherwise.    ICU Vital Signs Last 24 Hrs  T(C): 36.9 (09 Apr 2018 17:50), Max: 37.7 (09 Apr 2018 00:00)  T(F): 98.4 (09 Apr 2018 17:50), Max: 99.8 (09 Apr 2018 00:00)  HR: 82 (09 Apr 2018 17:50) (62 - 82)  BP: 133/74 (09 Apr 2018 17:50) (132/70 - 136/72)  BP(mean): --  ABP: --  ABP(mean): --  RR: 18 (09 Apr 2018 17:50) (17 - 18)  SpO2: 98% (09 Apr 2018 17:50) (96% - 98%)    PE:  Gen: Laying in bed, NAD,   Neuro: alert and awake  Resp: Unlabored breathing  CV: normal rate, regular rhythm  Abd: soft, NTND, 2 PACO drains both serous  Ext: no tenderness on active motion     Plan  Culture urine and blood  Simethicone for distention/gas pain

## 2018-04-09 NOTE — PROGRESS NOTE ADULT - SUBJECTIVE AND OBJECTIVE BOX
INTERVAL HPI/OVERNIGHT EVENTS:  Patient seen with multidisciplinary team (Nephrologist, pharmacist, nurse, nurse manager, NP, MD)  in am rounds and examined with Dr. Willis.  S/P LDRT 4/2/18, now with c/o abdominal pain due to C-Diff overnight, making adequate urine, B/L PACO's with moderate output.  Cr trending up, s/p one liter LR bolus and started on IVF with bicarb.       MEDICATIONS  (STANDING):  famotidine    Tablet 20 milliGRAM(s) Oral daily  melatonin 3 milliGRAM(s) Oral at bedtime  metoclopramide Injectable 5 milliGRAM(s) IV Push every 6 hours  nystatin    Suspension 652892 Unit(s) Oral four times a day  predniSONE   Tablet 5 milliGRAM(s) Oral daily  sodium bicarbonate  Infusion 0.098 mEq/kG/Hr (125 mL/Hr) IV Continuous <Continuous>  tacrolimus 5 milliGRAM(s) Oral two times a day  trimethoprim   80 mG/sulfamethoxazole 400 mG 1 Tablet(s) Oral daily  valGANciclovir 450 milliGRAM(s) Oral daily  vancomycin    Solution 125 milliGRAM(s) Oral every 6 hours    MEDICATIONS  (PRN):  acetaminophen   Tablet. 650 milliGRAM(s) Oral every 6 hours PRN Mild Pain (1 - 3)  diphenhydrAMINE   Capsule 25 milliGRAM(s) Oral at bedtime PRN Insomnia  HYDROmorphone  Injectable 0.5 milliGRAM(s) IV Push every 3 hours PRN Severe Breakthrough Pain  HYDROmorphone  Injectable 0.5 milliGRAM(s) IV Push every 6 hours PRN Severe Pain (7 - 10)      Allergies    Demerol HCl (Hives)  Phenergan (Hives; Rash)    Intolerances        Vital Signs Last 24 Hrs  T(C): 37.1 (09 Apr 2018 05:44), Max: 37.7 (09 Apr 2018 00:00)  T(F): 98.8 (09 Apr 2018 05:44), Max: 99.8 (09 Apr 2018 00:00)  HR: 62 (09 Apr 2018 05:44) (62 - 71)  BP: 135/77 (09 Apr 2018 05:44) (134/77 - 136/82)  BP(mean): --  RR: 17 (09 Apr 2018 05:44) (17 - 18)  SpO2: 97% (09 Apr 2018 05:44) (97% - 98%)    LABS:                        9.5    12.2  )-----------( 132      ( 09 Apr 2018 03:29 )             26.9     04-09    139  |  107  |  31<H>  ----------------------------<  132<H>  3.4<L>   |  22  |  2.27<H>    Ca    7.5<L>      09 Apr 2018 03:29  Phos  2.9     04-09  Mg     2.4     04-09    TPro  4.8<L>  /  Alb  2.3<L>  /  TBili  0.5  /  DBili  x   /  AST  10  /  ALT  16  /  AlkPhos  30<L>  04-09          RADIOLOGY & ADDITIONAL TESTS:    Review of systems  Gen: No weight changes, fatigue, fevers/chills, weakness  Skin: No rashes  Head/Eyes/Ears/Mouth: No headache; Normal hearing; Normal vision w/o blurriness; No sinus pain/discomfort, sore throat  Respiratory: No dyspnea, cough, wheezing, hemoptysis  CV: No chest pain, PND, orthopnea  GI: C/O abdominal pain, diarrhea, no constipation, nausea, vomiting, melena, hematochezia  : No increased frequency, dysuria, hematuria, nocturia  MSK: No joint pain/swelling; no back pain; no edema  Neuro: No dizziness/lightheadedness, weakness, seizures, numbness, tingling  Heme: No easy bruising or bleeding  Endo: No heat/cold intolerance  Psych: No significant nervousness, anxiety, stress, depression  All other systems were reviewed and are negative, except as noted.    PHYSICAL EXAM:  Constitutional: Well developed / well nourished  Eyes: Anicteric, PERRLA  ENMT: nc/at  Neck: Supple  Respiratory: CTA B/L  Cardiovascular: RRR  Gastrointestinal: Soft abdomen, mild tender to touch at surgical site, ND, remains with active diarrhea   Genitourinary: Urinary catheter in place  Extremities: SCD's in place and working bilaterally  Vascular: Palpable dp pulses bilaterally  Neurological: A&O x3  Skin: Mild serosanguinous tito on wound dressing no erythema and evidence of infection noted, B/L PACO intact  Musculoskeletal: Moving all extremities  Psychiatric: Responsive

## 2018-04-09 NOTE — PROGRESS NOTE ADULT - SUBJECTIVE AND OBJECTIVE BOX
[facility] DIVISION OF KIDNEY DISEASES AND HYPERTENSION -- [phone]   FOLLOW UP NOTE  --------------------------------------------------------------------------------  HPI:  47 y.o. woman with history of PCKD and ESRD who is POD4 from bilateral nephrectomies, and living donor renal transplant on 4/2/2018.   Received solumedrol and simulect intraoperatively. Also received 5 units of pRBC during the case.  Pt was  extubated on 4/3, off pressor support. Pt transferred out of SICU to floor.  Pt POD # 7. Pt seen and examined at bedside. Pt states she had 6 episodes of diarrhea overnight     PAST HISTORY  --------------------------------------------------------------------------------  No significant changes to PMH, PSH, FHx, SHx, unless otherwise noted    ALLERGIES & MEDICATIONS  --------------------------------------------------------------------------------  Allergies    Demerol HCl (Hives)  Phenergan (Hives; Rash)    Intolerances      Standing Inpatient Medications  famotidine    Tablet 20 milliGRAM(s) Oral daily  melatonin 3 milliGRAM(s) Oral at bedtime  metoclopramide Injectable 5 milliGRAM(s) IV Push every 6 hours  nystatin    Suspension 889125 Unit(s) Oral four times a day  predniSONE   Tablet 5 milliGRAM(s) Oral daily  sodium bicarbonate  Infusion 0.098 mEq/kG/Hr IV Continuous <Continuous>  tacrolimus 5 milliGRAM(s) Oral two times a day  trimethoprim   80 mG/sulfamethoxazole 400 mG 1 Tablet(s) Oral daily  valGANciclovir 450 milliGRAM(s) Oral daily  vancomycin    Solution 125 milliGRAM(s) Oral every 6 hours    PRN Inpatient Medications  acetaminophen   Tablet. 650 milliGRAM(s) Oral every 6 hours PRN  diphenhydrAMINE   Capsule 25 milliGRAM(s) Oral at bedtime PRN  HYDROmorphone  Injectable 0.5 milliGRAM(s) IV Push every 3 hours PRN  HYDROmorphone  Injectable 0.5 milliGRAM(s) IV Push every 6 hours PRN      REVIEW OF SYSTEMS  --------------------------------------------------------------------------------  General: no fever  CVS: no chest pain  RESP: no sob, no cough  ABD: no abdominal pain, + diarrhea   : no dysuria,  MSK: no edema     VITALS/PHYSICAL EXAM  --------------------------------------------------------------------------------  T(C): 37.1 (04-09-18 @ 10:30), Max: 37.7 (04-09-18 @ 00:00)  HR: 72 (04-09-18 @ 10:30) (62 - 72)  BP: 132/70 (04-09-18 @ 10:30) (132/70 - 136/82)  RR: 18 (04-09-18 @ 10:30) (17 - 18)  SpO2: 96% (04-09-18 @ 10:30) (96% - 98%)  Wt(kg): --        04-08-18 @ 07:01  -  04-09-18 @ 07:00  --------------------------------------------------------  IN: 2150 mL / OUT: 3031 mL / NET: -881 mL    04-09-18 @ 07:01  -  04-09-18 @ 10:57  --------------------------------------------------------  IN: 715 mL / OUT: 820 mL / NET: -105 mL      Physical Exam:    Gen: NAD  	HEENT:   	Pulm: CTAB  	CV: S1S2  	Abd: Soft, +BS , incision c/d/i  	Ext: dependent edema  	Neuro: awake   LABS/STUDIES  --------------------------------------------------------------------------------              9.5    12.2  >-----------<  132      [04-09-18 @ 03:29]              26.9     139  |  107  |  31  ----------------------------<  132      [04-09-18 @ 03:29]  3.4   |  22  |  2.27        Ca     7.5     [04-09-18 @ 03:29]      Mg     2.4     [04-09-18 @ 03:29]      Phos  2.9     [04-09-18 @ 03:29]    TPro  4.8  /  Alb  2.3  /  TBili  0.5  /  DBili  x   /  AST  10  /  ALT  16  /  AlkPhos  30  [04-09-18 @ 03:29]          Creatinine Trend:  SCr 2.27 [04-09 @ 03:29]  SCr 1.95 [04-08 @ 05:38]  SCr 1.98 [04-07 @ 05:59]  SCr 2.34 [04-06 @ 06:28]  SCr 2.92 [04-05 @ 06:19]    Urinalysis - [04-06-18 @ 06:28]      Color Yellow / Appearance Clear / SG 1.018 / pH 6.5      Gluc Negative / Ketone Negative  / Bili Negative / Urobili Negative       Blood Moderate / Protein 30 / Leuk Est Negative / Nitrite Negative      RBC 10-25 / WBC 2-5 / Hyaline 2-5 / Gran  / Sq Epi  / Non Sq Epi Occasional / Bacteria       Iron 18, TIBC 167, %sat 11      [09-25-17 @ 06:30]  Ferritin 555.7      [09-25-17 @ 06:30]  PTH -- (Ca 8.0)      [09-16-17 @ 23:21]   232  Vitamin D (25OH) 9.7      [09-16-17 @ 23:21]

## 2018-04-09 NOTE — PROGRESS NOTE ADULT - PROBLEM SELECTOR PLAN 2
Pt s/p Simulect on 4/2/2018 and  4/6/2018  Continue steroids with taper  cellcept held due to GI symptoms   Continue prograf 5 mg BID  Check FK level 30min prior to prograf dosing ( 4.9    on 4/9 )  Continue nystatin, bactrim and valcyte for ppx

## 2018-04-10 LAB
ALBUMIN SERPL ELPH-MCNC: 1.9 G/DL — LOW (ref 3.3–5)
ALP SERPL-CCNC: 30 U/L — LOW (ref 40–120)
ALT FLD-CCNC: 13 U/L RC — SIGNIFICANT CHANGE UP (ref 10–45)
ANION GAP SERPL CALC-SCNC: 11 MMOL/L — SIGNIFICANT CHANGE UP (ref 5–17)
AST SERPL-CCNC: 16 U/L — SIGNIFICANT CHANGE UP (ref 10–40)
BILIRUB SERPL-MCNC: 0.4 MG/DL — SIGNIFICANT CHANGE UP (ref 0.2–1.2)
BUN SERPL-MCNC: 36 MG/DL — HIGH (ref 7–23)
CALCIUM SERPL-MCNC: 7.2 MG/DL — LOW (ref 8.4–10.5)
CHLORIDE SERPL-SCNC: 103 MMOL/L — SIGNIFICANT CHANGE UP (ref 96–108)
CO2 SERPL-SCNC: 22 MMOL/L — SIGNIFICANT CHANGE UP (ref 22–31)
CREAT FLD-MCNC: 3.04 MG/DL — SIGNIFICANT CHANGE UP
CREAT FLD-MCNC: 3.05 MG/DL — SIGNIFICANT CHANGE UP
CREAT SERPL-MCNC: 2.71 MG/DL — HIGH (ref 0.5–1.3)
GLUCOSE SERPL-MCNC: 100 MG/DL — HIGH (ref 70–99)
HCT VFR BLD CALC: 26 % — LOW (ref 34.5–45)
HGB BLD-MCNC: 9.2 G/DL — LOW (ref 11.5–15.5)
MAGNESIUM SERPL-MCNC: 2.3 MG/DL — SIGNIFICANT CHANGE UP (ref 1.6–2.6)
MCHC RBC-ENTMCNC: 33.6 PG — SIGNIFICANT CHANGE UP (ref 27–34)
MCHC RBC-ENTMCNC: 35.3 GM/DL — SIGNIFICANT CHANGE UP (ref 32–36)
MCV RBC AUTO: 95.2 FL — SIGNIFICANT CHANGE UP (ref 80–100)
PHOSPHATE SERPL-MCNC: 3.8 MG/DL — SIGNIFICANT CHANGE UP (ref 2.5–4.5)
PLATELET # BLD AUTO: 47 K/UL — LOW (ref 150–400)
POTASSIUM SERPL-MCNC: 3.9 MMOL/L — SIGNIFICANT CHANGE UP (ref 3.5–5.3)
POTASSIUM SERPL-SCNC: 3.9 MMOL/L — SIGNIFICANT CHANGE UP (ref 3.5–5.3)
PROT SERPL-MCNC: 4.7 G/DL — LOW (ref 6–8.3)
RBC # BLD: 2.73 M/UL — LOW (ref 3.8–5.2)
RBC # FLD: 12.5 % — SIGNIFICANT CHANGE UP (ref 10.3–14.5)
SODIUM SERPL-SCNC: 136 MMOL/L — SIGNIFICANT CHANGE UP (ref 135–145)
SPECIMEN SOURCE FLD: SIGNIFICANT CHANGE UP
SPECIMEN SOURCE FLD: SIGNIFICANT CHANGE UP
TACROLIMUS SERPL-MCNC: 8.1 NG/ML — SIGNIFICANT CHANGE UP
WBC # BLD: 8.3 K/UL — SIGNIFICANT CHANGE UP (ref 3.8–10.5)
WBC # FLD AUTO: 8.3 K/UL — SIGNIFICANT CHANGE UP (ref 3.8–10.5)

## 2018-04-10 PROCEDURE — 99232 SBSQ HOSP IP/OBS MODERATE 35: CPT | Mod: GC

## 2018-04-10 PROCEDURE — 76776 US EXAM K TRANSPL W/DOPPLER: CPT | Mod: 26,RT

## 2018-04-10 RX ORDER — MYCOPHENOLATE MOFETIL 250 MG/1
500 CAPSULE ORAL
Qty: 0 | Refills: 0 | Status: DISCONTINUED | OUTPATIENT
Start: 2018-04-10 | End: 2018-04-13

## 2018-04-10 RX ORDER — MYCOPHENOLATE MOFETIL 250 MG/1
1000 CAPSULE ORAL
Qty: 0 | Refills: 0 | Status: DISCONTINUED | OUTPATIENT
Start: 2018-04-10 | End: 2018-04-10

## 2018-04-10 RX ADMIN — Medication 3 MILLIGRAM(S): at 21:12

## 2018-04-10 RX ADMIN — Medication 500000 UNIT(S): at 18:11

## 2018-04-10 RX ADMIN — HYDROMORPHONE HYDROCHLORIDE 0.5 MILLIGRAM(S): 2 INJECTION INTRAMUSCULAR; INTRAVENOUS; SUBCUTANEOUS at 00:43

## 2018-04-10 RX ADMIN — Medication 500000 UNIT(S): at 06:04

## 2018-04-10 RX ADMIN — HYDROMORPHONE HYDROCHLORIDE 0.5 MILLIGRAM(S): 2 INJECTION INTRAMUSCULAR; INTRAVENOUS; SUBCUTANEOUS at 12:50

## 2018-04-10 RX ADMIN — HYDROMORPHONE HYDROCHLORIDE 0.5 MILLIGRAM(S): 2 INJECTION INTRAMUSCULAR; INTRAVENOUS; SUBCUTANEOUS at 21:07

## 2018-04-10 RX ADMIN — HYDROMORPHONE HYDROCHLORIDE 0.5 MILLIGRAM(S): 2 INJECTION INTRAMUSCULAR; INTRAVENOUS; SUBCUTANEOUS at 15:45

## 2018-04-10 RX ADMIN — MYCOPHENOLATE MOFETIL 500 MILLIGRAM(S): 250 CAPSULE ORAL at 18:13

## 2018-04-10 RX ADMIN — Medication 125 MILLIGRAM(S): at 18:10

## 2018-04-10 RX ADMIN — Medication 500000 UNIT(S): at 12:52

## 2018-04-10 RX ADMIN — FAMOTIDINE 20 MILLIGRAM(S): 10 INJECTION INTRAVENOUS at 12:52

## 2018-04-10 RX ADMIN — HYDROMORPHONE HYDROCHLORIDE 0.5 MILLIGRAM(S): 2 INJECTION INTRAMUSCULAR; INTRAVENOUS; SUBCUTANEOUS at 07:51

## 2018-04-10 RX ADMIN — HYDROMORPHONE HYDROCHLORIDE 0.5 MILLIGRAM(S): 2 INJECTION INTRAMUSCULAR; INTRAVENOUS; SUBCUTANEOUS at 18:30

## 2018-04-10 RX ADMIN — HYDROMORPHONE HYDROCHLORIDE 0.5 MILLIGRAM(S): 2 INJECTION INTRAMUSCULAR; INTRAVENOUS; SUBCUTANEOUS at 01:00

## 2018-04-10 RX ADMIN — Medication 125 MILLIGRAM(S): at 06:04

## 2018-04-10 RX ADMIN — HYDROMORPHONE HYDROCHLORIDE 0.5 MILLIGRAM(S): 2 INJECTION INTRAMUSCULAR; INTRAVENOUS; SUBCUTANEOUS at 21:35

## 2018-04-10 RX ADMIN — Medication 5 MILLIGRAM(S): at 00:43

## 2018-04-10 RX ADMIN — Medication 5 MILLIGRAM(S): at 06:04

## 2018-04-10 RX ADMIN — HYDROMORPHONE HYDROCHLORIDE 0.5 MILLIGRAM(S): 2 INJECTION INTRAMUSCULAR; INTRAVENOUS; SUBCUTANEOUS at 07:36

## 2018-04-10 RX ADMIN — HYDROMORPHONE HYDROCHLORIDE 0.5 MILLIGRAM(S): 2 INJECTION INTRAMUSCULAR; INTRAVENOUS; SUBCUTANEOUS at 18:15

## 2018-04-10 RX ADMIN — HYDROMORPHONE HYDROCHLORIDE 0.5 MILLIGRAM(S): 2 INJECTION INTRAMUSCULAR; INTRAVENOUS; SUBCUTANEOUS at 05:46

## 2018-04-10 RX ADMIN — Medication 5 MILLIGRAM(S): at 12:52

## 2018-04-10 RX ADMIN — TACROLIMUS 6 MILLIGRAM(S): 5 CAPSULE ORAL at 18:12

## 2018-04-10 RX ADMIN — Medication 100 MILLIGRAM(S): at 16:21

## 2018-04-10 RX ADMIN — HYDROMORPHONE HYDROCHLORIDE 0.5 MILLIGRAM(S): 2 INJECTION INTRAMUSCULAR; INTRAVENOUS; SUBCUTANEOUS at 05:40

## 2018-04-10 RX ADMIN — Medication 5 MILLIGRAM(S): at 06:05

## 2018-04-10 RX ADMIN — Medication 125 MILLIGRAM(S): at 12:52

## 2018-04-10 RX ADMIN — Medication 5 MILLIGRAM(S): at 18:10

## 2018-04-10 RX ADMIN — HYDROMORPHONE HYDROCHLORIDE 0.5 MILLIGRAM(S): 2 INJECTION INTRAMUSCULAR; INTRAVENOUS; SUBCUTANEOUS at 13:05

## 2018-04-10 RX ADMIN — HYDROMORPHONE HYDROCHLORIDE 0.5 MILLIGRAM(S): 2 INJECTION INTRAMUSCULAR; INTRAVENOUS; SUBCUTANEOUS at 15:28

## 2018-04-10 RX ADMIN — Medication 125 MEQ/KG/HR: at 07:13

## 2018-04-10 RX ADMIN — TACROLIMUS 6 MILLIGRAM(S): 5 CAPSULE ORAL at 06:05

## 2018-04-10 RX ADMIN — Medication 1000 MILLIGRAM(S): at 05:40

## 2018-04-10 NOTE — PROVIDER CONTACT NOTE (OTHER) - RECOMMENDATIONS
will continue to monitor; IV zofran, IV tylenol, given; PACO drains emptied and documented.
Continue to monitor
Could be a side effect of medications?
Notify provider.
Notify transplant team.
Resident to come and assess patient.
SICU emergently to bedside at this time.
decrease rate of fluids? get rectal temp?
will continue to monitor; will discuss with transplant team.

## 2018-04-10 NOTE — PROGRESS NOTE ADULT - PROBLEM SELECTOR PLAN 1
Pt  with STEPHENIE likely secondary to diarrhea vs rejection vs TMA ( as pt with thrombocytopenia, anemia, renal failure)   Pt Scr continues to rise to 2.7 today. Pt received trial of IVF yesterday.   Pt non-oliguric  Check renal sonogram  Check bladder scan  Plan for pulse steroids today ( for possible rejection). Unable to do kidney biopsy as pt with intraperitoneal kidney  Check TMA labs ( LDH, Hapto)  HOld bactrim, valcyte for thrombocytopenia   Monitor BMP, strict I/O avoid nephrotoxics, NSAIds RCA

## 2018-04-10 NOTE — PROVIDER CONTACT NOTE (OTHER) - DATE AND TIME:
07-Apr-2018 11:30
04-Apr-2018 10:00
04-Apr-2018 20:53
05-Apr-2018 07:20
05-Apr-2018 15:00
06-Apr-2018 16:00
08-Apr-2018 21:00
10-Apr-2018 08:00
10-Apr-2018 19:38

## 2018-04-10 NOTE — PROVIDER CONTACT NOTE (OTHER) - SITUATION
Patient complaining of nausea, cold feeling throughout her body, pain in abdomen, and bilateral PACO drains are filling up several times with serosanguinous fluid.
At change of shift pt is rigoring (98.6 oral temp) complains of flank pain, and is requesting to decrease fluids because she feels bloated
Patient IVL became symptomatic-patient now has no IVL and refusing any further needle sticks.
Patient in respiratory distress, thin blood tinged emesis, AMS, decreasing heart rate and increasing agitation.
Pt complaining of gum pain/ food tastes weird
Pt continue to have vaginal bleeding
Pt had more vaginal bleeding in AM and midline abdominal surgical incision draining
Pt having brown discharge from vaginal area. Pt had period two weeks ago.
Pt refusing to ambulate

## 2018-04-10 NOTE — PROGRESS NOTE ADULT - SUBJECTIVE AND OBJECTIVE BOX
INTERVAL HPI/OVERNIGHT EVENTS:  Patient seen with multidisciplinary team (Nephrologist, pharmacist, nurse, nurse manager, NP, MD)  in am rounds and examined with Dr. Willis.  S/P LDRT 4/2/18, now with c/o abdominal pain due to C-Diff overnight, making adequate urine, B/L PACO's with moderate output.  Cr trending up, s/p one liter LR bolus and  IVF with bicarb with no improvement x24 hours.  Overnight diarrhea improved, NAD, making adequate urine.       MEDICATIONS  (STANDING):  famotidine    Tablet 20 milliGRAM(s) Oral daily  melatonin 3 milliGRAM(s) Oral at bedtime  metoclopramide Injectable 5 milliGRAM(s) IV Push every 6 hours  mycophenolate mofetil 500 milliGRAM(s) Oral two times a day  nystatin    Suspension 997177 Unit(s) Oral four times a day  predniSONE   Tablet 5 milliGRAM(s) Oral daily  tacrolimus 6 milliGRAM(s) Oral two times a day  trimethoprim   80 mG/sulfamethoxazole 400 mG 1 Tablet(s) Oral daily  valGANciclovir 450 milliGRAM(s) Oral daily  vancomycin    Solution 125 milliGRAM(s) Oral every 6 hours    MEDICATIONS  (PRN):  acetaminophen   Tablet. 650 milliGRAM(s) Oral every 6 hours PRN Mild Pain (1 - 3)  diphenhydrAMINE   Capsule 25 milliGRAM(s) Oral at bedtime PRN Insomnia  HYDROmorphone  Injectable 0.5 milliGRAM(s) IV Push every 3 hours PRN Severe Breakthrough Pain  HYDROmorphone  Injectable 0.5 milliGRAM(s) IV Push every 6 hours PRN Severe Pain (7 - 10)      Allergies    Demerol HCl (Hives)  Phenergan (Hives; Rash)    Intolerances        Vital Signs Last 24 Hrs  T(C): 36.9 (10 Apr 2018 06:00), Max: 37.5 (09 Apr 2018 22:00)  T(F): 98.4 (10 Apr 2018 06:00), Max: 99.5 (09 Apr 2018 22:00)  HR: 68 (10 Apr 2018 06:00) (64 - 82)  BP: 149/79 (10 Apr 2018 06:00) (130/70 - 149/79)  BP(mean): --  RR: 17 (10 Apr 2018 06:00) (17 - 18)  SpO2: 97% (10 Apr 2018 06:00) (95% - 99%)    LABS:                        9.2    8.3   )-----------( 47       ( 10 Apr 2018 06:25 )             26.0     04-10    136  |  103  |  36<H>  ----------------------------<  100<H>  3.9   |  22  |  2.71<H>    Ca    7.2<L>      10 Apr 2018 06:25  Phos  3.8     04-10  Mg     2.3     04-10    TPro  4.7<L>  /  Alb  1.9<L>  /  TBili  0.4  /  DBili  x   /  AST  16  /  ALT  13  /  AlkPhos  30<L>  04-10          RADIOLOGY & ADDITIONAL TESTS:    Review of systems  Gen: No weight changes, fatigue, fevers/chills, weakness  Skin: No rashes  Head/Eyes/Ears/Mouth: No headache; Normal hearing; Normal vision w/o blurriness; No sinus pain/discomfort, sore throat  Respiratory: No dyspnea, cough, wheezing, hemoptysis  CV: No chest pain, PND, orthopnea  GI: No abdominal pain, diarrhea, constipation, nausea, vomiting, melena, hematochezia  : No increased frequency, dysuria, hematuria, nocturia  MSK: No joint pain/swelling; no back pain; no edema  Neuro: No dizziness/lightheadedness, weakness, seizures, numbness, tingling  Heme: No easy bruising or bleeding  Endo: No heat/cold intolerance  Psych: No significant nervousness, anxiety, stress, depression  All other systems were reviewed and are negative, except as noted.    PHYSICAL EXAM:  Constitutional: Well developed / well nourished  Eyes: Anicteric, PERRLA  ENMT: nc/at  Neck: Supple  Respiratory: CTA B/L  Cardiovascular: RRR  Gastrointestinal: Soft abdomen, mild tender to touch at surgical site, ND  Genitourinary: Voiding spontaneously  Extremities: SCD's in place and working bilaterally  Vascular: Palpable dp pulses bilaterally  Neurological: A&O x3  Skin: Wound open to air no erythema and evidence of infection noted  Musculoskeletal: Moving all extremities  Psychiatric: Responsive

## 2018-04-10 NOTE — PROGRESS NOTE ADULT - PROBLEM SELECTOR PLAN 2
Pt s/p Simulect on 4/2/2018 and  4/6/2018  Plan for pulse steroids today   cellcept held due to GI symptoms   Continue prograf 6 mg BID  Check FK level 30min prior to prograf dosing ( 4.9  on 4/9 )  Continue nystatin. Hold  bactrim and valcyte  in setting of thrombocytopenia

## 2018-04-10 NOTE — PROVIDER CONTACT NOTE (OTHER) - NAME OF MD/NP/PA/DO NOTIFIED:
[id number]
Dr. Demario Fitzgerald
Dr. Willis
JENNIFER Gates
MD Benoit
MD Blackmon, [facility]
MD Bower
MD Zambrano
MD Celeste

## 2018-04-10 NOTE — PROVIDER CONTACT NOTE (OTHER) - BACKGROUND
Patient admitted to [facility] on 4/2/18 for kidney transplant.
47 y F sp kidney transplant on 4/2/18 hx htn, kidney stones, brain aneurysm
47 y F sp living donor kidney on 4/2/18
Patient admitted to [facility] on 4/2/18 for a living donor kidney transplant.
Pt admitted for kidney transplant surgery
Pt is post op day 8 kidney transplant
s/p fall with liver laceration and subcapsular hematoma in SICU for hemorrhage watch and cardiac monitoring for suspected ECG changes and up-trending cardiac enzymes.
s/p kidney transplant on 4/2/18
s/p live donor kidney transplant on 4/2/18

## 2018-04-10 NOTE — PROGRESS NOTE ADULT - PROBLEM SELECTOR PLAN 1
Doing well.  Po diet opal well.    Pt encouraged to ambulate  Dressing removed site intact.   Cr trending up, Renal US today.   B/L PACO cr normal  Bladder scan with 112 ml PVR

## 2018-04-10 NOTE — PROVIDER CONTACT NOTE (OTHER) - ACTION/TREATMENT ORDERED:
MD Celeste made aware; came to assess the patient; no further actions at this time.
[facility] aware pt is refusing to ambulate. As per team, they will speak to patient when they round later this afternoon.
MD made aware.  Will continue to monitor and assess
NP Moises Gates made aware; stated she will discuss with transplant team; stated ok to not have IVL at this time;
Respiratory called stat, Anesthesia called overhead, emergent intubation in progress, NGT placed, all IVF, PCA, and Blood transfusion discontinued at this time. plan for arterial line, stat labs.
Transplant team/Dr. Willis aware. As per Dr. Willis continue to monitor for any changes.
Vaginal bleeding normal. Place abdominal pad dressing over midline surgical incision. Continue to monitor.
Will come and assess patient. Continue to monitor.
no rectal temp ordered.  fluids at same rate.  pt is assessed by MD

## 2018-04-10 NOTE — PROGRESS NOTE ADULT - ASSESSMENT
Assessment and Plan:   · Assessment		  47 y.o. woman with history of PCKD and ESRD from bilateral nephrectomy, and living donor renal transplant on 4/2/2018. Positive for C-Diff on oral vancomycin. Assessment and Plan:   · Assessment		  47 y.o. woman with history of PCKD and ESRD from bilateral nephrectomy, and living donor renal transplant on 4/2/2018. She has a Cr trending upwards despite IVF, will follow up with STAT US.  Positive for C-Diff on oral vancomycin with improving symptoms. 47 y.o. woman with history of PCKD and ESRD from bilateral nephrectomy, and living donor renal transplant on 4/2/2018. She has a Cr trending upwards despite IVF, will follow up with STAT US.  Positive for C-Diff on oral vancomycin with improving symptoms.

## 2018-04-10 NOTE — PROGRESS NOTE ADULT - SUBJECTIVE AND OBJECTIVE BOX
· Subjective and Objective: 	  INTERVAL HPI/OVERNIGHT EVENTS:  Patient seen with multidisciplinary team (Nephrologist, pharmacist, nurse, nurse manager, NP, MD)  in am rounds.  S/P LDRT 4/2/18, now with c/o rigors overnight without fever and 2 episodes of diarrhea overnight, making adequate urine, B/L PACO's with moderate output.  Cr trending up.      MEDICATIONS  (STANDING):  famotidine    Tablet 20 milliGRAM(s) Oral daily    Vital Signs Last 24 Hrs  T(C): 36.9 (10 Apr 2018)  T(F): 98.4 (10 Apr 2018)  HR: 68 (10 Apr 2018)  BP: 149/79 (10 Apr 2018)  RR: 17 (10 Apr 2018)  SpO2: 97% (10 Apr 2018)    LABS:                        9.2    8.3  )-----------( 47 <L>      ( 10 Apr 2018)             26.0      136  |  103  |  36<H>  ----------------------------<  132<H>  3.9   |  22  |  2.71<H>    Ca    7.2<L>      10 Apr 2018   Phos  2.9     Mg     2.4        TPro  4.7<L>  /  Alb  1.9<L>  /  TBili  0.4  /  DBili  x   /  AST  16  /  ALT  13  /  AlkPhos  30<L>  04-10          RADIOLOGY & ADDITIONAL TESTS:    Review of systems  Gen: No weight changes, fatigue, fevers, weakness  Skin: No rashes  Head/Eyes/Ears/Mouth: No headache; Normal hearing; Normal vision w/o blurriness; No sinus pain/discomfort, sore throat  Respiratory: No dyspnea, cough, wheezing, hemoptysis  CV: No chest pain, PND, orthopnea  GI: C/O abdominal pain, diarrhea, no constipation, nausea, vomiting, melena, hematochezia  : No increased frequency, dysuria, hematuria, nocturia  MSK: No joint pain/swelling; no back pain; no edema  Neuro: No dizziness/lightheadedness, weakness, seizures, numbness, tingling  Heme: No easy bruising or bleeding  Endo: No heat/cold intolerance  Psych: No significant nervousness, anxiety, stress, depression  All other systems were reviewed and are negative, except as noted.    PHYSICAL EXAM:  Constitutional: Well developed / well nourished  Eyes: Anicteric, PERRLA  ENMT: nc/at  Neck: Supple  Respiratory: CTA B/L  Cardiovascular: RRR  Gastrointestinal: Soft abdomen, mild tender to touch at surgical site, ND   Genitourinary: Urinary catheter in place  Extremities: SCD's in place and working bilaterally  Vascular: Palpable dp pulses bilaterally  Neurological: A&O x3  Skin: B/L PACO intact, no erythema and evidence of infection noted  Musculoskeletal: Moving all extremities  Psychiatric: Responsive · Subjective and Objective: 	  INTERVAL HPI/OVERNIGHT EVENTS:  Patient seen with multidisciplinary team (Nephrologist, pharmacist, nurse, nurse manager, NP, MD)  in am rounds.  S/P LDRT 4/2/18, now with c/o rigors overnight without fever and 2 episodes of diarrhea overnight, making adequate urine, B/L PACO's with moderate output.  Cr trending up.      MEDICATIONS  (STANDING):  famotidine    Tablet 20 milliGRAM(s) Oral daily  melatonin 3 milliGRAM(s) Oral at bedtime  metoclopramide Injectable 5 milliGRAM(s) IV Push every 6 hours  mycophenolate mofetil 500 milliGRAM(s) Oral two times a day  nystatin    Suspension 112086 Unit(s) Oral four times a day  predniSONE   Tablet 5 milliGRAM(s) Oral daily  tacrolimus 6 milliGRAM(s) Oral two times a day  trimethoprim   80 mG/sulfamethoxazole 400 mG 1 Tablet(s) Oral daily  valGANciclovir 450 milliGRAM(s) Oral daily  vancomycin    Solution 125 milliGRAM(s) Oral every 6 hours    MEDICATIONS  (PRN):  acetaminophen   Tablet. 650 milliGRAM(s) Oral every 6 hours PRN Mild Pain (1 - 3)  diphenhydrAMINE   Capsule 25 milliGRAM(s) Oral at bedtime PRN Insomnia  HYDROmorphone  Injectable 0.5 milliGRAM(s) IV Push every 3 hours PRN Severe Breakthrough Pain  HYDROmorphone  Injectable 0.5 milliGRAM(s) IV Push every 6 hours PRN Severe Pain (7 - 10)      Vital Signs Last 24 Hrs  T(C): 36.9 (10 Apr 2018)  T(F): 98.4 (10 Apr 2018)  HR: 68 (10 Apr 2018)  BP: 149/79 (10 Apr 2018)  RR: 17 (10 Apr 2018)  SpO2: 97% (10 Apr 2018)    LABS:                        9.2    8.3  )-----------( 47 <L>      ( 10 Apr 2018)             26.0      136  |  103  |  36<H>  ----------------------------<  132<H>  3.9   |  22  |  2.71<H>    Ca    7.2<L>      10 Apr 2018   Phos  2.9     Mg     2.4        TPro  4.7<L>  /  Alb  1.9<L>  /  TBili  0.4  /  DBili  x   /  AST  16  /  ALT  13  /  AlkPhos  30<L>  04-10          RADIOLOGY & ADDITIONAL TESTS:    Review of systems  Gen: No weight changes, fatigue, fevers, weakness  Skin: No rashes  Head/Eyes/Ears/Mouth: No headache; Normal hearing; Normal vision w/o blurriness; No sinus pain/discomfort, sore throat  Respiratory: No dyspnea, cough, wheezing, hemoptysis  CV: No chest pain, PND, orthopnea  GI: C/O abdominal pain, diarrhea, no constipation, nausea, vomiting, melena, hematochezia  : No increased frequency, dysuria, hematuria, nocturia  MSK: No joint pain/swelling; no back pain; no edema  Neuro: No dizziness/lightheadedness, weakness, seizures, numbness, tingling  Heme: No easy bruising or bleeding  Endo: No heat/cold intolerance  Psych: No significant nervousness, anxiety, stress, depression  All other systems were reviewed and are negative, except as noted.    PHYSICAL EXAM:  Constitutional: Well developed / well nourished  Eyes: Anicteric, PERRLA  ENMT: nc/at  Neck: Supple  Respiratory: CTA B/L  Cardiovascular: RRR  Gastrointestinal: Soft abdomen, mild tender to touch at surgical site, ND   Genitourinary: Urinary catheter in place  Extremities: SCD's in place and working bilaterally  Vascular: Palpable dp pulses bilaterally  Neurological: A&O x3  Skin: B/L PACO intact, no erythema and evidence of infection noted  Musculoskeletal: Moving all extremities  Psychiatric: Responsive · Subjective and Objective: 	  INTERVAL HPI/OVERNIGHT EVENTS:  Patient seen with multidisciplinary team (Nephrologist, pharmacist, nurse, nurse manager, NP, MD)  in am rounds.  S/P LDRT 4/2/18, now with c/o rigors overnight without fever and 2 episodes of diarrhea overnight, making adequate urine, B/L PACO's with moderate output.  Cr trending up.      MEDICATIONS  (STANDING):  famotidine    Tablet 20 milliGRAM(s) Oral daily  melatonin 3 milliGRAM(s) Oral at bedtime  metoclopramide Injectable 5 milliGRAM(s) IV Push every 6 hours  mycophenolate mofetil 500 milliGRAM(s) Oral two times a day  nystatin    Suspension 154801 Unit(s) Oral four times a day  predniSONE   Tablet 5 milliGRAM(s) Oral daily  tacrolimus 6 milliGRAM(s) Oral two times a day  trimethoprim   80 mG/sulfamethoxazole 400 mG 1 Tablet(s) Oral daily  valGANciclovir 450 milliGRAM(s) Oral daily  vancomycin    Solution 125 milliGRAM(s) Oral every 6 hours    MEDICATIONS  (PRN):  acetaminophen   Tablet. 650 milliGRAM(s) Oral every 6 hours PRN Mild Pain (1 - 3)  diphenhydrAMINE   Capsule 25 milliGRAM(s) Oral at bedtime PRN Insomnia  HYDROmorphone  Injectable 0.5 milliGRAM(s) IV Push every 3 hours PRN Severe Breakthrough Pain  HYDROmorphone  Injectable 0.5 milliGRAM(s) IV Push every 6 hours PRN Severe Pain (7 - 10)    VITAL SIGNS:    T(C): 36.9 (10 Apr 2018 06:00), Max: 37.5 (09 Apr 2018 22:00)  T(F): 98.4 (10 Apr 2018 06:00), Max: 99.5 (09 Apr 2018 22:00)  HR: 68 (10 Apr 2018 06:00) (64 - 82)  BP: 149/79 (10 Apr 2018 06:00) (130/70 - 149/79)  BP(mean): --  RR: 17 (10 Apr 2018 06:00) (17 - 18)  SpO2: 97% (10 Apr 2018 06:00) (95% - 99%)    LABS:                        9.2    8.3   )-----------( 47       ( 10 Apr 2018 06:25 )             26.0     04-10    136  |  103  |  36<H>  ----------------------------<  100<H>  3.9   |  22  |  2.71<H>    Ca    7.2<L>      10 Apr 2018 06:25  Phos  3.8     04-10  Mg     2.3     04-10    TPro  4.7<L>  /  Alb  1.9<L>  /  TBili  0.4  /  DBili  x   /  AST  16  /  ALT  13  /  AlkPhos  30<L>  04-10    RADIOLOGY & ADDITIONAL TESTS:    Review of systems  Gen: No weight changes, fatigue, fevers, weakness  Skin: No rashes  Head/Eyes/Ears/Mouth: No headache; Normal hearing; Normal vision w/o blurriness; No sinus pain/discomfort, sore throat  Respiratory: No dyspnea, cough, wheezing, hemoptysis  CV: No chest pain, PND, orthopnea  GI: C/O abdominal pain, diarrhea, no constipation, nausea, vomiting, melena, hematochezia  : No increased frequency, dysuria, hematuria, nocturia  MSK: No joint pain/swelling; no back pain; no edema  Neuro: No dizziness/lightheadedness, weakness, seizures, numbness, tingling  Heme: No easy bruising or bleeding  Endo: No heat/cold intolerance  Psych: No significant nervousness, anxiety, stress, depression  All other systems were reviewed and are negative, except as noted.    PHYSICAL EXAM:  Constitutional: Well developed / well nourished  Eyes: Anicteric, PERRLA  ENMT: nc/at  Neck: Supple  Respiratory: CTA B/L  Cardiovascular: RRR  Gastrointestinal: Soft abdomen, mild tender to touch at surgical site, ND   Genitourinary: Urinary catheter in place  Extremities: SCD's in place and working bilaterally  Vascular: Palpable dp pulses bilaterally  Neurological: A&O x3  Skin: B/L PACO intact, no erythema and evidence of infection noted  Musculoskeletal: Moving all extremities  Psychiatric: Responsive · Subjective and Objective: 	  INTERVAL HPI/OVERNIGHT EVENTS:  Patient seen with multidisciplinary team (Nephrologist, pharmacist, nurse, nurse manager, NP, MD)  in am rounds.  S/P LDRT 4/2/18, now with c/o rigors without fever and 2 episodes of diarrhea overnight, making adequate urine, B/L PACO's with moderate output.  Cr trending up. No bouts of diarrhea this am.       MEDICATIONS  (STANDING):  famotidine    Tablet 20 milliGRAM(s) Oral daily  melatonin 3 milliGRAM(s) Oral at bedtime  metoclopramide Injectable 5 milliGRAM(s) IV Push every 6 hours  mycophenolate mofetil 500 milliGRAM(s) Oral two times a day  nystatin    Suspension [id number] Unit(s) Oral four times a day  predniSONE   Tablet 5 milliGRAM(s) Oral daily  tacrolimus 6 milliGRAM(s) Oral two times a day  trimethoprim   80 mG/sulfamethoxazole 400 mG 1 Tablet(s) Oral daily  valGANciclovir 450 milliGRAM(s) Oral daily  vancomycin    Solution 125 milliGRAM(s) Oral every 6 hours    MEDICATIONS  (PRN):  acetaminophen   Tablet. 650 milliGRAM(s) Oral every 6 hours PRN Mild Pain (1 - 3)  diphenhydrAMINE   Capsule 25 milliGRAM(s) Oral at bedtime PRN Insomnia  HYDROmorphone  Injectable 0.5 milliGRAM(s) IV Push every 3 hours PRN Severe Breakthrough Pain  HYDROmorphone  Injectable 0.5 milliGRAM(s) IV Push every 6 hours PRN Severe Pain (7 - 10)    VITAL SIGNS:    T(C): 36.9 (10 Apr 2018 06:00), Max: 37.5 (09 Apr 2018 22:00)  T(F): 98.4 (10 Apr 2018 06:00), Max: 99.5 (09 Apr 2018 22:00)  HR: 68 (10 Apr 2018 06:00) (64 - 82)  BP: 149/79 (10 Apr 2018 06:00) (130/70 - 149/79)  BP(mean): --  RR: 17 (10 Apr 2018 06:00) (17 - 18)  SpO2: 97% (10 Apr 2018 06:00) (95% - 99%)    LABS:                        9.2    8.3   )-----------( 47       ( 10 Apr 2018 06:25 )             26.0     04-10    136  |  103  |  36<H>  ----------------------------<  100<H>  3.9   |  22  |  2.71<H>    Ca    7.2<L>      10 Apr 2018 06:25  Phos  3.8     04-10  Mg     2.3     04-10    TPro  4.7<L>  /  Alb  1.9<L>  /  TBili  0.4  /  AST  16  /  ALT  13  /  AlkPhos  30<L>  04-10    RADIOLOGY & ADDITIONAL TESTS:    Review of systems  Gen: No weight changes, fatigue, fevers, weakness  Skin: No rashes  Head/Eyes/Ears/Mouth: No headache; Normal hearing; Normal vision w/o blurriness; No sinus pain/discomfort, sore throat  Respiratory: No dyspnea, cough, wheezing, hemoptysis  CV: No chest pain, PND, orthopnea  GI: C/O abdominal pain, diarrhea, no constipation, nausea, vomiting, melena, hematochezia  : No increased frequency, dysuria, hematuria, nocturia  MSK: No joint pain/swelling; no back pain; no edema  Neuro: No dizziness/lightheadedness, weakness, seizures, numbness, tingling  Heme: No easy bruising or bleeding  Endo: No heat/cold intolerance  Psych: No significant nervousness, anxiety, stress, depression  All other systems were reviewed and are negative, except as noted.    PHYSICAL EXAM:  Constitutional: Well developed / well nourished  Eyes: Anicteric, PERRLA  ENMT: nc/at  Neck: Supple  Respiratory: CTA B/L  Cardiovascular: RRR  Gastrointestinal: Soft abdomen, mild tender to touch at surgical site, ND   Genitourinary: Urinary catheter in place  Extremities: SCD's in place and working bilaterally  Vascular: Palpable dp pulses bilaterally  Neurological: A&O x3  Skin: B/L PACO intact, no erythema and evidence of infection noted  Musculoskeletal: Moving all extremities  Psychiatric: Responsive · Subjective and Objective: 	  INTERVAL HPI/OVERNIGHT EVENTS:  Patient seen with multidisciplinary team (Nephrologist, pharmacist, nurse, nurse manager, NP, MD)  in am rounds.  S/P LDRT 4/2/18, now with c/o rigors without fever and 2 episodes of diarrhea overnight, making adequate urine, B/L PACO's with moderate output.  Cr trending up. No bouts of diarrhea this am.       MEDICATIONS  (STANDING):  famotidine    Tablet 20 milliGRAM(s) Oral daily  melatonin 3 milliGRAM(s) Oral at bedtime  metoclopramide Injectable 5 milliGRAM(s) IV Push every 6 hours  mycophenolate mofetil 500 milliGRAM(s) Oral two times a day  nystatin    Suspension [id number] Unit(s) Oral four times a day  predniSONE   Tablet 5 milliGRAM(s) Oral daily  tacrolimus 6 milliGRAM(s) Oral two times a day  trimethoprim   80 mG/sulfamethoxazole 400 mG 1 Tablet(s) Oral daily  valGANciclovir 450 milliGRAM(s) Oral daily  vancomycin    Solution 125 milliGRAM(s) Oral every 6 hours    MEDICATIONS  (PRN):  acetaminophen   Tablet. 650 milliGRAM(s) Oral every 6 hours PRN Mild Pain (1 - 3)  diphenhydrAMINE   Capsule 25 milliGRAM(s) Oral at bedtime PRN Insomnia  HYDROmorphone  Injectable 0.5 milliGRAM(s) IV Push every 3 hours PRN Severe Breakthrough Pain  HYDROmorphone  Injectable 0.5 milliGRAM(s) IV Push every 6 hours PRN Severe Pain (7 - 10)    VITAL SIGNS:    T(C): 36.9 (10 Apr 2018 06:00), Max: 37.5 (09 Apr 2018 22:00)  T(F): 98.4 (10 Apr 2018 06:00), Max: 99.5 (09 Apr 2018 22:00)  HR: 68 (10 Apr 2018 06:00) (64 - 82)  BP: 149/79 (10 Apr 2018 06:00) (130/70 - 149/79)  BP(mean): --  RR: 17 (10 Apr 2018 06:00) (17 - 18)  SpO2: 97% (10 Apr 2018 06:00) (95% - 99%)    LABS:                        9.2    8.3   )-----------( 47       ( 10 Apr 2018 06:25 )             26.0     04-10    136  |  103  |  36<H>  ----------------------------<  100<H>  3.9   |  22  |  2.71<H>    Ca    7.2<L>      10 Apr 2018 06:25  Phos  3.8     04-10  Mg     2.3     04-10    TPro  4.7<L>  /  Alb  1.9<L>  /  TBili  0.4  /  AST  16  /  ALT  13  /  AlkPhos  30<L>  04-10    RADIOLOGY & ADDITIONAL TESTS:    Review of systems  Gen: No weight changes, fatigue, fevers, weakness  Skin: No rashes  Head/Eyes/Ears/Mouth: No headache; Normal hearing; Normal vision w/o blurriness; No sinus pain/discomfort, sore throat  Respiratory: No dyspnea, cough, wheezing, hemoptysis  CV: No chest pain, PND, orthopnea  GI: C/O abdominal pain, diarrhea, no constipation, nausea, vomiting, melena, hematochezia  : No increased frequency, dysuria, hematuria, nocturia  MSK: No joint pain/swelling; no back pain; no edema  Neuro: No dizziness/lightheadedness, weakness, seizures, numbness, tingling  Heme: No easy bruising or bleeding  Endo: No heat/cold intolerance  Psych: No significant nervousness, anxiety, stress, depression  All other systems were reviewed and are negative, except as noted.    PHYSICAL EXAM:  Constitutional: Well developed / well nourished  Eyes: Anicteric, PERRLA  ENMT: nc/at  Neck: Supple  Respiratory: CTA B/L  Cardiovascular: RRR  Gastrointestinal: Soft abdomen, mild tender to touch at surgical site, ND   Genitourinary: Urinary catheter in place  Extremities: SCD's in place and working bilaterally  Vascular: Palpable dp pulses bilaterally  Neurological: A&O x3  Skin: B/L PACO intact, no erythema and evidence of infection noted to wound site  Musculoskeletal: Moving all extremities  Psychiatric: Responsive

## 2018-04-10 NOTE — PROVIDER CONTACT NOTE (OTHER) - REASON
At change of shift pt is rigoring (98.6 oral temp) complains of flank pain, and is requesting to decrease fluids because she feels bloated
Patient IVL became symptomatic-patient now has no IVL and refusing any further needle sticks.
Patient in respiratory distress, thin blood tinged emesis
Pt complaining of gum pain/ food tastes weird
Pt had more vaginal bleeding in AM and midline abdominal surgical incision draining
Pt having brown discharge from vaginal area
pt refusing to ambulate
vaginal bleeding
Patient complaining of nausea, cold feeling throughout her body, pain in abdomen, and bilateral PACO drains are filling up several times with serosanguinous fluid.

## 2018-04-10 NOTE — PROGRESS NOTE ADULT - PROBLEM SELECTOR PLAN 2
On Tacrolimus, steroid, Simulect induction, Nystatin S&S, bactrim, and Valcyte  Restart cellcept today as diarrhea has improved   F/U Tacrolimus level daily, tacro goal 8-10  Continue PRN Tylenol and IV dilauded pain, Monitor and manage pain  Adequate urine output,   S/P One liter IV LR bolus x1,  and IVF Bicarb drip @125ml/hr x 24 hours yesterday  D/C IVF today  Monitor closely.

## 2018-04-10 NOTE — PROGRESS NOTE ADULT - SUBJECTIVE AND OBJECTIVE BOX
[facility] DIVISION OF KIDNEY DISEASES AND HYPERTENSION -- [phone]   FOLLOW UP NOTE  --------------------------------------------------------------------------------  HPI:  47 y.o. 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   Received solumedrol and simulect intraoperatively. Also received 5 units of pRBC during the case.  Pt was  extubated on 4/3, off pressor support. Pt transferred out of SICU to floor.  Pt POD # 8 Pt seen and examined at bedside. Pt states diarrhea improved however feels weak.     PAST HISTORY  --------------------------------------------------------------------------------  No significant changes to PMH, PSH, FHx, SHx, unless otherwise noted    ALLERGIES & MEDICATIONS  --------------------------------------------------------------------------------  Allergies    Demerol HCl (Hives)  Phenergan (Hives; Rash)    Intolerances      Standing Inpatient Medications  famotidine    Tablet 20 milliGRAM(s) Oral daily  melatonin 3 milliGRAM(s) Oral at bedtime  methylPREDNISolone sodium succinate IVPB 250 milliGRAM(s) IV Intermittent once  metoclopramide Injectable 5 milliGRAM(s) IV Push every 6 hours  mycophenolate mofetil 500 milliGRAM(s) Oral two times a day  nystatin    Suspension 254728 Unit(s) Oral four times a day  predniSONE   Tablet 5 milliGRAM(s) Oral daily  tacrolimus 6 milliGRAM(s) Oral two times a day  trimethoprim   80 mG/sulfamethoxazole 400 mG 1 Tablet(s) Oral daily  valGANciclovir 450 milliGRAM(s) Oral daily  vancomycin    Solution 125 milliGRAM(s) Oral every 6 hours    PRN Inpatient Medications  acetaminophen   Tablet. 650 milliGRAM(s) Oral every 6 hours PRN  diphenhydrAMINE   Capsule 25 milliGRAM(s) Oral at bedtime PRN  HYDROmorphone  Injectable 0.5 milliGRAM(s) IV Push every 3 hours PRN  HYDROmorphone  Injectable 0.5 milliGRAM(s) IV Push every 6 hours PRN      REVIEW OF SYSTEMS  --------------------------------------------------------------------------------  General: no fever  CVS: no chest pain  RESP: no sob, no cough  ABD: no abdominal pain  : no dysuria,  MSK: no edema     VITALS/PHYSICAL EXAM  --------------------------------------------------------------------------------  T(C): 37.1 (04-10-18 @ 12:48), Max: 37.5 (04-09-18 @ 22:00)  HR: 63 (04-10-18 @ 12:48) (63 - 82)  BP: 125/71 (04-10-18 @ 12:48) (125/71 - 149/79)  RR: 18 (04-10-18 @ 12:48) (17 - 18)  SpO2: 96% (04-10-18 @ 12:48) (95% - 99%)  Wt(kg): --        04-09-18 @ 07:01  -  04-10-18 @ 07:00  --------------------------------------------------------  IN: 4170 mL / OUT: 3750 mL / NET: 420 mL    04-10-18 @ 07:01  -  04-10-18 @ 13:17  --------------------------------------------------------  IN: 600 mL / OUT: 780 mL / NET: -180 mL      Physical Exam:  	Gen: NAD  	HEENT:   	Pulm: CTAB  	CV: S1S2  	Abd: Soft, +BS , incision c/d/i  	Ext: dependent edema  	Neuro: awake  LABS/STUDIES  --------------------------------------------------------------------------------              9.2    8.3   >-----------<  47       [04-10-18 @ 06:25]              26.0     136  |  103  |  36  ----------------------------<  100      [04-10-18 @ 06:25]  3.9   |  22  |  2.71        Ca     7.2     [04-10-18 @ 06:25]      Mg     2.3     [04-10-18 @ 06:25]      Phos  3.8     [04-10-18 @ 06:25]    TPro  4.7  /  Alb  1.9  /  TBili  0.4  /  DBili  x   /  AST  16  /  ALT  13  /  AlkPhos  30  [04-10-18 @ 06:25]              [04-10-18 @ 06:25]    Creatinine Trend:  SCr 2.71 [04-10 @ 06:25]  SCr 2.27 [04-09 @ 03:29]  SCr 1.95 [04-08 @ 05:38]  SCr 1.98 [04-07 @ 05:59]  SCr 2.34 [04-06 @ 06:28]    Urinalysis - [04-06-18 @ 06:28]      Color Yellow / Appearance Clear / SG 1.018 / pH 6.5      Gluc Negative / Ketone Negative  / Bili Negative / Urobili Negative       Blood Moderate / Protein 30 / Leuk Est Negative / Nitrite Negative      RBC 10-25 / WBC 2-5 / Hyaline 2-5 / Gran  / Sq Epi  / Non Sq Epi Occasional / Bacteria       Iron 18, TIBC 167, %sat 11      [09-25-17 @ 06:30]  Ferritin 555.7      [09-25-17 @ 06:30]  PTH -- (Ca 8.0)      [09-16-17 @ 23:21]   232  Vitamin D (25OH) 9.7      [09-16-17 @ 23:21]

## 2018-04-10 NOTE — PROVIDER CONTACT NOTE (OTHER) - ASSESSMENT
Patient AxOx4; resting in bed. PAS on.
A+Ox4.  states that her gum hurt since she had the surgery and nothing tastes "right". No bleeing from gums/ no soars in mouth
Patient is AxOx4; refusing to have any further needle sticks. requesting that we use her right arm (has old AV fistula not functioning); not currently receiving IVF
Patient obtunded, SpO2 70's,
Pt is +1 edema.  complains of flank pain that is relieved with dialudid.
VS noted in flowsheet
VS noted in flowsheet
VSS, alert & oriented X time
VSS, pt A/OX4

## 2018-04-10 NOTE — PROGRESS NOTE ADULT - ASSESSMENT
47 y.o. woman with history of PCKD and ESRD from bilateral nephrectomies, and living donor renal transplant on 4/2/2018. Positive for C-Diff over the weekend on oral vancomycin.  Cr trending up despite IVF, will get urgent renal US today.

## 2018-04-11 ENCOUNTER — TRANSCRIPTION ENCOUNTER (OUTPATIENT)
Age: 48
End: 2018-04-11

## 2018-04-11 LAB
ALBUMIN SERPL ELPH-MCNC: 2.5 G/DL — LOW (ref 3.3–5)
ALP SERPL-CCNC: 36 U/L — LOW (ref 40–120)
ALT FLD-CCNC: 12 U/L RC — SIGNIFICANT CHANGE UP (ref 10–45)
ANION GAP SERPL CALC-SCNC: 15 MMOL/L — SIGNIFICANT CHANGE UP (ref 5–17)
AST SERPL-CCNC: 7 U/L — LOW (ref 10–40)
BILIRUB SERPL-MCNC: 0.3 MG/DL — SIGNIFICANT CHANGE UP (ref 0.2–1.2)
BUN SERPL-MCNC: 39 MG/DL — HIGH (ref 7–23)
CALCIUM SERPL-MCNC: 8.2 MG/DL — LOW (ref 8.4–10.5)
CHLORIDE SERPL-SCNC: 103 MMOL/L — SIGNIFICANT CHANGE UP (ref 96–108)
CO2 SERPL-SCNC: 21 MMOL/L — LOW (ref 22–31)
CREAT SERPL-MCNC: 2.85 MG/DL — HIGH (ref 0.5–1.3)
CULTURE RESULTS: SIGNIFICANT CHANGE UP
CULTURE RESULTS: SIGNIFICANT CHANGE UP
GLUCOSE SERPL-MCNC: 212 MG/DL — HIGH (ref 70–99)
HCT VFR BLD CALC: 26.2 % — LOW (ref 34.5–45)
HGB BLD-MCNC: 9.3 G/DL — LOW (ref 11.5–15.5)
LDH SERPL L TO P-CCNC: 251 U/L — HIGH (ref 50–242)
MAGNESIUM SERPL-MCNC: 2.2 MG/DL — SIGNIFICANT CHANGE UP (ref 1.6–2.6)
MCHC RBC-ENTMCNC: 33.9 PG — SIGNIFICANT CHANGE UP (ref 27–34)
MCHC RBC-ENTMCNC: 35.7 GM/DL — SIGNIFICANT CHANGE UP (ref 32–36)
MCV RBC AUTO: 95 FL — SIGNIFICANT CHANGE UP (ref 80–100)
PHOSPHATE SERPL-MCNC: 4 MG/DL — SIGNIFICANT CHANGE UP (ref 2.5–4.5)
PLATELET # BLD AUTO: 176 K/UL — SIGNIFICANT CHANGE UP (ref 150–400)
POTASSIUM SERPL-MCNC: 3.6 MMOL/L — SIGNIFICANT CHANGE UP (ref 3.5–5.3)
POTASSIUM SERPL-SCNC: 3.6 MMOL/L — SIGNIFICANT CHANGE UP (ref 3.5–5.3)
PROT SERPL-MCNC: 5.2 G/DL — LOW (ref 6–8.3)
RBC # BLD: 2.76 M/UL — LOW (ref 3.8–5.2)
RBC # FLD: 12.3 % — SIGNIFICANT CHANGE UP (ref 10.3–14.5)
SODIUM SERPL-SCNC: 139 MMOL/L — SIGNIFICANT CHANGE UP (ref 135–145)
SPECIMEN SOURCE: SIGNIFICANT CHANGE UP
SPECIMEN SOURCE: SIGNIFICANT CHANGE UP
TACROLIMUS SERPL-MCNC: 9.3 NG/ML — SIGNIFICANT CHANGE UP
WBC # BLD: 9.3 K/UL — SIGNIFICANT CHANGE UP (ref 3.8–10.5)
WBC # FLD AUTO: 9.3 K/UL — SIGNIFICANT CHANGE UP (ref 3.8–10.5)

## 2018-04-11 PROCEDURE — 99232 SBSQ HOSP IP/OBS MODERATE 35: CPT | Mod: GC

## 2018-04-11 RX ORDER — OXYCODONE AND ACETAMINOPHEN 5; 325 MG/1; MG/1
2 TABLET ORAL EVERY 6 HOURS
Qty: 0 | Refills: 0 | Status: DISCONTINUED | OUTPATIENT
Start: 2018-04-11 | End: 2018-04-13

## 2018-04-11 RX ORDER — HYDROMORPHONE HYDROCHLORIDE 2 MG/ML
0.5 INJECTION INTRAMUSCULAR; INTRAVENOUS; SUBCUTANEOUS
Qty: 0 | Refills: 0 | Status: DISCONTINUED | OUTPATIENT
Start: 2018-04-11 | End: 2018-04-11

## 2018-04-11 RX ORDER — ONDANSETRON 8 MG/1
4 TABLET, FILM COATED ORAL EVERY 6 HOURS
Qty: 0 | Refills: 0 | Status: DISCONTINUED | OUTPATIENT
Start: 2018-04-11 | End: 2018-04-13

## 2018-04-11 RX ORDER — OXYCODONE AND ACETAMINOPHEN 5; 325 MG/1; MG/1
1 TABLET ORAL EVERY 4 HOURS
Qty: 0 | Refills: 0 | Status: DISCONTINUED | OUTPATIENT
Start: 2018-04-11 | End: 2018-04-13

## 2018-04-11 RX ORDER — CLONAZEPAM 1 MG
1 TABLET ORAL
Qty: 0 | Refills: 0 | Status: DISCONTINUED | OUTPATIENT
Start: 2018-04-11 | End: 2018-04-13

## 2018-04-11 RX ADMIN — HYDROMORPHONE HYDROCHLORIDE 0.5 MILLIGRAM(S): 2 INJECTION INTRAMUSCULAR; INTRAVENOUS; SUBCUTANEOUS at 15:20

## 2018-04-11 RX ADMIN — TACROLIMUS 6 MILLIGRAM(S): 5 CAPSULE ORAL at 06:07

## 2018-04-11 RX ADMIN — OXYCODONE AND ACETAMINOPHEN 1 TABLET(S): 5; 325 TABLET ORAL at 18:30

## 2018-04-11 RX ADMIN — Medication 500000 UNIT(S): at 00:15

## 2018-04-11 RX ADMIN — Medication 5 MILLIGRAM(S): at 06:07

## 2018-04-11 RX ADMIN — TACROLIMUS 6 MILLIGRAM(S): 5 CAPSULE ORAL at 17:11

## 2018-04-11 RX ADMIN — Medication 500000 UNIT(S): at 12:23

## 2018-04-11 RX ADMIN — Medication 500000 UNIT(S): at 17:10

## 2018-04-11 RX ADMIN — HYDROMORPHONE HYDROCHLORIDE 0.5 MILLIGRAM(S): 2 INJECTION INTRAMUSCULAR; INTRAVENOUS; SUBCUTANEOUS at 09:03

## 2018-04-11 RX ADMIN — Medication 125 MILLIGRAM(S): at 17:10

## 2018-04-11 RX ADMIN — HYDROMORPHONE HYDROCHLORIDE 0.5 MILLIGRAM(S): 2 INJECTION INTRAMUSCULAR; INTRAVENOUS; SUBCUTANEOUS at 04:11

## 2018-04-11 RX ADMIN — OXYCODONE AND ACETAMINOPHEN 2 TABLET(S): 5; 325 TABLET ORAL at 22:21

## 2018-04-11 RX ADMIN — HYDROMORPHONE HYDROCHLORIDE 0.5 MILLIGRAM(S): 2 INJECTION INTRAMUSCULAR; INTRAVENOUS; SUBCUTANEOUS at 12:30

## 2018-04-11 RX ADMIN — Medication 500000 UNIT(S): at 06:07

## 2018-04-11 RX ADMIN — Medication 125 MILLIGRAM(S): at 00:15

## 2018-04-11 RX ADMIN — Medication 5 MILLIGRAM(S): at 06:08

## 2018-04-11 RX ADMIN — ONDANSETRON 4 MILLIGRAM(S): 8 TABLET, FILM COATED ORAL at 18:45

## 2018-04-11 RX ADMIN — Medication 5 MILLIGRAM(S): at 00:15

## 2018-04-11 RX ADMIN — Medication 5 MILLIGRAM(S): at 12:24

## 2018-04-11 RX ADMIN — FAMOTIDINE 20 MILLIGRAM(S): 10 INJECTION INTRAVENOUS at 12:19

## 2018-04-11 RX ADMIN — HYDROMORPHONE HYDROCHLORIDE 0.5 MILLIGRAM(S): 2 INJECTION INTRAMUSCULAR; INTRAVENOUS; SUBCUTANEOUS at 10:15

## 2018-04-11 RX ADMIN — HYDROMORPHONE HYDROCHLORIDE 0.5 MILLIGRAM(S): 2 INJECTION INTRAMUSCULAR; INTRAVENOUS; SUBCUTANEOUS at 06:34

## 2018-04-11 RX ADMIN — Medication 100 MILLIGRAM(S): at 10:21

## 2018-04-11 RX ADMIN — HYDROMORPHONE HYDROCHLORIDE 0.5 MILLIGRAM(S): 2 INJECTION INTRAMUSCULAR; INTRAVENOUS; SUBCUTANEOUS at 07:00

## 2018-04-11 RX ADMIN — HYDROMORPHONE HYDROCHLORIDE 0.5 MILLIGRAM(S): 2 INJECTION INTRAMUSCULAR; INTRAVENOUS; SUBCUTANEOUS at 14:49

## 2018-04-11 RX ADMIN — Medication 125 MILLIGRAM(S): at 06:07

## 2018-04-11 RX ADMIN — MYCOPHENOLATE MOFETIL 500 MILLIGRAM(S): 250 CAPSULE ORAL at 17:13

## 2018-04-11 RX ADMIN — OXYCODONE AND ACETAMINOPHEN 2 TABLET(S): 5; 325 TABLET ORAL at 23:10

## 2018-04-11 RX ADMIN — OXYCODONE AND ACETAMINOPHEN 1 TABLET(S): 5; 325 TABLET ORAL at 18:14

## 2018-04-11 RX ADMIN — Medication 125 MILLIGRAM(S): at 12:23

## 2018-04-11 RX ADMIN — MYCOPHENOLATE MOFETIL 500 MILLIGRAM(S): 250 CAPSULE ORAL at 06:07

## 2018-04-11 RX ADMIN — HYDROMORPHONE HYDROCHLORIDE 0.5 MILLIGRAM(S): 2 INJECTION INTRAMUSCULAR; INTRAVENOUS; SUBCUTANEOUS at 13:15

## 2018-04-11 NOTE — DISCHARGE NOTE ADULT - HOSPITAL COURSE
47 y.o. woman with history of PCKD and ESRD from bilateral nephrectomies, and living donor renal transplant on 4/2/2018. Positive for C-Diff treated with oral vancomycin.  Cr trending up renal US with no stenosis, early rejection treatment started with high dose steroid.     ******************  Pt evaluated by the disciplinary team including nephrologist, pharmacist, nutrition, social work, daily and plan of care discussed.  *******************Incomplete 47 y.o. woman with history of PCKD and ESRD from bilateral nephrectomies, Positive for C-Diff treated with oral vancomycin.  On 4/2 pt underwent living donor renal transplant.  She tolerated the procedure well.  Intraoperatively was hypotensive and remained intubated on pressors, was brought to SICU for monitoring.  She remained hemodynamically stable and was extubated POD #1.  On POD #2 she was transferred to a surgical floor. Her pain was controlled by IV pain medications and then by PO pain medications.  Roberts was removed and she voided without difficulty.  She was advanced from clears to regular diet and tolerated it well.   Cr was trending up renal US with no stenosis, early rejection treatment started with high dose steroid.  Labs were monitored closely and Cre trended down.  She is ambulating, voiding, tolerating a regular diet, and pain is controlled with oral pain medications.  She is stable for discharge home and will follow-up in clinic on Monday 4/16.

## 2018-04-11 NOTE — DISCHARGE NOTE ADULT - CARE PLAN
Principal Discharge DX:	ESRD (end stage renal disease)  Goal:	Resolved  Assessment and plan of treatment:	S/P renal transplant  Secondary Diagnosis:	Renal transplant recipient  Assessment and plan of treatment:	No heavy lifting anything more than 10-15lbs or straining. Otherwise, you may return to your usual level of physical activity. If you are taking narcotic pain medication (such as Percocet), do NOT drive a car, operate machinery or make important decisions.  Call trnansplant clinic If you developed any of the following, fever, pain, redness, swelling at incision site, cough, nausea, vomiting, painfull urination, difficulty urination, or not making any urine.  NOTIFY YOUR SURGEON IF: You have any bleeding that does not stop, any pus draining from your wound, any fever (over 100.4 F) or chills, persistent nausea/vomiting with inability to tolerate food or liquids, persistent diarrhea, or if your pain is not controlled on your discharge pain medications.  Secondary Diagnosis:	Immunosuppressive management encounter following kidney transplant  Assessment and plan of treatment:	Keep away from people who have cough, cold, and symptom of flu.  Only take medications that are on your discharge list  If you missed your medications call the transplant office, do not double up medication because you missed a dose.  If you have any question regarding your medication please call transplant office.

## 2018-04-11 NOTE — PROGRESS NOTE ADULT - SUBJECTIVE AND OBJECTIVE BOX
INTERVAL HPI/OVERNIGHT EVENTS:  Patient seen with multidisciplinary team (Nephrologist, pharmacist, nurse, nurse manager, NP, MD)  in am rounds and examined with Dr. Willis.  S/P LDRT 4/2/18, positive C-Diff on oral vanco, B/L PACO's with moderate output.  Cr trending up. Overnight diarrhea improved, NAD, making adequate urine.     MEDICATIONS  (STANDING):  famotidine    Tablet 20 milliGRAM(s) Oral daily  melatonin 3 milliGRAM(s) Oral at bedtime  methylPREDNISolone sodium succinate IVPB 250 milliGRAM(s) IV Intermittent once  metoclopramide Injectable 5 milliGRAM(s) IV Push every 6 hours  mycophenolate mofetil 500 milliGRAM(s) Oral two times a day  nystatin    Suspension 766985 Unit(s) Oral four times a day  predniSONE   Tablet 5 milliGRAM(s) Oral daily  tacrolimus 6 milliGRAM(s) Oral two times a day  trimethoprim   80 mG/sulfamethoxazole 400 mG 1 Tablet(s) Oral daily  valGANciclovir 450 milliGRAM(s) Oral daily  vancomycin    Solution 125 milliGRAM(s) Oral every 6 hours    MEDICATIONS  (PRN):  acetaminophen   Tablet. 650 milliGRAM(s) Oral every 6 hours PRN Mild Pain (1 - 3)  diphenhydrAMINE   Capsule 25 milliGRAM(s) Oral at bedtime PRN Insomnia  HYDROmorphone  Injectable 0.5 milliGRAM(s) IV Push every 6 hours PRN Severe Pain (7 - 10)  HYDROmorphone  Injectable 0.5 milliGRAM(s) IV Push every 3 hours PRN Severe Breakthrough Pain      Allergies    Demerol HCl (Hives)  Phenergan (Hives; Rash)    Intolerances        Vital Signs Last 24 Hrs  T(C): 37 (11 Apr 2018 04:13), Max: 37.1 (10 Apr 2018 12:48)  T(F): 98.6 (11 Apr 2018 04:13), Max: 98.8 (10 Apr 2018 12:48)  HR: 60 (11 Apr 2018 06:32) (60 - 64)  BP: 137/70 (11 Apr 2018 06:32) (125/71 - 151/86)  BP(mean): --  RR: 18 (11 Apr 2018 06:32) (18 - 18)  SpO2: 95% (11 Apr 2018 04:13) (95% - 98%)    LABS:                        9.3    9.3   )-----------( 176      ( 11 Apr 2018 06:59 )             26.2     04-11    139  |  103  |  39<H>  ----------------------------<  212<H>  3.6   |  21<L>  |  2.85<H>    Ca    8.2<L>      11 Apr 2018 06:59  Phos  4.0     04-11  Mg     2.2     04-11    TPro  5.2<L>  /  Alb  2.5<L>  /  TBili  0.3  /  DBili  x   /  AST  7<L>  /  ALT  12  /  AlkPhos  36<L>  04-11          RADIOLOGY & ADDITIONAL TESTS:    Review of systems  Gen: No weight changes, fatigue, fevers/chills, weakness  Skin: No rashes  Head/Eyes/Ears/Mouth: No headache; Normal hearing; Normal vision w/o blurriness; No sinus pain/discomfort, sore throat  Respiratory: No dyspnea, cough, wheezing, hemoptysis  CV: No chest pain, PND, orthopnea  GI: No abdominal pain, diarrhea, constipation, nausea, vomiting, melena, hematochezia  : No increased frequency, dysuria, hematuria, nocturia  MSK: No joint pain/swelling; no back pain; no edema  Neuro: No dizziness/lightheadedness, weakness, seizures, numbness, tingling  Heme: No easy bruising or bleeding  Endo: No heat/cold intolerance  Psych: No significant nervousness, anxiety, stress, depression  All other systems were reviewed and are negative, except as noted.    PHYSICAL EXAM:  Constitutional: Well developed / well nourished  Eyes: Anicteric, PERRLA  ENMT: nc/at  Neck: Supple  Respiratory: CTA B/L  Cardiovascular: RRR  Gastrointestinal: Soft abdomen, mild tender to touch at surgical site, ND  Genitourinary: Voiding spontaneously  Extremities: SCD's in place and working bilaterally  Vascular: Palpable dp pulses bilaterally  Neurological: A&O x3  Skin: Wound open to air no erythema and evidence of infection noted  Musculoskeletal: Moving all extremities  Psychiatric: Responsive

## 2018-04-11 NOTE — DISCHARGE NOTE ADULT - PATIENT PORTAL LINK FT
You can access the [email or web address] Patient Portal, offered by [facility], by registering with the following website: [email or web address]

## 2018-04-11 NOTE — DISCHARGE NOTE ADULT - ADDITIONAL INSTRUCTIONS
1. Please call to make a follow-up appointment with Dr. Willis and Dr. Mcdonough  2. Please follow up with your primary care physician in one week regarding your hospitalization.

## 2018-04-11 NOTE — DISCHARGE NOTE ADULT - PLAN OF CARE
Resolved S/P renal transplant No heavy lifting anything more than 10-15lbs or straining. Otherwise, you may return to your usual level of physical activity. If you are taking narcotic pain medication (such as Percocet), do NOT drive a car, operate machinery or make important decisions.  Call trnansplant clinic If you developed any of the following, fever, pain, redness, swelling at incision site, cough, nausea, vomiting, painfull urination, difficulty urination, or not making any urine.  NOTIFY YOUR SURGEON IF: You have any bleeding that does not stop, any pus draining from your wound, any fever (over 100.4 F) or chills, persistent nausea/vomiting with inability to tolerate food or liquids, persistent diarrhea, or if your pain is not controlled on your discharge pain medications. Keep away from people who have cough, cold, and symptom of flu.  Only take medications that are on your discharge list  If you missed your medications call the transplant office, do not double up medication because you missed a dose.  If you have any question regarding your medication please call transplant office.

## 2018-04-11 NOTE — PROGRESS NOTE ADULT - PROBLEM SELECTOR PLAN 2
Pt s/p Simulect on 4/2/2018 and  4/6/2018  Continue pulse steroids    continue cellcept 500mg BID   Continue prograf 6 mg BID  Check FK level 30min prior to prograf dosing ( 8.1  on 4/10 )  Continue nystatin. Hold  bactrim and valcyte  in setting of thrombocytopenia.

## 2018-04-11 NOTE — DISCHARGE NOTE ADULT - MEDICATION SUMMARY - MEDICATIONS TO TAKE
I will START or STAY ON the medications listed below when I get home from the hospital:    acetaminophen 325 mg oral tablet  -- 2 tab(s) by mouth every 6 hours, As needed, Mild Pain (1 - 3)  -- Indication: For mild pain    KlonoPIN 1 mg oral tablet  -- 1 tab(s) by mouth once a day, As Needed  -- Indication: For mood    nystatin 100,000 units/mL oral suspension  -- 5 milliliter(s) by mouth 4 times a day  -- Indication: For Renal transplant recipient    valGANciclovir 450 mg oral tablet  -- 1 tab(s) by mouth once a day  -- Indication: For Renal transplant recipient    vancomycin 25 mg/mL oral liquid  -- 5 milliliter(s) by mouth every 6 hours x 14 days  -- Indication: For c diff    famotidine 20 mg oral tablet  -- 1 tab(s) by mouth once a day  -- Indication: For Reflux    tacrolimus 1 mg oral capsule  -- 7 cap(s) by mouth 2 times a day  -- Indication: For Renal transplant recipient    CellCept 500 mg oral tablet  -- 2 tab(s) by mouth 2 times a day  -- Indication: For Renal transplant recipient    sulfamethoxazole-trimethoprim 400 mg-80 mg oral tablet  -- 1 tab(s) by mouth once a day  -- Indication: For Renal transplant recipient

## 2018-04-11 NOTE — PROGRESS NOTE ADULT - SUBJECTIVE AND OBJECTIVE BOX
[facility] DIVISION OF KIDNEY DISEASES AND HYPERTENSION -- [phone]   FOLLOW UP NOTE  --------------------------------------------------------------------------------  HPI:  47 y.o. 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   Received solumedrol and simulect intraoperatively. Also received 5 units of pRBC during the case.  Pt was  extubated on 4/3, off pressor support. Pt transferred out of SICU to floor.  Pt POD # 9  Pt seen and examined at bedside. Pt states diarrhea improved.     PAST HISTORY  --------------------------------------------------------------------------------  No significant changes to PMH, PSH, FHx, SHx, unless otherwise noted    ALLERGIES & MEDICATIONS  --------------------------------------------------------------------------------  Allergies    Demerol HCl (Hives)  Phenergan (Hives; Rash)    Intolerances      Standing Inpatient Medications  famotidine    Tablet 20 milliGRAM(s) Oral daily  melatonin 3 milliGRAM(s) Oral at bedtime  metoclopramide Injectable 5 milliGRAM(s) IV Push every 6 hours  mycophenolate mofetil 500 milliGRAM(s) Oral two times a day  nystatin    Suspension 106721 Unit(s) Oral four times a day  predniSONE   Tablet 5 milliGRAM(s) Oral daily  tacrolimus 6 milliGRAM(s) Oral two times a day  trimethoprim   80 mG/sulfamethoxazole 400 mG 1 Tablet(s) Oral daily  valGANciclovir 450 milliGRAM(s) Oral daily  vancomycin    Solution 125 milliGRAM(s) Oral every 6 hours    PRN Inpatient Medications  acetaminophen   Tablet. 650 milliGRAM(s) Oral every 6 hours PRN  clonazePAM Tablet 1 milliGRAM(s) Oral two times a day PRN  diphenhydrAMINE   Capsule 25 milliGRAM(s) Oral at bedtime PRN  HYDROmorphone  Injectable 0.5 milliGRAM(s) IV Push every 6 hours PRN  HYDROmorphone  Injectable 0.5 milliGRAM(s) IV Push every 3 hours PRN      REVIEW OF SYSTEMS  --------------------------------------------------------------------------------  General: no fever  CVS: no chest pain  RESP: no sob, no cough  ABD: no abdominal pain  : no dysuria,  MSK: no edema     VITALS/PHYSICAL EXAM  --------------------------------------------------------------------------------  T(C): 36.7 (04-11-18 @ 12:00), Max: 37 (04-11-18 @ 04:13)  HR: 65 (04-11-18 @ 12:00) (60 - 65)  BP: 155/69 (04-11-18 @ 12:00) (131/72 - 155/69)  RR: 18 (04-11-18 @ 12:00) (18 - 18)  SpO2: 96% (04-11-18 @ 12:00) (95% - 98%)  Wt(kg): --        04-10-18 @ 07:01  -  04-11-18 @ 07:00  --------------------------------------------------------  IN: 1970 mL / OUT: 3320 mL / NET: -1350 mL    04-11-18 @ 07:01  -  04-11-18 @ 13:49  --------------------------------------------------------  IN: 1200 mL / OUT: 645 mL / NET: 555 mL      Physical Exam:  	Gen: NAD  	HEENT:   	Pulm: CTAB  	CV: S1S2  	Abd: Soft, +BS , incision c/d/i  	Ext: dependent edema  	Neuro: awake  LABS/STUDIES  --------------------------------------------------------------------------------              9.3    9.3   >-----------<  176      [04-11-18 @ 06:59]              26.2     139  |  103  |  39  ----------------------------<  212      [04-11-18 @ 06:59]  3.6   |  21  |  2.85        Ca     8.2     [04-11-18 @ 06:59]      Mg     2.2     [04-11-18 @ 06:59]      Phos  4.0     [04-11-18 @ 06:59]    TPro  5.2  /  Alb  2.5  /  TBili  0.3  /  DBili  x   /  AST  7   /  ALT  12  /  AlkPhos  36  [04-11-18 @ 06:59]              [04-11-18 @ 06:59]    Creatinine Trend:  SCr 2.85 [04-11 @ 06:59]  SCr 2.71 [04-10 @ 06:25]  SCr 2.27 [04-09 @ 03:29]  SCr 1.95 [04-08 @ 05:38]  SCr 1.98 [04-07 @ 05:59]    Urinalysis - [04-06-18 @ 06:28]      Color Yellow / Appearance Clear / SG 1.018 / pH 6.5      Gluc Negative / Ketone Negative  / Bili Negative / Urobili Negative       Blood Moderate / Protein 30 / Leuk Est Negative / Nitrite Negative      RBC 10-25 / WBC 2-5 / Hyaline 2-5 / Gran  / Sq Epi  / Non Sq Epi Occasional / Bacteria       Iron 18, TIBC 167, %sat 11      [09-25-17 @ 06:30]  Ferritin 555.7      [09-25-17 @ 06:30]  PTH -- (Ca 8.0)      [09-16-17 @ 23:21]   232  Vitamin D (25OH) 9.7      [09-16-17 @ 23:21]

## 2018-04-11 NOTE — DISCHARGE NOTE ADULT - CARE PROVIDER_API CALL
Michelet Willis (MD), Surgery  [address]  [address] [place] [address]  Phone: [phone]  Fax: [phone]    Miko Mcdonough (DO), Nephrology  [address] [place] [address]  Phone: [phone]  Fax: [phone]

## 2018-04-11 NOTE — DISCHARGE NOTE ADULT - MEDICATION SUMMARY - MEDICATIONS TO STOP TAKING
I will STOP taking the medications listed below when I get home from the hospital:    ferrous sulfate 325 mg (65 mg elemental iron) oral tablet  -- 1 tab(s) by mouth once a day  -- Check with your doctor before becoming pregnant.  Do not chew, break, or crush.  May discolor urine or feces.    Cozaar 50 mg oral tablet  -- orally once a day (at bedtime)    Vitamin D3 50,000 intl units oral capsule  -- 1 cap(s) by mouth once a week    calcium carbonate 1250 mg (500 mg elemental calcium) oral tablet  -- 1 tab(s) by mouth 3 times a day

## 2018-04-11 NOTE — PROGRESS NOTE ADULT - ASSESSMENT
47 y.o. woman with history of PCKD and ESRD from bilateral nephrectomies, and living donor renal transplant on 4/2/2018. Positive for C-Diff on oral vancomycin.  Cr trending up renal US with no stenosis, early rejection treatment started with high dose steroid.

## 2018-04-11 NOTE — DISCHARGE NOTE ADULT - CARE PROVIDERS DIRECT ADDRESSES
[email or web address]

## 2018-04-11 NOTE — PROGRESS NOTE ADULT - SUBJECTIVE AND OBJECTIVE BOX
Learner: Patient  Barriers: None    Patient received a renal transplant on 4/2/18    Method used: Verbal discussion and written material    Medication safety was discussed with the patient: allergies, herbals, adherence, interactions, medication precautions, miss dose instructions, purpose, side effects, signs and symptoms to report, and storage & handling    Patient was able to repeat and verbalize key points    Education Summary: Discharge immunosuppressant medications and prophylatic anti-infective agents reviewed with the patient. Outpatient medication schedule was discussed in detail including: medication name, indication, dose, administration times, treatment duration, side effects, drug interactions, and special instructions. Patient questions and concerns were answered and addressed. Patient demonstrated understanding.    Time spent discharge education: 60 minutes    Renal Transplant medications:  Tacrolimus adjusted to trough   Mycophenolate mofetil 500mg BID  Prednisone taper to 5mg daily  Sulfamethoxazole/Trimethoprim 1SS daily  Nystatin swish and swallow four times a day  Valganciclovir 450 mg daily  Pepcid 20 mg daily  Docusate and Senna  Other meds: clonazepam 1 mg bid as needed,

## 2018-04-11 NOTE — PROGRESS NOTE ADULT - PROBLEM SELECTOR PLAN 1
Doing well.  Po diet opal well.    Pt encouraged to ambulate  Dressing removed site intact.   Cr trending up, noted to be in early rejection started on high dose steroid 250mg x 3 days  B/L PACO cr normal  Renal US with no stenosis.

## 2018-04-11 NOTE — PROGRESS NOTE ADULT - PROBLEM SELECTOR PLAN 1
Pt  with STEPHENIE likely secondary to diarrhea vs rejection vs TMA ( as pt with thrombocytopenia, anemia, renal failure)   Pt Scr continues to rise to 2.8 today despite trial of IVF.  Pt non-oliguric  Check renal sonogram  Continue  pulse steroids ( for possible rejection).   Continue to hold bactrim, valcyte for thrombocytopenia , will resume tomorrow if platelets improve  Monitor BMP, strict I/O avoid nephrotoxics, NSAIds RCA

## 2018-04-11 NOTE — PROGRESS NOTE ADULT - PROBLEM SELECTOR PLAN 2
On Tacrolimus, steroid, Simulect induction, Nystatin S&S  Bactrim, and Valcyte placed on hold due to thrombocytopenia, today Thrombocytopenia has improved will restart in am.  Cellcept restarted yesterday as diarrhea has improved   F/U Tacrolimus level daily, tacro goal 8-10  Continue PRN Tylenol and IV Dilaudid pain, Monitor and manage pain  Adequate urine output,   D/C one PACO today, monitor site.  Monitor closely.

## 2018-04-12 ENCOUNTER — OTHER (OUTPATIENT)
Age: 48
End: 2018-04-12

## 2018-04-12 LAB
ALBUMIN SERPL ELPH-MCNC: 2.5 G/DL — LOW (ref 3.3–5)
ALP SERPL-CCNC: 35 U/L — LOW (ref 40–120)
ALT FLD-CCNC: 12 U/L RC — SIGNIFICANT CHANGE UP (ref 10–45)
ANION GAP SERPL CALC-SCNC: 12 MMOL/L — SIGNIFICANT CHANGE UP (ref 5–17)
AST SERPL-CCNC: 6 U/L — LOW (ref 10–40)
BILIRUB SERPL-MCNC: 0.1 MG/DL — LOW (ref 0.2–1.2)
BUN SERPL-MCNC: 38 MG/DL — HIGH (ref 7–23)
CALCIUM SERPL-MCNC: 8.1 MG/DL — LOW (ref 8.4–10.5)
CHLORIDE SERPL-SCNC: 106 MMOL/L — SIGNIFICANT CHANGE UP (ref 96–108)
CO2 SERPL-SCNC: 22 MMOL/L — SIGNIFICANT CHANGE UP (ref 22–31)
CREAT SERPL-MCNC: 2.57 MG/DL — HIGH (ref 0.5–1.3)
GLUCOSE SERPL-MCNC: 153 MG/DL — HIGH (ref 70–99)
HCT VFR BLD CALC: 24.4 % — LOW (ref 34.5–45)
HGB BLD-MCNC: 8.8 G/DL — LOW (ref 11.5–15.5)
MAGNESIUM SERPL-MCNC: 2.3 MG/DL — SIGNIFICANT CHANGE UP (ref 1.6–2.6)
MCHC RBC-ENTMCNC: 34.4 PG — HIGH (ref 27–34)
MCHC RBC-ENTMCNC: 35.8 GM/DL — SIGNIFICANT CHANGE UP (ref 32–36)
MCV RBC AUTO: 96 FL — SIGNIFICANT CHANGE UP (ref 80–100)
PHOSPHATE SERPL-MCNC: 3.3 MG/DL — SIGNIFICANT CHANGE UP (ref 2.5–4.5)
PLATELET # BLD AUTO: 160 K/UL — SIGNIFICANT CHANGE UP (ref 150–400)
POTASSIUM SERPL-MCNC: 3.6 MMOL/L — SIGNIFICANT CHANGE UP (ref 3.5–5.3)
POTASSIUM SERPL-SCNC: 3.6 MMOL/L — SIGNIFICANT CHANGE UP (ref 3.5–5.3)
PROT SERPL-MCNC: 4.9 G/DL — LOW (ref 6–8.3)
RBC # BLD: 2.54 M/UL — LOW (ref 3.8–5.2)
RBC # FLD: 12.6 % — SIGNIFICANT CHANGE UP (ref 10.3–14.5)
SODIUM SERPL-SCNC: 140 MMOL/L — SIGNIFICANT CHANGE UP (ref 135–145)
TACROLIMUS SERPL-MCNC: 9.2 NG/ML — SIGNIFICANT CHANGE UP
WBC # BLD: 10.6 K/UL — HIGH (ref 3.8–10.5)
WBC # FLD AUTO: 10.6 K/UL — HIGH (ref 3.8–10.5)

## 2018-04-12 PROCEDURE — 99232 SBSQ HOSP IP/OBS MODERATE 35: CPT | Mod: GC

## 2018-04-12 PROCEDURE — 99233 SBSQ HOSP IP/OBS HIGH 50: CPT | Mod: 24,GC

## 2018-04-12 RX ADMIN — FAMOTIDINE 20 MILLIGRAM(S): 10 INJECTION INTRAVENOUS at 11:05

## 2018-04-12 RX ADMIN — OXYCODONE AND ACETAMINOPHEN 2 TABLET(S): 5; 325 TABLET ORAL at 22:42

## 2018-04-12 RX ADMIN — MYCOPHENOLATE MOFETIL 500 MILLIGRAM(S): 250 CAPSULE ORAL at 05:55

## 2018-04-12 RX ADMIN — Medication 500000 UNIT(S): at 05:55

## 2018-04-12 RX ADMIN — VALGANCICLOVIR 450 MILLIGRAM(S): 450 TABLET, FILM COATED ORAL at 11:05

## 2018-04-12 RX ADMIN — OXYCODONE AND ACETAMINOPHEN 2 TABLET(S): 5; 325 TABLET ORAL at 07:01

## 2018-04-12 RX ADMIN — Medication 100 MILLIGRAM(S): at 11:06

## 2018-04-12 RX ADMIN — Medication 1 TABLET(S): at 11:06

## 2018-04-12 RX ADMIN — OXYCODONE AND ACETAMINOPHEN 2 TABLET(S): 5; 325 TABLET ORAL at 06:19

## 2018-04-12 RX ADMIN — Medication 125 MILLIGRAM(S): at 22:43

## 2018-04-12 RX ADMIN — TACROLIMUS 6 MILLIGRAM(S): 5 CAPSULE ORAL at 18:10

## 2018-04-12 RX ADMIN — Medication 125 MILLIGRAM(S): at 11:05

## 2018-04-12 RX ADMIN — Medication 125 MILLIGRAM(S): at 18:10

## 2018-04-12 RX ADMIN — OXYCODONE AND ACETAMINOPHEN 2 TABLET(S): 5; 325 TABLET ORAL at 14:45

## 2018-04-12 RX ADMIN — TACROLIMUS 6 MILLIGRAM(S): 5 CAPSULE ORAL at 05:55

## 2018-04-12 RX ADMIN — Medication 500000 UNIT(S): at 22:43

## 2018-04-12 RX ADMIN — Medication 500000 UNIT(S): at 18:10

## 2018-04-12 RX ADMIN — OXYCODONE AND ACETAMINOPHEN 1 TABLET(S): 5; 325 TABLET ORAL at 18:50

## 2018-04-12 RX ADMIN — OXYCODONE AND ACETAMINOPHEN 1 TABLET(S): 5; 325 TABLET ORAL at 11:48

## 2018-04-12 RX ADMIN — MYCOPHENOLATE MOFETIL 500 MILLIGRAM(S): 250 CAPSULE ORAL at 18:10

## 2018-04-12 RX ADMIN — Medication 3 MILLIGRAM(S): at 00:01

## 2018-04-12 RX ADMIN — OXYCODONE AND ACETAMINOPHEN 2 TABLET(S): 5; 325 TABLET ORAL at 23:12

## 2018-04-12 RX ADMIN — OXYCODONE AND ACETAMINOPHEN 1 TABLET(S): 5; 325 TABLET ORAL at 04:25

## 2018-04-12 RX ADMIN — Medication 500000 UNIT(S): at 11:05

## 2018-04-12 RX ADMIN — Medication 125 MILLIGRAM(S): at 00:01

## 2018-04-12 RX ADMIN — Medication 500000 UNIT(S): at 00:01

## 2018-04-12 RX ADMIN — OXYCODONE AND ACETAMINOPHEN 1 TABLET(S): 5; 325 TABLET ORAL at 03:45

## 2018-04-12 RX ADMIN — OXYCODONE AND ACETAMINOPHEN 2 TABLET(S): 5; 325 TABLET ORAL at 15:30

## 2018-04-12 RX ADMIN — OXYCODONE AND ACETAMINOPHEN 1 TABLET(S): 5; 325 TABLET ORAL at 11:05

## 2018-04-12 RX ADMIN — Medication 125 MILLIGRAM(S): at 05:55

## 2018-04-12 RX ADMIN — OXYCODONE AND ACETAMINOPHEN 1 TABLET(S): 5; 325 TABLET ORAL at 18:16

## 2018-04-12 RX ADMIN — Medication 5 MILLIGRAM(S): at 05:55

## 2018-04-12 NOTE — PROGRESS NOTE ADULT - PROBLEM SELECTOR PLAN 2
On Tacrolimus, steroid, Simulect induction, Nystatin S&S, bactrim, valcyte, cellcept  F/U Tacrolimus level daily, tacro goal 8-10  Continue PRN Tylenol and Percocet, Monitor and manage pain  Adequate urine output,   Monitor closely.

## 2018-04-12 NOTE — PROGRESS NOTE ADULT - SUBJECTIVE AND OBJECTIVE BOX
[facility] DIVISION OF KIDNEY DISEASES AND HYPERTENSION -- [phone]   FOLLOW UP NOTE  --------------------------------------------------------------------------------  HPI:  47 y.o. 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   Received solumedrol and simulect intraoperatively. Also received 5 units of pRBC during the case.  Pt was  extubated on 4/3, off pressor support. Pt transferred out of SICU to floor.  Pt POD # 10  Pt seen and examined at bedside. Pt reports to feel better.     PAST HISTORY  --------------------------------------------------------------------------------  No significant changes to PMH, PSH, FHx, SHx, unless otherwise noted    ALLERGIES & MEDICATIONS  --------------------------------------------------------------------------------  Allergies    Demerol HCl (Hives)  Phenergan (Hives; Rash)    Intolerances      Standing Inpatient Medications  famotidine    Tablet 20 milliGRAM(s) Oral daily  melatonin 3 milliGRAM(s) Oral at bedtime  methylPREDNISolone sodium succinate IVPB 250 milliGRAM(s) IV Intermittent once  mycophenolate mofetil 500 milliGRAM(s) Oral two times a day  nystatin    Suspension 495445 Unit(s) Oral four times a day  predniSONE   Tablet 5 milliGRAM(s) Oral daily  tacrolimus 6 milliGRAM(s) Oral two times a day  trimethoprim   80 mG/sulfamethoxazole 400 mG 1 Tablet(s) Oral daily  valGANciclovir 450 milliGRAM(s) Oral daily  vancomycin    Solution 125 milliGRAM(s) Oral every 6 hours    PRN Inpatient Medications  acetaminophen   Tablet. 650 milliGRAM(s) Oral every 6 hours PRN  clonazePAM Tablet 1 milliGRAM(s) Oral two times a day PRN  diphenhydrAMINE   Capsule 25 milliGRAM(s) Oral at bedtime PRN  ondansetron    Tablet 4 milliGRAM(s) Oral every 6 hours PRN  oxyCODONE    5 mG/acetaminophen 325 mG 1 Tablet(s) Oral every 4 hours PRN  oxyCODONE    5 mG/acetaminophen 325 mG 2 Tablet(s) Oral every 6 hours PRN      REVIEW OF SYSTEMS  --------------------------------------------------------------------------------  General: no fever  CVS: no chest pain  RESP: no sob, no cough  ABD: no abdominal pain  MSK: no edema     VITALS/PHYSICAL EXAM  --------------------------------------------------------------------------------  T(C): 37.1 (04-12-18 @ 05:50), Max: 37.2 (04-11-18 @ 21:00)  HR: 62 (04-12-18 @ 05:50) (58 - 65)  BP: 139/65 (04-12-18 @ 05:50) (128/73 - 155/69)  RR: 16 (04-12-18 @ 05:50) (16 - 18)  SpO2: 97% (04-12-18 @ 05:50) (93% - 98%)  Wt(kg): --        04-11-18 @ 07:01  -  04-12-18 @ 07:00  --------------------------------------------------------  IN: 2640 mL / OUT: 3000 mL / NET: -360 mL      Physical Exam:  	  Gen: NAD  	HEENT:   	Pulm: CTAB  	CV: S1S2  	Abd: Soft, +BS , incision c/d/i  	Ext: no LE  edema  	Neuro: awake    LABS/STUDIES  --------------------------------------------------------------------------------              8.8    10.6  >-----------<  160      [04-12-18 @ 05:57]              24.4     140  |  106  |  38  ----------------------------<  153      [04-12-18 @ 05:57]  3.6   |  22  |  2.57        Ca     8.1     [04-12-18 @ 05:57]      Mg     2.3     [04-12-18 @ 05:57]      Phos  3.3     [04-12-18 @ 05:57]    TPro  4.9  /  Alb  2.5  /  TBili  0.1  /  DBili  x   /  AST  6   /  ALT  12  /  AlkPhos  35  [04-12-18 @ 05:57]              [04-11-18 @ 06:59]    Creatinine Trend:  SCr 2.57 [04-12 @ 05:57]  SCr 2.85 [04-11 @ 06:59]  SCr 2.71 [04-10 @ 06:25]  SCr 2.27 [04-09 @ 03:29]  SCr 1.95 [04-08 @ 05:38]    Urinalysis - [04-06-18 @ 06:28]      Color Yellow / Appearance Clear / SG 1.018 / pH 6.5      Gluc Negative / Ketone Negative  / Bili Negative / Urobili Negative       Blood Moderate / Protein 30 / Leuk Est Negative / Nitrite Negative      RBC 10-25 / WBC 2-5 / Hyaline 2-5 / Gran  / Sq Epi  / Non Sq Epi Occasional / Bacteria       Iron 18, TIBC 167, %sat 11      [09-25-17 @ 06:30]  Ferritin 555.7      [09-25-17 @ 06:30]  PTH -- (Ca 8.0)      [09-16-17 @ 23:21]   232  Vitamin D (25OH) 9.7      [09-16-17 @ 23:21]

## 2018-04-12 NOTE — PROGRESS NOTE ADULT - PROBLEM SELECTOR PROBLEM 4
Hypotension
Hypoxia
Acidosis
Hypotension
Hypoxia
Hypotension
Hypoxia
Hypoxia

## 2018-04-12 NOTE — CHART NOTE - NSCHARTNOTEFT_GEN_A_CORE
Pt seen for nutrition follow up S/P kidney transplant, as per departmental protocol.     Source: Patient [X ]    Family [X ]     other [X ]; medical record    Hospital Course: Pt S/P LDRT and bilateral nephrectomy 4/2/18. Pt C. diff positive, but diarrhea improving.  Downtrending creatinine.    Pt noted with 2550ml urine output in past 24-hours. Magnesium, sodium, potassium and phosphorus WNL.    Reviewed post-transplant nutrition therapy recommendations to limit sugar intake due to risk of hyperglycemia on steroids. Pt reports she is reducing her intake of soda.    Diet : Regular + Ensure Clear (200Kcal, 7gm protein) tid     GI: Pt with C diff, noted with 6 liquid BMs today, 1 BM today.     PO intake:  Pt reports she is consuming > 75% of meal trays but is not drinking Ensure clear or Power Jessica.     Source for PO intake [X ] Patient [ X] family [ ] chart [ ] staff [ ] other     Enteral /Parenteral Nutrition: n/a    Current Weight: Weight (kg): 100.2Kg (4/10), 101Kg (4/9), 95.3Kg (4/2 dosing)  Edema: none noted    Pertinent Medications: MEDICATIONS  (STANDING):  famotidine    Tablet 20 milliGRAM(s) Oral daily  melatonin 3 milliGRAM(s) Oral at bedtime  mycophenolate mofetil 500 milliGRAM(s) Oral two times a day  nystatin    Suspension 106522 Unit(s) Oral four times a day  tacrolimus 6 milliGRAM(s) Oral two times a day  trimethoprim   80 mG/sulfamethoxazole 400 mG 1 Tablet(s) Oral daily  valGANciclovir 450 milliGRAM(s) Oral daily  vancomycin    Solution 125 milliGRAM(s) Oral every 6 hours    MEDICATIONS  (PRN):  acetaminophen   Tablet. 650 milliGRAM(s) Oral every 6 hours PRN Mild Pain (1 - 3)  clonazePAM Tablet 1 milliGRAM(s) Oral two times a day PRN anxiety  diphenhydrAMINE   Capsule 25 milliGRAM(s) Oral at bedtime PRN Insomnia  ondansetron    Tablet 4 milliGRAM(s) Oral every 6 hours PRN Nausea and/or Vomiting  oxyCODONE    5 mG/acetaminophen 325 mG 1 Tablet(s) Oral every 4 hours PRN Moderate Pain (4 - 6)  oxyCODONE    5 mG/acetaminophen 325 mG 2 Tablet(s) Oral every 6 hours PRN Severe Pain (7 - 10)    Pertinent Labs:  04-12 Na140 mmol/L Glu 153 mg/dL<H> K+ 3.6 mmol/L Cr  2.57 mg/dL<H> BUN 38 mg/dL<H> 04-12 Phos 3.3 mg/dL 04-12 Alb 2.5 g/dL<L>    Skin: no pressure injuries    Estimated Needs:   [X ] no change since previous assessment  [ ] recalculated:     Previous Nutrition Diagnosis:   [X ] Increased Nutrient Needs    Nutrition Diagnosis is [X ] ongoing; addressed with Regular diet    New Nutrition Diagnosis: [X ] not applicable    Interventions:     Recommend:  1) Continue Regular diet; encourage intake of high protein, nutrient dense foods and reduced intake of sugary drinks  2) Monitor weight, lab values, skin, po intake and GI tolerance  3) Reinforce therapeutic diet education as able    Monitoring and Evaluation:   Follow up per protocol  RD to remain available for further nutritional interventions as indicated.   Alina Reed, MS RD [facility], #[phone].

## 2018-04-12 NOTE — PROGRESS NOTE ADULT - SUBJECTIVE AND OBJECTIVE BOX
INTERVAL HPI/OVERNIGHT EVENTS:  Patient seen with multidisciplinary team (Nephrologist, pharmacist, nurse, nurse manager, NP, MD)  in am rounds and examined with Dr. Willis.  S/P LDRT 4/2/18, positive C-Diff on oral vanco, One PACO with minimum output.  Cr trending down. Overnight diarrhea improved, NAD, making adequate urine.     MEDICATIONS  (STANDING):  famotidine    Tablet 20 milliGRAM(s) Oral daily  melatonin 3 milliGRAM(s) Oral at bedtime  methylPREDNISolone sodium succinate IVPB 250 milliGRAM(s) IV Intermittent once  mycophenolate mofetil 500 milliGRAM(s) Oral two times a day  nystatin    Suspension 307337 Unit(s) Oral four times a day  tacrolimus 6 milliGRAM(s) Oral two times a day  trimethoprim   80 mG/sulfamethoxazole 400 mG 1 Tablet(s) Oral daily  valGANciclovir 450 milliGRAM(s) Oral daily  vancomycin    Solution 125 milliGRAM(s) Oral every 6 hours    MEDICATIONS  (PRN):  acetaminophen   Tablet. 650 milliGRAM(s) Oral every 6 hours PRN Mild Pain (1 - 3)  clonazePAM Tablet 1 milliGRAM(s) Oral two times a day PRN anxiety  diphenhydrAMINE   Capsule 25 milliGRAM(s) Oral at bedtime PRN Insomnia  ondansetron    Tablet 4 milliGRAM(s) Oral every 6 hours PRN Nausea and/or Vomiting  oxyCODONE    5 mG/acetaminophen 325 mG 1 Tablet(s) Oral every 4 hours PRN Moderate Pain (4 - 6)  oxyCODONE    5 mG/acetaminophen 325 mG 2 Tablet(s) Oral every 6 hours PRN Severe Pain (7 - 10)      Allergies    Demerol HCl (Hives)  Phenergan (Hives; Rash)    Intolerances      T(C): 37.1 (04-12-18 @ 05:50), Max: 37.2 (04-11-18 @ 21:00)  HR: 62 (04-12-18 @ 05:50) (58 - 65)  BP: 139/65 (04-12-18 @ 05:50) (128/73 - 155/69)  RR: 16 (04-12-18 @ 05:50) (16 - 18)  SpO2: 97% (04-12-18 @ 05:50) (93% - 98%)  Wt(kg): --    04-11 @ 07:01  -  04-12 @ 07:00  --------------------------------------------------------  IN:    Oral Fluid: 2640 mL  Total IN: 2640 mL    OUT:    Bulb: 70 mL    Bulb: 380 mL    Voided: 2550 mL  Total OUT: 3000 mL    Total NET: -360 mL                            8.8    10.6  )-----------( 160      ( 12 Apr 2018 05:57 )             24.4     04-12    140  |  106  |  38<H>  ----------------------------<  153<H>  3.6   |  22  |  2.57<H>    Ca    8.1<L>      12 Apr 2018 05:57  Phos  3.3     04-12  Mg     2.3     04-12    TPro  4.9<L>  /  Alb  2.5<L>  /  TBili  0.1<L>  /  DBili  x   /  AST  6<L>  /  ALT  12  /  AlkPhos  35<L>  04-12    LIVER FUNCTIONS - ( 12 Apr 2018 05:57 )  Alb: 2.5 g/dL / Pro: 4.9 g/dL / ALK PHOS: 35 U/L / ALT: 12 U/L RC / AST: 6 U/L / GGT: x               Review of systems  Gen: No weight changes, fatigue, fevers/chills, weakness  Skin: No rashes  Head/Eyes/Ears/Mouth: No headache; Normal hearing; Normal vision w/o blurriness; No sinus pain/discomfort, sore throat  Respiratory: No dyspnea, cough, wheezing, hemoptysis  CV: No chest pain, PND, orthopnea  GI: No abdominal pain, diarrhea, constipation, nausea, vomiting, melena, hematochezia  : No increased frequency, dysuria, hematuria, nocturia  MSK: No joint pain/swelling; no back pain; no edema  Neuro: No dizziness/lightheadedness, weakness, seizures, numbness, tingling  Heme: No easy bruising or bleeding  Endo: No heat/cold intolerance  Psych: No significant nervousness, anxiety, stress, depression  All other systems were reviewed and are negative, except as noted.    PHYSICAL EXAM:  Constitutional: Well developed / well nourished  Eyes: Anicteric, PERRLA  ENMT: nc/at  Neck: Supple  Respiratory: CTA B/L  Cardiovascular: RRR  Gastrointestinal: Soft abdomen, mild tender to touch at surgical site, ND  Genitourinary: Voiding spontaneously  Extremities: SCD's in place and working bilaterally  Vascular: Palpable dp pulses bilaterally  Neurological: A&O x3  Skin: Wound open to air no erythema and evidence of infection noted  Musculoskeletal: Moving all extremities  Psychiatric: Responsive

## 2018-04-12 NOTE — PROGRESS NOTE ADULT - PROBLEM SELECTOR PLAN 2
Pt s/p Simulect on 4/2/2018 and  4/6/2018  Continue pulse steroids    continue cellcept 500mg BID   Continue prograf 6 mg BID  Check FK level 30min prior to prograf dosing ( 9.3  on 4/11 )  Continue nystatin, bactrim, valcyte

## 2018-04-12 NOTE — PROGRESS NOTE ADULT - PROBLEM SELECTOR PLAN 1
Pt  with STEPHENIE likely secondary to diarrhea vs rejection vs TMA   Pt Scr improved to 2.5 today from 2.8 yesterday.   Pt non-oliguric  Continue  pulse steroids ( for possible rejection).   Resume  bactrim, valcyte as thrombocytopenia improved  Monitor BMP, strict I/O avoid nephrotoxics, NSAIds RCA

## 2018-04-12 NOTE — PROGRESS NOTE ADULT - ATTENDING COMMENTS
Patient seen and examined on rounds with house staff.  Care discussed by SICU team with transplant team.  Actively resuscitated last night and this morning  Lactate, pressor requirements, and acidosis improving throughout the day  Awake and following commands  Did well with spontaneous breathing trial, extubated without immediate complications  Immunosuppression as per transplant team.    - resolving metabolic acidosis  - resolving acute postoperative respiratory failure   -45 minutes direct critical care time
I personally saw and examined patient with transplant team  Being treated for C. difficile infection.  IMMUNOSUPPRESSION:  Prograf 6-6
I personally saw and examined patient with transplant team.  Being treated for C. difficile.  IMMUNOSUPPRESSION:  Prograf 6-6  Cellcept 500-500
I personally saw and examined patient with transplant team.  IMMUNOSUPPRESSION:  Prograf 6-6
PT POD 3 s/p b/l nephrectomies and LURT from ex   Pt feels better but sore.  BP on low side but stable  Creatinine slowly decreasing.   Mild hypoxia likely related to volume - will taper down IVF as pt begins to drink.
STEPHENIE despite IVF  check sono and PACO drain creatinine  If ok suspect ACR - will pulse as cannot biopsy with low platelets  thrombocytopenia - check LDH r/o TMA.  Hold valcyte and bactrim.
STEPHENIE in setting of C diff diarrhea.  Continue vanco  Start fluid challenge - if no improvement check transplant sono/doppler.
STEPHENIE likely rejection - creatinine improving with solumedrol - will give one more dose of 250mgs today.    Platelets improved - ? clumping - restart valcyte and bactrim.
STEPHENIE possibly rejection  s/p 250mgs solumedrol yesterday - will repeat today.  If no improvement after 750mgs solumedrol pt may need biopsy.   Platelets improved - ? clumping
PT POD 2 s/p b/l nephrectomies and LURT from ex   Pt feels better but sore.  Off pressors, extubated with good UO  Creatinine beginning to improve.
PT POD 4 s/p b/l nephrectomies and LURT from ex   Pt having abdominal pain and nausea - will start PO dilaudid and zofran.   BP on low side but stable  Creatinine slowly decreasing.   Hypoxia improving - has left effusion post OR>
Renal transplant- cr stable; monitor trend  IS- cont for now; prograf/cellcept/pred  nausea/vomiting/diarrhea/ab pain- monitor; anti-emetics, anti-nausea meds; check c dif  hypokalemia- replete k as needed
I personally saw and examined patient.  Feeling better than yesterday.  IMMUNOSUPPRESSION:  Prograf 3-3
Renal transplant- cr stable; monitor trend  IS- cont for now; prograf/cellcept/pred  nausea/vomiting/diarrhea/ab pain- monitor; anti-emetics, anti-nausea meds per dr nevarez;
agree with above. looks good today. out of bed in chair. off pressors and extubated. UOP about 2 liters. creatinine trending down slow. Immunosuppresion FK, MMF and steroids. can be transferred to regular floor if approved by ICU team.
I personally saw and examined patient with the transplant team.  IMMUNOSUPPRESSION:  No change in prograf dose
I personally saw and examined patient with transplant team.  Abdomen soft.  IMMUNOSUPPRESSION:  Prograf 5-5
I personally saw and examined patient with transplant team this AM and later at 14:45 pm.  Sitting in armchair.  IMMUNOSUPPRESSION:  No change in prograf dose.

## 2018-04-12 NOTE — PROGRESS NOTE ADULT - PROBLEM SELECTOR PLAN 1
Doing well.  Po diet opal well.    Pt encouraged to ambulate  Dressing removed site intact.   Cr trending up, noted to be in early rejection started on high dose steroid 250mg x 3 days  1 PACO minimal output, will d/c today  Renal US with no stenosis.

## 2018-04-12 NOTE — PROGRESS NOTE ADULT - ASSESSMENT
47 y.o. woman with history of PCKD and ESRD from bilateral nephrectomies, and living donor renal transplant on 4/2/2018. Positive for C-Diff on oral vancomycin.  Cr trending up renal US with no stenosis, early rejection treatment started with high dose steroid. Cr trending down today.

## 2018-04-12 NOTE — PROGRESS NOTE ADULT - PROBLEM SELECTOR PLAN 4
Improved. Off pressor support  monitor BP closely
-decreased breath sounds on L > R  -continue to monitor o2 sats, likely from atelectasis  -encourage OOB, ambulation, IS
Improved. Off pressor support  monitor BP closely
- 1/2 NS 2 75 mEq sodium bicarb
-decreased breath sounds on L > R  -continue to monitor o2 sats, likely from atelectasis  -encourage OOB, ambulation, IS
BP stable.   monitor BP
Improved.   monitor BP.
Improved. Off pressor support  monitor BP closely
-decreased breath sounds on L > R  -continue to monitor o2 sats, likely from atelectasis  -encourage OOB, ambulation, IS
Improved.   monitor BP.
-clear lungs, clinically does not appear hypervolemic, likely 2/2 atelectasis  -encourage OOB, ambulation, IS

## 2018-04-13 VITALS
SYSTOLIC BLOOD PRESSURE: 163 MMHG | HEART RATE: 58 BPM | OXYGEN SATURATION: 100 % | DIASTOLIC BLOOD PRESSURE: 79 MMHG | RESPIRATION RATE: 16 BRPM | TEMPERATURE: 98 F

## 2018-04-13 LAB
ALBUMIN SERPL ELPH-MCNC: 2.6 G/DL — LOW (ref 3.3–5)
ALP SERPL-CCNC: 36 U/L — LOW (ref 40–120)
ALT FLD-CCNC: 14 U/L RC — SIGNIFICANT CHANGE UP (ref 10–45)
ANION GAP SERPL CALC-SCNC: 13 MMOL/L — SIGNIFICANT CHANGE UP (ref 5–17)
AST SERPL-CCNC: 7 U/L — LOW (ref 10–40)
BILIRUB SERPL-MCNC: 0.2 MG/DL — SIGNIFICANT CHANGE UP (ref 0.2–1.2)
BUN SERPL-MCNC: 36 MG/DL — HIGH (ref 7–23)
CALCIUM SERPL-MCNC: 8.1 MG/DL — LOW (ref 8.4–10.5)
CHLORIDE SERPL-SCNC: 107 MMOL/L — SIGNIFICANT CHANGE UP (ref 96–108)
CO2 SERPL-SCNC: 21 MMOL/L — LOW (ref 22–31)
CREAT SERPL-MCNC: 2.33 MG/DL — HIGH (ref 0.5–1.3)
GLUCOSE SERPL-MCNC: 148 MG/DL — HIGH (ref 70–99)
HCT VFR BLD CALC: 25 % — LOW (ref 34.5–45)
HGB BLD-MCNC: 8.7 G/DL — LOW (ref 11.5–15.5)
MAGNESIUM SERPL-MCNC: 2 MG/DL — SIGNIFICANT CHANGE UP (ref 1.6–2.6)
MCHC RBC-ENTMCNC: 33.3 PG — SIGNIFICANT CHANGE UP (ref 27–34)
MCHC RBC-ENTMCNC: 34.9 GM/DL — SIGNIFICANT CHANGE UP (ref 32–36)
MCV RBC AUTO: 95.6 FL — SIGNIFICANT CHANGE UP (ref 80–100)
PHOSPHATE SERPL-MCNC: 2.8 MG/DL — SIGNIFICANT CHANGE UP (ref 2.5–4.5)
PLATELET # BLD AUTO: 159 K/UL — SIGNIFICANT CHANGE UP (ref 150–400)
POTASSIUM SERPL-MCNC: 4 MMOL/L — SIGNIFICANT CHANGE UP (ref 3.5–5.3)
POTASSIUM SERPL-SCNC: 4 MMOL/L — SIGNIFICANT CHANGE UP (ref 3.5–5.3)
PROT SERPL-MCNC: 5 G/DL — LOW (ref 6–8.3)
RBC # BLD: 2.61 M/UL — LOW (ref 3.8–5.2)
RBC # FLD: 12.5 % — SIGNIFICANT CHANGE UP (ref 10.3–14.5)
SODIUM SERPL-SCNC: 141 MMOL/L — SIGNIFICANT CHANGE UP (ref 135–145)
TACROLIMUS SERPL-MCNC: 7.4 NG/ML — SIGNIFICANT CHANGE UP
WBC # BLD: 13 K/UL — HIGH (ref 3.8–10.5)
WBC # FLD AUTO: 13 K/UL — HIGH (ref 3.8–10.5)

## 2018-04-13 PROCEDURE — 82803 BLOOD GASES ANY COMBINATION: CPT

## 2018-04-13 PROCEDURE — 87449 NOS EACH ORGANISM AG IA: CPT

## 2018-04-13 PROCEDURE — 86923 COMPATIBILITY TEST ELECTRIC: CPT

## 2018-04-13 PROCEDURE — 81001 URINALYSIS AUTO W/SCOPE: CPT

## 2018-04-13 PROCEDURE — 84100 ASSAY OF PHOSPHORUS: CPT

## 2018-04-13 PROCEDURE — 71045 X-RAY EXAM CHEST 1 VIEW: CPT

## 2018-04-13 PROCEDURE — 97116 GAIT TRAINING THERAPY: CPT

## 2018-04-13 PROCEDURE — 87086 URINE CULTURE/COLONY COUNT: CPT

## 2018-04-13 PROCEDURE — 85610 PROTHROMBIN TIME: CPT

## 2018-04-13 PROCEDURE — 74018 RADEX ABDOMEN 1 VIEW: CPT

## 2018-04-13 PROCEDURE — 85730 THROMBOPLASTIN TIME PARTIAL: CPT

## 2018-04-13 PROCEDURE — 87177 OVA AND PARASITES SMEARS: CPT

## 2018-04-13 PROCEDURE — 94799 UNLISTED PULMONARY SVC/PX: CPT

## 2018-04-13 PROCEDURE — 80197 ASSAY OF TACROLIMUS: CPT

## 2018-04-13 PROCEDURE — 84132 ASSAY OF SERUM POTASSIUM: CPT

## 2018-04-13 PROCEDURE — 97530 THERAPEUTIC ACTIVITIES: CPT

## 2018-04-13 PROCEDURE — 94002 VENT MGMT INPAT INIT DAY: CPT

## 2018-04-13 PROCEDURE — 83605 ASSAY OF LACTIC ACID: CPT

## 2018-04-13 PROCEDURE — 36430 TRANSFUSION BLD/BLD COMPNT: CPT

## 2018-04-13 PROCEDURE — C1889: CPT

## 2018-04-13 PROCEDURE — 84295 ASSAY OF SERUM SODIUM: CPT

## 2018-04-13 PROCEDURE — 82330 ASSAY OF CALCIUM: CPT

## 2018-04-13 PROCEDURE — 82435 ASSAY OF BLOOD CHLORIDE: CPT

## 2018-04-13 PROCEDURE — 80076 HEPATIC FUNCTION PANEL: CPT

## 2018-04-13 PROCEDURE — P9040: CPT

## 2018-04-13 PROCEDURE — 94003 VENT MGMT INPAT SUBQ DAY: CPT

## 2018-04-13 PROCEDURE — 83735 ASSAY OF MAGNESIUM: CPT

## 2018-04-13 PROCEDURE — 85384 FIBRINOGEN ACTIVITY: CPT

## 2018-04-13 PROCEDURE — 87324 CLOSTRIDIUM AG IA: CPT

## 2018-04-13 PROCEDURE — 80053 COMPREHEN METABOLIC PANEL: CPT

## 2018-04-13 PROCEDURE — 87040 BLOOD CULTURE FOR BACTERIA: CPT

## 2018-04-13 PROCEDURE — 87493 C DIFF AMPLIFIED PROBE: CPT

## 2018-04-13 PROCEDURE — 88307 TISSUE EXAM BY PATHOLOGIST: CPT

## 2018-04-13 PROCEDURE — 83615 LACTATE (LD) (LDH) ENZYME: CPT

## 2018-04-13 PROCEDURE — P9041: CPT

## 2018-04-13 PROCEDURE — 82947 ASSAY GLUCOSE BLOOD QUANT: CPT

## 2018-04-13 PROCEDURE — 97162 PT EVAL MOD COMPLEX 30 MIN: CPT

## 2018-04-13 PROCEDURE — 85014 HEMATOCRIT: CPT

## 2018-04-13 PROCEDURE — 86900 BLOOD TYPING SEROLOGIC ABO: CPT

## 2018-04-13 PROCEDURE — 85027 COMPLETE CBC AUTOMATED: CPT

## 2018-04-13 PROCEDURE — 80048 BASIC METABOLIC PNL TOTAL CA: CPT

## 2018-04-13 PROCEDURE — 86901 BLOOD TYPING SEROLOGIC RH(D): CPT

## 2018-04-13 PROCEDURE — 99238 HOSP IP/OBS DSCHRG MGMT 30/<: CPT | Mod: 24

## 2018-04-13 PROCEDURE — 76776 US EXAM K TRANSPL W/DOPPLER: CPT

## 2018-04-13 PROCEDURE — 82570 ASSAY OF URINE CREATININE: CPT

## 2018-04-13 PROCEDURE — 82565 ASSAY OF CREATININE: CPT

## 2018-04-13 RX ORDER — ACETAMINOPHEN 500 MG
2 TABLET ORAL
Qty: 0 | Refills: 0 | COMMUNITY
Start: 2018-04-13

## 2018-04-13 RX ORDER — CALCIUM CARBONATE 500(1250)
1 TABLET ORAL
Qty: 0 | Refills: 0 | COMMUNITY

## 2018-04-13 RX ORDER — MYCOPHENOLATE MOFETIL 250 MG/1
1000 CAPSULE ORAL
Qty: 0 | Refills: 0 | Status: DISCONTINUED | OUTPATIENT
Start: 2018-04-13 | End: 2018-04-13

## 2018-04-13 RX ORDER — VALGANCICLOVIR 450 MG/1
1 TABLET, FILM COATED ORAL
Qty: 0 | Refills: 0 | COMMUNITY
Start: 2018-04-13

## 2018-04-13 RX ORDER — TACROLIMUS 5 MG/1
7 CAPSULE ORAL
Qty: 0 | Refills: 0 | Status: DISCONTINUED | OUTPATIENT
Start: 2018-04-13 | End: 2018-04-13

## 2018-04-13 RX ORDER — TACROLIMUS 5 MG/1
7 CAPSULE ORAL
Qty: 0 | Refills: 0 | COMMUNITY
Start: 2018-04-13

## 2018-04-13 RX ORDER — VANCOMYCIN HYDROCHLORIDE 125 MG/1
125 CAPSULE ORAL 4 TIMES DAILY
Qty: 20 | Refills: 0 | Status: DISCONTINUED | COMMUNITY
Start: 2018-04-12 | End: 2018-04-13

## 2018-04-13 RX ORDER — LOSARTAN POTASSIUM 100 MG/1
0 TABLET, FILM COATED ORAL
Qty: 0 | Refills: 0 | COMMUNITY

## 2018-04-13 RX ORDER — NYSTATIN 500MM UNIT
5 POWDER (EA) MISCELLANEOUS
Qty: 0 | Refills: 0 | COMMUNITY
Start: 2018-04-13

## 2018-04-13 RX ORDER — CHOLECALCIFEROL (VITAMIN D3) 125 MCG
1 CAPSULE ORAL
Qty: 0 | Refills: 0 | COMMUNITY

## 2018-04-13 RX ADMIN — Medication 25 MILLIGRAM(S): at 00:28

## 2018-04-13 RX ADMIN — Medication 125 MILLIGRAM(S): at 06:03

## 2018-04-13 RX ADMIN — Medication 500000 UNIT(S): at 06:03

## 2018-04-13 RX ADMIN — FAMOTIDINE 20 MILLIGRAM(S): 10 INJECTION INTRAVENOUS at 12:03

## 2018-04-13 RX ADMIN — Medication 650 MILLIGRAM(S): at 12:03

## 2018-04-13 RX ADMIN — ONDANSETRON 4 MILLIGRAM(S): 8 TABLET, FILM COATED ORAL at 06:44

## 2018-04-13 RX ADMIN — VALGANCICLOVIR 450 MILLIGRAM(S): 450 TABLET, FILM COATED ORAL at 12:03

## 2018-04-13 RX ADMIN — Medication 500000 UNIT(S): at 12:03

## 2018-04-13 RX ADMIN — Medication 650 MILLIGRAM(S): at 13:00

## 2018-04-13 RX ADMIN — Medication 1 TABLET(S): at 12:03

## 2018-04-13 RX ADMIN — Medication 3 MILLIGRAM(S): at 00:29

## 2018-04-13 RX ADMIN — OXYCODONE AND ACETAMINOPHEN 2 TABLET(S): 5; 325 TABLET ORAL at 06:03

## 2018-04-13 RX ADMIN — Medication 125 MILLIGRAM(S): at 12:03

## 2018-04-13 RX ADMIN — OXYCODONE AND ACETAMINOPHEN 2 TABLET(S): 5; 325 TABLET ORAL at 06:47

## 2018-04-13 RX ADMIN — MYCOPHENOLATE MOFETIL 500 MILLIGRAM(S): 250 CAPSULE ORAL at 06:03

## 2018-04-13 RX ADMIN — Medication 125 MILLIGRAM(S): at 13:28

## 2018-04-13 RX ADMIN — TACROLIMUS 6 MILLIGRAM(S): 5 CAPSULE ORAL at 06:03

## 2018-04-13 NOTE — PROGRESS NOTE ADULT - SUBJECTIVE AND OBJECTIVE BOX
[facility] DIVISION OF KIDNEY DISEASES AND HYPERTENSION -- [phone]   FOLLOW UP NOTE  --------------------------------------------------------------------------------  HPI:  47 y.o. 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   Received solumedrol and simulect intraoperatively. Also received 5 units of pRBC during the case.  Pt was  extubated on 4/3, off pressor support. Pt transferred out of SICU to floor.  Pt POD # 11  Pt seen and examined at bedside. Pt reports to feel better.     PAST HISTORY  --------------------------------------------------------------------------------  No significant changes to PMH, PSH, FHx, SHx, unless otherwise noted    ALLERGIES & MEDICATIONS  --------------------------------------------------------------------------------  Allergies    Demerol HCl (Hives)  Phenergan (Hives; Rash)    Intolerances      Standing Inpatient Medications  famotidine    Tablet 20 milliGRAM(s) Oral daily  melatonin 3 milliGRAM(s) Oral at bedtime  methylPREDNISolone sodium succinate Injectable 125 milliGRAM(s) IV Push once  mycophenolate mofetil 1000 milliGRAM(s) Oral two times a day  nystatin    Suspension 041748 Unit(s) Oral four times a day  tacrolimus 6 milliGRAM(s) Oral two times a day  trimethoprim   80 mG/sulfamethoxazole 400 mG 1 Tablet(s) Oral daily  valGANciclovir 450 milliGRAM(s) Oral daily  vancomycin    Solution 125 milliGRAM(s) Oral every 6 hours    PRN Inpatient Medications  acetaminophen   Tablet. 650 milliGRAM(s) Oral every 6 hours PRN  clonazePAM Tablet 1 milliGRAM(s) Oral two times a day PRN  diphenhydrAMINE   Capsule 25 milliGRAM(s) Oral at bedtime PRN  ondansetron    Tablet 4 milliGRAM(s) Oral every 6 hours PRN  oxyCODONE    5 mG/acetaminophen 325 mG 1 Tablet(s) Oral every 4 hours PRN  oxyCODONE    5 mG/acetaminophen 325 mG 2 Tablet(s) Oral every 6 hours PRN      REVIEW OF SYSTEMS  --------------------------------------------------------------------------------  General: no fever  CVS: no chest pain  RESP: no sob, no cough  ABD: no abdominal pain  : no dysuria,  MSK: + edema     VITALS/PHYSICAL EXAM  --------------------------------------------------------------------------------  T(C): 36.7 (04-13-18 @ 00:26), Max: 36.9 (04-12-18 @ 14:13)  HR: 60 (04-13-18 @ 00:26) (60 - 69)  BP: 156/72 (04-13-18 @ 00:26) (146/77 - 156/72)  RR: 18 (04-13-18 @ 00:26) (18 - 18)  SpO2: 99% (04-13-18 @ 00:26) (98% - 99%)  Wt(kg): --        04-12-18 @ 07:01  -  04-13-18 @ 07:00  --------------------------------------------------------  IN: 2390 mL / OUT: 2565 mL / NET: -175 mL    04-13-18 @ 07:01  -  04-13-18 @ 09:31  --------------------------------------------------------  IN: 120 mL / OUT: 0 mL / NET: 120 mL      Physical Exam:  	Gen: NAD  	HEENT:   	Pulm: CTAB  	CV: S1S2  	Abd: Soft, +BS , incision c/d/i  	Ext: trace LE  edema  	Neuro: awake    LABS/STUDIES  --------------------------------------------------------------------------------              8.7    13.0  >-----------<  159      [04-13-18 @ 05:53]              25.0     141  |  107  |  36  ----------------------------<  148      [04-13-18 @ 05:53]  4.0   |  21  |  2.33        Ca     8.1     [04-13-18 @ 05:53]      Mg     2.0     [04-13-18 @ 05:53]      Phos  2.8     [04-13-18 @ 05:53]    TPro  5.0  /  Alb  2.6  /  TBili  0.2  /  DBili  x   /  AST  7   /  ALT  14  /  AlkPhos  36  [04-13-18 @ 05:53]          Creatinine Trend:  SCr 2.33 [04-13 @ 05:53]  SCr 2.57 [04-12 @ 05:57]  SCr 2.85 [04-11 @ 06:59]  SCr 2.71 [04-10 @ 06:25]  SCr 2.27 [04-09 @ 03:29]    Urinalysis - [04-06-18 @ 06:28]      Color Yellow / Appearance Clear / SG 1.018 / pH 6.5      Gluc Negative / Ketone Negative  / Bili Negative / Urobili Negative       Blood Moderate / Protein 30 / Leuk Est Negative / Nitrite Negative      RBC 10-25 / WBC 2-5 / Hyaline 2-5 / Gran  / Sq Epi  / Non Sq Epi Occasional / Bacteria       Iron 18, TIBC 167, %sat 11      [09-25-17 @ 06:30]  Ferritin 555.7      [09-25-17 @ 06:30]  PTH -- (Ca 8.0)      [09-16-17 @ 23:21]   232  Vitamin D (25OH) 9.7      [09-16-17 @ 23:21]

## 2018-04-13 NOTE — PROGRESS NOTE ADULT - PROBLEM SELECTOR PLAN 2
Pt s/p Simulect on 4/2/2018 and  4/6/2018  Continue pulse steroids    increase  cellcept 1000mg BID   Continue prograf 6 mg BID  Check FK level 30min prior to prograf dosing ( 9.2 on 4/12)  Continue nystatin, bactrim, valcyte

## 2018-04-13 NOTE — PROGRESS NOTE ADULT - PROBLEM SELECTOR PLAN 1
Pt  with STEPHENIE likely secondary to  rejection   Pt Scr improving , non -oliguric   Continue  pulse steroids 125mg solumedrol today   continue  bactrim, valcyte for ppx   Monitor BMP, strict I/O avoid nephrotoxics, NSAIds RCA

## 2018-04-13 NOTE — PROGRESS NOTE ADULT - PROBLEM SELECTOR PLAN 1
Doing well, tolerating regular diet  Pt encouraged to ambulate  Dressing removed, site intact  Creatinine continues to trend down, plan for discharge home today

## 2018-04-13 NOTE — PROGRESS NOTE ADULT - ASSESSMENT
47F POD#11 s/p LDRT and bilateral nephrectomies for PCKD; post-operative course c/b C-diff colitis and rising creatinine concerning for rejection now s/p IV solumedrol 250mg x 3 doses.    Patient seen and discussed on multidisciplinary transplant rounds. Plan as below. Will d/c home today, to follow up in office on Tuesday 4/17.    ASH Wen, R3  [id number]

## 2018-04-13 NOTE — PROGRESS NOTE ADULT - PROBLEM SELECTOR PROBLEM 1
Renal transplant recipient

## 2018-04-13 NOTE — PROGRESS NOTE ADULT - PROBLEM SELECTOR PLAN 2
-continue MMF (increased to 1000mg BID)  -continue Prograf 6 BID, check AM tacro level and re-dose PRN  -solumedrol today and then prednisone taper as above

## 2018-04-13 NOTE — PROGRESS NOTE ADULT - SUBJECTIVE AND OBJECTIVE BOX
Learner: Patient and mother	  Barriers: None    Patient received a renal transplant on 4/2/18    Method used: Verbal discussion and written material    Medication safety was discussed with the patient: allergies, herbals, adherence, interactions, medication precautions, miss dose instructions, purpose, side effects, signs and symptoms to report, and storage & handling    Patient was able to repeat and verbalize key points    Education Summary: Discharge immunosuppressant medications and prophylatic anti-infective agents reviewed with the patient. Outpatient medication schedule was discussed in detail including: medication name, indication, dose, administration times, treatment duration, side effects, drug interactions, and special instructions. Patient questions and concerns were answered and addressed. Patient demonstrated understanding.    Time spent discharge education: 60 minutes    Renal Transplant medications:  Tacrolimus adjusted to trough   Mycophenolate mofetil 1g BID  Prednisone taper to 5mg daily  Sulfamethoxazole/Trimethoprim 1SS daily  Nystatin swish and swallow four times a day  Valganciclovir 450 mg daily  Pepcid 20 mg daily  Docusate and Senna  Other meds: vancomycin 125 mg q6h for 2 weeks, clonazepam 1 mg BID PRN anxiety,

## 2018-04-13 NOTE — PROGRESS NOTE ADULT - PROBLEM SELECTOR PLAN 1
-pain controlled with oral pain medication  -will give one more dose of IV Solumedrol today (125mg) and then start prednisone taper  -continue anti-infectives (Valcyte, Bactrim, Nystatin)   -continue immunosuppressive regimen as below  -encourage ambulation, incentive spirometry  -to be d/c'd home today and f/u as outpatient

## 2018-04-13 NOTE — PROGRESS NOTE ADULT - SUBJECTIVE AND OBJECTIVE BOX
BLUE TEAM GENERAL SURGERY DAILY PROGRESS NOTE:       Subjective:  Patient seen this AM during rounds. No acute overnight events. Patient states that she no longer is having diarrhea. Tolerating regular diet. Afebrile, pain well controlled. Hopes to be discharged today.        Objective:    PE:  Gen: NAD  Resp: airway patent, respirations unlabored, no increased WoB  Abd: soft abdomen, mild tender to touch at surgical site, non-distended  Ext: no edema, WWP  Neuro: AAOx3, no focal deficits    Vital Signs Last 24 Hrs  T(C): 36.7 (2018 00:26), Max: 36.9 (2018 14:13)  T(F): 98.1 (2018 00:26), Max: 98.4 (2018 14:13)  HR: 60 (2018 00:26) (60 - 69)  BP: 156/72 (2018 00:26) (146/77 - 156/72)  BP(mean): --  RR: 18 (2018 00:26) (18 - 18)  SpO2: 99% (2018 00:26) (98% - 99%)    I&O's Detail    2018 07:01  -  2018 07:00  --------------------------------------------------------  IN:    IV PiggyBack: 50 mL    Oral Fluid: 2340 mL  Total IN: 2390 mL    OUT:    Bulb: 190 mL    Voided: 2375 mL  Total OUT: 2565 mL    Total NET: -175 mL          Daily     Daily Weight in k.1 (2018 16:06)    MEDICATIONS  (STANDING):  famotidine    Tablet 20 milliGRAM(s) Oral daily  melatonin 3 milliGRAM(s) Oral at bedtime  methylPREDNISolone sodium succinate Injectable 125 milliGRAM(s) IV Push once  mycophenolate mofetil 1000 milliGRAM(s) Oral two times a day  nystatin    Suspension 992819 Unit(s) Oral four times a day  tacrolimus 6 milliGRAM(s) Oral two times a day  trimethoprim   80 mG/sulfamethoxazole 400 mG 1 Tablet(s) Oral daily  valGANciclovir 450 milliGRAM(s) Oral daily  vancomycin    Solution 125 milliGRAM(s) Oral every 6 hours    MEDICATIONS  (PRN):  acetaminophen   Tablet. 650 milliGRAM(s) Oral every 6 hours PRN Mild Pain (1 - 3)  clonazePAM Tablet 1 milliGRAM(s) Oral two times a day PRN anxiety  diphenhydrAMINE   Capsule 25 milliGRAM(s) Oral at bedtime PRN Insomnia  ondansetron    Tablet 4 milliGRAM(s) Oral every 6 hours PRN Nausea and/or Vomiting  oxyCODONE    5 mG/acetaminophen 325 mG 1 Tablet(s) Oral every 4 hours PRN Moderate Pain (4 - 6)  oxyCODONE    5 mG/acetaminophen 325 mG 2 Tablet(s) Oral every 6 hours PRN Severe Pain (7 - 10)      LABS:                        8.7    13.0  )-----------( 159      ( 2018 05:53 )             25.0         141  |  107  |  36<H>  ----------------------------<  148<H>  4.0   |  21<L>  |  2.33<H>    Ca    8.1<L>      2018 05:53  Phos  2.8       Mg     2.0         TPro  5.0<L>  /  Alb  2.6<L>  /  TBili  0.2  /  DBili  x   /  AST  7<L>  /  ALT  14  /  AlkPhos  36<L>

## 2018-04-13 NOTE — PROGRESS NOTE ADULT - PROBLEM SELECTOR PROBLEM 3
Acidosis
Immunosuppressive management encounter following kidney transplant
Acidosis
Diarrhea of infectious origin
Hypotension
Acidosis

## 2018-04-13 NOTE — PROGRESS NOTE ADULT - SUBJECTIVE AND OBJECTIVE BOX
TRANSPLANT SURGERY PROGRESS NOTE    STATUS POST: bilateral nephrectomies, living donor renal transplant  POST-OPERATIVE DAY #11    SUBJECTIVE    INTERVAL HPI/OVERNIGHT EVENTS: No acute events. Feeling well. Pain is controlled. Tolerating diet. No further episodes of diarrhea. Feels ready to go home.    All other systems reviewed and negative except as noted above.    MEDICATIONS  MEDICATIONS  (STANDING):  famotidine    Tablet 20 milliGRAM(s) Oral daily  melatonin 3 milliGRAM(s) Oral at bedtime  methylPREDNISolone sodium succinate Injectable 125 milliGRAM(s) IV Push once  mycophenolate mofetil 1000 milliGRAM(s) Oral two times a day  nystatin    Suspension 830329 Unit(s) Oral four times a day  tacrolimus 6 milliGRAM(s) Oral two times a day  trimethoprim   80 mG/sulfamethoxazole 400 mG 1 Tablet(s) Oral daily  valGANciclovir 450 milliGRAM(s) Oral daily  vancomycin    Solution 125 milliGRAM(s) Oral every 6 hours    MEDICATIONS  (PRN):  acetaminophen   Tablet. 650 milliGRAM(s) Oral every 6 hours PRN Mild Pain (1 - 3)  clonazePAM Tablet 1 milliGRAM(s) Oral two times a day PRN anxiety  diphenhydrAMINE   Capsule 25 milliGRAM(s) Oral at bedtime PRN Insomnia  ondansetron    Tablet 4 milliGRAM(s) Oral every 6 hours PRN Nausea and/or Vomiting  oxyCODONE    5 mG/acetaminophen 325 mG 1 Tablet(s) Oral every 4 hours PRN Moderate Pain (4 - 6)  oxyCODONE    5 mG/acetaminophen 325 mG 2 Tablet(s) Oral every 6 hours PRN Severe Pain (7 - 10)    OBJECTIVE    PHYSICAL EXAM  Vital Signs Last 24 Hrs  T(C): 36.7 (13 Apr 2018 00:26), Max: 36.9 (12 Apr 2018 14:13)  T(F): 98.1 (13 Apr 2018 00:26), Max: 98.4 (12 Apr 2018 14:13)  HR: 60 (13 Apr 2018 00:26) (60 - 69)  BP: 156/72 (13 Apr 2018 00:26) (146/77 - 156/72)  RR: 18 (13 Apr 2018 00:26) (18 - 18)  SpO2: 99% (13 Apr 2018 00:26) (98% - 99%)    UOP 2375/24 hours    Constitutional: well developed, well nourished  Neuro: AAOx3  Eyes: anicteric, PERRLA  ENMT: nc/at  Respiratory: CTA B/L  Cardiovascular: RRR  Gastrointestinal: soft abdomen, non-distended, appropriate kira-incisional tenderness, incision C/D/I with staples  Extremities: SCD's in place and working bilaterally  Vascular: equal DP pulses bilaterally  Musculoskeletal: Moving all extremities    LABS                        8.7    13.0  )-----------( 159      ( 13 Apr 2018 05:53 )             25.0     04-13    141  |  107  |  36<H>  ----------------------------<  148<H>  4.0   |  21<L>  |  2.33<H>    Ca    8.1<L>      13 Apr 2018 05:53  Phos  2.8     04-13  Mg     2.0     04-13    TPro  5.0<L>  /  Alb  2.6<L>  /  TBili  0.2  /  DBili  x   /  AST  7<L>  /  ALT  14  /  AlkPhos  36<L>  04-13    Tacrolimus (), Serum: 9.2: Tacrolimus testing is performed on the Abbott  by  chemiluminescent microparticle immunoassay. The therapeutic range of  tacrolimus is not clearly defined but target 12-hour trough whole blood  concentrations are 5.0 - 20.0 ng/mL early post transplant. Twenty-four  hour  trough concentrations are 33-50% less than the corresponding 12-hour  trough. ng/mL (04.12.18 @ 07:53)

## 2018-04-13 NOTE — PROGRESS NOTE ADULT - PROBLEM SELECTOR PLAN 3
Continue Tacrolimus, Steroid, Simulect induction, Nystatin S&S, bactrim, Valcyte & Cellcept  PRN Tylenol and Percocet  Adequate urine output

## 2018-04-15 LAB
CULTURE RESULTS: SIGNIFICANT CHANGE UP
SPECIMEN SOURCE: SIGNIFICANT CHANGE UP

## 2018-04-16 LAB
CULTURE RESULTS: SIGNIFICANT CHANGE UP
SPECIMEN SOURCE: SIGNIFICANT CHANGE UP

## 2018-04-17 ENCOUNTER — APPOINTMENT (OUTPATIENT)
Dept: TRANSPLANT | Facility: CLINIC | Age: 48
End: 2018-04-17
Payer: COMMERCIAL

## 2018-04-17 VITALS
TEMPERATURE: 98.3 F | BODY MASS INDEX: 34.4 KG/M2 | SYSTOLIC BLOOD PRESSURE: 152 MMHG | DIASTOLIC BLOOD PRESSURE: 83 MMHG | HEIGHT: 68 IN | OXYGEN SATURATION: 96 % | HEART RATE: 67 BPM | RESPIRATION RATE: 14 BRPM | WEIGHT: 227 LBS

## 2018-04-17 PROCEDURE — 99213 OFFICE O/P EST LOW 20 MIN: CPT | Mod: 24

## 2018-04-17 PROCEDURE — 99024 POSTOP FOLLOW-UP VISIT: CPT

## 2018-04-23 ENCOUNTER — APPOINTMENT (OUTPATIENT)
Dept: NEPHROLOGY | Facility: CLINIC | Age: 48
End: 2018-04-23
Payer: COMMERCIAL

## 2018-04-23 VITALS
WEIGHT: 199 LBS | DIASTOLIC BLOOD PRESSURE: 82 MMHG | BODY MASS INDEX: 30.16 KG/M2 | HEIGHT: 68 IN | TEMPERATURE: 98.1 F | RESPIRATION RATE: 18 BRPM | OXYGEN SATURATION: 98 % | HEART RATE: 77 BPM | SYSTOLIC BLOOD PRESSURE: 135 MMHG

## 2018-04-23 PROCEDURE — 99215 OFFICE O/P EST HI 40 MIN: CPT

## 2018-04-23 RX ORDER — FAMOTIDINE 20 MG/1
20 TABLET, FILM COATED ORAL DAILY
Qty: 30 | Refills: 11 | Status: DISCONTINUED | COMMUNITY
Start: 2017-10-04 | End: 2018-04-23

## 2018-04-23 RX ORDER — MYCOPHENOLATE MOFETIL 500 MG/1
500 TABLET ORAL EVERY 6 HOURS
Qty: 120 | Refills: 11 | Status: DISCONTINUED | COMMUNITY
Start: 2018-04-04 | End: 2018-04-23

## 2018-04-23 RX ORDER — DOCUSATE SODIUM 100 MG/1
100 CAPSULE, LIQUID FILLED ORAL TWICE DAILY
Qty: 60 | Refills: 11 | Status: DISCONTINUED | COMMUNITY
Start: 2018-04-04 | End: 2018-04-23

## 2018-04-23 RX ORDER — SENNOSIDES 8.6 MG TABLETS 8.6 MG/1
8.6 TABLET ORAL
Qty: 30 | Refills: 11 | Status: DISCONTINUED | COMMUNITY
Start: 2018-04-04 | End: 2018-04-23

## 2018-04-23 RX ORDER — MYCOPHENILIC ACID 360 MG/1
360 TABLET, DELAYED RELEASE ORAL
Qty: 60 | Refills: 11 | Status: DISCONTINUED | COMMUNITY
Start: 2018-04-23 | End: 2018-04-23

## 2018-04-23 RX ORDER — PREDNISONE 5 MG/1
5 TABLET ORAL DAILY
Qty: 30 | Refills: 3 | Status: DISCONTINUED | COMMUNITY
Start: 2018-04-04 | End: 2018-04-23

## 2018-04-23 RX ORDER — TACROLIMUS 0.5 MG/1
0.5 CAPSULE ORAL
Qty: 60 | Refills: 11 | Status: DISCONTINUED | COMMUNITY
Start: 2018-04-04 | End: 2018-04-23

## 2018-04-23 RX ORDER — TACROLIMUS 5 MG/1
5 CAPSULE ORAL TWICE DAILY
Qty: 120 | Refills: 11 | Status: DISCONTINUED | COMMUNITY
Start: 2018-04-04 | End: 2018-04-23

## 2018-04-23 RX ORDER — FUROSEMIDE 40 MG/1
40 TABLET ORAL DAILY
Qty: 30 | Refills: 11 | Status: DISCONTINUED | COMMUNITY
Start: 2018-04-18 | End: 2018-04-23

## 2018-05-01 ENCOUNTER — APPOINTMENT (OUTPATIENT)
Dept: TRANSPLANT | Facility: CLINIC | Age: 48
End: 2018-05-01
Payer: COMMERCIAL

## 2018-05-01 VITALS
RESPIRATION RATE: 14 BRPM | BODY MASS INDEX: 30.31 KG/M2 | DIASTOLIC BLOOD PRESSURE: 84 MMHG | SYSTOLIC BLOOD PRESSURE: 127 MMHG | OXYGEN SATURATION: 100 % | HEART RATE: 87 BPM | TEMPERATURE: 98.5 F | HEIGHT: 68 IN | WEIGHT: 200 LBS

## 2018-05-01 PROCEDURE — 87799 DETECT AGENT NOS DNA QUANT: CPT

## 2018-05-01 PROCEDURE — 99214 OFFICE O/P EST MOD 30 MIN: CPT | Mod: 24

## 2018-05-01 PROCEDURE — 87496 CYTOMEG DNA AMP PROBE: CPT

## 2018-05-01 PROCEDURE — 81001 URINALYSIS AUTO W/SCOPE: CPT

## 2018-05-01 RX ORDER — CHLORHEXIDINE GLUCONATE 4 %
400 (240 MG) LIQUID (ML) TOPICAL
Qty: 30 | Refills: 3 | Status: DISCONTINUED | COMMUNITY
Start: 2018-04-23 | End: 2018-05-01

## 2018-05-01 RX ORDER — VANCOMYCIN HYDROCHLORIDE 125 MG/1
125 CAPSULE ORAL
Qty: 56 | Refills: 0 | Status: DISCONTINUED | COMMUNITY
Start: 2018-04-13 | End: 2018-05-01

## 2018-05-01 RX ORDER — CARVEDILOL 6.25 MG/1
6.25 TABLET, FILM COATED ORAL
Qty: 60 | Refills: 11 | Status: DISCONTINUED | COMMUNITY
Start: 2018-04-23 | End: 2018-05-01

## 2018-05-10 ENCOUNTER — APPOINTMENT (OUTPATIENT)
Dept: NEPHROLOGY | Facility: CLINIC | Age: 48
End: 2018-05-10
Payer: COMMERCIAL

## 2018-05-10 VITALS
OXYGEN SATURATION: 98 % | HEIGHT: 68 IN | TEMPERATURE: 98 F | RESPIRATION RATE: 14 BRPM | SYSTOLIC BLOOD PRESSURE: 126 MMHG | DIASTOLIC BLOOD PRESSURE: 87 MMHG | HEART RATE: 97 BPM | WEIGHT: 203 LBS | BODY MASS INDEX: 30.77 KG/M2

## 2018-05-10 PROCEDURE — 99215 OFFICE O/P EST HI 40 MIN: CPT

## 2018-05-16 ENCOUNTER — APPOINTMENT (OUTPATIENT)
Dept: TRANSPLANT | Facility: CLINIC | Age: 48
End: 2018-05-16
Payer: COMMERCIAL

## 2018-05-16 VITALS
BODY MASS INDEX: 31.71 KG/M2 | TEMPERATURE: 97.6 F | SYSTOLIC BLOOD PRESSURE: 118 MMHG | OXYGEN SATURATION: 98 % | RESPIRATION RATE: 14 BRPM | DIASTOLIC BLOOD PRESSURE: 76 MMHG | WEIGHT: 202 LBS | HEART RATE: 96 BPM | HEIGHT: 67 IN

## 2018-05-16 PROCEDURE — 80197 ASSAY OF TACROLIMUS: CPT

## 2018-05-16 PROCEDURE — 84550 ASSAY OF BLOOD/URIC ACID: CPT | Mod: QW

## 2018-05-16 PROCEDURE — 87799 DETECT AGENT NOS DNA QUANT: CPT

## 2018-05-16 PROCEDURE — 85025 COMPLETE CBC W/AUTO DIFF WBC: CPT

## 2018-05-16 PROCEDURE — 83735 ASSAY OF MAGNESIUM: CPT

## 2018-05-16 PROCEDURE — 80053 COMPREHEN METABOLIC PANEL: CPT | Mod: QW

## 2018-05-16 PROCEDURE — 81001 URINALYSIS AUTO W/SCOPE: CPT

## 2018-05-16 PROCEDURE — 84100 ASSAY OF PHOSPHORUS: CPT

## 2018-05-16 PROCEDURE — 87496 CYTOMEG DNA AMP PROBE: CPT

## 2018-05-16 PROCEDURE — 84156 ASSAY OF PROTEIN URINE: CPT

## 2018-05-16 PROCEDURE — 83615 LACTATE (LD) (LDH) ENZYME: CPT

## 2018-05-16 PROCEDURE — 99214 OFFICE O/P EST MOD 30 MIN: CPT | Mod: 24

## 2018-05-16 RX ORDER — PREDNISONE 5 MG/1
5 TABLET ORAL DAILY
Refills: 0 | Status: DISCONTINUED | COMMUNITY
Start: 2018-04-13 | End: 2018-05-16

## 2018-05-17 ENCOUNTER — APPOINTMENT (OUTPATIENT)
Dept: TRANSPLANT | Facility: CLINIC | Age: 48
End: 2018-05-17

## 2018-05-21 ENCOUNTER — APPOINTMENT (OUTPATIENT)
Dept: NEPHROLOGY | Facility: CLINIC | Age: 48
End: 2018-05-21
Payer: COMMERCIAL

## 2018-05-21 VITALS — SYSTOLIC BLOOD PRESSURE: 138 MMHG | DIASTOLIC BLOOD PRESSURE: 82 MMHG

## 2018-05-21 DIAGNOSIS — Z01.818 ENCOUNTER FOR OTHER PREPROCEDURAL EXAMINATION: ICD-10-CM

## 2018-05-21 PROCEDURE — 99215 OFFICE O/P EST HI 40 MIN: CPT

## 2018-05-23 ENCOUNTER — APPOINTMENT (OUTPATIENT)
Dept: INFECTIOUS DISEASE | Facility: CLINIC | Age: 48
End: 2018-05-23
Payer: COMMERCIAL

## 2018-05-23 VITALS
HEIGHT: 67 IN | SYSTOLIC BLOOD PRESSURE: 119 MMHG | DIASTOLIC BLOOD PRESSURE: 76 MMHG | HEART RATE: 92 BPM | WEIGHT: 206 LBS | BODY MASS INDEX: 32.33 KG/M2 | OXYGEN SATURATION: 99 % | TEMPERATURE: 97.6 F

## 2018-05-23 PROCEDURE — 99244 OFF/OP CNSLTJ NEW/EST MOD 40: CPT

## 2018-05-23 RX ORDER — ESOMEPRAZOLE MAGNESIUM 40 MG/1
40 CAPSULE, DELAYED RELEASE ORAL DAILY
Qty: 90 | Refills: 3 | Status: DISCONTINUED | COMMUNITY
Start: 2018-04-23 | End: 2018-05-23

## 2018-05-24 ENCOUNTER — MEDICATION RENEWAL (OUTPATIENT)
Age: 48
End: 2018-05-24

## 2018-05-29 ENCOUNTER — APPOINTMENT (OUTPATIENT)
Dept: TRANSPLANT | Facility: CLINIC | Age: 48
End: 2018-05-29

## 2018-05-29 LAB
ALBUMIN SERPL ELPH-MCNC: 4.2 G/DL
ALP BLD-CCNC: 41 U/L
ALT SERPL-CCNC: 21 U/L
ANION GAP SERPL CALC-SCNC: 13 MMOL/L
APPEARANCE: ABNORMAL
AST SERPL-CCNC: 13 U/L
BASOPHILS # BLD AUTO: 0.04 K/UL
BASOPHILS NFR BLD AUTO: 0.6 %
BILIRUB SERPL-MCNC: 0.4 MG/DL
BILIRUBIN URINE: NEGATIVE
BKV DNA SPEC QL NAA+PROBE: NORMAL
BLOOD URINE: NEGATIVE
BUN SERPL-MCNC: 27 MG/DL
CALCIUM SERPL-MCNC: 9.9 MG/DL
CHLORIDE SERPL-SCNC: 107 MMOL/L
CO2 SERPL-SCNC: 22 MMOL/L
COLOR: YELLOW
CREAT SERPL-MCNC: 1.3 MG/DL
CREAT SPEC-SCNC: 124 MG/DL
CREAT/PROT UR: 0.1 RATIO
EOSINOPHIL # BLD AUTO: 0.17 K/UL
EOSINOPHIL NFR BLD AUTO: 2.4 %
G6PD SER-CCNC: 16.9 U/G HGB
GLUCOSE QUALITATIVE U: NEGATIVE MG/DL
GLUCOSE SERPL-MCNC: 104 MG/DL
HCT VFR BLD CALC: 33.7 %
HGB BLD-MCNC: 11.3 G/DL
IMM GRANULOCYTES NFR BLD AUTO: 0.4 %
KETONES URINE: NEGATIVE
LDH SERPL-CCNC: 220 U/L
LEUKOCYTE ESTERASE URINE: NEGATIVE
LYMPHOCYTES # BLD AUTO: 1.69 K/UL
LYMPHOCYTES NFR BLD AUTO: 24.2 %
MAGNESIUM SERPL-MCNC: 1.4 MG/DL
MAN DIFF?: NORMAL
MCHC RBC-ENTMCNC: 33.5 GM/DL
MCHC RBC-ENTMCNC: 34.3 PG
MCV RBC AUTO: 102.4 FL
MONOCYTES # BLD AUTO: 0.58 K/UL
MONOCYTES NFR BLD AUTO: 8.3 %
NEUTROPHILS # BLD AUTO: 4.48 K/UL
NEUTROPHILS NFR BLD AUTO: 64.1 %
NITRITE URINE: NEGATIVE
PH URINE: 5.5
PHOSPHATE SERPL-MCNC: 3.2 MG/DL
PLATELET # BLD AUTO: 156 K/UL
POTASSIUM SERPL-SCNC: 4.9 MMOL/L
PROT SERPL-MCNC: 7 G/DL
PROT UR-MCNC: 14 MG/DL
PROTEIN URINE: ABNORMAL MG/DL
RBC # BLD: 3.29 M/UL
RBC # FLD: 15.1 %
SODIUM SERPL-SCNC: 142 MMOL/L
SPECIFIC GRAVITY URINE: 1.02
TACROLIMUS SERPL-MCNC: 10.1 NG/ML
URATE SERPL-MCNC: 6.8 MG/DL
UROBILINOGEN URINE: NEGATIVE MG/DL
WBC # FLD AUTO: 6.99 K/UL

## 2018-05-31 ENCOUNTER — APPOINTMENT (OUTPATIENT)
Dept: INFECTIOUS DISEASE | Facility: CLINIC | Age: 48
End: 2018-05-31
Payer: COMMERCIAL

## 2018-05-31 VITALS
OXYGEN SATURATION: 99 % | SYSTOLIC BLOOD PRESSURE: 120 MMHG | HEART RATE: 93 BPM | DIASTOLIC BLOOD PRESSURE: 72 MMHG | WEIGHT: 208 LBS | HEIGHT: 67 IN | TEMPERATURE: 98.8 F | BODY MASS INDEX: 32.65 KG/M2

## 2018-05-31 PROCEDURE — 99213 OFFICE O/P EST LOW 20 MIN: CPT | Mod: 25

## 2018-05-31 PROCEDURE — G0455: CPT

## 2018-06-07 ENCOUNTER — LABORATORY RESULT (OUTPATIENT)
Age: 48
End: 2018-06-07

## 2018-06-07 ENCOUNTER — APPOINTMENT (OUTPATIENT)
Dept: NEPHROLOGY | Facility: CLINIC | Age: 48
End: 2018-06-07
Payer: COMMERCIAL

## 2018-06-07 ENCOUNTER — APPOINTMENT (OUTPATIENT)
Dept: INFECTIOUS DISEASE | Facility: CLINIC | Age: 48
End: 2018-06-07
Payer: COMMERCIAL

## 2018-06-07 VITALS
TEMPERATURE: 98.5 F | BODY MASS INDEX: 33.27 KG/M2 | WEIGHT: 212 LBS | OXYGEN SATURATION: 97 % | DIASTOLIC BLOOD PRESSURE: 79 MMHG | SYSTOLIC BLOOD PRESSURE: 128 MMHG | RESPIRATION RATE: 14 BRPM | HEIGHT: 67 IN

## 2018-06-07 VITALS
OXYGEN SATURATION: 97 % | WEIGHT: 212 LBS | HEIGHT: 67 IN | TEMPERATURE: 98.5 F | HEART RATE: 112 BPM | DIASTOLIC BLOOD PRESSURE: 75 MMHG | BODY MASS INDEX: 33.27 KG/M2 | RESPIRATION RATE: 14 BRPM | SYSTOLIC BLOOD PRESSURE: 129 MMHG

## 2018-06-07 PROCEDURE — 99213 OFFICE O/P EST LOW 20 MIN: CPT | Mod: 25

## 2018-06-07 PROCEDURE — 99215 OFFICE O/P EST HI 40 MIN: CPT

## 2018-06-07 PROCEDURE — G0455: CPT

## 2018-06-07 RX ORDER — METRONIDAZOLE 500 MG/1
500 TABLET ORAL 3 TIMES DAILY
Qty: 42 | Refills: 0 | Status: DISCONTINUED | COMMUNITY
Start: 2018-04-18 | End: 2018-06-07

## 2018-06-07 RX ORDER — VANCOMYCIN HYDROCHLORIDE 125 MG/1
125 CAPSULE ORAL 4 TIMES DAILY
Qty: 28 | Refills: 3 | Status: DISCONTINUED | COMMUNITY
Start: 2018-05-23 | End: 2018-06-07

## 2018-06-07 RX ORDER — SULFAMETHOXAZOLE AND TRIMETHOPRIM 400; 80 MG/1; MG/1
400-80 TABLET ORAL
Qty: 30 | Refills: 11 | Status: DISCONTINUED | COMMUNITY
Start: 2018-04-04 | End: 2018-06-07

## 2018-06-08 LAB
ALBUMIN SERPL ELPH-MCNC: 4.1 G/DL
ALP BLD-CCNC: 54 U/L
ALT SERPL-CCNC: 45 U/L
ANION GAP SERPL CALC-SCNC: 17 MMOL/L
APPEARANCE: ABNORMAL
AST SERPL-CCNC: 23 U/L
BACTERIA: ABNORMAL
BASOPHILS # BLD AUTO: 0.25 K/UL
BASOPHILS NFR BLD AUTO: 1.8 %
BILIRUB SERPL-MCNC: 0.2 MG/DL
BILIRUBIN URINE: NEGATIVE
BKV DNA SPEC QL NAA+PROBE: NORMAL
BLOOD URINE: NEGATIVE
BUN SERPL-MCNC: 20 MG/DL
CALCIUM SERPL-MCNC: 9.3 MG/DL
CHLORIDE SERPL-SCNC: 104 MMOL/L
CMV DNA SPEC QL NAA+PROBE: NOT DETECTED IU/ML
CO2 SERPL-SCNC: 19 MMOL/L
COLOR: YELLOW
CREAT SERPL-MCNC: 1.31 MG/DL
CREAT SPEC-SCNC: 198 MG/DL
CREAT/PROT UR: 0.1 RATIO
EOSINOPHIL # BLD AUTO: 0.12 K/UL
EOSINOPHIL NFR BLD AUTO: 0.9 %
GLUCOSE QUALITATIVE U: NEGATIVE MG/DL
GLUCOSE SERPL-MCNC: 154 MG/DL
HCT VFR BLD CALC: 35.7 %
HGB BLD-MCNC: 11.7 G/DL
HYALINE CASTS: 0 /LPF
KETONES URINE: NEGATIVE
LDH SERPL-CCNC: 247 U/L
LEUKOCYTE ESTERASE URINE: NEGATIVE
LYMPHOCYTES # BLD AUTO: 1 K/UL
LYMPHOCYTES NFR BLD AUTO: 7.3 %
MAGNESIUM SERPL-MCNC: 1.3 MG/DL
MAN DIFF?: NORMAL
MCHC RBC-ENTMCNC: 32.8 GM/DL
MCHC RBC-ENTMCNC: 32.8 PG
MCV RBC AUTO: 100 FL
MICROSCOPIC-UA: NORMAL
MONOCYTES # BLD AUTO: 0.37 K/UL
MONOCYTES NFR BLD AUTO: 2.7 %
NEUTROPHILS # BLD AUTO: 11.79 K/UL
NEUTROPHILS NFR BLD AUTO: 85.5 %
NITRITE URINE: NEGATIVE
PH URINE: 5
PHOSPHATE SERPL-MCNC: 2.5 MG/DL
PLATELET # BLD AUTO: 200 K/UL
POTASSIUM SERPL-SCNC: 4.2 MMOL/L
PROT SERPL-MCNC: 7.1 G/DL
PROT UR-MCNC: 22 MG/DL
PROTEIN URINE: 30 MG/DL
RBC # BLD: 3.57 M/UL
RBC # FLD: 14.2 %
RED BLOOD CELLS URINE: 1 /HPF
SODIUM SERPL-SCNC: 140 MMOL/L
SPECIFIC GRAVITY URINE: 1.03
SQUAMOUS EPITHELIAL CELLS: 17 /HPF
TACROLIMUS SERPL-MCNC: 9.4 NG/ML
URATE SERPL-MCNC: 6.7 MG/DL
URINE COMMENTS: NORMAL
UROBILINOGEN URINE: NEGATIVE MG/DL
WBC # FLD AUTO: 13.65 K/UL
WHITE BLOOD CELLS URINE: 2 /HPF

## 2018-06-11 LAB
BACTERIA UR CULT: NORMAL
G6PD SER-CCNC: 18 U/G HGB

## 2018-06-12 ENCOUNTER — APPOINTMENT (OUTPATIENT)
Dept: TRANSPLANT | Facility: CLINIC | Age: 48
End: 2018-06-12

## 2018-06-18 ENCOUNTER — APPOINTMENT (OUTPATIENT)
Dept: NEPHROLOGY | Facility: CLINIC | Age: 48
End: 2018-06-18

## 2018-06-26 ENCOUNTER — APPOINTMENT (OUTPATIENT)
Dept: TRANSPLANT | Facility: CLINIC | Age: 48
End: 2018-06-26

## 2018-07-02 ENCOUNTER — APPOINTMENT (OUTPATIENT)
Dept: NEPHROLOGY | Facility: CLINIC | Age: 48
End: 2018-07-02

## 2018-07-05 ENCOUNTER — APPOINTMENT (OUTPATIENT)
Dept: TRANSPLANT | Facility: CLINIC | Age: 48
End: 2018-07-05
Payer: COMMERCIAL

## 2018-07-05 ENCOUNTER — OUTPATIENT (OUTPATIENT)
Dept: OUTPATIENT SERVICES | Facility: HOSPITAL | Age: 48
LOS: 1 days | End: 2018-07-05
Payer: COMMERCIAL

## 2018-07-05 ENCOUNTER — APPOINTMENT (OUTPATIENT)
Dept: CT IMAGING | Facility: CLINIC | Age: 48
End: 2018-07-05
Payer: COMMERCIAL

## 2018-07-05 VITALS
SYSTOLIC BLOOD PRESSURE: 140 MMHG | RESPIRATION RATE: 14 BRPM | WEIGHT: 218 LBS | HEIGHT: 67 IN | OXYGEN SATURATION: 96 % | TEMPERATURE: 97.7 F | BODY MASS INDEX: 34.21 KG/M2 | HEART RATE: 85 BPM | DIASTOLIC BLOOD PRESSURE: 75 MMHG

## 2018-07-05 DIAGNOSIS — Z98.890 OTHER SPECIFIED POSTPROCEDURAL STATES: Chronic | ICD-10-CM

## 2018-07-05 DIAGNOSIS — I77.0 ARTERIOVENOUS FISTULA, ACQUIRED: Chronic | ICD-10-CM

## 2018-07-05 DIAGNOSIS — Z90.49 ACQUIRED ABSENCE OF OTHER SPECIFIED PARTS OF DIGESTIVE TRACT: Chronic | ICD-10-CM

## 2018-07-05 DIAGNOSIS — Z94.0 KIDNEY TRANSPLANT STATUS: ICD-10-CM

## 2018-07-05 DIAGNOSIS — Z98.51 TUBAL LIGATION STATUS: Chronic | ICD-10-CM

## 2018-07-05 DIAGNOSIS — N20.0 CALCULUS OF KIDNEY: Chronic | ICD-10-CM

## 2018-07-05 LAB
ALBUMIN SERPL ELPH-MCNC: 4.1 G/DL
ALP BLD-CCNC: 47 U/L
ALT SERPL-CCNC: 43 U/L
ANION GAP SERPL CALC-SCNC: 15 MMOL/L
APPEARANCE: CLEAR
AST SERPL-CCNC: 21 U/L
BACTERIA: ABNORMAL
BASOPHILS # BLD AUTO: 0.04 K/UL
BASOPHILS NFR BLD AUTO: 0.6 %
BILIRUB SERPL-MCNC: 0.6 MG/DL
BILIRUBIN URINE: NEGATIVE
BLOOD URINE: NEGATIVE
BUN SERPL-MCNC: 21 MG/DL
CALCIUM SERPL-MCNC: 9.7 MG/DL
CHLORIDE SERPL-SCNC: 103 MMOL/L
CO2 SERPL-SCNC: 22 MMOL/L
COLOR: YELLOW
CREAT SERPL-MCNC: 1.23 MG/DL
CREAT SPEC-SCNC: 193 MG/DL
CREAT/PROT UR: 0.1 RATIO
EOSINOPHIL # BLD AUTO: 0.22 K/UL
EOSINOPHIL NFR BLD AUTO: 3.5 %
GLUCOSE QUALITATIVE U: NEGATIVE MG/DL
GLUCOSE SERPL-MCNC: 136 MG/DL
HCT VFR BLD CALC: 38.3 %
HGB BLD-MCNC: 12.1 G/DL
HYALINE CASTS: 11 /LPF
IMM GRANULOCYTES NFR BLD AUTO: 0.3 %
KETONES URINE: NEGATIVE
LDH SERPL-CCNC: 257 U/L
LEUKOCYTE ESTERASE URINE: NEGATIVE
LYMPHOCYTES # BLD AUTO: 1.71 K/UL
LYMPHOCYTES NFR BLD AUTO: 27 %
MAGNESIUM SERPL-MCNC: 1.4 MG/DL
MAN DIFF?: NORMAL
MCHC RBC-ENTMCNC: 31.5 PG
MCHC RBC-ENTMCNC: 31.6 GM/DL
MCV RBC AUTO: 99.7 FL
MICROSCOPIC-UA: NORMAL
MONOCYTES # BLD AUTO: 0.79 K/UL
MONOCYTES NFR BLD AUTO: 12.5 %
NEUTROPHILS # BLD AUTO: 3.56 K/UL
NEUTROPHILS NFR BLD AUTO: 56.1 %
NITRITE URINE: NEGATIVE
PH URINE: 5
PHOSPHATE SERPL-MCNC: 3.3 MG/DL
PLATELET # BLD AUTO: 213 K/UL
POTASSIUM SERPL-SCNC: 4.2 MMOL/L
PROT SERPL-MCNC: 6.8 G/DL
PROT UR-MCNC: 18 MG/DL
PROTEIN URINE: ABNORMAL MG/DL
RBC # BLD: 3.84 M/UL
RBC # FLD: 13.6 %
RED BLOOD CELLS URINE: 2 /HPF
SODIUM SERPL-SCNC: 140 MMOL/L
SPECIFIC GRAVITY URINE: 1.03
SQUAMOUS EPITHELIAL CELLS: 14 /HPF
TACROLIMUS SERPL-MCNC: 10 NG/ML
URATE SERPL-MCNC: 6.7 MG/DL
UROBILINOGEN URINE: NEGATIVE MG/DL
WBC # FLD AUTO: 6.34 K/UL
WHITE BLOOD CELLS URINE: 3 /HPF

## 2018-07-05 PROCEDURE — 74176 CT ABD & PELVIS W/O CONTRAST: CPT | Mod: 26

## 2018-07-05 PROCEDURE — 99213 OFFICE O/P EST LOW 20 MIN: CPT

## 2018-07-05 PROCEDURE — 74176 CT ABD & PELVIS W/O CONTRAST: CPT

## 2018-07-05 RX ORDER — CLOTRIMAZOLE 10 MG/1
10 LOZENGE ORAL 3 TIMES DAILY
Qty: 180 | Refills: 0 | Status: COMPLETED | COMMUNITY
Start: 2018-05-01 | End: 2018-07-05

## 2018-07-09 ENCOUNTER — OTHER (OUTPATIENT)
Age: 48
End: 2018-07-09

## 2018-07-09 LAB
C DIFF TOX GENS STL QL NAA+PROBE: NORMAL
CDIFF BY PCR: DETECTED
CMV DNA SPEC QL NAA+PROBE: NOT DETECTED IU/ML
CMVPCR LOG: NOT DETECTED LOGIU/ML

## 2018-07-10 ENCOUNTER — APPOINTMENT (OUTPATIENT)
Dept: TRANSPLANT | Facility: CLINIC | Age: 48
End: 2018-07-10

## 2018-07-16 ENCOUNTER — LABORATORY RESULT (OUTPATIENT)
Age: 48
End: 2018-07-16

## 2018-07-16 ENCOUNTER — TRANSCRIPTION ENCOUNTER (OUTPATIENT)
Age: 48
End: 2018-07-16

## 2018-07-16 ENCOUNTER — APPOINTMENT (OUTPATIENT)
Dept: NEPHROLOGY | Facility: CLINIC | Age: 48
End: 2018-07-16
Payer: COMMERCIAL

## 2018-07-16 VITALS
SYSTOLIC BLOOD PRESSURE: 153 MMHG | WEIGHT: 217 LBS | HEART RATE: 95 BPM | BODY MASS INDEX: 34.06 KG/M2 | DIASTOLIC BLOOD PRESSURE: 77 MMHG | HEIGHT: 67 IN | TEMPERATURE: 98.3 F | OXYGEN SATURATION: 93 %

## 2018-07-16 PROBLEM — N18.4 CHRONIC KIDNEY DISEASE, STAGE 4 (SEVERE): Chronic | Status: ACTIVE | Noted: 2018-01-09

## 2018-07-16 LAB — BKV DNA SPEC QL NAA+PROBE: NORMAL

## 2018-07-16 PROCEDURE — 99215 OFFICE O/P EST HI 40 MIN: CPT

## 2018-07-17 LAB
ALBUMIN SERPL ELPH-MCNC: 4.4 G/DL
ALP BLD-CCNC: 50 U/L
ALT SERPL-CCNC: 55 U/L
ANION GAP SERPL CALC-SCNC: 13 MMOL/L
APPEARANCE: CLEAR
AST SERPL-CCNC: 22 U/L
BACTERIA: ABNORMAL
BASOPHILS # BLD AUTO: 0.06 K/UL
BASOPHILS NFR BLD AUTO: 0.6 %
BILIRUB SERPL-MCNC: 0.5 MG/DL
BILIRUBIN URINE: NEGATIVE
BLOOD URINE: ABNORMAL
BUN SERPL-MCNC: 17 MG/DL
CALCIUM SERPL-MCNC: 10 MG/DL
CHLORIDE SERPL-SCNC: 100 MMOL/L
CO2 SERPL-SCNC: 27 MMOL/L
COLOR: YELLOW
CREAT SERPL-MCNC: 1.23 MG/DL
CREAT SPEC-SCNC: 41 MG/DL
CREAT/PROT UR: 0.1 RATIO
EOSINOPHIL # BLD AUTO: 0.33 K/UL
EOSINOPHIL NFR BLD AUTO: 3.5 %
GLUCOSE QUALITATIVE U: NEGATIVE MG/DL
GLUCOSE SERPL-MCNC: 109 MG/DL
HCT VFR BLD CALC: 40.5 %
HGB BLD-MCNC: 13 G/DL
HYALINE CASTS: 1 /LPF
IMM GRANULOCYTES NFR BLD AUTO: 0.3 %
KETONES URINE: NEGATIVE
LDH SERPL-CCNC: 274 U/L
LEUKOCYTE ESTERASE URINE: NEGATIVE
LYMPHOCYTES # BLD AUTO: 2.62 K/UL
LYMPHOCYTES NFR BLD AUTO: 27.8 %
MAGNESIUM SERPL-MCNC: 1.7 MG/DL
MAN DIFF?: NORMAL
MCHC RBC-ENTMCNC: 31.5 PG
MCHC RBC-ENTMCNC: 32.1 GM/DL
MCV RBC AUTO: 98.1 FL
MICROSCOPIC-UA: NORMAL
MONOCYTES # BLD AUTO: 0.66 K/UL
MONOCYTES NFR BLD AUTO: 7 %
NEUTROPHILS # BLD AUTO: 5.72 K/UL
NEUTROPHILS NFR BLD AUTO: 60.8 %
NITRITE URINE: NEGATIVE
PH URINE: 6
PHOSPHATE SERPL-MCNC: 3.4 MG/DL
PLATELET # BLD AUTO: 207 K/UL
POTASSIUM SERPL-SCNC: 4.5 MMOL/L
PROT SERPL-MCNC: 7.1 G/DL
PROT UR-MCNC: 6 MG/DL
PROTEIN URINE: NEGATIVE MG/DL
RBC # BLD: 4.13 M/UL
RBC # FLD: 13.4 %
RED BLOOD CELLS URINE: 2 /HPF
SODIUM SERPL-SCNC: 140 MMOL/L
SPECIFIC GRAVITY URINE: 1.01
SQUAMOUS EPITHELIAL CELLS: 4 /HPF
TACROLIMUS SERPL-MCNC: 6.4 NG/ML
URATE SERPL-MCNC: 6.8 MG/DL
UROBILINOGEN URINE: NEGATIVE MG/DL
WBC # FLD AUTO: 9.42 K/UL
WHITE BLOOD CELLS URINE: 2 /HPF

## 2018-07-19 LAB — BKV DNA SPEC QL NAA+PROBE: NORMAL

## 2018-07-24 ENCOUNTER — LABORATORY RESULT (OUTPATIENT)
Age: 48
End: 2018-07-24

## 2018-07-24 ENCOUNTER — APPOINTMENT (OUTPATIENT)
Dept: TRANSPLANT | Facility: CLINIC | Age: 48
End: 2018-07-24
Payer: COMMERCIAL

## 2018-07-24 VITALS
RESPIRATION RATE: 12 BRPM | BODY MASS INDEX: 34.69 KG/M2 | TEMPERATURE: 97.6 F | WEIGHT: 221 LBS | HEIGHT: 67 IN | HEART RATE: 87 BPM | DIASTOLIC BLOOD PRESSURE: 76 MMHG | SYSTOLIC BLOOD PRESSURE: 135 MMHG | OXYGEN SATURATION: 97 %

## 2018-07-24 PROCEDURE — 99214 OFFICE O/P EST MOD 30 MIN: CPT

## 2018-07-25 LAB
ALBUMIN SERPL ELPH-MCNC: 3.9 G/DL
ALP BLD-CCNC: 50 U/L
ALT SERPL-CCNC: 61 U/L
ANION GAP SERPL CALC-SCNC: 12 MMOL/L
APPEARANCE: ABNORMAL
AST SERPL-CCNC: 33 U/L
BASOPHILS # BLD AUTO: 0.06 K/UL
BASOPHILS NFR BLD AUTO: 0.8 %
BILIRUB SERPL-MCNC: 0.4 MG/DL
BILIRUBIN URINE: NEGATIVE
BLOOD URINE: NEGATIVE
BUN SERPL-MCNC: 18 MG/DL
CALCIUM SERPL-MCNC: 9.2 MG/DL
CHLORIDE SERPL-SCNC: 104 MMOL/L
CO2 SERPL-SCNC: 23 MMOL/L
COLOR: YELLOW
CREAT SERPL-MCNC: 1.16 MG/DL
CREAT SPEC-SCNC: 121 MG/DL
CREAT/PROT UR: 0.1 RATIO
EOSINOPHIL # BLD AUTO: 0.23 K/UL
EOSINOPHIL NFR BLD AUTO: 3 %
GLUCOSE QUALITATIVE U: NEGATIVE MG/DL
GLUCOSE SERPL-MCNC: 109 MG/DL
HCT VFR BLD CALC: 37.5 %
HGB BLD-MCNC: 12.1 G/DL
IMM GRANULOCYTES NFR BLD AUTO: 0.1 %
KETONES URINE: NEGATIVE
LDH SERPL-CCNC: 296 U/L
LEUKOCYTE ESTERASE URINE: NEGATIVE
LYMPHOCYTES # BLD AUTO: 2.2 K/UL
LYMPHOCYTES NFR BLD AUTO: 28.9 %
MAGNESIUM SERPL-MCNC: 1.7 MG/DL
MAN DIFF?: NORMAL
MCHC RBC-ENTMCNC: 31.8 PG
MCHC RBC-ENTMCNC: 32.3 GM/DL
MCV RBC AUTO: 98.4 FL
MONOCYTES # BLD AUTO: 0.46 K/UL
MONOCYTES NFR BLD AUTO: 6 %
NEUTROPHILS # BLD AUTO: 4.65 K/UL
NEUTROPHILS NFR BLD AUTO: 61.2 %
NITRITE URINE: NEGATIVE
PH URINE: 5.5
PHOSPHATE SERPL-MCNC: 2.3 MG/DL
PLATELET # BLD AUTO: 233 K/UL
POTASSIUM SERPL-SCNC: 4.3 MMOL/L
PROT SERPL-MCNC: 6.6 G/DL
PROT UR-MCNC: 11 MG/DL
PROTEIN URINE: NEGATIVE MG/DL
RBC # BLD: 3.81 M/UL
RBC # FLD: 14 %
SODIUM SERPL-SCNC: 139 MMOL/L
SPECIFIC GRAVITY URINE: 1.01
TACROLIMUS SERPL-MCNC: 8.3 NG/ML
URATE SERPL-MCNC: 7.2 MG/DL
UROBILINOGEN URINE: NEGATIVE MG/DL
WBC # FLD AUTO: 7.61 K/UL

## 2018-07-26 LAB
CMV DNA SPEC QL NAA+PROBE: NOT DETECTED IU/ML
CMVPCR LOG: NOT DETECTED LOGIU/ML

## 2018-07-30 LAB — BKV DNA SPEC QL NAA+PROBE: NORMAL

## 2018-08-13 ENCOUNTER — APPOINTMENT (OUTPATIENT)
Dept: NEPHROLOGY | Facility: CLINIC | Age: 48
End: 2018-08-13
Payer: COMMERCIAL

## 2018-08-15 ENCOUNTER — APPOINTMENT (OUTPATIENT)
Dept: NEPHROLOGY | Facility: CLINIC | Age: 48
End: 2018-08-15
Payer: COMMERCIAL

## 2018-08-15 VITALS
DIASTOLIC BLOOD PRESSURE: 83 MMHG | SYSTOLIC BLOOD PRESSURE: 142 MMHG | RESPIRATION RATE: 14 BRPM | BODY MASS INDEX: 33.97 KG/M2 | HEIGHT: 67.5 IN | WEIGHT: 219 LBS | OXYGEN SATURATION: 96 % | TEMPERATURE: 98.6 F | HEART RATE: 67 BPM

## 2018-08-15 PROBLEM — N18.6 END STAGE RENAL DISEASE: Chronic | Status: ACTIVE | Noted: 2018-03-20

## 2018-08-15 PROBLEM — F41.9 ANXIETY DISORDER, UNSPECIFIED: Chronic | Status: ACTIVE | Noted: 2018-01-09

## 2018-08-15 PROCEDURE — 99215 OFFICE O/P EST HI 40 MIN: CPT

## 2018-08-15 RX ORDER — TACROLIMUS 0.5 MG/1
0.5 CAPSULE ORAL
Qty: 60 | Refills: 11 | Status: DISCONTINUED | COMMUNITY
Start: 2018-05-23 | End: 2018-08-15

## 2018-08-15 RX ORDER — TACROLIMUS 1 MG/1
1 CAPSULE ORAL TWICE DAILY
Qty: 120 | Refills: 3 | Status: DISCONTINUED | COMMUNITY
Start: 2018-04-04 | End: 2018-08-15

## 2018-08-15 RX ORDER — NYSTATIN 100000 [USP'U]/ML
100000 SUSPENSION ORAL 4 TIMES DAILY
Qty: 600 | Refills: 11 | Status: DISCONTINUED | COMMUNITY
Start: 2018-04-04 | End: 2018-08-15

## 2018-08-16 LAB
25(OH)D3 SERPL-MCNC: 10.2 NG/ML
ALBUMIN SERPL ELPH-MCNC: 4.2 G/DL
ALP BLD-CCNC: 58 U/L
ALT SERPL-CCNC: 72 U/L
ANION GAP SERPL CALC-SCNC: 14 MMOL/L
APPEARANCE: ABNORMAL
AST SERPL-CCNC: 34 U/L
BACTERIA: ABNORMAL
BASOPHILS # BLD AUTO: 0.03 K/UL
BASOPHILS NFR BLD AUTO: 0.4 %
BILIRUB SERPL-MCNC: 0.5 MG/DL
BILIRUBIN URINE: NEGATIVE
BLOOD URINE: NEGATIVE
BUN SERPL-MCNC: 22 MG/DL
CALCIUM SERPL-MCNC: 9.9 MG/DL
CHLORIDE SERPL-SCNC: 103 MMOL/L
CHOLEST SERPL-MCNC: 176 MG/DL
CHOLEST/HDLC SERPL: 6.5 RATIO
CO2 SERPL-SCNC: 23 MMOL/L
COLOR: YELLOW
CREAT SERPL-MCNC: 1.29 MG/DL
CREAT SPEC-SCNC: 201 MG/DL
CREAT/PROT UR: 0.1 RATIO
EOSINOPHIL # BLD AUTO: 0.23 K/UL
EOSINOPHIL NFR BLD AUTO: 2.8 %
GLUCOSE QUALITATIVE U: NEGATIVE MG/DL
GLUCOSE SERPL-MCNC: 140 MG/DL
HBA1C MFR BLD HPLC: 5.1 %
HCT VFR BLD CALC: 40 %
HDLC SERPL-MCNC: 27 MG/DL
HGB BLD-MCNC: 12.7 G/DL
HYALINE CASTS: 0 /LPF
IMM GRANULOCYTES NFR BLD AUTO: 0.2 %
KETONES URINE: NEGATIVE
LDH SERPL-CCNC: 240 U/L
LDLC SERPL CALC-MCNC: NORMAL
LEUKOCYTE ESTERASE URINE: NEGATIVE
LYMPHOCYTES # BLD AUTO: 2.31 K/UL
LYMPHOCYTES NFR BLD AUTO: 28 %
MAGNESIUM SERPL-MCNC: 1.6 MG/DL
MAN DIFF?: NORMAL
MCHC RBC-ENTMCNC: 31.4 PG
MCHC RBC-ENTMCNC: 31.8 GM/DL
MCV RBC AUTO: 99 FL
MICROSCOPIC-UA: NORMAL
MONOCYTES # BLD AUTO: 0.42 K/UL
MONOCYTES NFR BLD AUTO: 5.1 %
NEUTROPHILS # BLD AUTO: 5.24 K/UL
NEUTROPHILS NFR BLD AUTO: 63.5 %
NITRITE URINE: NEGATIVE
PH URINE: 5.5
PHOSPHATE SERPL-MCNC: 3.2 MG/DL
PLATELET # BLD AUTO: 208 K/UL
POTASSIUM SERPL-SCNC: 4.5 MMOL/L
PROT SERPL-MCNC: 6.8 G/DL
PROT UR-MCNC: 18 MG/DL
PROTEIN URINE: NEGATIVE MG/DL
RBC # BLD: 4.04 M/UL
RBC # FLD: 14.8 %
RED BLOOD CELLS URINE: 7 /HPF
SODIUM SERPL-SCNC: 140 MMOL/L
SPECIFIC GRAVITY URINE: 1.02
SQUAMOUS EPITHELIAL CELLS: 8 /HPF
TACROLIMUS SERPL-MCNC: 9.4 NG/ML
TRIGL SERPL-MCNC: 488 MG/DL
URATE SERPL-MCNC: 7.5 MG/DL
URINE COMMENTS: NORMAL
UROBILINOGEN URINE: NEGATIVE MG/DL
WBC # FLD AUTO: 8.25 K/UL
WHITE BLOOD CELLS URINE: 2 /HPF

## 2018-08-20 LAB — BKV DNA SPEC QL NAA+PROBE: NORMAL

## 2018-08-28 ENCOUNTER — APPOINTMENT (OUTPATIENT)
Dept: TRANSPLANT | Facility: CLINIC | Age: 48
End: 2018-08-28

## 2018-08-28 ENCOUNTER — LABORATORY RESULT (OUTPATIENT)
Age: 48
End: 2018-08-28

## 2018-09-02 ENCOUNTER — OTHER (OUTPATIENT)
Age: 48
End: 2018-09-02

## 2018-09-06 LAB
ALBUMIN SERPL ELPH-MCNC: 4.2 G/DL
ALP BLD-CCNC: 58 U/L
ALT SERPL-CCNC: 73 U/L
ANION GAP SERPL CALC-SCNC: 14 MMOL/L
APPEARANCE: ABNORMAL
AST SERPL-CCNC: 34 U/L
BASOPHILS # BLD AUTO: 0.03 K/UL
BASOPHILS NFR BLD AUTO: 0.3 %
BILIRUB SERPL-MCNC: 0.5 MG/DL
BILIRUBIN URINE: NEGATIVE
BKV DNA SPEC QL NAA+PROBE: NORMAL
BLOOD URINE: NEGATIVE
BUN SERPL-MCNC: 22 MG/DL
CALCIUM SERPL-MCNC: 9.3 MG/DL
CHLORIDE SERPL-SCNC: 105 MMOL/L
CO2 SERPL-SCNC: 23 MMOL/L
COLOR: YELLOW
CREAT SERPL-MCNC: 1.22 MG/DL
CREAT SPEC-SCNC: 286 MG/DL
CREAT/PROT UR: 0.1 RATIO
EOSINOPHIL # BLD AUTO: 0.2 K/UL
EOSINOPHIL NFR BLD AUTO: 2.1 %
GLUCOSE QUALITATIVE U: NEGATIVE
GLUCOSE SERPL-MCNC: 136 MG/DL
HCT VFR BLD CALC: 39.1 %
HGB BLD-MCNC: 12.6 G/DL
IMM GRANULOCYTES NFR BLD AUTO: 0.3 %
KETONES URINE: NEGATIVE
LDH SERPL-CCNC: 202 U/L
LEUKOCYTE ESTERASE URINE: NEGATIVE
LYMPHOCYTES # BLD AUTO: 2.23 K/UL
LYMPHOCYTES NFR BLD AUTO: 23.6 %
MAGNESIUM SERPL-MCNC: 1.5 MG/DL
MAN DIFF?: NORMAL
MCHC RBC-ENTMCNC: 32.2 GM/DL
MCHC RBC-ENTMCNC: 32.3 PG
MCV RBC AUTO: 100.3 FL
MONOCYTES # BLD AUTO: 0.59 K/UL
MONOCYTES NFR BLD AUTO: 6.2 %
NEUTROPHILS # BLD AUTO: 6.38 K/UL
NEUTROPHILS NFR BLD AUTO: 67.5 %
NITRITE URINE: NEGATIVE
PH URINE: 6
PHOSPHATE SERPL-MCNC: 3.9 MG/DL
PLATELET # BLD AUTO: 182 K/UL
POTASSIUM SERPL-SCNC: 4.3 MMOL/L
PROT SERPL-MCNC: 6.5 G/DL
PROT UR-MCNC: 29 MG/DL
PROTEIN URINE: ABNORMAL
RBC # BLD: 3.9 M/UL
RBC # FLD: 14.6 %
SODIUM SERPL-SCNC: 142 MMOL/L
SPECIFIC GRAVITY URINE: 1.02
TACROLIMUS SERPL-MCNC: 8.5 NG/ML
URATE SERPL-MCNC: 7.4 MG/DL
UROBILINOGEN URINE: NEGATIVE
WBC # FLD AUTO: 9.46 K/UL

## 2018-09-24 ENCOUNTER — APPOINTMENT (OUTPATIENT)
Dept: TRANSPLANT | Facility: CLINIC | Age: 48
End: 2018-09-24

## 2018-10-11 ENCOUNTER — APPOINTMENT (OUTPATIENT)
Dept: NEPHROLOGY | Facility: CLINIC | Age: 48
End: 2018-10-11

## 2018-10-11 ENCOUNTER — APPOINTMENT (OUTPATIENT)
Age: 48
End: 2018-10-11

## 2018-10-22 LAB
ALBUMIN SERPL ELPH-MCNC: 4.1 G/DL
ALP BLD-CCNC: 54 U/L
ALT SERPL-CCNC: 58 U/L
ANION GAP SERPL CALC-SCNC: 16 MMOL/L
APPEARANCE: CLEAR
AST SERPL-CCNC: 23 U/L
BACTERIA: NEGATIVE
BASOPHILS # BLD AUTO: 0.04 K/UL
BASOPHILS NFR BLD AUTO: 0.4 %
BILIRUB SERPL-MCNC: 0.7 MG/DL
BILIRUBIN URINE: NEGATIVE
BKV DNA SPEC QL NAA+PROBE: NORMAL
BLOOD URINE: ABNORMAL
BUN SERPL-MCNC: 20 MG/DL
CALCIUM SERPL-MCNC: 9.8 MG/DL
CHLORIDE SERPL-SCNC: 102 MMOL/L
CMV DNA SPEC QL NAA+PROBE: NOT DETECTED IU/ML
CMVPCR LOG: NOT DETECTED LOGIU/ML
CO2 SERPL-SCNC: 25 MMOL/L
COLOR: YELLOW
CREAT SERPL-MCNC: 1.3 MG/DL
CREAT SPEC-SCNC: 87 MG/DL
CREAT/PROT UR: 0.1 RATIO
EOSINOPHIL # BLD AUTO: 0.19 K/UL
EOSINOPHIL NFR BLD AUTO: 2.1 %
GLUCOSE QUALITATIVE U: NEGATIVE MG/DL
GLUCOSE SERPL-MCNC: 113 MG/DL
HCT VFR BLD CALC: 38.7 %
HGB BLD-MCNC: 12.1 G/DL
HYALINE CASTS: 0 /LPF
IMM GRANULOCYTES NFR BLD AUTO: 0.3 %
KETONES URINE: NEGATIVE
LDH SERPL-CCNC: 215 U/L
LEUKOCYTE ESTERASE URINE: NEGATIVE
LYMPHOCYTES # BLD AUTO: 2.16 K/UL
LYMPHOCYTES NFR BLD AUTO: 24.2 %
MAGNESIUM SERPL-MCNC: 1.4 MG/DL
MAN DIFF?: NORMAL
MCHC RBC-ENTMCNC: 31.3 GM/DL
MCHC RBC-ENTMCNC: 32.4 PG
MCV RBC AUTO: 103.5 FL
MICROSCOPIC-UA: NORMAL
MONOCYTES # BLD AUTO: 0.44 K/UL
MONOCYTES NFR BLD AUTO: 4.9 %
NEUTROPHILS # BLD AUTO: 6.05 K/UL
NEUTROPHILS NFR BLD AUTO: 68.1 %
NITRITE URINE: NEGATIVE
PH URINE: 5.5
PHOSPHATE SERPL-MCNC: 3.4 MG/DL
PLATELET # BLD AUTO: 175 K/UL
POTASSIUM SERPL-SCNC: 4.2 MMOL/L
PROT SERPL-MCNC: 6.4 G/DL
PROT UR-MCNC: 11 MG/DL
PROTEIN URINE: NEGATIVE MG/DL
RBC # BLD: 3.74 M/UL
RBC # FLD: 14.6 %
RED BLOOD CELLS URINE: 34 /HPF
SODIUM SERPL-SCNC: 143 MMOL/L
SPECIFIC GRAVITY URINE: 1.02
SQUAMOUS EPITHELIAL CELLS: 3 /HPF
TACROLIMUS SERPL-MCNC: 9.6 NG/ML
URINE COMMENTS: NORMAL
UROBILINOGEN URINE: NEGATIVE MG/DL
WBC # FLD AUTO: 8.91 K/UL
WHITE BLOOD CELLS URINE: 2 /HPF

## 2018-10-25 ENCOUNTER — APPOINTMENT (OUTPATIENT)
Dept: ORTHOPEDIC SURGERY | Facility: CLINIC | Age: 48
End: 2018-10-25
Payer: COMMERCIAL

## 2018-10-25 VITALS
DIASTOLIC BLOOD PRESSURE: 72 MMHG | HEART RATE: 68 BPM | WEIGHT: 215 LBS | BODY MASS INDEX: 32.58 KG/M2 | HEIGHT: 68 IN | SYSTOLIC BLOOD PRESSURE: 131 MMHG

## 2018-10-25 PROCEDURE — 73030 X-RAY EXAM OF SHOULDER: CPT | Mod: RT

## 2018-10-25 PROCEDURE — 72040 X-RAY EXAM NECK SPINE 2-3 VW: CPT

## 2018-10-25 PROCEDURE — 99203 OFFICE O/P NEW LOW 30 MIN: CPT

## 2018-10-26 ENCOUNTER — OTHER (OUTPATIENT)
Age: 48
End: 2018-10-26

## 2018-11-07 RX ORDER — VALGANCICLOVIR HYDROCHLORIDE 450 MG/1
450 TABLET ORAL
Qty: 15 | Refills: 0 | Status: DISCONTINUED | COMMUNITY
Start: 2018-04-04 | End: 2018-11-07

## 2018-11-08 ENCOUNTER — APPOINTMENT (OUTPATIENT)
Dept: NEPHROLOGY | Facility: CLINIC | Age: 48
End: 2018-11-08

## 2018-11-15 LAB
ALBUMIN SERPL ELPH-MCNC: 3.8 G/DL
ALP BLD-CCNC: 60 U/L
ALT SERPL-CCNC: 54 U/L
ANION GAP SERPL CALC-SCNC: 14 MMOL/L
APPEARANCE: ABNORMAL
AST SERPL-CCNC: 25 U/L
BACTERIA: NEGATIVE
BASOPHILS # BLD AUTO: 0.04 K/UL
BASOPHILS NFR BLD AUTO: 0.3 %
BILIRUB SERPL-MCNC: 0.6 MG/DL
BILIRUBIN URINE: NEGATIVE
BLOOD URINE: ABNORMAL
BUN SERPL-MCNC: 19 MG/DL
CALCIUM SERPL-MCNC: 9 MG/DL
CHLORIDE SERPL-SCNC: 102 MMOL/L
CMV DNA SPEC QL NAA+PROBE: NOT DETECTED IU/ML
CMVPCR LOG: NOT DETECTED LOGIU/ML
CO2 SERPL-SCNC: 23 MMOL/L
COLOR: YELLOW
CREAT SERPL-MCNC: 1.16 MG/DL
CREAT SPEC-SCNC: 132 MG/DL
CREAT/PROT UR: 0.1 RATIO
EOSINOPHIL # BLD AUTO: 0.2 K/UL
EOSINOPHIL NFR BLD AUTO: 1.6 %
GLUCOSE QUALITATIVE U: NEGATIVE MG/DL
GLUCOSE SERPL-MCNC: 172 MG/DL
HCT VFR BLD CALC: 37.8 %
HGB BLD-MCNC: 12.2 G/DL
HYALINE CASTS: 1 /LPF
IMM GRANULOCYTES NFR BLD AUTO: 0.3 %
KETONES URINE: NEGATIVE
LDH SERPL-CCNC: 207 U/L
LEUKOCYTE ESTERASE URINE: NEGATIVE
LYMPHOCYTES # BLD AUTO: 2.67 K/UL
LYMPHOCYTES NFR BLD AUTO: 21.5 %
MAGNESIUM SERPL-MCNC: 1.2 MG/DL
MAN DIFF?: NORMAL
MCHC RBC-ENTMCNC: 32.1 PG
MCHC RBC-ENTMCNC: 32.3 GM/DL
MCV RBC AUTO: 99.5 FL
MICROSCOPIC-UA: NORMAL
MONOCYTES # BLD AUTO: 0.8 K/UL
MONOCYTES NFR BLD AUTO: 6.4 %
NEUTROPHILS # BLD AUTO: 8.67 K/UL
NEUTROPHILS NFR BLD AUTO: 69.9 %
NITRITE URINE: NEGATIVE
PH URINE: 5.5
PHOSPHATE SERPL-MCNC: 3 MG/DL
PLATELET # BLD AUTO: 211 K/UL
POTASSIUM SERPL-SCNC: 4.2 MMOL/L
PROT SERPL-MCNC: 6.2 G/DL
PROT UR-MCNC: 12 MG/DL
PROTEIN URINE: NEGATIVE MG/DL
RBC # BLD: 3.8 M/UL
RBC # FLD: 13.5 %
RED BLOOD CELLS URINE: 12 /HPF
SODIUM SERPL-SCNC: 139 MMOL/L
SPECIFIC GRAVITY URINE: 1.02
SQUAMOUS EPITHELIAL CELLS: 4 /HPF
TACROLIMUS SERPL-MCNC: 10 NG/ML
URATE SERPL-MCNC: 6.8 MG/DL
UROBILINOGEN URINE: NEGATIVE MG/DL
WBC # FLD AUTO: 12.42 K/UL
WHITE BLOOD CELLS URINE: 1 /HPF

## 2018-11-15 RX ORDER — METHOCARBAMOL 500 MG/1
500 TABLET, FILM COATED ORAL 4 TIMES DAILY
Qty: 60 | Refills: 0 | Status: DISCONTINUED | COMMUNITY
Start: 2018-10-26 | End: 2018-11-15

## 2018-11-15 RX ORDER — TACROLIMUS 1 MG/1
1 CAPSULE ORAL
Refills: 0 | Status: DISCONTINUED | COMMUNITY
End: 2018-11-15

## 2018-11-15 RX ORDER — TACROLIMUS 0.5 MG/1
0.5 CAPSULE ORAL
Qty: 180 | Refills: 3 | Status: DISCONTINUED | COMMUNITY
Start: 2018-09-14 | End: 2018-11-15

## 2018-11-16 LAB
INR PPP: 0.95 RATIO
PT BLD: 10.8 SEC

## 2018-11-19 ENCOUNTER — APPOINTMENT (OUTPATIENT)
Dept: NEPHROLOGY | Facility: CLINIC | Age: 48
End: 2018-11-19
Payer: COMMERCIAL

## 2018-11-20 LAB — BKV DNA SPEC QL NAA+PROBE: NORMAL

## 2018-11-26 ENCOUNTER — APPOINTMENT (OUTPATIENT)
Dept: NEPHROLOGY | Facility: CLINIC | Age: 48
End: 2018-11-26
Payer: COMMERCIAL

## 2018-11-26 VITALS
TEMPERATURE: 98 F | DIASTOLIC BLOOD PRESSURE: 82 MMHG | SYSTOLIC BLOOD PRESSURE: 174 MMHG | HEART RATE: 81 BPM | RESPIRATION RATE: 14 BRPM | OXYGEN SATURATION: 92 % | WEIGHT: 233 LBS | HEIGHT: 68 IN | BODY MASS INDEX: 35.31 KG/M2

## 2018-11-26 LAB
ALBUMIN SERPL ELPH-MCNC: 3.9 G/DL
ALP BLD-CCNC: 55 U/L
ALT SERPL-CCNC: 101 U/L
ANION GAP SERPL CALC-SCNC: 12 MMOL/L
AST SERPL-CCNC: 65 U/L
BASOPHILS # BLD AUTO: 0.03 K/UL
BASOPHILS NFR BLD AUTO: 0.3 %
BILIRUB SERPL-MCNC: 0.6 MG/DL
BUN SERPL-MCNC: 20 MG/DL
CALCIUM SERPL-MCNC: 9.3 MG/DL
CHLORIDE SERPL-SCNC: 103 MMOL/L
CHOLEST SERPL-MCNC: 138 MG/DL
CHOLEST/HDLC SERPL: 4.3 RATIO
CO2 SERPL-SCNC: 24 MMOL/L
CREAT SERPL-MCNC: 1.16 MG/DL
EOSINOPHIL # BLD AUTO: 0.23 K/UL
EOSINOPHIL NFR BLD AUTO: 2.3 %
GLUCOSE SERPL-MCNC: 164 MG/DL
HBA1C MFR BLD HPLC: 5.5 %
HCT VFR BLD CALC: 37.5 %
HDLC SERPL-MCNC: 32 MG/DL
HGB BLD-MCNC: 12.1 G/DL
IMM GRANULOCYTES NFR BLD AUTO: 0.4 %
LDH SERPL-CCNC: 306 U/L
LDLC SERPL CALC-MCNC: 63 MG/DL
LYMPHOCYTES # BLD AUTO: 1.97 K/UL
LYMPHOCYTES NFR BLD AUTO: 19.8 %
MAGNESIUM SERPL-MCNC: 1.5 MG/DL
MAN DIFF?: NORMAL
MCHC RBC-ENTMCNC: 32.3 GM/DL
MCHC RBC-ENTMCNC: 32.6 PG
MCV RBC AUTO: 101.1 FL
MONOCYTES # BLD AUTO: 0.47 K/UL
MONOCYTES NFR BLD AUTO: 4.7 %
NEUTROPHILS # BLD AUTO: 7.19 K/UL
NEUTROPHILS NFR BLD AUTO: 72.5 %
PHOSPHATE SERPL-MCNC: 2.6 MG/DL
PLATELET # BLD AUTO: 182 K/UL
POTASSIUM SERPL-SCNC: 4.3 MMOL/L
PROT SERPL-MCNC: 6.5 G/DL
RBC # BLD: 3.71 M/UL
RBC # FLD: 13.8 %
SODIUM SERPL-SCNC: 139 MMOL/L
TRIGL SERPL-MCNC: 214 MG/DL
URATE SERPL-MCNC: 6.7 MG/DL
WBC # FLD AUTO: 9.93 K/UL

## 2018-11-26 PROCEDURE — 99215 OFFICE O/P EST HI 40 MIN: CPT

## 2018-11-26 RX ORDER — TACROLIMUS 0.5 MG/1
0.5 CAPSULE ORAL
Refills: 0 | Status: DISCONTINUED | COMMUNITY
End: 2018-11-26

## 2018-11-26 NOTE — PATIENT PROFILE ADULT. - TEACHING/LEARNING LEARNING PREFERENCES
February 19, 2019       Ana Lee  [address] [facility] [address]        Dear Ms. Lee,    Please review the enclosed prep instructions for your procedure with Donny Thurman MD.    Procedure and arrival times may occasionally change. Depending on the facility where your procedure is scheduled, you may receive a phone call prior to confirm those times.    Date of Procedure: March 15, 2019    Time of Procedure: 11:00 AM  Arrival Time: 10:00 AM    Location:  [facility]   [address] [place] [id number] [phone]    If you have any questions, please call the Gastrointestinal (GI) Department at [phone].    Thank you,    Open Access Scheduling Patient Line [phone]  Surgery Scheduler for Donny Thurman MD    14 Days Prior to Procedure:  • Hold Phentermine    7 Days Prior to Procedure:  • Do not eat popcorn, seeds, nuts or whole kernel corn.  • Do not take iron supplements or Pepto-Bismol.  • Hold Plavix or Clopidogrel for 5-7 days prior to the procedure   Please contact your cardiologist or Primary Care physician if you have questions or concerns about holding your Plavix or Coumadin or any generic brand.  Purchase prep from pharmacy if not a hospital prep.   · For Diabetics: Contact you primary care physician for recommendations regarding insulin on the day of you procedure.  • See bowel prep instructions below.    3 Days Prior to Procedure:  • If you take Coumadin, Warfarin, Xaralto or Pradaxa, please call your Primary Care Provider/Cardiologist.    Day Before Procedure:  • You may drink clear liquids only the day before your procedure (see clear liquid diet at the end of the letter).  • Do not drink alcoholic beverages for the 24 hours before your procedure.  • Do not take Lomotil, Imodium, Effer-syllium, Metamucil, Citrucel, Konsyl, Hydrocil or FiberCon.  • Mix prep according to the package directions  (refrigerate in the morning if needed).  • Do not eat or drink anything after midnight.    Day of Procedure:  • Do not take oral diabetic medication on the day of your procedure.   • You will be instructed during the call from the preadmission nurse at the designated Surgery Center regarding the medications you should take the morning of your procedure. Questions regarding pre-procedure medications should be directed to the Surgery Center listed on the first page of these instructions.    BOWEL PREP INSTRUCTIONS    Bowel Prep with Nulytely the Day Before Your Procedure:  · In the morning, mix the Nulytely prep with the flavor packet and one gallon of water, shake well to mix, and refrigerate to chill. Do not further dilute the prep in any way.  · At 5:00 pm, start drinking half of the Nulytely prep. Drink one large (8-10 oz.) glass every 10-15 minutes. In most cases, loose, liquidy bowel movements will begin within 30 minutes to one hour. You should remain within easy access of a bathroom. Continue to drink the prep regardless of stool frequency.  · You will drink half of the Nulytely prep now. The remaining 1/2 gallon will be used tomorrow. Put the remaining prep solution in the refrigerator.   · Continue to drink clear liquids (your physician recommends 16 oz of Gatorade) throughout the evening.    Bowel Prep with Nulytely the Day of Your Procedure:   · Starting 4 hours prior to your procedure drink the remainder of Nulytely prep over 1 hour.  · Do not eat or drink anything after you finish the prep.  · You must not have anything to drink 2 hours prior to the procedure.   · Take heart and/or blood pressure medications with a small sip of water. Do not take any other medications until after the procedure.     CLEAR LIQUID DIET    Clear Liquid Diet - You May Consume:  • Tea, coffee (decaf or regular), clear carbonated beverages (Sprite, 7Up, ginger ale)  • Gatorade (no purple, orange, blue, or red)  • Strained fruit  juices - no pulp (apple, white grape, lemonade)  • Soups - clear broth or consommé only  • Desserts - gelatin (no purple, orange, blue, or red flavors, and no fruit or toppings)  • Miscellaneous - sugar, honey, syrup, clear hard candy, salt    Clear Liquid Diet - Do Not Consume:  • Dairy - milk drinks/dairy products  • Protein - meat and meat substitutes  • Vegetables  • Fruits or fruit juices with unstrained pulp  • Grains and starches  • Desserts (except gelatins allowed)  • Fats    ADDITIONAL INSTRUCTIONS    · Empty Bowel: In order to proceed with your procedure, your bowels must be empty.  If your doctor is not able to clearly view your colon and you will risk cancellation or rescheduling of your procedure. Your stools should have a clear to see-through, cloudy, yellow appearance with no formed substance. If your stools are darker or have formed substance, please call the location where your procedure is scheduled to be done and ask for the pre-op nurse, who will get further instructions from your physician.  · Should you experience rectal irritation due to frequent stooling you may apply Desitin, Balmex, Vitamin A&D ointment or other similar product to the irritated area.  · Transporation:  A responsible family member/friend must come with you to your procedure and remain in the procedure area until you are discharged.  You are not allowed to drive, take a taxi, ride a bus, or leave the procedure center alone.  This is due to the sedation that you will receive during your procedure, and is for your safety as well as the safety of others.  · Do not plan on going to work or doing anything for the rest of the procedure day. You may resume eating/drinking with limitations following your procedure, do not consume alcohol.  · Pre-Procedure Testing: Some patients may need to complete pre-procedure testing prior to their procedure date as part of the surgery center's requirements.  You will be notified of any required  tests, orders will be entered into our electronic system and you can go to any [facility] lab to have the testing completed. No appointment is necessary for the lab and you do not need to be fasting.    Thank you for choosing [facility]   verbal instruction/written material

## 2018-11-27 LAB
APPEARANCE: CLEAR
BACTERIA: NEGATIVE
BILIRUBIN URINE: NEGATIVE
BLOOD URINE: NEGATIVE
COLOR: YELLOW
GLUCOSE QUALITATIVE U: NEGATIVE MG/DL
HYALINE CASTS: 0 /LPF
KETONES URINE: NEGATIVE
LEUKOCYTE ESTERASE URINE: NEGATIVE
MICROSCOPIC-UA: NORMAL
NITRITE URINE: NEGATIVE
PH URINE: 5.5
PROTEIN URINE: 30 MG/DL
RED BLOOD CELLS URINE: 11 /HPF
SPECIFIC GRAVITY URINE: 1.03
SQUAMOUS EPITHELIAL CELLS: 9 /HPF
UROBILINOGEN URINE: NEGATIVE MG/DL
WHITE BLOOD CELLS URINE: 2 /HPF

## 2018-11-29 LAB
25(OH)D3 SERPL-MCNC: 22.8 NG/ML
BKV DNA SPEC QL NAA+PROBE: NOT DETECTED COPIES/ML
CMV DNA SPEC QL NAA+PROBE: NOT DETECTED IU/ML
CMV IGG SERPL QL: <0.2 U/ML
CMV IGG SERPL-IMP: NEGATIVE
CMVPCR LOG: NOT DETECTED LOGIU/ML
CREAT SPEC-SCNC: 206 MG/DL
CREAT/PROT UR: 0.1 RATIO
PROT UR-MCNC: 30 MG/DL
TACROLIMUS SERPL-MCNC: 7.3 NG/ML

## 2018-12-06 ENCOUNTER — TRANSCRIPTION ENCOUNTER (OUTPATIENT)
Age: 48
End: 2018-12-06

## 2018-12-07 ENCOUNTER — APPOINTMENT (OUTPATIENT)
Dept: FAMILY MEDICINE | Facility: CLINIC | Age: 48
End: 2018-12-07

## 2018-12-17 ENCOUNTER — APPOINTMENT (OUTPATIENT)
Dept: NEPHROLOGY | Facility: CLINIC | Age: 48
End: 2018-12-17
Payer: COMMERCIAL

## 2018-12-17 VITALS
BODY MASS INDEX: 35.01 KG/M2 | DIASTOLIC BLOOD PRESSURE: 78 MMHG | HEIGHT: 68 IN | RESPIRATION RATE: 14 BRPM | OXYGEN SATURATION: 98 % | WEIGHT: 231 LBS | TEMPERATURE: 98.3 F | SYSTOLIC BLOOD PRESSURE: 149 MMHG | HEART RATE: 78 BPM

## 2018-12-17 PROCEDURE — 99214 OFFICE O/P EST MOD 30 MIN: CPT

## 2018-12-17 NOTE — PROGRESS NOTE ADULT - SUBJECTIVE AND OBJECTIVE BOX
[facility]  INFECTIOUS DISEASES AND INTERNAL MEDICINE OF [place]  =======================================================  Thomas Pope MD  Diplomates American Board of Internal Medicine and Infectious Diseases  =======================================================      ROLLY MEDEIROS [id number]    Follow up: Pyelonephritis, infected PCKD cyst, C Diff colitis     Still with fevers and Rt flank pain    Allergies:  Demerol HCl (Hives)  Demerol HCl (Hives; Rash)  Phenergan (Hives; Rash)  Phenergan Fortis (Hives)      Antibiotics:  vancomycin    Solution 250 milliGRAM(s) Oral every 6 hours  aztreonam  IVPB 2000 milliGRAM(s) IV Intermittent every 12 hours       REVIEW OF SYSTEMS:  CONSTITUTIONAL:  No Fever, + chills  HEENT:   No diplopia or blurred vision.  No earache, sore throat or runny nose.  CARDIOVASCULAR:  No pressure, squeezing, strangling, tightness, heaviness or aching about the chest, neck, axilla or epigastrium.  RESPIRATORY:  No cough, shortness of breath  GASTROINTESTINAL:  No nausea, vomiting or diarrhea.  GENITOURINARY:  No dysuria, frequency or urgency. + Rt flank pain  MUSCULOSKELETAL:  no joint aches, no muscle pain  SKIN:  No change in skin, hair or nails.  NEUROLOGIC:  No paresthesias, fasciculations  PSYCHIATRIC:  No disorder of thought or mood.  ENDOCRINE:  No heat or cold intolerance  HEMATOLOGICAL:  No easy bruising or bleeding.       Physical Exam:  Vital Signs Last 24 Hrs  T(C): 37.2 (25 Sep 2017 08:31), Max: 37.5 (25 Sep 2017 04:30)  T(F): 99 (25 Sep 2017 08:31), Max: 99.5 (25 Sep 2017 04:30)  HR: 53 (25 Sep 2017 08:31) (53 - 67)  BP: 100/51 (25 Sep 2017 08:31) (99/50 - 104/61)  RR: 20 (25 Sep 2017 08:31) (18 - 20)  SpO2: 96% (25 Sep 2017 08:31) (96% - 96%)      GEN: NAD, pleasant  HEENT: normocephalic and atraumatic. EOMI. PERRL.    NECK: Supple.   LUNGS: Clear to auscultation.  HEART: Regular rate and rhythm   ABDOMEN: Soft, nontender, and nondistended.  Positive bowel sounds.    : + Rt CVA tenderness  EXTREMITIES: Without any edema.  MSK: no joint swelling  NEUROLOGIC: Cranial nerves II through XII are grossly intact.  PSYCHIATRIC: Appropriate affect .  SKIN: No ulceration or induration present.      Labs:  09-25    140  |  103  |  31.0<H>  ----------------------------<  98  4.0   |  23.0  |  3.01<H>    Ca    8.7      25 Sep 2017 06:30  Phos  4.2     09-25  Mg     1.9     09-25                            9.2    9.1   )-----------( 291      ( 25 Sep 2017 06:30 )             28.7       RECENT CULTURES:  Culture - Blood (09.22.17 @ 19:06)    Specimen Source: .Blood Blood-Peripheral    Culture Results:   No growth at 48 hours      Culture - Blood (09.22.17 @ 17:07)    Specimen Source: .Blood Blood-Peripheral    Culture Results:   No growth at 48 hours      Culture - Acid Fast - CSF (09.18.17 @ 12:18)    Specimen Source: .CSF CSF    Acid Fast Bacilli Smear:   No acid fast bacilli seen by fluorochrome stain      Culture - Blood (09.18.17 @ 10:50)    Specimen Source: .Blood Blood-Peripheral    Culture Results:   No growth at 5 days.      Culture - Blood (09.18.17 @ 10:03)    Specimen Source: .Blood Blood-Peripheral    Culture Results:   No growth at 5 days.      Culture - Fungal, CSF (09.17.17 @ 14:52)    Specimen Source: .CSF CSF    Culture Results:   No growth to date  Culture in progress      Culture - CSF with Gram Stain . (09.17.17 @ 14:48)    Gram Stain:   No White blood cells  No organisms seen    Specimen Source: .CSF CSF    Culture Results:   No growth at 3 days.      Culture - Urine (09.16.17 @ 13:20)    Specimen Source: .Urine Clean Catch (Midstream)    Culture Results:   No growth      Culture - Urine (09.15.17 @ 19:43)    Specimen Source: .Urine Clean Catch (Midstream)    Culture Results:   No growth      Culture - Blood (09.15.17 @ 17:27)    Specimen Source: .Blood Blood-Peripheral    Culture Results:   No growth at 5 days.      Culture - Blood (09.15.17 @ 15:55)    Specimen Source: .Blood Blood-Peripheral    Culture Results:   No growth at 5 days.      Culture - Urine (09.13.17 @ 23:45)    -  Amikacin: S <=16    -  Ampicillin: R >16    -  Ampicillin/Sulbactam: S <=8/4    -  Aztreonam: S <=4    -  Cefazolin: S <=8    -  Cefepime: S <=4    -  Cefoxitin: S <=8    -  Ceftazidime: S <=1    -  Ceftriaxone: S <=1    -  Ciprofloxacin: S <=1    -  Ertapenem: S <=1    -  Gentamicin: S <=4    -  Imipenem: S <=1    -  Levofloxacin: S <=2    -  Meropenem: S <=1    -  Nitrofurantoin: S <=32    -  Piperacillin/Tazobactam: S <=16    -  Tobramycin: S <=4    -  Trimethoprim/Sulfamethoxazole: S <=2/38    Specimen Source: .Urine Clean Catch (Midstream)    Culture Results:   >100,000 CFU/ml Klebsiella pneumoniae    Organism Identification: Klebsiella pneumoniae    Organism: Klebsiella pneumoniae    Method Type: TITI        EXAM:  ABDOMEN (MRI) W O CON                        PROCEDURE DATE:  09/18/2017    INTERPRETATION:  Exam Type: ABDOMEN (MRI) W O CON   Exam Date and Time:  9/18/2017 8:11 PM   Indication:  Abdominal pain, polycystic kidney disease, fever   Comparison: CT 9/16/2017, ultrasound 9/13/2017, MRI 8/18/2013   Procedure: MRI of the abdomen without contrast was performed on a 1.5   Lidia magnet. Contrast was not administered secondary to renal   insufficiency.  Findings:  Innumerable cystic lesions throughout bilateral enlarged kidneys as seen   on multiple prior exams compatible with known polycystic kidney disease.   Multiple bilateral hemorrhagic renal cyst identified. Limited evaluation   of solid masses given lack of IV contrast however no definite findings to   suggest solid lesions. Minimal to mild infiltrative stranding is seen   surrounding the proximal right ureter as seen on CT of prior day. No   hydronephrosis bilaterally. No bilateral perinephric collections.   Aortocaval lymph node measuring 1.4 cm unchanged. Several left renal   calcifications as seen on CT.  Several left hepatic cysts are unchanged. No hepatic steatosis.   Subcentimeter peggy hepatis and portacaval lymph nodes likely reactive in   nature. Spleen is enlarged measuring 14 cm. Prior cholecystectomy. No   intrahepatic or extra hepatic biliary dilatation. Pancreas is of normal   signal intensity with tiny less than 2 mm pancreatic body and pancreatic   tail cyst. Pancreatic duct is of normal caliber.  Thickening of the   bilateral adrenal glands without focal mass.  Patchy opacities in the right lung base may represent atelectasis or   pneumonia, clinically correlate. Small umbilical fat-containing hernia.   Mild degenerative changes.  IMPRESSION:  Findings compatible with polycystic kidney disease as seen on prior   exams. No hydronephrosis bilaterally. Minimal to mild infiltrative   changes surrounding the proximal right ureter as seen on recent CT. No   perinephric collections.  Patchy opacities in the right lung base may represent atelectasis or   pneumonia, clinically correlate      EXAM:  NM MULTI DAY PROCEDURE                        EXAM:  NM INFLAMMATORY LOC WBC WB                        EXAM:  NM INFLAMMATORY LOC SPECT                        PROCEDURE DATE:  09/21/2017    INTERPRETATION:  RADIOPHARMACEUTICAL:  0.5 mCi indium labeled autologous   leukocytes, I.V. injected on 9/20/2017.  CLINICAL INFORMATION: 46-year-old female with history of polycystic   kidney disease, presents with recurrent fever, UTI, C. difficile   infection, and abdominal pain, to evaluate for infection.  TECHNIQUE:  Whole-body images were obtained in the anterior and posterior   projections approximately 24 hours after radiotracer administration.   SPECT of the abdomen was also performed.  COMPARISON: No prior labeled leukocyte study available.  FINDINGS: There is labeled leukocyte accumulation in the right mid   abdomen, in the region of the right kidney.  IMPRESSION:   Abnormal indium labeled leukocyte scan.  Findings suspicious for infection of the right kidney denies loose teeth

## 2018-12-18 ENCOUNTER — OTHER (OUTPATIENT)
Age: 48
End: 2018-12-18

## 2018-12-18 RX ORDER — TACROLIMUS 1 MG/1
1 CAPSULE ORAL TWICE DAILY
Qty: 360 | Refills: 3 | Status: DISCONTINUED | COMMUNITY
Start: 2018-09-02 | End: 2018-12-18

## 2018-12-21 ENCOUNTER — EMERGENCY (EMERGENCY)
Facility: HOSPITAL | Age: 48
LOS: 1 days | Discharge: HOME | End: 2018-12-21
Attending: EMERGENCY MEDICINE
Payer: COMMERCIAL

## 2018-12-21 VITALS
HEIGHT: 69 IN | WEIGHT: 225.09 LBS | TEMPERATURE: 98 F | OXYGEN SATURATION: 98 % | RESPIRATION RATE: 20 BRPM | HEART RATE: 79 BPM

## 2018-12-21 DIAGNOSIS — Z98.890 OTHER SPECIFIED POSTPROCEDURAL STATES: Chronic | ICD-10-CM

## 2018-12-21 DIAGNOSIS — I77.0 ARTERIOVENOUS FISTULA, ACQUIRED: Chronic | ICD-10-CM

## 2018-12-21 DIAGNOSIS — N20.0 CALCULUS OF KIDNEY: Chronic | ICD-10-CM

## 2018-12-21 DIAGNOSIS — Z90.49 ACQUIRED ABSENCE OF OTHER SPECIFIED PARTS OF DIGESTIVE TRACT: Chronic | ICD-10-CM

## 2018-12-21 DIAGNOSIS — Z98.51 TUBAL LIGATION STATUS: Chronic | ICD-10-CM

## 2018-12-21 LAB
BASE EXCESS BLDV CALC-SCNC: 0.2 MMOL/L — SIGNIFICANT CHANGE UP (ref -2–2)
BLOOD GAS SOURCE: SIGNIFICANT CHANGE UP
CA-I SERPL-SCNC: 1.12 MMOL/L — LOW (ref 1.15–1.33)
CHLORIDE BLDV-SCNC: 106 MMOL/L — SIGNIFICANT CHANGE UP (ref 98–107)
COHGB MFR BLDV: 1.2 % — SIGNIFICANT CHANGE UP (ref 0–2)
GAS PNL BLDV: 140 MMOL/L — SIGNIFICANT CHANGE UP (ref 135–145)
GAS PNL BLDV: SIGNIFICANT CHANGE UP
GAS PNL BLDV: SIGNIFICANT CHANGE UP
GLUCOSE BLDV-MCNC: 167 MG/DL — HIGH (ref 70–99)
HCO3 BLDV-SCNC: 25 MMOL/L — SIGNIFICANT CHANGE UP (ref 20–26)
HCT VFR BLDA CALC: 35 — LOW (ref 39–50)
HGB BLD CALC-MCNC: 11.5 G/DL — SIGNIFICANT CHANGE UP (ref 11.5–15.5)
HGB BLD CALC-MCNC: 12 G/DL — SIGNIFICANT CHANGE UP (ref 11.5–15.5)
LACTATE BLDV-MCNC: 1.9 MMOL/L — SIGNIFICANT CHANGE UP (ref 0.5–2)
OTHER CELLS CSF MANUAL: 16 ML/DL — LOW (ref 18–22)
PCO2 BLDV: 42 MMHG — SIGNIFICANT CHANGE UP (ref 35–50)
PH BLDV: 7.39 — SIGNIFICANT CHANGE UP (ref 7.32–7.43)
PO2 BLDV: 265 MMHG — HIGH (ref 25–45)
POTASSIUM BLDV-SCNC: 4.4 MMOL/L — SIGNIFICANT CHANGE UP (ref 3.4–4.5)
SAO2 % BLDV: 100 % — SIGNIFICANT CHANGE UP

## 2018-12-21 PROCEDURE — 82330 ASSAY OF CALCIUM: CPT

## 2018-12-21 PROCEDURE — 36415 COLL VENOUS BLD VENIPUNCTURE: CPT

## 2018-12-21 PROCEDURE — 82435 ASSAY OF BLOOD CHLORIDE: CPT

## 2018-12-21 PROCEDURE — 93005 ELECTROCARDIOGRAM TRACING: CPT

## 2018-12-21 PROCEDURE — 93010 ELECTROCARDIOGRAM REPORT: CPT

## 2018-12-21 PROCEDURE — 85014 HEMATOCRIT: CPT

## 2018-12-21 PROCEDURE — 82947 ASSAY GLUCOSE BLOOD QUANT: CPT

## 2018-12-21 PROCEDURE — 99284 EMERGENCY DEPT VISIT MOD MDM: CPT

## 2018-12-21 PROCEDURE — 83605 ASSAY OF LACTIC ACID: CPT

## 2018-12-21 PROCEDURE — 99283 EMERGENCY DEPT VISIT LOW MDM: CPT

## 2018-12-21 PROCEDURE — 84295 ASSAY OF SERUM SODIUM: CPT

## 2018-12-21 PROCEDURE — 82803 BLOOD GASES ANY COMBINATION: CPT

## 2018-12-21 PROCEDURE — 82375 ASSAY CARBOXYHB QUANT: CPT

## 2018-12-21 PROCEDURE — 84132 ASSAY OF SERUM POTASSIUM: CPT

## 2018-12-21 RX ORDER — ACETAMINOPHEN 500 MG
975 TABLET ORAL ONCE
Qty: 0 | Refills: 0 | Status: COMPLETED | OUTPATIENT
Start: 2018-12-21 | End: 2018-12-21

## 2018-12-21 RX ADMIN — Medication 975 MILLIGRAM(S): at 15:44

## 2018-12-21 NOTE — ED ADULT NURSE NOTE - NSIMPLEMENTINTERV_GEN_ALL_ED
Implemented All Universal Safety Interventions:  [place] to call system. Call bell, personal items and telephone within reach. Instruct patient to call for assistance. Room bathroom lighting operational. Non-slip footwear when patient is off stretcher. Physically safe environment: no spills, clutter or unnecessary equipment. Stretcher in lowest position, wheels locked, appropriate side rails in place.
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
## 2018-12-21 NOTE — ED PROVIDER NOTE - CHPI ED SYMPTOMS NEG
no flushing/no sleeping issues/no chest tightness/no disorientation/no difficulty breathing/no fussiness/no lethargy/no numbness/no hematuria/no loss of consciousness/no nausea/no seizure/no thirst/no tingling/no weakness/no dark urine/no dizziness/no facial pain/pressure/no fever/no diarrhea/no discoloration/no dry mucous membranes/no edema/no fatigue/no irritability/no dehydration/no abdominal distension/no hypotension/no itching/no change in level of consciousness/no chills/no crying/no decreased eating/drinking/no incontinent of urine/no pulling at ears/no ataxia/no congestion/no rash/no blurred vision/no redness/no shortness of breath/no vomiting
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
## 2019-01-14 LAB
ALBUMIN SERPL ELPH-MCNC: 4 G/DL
ALP BLD-CCNC: 51 U/L
ALT SERPL-CCNC: 101 U/L
ANION GAP SERPL CALC-SCNC: 13 MMOL/L
APPEARANCE: CLEAR
AST SERPL-CCNC: 55 U/L
BACTERIA: NEGATIVE
BASOPHILS # BLD AUTO: 0.03 K/UL
BASOPHILS NFR BLD AUTO: 0.3 %
BILIRUB SERPL-MCNC: 0.6 MG/DL
BILIRUBIN URINE: NEGATIVE
BKV DNA SPEC QL NAA+PROBE: NOT DETECTED COPIES/ML
BLOOD URINE: NEGATIVE
BUN SERPL-MCNC: 20 MG/DL
CALCIUM OXALATE CRYSTALS: ABNORMAL
CALCIUM SERPL-MCNC: 9.3 MG/DL
CHLORIDE SERPL-SCNC: 103 MMOL/L
CMV DNA SPEC QL NAA+PROBE: NOT DETECTED IU/ML
CMVPCR LOG: NOT DETECTED LOGIU/ML
CO2 SERPL-SCNC: 24 MMOL/L
COLOR: YELLOW
CREAT SERPL-MCNC: 1.11 MG/DL
CREAT SPEC-SCNC: 203 MG/DL
CREAT/PROT UR: 0.1 RATIO
EOSINOPHIL # BLD AUTO: 0.16 K/UL
EOSINOPHIL NFR BLD AUTO: 1.6 %
GLUCOSE QUALITATIVE U: NEGATIVE MG/DL
GLUCOSE SERPL-MCNC: 159 MG/DL
HCT VFR BLD CALC: 37.4 %
HGB BLD-MCNC: 11.7 G/DL
HYALINE CASTS: 0 /LPF
IMM GRANULOCYTES NFR BLD AUTO: 0.2 %
KETONES URINE: ABNORMAL
LDH SERPL-CCNC: 304 U/L
LEUKOCYTE ESTERASE URINE: NEGATIVE
LYMPHOCYTES # BLD AUTO: 2.2 K/UL
LYMPHOCYTES NFR BLD AUTO: 21.9 %
MAGNESIUM SERPL-MCNC: 1.6 MG/DL
MAN DIFF?: NORMAL
MCHC RBC-ENTMCNC: 31.3 GM/DL
MCHC RBC-ENTMCNC: 31.7 PG
MCV RBC AUTO: 101.4 FL
MICROSCOPIC-UA: NORMAL
MONOCYTES # BLD AUTO: 0.6 K/UL
MONOCYTES NFR BLD AUTO: 6 %
NEUTROPHILS # BLD AUTO: 7.03 K/UL
NEUTROPHILS NFR BLD AUTO: 70 %
NITRITE URINE: NEGATIVE
PH URINE: 5.5
PHOSPHATE SERPL-MCNC: 2.2 MG/DL
PLATELET # BLD AUTO: 215 K/UL
POTASSIUM SERPL-SCNC: 4.3 MMOL/L
PROT SERPL-MCNC: 6.6 G/DL
PROT UR-MCNC: 20 MG/DL
PROTEIN URINE: NEGATIVE MG/DL
RBC # BLD: 3.69 M/UL
RBC # FLD: 13.6 %
RED BLOOD CELLS URINE: 3 /HPF
SODIUM SERPL-SCNC: 140 MMOL/L
SPECIFIC GRAVITY URINE: 1.03
SQUAMOUS EPITHELIAL CELLS: 16 /HPF
TACROLIMUS SERPL-MCNC: 6.5 NG/ML
URINE COMMENTS: NORMAL
UROBILINOGEN URINE: NEGATIVE MG/DL
WBC # FLD AUTO: 10.04 K/UL
WHITE BLOOD CELLS URINE: 3 /HPF

## 2019-01-24 ENCOUNTER — APPOINTMENT (OUTPATIENT)
Dept: NEPHROLOGY | Facility: CLINIC | Age: 49
End: 2019-01-24
Payer: COMMERCIAL

## 2019-01-24 VITALS
TEMPERATURE: 98.1 F | RESPIRATION RATE: 19 BRPM | HEART RATE: 68 BPM | SYSTOLIC BLOOD PRESSURE: 128 MMHG | OXYGEN SATURATION: 95 % | HEIGHT: 68 IN | DIASTOLIC BLOOD PRESSURE: 79 MMHG | BODY MASS INDEX: 35.61 KG/M2 | WEIGHT: 235 LBS

## 2019-01-24 PROCEDURE — 99214 OFFICE O/P EST MOD 30 MIN: CPT

## 2019-01-24 RX ORDER — ESOMEPRAZOLE MAGNESIUM 40 MG/1
40 CAPSULE, DELAYED RELEASE ORAL DAILY
Qty: 90 | Refills: 3 | Status: DISCONTINUED | COMMUNITY
Start: 2018-05-23 | End: 2019-01-24

## 2019-01-24 RX ORDER — NIFEDIPINE 60 MG/1
60 TABLET, EXTENDED RELEASE ORAL
Qty: 90 | Refills: 3 | Status: DISCONTINUED | COMMUNITY
Start: 2018-05-01 | End: 2019-01-24

## 2019-01-24 NOTE — ASSESSMENT
[FreeTextEntry1] : 1.  Renal transplant - also with b/l native nephrectomies.  Early on likely ACR treated with solumedrol 750mgs x3.  Creatinine stable around 1.   \par 2.  Immunosuppression - target tacro 6-8, myfortic 360mgs BID and pred 5.  Diarrhea resolved on low dose myfortic.  Explained that Envarsus same drug as tacrolimus IR.  Switched because she was forgetting night dose of Prograf.    \par 3.  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  \par 4.  Dysphagia - resolved on nexium.  \par 5.  Diarrhea - resolved on low dose myfortic.  In addition her Cdiff has been treated with a prolonged course of vancomycin then finally a stool transplant.  \par 6.  Hyperglycemia - concerning with weight gain but last A1c was 5.5\par 7.  Hypomagnesemia -now on pepcid.  Will reassess.\par 8.  Muscle cramps - check electrolytes. \par 9.  Irregular menses - Pt will see her GYN this week.  \par \par Pt will return for f/u in 4 weeks if labs are ok. \par unclear etiology of easy bruising but likely benign.

## 2019-01-24 NOTE — PHYSICAL EXAM
[General Appearance - Alert] : alert [General Appearance - Well Nourished] : well nourished [Sclera] : the sclera and conjunctiva were normal [Extraocular Movements] : extraocular movements were intact [Outer Ear] : the ears and nose were normal in appearance [Neck Appearance] : the appearance of the neck was normal [Neck Cervical Mass (___cm)] : no neck mass was observed [Jugular Venous Distention Increased] : there was no jugular-venous distention [Respiration, Rhythm And Depth] : normal respiratory rhythm and effort [Exaggerated Use Of Accessory Muscles For Inspiration] : no accessory muscle use [Auscultation Breath Sounds / Voice Sounds] : lungs were clear to auscultation bilaterally [Heart Rate And Rhythm] : heart rate was normal and rhythm regular [Heart Sounds] : normal S1 and S2 [Heart Sounds Gallop] : no gallops [Murmurs] : no murmurs [Heart Sounds Pericardial Friction Rub] : no pericardial rub [Bowel Sounds] : normal bowel sounds [Abdomen Soft] : soft [Abdomen Tenderness] : non-tender [Abdomen Mass (___ Cm)] : no abdominal mass palpated [Cervical Lymph Nodes Enlarged Posterior Bilaterally] : posterior cervical [Cervical Lymph Nodes Enlarged Anterior Bilaterally] : anterior cervical [Supraclavicular Lymph Nodes Enlarged Bilaterally] : supraclavicular [Axillary Lymph Nodes Enlarged Bilaterally] : axillary [No CVA Tenderness] : no ~M costovertebral angle tenderness [Abnormal Walk] : normal gait [Nail Clubbing] : no clubbing  or cyanosis of the fingernails [Musculoskeletal - Swelling] : no joint swelling seen [Skin Color & Pigmentation] : normal skin color and pigmentation [Skin Turgor] : normal skin turgor [] : no rash [Skin Lesions] : no skin lesions [Cranial Nerves] : cranial nerves 2-12 were intact [Oriented To Time, Place, And Person] : oriented to person, place, and time [Impaired Insight] : insight and judgment were intact [Affect] : the affect was normal [Mood] : the mood was normal [FreeTextEntry1] : midline incision healed.
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
## 2019-01-25 ENCOUNTER — OTHER (OUTPATIENT)
Age: 49
End: 2019-01-25

## 2019-01-25 LAB
ALBUMIN SERPL ELPH-MCNC: 4 G/DL
ALP BLD-CCNC: 57 U/L
ALT SERPL-CCNC: 79 U/L
ANION GAP SERPL CALC-SCNC: 12 MMOL/L
APPEARANCE: CLEAR
AST SERPL-CCNC: 44 U/L
BACTERIA: NEGATIVE
BASOPHILS # BLD AUTO: 0.02 K/UL
BASOPHILS NFR BLD AUTO: 0.2 %
BILIRUB SERPL-MCNC: 0.5 MG/DL
BILIRUBIN URINE: NEGATIVE
BKV DNA SPEC QL NAA+PROBE: NOT DETECTED COPIES/ML
BLOOD URINE: NEGATIVE
BUN SERPL-MCNC: 17 MG/DL
CALCIUM SERPL-MCNC: 9.3 MG/DL
CHLORIDE SERPL-SCNC: 105 MMOL/L
CHOLEST SERPL-MCNC: 138 MG/DL
CHOLEST/HDLC SERPL: 4.5 RATIO
CO2 SERPL-SCNC: 22 MMOL/L
COLOR: YELLOW
CREAT SERPL-MCNC: 1.04 MG/DL
CREAT SPEC-SCNC: 157 MG/DL
CREAT/PROT UR: 0.1 RATIO
EOSINOPHIL # BLD AUTO: 0.23 K/UL
EOSINOPHIL NFR BLD AUTO: 2.6 %
GLUCOSE QUALITATIVE U: NEGATIVE MG/DL
GLUCOSE SERPL-MCNC: 137 MG/DL
HBA1C MFR BLD HPLC: 5.9 %
HCT VFR BLD CALC: 37.3 %
HDLC SERPL-MCNC: 31 MG/DL
HGB BLD-MCNC: 11.9 G/DL
HYALINE CASTS: 2 /LPF
IMM GRANULOCYTES NFR BLD AUTO: 0.3 %
KETONES URINE: NEGATIVE
LDH SERPL-CCNC: 193 U/L
LDLC SERPL CALC-MCNC: 76 MG/DL
LEUKOCYTE ESTERASE URINE: NEGATIVE
LYMPHOCYTES # BLD AUTO: 2.49 K/UL
LYMPHOCYTES NFR BLD AUTO: 27.6 %
MAGNESIUM SERPL-MCNC: 1.6 MG/DL
MAN DIFF?: NORMAL
MCHC RBC-ENTMCNC: 30.9 PG
MCHC RBC-ENTMCNC: 31.9 GM/DL
MCV RBC AUTO: 96.9 FL
MICROSCOPIC-UA: NORMAL
MONOCYTES # BLD AUTO: 0.56 K/UL
MONOCYTES NFR BLD AUTO: 6.2 %
NEUTROPHILS # BLD AUTO: 5.68 K/UL
NEUTROPHILS NFR BLD AUTO: 63.1 %
NITRITE URINE: NEGATIVE
PH URINE: 5.5
PHOSPHATE SERPL-MCNC: 2.7 MG/DL
PLATELET # BLD AUTO: 189 K/UL
POTASSIUM SERPL-SCNC: 4.3 MMOL/L
PROT SERPL-MCNC: 6.5 G/DL
PROT UR-MCNC: 18 MG/DL
PROTEIN URINE: NEGATIVE MG/DL
RBC # BLD: 3.85 M/UL
RBC # FLD: 14.5 %
RED BLOOD CELLS URINE: 11 /HPF
SODIUM SERPL-SCNC: 139 MMOL/L
SPECIFIC GRAVITY URINE: 1.02
SQUAMOUS EPITHELIAL CELLS: 4 /HPF
TACROLIMUS SERPL-MCNC: 9.2 NG/ML
TRIGL SERPL-MCNC: 157 MG/DL
URINE COMMENTS: NORMAL
UROBILINOGEN URINE: NEGATIVE MG/DL
WBC # FLD AUTO: 9.01 K/UL
WHITE BLOOD CELLS URINE: 1 /HPF

## 2019-01-29 LAB
CMV DNA SPEC QL NAA+PROBE: NOT DETECTED IU/ML
CMVPCR LOG: NOT DETECTED LOGIU/ML

## 2019-01-30 ENCOUNTER — APPOINTMENT (OUTPATIENT)
Dept: OBGYN | Facility: CLINIC | Age: 49
End: 2019-01-30
Payer: COMMERCIAL

## 2019-01-30 ENCOUNTER — ASOB RESULT (OUTPATIENT)
Age: 49
End: 2019-01-30

## 2019-01-30 PROCEDURE — 99214 OFFICE O/P EST MOD 30 MIN: CPT | Mod: 25

## 2019-01-30 PROCEDURE — 76830 TRANSVAGINAL US NON-OB: CPT

## 2019-01-30 PROCEDURE — 58558Z: CUSTOM

## 2019-01-30 NOTE — HISTORY OF PRESENT ILLNESS
[Last Pap ___] : Last cervical pap smear was [unfilled] [Definite:  ___ (Date)] : the last menstrual period was [unfilled] [Sexually Active] : is sexually active [Monogamous] : is monogamous [Contraception] : uses contraception [Tubal Ligation] : has had a tubal ligation [Male ___] : [unfilled] male [No] : no

## 2019-01-30 NOTE — DISCUSSION/SUMMARY
[FreeTextEntry1] : Differential diagnosis of dysfunctional uterine bleeding discussed at length including structural, hormonal, and malignant causes. Hormone panel was drawn today to assess for hormonal causes. We will review lab results at her next visit. A pelvic ultrasound was ordered as well. We also discussed the need for hysteroscopy with biopsy to R/O structural and malignant causes. The procedure was discussed in detail. She will premedicate with motrin as there is cramping associated with this procedure.\par \par Two weeks after her above work up is completed, she will return for annual exam and to discuss all results and discuss a treatment plan.\par \par Bleeding likely perimenopausal or related to steroids.\par \par Small ovarian cyst, will repeat sono in 3 months.\par

## 2019-01-31 LAB
BASOPHILS # BLD AUTO: 0.01 K/UL
BASOPHILS NFR BLD AUTO: 0.1 %
EOSINOPHIL # BLD AUTO: 0.08 K/UL
EOSINOPHIL NFR BLD AUTO: 1.1 %
ESTRADIOL SERPL-MCNC: 40 PG/ML
HCT VFR BLD CALC: 38.9 %
HGB BLD-MCNC: 12.4 G/DL
IMM GRANULOCYTES NFR BLD AUTO: 0.3 %
LH SERPL-ACNC: 3.9 IU/L
LYMPHOCYTES # BLD AUTO: 0.81 K/UL
LYMPHOCYTES NFR BLD AUTO: 10.7 %
MAN DIFF?: NORMAL
MCHC RBC-ENTMCNC: 31.1 PG
MCHC RBC-ENTMCNC: 31.9 GM/DL
MCV RBC AUTO: 97.5 FL
MONOCYTES # BLD AUTO: 0.45 K/UL
MONOCYTES NFR BLD AUTO: 5.9 %
NEUTROPHILS # BLD AUTO: 6.23 K/UL
NEUTROPHILS NFR BLD AUTO: 81.9 %
PLATELET # BLD AUTO: 194 K/UL
PROLACTIN SERPL-MCNC: 15.9 NG/ML
RBC # BLD: 3.99 M/UL
RBC # FLD: 14.5 %
TSH SERPL-ACNC: 2.47 UIU/ML
WBC # FLD AUTO: 7.6 K/UL

## 2019-02-01 ENCOUNTER — RECORD ABSTRACTING (OUTPATIENT)
Age: 49
End: 2019-02-01

## 2019-02-01 DIAGNOSIS — Z87.442 PERSONAL HISTORY OF URINARY CALCULI: ICD-10-CM

## 2019-02-01 DIAGNOSIS — Z86.2 PERSONAL HISTORY OF DISEASES OF THE BLOOD AND BLOOD-FORMING ORGANS AND CERTAIN DISORDERS INVOLVING THE IMMUNE MECHANISM: ICD-10-CM

## 2019-02-01 DIAGNOSIS — Z86.19 PERSONAL HISTORY OF OTHER INFECTIOUS AND PARASITIC DISEASES: ICD-10-CM

## 2019-02-01 LAB — CYTOLOGY CVX/VAG DOC THIN PREP: NORMAL

## 2019-02-06 NOTE — AIRWAY REMOVAL NOTE  ADULT & PEDS - RESPIRATORY EXPANSION/ACCESSORY MUSCLES/RETRACTIONS
Ears were cleaned.   Normal ear exam. Small amount of wax removed    Proceed with hearing aid consult     If new symptoms develop- consider MRI of inner ear    Thank you for allowing [facility] and our ENT team to participate in your care.  If your medications are too expensive, please give the nurse a call.  We can possibly change this medication.  If you have a scheduling or an appointment question please contact our Health Unit Coordinator at their direct line [phone].   ALL nursing questions or concerns can be directed to your ENT nurse at: [phone] Shruthi       expansion symmetric/no retractions/no use of accessory muscles

## 2019-02-13 ENCOUNTER — APPOINTMENT (OUTPATIENT)
Dept: OBGYN | Facility: CLINIC | Age: 49
End: 2019-02-13
Payer: COMMERCIAL

## 2019-02-13 VITALS
SYSTOLIC BLOOD PRESSURE: 128 MMHG | HEIGHT: 68 IN | DIASTOLIC BLOOD PRESSURE: 80 MMHG | WEIGHT: 250 LBS | BODY MASS INDEX: 37.89 KG/M2

## 2019-02-13 PROCEDURE — 99214 OFFICE O/P EST MOD 30 MIN: CPT

## 2019-02-14 LAB — CORE LAB BIOPSY: NORMAL

## 2019-02-14 NOTE — DISCUSSION/SUMMARY
[FreeTextEntry1] : Pathology reviewed with pt. We discussed the fact that her EMB resulted in simple hyperplasia no atypia. We discussed the fact that there is a 1-3% increased risk of developing endometrial cancer. \par \par We also discussed the need for a D&C in the OR to R/O higher grade hyperplasia given the large amount of tissue in her uterus.\par \par We also discussed treatment after D&C, if c/w EMB, which could include oral progesterone or Mirena IUD. Will also need to speak with her nephrogist.\par \par Will need to speak with her nephrologist Dr SHANT Mcdonough at [facility]. [phone]. Message left with his staff.

## 2019-02-28 ENCOUNTER — APPOINTMENT (OUTPATIENT)
Dept: NEPHROLOGY | Facility: CLINIC | Age: 49
End: 2019-02-28

## 2019-03-04 ENCOUNTER — APPOINTMENT (OUTPATIENT)
Dept: NEPHROLOGY | Facility: CLINIC | Age: 49
End: 2019-03-04

## 2019-03-06 ENCOUNTER — OUTPATIENT (OUTPATIENT)
Dept: OUTPATIENT SERVICES | Facility: HOSPITAL | Age: 49
LOS: 1 days | End: 2019-03-06
Payer: COMMERCIAL

## 2019-03-06 VITALS
DIASTOLIC BLOOD PRESSURE: 67 MMHG | HEART RATE: 52 BPM | HEIGHT: 68 IN | WEIGHT: 233.69 LBS | TEMPERATURE: 98 F | SYSTOLIC BLOOD PRESSURE: 147 MMHG | RESPIRATION RATE: 20 BRPM

## 2019-03-06 DIAGNOSIS — N92.0 EXCESSIVE AND FREQUENT MENSTRUATION WITH REGULAR CYCLE: ICD-10-CM

## 2019-03-06 DIAGNOSIS — Z98.890 OTHER SPECIFIED POSTPROCEDURAL STATES: Chronic | ICD-10-CM

## 2019-03-06 DIAGNOSIS — Z98.51 TUBAL LIGATION STATUS: Chronic | ICD-10-CM

## 2019-03-06 DIAGNOSIS — I77.0 ARTERIOVENOUS FISTULA, ACQUIRED: Chronic | ICD-10-CM

## 2019-03-06 DIAGNOSIS — Z01.818 ENCOUNTER FOR OTHER PREPROCEDURAL EXAMINATION: ICD-10-CM

## 2019-03-06 DIAGNOSIS — N18.6 END STAGE RENAL DISEASE: ICD-10-CM

## 2019-03-06 DIAGNOSIS — Z90.49 ACQUIRED ABSENCE OF OTHER SPECIFIED PARTS OF DIGESTIVE TRACT: Chronic | ICD-10-CM

## 2019-03-06 DIAGNOSIS — Z94.0 KIDNEY TRANSPLANT STATUS: Chronic | ICD-10-CM

## 2019-03-06 DIAGNOSIS — N20.0 CALCULUS OF KIDNEY: Chronic | ICD-10-CM

## 2019-03-06 DIAGNOSIS — Z29.9 ENCOUNTER FOR PROPHYLACTIC MEASURES, UNSPECIFIED: ICD-10-CM

## 2019-03-06 DIAGNOSIS — N85.01 BENIGN ENDOMETRIAL HYPERPLASIA: ICD-10-CM

## 2019-03-06 DIAGNOSIS — Z90.5 ACQUIRED ABSENCE OF KIDNEY: Chronic | ICD-10-CM

## 2019-03-06 LAB
ANION GAP SERPL CALC-SCNC: 12 MMOL/L — SIGNIFICANT CHANGE UP (ref 5–17)
APTT BLD: 28.8 SEC — SIGNIFICANT CHANGE UP (ref 27.5–36.3)
BASOPHILS # BLD AUTO: 0 K/UL — SIGNIFICANT CHANGE UP (ref 0–0.2)
BASOPHILS NFR BLD AUTO: 0.3 % — SIGNIFICANT CHANGE UP (ref 0–2)
BUN SERPL-MCNC: 16 MG/DL — SIGNIFICANT CHANGE UP (ref 8–20)
CALCIUM SERPL-MCNC: 9.6 MG/DL — SIGNIFICANT CHANGE UP (ref 8.6–10.2)
CHLORIDE SERPL-SCNC: 105 MMOL/L — SIGNIFICANT CHANGE UP (ref 98–107)
CO2 SERPL-SCNC: 22 MMOL/L — SIGNIFICANT CHANGE UP (ref 22–29)
CREAT SERPL-MCNC: 1.02 MG/DL — SIGNIFICANT CHANGE UP (ref 0.5–1.3)
EOSINOPHIL # BLD AUTO: 0.1 K/UL — SIGNIFICANT CHANGE UP (ref 0–0.5)
EOSINOPHIL NFR BLD AUTO: 1.6 % — SIGNIFICANT CHANGE UP (ref 0–6)
GLUCOSE SERPL-MCNC: 141 MG/DL — HIGH (ref 70–115)
HCT VFR BLD CALC: 36.4 % — LOW (ref 37–47)
HGB BLD-MCNC: 11.5 G/DL — LOW (ref 12–16)
INR BLD: 0.99 RATIO — SIGNIFICANT CHANGE UP (ref 0.88–1.16)
LYMPHOCYTES # BLD AUTO: 1.1 K/UL — SIGNIFICANT CHANGE UP (ref 1–4.8)
LYMPHOCYTES # BLD AUTO: 14.4 % — LOW (ref 20–55)
MCHC RBC-ENTMCNC: 30.3 PG — SIGNIFICANT CHANGE UP (ref 27–31)
MCHC RBC-ENTMCNC: 31.6 G/DL — LOW (ref 32–36)
MCV RBC AUTO: 96 FL — SIGNIFICANT CHANGE UP (ref 81–99)
MONOCYTES # BLD AUTO: 0.4 K/UL — SIGNIFICANT CHANGE UP (ref 0–0.8)
MONOCYTES NFR BLD AUTO: 5 % — SIGNIFICANT CHANGE UP (ref 3–10)
NEUTROPHILS # BLD AUTO: 6.2 K/UL — SIGNIFICANT CHANGE UP (ref 1.8–8)
NEUTROPHILS NFR BLD AUTO: 78.6 % — HIGH (ref 37–73)
PLATELET # BLD AUTO: 194 K/UL — SIGNIFICANT CHANGE UP (ref 150–400)
POTASSIUM SERPL-MCNC: 4.6 MMOL/L — SIGNIFICANT CHANGE UP (ref 3.5–5.3)
POTASSIUM SERPL-SCNC: 4.6 MMOL/L — SIGNIFICANT CHANGE UP (ref 3.5–5.3)
PROTHROM AB SERPL-ACNC: 11.4 SEC — SIGNIFICANT CHANGE UP (ref 10–12.9)
RBC # BLD: 3.79 M/UL — LOW (ref 4.4–5.2)
RBC # FLD: 14.4 % — SIGNIFICANT CHANGE UP (ref 11–15.6)
SODIUM SERPL-SCNC: 139 MMOL/L — SIGNIFICANT CHANGE UP (ref 135–145)
WBC # BLD: 7.9 K/UL — SIGNIFICANT CHANGE UP (ref 4.8–10.8)
WBC # FLD AUTO: 7.9 K/UL — SIGNIFICANT CHANGE UP (ref 4.8–10.8)

## 2019-03-06 PROCEDURE — 93005 ELECTROCARDIOGRAM TRACING: CPT

## 2019-03-06 PROCEDURE — 36415 COLL VENOUS BLD VENIPUNCTURE: CPT

## 2019-03-06 PROCEDURE — 85730 THROMBOPLASTIN TIME PARTIAL: CPT

## 2019-03-06 PROCEDURE — 85027 COMPLETE CBC AUTOMATED: CPT

## 2019-03-06 PROCEDURE — 85610 PROTHROMBIN TIME: CPT

## 2019-03-06 PROCEDURE — 80048 BASIC METABOLIC PNL TOTAL CA: CPT

## 2019-03-06 PROCEDURE — 93010 ELECTROCARDIOGRAM REPORT: CPT

## 2019-03-06 RX ORDER — MYCOPHENOLATE MOFETIL 250 MG/1
2 CAPSULE ORAL
Qty: 0 | Refills: 0 | COMMUNITY

## 2019-03-06 RX ORDER — VANCOMYCIN HCL 1 G
5 VIAL (EA) INTRAVENOUS
Qty: 0 | Refills: 0 | COMMUNITY

## 2019-03-06 RX ORDER — SODIUM CHLORIDE 9 MG/ML
3 INJECTION INTRAMUSCULAR; INTRAVENOUS; SUBCUTANEOUS ONCE
Qty: 0 | Refills: 0 | Status: DISCONTINUED | OUTPATIENT
Start: 2019-03-18 | End: 2019-04-02

## 2019-03-06 NOTE — H&P PST ADULT - ASSESSMENT
47 yo female present with c/o abnormal menstrual bleeding. Patient state she has suffered from prolong mensuration for a year.  She is now schedule for D & C hysteroscopy possible polypectomy using myosure and related procedures.   CAPRINI VTE 2.0 SCORE [CLOT updated 2019]    AGE RELATED RISK FACTORS                                                       MOBILITY RELATED FACTORS  [x ] Age 41-60 years                                            (1 Point)                    [ ] Bed rest                                                        (1 Point)  [ ] Age: 61-74 years                                           (2 Points)                  [ ] Plaster cast                                                   (2 Points)  [ ] Age= 75 years                                              (3 Points)                    [ ] Bed bound for more than 72 hours                 (2 Points)    DISEASE RELATED RISK FACTORS                                               GENDER SPECIFIC FACTORS  [x ] Edema in the lower extremities                       (1 Point)              [ ] Pregnancy                                                     (1 Point)  [ x] Varicose veins                                               (1 Point)                     [ ] Post-partum < 6 weeks                                   (1 Point)             [ x] BMI > 25 Kg/m2                                            (1 Point)                     [ ] Hormonal therapy  or oral contraception          (1 Point)                 [ ] Sepsis (in the previous month)                        (1 Point)               [ ] History of pregnancy complications                 (1 point)  [ ] Pneumonia or serious lung disease                                               [ ] Unexplained or recurrent                     (1 Point)           (in the previous month)                               (1 Point)  [ ] Abnormal pulmonary function test                     (1 Point)                 SURGERY RELATED RISK FACTORS  [ ] Acute myocardial infarction                              (1 Point)               [ ]  Section                                             (1 Point)  [ ] Congestive heart failure (in the previous month)  (1 Point)      [ x] Minor surgery                                                  (1 Point)   [ ] Inflammatory bowel disease                             (1 Point)               [ ] Arthroscopic surgery                                        (2 Points)  [ ] Central venous access                                      (2 Points)                [ ] General surgery lasting more than 45 minutes (2 points)  [ ] Malignancy- Present or previous                   (2 Points)                [ ] Elective arthroplasty                                         (5 points)    [ ] Stroke (in the previous month)                          (5 Points)                                                                                                                                                           HEMATOLOGY RELATED FACTORS                                                 TRAUMA RELATED RISK FACTORS  [ ] Prior episodes of VTE                                     (3 Points)                [ ] Fracture of the hip, pelvis, or leg                       (5 Points)  [ ] Positive family history for VTE                         (3 Points)             [ ] Acute spinal cord injury (in the previous month)  (5 Points)  [ ] Prothrombin 38443 A                                     (3 Points)               [ ] Paralysis  (less than 1 month)                             (5 Points)  [ ] Factor V Leiden                                             (3 Points)                  [ ] Multiple Trauma within 1 month                        (5 Points)  [ ] Lupus anticoagulants                                     (3 Points)                                                           [ ] Anticardiolipin antibodies                               (3 Points)                                                       [ ] High homocysteine in the blood                      (3 Points)                                             [ ] Other congenital or acquired thrombophilia      (3 Points)                                                [ ] Heparin induced thrombocytopenia                  (3 Points)                                     Total Score [ 5    ]

## 2019-03-06 NOTE — H&P PST ADULT - HISTORY OF PRESENT ILLNESS
49 yo here for results. She continues to have bleeding. 47 yo female present with c/o abnormal menstrual bleeding. Patient state she has suffered from prolong mensuration for a year.  She is now schedule for D & C hysteroscopy possible polypectomy using myosure and related procedures.

## 2019-03-06 NOTE — ASU PATIENT PROFILE, ADULT - TEACHING/LEARNING RELIGIOUS CONSIDERATIONS
Reason:  SOLITARY PULMONARY NODULE

Procedure Date:  01/24/2019   

Accession Number:  [id number] / [id number]                    

Procedure:  XR  - Chest 2 View X-Ray CPT Code:  90974

 

FULL RESULT:

 

 

EXAM:

CHEST RADIOGRAPHY

 

EXAM DATE: 1/24/2019 08:53 AM.

 

CLINICAL HISTORY: POSSIBLE SOLITARY PULMONARY NODULE.

 

COMPARISON: Chest 09/20/2018.

 

TECHNIQUE: 2 views.

 

FINDINGS:

Lungs/Pleura: Again seen is a nodular density about the lower right lung. 

It is somewhat ill-defined and difficult to measure. It appears without 

change, however.

 

No pneumothorax or pleural effusion. Lung volumes are somewhat large.

 

Mediastinum: Heart and mediastinal contours are unremarkable.

 

There is lateral curvature of the thoracolumbar spine.

IMPRESSION: Right lower lung nodule versus rib. No change compared to

09/20/2018. May consider follow-up, for example September 2019.

 

Large lung volumes may be on the basis of COPD.

 

RADIA
none

## 2019-03-06 NOTE — ASU PATIENT PROFILE, ADULT - PSH
Ankle fracture - Left  brumstein barajas procedure 2000  AV fistula  right forearm extremity AV fistula radiocephalic ( 2018)  H/O shoulder surgery  left  H/O tubal ligation  (1996)  History of ventral hernia repair    Kidney calculi  cysto/lithotripsy multiple  Renal transplant recipient    S/P cholecystectomy  (2013)  Tubal ligation  1995

## 2019-03-06 NOTE — H&P PST ADULT - PSH
Ankle fracture - Left  brumstein barajas procedure 2000  AV fistula  right forearm extremity AV fistula radiocephalic ( 2018)  H/O shoulder surgery  left  H/O tubal ligation  (1996)  History of ventral hernia repair    Kidney calculi  cysto/lithotripsy multiple  Renal transplant recipient    S/P cholecystectomy  (2013)  Tubal ligation  1995 Ankle fracture - Left  brumstein barajas procedure 2000  AV fistula  right forearm extremity AV fistula radiocephalic ( 2018)  H/O shoulder surgery  left  H/O tubal ligation  (1996)  History of ventral hernia repair    Kidney calculi  cysto/lithotripsy multiple  Renal transplant recipient    S/P cholecystectomy  (2013)  Status post nephrectomy  bilateral  Tubal ligation  1995

## 2019-03-09 ENCOUNTER — TRANSCRIPTION ENCOUNTER (OUTPATIENT)
Age: 49
End: 2019-03-09

## 2019-03-15 ENCOUNTER — APPOINTMENT (OUTPATIENT)
Dept: FAMILY MEDICINE | Facility: CLINIC | Age: 49
End: 2019-03-15
Payer: COMMERCIAL

## 2019-03-15 VITALS
BODY MASS INDEX: 34.86 KG/M2 | HEIGHT: 68 IN | DIASTOLIC BLOOD PRESSURE: 86 MMHG | TEMPERATURE: 98.5 F | SYSTOLIC BLOOD PRESSURE: 143 MMHG | OXYGEN SATURATION: 95 % | WEIGHT: 230 LBS | HEART RATE: 75 BPM

## 2019-03-15 PROCEDURE — 36415 COLL VENOUS BLD VENIPUNCTURE: CPT

## 2019-03-15 PROCEDURE — 99214 OFFICE O/P EST MOD 30 MIN: CPT | Mod: 25

## 2019-03-15 RX ORDER — OMEGA-3/DHA/EPA/FISH OIL 300-1000MG
400 CAPSULE ORAL TWICE DAILY
Qty: 180 | Refills: 0 | Status: DISCONTINUED | COMMUNITY
Start: 2018-11-15 | End: 2019-03-15

## 2019-03-15 RX ORDER — ERGOCALCIFEROL 1.25 MG/1
1.25 MG CAPSULE, LIQUID FILLED ORAL
Qty: 8 | Refills: 0 | Status: DISCONTINUED | COMMUNITY
Start: 2018-08-16 | End: 2019-03-15

## 2019-03-15 RX ORDER — METHOCARBAMOL 500 MG/1
500 TABLET, FILM COATED ORAL 4 TIMES DAILY
Qty: 60 | Refills: 0 | Status: DISCONTINUED | COMMUNITY
Start: 2018-10-25 | End: 2019-03-15

## 2019-03-15 RX ORDER — LOSARTAN POTASSIUM 50 MG/1
50 TABLET, FILM COATED ORAL
Qty: 30 | Refills: 3 | Status: DISCONTINUED | COMMUNITY
Start: 2019-01-24 | End: 2019-03-15

## 2019-03-15 RX ORDER — CALCITRIOL 0.25 UG/1
0.25 CAPSULE, LIQUID FILLED ORAL
Refills: 0 | Status: DISCONTINUED | COMMUNITY
End: 2019-03-15

## 2019-03-15 NOTE — HEALTH RISK ASSESSMENT
[Intercurrent Urgi Care visits] : went to urgent care [No falls in past year] : Patient reported no falls in the past year [] : No [de-identified] : 3/9/19

## 2019-03-15 NOTE — HISTORY OF PRESENT ILLNESS
[FreeTextEntry1] : Urgent Care f/up [de-identified] : 49 y/o F with hx Kidney transplant 1 4/2018, on Immunosupresants, presents today for f/up after seen in Urgent Care on 3/9/19 w/ Dx: Pneumonia secondary to Flu.\par Pt states flu tests was negative. Exposed to daughter dx w/ Flu.\par Pt was treated in urgent care w/ Z-pack, albuterol Inhaler and nebulizer, Tamiflu, then ATB was switch to levaquin every other day by Nephrology/ transplant team.\par Pt seen regularly by Nephrology.\par Recently evaluated by Gyn for evaluation of vaginal bleeding.

## 2019-03-15 NOTE — ASSESSMENT
[FreeTextEntry1] : 47 y/o F s/p Kidney transplant 4/2018 presents today for F/up after seen in Urgent Care on 3/9/19:\par \par URI;\par -completed Tamiflu.\par -On Levaquin, Ventolin inhaler and nebs.\par -Doing well./ improving\par -No sing of infection at present time.\par \par S/P kidney transplant 4/2018:\par -F/up w/ nephrology.\par -on Envarsus, Mycophenolate and prednisone\par \par Anxiety:\par -on Clonazepam\par \par HTN;\par -on Cozaar and carvedilol.\par \par GERD;\par -on famotidine\par \par Menorrhagia:\par -s/p Bxp w/ gyn.: benign\par \par \par

## 2019-03-15 NOTE — PAST MEDICAL HISTORY
[Definite ___ (Date)] : the last menstrual period was [unfilled] [Total Preg ___] : G[unfilled] [Living ___] : Living: [unfilled]

## 2019-03-17 ENCOUNTER — TRANSCRIPTION ENCOUNTER (OUTPATIENT)
Age: 49
End: 2019-03-17

## 2019-03-18 ENCOUNTER — TRANSCRIPTION ENCOUNTER (OUTPATIENT)
Age: 49
End: 2019-03-18

## 2019-03-18 ENCOUNTER — OUTPATIENT (OUTPATIENT)
Dept: INPATIENT UNIT | Facility: HOSPITAL | Age: 49
LOS: 1 days | End: 2019-03-18
Payer: COMMERCIAL

## 2019-03-18 ENCOUNTER — APPOINTMENT (OUTPATIENT)
Dept: OBGYN | Facility: HOSPITAL | Age: 49
End: 2019-03-18

## 2019-03-18 ENCOUNTER — RESULT REVIEW (OUTPATIENT)
Age: 49
End: 2019-03-18

## 2019-03-18 VITALS
TEMPERATURE: 98 F | HEART RATE: 74 BPM | OXYGEN SATURATION: 98 % | SYSTOLIC BLOOD PRESSURE: 142 MMHG | RESPIRATION RATE: 16 BRPM | DIASTOLIC BLOOD PRESSURE: 66 MMHG

## 2019-03-18 VITALS
DIASTOLIC BLOOD PRESSURE: 72 MMHG | HEART RATE: 70 BPM | OXYGEN SATURATION: 95 % | SYSTOLIC BLOOD PRESSURE: 124 MMHG | RESPIRATION RATE: 16 BRPM

## 2019-03-18 DIAGNOSIS — N92.0 EXCESSIVE AND FREQUENT MENSTRUATION WITH REGULAR CYCLE: ICD-10-CM

## 2019-03-18 DIAGNOSIS — Z01.818 ENCOUNTER FOR OTHER PREPROCEDURAL EXAMINATION: ICD-10-CM

## 2019-03-18 DIAGNOSIS — Z90.49 ACQUIRED ABSENCE OF OTHER SPECIFIED PARTS OF DIGESTIVE TRACT: Chronic | ICD-10-CM

## 2019-03-18 DIAGNOSIS — I77.0 ARTERIOVENOUS FISTULA, ACQUIRED: Chronic | ICD-10-CM

## 2019-03-18 DIAGNOSIS — Z98.51 TUBAL LIGATION STATUS: Chronic | ICD-10-CM

## 2019-03-18 DIAGNOSIS — N20.0 CALCULUS OF KIDNEY: Chronic | ICD-10-CM

## 2019-03-18 DIAGNOSIS — Z98.890 OTHER SPECIFIED POSTPROCEDURAL STATES: Chronic | ICD-10-CM

## 2019-03-18 DIAGNOSIS — Z90.5 ACQUIRED ABSENCE OF KIDNEY: Chronic | ICD-10-CM

## 2019-03-18 DIAGNOSIS — Z94.0 KIDNEY TRANSPLANT STATUS: Chronic | ICD-10-CM

## 2019-03-18 DIAGNOSIS — N85.01 BENIGN ENDOMETRIAL HYPERPLASIA: ICD-10-CM

## 2019-03-18 LAB
ALBUMIN SERPL ELPH-MCNC: 3.9 G/DL
ALP BLD-CCNC: 64 U/L
ALT SERPL-CCNC: 79 U/L
ANION GAP SERPL CALC-SCNC: 14 MMOL/L
AST SERPL-CCNC: 57 U/L
BASOPHILS # BLD AUTO: 0.05 K/UL
BASOPHILS NFR BLD AUTO: 0.4 %
BILIRUB SERPL-MCNC: 0.6 MG/DL
BUN SERPL-MCNC: 14 MG/DL
CALCIUM SERPL-MCNC: 9.3 MG/DL
CHLORIDE SERPL-SCNC: 104 MMOL/L
CO2 SERPL-SCNC: 20 MMOL/L
CREAT SERPL-MCNC: 1.1 MG/DL
EOSINOPHIL # BLD AUTO: 0.16 K/UL
EOSINOPHIL NFR BLD AUTO: 1.4 %
GLUCOSE SERPL-MCNC: 115 MG/DL
HCT VFR BLD CALC: 40.1 %
HGB BLD-MCNC: 12.6 G/DL
IMM GRANULOCYTES NFR BLD AUTO: 0.4 %
LYMPHOCYTES # BLD AUTO: 1.96 K/UL
LYMPHOCYTES NFR BLD AUTO: 17.1 %
MAN DIFF?: NORMAL
MCHC RBC-ENTMCNC: 30.9 PG
MCHC RBC-ENTMCNC: 31.4 GM/DL
MCV RBC AUTO: 98.3 FL
MONOCYTES # BLD AUTO: 0.64 K/UL
MONOCYTES NFR BLD AUTO: 5.6 %
NEUTROPHILS # BLD AUTO: 8.61 K/UL
NEUTROPHILS NFR BLD AUTO: 75.1 %
PLATELET # BLD AUTO: 213 K/UL
POTASSIUM SERPL-SCNC: 4.5 MMOL/L
PROT SERPL-MCNC: 6.5 G/DL
RBC # BLD: 4.08 M/UL
RBC # FLD: 14 %
SODIUM SERPL-SCNC: 138 MMOL/L
TACROLIMUS SERPL-MCNC: 5.5 NG/ML
WBC # FLD AUTO: 11.47 K/UL

## 2019-03-18 PROCEDURE — 58558 HYSTEROSCOPY BIOPSY: CPT

## 2019-03-18 PROCEDURE — 58300 INSERT INTRAUTERINE DEVICE: CPT | Mod: 59

## 2019-03-18 PROCEDURE — 88305 TISSUE EXAM BY PATHOLOGIST: CPT

## 2019-03-18 PROCEDURE — 88305 TISSUE EXAM BY PATHOLOGIST: CPT | Mod: 26

## 2019-03-18 RX ORDER — FAMOTIDINE 10 MG/ML
1 INJECTION INTRAVENOUS
Qty: 0 | Refills: 0 | COMMUNITY

## 2019-03-18 RX ORDER — TACROLIMUS 5 MG/1
3 CAPSULE ORAL
Qty: 0 | Refills: 0 | COMMUNITY

## 2019-03-18 RX ORDER — DAPSONE 100 MG/1
1 TABLET ORAL
Qty: 0 | Refills: 0 | COMMUNITY

## 2019-03-18 RX ORDER — FENTANYL CITRATE 50 UG/ML
25 INJECTION INTRAVENOUS
Qty: 0 | Refills: 0 | Status: DISCONTINUED | OUTPATIENT
Start: 2019-03-18 | End: 2019-03-18

## 2019-03-18 RX ORDER — MORPHINE SULFATE 50 MG/1
4 CAPSULE, EXTENDED RELEASE ORAL
Qty: 0 | Refills: 0 | Status: DISCONTINUED | OUTPATIENT
Start: 2019-03-18 | End: 2019-03-18

## 2019-03-18 RX ORDER — CARVEDILOL PHOSPHATE 80 MG/1
1 CAPSULE, EXTENDED RELEASE ORAL
Qty: 0 | Refills: 0 | COMMUNITY

## 2019-03-18 RX ORDER — ONDANSETRON 8 MG/1
4 TABLET, FILM COATED ORAL ONCE
Qty: 0 | Refills: 0 | Status: DISCONTINUED | OUTPATIENT
Start: 2019-03-18 | End: 2019-03-18

## 2019-03-18 RX ORDER — LOSARTAN POTASSIUM 100 MG/1
1 TABLET, FILM COATED ORAL
Qty: 0 | Refills: 0 | COMMUNITY

## 2019-03-18 RX ORDER — MYCOPHENOLIC ACID 180 MG/1
1 TABLET, DELAYED RELEASE ORAL
Qty: 0 | Refills: 0 | COMMUNITY

## 2019-03-18 RX ORDER — SODIUM CHLORIDE 9 MG/ML
1000 INJECTION, SOLUTION INTRAVENOUS
Qty: 0 | Refills: 0 | Status: DISCONTINUED | OUTPATIENT
Start: 2019-03-18 | End: 2019-03-18

## 2019-03-18 RX ORDER — CLONAZEPAM 1 MG
1 TABLET ORAL
Qty: 0 | Refills: 0 | COMMUNITY

## 2019-03-18 NOTE — BRIEF OPERATIVE NOTE - NSICDXBRIEFPROCEDURE_GEN_ALL_CORE_FT
PROCEDURES:  Insertion of Mirena IUD 18-Mar-2019 08:43:10  Eugenie Finn  Hysteroscopy, with biopsy 18-Mar-2019 08:42:59  Eugenie Finn  Dilation and curettage, uterus 18-Mar-2019 08:42:41  Eugenie Finn

## 2019-03-18 NOTE — BRIEF OPERATIVE NOTE - NSICDXBRIEFPOSTOP_GEN_ALL_CORE_FT
POST-OP DIAGNOSIS:  Complex endometrial hyperplasia 18-Mar-2019 08:44:30  Eugenie Finn  Obesity 18-Mar-2019 08:44:17  Eugenie Finn  Anemia, blood loss 18-Mar-2019 08:44:06  Eugenie Finn  Menometrorrhagia 18-Mar-2019 08:43:55  Eugenie Finn

## 2019-03-18 NOTE — ASU DISCHARGE PLAN (ADULT/PEDIATRIC) - CARE PROVIDER_API CALL
Eugenie Finn)  Obstetrics and Gynecology  [facility] [address] [place] [address]  Phone: [phone]  Fax: [phone]  Follow Up Time:

## 2019-03-18 NOTE — ASU DISCHARGE PLAN (ADULT/PEDIATRIC) - CALL YOUR DOCTOR IF YOU HAVE ANY OF THE FOLLOWING:
Wound/Surgical Site with redness, or foul smelling discharge or pus/Bleeding that does not stop/Fever greater than (need to indicate Fahrenheit or Celsius)/Pain not relieved by Medications

## 2019-03-18 NOTE — BRIEF OPERATIVE NOTE - NSICDXBRIEFPREOP_GEN_ALL_CORE_FT
PRE-OP DIAGNOSIS:  Chronic renal disease 18-Mar-2019 08:40:18  Eugenie Finn  Morbid obesity 18-Mar-2019 08:39:35  Eugenie Finn  Simple endometrial hyperplasia 18-Mar-2019 08:39:26  Eugenie Finn  Blood loss anemia 18-Mar-2019 08:38:55  Eugenie Finn  Menometrorrhagia 18-Mar-2019 08:38:35  Eugenie Finn

## 2019-03-19 LAB — SURGICAL PATHOLOGY STUDY: SIGNIFICANT CHANGE UP

## 2019-03-22 ENCOUNTER — FORM ENCOUNTER (OUTPATIENT)
Age: 49
End: 2019-03-22

## 2019-03-22 ENCOUNTER — ASOB RESULT (OUTPATIENT)
Age: 49
End: 2019-03-22

## 2019-03-22 ENCOUNTER — APPOINTMENT (OUTPATIENT)
Dept: OBGYN | Facility: CLINIC | Age: 49
End: 2019-03-22
Payer: COMMERCIAL

## 2019-03-22 VITALS
WEIGHT: 230 LBS | BODY MASS INDEX: 34.07 KG/M2 | SYSTOLIC BLOOD PRESSURE: 126 MMHG | HEIGHT: 69 IN | DIASTOLIC BLOOD PRESSURE: 70 MMHG

## 2019-03-22 LAB
DATE COLLECTED: NORMAL
HEMOCCULT SP1 STL QL: NEGATIVE
QUALITY CONTROL: YES

## 2019-03-22 PROCEDURE — 76830 TRANSVAGINAL US NON-OB: CPT

## 2019-03-22 PROCEDURE — 99214 OFFICE O/P EST MOD 30 MIN: CPT | Mod: 25

## 2019-03-22 PROCEDURE — 82270 OCCULT BLOOD FECES: CPT

## 2019-03-22 PROCEDURE — 99396 PREV VISIT EST AGE 40-64: CPT

## 2019-03-22 NOTE — DISCUSSION/SUMMARY
[FreeTextEntry1] : F/U pap.\par \par We reviewed benign D&C with no evidence of hyperplasia. Mirena IUD in place. We discussed the off label use of the Mirena IUD in the treatment of menometrorrhagia and hyperplasia. \par \par

## 2019-03-22 NOTE — HISTORY OF PRESENT ILLNESS
[Last Pap ___] : Last cervical pap smear was [unfilled] [Definite:  ___ (Date)] : the last menstrual period was [unfilled] [Menarche Age: ____] : age at menarche was [unfilled]

## 2019-03-22 NOTE — REVIEW OF SYSTEMS
[Nl] : Hematologic/Lymphatic [Abn Vag Bleeding] : abnormal vaginal bleeding [Fever] : no fever [Chills] : no chills [Chest Pain] : no chest pain [Dyspnea] : no shortness of breath [Abdominal Pain] : no abdominal pain [Vomiting] : no vomiting [Pelvic Pain] : no pelvic pain

## 2019-03-23 ENCOUNTER — APPOINTMENT (OUTPATIENT)
Dept: MAMMOGRAPHY | Facility: CLINIC | Age: 49
End: 2019-03-23
Payer: COMMERCIAL

## 2019-03-23 ENCOUNTER — OUTPATIENT (OUTPATIENT)
Dept: OUTPATIENT SERVICES | Facility: HOSPITAL | Age: 49
LOS: 1 days | End: 2019-03-23
Payer: COMMERCIAL

## 2019-03-23 DIAGNOSIS — Z94.0 KIDNEY TRANSPLANT STATUS: Chronic | ICD-10-CM

## 2019-03-23 DIAGNOSIS — N20.0 CALCULUS OF KIDNEY: Chronic | ICD-10-CM

## 2019-03-23 DIAGNOSIS — Z98.890 OTHER SPECIFIED POSTPROCEDURAL STATES: Chronic | ICD-10-CM

## 2019-03-23 DIAGNOSIS — I77.0 ARTERIOVENOUS FISTULA, ACQUIRED: Chronic | ICD-10-CM

## 2019-03-23 DIAGNOSIS — Z90.5 ACQUIRED ABSENCE OF KIDNEY: Chronic | ICD-10-CM

## 2019-03-23 DIAGNOSIS — Z98.51 TUBAL LIGATION STATUS: Chronic | ICD-10-CM

## 2019-03-23 DIAGNOSIS — Z12.31 ENCOUNTER FOR SCREENING MAMMOGRAM FOR MALIGNANT NEOPLASM OF BREAST: ICD-10-CM

## 2019-03-23 DIAGNOSIS — Z90.49 ACQUIRED ABSENCE OF OTHER SPECIFIED PARTS OF DIGESTIVE TRACT: Chronic | ICD-10-CM

## 2019-03-23 PROCEDURE — 77067 SCR MAMMO BI INCL CAD: CPT | Mod: 26

## 2019-03-23 PROCEDURE — 77067 SCR MAMMO BI INCL CAD: CPT

## 2019-03-23 PROCEDURE — 77063 BREAST TOMOSYNTHESIS BI: CPT

## 2019-03-23 PROCEDURE — 77063 BREAST TOMOSYNTHESIS BI: CPT | Mod: 26

## 2019-03-25 LAB — HPV HIGH+LOW RISK DNA PNL CVX: NOT DETECTED

## 2019-03-26 ENCOUNTER — MESSAGE (OUTPATIENT)
Age: 49
End: 2019-03-26

## 2019-03-28 ENCOUNTER — FORM ENCOUNTER (OUTPATIENT)
Age: 49
End: 2019-03-28

## 2019-03-28 LAB — CYTOLOGY CVX/VAG DOC THIN PREP: NORMAL

## 2019-03-29 ENCOUNTER — OUTPATIENT (OUTPATIENT)
Dept: OUTPATIENT SERVICES | Facility: HOSPITAL | Age: 49
LOS: 1 days | End: 2019-03-29
Payer: COMMERCIAL

## 2019-03-29 ENCOUNTER — APPOINTMENT (OUTPATIENT)
Dept: MAMMOGRAPHY | Facility: CLINIC | Age: 49
End: 2019-03-29
Payer: COMMERCIAL

## 2019-03-29 DIAGNOSIS — Z90.49 ACQUIRED ABSENCE OF OTHER SPECIFIED PARTS OF DIGESTIVE TRACT: Chronic | ICD-10-CM

## 2019-03-29 DIAGNOSIS — Z90.5 ACQUIRED ABSENCE OF KIDNEY: Chronic | ICD-10-CM

## 2019-03-29 DIAGNOSIS — N20.0 CALCULUS OF KIDNEY: Chronic | ICD-10-CM

## 2019-03-29 DIAGNOSIS — R92.2 INCONCLUSIVE MAMMOGRAM: ICD-10-CM

## 2019-03-29 DIAGNOSIS — Z98.51 TUBAL LIGATION STATUS: Chronic | ICD-10-CM

## 2019-03-29 DIAGNOSIS — Z94.0 KIDNEY TRANSPLANT STATUS: Chronic | ICD-10-CM

## 2019-03-29 DIAGNOSIS — Z98.890 OTHER SPECIFIED POSTPROCEDURAL STATES: Chronic | ICD-10-CM

## 2019-03-29 DIAGNOSIS — I77.0 ARTERIOVENOUS FISTULA, ACQUIRED: Chronic | ICD-10-CM

## 2019-03-29 DIAGNOSIS — Z00.8 ENCOUNTER FOR OTHER GENERAL EXAMINATION: ICD-10-CM

## 2019-03-29 PROCEDURE — 77065 DX MAMMO INCL CAD UNI: CPT

## 2019-03-29 PROCEDURE — 76641 ULTRASOUND BREAST COMPLETE: CPT | Mod: 26,50

## 2019-03-29 PROCEDURE — 76641 ULTRASOUND BREAST COMPLETE: CPT

## 2019-03-29 PROCEDURE — 77065 DX MAMMO INCL CAD UNI: CPT | Mod: 26,LT

## 2019-04-02 ENCOUNTER — APPOINTMENT (OUTPATIENT)
Dept: NEPHROLOGY | Facility: CLINIC | Age: 49
End: 2019-04-02
Payer: COMMERCIAL

## 2019-04-02 VITALS — BODY MASS INDEX: 34.7 KG/M2 | WEIGHT: 235 LBS

## 2019-04-02 VITALS
OXYGEN SATURATION: 96 % | DIASTOLIC BLOOD PRESSURE: 81 MMHG | HEART RATE: 18 BPM | RESPIRATION RATE: 18 BRPM | SYSTOLIC BLOOD PRESSURE: 161 MMHG | TEMPERATURE: 98.1 F

## 2019-04-02 PROCEDURE — 99214 OFFICE O/P EST MOD 30 MIN: CPT

## 2019-04-02 RX ORDER — FAMOTIDINE 20 MG/1
20 TABLET, FILM COATED ORAL
Refills: 0 | Status: DISCONTINUED | COMMUNITY
End: 2019-04-02

## 2019-04-02 RX ORDER — FAMOTIDINE 20 MG/1
20 TABLET, FILM COATED ORAL DAILY
Qty: 90 | Refills: 3 | Status: DISCONTINUED | COMMUNITY
Start: 2019-01-24 | End: 2019-04-02

## 2019-04-02 RX ORDER — LEVOFLOXACIN 750 MG/1
750 TABLET, FILM COATED ORAL
Qty: 5 | Refills: 0 | Status: DISCONTINUED | COMMUNITY
Start: 2019-03-11 | End: 2019-04-02

## 2019-04-02 NOTE — ASSESSMENT
[FreeTextEntry1] : 1.  Renal transplant - also with b/l native nephrectomies.  Early on likely ACR treated with solumedrol 750mgs x3.  Creatinine stable around 1.   \par 2.  Immunosuppression - target tacro 5-7, myfortic 360mgs BID and pred 5.  Diarrhea resolved on low dose myfortic but now started again.      Restart dapsone.  Holding dapsone did not improve LFT's. \par 3.  HTN- increase coreg to 25mgs BID\par 4.  Transaminitis - stop pepcid and reassess.  If no improvement in 1 month check liver sono.  May have fatty liver as she gained a lot of weight recently.    \par 5.  Diarrhea - see below\par 6.  Hyperglycemia - concerning with weight gain but last A1c was 5.5\par 7.  Irregular menses - Pt following with GYN.  \par \par Pt will return for f/u in 4 weeks\par Check CMV pcr (drawn yesterday)\par stop pepcid\par If diarrhea does not resolve check Cdiff (pt recently on antibiotics)\par If negative stop myfortic.

## 2019-04-02 NOTE — HISTORY OF PRESENT ILLNESS
[FreeTextEntry1] : Ms. Vanegas is a 47 y.o female with a history of PCKD s/p LURT from ex  and bilateral native nephrectomies on 4/2/18.\par Course was complicated with slow function assumed to be early ACR (low tacro levels) treated with solumedrol 750mgs.  Pt also had Cdiff treated with vancomycin.   \par \par Interval events:\par About 3 weeks ago pt had bronchitis treated with levoquin.  Shortly after started having diarrhea - about 4-5 episodes per day.  Feels tired, does not feel great.  Also LFT's have been elevated.

## 2019-04-03 LAB
ALBUMIN SERPL ELPH-MCNC: 4.2 G/DL
ALP BLD-CCNC: 52 U/L
ALT SERPL-CCNC: 140 U/L
ANION GAP SERPL CALC-SCNC: 12 MMOL/L
APPEARANCE: ABNORMAL
APTT BLD: 27.4 SEC
AST SERPL-CCNC: 91 U/L
BACTERIA: NEGATIVE
BASOPHILS # BLD AUTO: 0.03 K/UL
BASOPHILS NFR BLD AUTO: 0.3 %
BILIRUB SERPL-MCNC: 0.4 MG/DL
BILIRUBIN URINE: NEGATIVE
BKV DNA SPEC QL NAA+PROBE: NOT DETECTED COPIES/ML
BLOOD URINE: ABNORMAL
BUN SERPL-MCNC: 19 MG/DL
CALCIUM SERPL-MCNC: 9.6 MG/DL
CHLORIDE SERPL-SCNC: 102 MMOL/L
CMV DNA SPEC QL NAA+PROBE: NOT DETECTED
CMVPCR LOG: NOT DETECTED LOGIU/ML
CO2 SERPL-SCNC: 25 MMOL/L
COLOR: YELLOW
CREAT SERPL-MCNC: 1.2 MG/DL
CREAT SPEC-SCNC: 164 MG/DL
CREAT/PROT UR: 0.1 RATIO
EOSINOPHIL # BLD AUTO: 0.18 K/UL
EOSINOPHIL NFR BLD AUTO: 2.1 %
GLUCOSE QUALITATIVE U: NEGATIVE
GLUCOSE SERPL-MCNC: 143 MG/DL
HCT VFR BLD CALC: 40.2 %
HGB BLD-MCNC: 13 G/DL
HYALINE CASTS: 0 /LPF
IMM GRANULOCYTES NFR BLD AUTO: 0.3 %
INR PPP: 0.94 RATIO
KETONES URINE: NEGATIVE
LDH SERPL-CCNC: 246 U/L
LEUKOCYTE ESTERASE URINE: NEGATIVE
LYMPHOCYTES # BLD AUTO: 2.3 K/UL
LYMPHOCYTES NFR BLD AUTO: 26.5 %
MAGNESIUM SERPL-MCNC: 1.6 MG/DL
MAN DIFF?: NORMAL
MCHC RBC-ENTMCNC: 30.8 PG
MCHC RBC-ENTMCNC: 32.3 GM/DL
MCV RBC AUTO: 95.3 FL
MICROSCOPIC-UA: NORMAL
MONOCYTES # BLD AUTO: 0.58 K/UL
MONOCYTES NFR BLD AUTO: 6.7 %
NEUTROPHILS # BLD AUTO: 5.57 K/UL
NEUTROPHILS NFR BLD AUTO: 64.1 %
NITRITE URINE: NEGATIVE
PH URINE: 5.5
PHOSPHATE SERPL-MCNC: 3.4 MG/DL
PLATELET # BLD AUTO: 177 K/UL
POTASSIUM SERPL-SCNC: 4.4 MMOL/L
PROT SERPL-MCNC: 7.3 G/DL
PROT UR-MCNC: 15 MG/DL
PROTEIN URINE: NORMAL
PT BLD: 10.7 SEC
RBC # BLD: 4.22 M/UL
RBC # FLD: 14.3 %
RED BLOOD CELLS URINE: 12 /HPF
SODIUM SERPL-SCNC: 139 MMOL/L
SPECIFIC GRAVITY URINE: 1.02
SQUAMOUS EPITHELIAL CELLS: 6 /HPF
TACROLIMUS SERPL-MCNC: 8.6 NG/ML
URATE SERPL-MCNC: 8.3 MG/DL
URINE COMMENTS: NORMAL
UROBILINOGEN URINE: NORMAL
WBC # FLD AUTO: 8.69 K/UL
WHITE BLOOD CELLS URINE: 12 /HPF

## 2019-04-04 LAB
ALBUMIN SERPL ELPH-MCNC: 4.2 G/DL
ALP BLD-CCNC: 53 U/L
ALT SERPL-CCNC: 131 U/L
ANION GAP SERPL CALC-SCNC: 13 MMOL/L
APPEARANCE: ABNORMAL
AST SERPL-CCNC: 80 U/L
BACTERIA: NEGATIVE
BASOPHILS # BLD AUTO: 0.04 K/UL
BASOPHILS NFR BLD AUTO: 0.4 %
BILIRUB SERPL-MCNC: 0.4 MG/DL
BILIRUBIN URINE: NEGATIVE
BKV DNA SPEC QL NAA+PROBE: NOT DETECTED COPIES/ML
BLOOD URINE: ABNORMAL
BUN SERPL-MCNC: 16 MG/DL
CALCIUM SERPL-MCNC: 9.3 MG/DL
CHLORIDE SERPL-SCNC: 105 MMOL/L
CMV DNA SPEC QL NAA+PROBE: NOT DETECTED
CMVPCR LOG: NOT DETECTED LOGIU/ML
CO2 SERPL-SCNC: 22 MMOL/L
COLOR: NORMAL
CREAT SERPL-MCNC: 1.08 MG/DL
CREAT SPEC-SCNC: 62 MG/DL
CREAT/PROT UR: 0.3 RATIO
EOSINOPHIL # BLD AUTO: 0.13 K/UL
EOSINOPHIL NFR BLD AUTO: 1.5 %
GLUCOSE QUALITATIVE U: NEGATIVE
GLUCOSE SERPL-MCNC: 188 MG/DL
HCT VFR BLD CALC: 40.9 %
HGB BLD-MCNC: 12.5 G/DL
HYALINE CASTS: 3 /LPF
IMM GRANULOCYTES NFR BLD AUTO: 0.3 %
KETONES URINE: NEGATIVE
LDH SERPL-CCNC: 241 U/L
LEUKOCYTE ESTERASE URINE: NEGATIVE
LYMPHOCYTES # BLD AUTO: 1.56 K/UL
LYMPHOCYTES NFR BLD AUTO: 17.5 %
MAGNESIUM SERPL-MCNC: 1.7 MG/DL
MAN DIFF?: NORMAL
MCHC RBC-ENTMCNC: 30.5 PG
MCHC RBC-ENTMCNC: 30.6 GM/DL
MCV RBC AUTO: 99.8 FL
MICROSCOPIC-UA: NORMAL
MONOCYTES # BLD AUTO: 0.55 K/UL
MONOCYTES NFR BLD AUTO: 6.2 %
NEUTROPHILS # BLD AUTO: 6.61 K/UL
NEUTROPHILS NFR BLD AUTO: 74.1 %
NITRITE URINE: NEGATIVE
PH URINE: 6
PHOSPHATE SERPL-MCNC: 2.5 MG/DL
PLATELET # BLD AUTO: 175 K/UL
POTASSIUM SERPL-SCNC: 4.6 MMOL/L
PROT SERPL-MCNC: 7.1 G/DL
PROT UR-MCNC: 17 MG/DL
PROTEIN URINE: NORMAL
RBC # BLD: 4.1 M/UL
RBC # FLD: 14.2 %
RED BLOOD CELLS URINE: 11 /HPF
SODIUM SERPL-SCNC: 140 MMOL/L
SPECIFIC GRAVITY URINE: 1.01
SQUAMOUS EPITHELIAL CELLS: 6 /HPF
TACROLIMUS SERPL-MCNC: 11.2 NG/ML
URATE SERPL-MCNC: 8.1 MG/DL
UROBILINOGEN URINE: NORMAL
WBC # FLD AUTO: 8.92 K/UL
WHITE BLOOD CELLS URINE: 2 /HPF

## 2019-04-17 ENCOUNTER — APPOINTMENT (OUTPATIENT)
Dept: TRANSPLANT | Facility: CLINIC | Age: 49
End: 2019-04-17

## 2019-04-17 ENCOUNTER — APPOINTMENT (OUTPATIENT)
Dept: FAMILY MEDICINE | Facility: CLINIC | Age: 49
End: 2019-04-17
Payer: COMMERCIAL

## 2019-04-17 VITALS
HEIGHT: 69 IN | WEIGHT: 229 LBS | SYSTOLIC BLOOD PRESSURE: 125 MMHG | DIASTOLIC BLOOD PRESSURE: 70 MMHG | OXYGEN SATURATION: 95 % | BODY MASS INDEX: 33.92 KG/M2 | HEART RATE: 69 BPM | TEMPERATURE: 97.5 F

## 2019-04-17 PROCEDURE — 99396 PREV VISIT EST AGE 40-64: CPT | Mod: 25

## 2019-04-17 PROCEDURE — 96127 BRIEF EMOTIONAL/BEHAV ASSMT: CPT | Mod: 59

## 2019-04-17 NOTE — PHYSICAL EXAM
[No Acute Distress] : no acute distress [Well Nourished] : well nourished [Well Developed] : well developed [Well-Appearing] : well-appearing [Normal Sclera/Conjunctiva] : normal sclera/conjunctiva [PERRL] : pupils equal round and reactive to light [EOMI] : extraocular movements intact [Normal Outer Ear/Nose] : the outer ears and nose were normal in appearance [Normal Oropharynx] : the oropharynx was normal [Supple] : supple [No JVD] : no jugular venous distention [No Lymphadenopathy] : no lymphadenopathy [Clear to Auscultation] : lungs were clear to auscultation bilaterally [Thyroid Normal, No Nodules] : the thyroid was normal and there were no nodules present [No Respiratory Distress] : no respiratory distress  [Normal Rate] : normal rate  [No Accessory Muscle Use] : no accessory muscle use [Regular Rhythm] : with a regular rhythm [Normal S1, S2] : normal S1 and S2 [No Murmur] : no murmur heard [No Carotid Bruits] : no carotid bruits [No Abdominal Bruit] : a ~M bruit was not heard ~T in the abdomen [No Varicosities] : no varicosities [Pedal Pulses Present] : the pedal pulses are present [No Edema] : there was no peripheral edema [No Extremity Clubbing/Cyanosis] : no extremity clubbing/cyanosis [No Palpable Aorta] : no palpable aorta [Soft] : abdomen soft [Non Tender] : non-tender [Non-distended] : non-distended [No HSM] : no HSM [No Masses] : no abdominal mass palpated [Normal Posterior Cervical Nodes] : no posterior cervical lymphadenopathy [Normal Bowel Sounds] : normal bowel sounds [Normal Anterior Cervical Nodes] : no anterior cervical lymphadenopathy [No CVA Tenderness] : no CVA  tenderness [No Spinal Tenderness] : no spinal tenderness [No Rash] : no rash [Grossly Normal Strength/Tone] : grossly normal strength/tone [No Joint Swelling] : no joint swelling [Coordination Grossly Intact] : coordination grossly intact [Normal Gait] : normal gait [Deep Tendon Reflexes (DTR)] : deep tendon reflexes were 2+ and symmetric [No Focal Deficits] : no focal deficits [Normal Affect] : the affect was normal [Normal Insight/Judgement] : insight and judgment were intact
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
## 2019-04-17 NOTE — COUNSELING
[Weight management counseling provided] : Weight management [Healthy eating counseling provided] : healthy eating [Activity counseling provided] : activity [Fall prevention counseling provided] : fall prevention  [Behavioral health counseling provided] : behavioral health  [None] : None [Good understanding] : Patient has a good understanding of lifestyle changes and the steps needed to achieve self management goals [ - Annual Depression Screening] : Annual Depression Screening

## 2019-04-17 NOTE — HEALTH RISK ASSESSMENT
[Fair] :  ~his/her~ mood as fair [No falls in past year] : Patient reported no falls in the past year [Patient reported mammogram was normal] : Patient reported mammogram was normal [Patient reported PAP Smear was normal] : Patient reported PAP Smear was normal [HIV test declined] : HIV test declined [Hepatitis C test declined] : Hepatitis C test declined [None] : None [Alone] : lives alone [Employed] : employed [] :  [Sexually Active] : sexually active [Fully functional (bathing, dressing, toileting, transferring, walking, feeding)] : Fully functional (bathing, dressing, toileting, transferring, walking, feeding) [Fully functional (using the telephone, shopping, preparing meals, housekeeping, doing laundry, using] : Fully functional and needs no help or supervision to perform IADLs (using the telephone, shopping, preparing meals, housekeeping, doing laundry, using transportation, managing medications and managing finances) [Seat Belt] :  uses seat belt [Smoke Detector] : smoke detector [With Patient/Caregiver] : With Patient/Caregiver [Designated Healthcare Proxy] : Designated healthcare proxy [Name: ___] : Health Care Proxy's Name: [unfilled]  [Relationship: ___] : Relationship: [unfilled] [2] : 2) Feeling down, depressed, or hopeless for more than half of the days (2) [] : No [de-identified] : quit 4/2018 [Change in mental status noted] : No change in mental status noted [Language] : denies difficulty with language [Handling Complex Tasks] : denies difficulty handling complex tasks [Reports changes in vision] : Reports no changes in vision [Reports changes in hearing] : Reports no changes in hearing [Reports changes in dental health] : Reports no changes in dental health [MammogramDate] : 3/2019 [BoneDensityComments] : never done [PapSmearDate] : 3/2019 [PapSmearComments] : Gyn: Dr Finn [ColonoscopyComments] : never done [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

## 2019-04-17 NOTE — ASSESSMENT
[FreeTextEntry1] : 49 y/o F s/p Kidney transplant 4/2018 presents today for CPE:\par \par HCM:\par -blood and UA done today as outpt order by Neohrology.\par \par Abnromal LFT's:\par -USG abdomen order to evaluate liver.\par \par S/P kidney transplant 4/2018:\par -F/up w/ nephrology.\par -on Envarsus, Mycophenolate and prednisone\par \par Anxiety:\par -on Clonazepam\par -I-STOP check.\par -Refill #60\par \par HTN;\par -on Cozaar and carvedilol.\par \par GERD;\par -on famotidine\par \par Menorrhagia:\par -s/p Bxp w/ gyn.: benign\par -s/p IUD mirena placed.\par -f/up w/ gyn.\par \par

## 2019-04-17 NOTE — PAST MEDICAL HISTORY
[Menstruating] : menstruating [unknown] : the patient is unsure of the date of her LMP [Irregular Cycle Intervals] : are  irregular [Total Preg ___] : G[unfilled] [Living ___] : Living: [unfilled] [de-identified] : methrorragia

## 2019-04-18 ENCOUNTER — APPOINTMENT (OUTPATIENT)
Dept: VASCULAR SURGERY | Facility: CLINIC | Age: 49
End: 2019-04-18

## 2019-04-21 ENCOUNTER — FORM ENCOUNTER (OUTPATIENT)
Age: 49
End: 2019-04-21

## 2019-04-22 ENCOUNTER — APPOINTMENT (OUTPATIENT)
Dept: ULTRASOUND IMAGING | Facility: CLINIC | Age: 49
End: 2019-04-22
Payer: COMMERCIAL

## 2019-04-22 ENCOUNTER — OUTPATIENT (OUTPATIENT)
Dept: OUTPATIENT SERVICES | Facility: HOSPITAL | Age: 49
LOS: 1 days | End: 2019-04-22
Payer: COMMERCIAL

## 2019-04-22 DIAGNOSIS — N20.0 CALCULUS OF KIDNEY: Chronic | ICD-10-CM

## 2019-04-22 DIAGNOSIS — Z98.890 OTHER SPECIFIED POSTPROCEDURAL STATES: Chronic | ICD-10-CM

## 2019-04-22 DIAGNOSIS — R94.5 ABNORMAL RESULTS OF LIVER FUNCTION STUDIES: ICD-10-CM

## 2019-04-22 DIAGNOSIS — Z94.0 KIDNEY TRANSPLANT STATUS: Chronic | ICD-10-CM

## 2019-04-22 DIAGNOSIS — I77.0 ARTERIOVENOUS FISTULA, ACQUIRED: Chronic | ICD-10-CM

## 2019-04-22 DIAGNOSIS — Z98.51 TUBAL LIGATION STATUS: Chronic | ICD-10-CM

## 2019-04-22 DIAGNOSIS — Z00.00 ENCOUNTER FOR GENERAL ADULT MEDICAL EXAMINATION WITHOUT ABNORMAL FINDINGS: ICD-10-CM

## 2019-04-22 DIAGNOSIS — Z90.49 ACQUIRED ABSENCE OF OTHER SPECIFIED PARTS OF DIGESTIVE TRACT: Chronic | ICD-10-CM

## 2019-04-22 DIAGNOSIS — Z90.5 ACQUIRED ABSENCE OF KIDNEY: Chronic | ICD-10-CM

## 2019-04-22 LAB
25(OH)D3 SERPL-MCNC: 12.1 NG/ML
ALBUMIN SERPL ELPH-MCNC: 4.2 G/DL
ALP BLD-CCNC: 49 U/L
ALT SERPL-CCNC: 101 U/L
ANION GAP SERPL CALC-SCNC: 14 MMOL/L
APPEARANCE: ABNORMAL
AST SERPL-CCNC: 77 U/L
BACTERIA: ABNORMAL
BASOPHILS # BLD AUTO: 0.06 K/UL
BASOPHILS NFR BLD AUTO: 0.5 %
BILIRUB SERPL-MCNC: 1.2 MG/DL
BILIRUBIN URINE: NEGATIVE
BKV DNA SPEC QL NAA+PROBE: NOT DETECTED COPIES/ML
BLOOD URINE: ABNORMAL
BUN SERPL-MCNC: 16 MG/DL
CALCIUM SERPL-MCNC: 9.3 MG/DL
CHLORIDE SERPL-SCNC: 102 MMOL/L
CHOLEST SERPL-MCNC: 154 MG/DL
CHOLEST/HDLC SERPL: 5.1 RATIO
CMV DNA SPEC QL NAA+PROBE: NOT DETECTED
CMVPCR LOG: NOT DETECTED LOGIU/ML
CO2 SERPL-SCNC: 23 MMOL/L
COLOR: YELLOW
CREAT SERPL-MCNC: 1.21 MG/DL
CREAT SPEC-SCNC: 139 MG/DL
CREAT/PROT UR: 0.1 RATIO
EOSINOPHIL # BLD AUTO: 0.19 K/UL
EOSINOPHIL NFR BLD AUTO: 1.7 %
ESTIMATED AVERAGE GLUCOSE: 126 MG/DL
GLUCOSE QUALITATIVE U: NEGATIVE
GLUCOSE SERPL-MCNC: 182 MG/DL
HBA1C MFR BLD HPLC: 6 %
HCT VFR BLD CALC: 40.4 %
HDLC SERPL-MCNC: 30 MG/DL
HGB BLD-MCNC: 12.9 G/DL
HYALINE CASTS: 0 /LPF
IMM GRANULOCYTES NFR BLD AUTO: 0.4 %
KETONES URINE: NEGATIVE
LDH SERPL-CCNC: 268 U/L
LDLC SERPL CALC-MCNC: 80 MG/DL
LEUKOCYTE ESTERASE URINE: ABNORMAL
LYMPHOCYTES # BLD AUTO: 2.76 K/UL
LYMPHOCYTES NFR BLD AUTO: 24.2 %
MAGNESIUM SERPL-MCNC: 1.4 MG/DL
MAN DIFF?: NORMAL
MCHC RBC-ENTMCNC: 31.2 PG
MCHC RBC-ENTMCNC: 31.9 GM/DL
MCV RBC AUTO: 97.6 FL
MICROSCOPIC-UA: NORMAL
MONOCYTES # BLD AUTO: 0.77 K/UL
MONOCYTES NFR BLD AUTO: 6.7 %
NEUTROPHILS # BLD AUTO: 7.59 K/UL
NEUTROPHILS NFR BLD AUTO: 66.5 %
NITRITE URINE: POSITIVE
PH URINE: 5.5
PHOSPHATE SERPL-MCNC: 3.8 MG/DL
PLATELET # BLD AUTO: 201 K/UL
POTASSIUM SERPL-SCNC: 4.2 MMOL/L
PROT SERPL-MCNC: 6.4 G/DL
PROT UR-MCNC: 13 MG/DL
PROTEIN URINE: NORMAL
RBC # BLD: 4.14 M/UL
RBC # FLD: 14.3 %
RED BLOOD CELLS URINE: 22 /HPF
SODIUM SERPL-SCNC: 139 MMOL/L
SPECIFIC GRAVITY URINE: 1.02
SQUAMOUS EPITHELIAL CELLS: 7 /HPF
TACROLIMUS SERPL-MCNC: 9.8 NG/ML
TRIGL SERPL-MCNC: 219 MG/DL
URATE SERPL-MCNC: 9 MG/DL
URINE COMMENTS: NORMAL
UROBILINOGEN URINE: NORMAL
WBC # FLD AUTO: 11.42 K/UL
WHITE BLOOD CELLS URINE: 44 /HPF

## 2019-04-22 PROCEDURE — 76700 US EXAM ABDOM COMPLETE: CPT

## 2019-04-22 PROCEDURE — 76700 US EXAM ABDOM COMPLETE: CPT | Mod: 26

## 2019-05-03 ENCOUNTER — OTHER (OUTPATIENT)
Age: 49
End: 2019-05-03

## 2019-05-06 ENCOUNTER — APPOINTMENT (OUTPATIENT)
Dept: TRANSPLANT | Facility: CLINIC | Age: 49
End: 2019-05-06

## 2019-05-07 ENCOUNTER — OTHER (OUTPATIENT)
Age: 49
End: 2019-05-07

## 2019-05-09 ENCOUNTER — APPOINTMENT (OUTPATIENT)
Dept: NEPHROLOGY | Facility: CLINIC | Age: 49
End: 2019-05-09
Payer: COMMERCIAL

## 2019-05-09 ENCOUNTER — APPOINTMENT (OUTPATIENT)
Dept: HEPATOLOGY | Facility: CLINIC | Age: 49
End: 2019-05-09
Payer: COMMERCIAL

## 2019-05-09 VITALS
BODY MASS INDEX: 34.51 KG/M2 | TEMPERATURE: 98.1 F | WEIGHT: 233 LBS | OXYGEN SATURATION: 94 % | SYSTOLIC BLOOD PRESSURE: 127 MMHG | HEART RATE: 68 BPM | RESPIRATION RATE: 18 BRPM | HEIGHT: 69 IN | DIASTOLIC BLOOD PRESSURE: 76 MMHG

## 2019-05-09 LAB
25(OH)D3 SERPL-MCNC: 21.3 NG/ML
ALBUMIN SERPL ELPH-MCNC: 4 G/DL
ALP BLD-CCNC: 55 U/L
ALT SERPL-CCNC: 84 U/L
ANION GAP SERPL CALC-SCNC: 12 MMOL/L
APPEARANCE: ABNORMAL
AST SERPL-CCNC: 47 U/L
BACTERIA: ABNORMAL
BASOPHILS # BLD AUTO: 0.04 K/UL
BASOPHILS NFR BLD AUTO: 0.4 %
BILIRUB SERPL-MCNC: 0.5 MG/DL
BILIRUBIN URINE: NEGATIVE
BKV DNA SPEC QL NAA+PROBE: NOT DETECTED COPIES/ML
BLOOD URINE: ABNORMAL
BUN SERPL-MCNC: 18 MG/DL
CALCIUM SERPL-MCNC: 8.9 MG/DL
CHLORIDE SERPL-SCNC: 105 MMOL/L
CHOLEST SERPL-MCNC: 152 MG/DL
CHOLEST/HDLC SERPL: 5.2 RATIO
CMV DNA SPEC QL NAA+PROBE: NOT DETECTED
CMVPCR LOG: NOT DETECTED LOGIU/ML
CO2 SERPL-SCNC: 23 MMOL/L
COLOR: YELLOW
CREAT SERPL-MCNC: 1.13 MG/DL
CREAT SPEC-SCNC: 164 MG/DL
CREAT/PROT UR: 0.1 RATIO
EOSINOPHIL # BLD AUTO: 0.21 K/UL
EOSINOPHIL NFR BLD AUTO: 2.3 %
ESTIMATED AVERAGE GLUCOSE: 137 MG/DL
GLUCOSE QUALITATIVE U: NEGATIVE
GLUCOSE SERPL-MCNC: 236 MG/DL
HBA1C MFR BLD HPLC: 6.4 %
HCT VFR BLD CALC: 39.8 %
HDLC SERPL-MCNC: 29 MG/DL
HGB BLD-MCNC: 12.5 G/DL
HYALINE CASTS: 0 /LPF
IMM GRANULOCYTES NFR BLD AUTO: 0.4 %
INR PPP: 0.99 RATIO
KETONES URINE: NEGATIVE
LDH SERPL-CCNC: 193 U/L
LDLC SERPL CALC-MCNC: 57 MG/DL
LEUKOCYTE ESTERASE URINE: NEGATIVE
LYMPHOCYTES # BLD AUTO: 2.23 K/UL
LYMPHOCYTES NFR BLD AUTO: 24.9 %
MAGNESIUM SERPL-MCNC: 1.5 MG/DL
MAN DIFF?: NORMAL
MCHC RBC-ENTMCNC: 31.1 PG
MCHC RBC-ENTMCNC: 31.4 GM/DL
MCV RBC AUTO: 99 FL
MICROSCOPIC-UA: NORMAL
MONOCYTES # BLD AUTO: 0.58 K/UL
MONOCYTES NFR BLD AUTO: 6.5 %
NEUTROPHILS # BLD AUTO: 5.86 K/UL
NEUTROPHILS NFR BLD AUTO: 65.5 %
NITRITE URINE: NEGATIVE
PH URINE: 6
PHOSPHATE SERPL-MCNC: 3.2 MG/DL
PLATELET # BLD AUTO: 188 K/UL
POTASSIUM SERPL-SCNC: 4.1 MMOL/L
PROT SERPL-MCNC: 6.4 G/DL
PROT UR-MCNC: 21 MG/DL
PROTEIN URINE: ABNORMAL
PT BLD: 11.2 SEC
RBC # BLD: 4.02 M/UL
RBC # FLD: 14 %
RED BLOOD CELLS URINE: 92 /HPF
SODIUM SERPL-SCNC: 140 MMOL/L
SPECIFIC GRAVITY URINE: 1.02
SQUAMOUS EPITHELIAL CELLS: 10 /HPF
TACROLIMUS SERPL-MCNC: 4.6 NG/ML
TRIGL SERPL-MCNC: 330 MG/DL
URATE SERPL-MCNC: 6.3 MG/DL
URINE COMMENTS: NORMAL
UROBILINOGEN URINE: NORMAL
WBC # FLD AUTO: 8.96 K/UL
WHITE BLOOD CELLS URINE: 6 /HPF

## 2019-05-09 PROCEDURE — 99214 OFFICE O/P EST MOD 30 MIN: CPT

## 2019-05-09 PROCEDURE — 99204 OFFICE O/P NEW MOD 45 MIN: CPT

## 2019-05-09 RX ORDER — ASPIRIN/ACETAMINOPHEN/CAFFEINE 250-250-65
325 (65 FE) TABLET ORAL
Refills: 0 | Status: DISCONTINUED | COMMUNITY
End: 2019-05-09

## 2019-05-09 RX ORDER — CIPROFLOXACIN HYDROCHLORIDE 500 MG/1
500 TABLET, FILM COATED ORAL
Qty: 14 | Refills: 0 | Status: DISCONTINUED | COMMUNITY
Start: 2019-04-18 | End: 2019-05-09

## 2019-05-09 NOTE — ASSESSMENT
[FreeTextEntry1] : 47 yo F with a history of HTN, obesity, and PCKD, s/p LURT on 4/2/18 and on chronic immunosuppression with prednisone, Myfortic, and tacrolimus, with post-transplant weight gain of approximately 30 lbs to current BMI of 34.4 kg/m2 as well as post-transplant development of pre-diabetes (with recent HbA1c 6.4% on 5/3/19) and hypertriglyceridemia.\par \par She has had mild elevations in her ALT dating back to 5/2018 (shortly post-transplant), with more noticeable increase in her AST and ALT in more recent months. Her alkaline phosphatase, bilirubin, and liver synthetic function remain within normal limits. She has no prior serologic evidence of chronic HBV or HCV and no significant alcohol history. Her post-transplant liver imaging is notable for hepatic steatosis.\par \par Given her risk factors of obesity, pre-diabetes, hypertriglyceridemia, hypertension, and chronic immunosuppression with both prednisone and tacrolimus, she appears to have developed nonalcoholic fatty liver disease (NAFLD).\par \par I have ordered labs today to rule out other, less likely, causes of chronic liver disease and also to ascertain whether she has immunity to HAV (was non-immune on prior labs from prior to her transplant and cannot recall if she has been vaccinated since then).\par \par I have discussed with her at length regarding nonalcoholic fatty liver disease (NAFLD). I reviewed the natural history, evaluation and staging of the disease including elastography (with MRI/MRE ordered today) and potential need for liver biopsy, and prognosis, including possible risks of development of compensated cirrhosis, decompensated cirrhosis, and HCC. Her current NAFLD fibrosis score is 2.32, concerning for the presence of significant hepatic fibrosis, though her recent abdominal sonogram did not show morphologic features of cirrhosis.\par \par I have discussed with her that lifestyle modifications are crucial for management of NAFLD. I recommended gradual weight loss of 10% of her body weight through dietary changes and moderate intensity exercise for at least 20 minutes at least 3 times per week. These changes have been shown to lead to regression or even resolution of steatosis, inflammation, and even fibrosis in some patients.\par \par I have reviewed with her the fact that currently, there are no FDA-approved pharmacologic treatments for NAFLD, but that this may change within the next 1-2 years as a number of promising drugs are currently being investigated in clinical trials. We have discussed the possibility of clinical trial referral/enrollment, though I suspect that she would not be eligible given her history of prior kidney transplant and need for chronic immunosuppression, as there could be concern for drug-drug interactions with any study medication(s). We will re-address the possibility of pharmacologic treatment at her next visit, pending her laboratory testing and MRI/MRE results.\par \par She will return to clinic for follow-up in 6-8 weeks.

## 2019-05-09 NOTE — HISTORY OF PRESENT ILLNESS
[Tattoo] : tattoo(s) [Occupational Exposure] : occupational exposure [Infected Sexual Partner] : no infected sexual partner [Needlestick Exposure] : no needlestick exposure [Body Piercing] : no body piercing [Hemodialysis] : no hemodialysis [IV Drug Use] : no IV drug use [Transfusion before 1992] : no transfusion before 1992 [Transplant before 1992] : no transplant before 1992 [Incarceration] : no incarceration [Alcohol Abuse] : no alcohol abuse [Autoimmune Disorder] : no autoimmune disorder [Household Contact to HBV] : no household contact to HBV [Travel to Endemic Area] : no travel to an endemic area [Cocaine Use] : no cocaine use [de-identified] : Ms. Vanegas is a 49 yo  F with a history of HTN, obesity, and PCKD, s/p LURT from her ex- and bilateral nephrectomies on 4/2/18, now on chronic immunosuppression with prednisone, Myfortic, and tacrolimus. Her post-transplant course was complicated by early ACR treated with Solumedrol, as well as C difficile infection treated with vancomycin. She was referred to Liver clinic by her transplant nephrologist, Dr. Miko Mcdonough, for further evaluation of abnormal liver enzymes.\par \par She denies any prior known history of liver disease, and denies any history of jaundice, scleral icterus, overt GI bleeding, ascites, or confusion. \par \par She reports that she has been "heavy" for most of her life, with pre-kidney transplant weight of ~200 lbs (208 lbs with BMI 31.6 kg/m2 in 3/2018). She was around a size 12 at that time. Post-transplant, she has gained ~30 lbs (weight today: 233 lbs with BMI 34.4 kg/m2). She is now a size 16. 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 She drinks tea and sodas but does not drink coffee. She has a single small tattoo on her left breast done at age 19. She denies any needlestick injuries at work. She did not require hemodialysis prior to receiving her kidney transplant.\par \par Pre-transplant liver imaging included an MRI abdomen without contrast (done on 9/19/17) that showed no hepatic steatosis, borderline splenomegaly (14 cm), and several stable left hepatic cysts. Post-transplant liver imaging includes a CT abdomen/pelvis without contrast (done on 7/5/18) that showed fatty infiltration of the liver. A recent abdominal sonogram (done on 4/22/19) showed hepatomegaly (21.3 cm) and moderate diffuse hepatic steatosis, with normal size spleen.\par \par Relevant labs:\par HbA1c 6.4% (5/3/19)\par Lipid panel (5/3/19): Cholesterol 152, LDL 57, HDL 29, Tg 330\par HIV negative\par HAV total Ab negative (11/8/17)\par HBsAg negative, HBcAb negative, HBsAb positive 32.7 (3/20/18)\par HCV Ab negative (3/20/18)\par CMV PCR negative (5/3/19)\par BK virus PCR negative (5/3/19)

## 2019-05-09 NOTE — CONSULT LETTER
[( Thank you for referring [unfilled] for consultation for _____ )] : Thank you for referring [unfilled] for consultation for [unfilled] [Dear  ___] : Dear  [unfilled], [Please see my note below.] : Please see my note below. [FreeTextEntry3] : Sincerely,\par \par Rosanna Norton M.D., Ph.D.\par [facility]\par [address]\[place] [address]\par Tel: [phone]\par Fax: [phone]\par Cell: [phone]\par E-mail: [email or web address]\par  [Consult Closing:] : Thank you very much for allowing me to participate in the care of this patient.  If you have any questions, please do not hesitate to contact me. [FreeTextEntry2] : Dr. Miko Mcdonough

## 2019-05-09 NOTE — ED ADULT NURSE NOTE - NS ED NURSE LEVEL OF CONSCIOUSNESS SPEECH
PODIATRIC MEDICINE AND SURGERY  CONSULT HISTORY AND PHYSICAL      Consulting Service:  Internal Medicine  Opinion/advice regarding: Management of Right LE DFU  Staff Doctor:  Dr. Main Pelaez DPM    ASSESSMENT:  This 77 y.o. male with significant PMH of DM2, PVD, Left AKA 11/2018 at [facility] general. Stable right heel and leg ulcerations. Dependent Rubor.     PLAN AND RECOMMENDATIONS[de-identified]  Angio today with Dr. Andrez Paz. Weight bearing as tolerated to RLE for transfers  Continue santyl to the right posterior heel and anterior leg with DSD. Betadine paint to any areas of necrosis to toes. Leave open to air. Change daily. Will follow up with Dr. Elmo Lu in the [facility] in 7-10 days of discharge. Podiatry to follow while in house. Interval HPI: No interval changes. Going for angio today for RLE. Wounds are stable. Past Medical History:   Diagnosis Date    Asthma     COPD (chronic obstructive pulmonary disease) ([address]     Diabetes mellitus due to underlying condition, controlled, with stage 2 chronic kidney disease ([address]     Morbid obesity ([address]     Neuropathy     PVD (peripheral vascular disease) ([address]     1 stent in left leg placed 09/2018 and 2 stents in right leg    Sleep apnea     Type 2 diabetes mellitus without complication ([address]        Past Surgical History:   Procedure Laterality Date    RECTAL SURGERY  2015    patient had a boil break open inside rectum. needed surgery for infection removal.       No current facility-administered medications on file prior to encounter.       Current Outpatient Medications on File Prior to Encounter   Medication Sig Dispense Refill    Clopidogrel Bisulfate (PLAVIX PO) Take 75 mg by mouth daily       insulin lispro protamine & lispro (HUMALOG MIX 75/25) (75-25) 100 UNIT per ML SUSP injection vial Inject into the skin 2 times daily (with meals)      albuterol sulfate  (90 Base) MCG/ACT inhaler Inhale 2 puffs into the lungs      ipratropium-albuterol (DUONEB) 0.5-2.5 (3) MG/3ML SOLN nebulizer solution Inhale 3 mLs into the lungs      oxyCODONE-acetaminophen (PERCOCET) 5-325 MG per tablet Take 1 tablet by mouth as needed for Pain. Nile Dark gabapentin (NEURONTIN) 800 MG tablet Take 1 tablet by mouth 3 times daily for 30 days. . 90 tablet 0       Allergies   Allergen Reactions    Coconut Oil Nausea And Vomiting     Coconut meat only. History reviewed. No pertinent family history.     Social History     Socioeconomic History    Marital status:      Spouse name: Not on file    Number of children: Not on file    Years of education: Not on file    Highest education level: Not on file   Occupational History    Not on file   Social Needs    Financial resource strain: Not on file    Food insecurity:     Worry: Not on file     Inability: Not on file    Transportation needs:     Medical: Not on file     Non-medical: Not on file   Tobacco Use    Smoking status: Current Every Day Smoker     Packs/day: 1.00     Years: 58.00     Pack years: 58.00     Types: Cigarettes    Smokeless tobacco: Never Used   Substance and Sexual Activity    Alcohol use: No    Drug use: Yes     Types: Marijuana    Sexual activity: Not on file   Lifestyle    Physical activity:     Days per week: Not on file     Minutes per session: Not on file    Stress: Not on file   Relationships    Social connections:     Talks on phone: Not on file     Gets together: Not on file     Attends Yazdanism service: Not on file     Active member of club or organization: Not on file     Attends meetings of clubs or organizations: Not on file     Relationship status: Not on file    Intimate partner violence:     Fear of current or ex partner: Not on file     Emotionally abused: Not on file     Physically abused: Not on file     Forced sexual activity: Not on file   Other Topics Concern    Not on file   Social History Narrative         Lives With: Alone     Pt is from home but home was not going well prior to this admission. Pt lives alone. Pt was recently at a SNF [facility] but he does not want to return there. Pt is agreeable to [facility] if they will take him. Referral called to [place] [facility] [place]. Type of Home: Apartment One level    Home Access: Stairs to enter without rails - Number of Steps: 1 (working with landlord to get a ramp)    Bathroom Shower/Tub: Tub/Shower unit(w/c does not fit into the bathroom)    Bathroom Equipment: 3-in-1 commode    Home Equipment: Frolikx    ADL Assistance: Independent(\"With baby wipes, not very good\")    Homemaking Assistance: Needs assistance    Ambulation Assistance: (w/c)    Transfer Assistance: Independent(was using slideboard at home)    Active : No    Additional Comments: Has been sleeping on the sofa, cannot get into the bedroom or bathroom at home as [facility] does not fit in the doorways. Girlfriend or guys from the apartment do his shopping    Social/Functional History            Review of Systems  CONSTITUTIONAL: No fevers, chills, diaphoresis  HEENT: Denies epistaxis or tinnitus  EYES: No diplopia or blurry vision. CARDIOVASCULAR: No chest pain, dyspnea, palpitations, orthopnea, PND, ankle edema. PULM: No dyspnea, tachypnea, wheezing  GI: No dysphagia/odynophagia, constipation, diarrhea, changes in stool habits, hematochezia, melena. : No new urinary complaints, including dysuria, gross hematuria or pyuria. NEURO: No new balance problems, peripheral weakness/paresthesias or numbness of concern. MUSC-SKEL: admits to pain in right. See below  PSY: No concerns regarding depression, anxiety or panic. INTEGUMENTARY: admits to open skin lesion.  See below    OBJECTIVE:  BP (!) 117/54   Pulse 70   Temp 97.8 °F (36.6 °C) (Oral)   Resp 22   Ht 6' (1.829 m)   Wt (!) 326 lb (147.9 kg)   SpO2 94%   BMI 44.21 kg/m²   Patient is alert and oriented x 3 in NAD.        Vascular:   Non-Palpable Dorsalis Pedis and non-Palpable Posterior Tibial Pulses on right. Capillary Fill time < 5 seconds right  Skin temperature warm to cool tibial tuberosity to the digits B/L  Hair growth absent to digits   ++ Pitting edema RLE  Dependent rubor distal right forefoot     Neurological:   Epicritic sensation intact B/L  Protective sensation via monofilament testing impaired B/L  Sharp/dull sensation impaired to plantar foot B/L     Musculoskeletal/Orthopaedic:   Structural Deformities: decreased medial longitudinal arch   4/5 muscle strength Dorsiflexion, Plantarflexion, Inversion, Eversion   ROM decreased pedal and ankle joints B/L. TTP RLE  Left AKA     Dermatological:   RLE 5 cm x 5 cm large necrotic eschar to anterior right leg. No drainage. No surrounding erythema noted. No fluctuance. No purulence. Does not probe. Right posterior heel ulceration with fibrogranular base and serosanginous drainage. No surrounding signs of infection. Multiple scattered necrotic pinpoint islands. No overt signs of infection. Dependent rubor noted to distal forefoot. LABS:   Lab Results   Component Value Date    WBC 7.3 2019    HGB 13.5 (L) 2019    HCT 42.7 2019    MCV 99.3 2019     2019     Lab Results   Component Value Date     2019    K 4.3 2019     2019    CO2 25 2019    BUN 31 2019    CREATININE 0.94 2019    GLUCOSE 237 2019    CALCIUM 7.7 2019      Lab Results   Component Value Date    LABALBU 2.5 (L) 2019     Lab Results   Component Value Date    SEDRATE 59 (H) 2018     Lab Results   Component Value Date    .4 (H) 2018     Lab Results   Component Value Date    LABA1C 7.8 (H) 2019     No results found for: EAG      IMAGIN/7/19 X-ray of the right foot as read by Radiologist on 19  THERE IS NO ACUTE FRACTURE OR DISLOCATION. NO SIGNS OF OSTEOMYELITIS. THERE IS SOFT TISSUE SWELLING OF THE DORSUM OF FOOT.         5/7/19:Right foot MRI:  Impression   1.   PRONOUNCED GENERALIZED SOFT TISSUE SWELLING AND SKIN THICKENING, COMPATIBLE CELLULITIS.       2.  THERE IS NO DRAINABLE ABSCESS.       3.  THERE IS NO SEPTIC ARTHRITIS.       4.  THERE IS NO OSTEOMYELITIS     Right LE Duplex Art US 5/7/19       FINDINGS:       The study is significantly limited by the patient's body habitus.       Elevated velocity within the distal right superficial femoral artery is present to just over 340 cm/s, consistent with an approximately 60% stenosis.       No other significantly elevated velocities are identified elsewhere. There is no occlusion.       There is significantly limited visualization of the infrapopliteal runoff secondary to leg bandages, with mildly pulsatile monophasic waveforms identified within the proximal anterior tibial, posterior tibial and peroneal arteries.       Other maximum velocities are noted on the included worksheet.                   Impression       EXTENSIVE ATHEROSCLEROTIC DISEASE, WITH AN APPROXIMATELY 60% STENOSIS OF THE DISTAL RIGHT SUPERFICIAL FEMORAL ARTERY.       NO EVIDENCE OF OCCLUSION, OR OTHER FLOW LIMITING STENOSIS IDENTIFIED.             Patient's case will be discussed with staff, who will provide final recommendations. Thank you for the consult.     Daniel Chen, PGY2  Please first page Podiatry On Call, [phone]  May 9, 2019  11:24 AM Speaking Coherently

## 2019-05-10 NOTE — HISTORY OF PRESENT ILLNESS
[FreeTextEntry1] : Ms. Vanegas is a 47 y.o female with a history of PCKD s/p LURT from ex  and bilateral native nephrectomies on 4/2/18.\par Course was complicated with slow function assumed to be early ACR (low tacro levels) treated with solumedrol 750mgs.  Pt also had Cdiff treated with vancomycin.   \par \par Interval events:\par Pt feels well but has occasional loose BM's.  NOt very watery like when she had Cdiff.  Has been gaining weight.  Saw Dr. Norton for elevated LFT's.  Hb A1c also elevated.

## 2019-05-10 NOTE — ASSESSMENT
[FreeTextEntry1] : 1.  Renal transplant - also with b/l native nephrectomies.  Early on likely ACR treated with solumedrol 750mgs x3.  Creatinine stable around 1.   \par 2.  Immunosuppression - target tacro 5-7, myfortic 360mgs BID and pred 5.  Still has occasional nausea and loose stool.  May still be from myfortic but avoiding imuran due to WATKINS.  Not to bad so will monitor.  If getting worse will stop myfortic.   \par 3.  HTN- controlled\par 4.  Transaminitis - Pt saw Dr. Norton - will need MRI of liver.  Likely has fatty liver/ WATKINS.  \par 5.  Diarrhea - if getting worse will check Cdiff. \par 6.  A1c rising - pt has pre diabetes and needs to lose weight.  Discussed at length.  \par 7.  Irregular menses - Pt following with GYN.  \par \par Pt will return to see Dr. Alan in 1 month and will f/u with use in 2months.  \par resume pepcid to see if it helps intermittent nausea.

## 2019-05-14 LAB
ALBUMIN SERPL ELPH-MCNC: 4.3 G/DL
ALP BLD-CCNC: 48 U/L
ALT SERPL-CCNC: 115 U/L
ANION GAP SERPL CALC-SCNC: 13 MMOL/L
APPEARANCE: CLEAR
AST SERPL-CCNC: 72 U/L
BACTERIA UR CULT: NORMAL
BACTERIA: NEGATIVE
BASOPHILS # BLD AUTO: 0.04 K/UL
BASOPHILS NFR BLD AUTO: 0.4 %
BILIRUB SERPL-MCNC: 1 MG/DL
BILIRUBIN URINE: NEGATIVE
BKV DNA SPEC QL NAA+PROBE: NOT DETECTED COPIES/ML
BLOOD URINE: NORMAL
BUN SERPL-MCNC: 15 MG/DL
CALCIUM SERPL-MCNC: 9.4 MG/DL
CHLORIDE SERPL-SCNC: 104 MMOL/L
CHOLEST SERPL-MCNC: 165 MG/DL
CHOLEST/HDLC SERPL: 5.2 RATIO
CMV DNA SPEC QL NAA+PROBE: NOT DETECTED
CMVPCR LOG: NOT DETECTED LOGIU/ML
CO2 SERPL-SCNC: 22 MMOL/L
COLOR: NORMAL
CREAT SERPL-MCNC: 1.04 MG/DL
CREAT SPEC-SCNC: 76 MG/DL
CREAT/PROT UR: 0.2 RATIO
EOSINOPHIL # BLD AUTO: 0.18 K/UL
EOSINOPHIL NFR BLD AUTO: 1.8 %
ESTIMATED AVERAGE GLUCOSE: 137 MG/DL
GLUCOSE QUALITATIVE U: NEGATIVE
GLUCOSE SERPL-MCNC: 189 MG/DL
HBA1C MFR BLD HPLC: 6.4 %
HCT VFR BLD CALC: 39.8 %
HDLC SERPL-MCNC: 32 MG/DL
HGB BLD-MCNC: 12.5 G/DL
HYALINE CASTS: 0 /LPF
IMM GRANULOCYTES NFR BLD AUTO: 0.5 %
KETONES URINE: NEGATIVE
LDH SERPL-CCNC: 229 U/L
LDLC SERPL CALC-MCNC: 97 MG/DL
LEUKOCYTE ESTERASE URINE: NEGATIVE
LYMPHOCYTES # BLD AUTO: 1.46 K/UL
LYMPHOCYTES NFR BLD AUTO: 14.4 %
MAGNESIUM SERPL-MCNC: 1.7 MG/DL
MAN DIFF?: NORMAL
MCHC RBC-ENTMCNC: 30.7 PG
MCHC RBC-ENTMCNC: 31.4 GM/DL
MCV RBC AUTO: 97.8 FL
MICROSCOPIC-UA: NORMAL
MONOCYTES # BLD AUTO: 0.52 K/UL
MONOCYTES NFR BLD AUTO: 5.1 %
NEUTROPHILS # BLD AUTO: 7.89 K/UL
NEUTROPHILS NFR BLD AUTO: 77.8 %
NITRITE URINE: NEGATIVE
PH URINE: 6
PHOSPHATE SERPL-MCNC: 2.4 MG/DL
PLATELET # BLD AUTO: 198 K/UL
POTASSIUM SERPL-SCNC: 4.7 MMOL/L
PROT SERPL-MCNC: 6.9 G/DL
PROT UR-MCNC: 16 MG/DL
PROTEIN URINE: NORMAL
RBC # BLD: 4.07 M/UL
RBC # FLD: 13.8 %
RED BLOOD CELLS URINE: 4 /HPF
SODIUM SERPL-SCNC: 139 MMOL/L
SPECIFIC GRAVITY URINE: 1.01
SQUAMOUS EPITHELIAL CELLS: 3 /HPF
TACROLIMUS SERPL-MCNC: 4.4 NG/ML
TRIGL SERPL-MCNC: 182 MG/DL
UROBILINOGEN URINE: NORMAL
WBC # FLD AUTO: 10.14 K/UL
WHITE BLOOD CELLS URINE: 2 /HPF

## 2019-05-21 ENCOUNTER — CLINICAL ADVICE (OUTPATIENT)
Age: 49
End: 2019-05-21

## 2019-05-27 ENCOUNTER — FORM ENCOUNTER (OUTPATIENT)
Age: 49
End: 2019-05-27

## 2019-05-28 ENCOUNTER — APPOINTMENT (OUTPATIENT)
Dept: MRI IMAGING | Facility: IMAGING CENTER | Age: 49
End: 2019-05-28
Payer: COMMERCIAL

## 2019-05-28 ENCOUNTER — OUTPATIENT (OUTPATIENT)
Dept: OUTPATIENT SERVICES | Facility: HOSPITAL | Age: 49
LOS: 1 days | End: 2019-05-28
Payer: COMMERCIAL

## 2019-05-28 DIAGNOSIS — Z98.890 OTHER SPECIFIED POSTPROCEDURAL STATES: Chronic | ICD-10-CM

## 2019-05-28 DIAGNOSIS — I77.0 ARTERIOVENOUS FISTULA, ACQUIRED: Chronic | ICD-10-CM

## 2019-05-28 DIAGNOSIS — Z90.5 ACQUIRED ABSENCE OF KIDNEY: Chronic | ICD-10-CM

## 2019-05-28 DIAGNOSIS — R74.8 ABNORMAL LEVELS OF OTHER SERUM ENZYMES: ICD-10-CM

## 2019-05-28 DIAGNOSIS — Z98.51 TUBAL LIGATION STATUS: Chronic | ICD-10-CM

## 2019-05-28 DIAGNOSIS — Z94.0 KIDNEY TRANSPLANT STATUS: Chronic | ICD-10-CM

## 2019-05-28 DIAGNOSIS — Z90.49 ACQUIRED ABSENCE OF OTHER SPECIFIED PARTS OF DIGESTIVE TRACT: Chronic | ICD-10-CM

## 2019-05-28 DIAGNOSIS — N20.0 CALCULUS OF KIDNEY: Chronic | ICD-10-CM

## 2019-05-28 DIAGNOSIS — E78.5 HYPERLIPIDEMIA, UNSPECIFIED: ICD-10-CM

## 2019-05-28 DIAGNOSIS — E66.9 OBESITY, UNSPECIFIED: ICD-10-CM

## 2019-05-28 DIAGNOSIS — K76.0 FATTY (CHANGE OF) LIVER, NOT ELSEWHERE CLASSIFIED: ICD-10-CM

## 2019-05-28 DIAGNOSIS — Z94.0 KIDNEY TRANSPLANT STATUS: ICD-10-CM

## 2019-05-28 PROCEDURE — 74183 MRI ABD W/O CNTR FLWD CNTR: CPT | Mod: 26

## 2019-05-28 PROCEDURE — A9585: CPT

## 2019-05-28 PROCEDURE — 76391 MR ELASTOGRAPHY: CPT | Mod: 26

## 2019-05-28 PROCEDURE — 74183 MRI ABD W/O CNTR FLWD CNTR: CPT

## 2019-05-28 PROCEDURE — 76391 MR ELASTOGRAPHY: CPT

## 2019-06-06 ENCOUNTER — TRANSCRIPTION ENCOUNTER (OUTPATIENT)
Age: 49
End: 2019-06-06

## 2019-06-21 ENCOUNTER — APPOINTMENT (OUTPATIENT)
Dept: HEPATOLOGY | Facility: CLINIC | Age: 49
End: 2019-06-21
Payer: COMMERCIAL

## 2019-06-21 VITALS
HEIGHT: 69 IN | TEMPERATURE: 98.2 F | DIASTOLIC BLOOD PRESSURE: 83 MMHG | WEIGHT: 235 LBS | SYSTOLIC BLOOD PRESSURE: 131 MMHG | HEART RATE: 72 BPM | BODY MASS INDEX: 34.8 KG/M2 | RESPIRATION RATE: 18 BRPM

## 2019-06-21 PROCEDURE — 99214 OFFICE O/P EST MOD 30 MIN: CPT

## 2019-06-21 NOTE — ASSESSMENT
[FreeTextEntry1] : 49 yo F with a history of HTN, obesity, and PCKD, s/p LURT on 4/2/18 and on chronic immunosuppression with prednisone, Myfortic, and tacrolimus, with post-transplant weight gain of approximately 30 lbs to current BMI of 34.7 kg/m2 as well as post-transplant development of pre-diabetes (with recent HbA1c 6.4% on 5/3/19) and hypertriglyceridemia.\par \par She has had mild elevations in her ALT dating back to 5/2018 (shortly post-transplant), with more noticeable increase in her AST and ALT in more recent months. Her alkaline phosphatase, bilirubin, and liver synthetic function remain within normal limits. She has no prior serologic evidence of chronic HBV or HCV and no significant alcohol history. Her post-transplant liver imaging is notable for hepatic steatosis.\par \par Given her risk factors of obesity, pre-diabetes, hypertriglyceridemia, hypertension, and chronic immunosuppression with both prednisone and tacrolimus, she appears to have developed nonalcoholic fatty liver disease (NAFLD). This was confirmed on recent MRI/MRE, that showed findings consistent with nonalcoholic steatohepatitis (WATKINS) with advanced (F2-F3) fibrosis but no definite evidence of cirrhosis.\par \par I have re-ordered labs today to rule out other, less likely, causes of chronic liver disease and also to ascertain whether she has immunity to HAV (was non-immune on prior labs from prior to her transplant and cannot recall if she has been vaccinated since then). These were previously ordered at her last visit but not drawn then.\par \par I have discussed with her at length regarding nonalcoholic fatty liver disease (NAFLD). I reviewed the natural history, evaluation and staging of the disease including her elastography results, and prognosis, including possible risks of development of compensated cirrhosis, decompensated cirrhosis, and HCC.\par \par I have discussed with her that lifestyle modifications are crucial for management of NAFLD. I recommended gradual weight loss of at least 10% of her body weight through dietary changes and moderate intensity exercise for at least 20 minutes at least 3 times per week. These changes have been shown to lead to regression or even resolution of steatosis, inflammation, and even fibrosis in some patients. She was referred to a nutritionist and is planning to hire a  to help with her exercise. Short-term goal will be for her to achieve weight loss of 15 lbs before her next visit in 6 months.\par \par I have reviewed with her the fact that currently, there are no FDA-approved pharmacologic treatments for NAFLD, but that this may change within the next 1-2 years as a number of promising drugs are currently being investigated in clinical trials. There is some clinical trial data to suggest benefit of vitamin E in non-diabetic patients, and this will be prescribed today. Her transplant nephrologist Dr. Mcdonough was consulted and did not have any concerns regarding our starting that medication. We have discussed the possibility of clinical trial referral/enrollment, though I suspect that she would not be eligible given her history of prior kidney transplant and need for chronic immunosuppression, as there could be concern for drug-drug interactions with any study medication(s).\par \par She will return for follow-up in 6 months.

## 2019-06-21 NOTE — HISTORY OF PRESENT ILLNESS
[Tattoo] : tattoo(s) [Occupational Exposure] : occupational exposure [Needlestick Exposure] : no needlestick exposure [Infected Sexual Partner] : no infected sexual partner [IV Drug Use] : no IV drug use [Body Piercing] : no body piercing [Hemodialysis] : no hemodialysis [Transfusion before 1992] : no transfusion before 1992 [Transplant before 1992] : no transplant before 1992 [Incarceration] : no incarceration [Alcohol Abuse] : no alcohol abuse [Autoimmune Disorder] : no autoimmune disorder [Household Contact to HBV] : no household contact to HBV [Travel to Endemic Area] : no travel to an endemic area [Cocaine Use] : no cocaine use [de-identified] : Ms. Vanegas is a 49 yo  F with a history of HTN, obesity, and PCKD, s/p LURT from her ex- and bilateral nephrectomies on 4/2/18, now on chronic immunosuppression with prednisone, Myfortic, and tacrolimus. Her post-transplant course was complicated by early ACR treated with Solumedrol, as well as C difficile infection treated with vancomycin. She was previously referred to Liver clinic by her transplant nephrologist, Dr. Miko Mcdonough, for further evaluation of abnormal liver enzymes.\par \par She denies any prior known history of liver disease, and denies any history of jaundice, scleral icterus, overt GI bleeding, ascites, or confusion. \par \par She reports that she has been "heavy" for most of her life, with pre-kidney transplant weight of ~200 lbs (208 lbs with BMI 31.6 kg/m2 in 3/2018). She was around a size 12 at that time. Post-transplant, she has gained ~30 lbs (weight today: 235 lbs with BMI 34.7 kg/m2). She is now a size 16. 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 She drinks tea and sodas but does not drink coffee. She has a single small tattoo on her left breast done at age 19. She denies any needlestick injuries at work. She did not require hemodialysis prior to receiving her kidney transplant.\par \par Pre-transplant liver imaging included an MRI abdomen without contrast (done on 9/19/17) that showed no hepatic steatosis, borderline splenomegaly (14 cm), and several stable left hepatic cysts. Post-transplant liver imaging includes a CT abdomen/pelvis without contrast (done on 7/5/18) that showed fatty infiltration of the liver. An abdominal sonogram (done on 4/22/19) showed hepatomegaly (21.3 cm) and moderate diffuse hepatic steatosis, with normal size spleen.\par \par Relevant labs:\par HbA1c 6.4% (5/3/19)\par Lipid panel (5/3/19): Cholesterol 152, LDL 57, HDL 29, Tg 330\par HIV negative\par HAV total Ab negative (11/8/17)\par HBsAg negative, HBcAb negative, HBsAb positive 32.7 (3/20/18)\par HCV Ab negative (3/20/18)\par CMV PCR negative (5/3/19)\par BK virus PCR negative (5/3/19)\par \par Since her last visit, she underwent MRI abdomen with and without contrast and MR elastography on 5/28/19 that showed mild hepatic steatosis (5-11%), hepatic stiffness of 3.8 kPA (consistent with F2-3 fibrosis), no hepatic iron deposition, spleen at the upper limits of normal (13.1 cm), and scattered subcentimeter hepatic cysts with no suspicious hepatic lesion.\par \par She has not lost weight yet, but is interested in hiring a  to help motivate her to exercise. She is also open to meeting with a nutritionist to discuss her diet and weight loss.\par \par She was recently on a steroid taper due to recurrent ear infections but soon will be back to her usual prednisone dosing of 5 mg/day.

## 2019-06-21 NOTE — CONSULT LETTER
[Courtesy Letter:] : I had the pleasure of seeing your patient, [unfilled], in my office today. [Dear  ___] : Dear  [unfilled], [Please see my note below.] : Please see my note below. [Consult Closing:] : Thank you very much for allowing me to participate in the care of this patient.  If you have any questions, please do not hesitate to contact me. [FreeTextEntry2] : Dr. Miko Mcdonough [FreeTextEntry3] : Sincerely,\par \par Rosanna Norton M.D., Ph.D.\par [facility]\par [address]\[place] [address]\par Tel: [phone]\par Fax: [phone]\par Cell: [phone]\par E-mail: [email or web address]\par

## 2019-06-24 LAB
AFP-TM SERPL-MCNC: <1.8 NG/ML
CERULOPLASMIN SERPL-MCNC: 17 MG/DL
FERRITIN SERPL-MCNC: 216 NG/ML
GGT SERPL-CCNC: 85 U/L
HCV AB SER QL: NONREACTIVE
HCV S/CO RATIO: 0.28 S/CO
HEPATITIS A IGG ANTIBODY: NONREACTIVE
IGA SER QL IEP: 263 MG/DL
IGG SER QL IEP: 771 MG/DL
IGM SER QL IEP: 170 MG/DL
IRON SATN MFR SERPL: 35 %
IRON SERPL-MCNC: 102 UG/DL
TIBC SERPL-MCNC: 289 UG/DL
UIBC SERPL-MCNC: 187 UG/DL

## 2019-06-25 LAB
ANA SER IF-ACNC: NEGATIVE
MITOCHONDRIA AB SER IF-ACNC: NORMAL
SMOOTH MUSCLE AB SER QL IF: NORMAL

## 2019-06-28 LAB
A1AT PHENOTYP SERPL-IMP: NORMAL BANDS
A1AT SERPL-MCNC: 140 MG/DL

## 2019-07-11 ENCOUNTER — APPOINTMENT (OUTPATIENT)
Dept: MRI IMAGING | Facility: CLINIC | Age: 49
End: 2019-07-11
Payer: COMMERCIAL

## 2019-07-11 ENCOUNTER — OUTPATIENT (OUTPATIENT)
Dept: OUTPATIENT SERVICES | Facility: HOSPITAL | Age: 49
LOS: 1 days | End: 2019-07-11

## 2019-07-11 DIAGNOSIS — Z98.890 OTHER SPECIFIED POSTPROCEDURAL STATES: Chronic | ICD-10-CM

## 2019-07-11 DIAGNOSIS — N20.0 CALCULUS OF KIDNEY: Chronic | ICD-10-CM

## 2019-07-11 DIAGNOSIS — Z90.5 ACQUIRED ABSENCE OF KIDNEY: Chronic | ICD-10-CM

## 2019-07-11 DIAGNOSIS — Z94.0 KIDNEY TRANSPLANT STATUS: Chronic | ICD-10-CM

## 2019-07-11 DIAGNOSIS — Z98.51 TUBAL LIGATION STATUS: Chronic | ICD-10-CM

## 2019-07-11 DIAGNOSIS — I77.0 ARTERIOVENOUS FISTULA, ACQUIRED: Chronic | ICD-10-CM

## 2019-07-11 DIAGNOSIS — Z90.49 ACQUIRED ABSENCE OF OTHER SPECIFIED PARTS OF DIGESTIVE TRACT: Chronic | ICD-10-CM

## 2019-07-11 DIAGNOSIS — I67.1 CEREBRAL ANEURYSM, NONRUPTURED: ICD-10-CM

## 2019-07-11 PROCEDURE — 70546 MR ANGIOGRAPH HEAD W/O&W/DYE: CPT | Mod: 26

## 2019-07-12 ENCOUNTER — APPOINTMENT (OUTPATIENT)
Dept: NEPHROLOGY | Facility: CLINIC | Age: 49
End: 2019-07-12
Payer: COMMERCIAL

## 2019-07-12 VITALS
OXYGEN SATURATION: 80 % | DIASTOLIC BLOOD PRESSURE: 62 MMHG | WEIGHT: 235 LBS | HEIGHT: 69 IN | BODY MASS INDEX: 34.8 KG/M2 | SYSTOLIC BLOOD PRESSURE: 116 MMHG

## 2019-07-12 LAB
25(OH)D3 SERPL-MCNC: 23.8 NG/ML
ALBUMIN SERPL ELPH-MCNC: 4.7 G/DL
ANION GAP SERPL CALC-SCNC: 15 MMOL/L
BASOPHILS # BLD AUTO: 0.08 K/UL
BASOPHILS NFR BLD AUTO: 0.7 %
BUN SERPL-MCNC: 22 MG/DL
CALCIUM SERPL-MCNC: 10.3 MG/DL
CHLORIDE SERPL-SCNC: 101 MMOL/L
CHOLEST SERPL-MCNC: 186 MG/DL
CHOLEST/HDLC SERPL: 6.2 RATIO
CO2 SERPL-SCNC: 25 MMOL/L
CREAT SERPL-MCNC: 1.27 MG/DL
CREAT SPEC-SCNC: 313 MG/DL
CREAT/PROT UR: 0.2 RATIO
EOSINOPHIL # BLD AUTO: 0.33 K/UL
EOSINOPHIL NFR BLD AUTO: 3 %
GLUCOSE SERPL-MCNC: 202 MG/DL
HCT VFR BLD CALC: 43.8 %
HDLC SERPL-MCNC: 30 MG/DL
HGB BLD-MCNC: 14.1 G/DL
IMM GRANULOCYTES NFR BLD AUTO: 0.5 %
LDLC SERPL CALC-MCNC: 81 MG/DL
LYMPHOCYTES # BLD AUTO: 2.54 K/UL
LYMPHOCYTES NFR BLD AUTO: 23.2 %
MAGNESIUM SERPL-MCNC: 1.7 MG/DL
MAN DIFF?: NORMAL
MCHC RBC-ENTMCNC: 32.2 GM/DL
MCHC RBC-ENTMCNC: 32.3 PG
MCV RBC AUTO: 100.2 FL
MONOCYTES # BLD AUTO: 0.93 K/UL
MONOCYTES NFR BLD AUTO: 8.5 %
NEUTROPHILS # BLD AUTO: 7 K/UL
NEUTROPHILS NFR BLD AUTO: 64.1 %
PHOSPHATE SERPL-MCNC: 3.9 MG/DL
PLATELET # BLD AUTO: 214 K/UL
POTASSIUM SERPL-SCNC: 4.4 MMOL/L
PROT UR-MCNC: 61 MG/DL
RBC # BLD: 4.37 M/UL
RBC # FLD: 14.1 %
SODIUM SERPL-SCNC: 141 MMOL/L
TACROLIMUS SERPL-MCNC: 5.1 NG/ML
TRIGL SERPL-MCNC: 374 MG/DL
URATE SERPL-MCNC: 7.5 MG/DL
WBC # FLD AUTO: 10.93 K/UL

## 2019-07-12 PROCEDURE — 99215 OFFICE O/P EST HI 40 MIN: CPT

## 2019-07-12 NOTE — PHYSICAL EXAM
[General Appearance - Alert] : alert [General Appearance - Well Nourished] : well nourished [Sclera] : the sclera and conjunctiva were normal [Extraocular Movements] : extraocular movements were intact [Neck Appearance] : the appearance of the neck was normal [Neck Cervical Mass (___cm)] : no neck mass was observed [Outer Ear] : the ears and nose were normal in appearance [Jugular Venous Distention Increased] : there was no jugular-venous distention [Exaggerated Use Of Accessory Muscles For Inspiration] : no accessory muscle use [Respiration, Rhythm And Depth] : normal respiratory rhythm and effort [Auscultation Breath Sounds / Voice Sounds] : lungs were clear to auscultation bilaterally [Murmurs] : no murmurs [Heart Rate And Rhythm] : heart rate was normal and rhythm regular [Heart Sounds] : normal S1 and S2 [Heart Sounds Gallop] : no gallops [Edema] : there was no peripheral edema [Heart Sounds Pericardial Friction Rub] : no pericardial rub [Abdomen Soft] : soft [Bowel Sounds] : normal bowel sounds [Abdomen Tenderness] : non-tender [Abdomen Mass (___ Cm)] : no abdominal mass palpated [Cervical Lymph Nodes Enlarged Posterior Bilaterally] : posterior cervical [Cervical Lymph Nodes Enlarged Anterior Bilaterally] : anterior cervical [Supraclavicular Lymph Nodes Enlarged Bilaterally] : supraclavicular [Abnormal Walk] : normal gait [No CVA Tenderness] : no ~M costovertebral angle tenderness [Axillary Lymph Nodes Enlarged Bilaterally] : axillary [Nail Clubbing] : no clubbing  or cyanosis of the fingernails [Musculoskeletal - Swelling] : no joint swelling seen [] : no rash [Skin Color & Pigmentation] : normal skin color and pigmentation [Skin Turgor] : normal skin turgor [Cranial Nerves] : cranial nerves 2-12 were intact [Oriented To Time, Place, And Person] : oriented to person, place, and time [Impaired Insight] : insight and judgment were intact [Skin Lesions] : no skin lesions [Affect] : the affect was normal [Mood] : the mood was normal [FreeTextEntry1] : midline incision healed.

## 2019-07-12 NOTE — ASSESSMENT
[FreeTextEntry1] : 1.  Renal transplant - also with b/l native nephrectomies.  Early on likely ACR treated with solumedrol 750 mgs.,   x 3.  Creatinine stable around 1.   \par 2.  Immunosuppression - target tacrolimus  6-9, Myfortic 360 mgs., BID and pred 5.  Diarrhea resolved on low dose Myfortic.  Explained that Envarsus same drug as tacrolimus IR.  Switched because she was forgetting night dose of Prograf.    \par 3.  HTN - Increase nifedipine to 60 mgs daily.  \par 4.  Dysphagia - resolved on Pepcid.\par 5.  Diarrhea - resolved on low dose Myfortic.  In addition her C. Difficele has been treated with a prolonged course of vancomycin, \par 6.  Hyperglycemia - concerning with weight gain but last A1c was 5.5 % \par 7.  Hypomagnesemia - resolved, Off PPI, \par \par Pt will return for f/u in 4 weeks ,

## 2019-07-12 NOTE — HISTORY OF PRESENT ILLNESS
[FreeTextEntry1] : 47 y.o female with a history of PCKD s/p LURT from ex  and bilateral native nephrectomies on 4/2/18.\par Course was complicated with slow function assumed to be early ACR (low tacrolimus  levels) treated with solumedrol 750 mgs.  Pt also had C, diff treated with vancomycin.   \par \par Interval events:\par Pt started envarsus - was concerned about package insert but explained it is the same drug as tacrolimus.  She also has a URI - took 7 days of cefdinir but no improvement.  No fever but hoarse Voice  and sinus headache.  \par \par Father is dying - looking to bring him to hospice.

## 2019-07-14 LAB
APPEARANCE: ABNORMAL
BACTERIA: ABNORMAL
BILIRUBIN URINE: NEGATIVE
BKV DNA SPEC QL NAA+PROBE: NOT DETECTED COPIES/ML
BLOOD URINE: NORMAL
COLOR: YELLOW
GLUCOSE QUALITATIVE U: ABNORMAL
HYALINE CASTS: 0 /LPF
KETONES URINE: NEGATIVE
LEUKOCYTE ESTERASE URINE: NEGATIVE
MICROSCOPIC-UA: NORMAL
NITRITE URINE: NEGATIVE
PH URINE: 6
PROTEIN URINE: ABNORMAL
RED BLOOD CELLS URINE: 8 /HPF
SPECIFIC GRAVITY URINE: 1.02
SQUAMOUS EPITHELIAL CELLS: 6 /HPF
UROBILINOGEN URINE: NORMAL
WHITE BLOOD CELLS URINE: 24 /HPF

## 2019-08-19 NOTE — PROGRESS NOTE ADULT - PROBLEM SELECTOR PROBLEM 4
Urinary tract infection without hematuria, site unspecified Complex Repair And Flap Additional Text (Will Appearing After The Standard Complex Repair Text): The complex repair was not sufficient to completely close the primary defect. The remaining additional defect was repaired with the flap mentioned below.

## 2019-08-21 ENCOUNTER — APPOINTMENT (OUTPATIENT)
Dept: NEPHROLOGY | Facility: CLINIC | Age: 49
End: 2019-08-21
Payer: COMMERCIAL

## 2019-08-21 VITALS
WEIGHT: 242 LBS | SYSTOLIC BLOOD PRESSURE: 162 MMHG | OXYGEN SATURATION: 98 % | HEIGHT: 69 IN | BODY MASS INDEX: 35.84 KG/M2 | HEART RATE: 76 BPM | DIASTOLIC BLOOD PRESSURE: 60 MMHG

## 2019-08-21 PROCEDURE — 36415 COLL VENOUS BLD VENIPUNCTURE: CPT

## 2019-08-21 PROCEDURE — 99215 OFFICE O/P EST HI 40 MIN: CPT | Mod: 25

## 2019-08-21 NOTE — ASSESSMENT
[FreeTextEntry1] : 1.  Renal transplant - also with b/l native nephrectomies.  Early on likely ACR treated with solumedrol 750 mgs.,   x 3.  Creatinine stable around 1.   \par 2.  Immunosuppression - target tacrolimus  6-9, Myfortic 360 mgs., BID and pred 5.  Diarrhea resolved on low dose Myfortic.  Explained that Envarsus same drug as tacrolimus IR.  Switched because she was forgetting night dose of Prograf.    \par 3.  HTN - Increase nifedipine to 60 mgs daily.  \par 4.  Dysphagia - resolved on Pepcid.\par 5.  Diarrhea - resolved on low dose Myfortic.  In addition her C. Difficele has been treated with a prolonged course of vancomycin, \par 6.  Hyperglycemia - concerning with weight gain but last A1c was 5.5 % \par 7.  Hypomagnesemia - resolved, Off PPI, \par \par Cipro 250 mg., BID X 7 days for UTI, \par \par Pt will return for f/u in 4 weeks ,

## 2019-08-21 NOTE — PHYSICAL EXAM
[General Appearance - Alert] : alert [General Appearance - Well Nourished] : well nourished [Sclera] : the sclera and conjunctiva were normal [Extraocular Movements] : extraocular movements were intact [Outer Ear] : the ears and nose were normal in appearance [Neck Appearance] : the appearance of the neck was normal [Neck Cervical Mass (___cm)] : no neck mass was observed [Jugular Venous Distention Increased] : there was no jugular-venous distention [Respiration, Rhythm And Depth] : normal respiratory rhythm and effort [Exaggerated Use Of Accessory Muscles For Inspiration] : no accessory muscle use [Auscultation Breath Sounds / Voice Sounds] : lungs were clear to auscultation bilaterally [Heart Rate And Rhythm] : heart rate was normal and rhythm regular [Heart Sounds] : normal S1 and S2 [Heart Sounds Gallop] : no gallops [Murmurs] : no murmurs [Heart Sounds Pericardial Friction Rub] : no pericardial rub [Edema] : there was no peripheral edema [Bowel Sounds] : normal bowel sounds [Abdomen Tenderness] : non-tender [Abdomen Soft] : soft [Abdomen Mass (___ Cm)] : no abdominal mass palpated [Cervical Lymph Nodes Enlarged Posterior Bilaterally] : posterior cervical [Cervical Lymph Nodes Enlarged Anterior Bilaterally] : anterior cervical [Supraclavicular Lymph Nodes Enlarged Bilaterally] : supraclavicular [Axillary Lymph Nodes Enlarged Bilaterally] : axillary [No CVA Tenderness] : no ~M costovertebral angle tenderness [Abnormal Walk] : normal gait [Nail Clubbing] : no clubbing  or cyanosis of the fingernails [Musculoskeletal - Swelling] : no joint swelling seen [Skin Color & Pigmentation] : normal skin color and pigmentation [Skin Turgor] : normal skin turgor [] : no rash [Skin Lesions] : no skin lesions [Oriented To Time, Place, And Person] : oriented to person, place, and time [Cranial Nerves] : cranial nerves 2-12 were intact [Impaired Insight] : insight and judgment were intact [Affect] : the affect was normal [Mood] : the mood was normal [FreeTextEntry1] : midline incision healed.

## 2019-08-22 ENCOUNTER — CLINICAL ADVICE (OUTPATIENT)
Age: 49
End: 2019-08-22

## 2019-08-22 LAB
ALBUMIN SERPL ELPH-MCNC: 4 G/DL
ANION GAP SERPL CALC-SCNC: 12 MMOL/L
BASOPHILS # BLD AUTO: 0.05 K/UL
BASOPHILS NFR BLD AUTO: 0.5 %
BUN SERPL-MCNC: 14 MG/DL
CALCIUM SERPL-MCNC: 8.9 MG/DL
CHLORIDE SERPL-SCNC: 104 MMOL/L
CO2 SERPL-SCNC: 21 MMOL/L
CREAT SERPL-MCNC: 1.01 MG/DL
CREAT SPEC-SCNC: 68 MG/DL
CREAT/PROT UR: 0.2 RATIO
EOSINOPHIL # BLD AUTO: 0.48 K/UL
EOSINOPHIL NFR BLD AUTO: 5.1 %
GLUCOSE SERPL-MCNC: 190 MG/DL
HCT VFR BLD CALC: 39.9 %
HGB BLD-MCNC: 12.8 G/DL
IMM GRANULOCYTES NFR BLD AUTO: 0.5 %
LYMPHOCYTES # BLD AUTO: 1.83 K/UL
LYMPHOCYTES NFR BLD AUTO: 19.5 %
MAGNESIUM SERPL-MCNC: 1.8 MG/DL
MAN DIFF?: NORMAL
MCHC RBC-ENTMCNC: 31.8 PG
MCHC RBC-ENTMCNC: 32.1 GM/DL
MCV RBC AUTO: 99.3 FL
MONOCYTES # BLD AUTO: 0.6 K/UL
MONOCYTES NFR BLD AUTO: 6.4 %
NEUTROPHILS # BLD AUTO: 6.38 K/UL
NEUTROPHILS NFR BLD AUTO: 68 %
PHOSPHATE SERPL-MCNC: 2.1 MG/DL
PLATELET # BLD AUTO: 156 K/UL
POTASSIUM SERPL-SCNC: 4.3 MMOL/L
PROT UR-MCNC: 11 MG/DL
RBC # BLD: 4.02 M/UL
RBC # FLD: 13.2 %
SODIUM SERPL-SCNC: 137 MMOL/L
TACROLIMUS SERPL-MCNC: 2.5 NG/ML
URATE SERPL-MCNC: 5.8 MG/DL
WBC # FLD AUTO: 9.39 K/UL

## 2019-08-23 LAB — BACTERIA UR CULT: NORMAL

## 2019-08-26 ENCOUNTER — APPOINTMENT (OUTPATIENT)
Dept: NEUROSURGERY | Facility: CLINIC | Age: 49
End: 2019-08-26

## 2019-08-28 LAB — BKV DNA SPEC QL NAA+PROBE: NOT DETECTED COPIES/ML

## 2019-09-04 LAB — TACROLIMUS SERPL-MCNC: 6.9 NG/ML

## 2019-09-05 ENCOUNTER — APPOINTMENT (OUTPATIENT)
Dept: NEPHROLOGY | Facility: CLINIC | Age: 49
End: 2019-09-05
Payer: COMMERCIAL

## 2019-09-05 VITALS
RESPIRATION RATE: 18 BRPM | BODY MASS INDEX: 34.8 KG/M2 | HEIGHT: 69 IN | HEART RATE: 74 BPM | SYSTOLIC BLOOD PRESSURE: 141 MMHG | WEIGHT: 235 LBS | TEMPERATURE: 98.6 F | OXYGEN SATURATION: 95 % | DIASTOLIC BLOOD PRESSURE: 87 MMHG

## 2019-09-05 PROCEDURE — 99214 OFFICE O/P EST MOD 30 MIN: CPT

## 2019-09-05 RX ORDER — CIPROFLOXACIN HYDROCHLORIDE 250 MG/1
250 TABLET, FILM COATED ORAL
Qty: 14 | Refills: 0 | Status: DISCONTINUED | COMMUNITY
Start: 2019-08-21 | End: 2019-09-05

## 2019-09-05 NOTE — HISTORY OF PRESENT ILLNESS
[FreeTextEntry1] : Ms. Vanegas is a 47 y.o female with a history of PCKD s/p LURT from ex  and bilateral native nephrectomies on 4/2/18.\par Course was complicated with slow function assumed to be early ACR (low tacro levels) treated with solumedrol 750mgs.  Pt also had Cdiff treated with vancomycin.   \par \par Interval events:\par Having diarrhea after receiving cipro for UTI.  \par also has been having pruritis.  Thinks it may be due to dapsone.  Had MRA of brain - aneurysm is stable.  Also has WATKINS and increased A1c.  Has not been able to lose weight.  busy at work.  Occasional back pain.

## 2019-09-05 NOTE — ASSESSMENT
[FreeTextEntry1] : 1.  Renal transplant - also with b/l native nephrectomies.  Early on likely ACR treated with solumedrol 750mgs x3.  Creatinine stable around 1.   \par 2.  Immunosuppression - target tacro 5-7, myfortic 360mgs BID and pred 5.  Diarrhea increased after cipro.  May still be from myfortic but avoiding imuran due to WATKINS.  May also be recurrence of Cdiff.  Will discuss with ID next steps as pt has had a stool transplant.  \par 3.  HTN- controlled\par 4.  Transaminitis - Pt saw Dr. Norton - Has fatty liver/ WATKINS.  \par 5.  Diarrhea - will discuss with ID checking cdiff. \par 6.  A1c rising - pt has pre diabetes and needs to lose weight.  Discussed at length.  \par 7.  Irregular menses - Pt following with GYN.  \par 8.  Brain aneurysm - stable.  following with neurosurg.  \par 9. Pruritis - hold dapsone for 2 weeks to see if it is the cause. \par \par Pt will return to see Dr. Alan and f/u with us in 6 months.  \par

## 2019-09-05 NOTE — PHYSICAL EXAM
[General Appearance - Alert] : alert [General Appearance - Well Nourished] : well nourished [Extraocular Movements] : extraocular movements were intact [Sclera] : the sclera and conjunctiva were normal [Outer Ear] : the ears and nose were normal in appearance [Neck Appearance] : the appearance of the neck was normal [Neck Cervical Mass (___cm)] : no neck mass was observed [Jugular Venous Distention Increased] : there was no jugular-venous distention [Respiration, Rhythm And Depth] : normal respiratory rhythm and effort [Exaggerated Use Of Accessory Muscles For Inspiration] : no accessory muscle use [Auscultation Breath Sounds / Voice Sounds] : lungs were clear to auscultation bilaterally [Heart Sounds] : normal S1 and S2 [Heart Rate And Rhythm] : heart rate was normal and rhythm regular [Heart Sounds Gallop] : no gallops [Heart Sounds Pericardial Friction Rub] : no pericardial rub [Murmurs] : no murmurs [Bowel Sounds] : normal bowel sounds [Abdomen Tenderness] : non-tender [Abdomen Soft] : soft [Abdomen Mass (___ Cm)] : no abdominal mass palpated [Cervical Lymph Nodes Enlarged Posterior Bilaterally] : posterior cervical [Cervical Lymph Nodes Enlarged Anterior Bilaterally] : anterior cervical [Supraclavicular Lymph Nodes Enlarged Bilaterally] : supraclavicular [Axillary Lymph Nodes Enlarged Bilaterally] : axillary [No CVA Tenderness] : no ~M costovertebral angle tenderness [Abnormal Walk] : normal gait [Nail Clubbing] : no clubbing  or cyanosis of the fingernails [Musculoskeletal - Swelling] : no joint swelling seen [Skin Color & Pigmentation] : normal skin color and pigmentation [Skin Turgor] : normal skin turgor [] : no rash [Skin Lesions] : no skin lesions [Cranial Nerves] : cranial nerves 2-12 were intact [Oriented To Time, Place, And Person] : oriented to person, place, and time [Impaired Insight] : insight and judgment were intact [Mood] : the mood was normal [Affect] : the affect was normal [FreeTextEntry1] : midline incision healed.

## 2019-09-06 LAB
ALBUMIN SERPL ELPH-MCNC: 4.3 G/DL
ALP BLD-CCNC: 45 U/L
ALT SERPL-CCNC: 42 U/L
ANION GAP SERPL CALC-SCNC: 14 MMOL/L
APPEARANCE: ABNORMAL
AST SERPL-CCNC: 29 U/L
BACTERIA: ABNORMAL
BASOPHILS # BLD AUTO: 0.03 K/UL
BASOPHILS NFR BLD AUTO: 0.3 %
BILIRUB SERPL-MCNC: 0.9 MG/DL
BILIRUBIN URINE: NEGATIVE
BKV DNA SPEC QL NAA+PROBE: NOT DETECTED COPIES/ML
BLOOD URINE: NORMAL
BUN SERPL-MCNC: 14 MG/DL
CALCIUM SERPL-MCNC: 9.6 MG/DL
CHLORIDE SERPL-SCNC: 101 MMOL/L
CMV DNA SPEC QL NAA+PROBE: NOT DETECTED
CMVPCR LOG: NOT DETECTED LOGIU/ML
CO2 SERPL-SCNC: 24 MMOL/L
COLOR: NORMAL
CREAT SERPL-MCNC: 1.14 MG/DL
CREAT SPEC-SCNC: 44 MG/DL
CREAT/PROT UR: 0.2 RATIO
EOSINOPHIL # BLD AUTO: 0.34 K/UL
EOSINOPHIL NFR BLD AUTO: 3.6 %
GLUCOSE QUALITATIVE U: NEGATIVE
GLUCOSE SERPL-MCNC: 189 MG/DL
HCT VFR BLD CALC: 41.4 %
HGB BLD-MCNC: 13.1 G/DL
HYALINE CASTS: 1 /LPF
IMM GRANULOCYTES NFR BLD AUTO: 0.5 %
KETONES URINE: NEGATIVE
LDH SERPL-CCNC: 199 U/L
LEUKOCYTE ESTERASE URINE: NEGATIVE
LYMPHOCYTES # BLD AUTO: 2.1 K/UL
LYMPHOCYTES NFR BLD AUTO: 22.4 %
MAGNESIUM SERPL-MCNC: 1.5 MG/DL
MAN DIFF?: NORMAL
MCHC RBC-ENTMCNC: 31.6 GM/DL
MCHC RBC-ENTMCNC: 32 PG
MCV RBC AUTO: 101 FL
MICROSCOPIC-UA: NORMAL
MONOCYTES # BLD AUTO: 0.68 K/UL
MONOCYTES NFR BLD AUTO: 7.3 %
NEUTROPHILS # BLD AUTO: 6.16 K/UL
NEUTROPHILS NFR BLD AUTO: 65.9 %
NITRITE URINE: NEGATIVE
PH URINE: 6
PHOSPHATE SERPL-MCNC: 2.8 MG/DL
PLATELET # BLD AUTO: 189 K/UL
POTASSIUM SERPL-SCNC: 4.2 MMOL/L
PROT SERPL-MCNC: 6.6 G/DL
PROT UR-MCNC: 8 MG/DL
PROTEIN URINE: NEGATIVE
RBC # BLD: 4.1 M/UL
RBC # FLD: 13.2 %
RED BLOOD CELLS URINE: 2 /HPF
SODIUM SERPL-SCNC: 139 MMOL/L
SPECIFIC GRAVITY URINE: 1.01
SQUAMOUS EPITHELIAL CELLS: 4 /HPF
TACROLIMUS SERPL-MCNC: 6.8 NG/ML
URATE SERPL-MCNC: 5.8 MG/DL
UROBILINOGEN URINE: NORMAL
WBC # FLD AUTO: 9.36 K/UL
WHITE BLOOD CELLS URINE: 3 /HPF

## 2019-09-13 NOTE — DATA REVIEWED
[de-identified] : EXAM: MR ANGIO BRAIN WAW IC \par \par \par PROCEDURE DATE: 07/11/2019 \par \par \par \par INTERPRETATION: Clinical indication: Follow-up aneurysm. \par \par MRA of the [place] is performed using 3-D [place] technique. \par The patient was injected with approximately 10 cc of Gadavist IV and MRA \par [place] was performed. \par \par This exam is compared with prior MRA [place] performed on September \par 15, 2017. \par \par Prominent flow related signal enhancement involving the cavernous segment of \par the right internal carotid artery is again seen. This projects laterally and \par is likely compatible with known aneurysm. This finding measures \par approximately 2.7 x 2.4 mm and previously measured approximately 2.8 x 2.5 mm \par \par Evaluation of the distal left internal carotid, anterior cerebral, middle \par cerebral, basilar and posterior cerebral arteries appear normal \par \par Impression: Stable aneurysm as described above. \par \par \par \par \par \par \par \par \par NICKOLAS BENJAMIN M.D., ATTENDING RADIOLOGIST \par This document has been electronically signed. Jul 11 2019 1:52PM \par

## 2019-09-16 ENCOUNTER — TRANSCRIPTION ENCOUNTER (OUTPATIENT)
Age: 49
End: 2019-09-16

## 2019-09-16 ENCOUNTER — RX RENEWAL (OUTPATIENT)
Age: 49
End: 2019-09-16

## 2019-10-14 ENCOUNTER — APPOINTMENT (OUTPATIENT)
Dept: MRI IMAGING | Facility: CLINIC | Age: 49
End: 2019-10-14
Payer: COMMERCIAL

## 2019-10-14 PROCEDURE — 73721 MRI JNT OF LWR EXTRE W/O DYE: CPT | Mod: LT

## 2019-10-21 NOTE — DIETITIAN INITIAL EVALUATION ADULT. - DOB: +DATEOFBIRTH
Called and spoke with wife.  He had a small CVA and is now on therapy.  We will discuss in detail after he is out and she will follow-up in the next few weeks.  
PATIENT'S WIFE-DUC CALLING TO REQUEST A CALL BACK FROM LAUREN.  WIFE-DUC CALLING TO LET WMINERVA KNOW THAT THE PATIENT WAS AT [facility] AND NOW AT [facility] FOR A POSSIBLE STROKE.    WIFE-DUC WANTED TO FOLLOW UP WITH LAUREN  
Statement Selected

## 2019-11-07 LAB
25(OH)D3 SERPL-MCNC: 21.8 NG/ML
BASOPHILS # BLD AUTO: 0.05 K/UL
BASOPHILS NFR BLD AUTO: 0.6 %
CREAT SPEC-SCNC: 99 MG/DL
CREAT/PROT UR: 0.2 RATIO
EOSINOPHIL # BLD AUTO: 0.19 K/UL
EOSINOPHIL NFR BLD AUTO: 2.2 %
HCT VFR BLD CALC: 43 %
HGB BLD-MCNC: 14 G/DL
IMM GRANULOCYTES NFR BLD AUTO: 0.5 %
LYMPHOCYTES # BLD AUTO: 1.94 K/UL
LYMPHOCYTES NFR BLD AUTO: 22.6 %
MAGNESIUM SERPL-MCNC: 1.8 MG/DL
MAN DIFF?: NORMAL
MCHC RBC-ENTMCNC: 32.4 PG
MCHC RBC-ENTMCNC: 32.6 GM/DL
MCV RBC AUTO: 99.5 FL
MONOCYTES # BLD AUTO: 0.65 K/UL
MONOCYTES NFR BLD AUTO: 7.6 %
NEUTROPHILS # BLD AUTO: 5.73 K/UL
NEUTROPHILS NFR BLD AUTO: 66.5 %
PLATELET # BLD AUTO: 163 K/UL
PROT UR-MCNC: 18 MG/DL
RBC # BLD: 4.32 M/UL
RBC # FLD: 13.9 %
TACROLIMUS SERPL-MCNC: 7.4 NG/ML
URATE SERPL-MCNC: 6.2 MG/DL
WBC # FLD AUTO: 8.6 K/UL

## 2019-11-08 ENCOUNTER — APPOINTMENT (OUTPATIENT)
Dept: NEPHROLOGY | Facility: CLINIC | Age: 49
End: 2019-11-08
Payer: COMMERCIAL

## 2019-11-08 VITALS
WEIGHT: 240 LBS | SYSTOLIC BLOOD PRESSURE: 130 MMHG | HEIGHT: 69 IN | DIASTOLIC BLOOD PRESSURE: 80 MMHG | HEART RATE: 67 BPM | BODY MASS INDEX: 35.55 KG/M2

## 2019-11-08 LAB
ALBUMIN SERPL ELPH-MCNC: 4.1 G/DL
ANION GAP SERPL CALC-SCNC: 15 MMOL/L
BKV DNA SPEC QL NAA+PROBE: NOT DETECTED COPIES/ML
BUN SERPL-MCNC: 20 MG/DL
CALCIUM SERPL-MCNC: 9.8 MG/DL
CHLORIDE SERPL-SCNC: 100 MMOL/L
CO2 SERPL-SCNC: 22 MMOL/L
CREAT SERPL-MCNC: 1.14 MG/DL
GLUCOSE SERPL-MCNC: 334 MG/DL
PHOSPHATE SERPL-MCNC: 4.4 MG/DL
POTASSIUM SERPL-SCNC: 4.3 MMOL/L
SODIUM SERPL-SCNC: 137 MMOL/L

## 2019-11-08 PROCEDURE — 99215 OFFICE O/P EST HI 40 MIN: CPT

## 2019-11-08 NOTE — ASSESSMENT
[FreeTextEntry1] : 1.  Renal transplant - also with b/l native nephrectomies.  Early ACR treated with solumedrol 750 mgs.,   x 3.  Creatinine stable around 1 mg.,   \par 2.  Immunosuppression - target tacrolimus  6-9,Diarrhea resolved on low dose Myfortic.  \par 3.  HTN - On  nifedipine  60 mgs daily.  \par 5.  C. Difficele had been treated with a prolonged course of vancomycin, \par 6.  Hyperglycemia - concerning with weight gain but last A1c was 5.5 %,  \par \par Return for f/u in 6-7 weeks. Will add antibiotic once antibiotic sensitivities are available from urine culture. Refer to endocrinologist for management of blood sugar/weight loss. Medications and labs reviewed, no changes.

## 2019-11-08 NOTE — PHYSICAL EXAM
[General Appearance - Alert] : alert [General Appearance - Well Nourished] : well nourished [Sclera] : the sclera and conjunctiva were normal [Extraocular Movements] : extraocular movements were intact [Outer Ear] : the ears and nose were normal in appearance [Neck Appearance] : the appearance of the neck was normal [Neck Cervical Mass (___cm)] : no neck mass was observed [Respiration, Rhythm And Depth] : normal respiratory rhythm and effort [Jugular Venous Distention Increased] : there was no jugular-venous distention [Exaggerated Use Of Accessory Muscles For Inspiration] : no accessory muscle use [Heart Rate And Rhythm] : heart rate was normal and rhythm regular [Auscultation Breath Sounds / Voice Sounds] : lungs were clear to auscultation bilaterally [Murmurs] : no murmurs [Heart Sounds] : normal S1 and S2 [Heart Sounds Gallop] : no gallops [Heart Sounds Pericardial Friction Rub] : no pericardial rub [Edema] : there was no peripheral edema [Abdomen Soft] : soft [Bowel Sounds] : normal bowel sounds [Cervical Lymph Nodes Enlarged Posterior Bilaterally] : posterior cervical [Abdomen Mass (___ Cm)] : no abdominal mass palpated [Cervical Lymph Nodes Enlarged Anterior Bilaterally] : anterior cervical [Axillary Lymph Nodes Enlarged Bilaterally] : axillary [Supraclavicular Lymph Nodes Enlarged Bilaterally] : supraclavicular [No CVA Tenderness] : no ~M costovertebral angle tenderness [Abnormal Walk] : normal gait [Nail Clubbing] : no clubbing  or cyanosis of the fingernails [Skin Color & Pigmentation] : normal skin color and pigmentation [Musculoskeletal - Swelling] : no joint swelling seen [] : no rash [Skin Turgor] : normal skin turgor [Skin Lesions] : no skin lesions [Oriented To Time, Place, And Person] : oriented to person, place, and time [Impaired Insight] : insight and judgment were intact [Cranial Nerves] : cranial nerves 2-12 were intact [Mood] : the mood was normal [Affect] : the affect was normal [FreeTextEntry1] : Mild bladder tenderness. Firm allograft.

## 2019-11-08 NOTE — HISTORY OF PRESENT ILLNESS
[FreeTextEntry1] : 47 y.o female with a history of PCKD s/p LURT from ex  and bilateral native nephrectomies on 4/2/18.\par Course was complicated with slow function assumed to be early ACR (low tacrolimus  levels) treated with solumedrol 750 mgs.  Pt also had C, diff treated with vancomycin.   \par \par Patient here for follow-up visit. Frequent UTIs. Elevated blood sugar from previous lab work. Patient gaining weight.

## 2019-11-09 LAB — BACTERIA UR CULT: ABNORMAL

## 2019-12-13 ENCOUNTER — APPOINTMENT (OUTPATIENT)
Dept: HEPATOLOGY | Facility: CLINIC | Age: 49
End: 2019-12-13

## 2019-12-23 ENCOUNTER — APPOINTMENT (OUTPATIENT)
Dept: NEPHROLOGY | Facility: CLINIC | Age: 49
End: 2019-12-23

## 2020-01-01 ENCOUNTER — TRANSCRIPTION ENCOUNTER (OUTPATIENT)
Age: 50
End: 2020-01-01

## 2020-01-12 LAB
25(OH)D3 SERPL-MCNC: 22.2 NG/ML
ALBUMIN SERPL ELPH-MCNC: 4.4 G/DL
ANION GAP SERPL CALC-SCNC: 15 MMOL/L
BASOPHILS # BLD AUTO: 0.05 K/UL
BASOPHILS NFR BLD AUTO: 0.5 %
BUN SERPL-MCNC: 17 MG/DL
CALCIUM SERPL-MCNC: 9.2 MG/DL
CHLORIDE SERPL-SCNC: 99 MMOL/L
CO2 SERPL-SCNC: 22 MMOL/L
CREAT SERPL-MCNC: 0.99 MG/DL
CREAT SPEC-SCNC: 125 MG/DL
CREAT/PROT UR: 0.3 RATIO
EOSINOPHIL # BLD AUTO: 0.28 K/UL
EOSINOPHIL NFR BLD AUTO: 2.8 %
ESTIMATED AVERAGE GLUCOSE: 240 MG/DL
GLUCOSE SERPL-MCNC: 361 MG/DL
HBA1C MFR BLD HPLC: 10 %
HCT VFR BLD CALC: 41.9 %
HGB BLD-MCNC: 13.9 G/DL
IMM GRANULOCYTES NFR BLD AUTO: 0.5 %
LYMPHOCYTES # BLD AUTO: 2.21 K/UL
LYMPHOCYTES NFR BLD AUTO: 22.4 %
MAGNESIUM SERPL-MCNC: 1.7 MG/DL
MAN DIFF?: NORMAL
MCHC RBC-ENTMCNC: 32.3 PG
MCHC RBC-ENTMCNC: 33.2 GM/DL
MCV RBC AUTO: 97.4 FL
MONOCYTES # BLD AUTO: 0.65 K/UL
MONOCYTES NFR BLD AUTO: 6.6 %
NEUTROPHILS # BLD AUTO: 6.61 K/UL
NEUTROPHILS NFR BLD AUTO: 67.2 %
PHOSPHATE SERPL-MCNC: 2.6 MG/DL
PLATELET # BLD AUTO: 188 K/UL
POTASSIUM SERPL-SCNC: 4 MMOL/L
PROT UR-MCNC: 37 MG/DL
RBC # BLD: 4.3 M/UL
RBC # FLD: 13.2 %
SODIUM SERPL-SCNC: 135 MMOL/L
TACROLIMUS SERPL-MCNC: 3.5 NG/ML
URATE SERPL-MCNC: 5.4 MG/DL
WBC # FLD AUTO: 9.85 K/UL

## 2020-01-12 RX ORDER — CEPHALEXIN 500 MG/1
500 CAPSULE ORAL
Qty: 20 | Refills: 0 | Status: DISCONTINUED | COMMUNITY
Start: 2019-11-09 | End: 2020-01-12

## 2020-01-14 ENCOUNTER — RESULT CHARGE (OUTPATIENT)
Age: 50
End: 2020-01-14

## 2020-01-14 ENCOUNTER — APPOINTMENT (OUTPATIENT)
Dept: ENDOCRINOLOGY | Facility: CLINIC | Age: 50
End: 2020-01-14
Payer: COMMERCIAL

## 2020-01-14 VITALS — DIASTOLIC BLOOD PRESSURE: 90 MMHG | SYSTOLIC BLOOD PRESSURE: 170 MMHG | HEIGHT: 69 IN | HEART RATE: 71 BPM

## 2020-01-14 LAB — GLUCOSE BLDC GLUCOMTR-MCNC: 488

## 2020-01-14 PROCEDURE — 82962 GLUCOSE BLOOD TEST: CPT

## 2020-01-14 PROCEDURE — 99205 OFFICE O/P NEW HI 60 MIN: CPT | Mod: 25

## 2020-01-14 NOTE — ASSESSMENT
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 Severely uncontrolled with symptoms, but not in crisis requiring hospitalization, and no evidence of acute decline in renal function.\par Insulin treatment urgently needed; unclear if this will be permanent.\par Plan:\par 20 units of lantus and 6 units of insulin injected here\par start glimepiride 2 mg today and in AM\par return for diabetic teaching tomorrow and continue the following regimen:\par lantus 20 units qpm\par glimepiride 2 mg daily\par novolog correction scale ac:\par 200-249 4 units\par 250-299 6\par 300+      8\par \par instruct on diet and freestyle rosa use\par \par return appt. with CDE tomorrow AM

## 2020-01-14 NOTE — HISTORY OF PRESENT ILLNESS
[FreeTextEntry1] : Referred due to rising glucose levels.\par h/o polycystic kidney disease with multiple infections, s/p nephrectomies and renal transplant 1.5 years ago. No prior h/o diabetes, however blood glucose levels have trended upwards over the past year.\par Now pt. c/o polydipsia,polyuria, blurry vision, and weight gain.\par A1C over 10%, and FS here 488.\par \par ON 5 mg of prednisone, tacrolimus, cell-cept\par recent recurrent UTI

## 2020-01-14 NOTE — REVIEW OF SYSTEMS
[Fatigue] : no fatigue [Recent Weight Gain (___ Lbs)] : recent [unfilled] ~Ulb weight gain [Blurry Vision] : blurred vision [Nausea] : nausea [Vomiting] : vomiting was observed [Diarrhea] : diarrhea [Polyuria] : polyuria [Nocturia] : nocturia [Dry Skin] : dry skin [Back Pain] : back pain [Depression] : depression [Anxiety] : no anxiety [Insomnia] : insomnia [All other systems negative] : All other systems negative

## 2020-01-14 NOTE — PHYSICAL EXAM
[Well Nourished] : well nourished [No Acute Distress] : no acute distress [EOMI] : extra ocular movement intact [Well Developed] : well developed [No Proptosis] : no proptosis [Thyroid Not Enlarged] : the thyroid was not enlarged [No Thyroid Nodules] : there were no palpable thyroid nodules [No Accessory Muscle Use] : no accessory muscle use [Clear to Auscultation] : lungs were clear to auscultation bilaterally [Normal S1, S2] : normal S1 and S2 [Regular Rhythm] : with a regular rhythm [No Edema] : there was no peripheral edema [No Varicosities] : there were no varicosital changes [No Skin Lesions] : no skin lesions [No Clubbing, Cyanosis] : no clubbing  or cyanosis of the fingernails [Normal Strength/Tone] : muscle strength and tone were normal [Normal Affect] : the affect was normal [No Motor Deficits] : the motor exam was normal [No Tremors] : no tremors [Normal Mood] : the mood was normal [de-identified] : mild facial rounding, generalized obesity [de-identified] : warm and dry

## 2020-01-15 ENCOUNTER — APPOINTMENT (OUTPATIENT)
Dept: ENDOCRINOLOGY | Facility: CLINIC | Age: 50
End: 2020-01-15
Payer: COMMERCIAL

## 2020-01-15 ENCOUNTER — TRANSCRIPTION ENCOUNTER (OUTPATIENT)
Age: 50
End: 2020-01-15

## 2020-01-15 ENCOUNTER — RESULT CHARGE (OUTPATIENT)
Age: 50
End: 2020-01-15

## 2020-01-15 LAB — GLUCOSE BLDC GLUCOMTR-MCNC: 444

## 2020-01-15 PROCEDURE — G0108 DIAB MANAGE TRN  PER INDIV: CPT

## 2020-01-15 RX ORDER — INSULIN ASPART 100 [IU]/ML
100 INJECTION, SOLUTION INTRAVENOUS; SUBCUTANEOUS
Qty: 1 | Refills: 3 | Status: DISCONTINUED | COMMUNITY
Start: 2020-01-14 | End: 2020-01-15

## 2020-01-28 ENCOUNTER — APPOINTMENT (OUTPATIENT)
Dept: ENDOCRINOLOGY | Facility: CLINIC | Age: 50
End: 2020-01-28
Payer: COMMERCIAL

## 2020-01-28 PROCEDURE — G0108 DIAB MANAGE TRN  PER INDIV: CPT

## 2020-01-31 NOTE — H&P PST ADULT - NEGATIVE GASTROINTESTINAL SYMPTOMS
Reason for admission   Pseudarthrosis after fusion or arthrodesis [M96.0]    HPI  This is a pleasant 55 year old male with chronic medical history of essential hypertension, type 2 diabetes mellitus on oral medications, mild morbid obesity, dyslipidemia who have has a previous intervention at the level of L3-S1, who was electively admitted for revision of L3-S1 fusion due to pseudoarthrosis.  The aforementioned procedures was performed by Dr. Toscano on 01/30/2020.    At this time the patient corroborates that he has being on a combination of Norvasc and olmesartan 10 as well as nebivolol for management of his hypertension.  He has been taking statin for dyslipidemia though he indicates that his primary care physician recently indicating that he may be able to come off.  In addition he reports that in the clinic is glycemia has been adequate and has been maintained on metformin but does not have a glucometer to check at home.    At the current time the primary complaint remains postop pain.  He is on a PCA but has been hesitant to use it consistently as he has noted that is causing to have dry mouth.  I had advised him to try and get some hard candy overcome to increase elevation of motion of the mouth but that he may use the PCA in order to maintain pain at check.  In addition will review prior to admission medications which at indicated he was on gabapentin.    Past Medical History:   Diagnosis Date   • Ambulates with cane    • Arthropathy, unspecified    • Benign prostatic hyperplasia without lower urinary tract symptoms    • BPH (benign prostatic hyperplasia)    • Carpal tunnel syndrome    • Cocaine abuse (CMS/HCC)     pt states in remission   • Diabetes mellitus (CMS/[facility])    • SMITH (dyspnea on exertion)     pt reports smith walking 2 blocks with dizziness & palpatatiosn at times   • Essential (primary) hypertension    • Ichthyosis vulgaris    • Low back pain    • Mononeuropathy    • Obesity, Class I, BMI 30.0-34.9  (see actual BMI)    • Osteoarthritis of first carpometacarpal joint, unspecified    • Other intervertebral disc displacement, lumbar region    • Other intervertebral disc displacement, lumbosacral region    • Pain in left wrist    • Pain syndrome, chronic    • Paresthesia of skin    • Postlaminectomy syndrome, not elsewhere classified    • Radial styloid tenosynovitis (de quervain)    • Radiculopathy, lumbar region    • Tinea pedis    • Vitamin D deficiency    • Wears prescription eyeglasses     w/ bifocals       Past Surgical History:   Procedure Laterality Date   • Back surgery  2018    with Dr. Toscano at [facility] surgery [address] [place] [address]   • Back surgery  01/30/2020    REVISION TO LUMBAR 3-SACRAL 1 PLIF by Dr. Toscano at [facility]   • Carpal [place]   • Hernia repair  2012    ingual with mesh       ALLERGIES:  No Known Allergies    Medications: PTA MEDS reviewed in MAR     ROS: 10 systems review is negative except for pertinent to HPI    Family History   Problem Relation Age of Onset   • Diabetes Mother        Social History     Tobacco Use   • Smoking status: Current Every Day Smoker     Packs/day: 0.25   • Smokeless tobacco: Never Used   • Tobacco comment: 3-4 cigs per day   Substance Use Topics   • Alcohol use: Yes     Comment: some weekends 1-2    • Drug use: Not Currently       Physical Exam   Visit Vitals  /67 (BP Location: RUE - Right upper extremity, Patient Position: Semi-Nguyen's)   Pulse 92   Temp 99 °F (37.2 °C) (Oral)   Resp 24   Ht 5' 8\" (1.727 m)   Wt 97 kg   SpO2 100%   BMI 32.51 kg/m²     GEN: Well developed ; pleasant cooperative with evaluation, no acute distress despite postop pain.   ENT: No icterus, no pallor, EOMI, PERRLA.  CHEST: Wall without rash ; symmetric movement.   CARD: S1S2 regular, no MGR.  No tachycardia  LUNGS: Clear to auscultation bilat.  Good inspiratory effort.  No respiratory  distress.  No accessory muscle use.  No supplemental oxygen needs  ABD: +BS soft NTND, moderately rotund abdomen, no palpable hepatosplenomegaly (laying on his side)  BACK:  Incision not reviewed at this time it is stressed  EXT: No edema of extremities, no synovitis.  SKIN: Integrity intact.  NEURO: Alert and oriented to time/person/place and condition; CN intact, no gross focal motor (5/5 strength symmetric) nor sensory deficits appreciated to light touch.  Mobility limitations are primarily secondary to pain    LABS and IMAGES   WBC (x10E3/uL)   Date Value   01/17/2020 9.5     HGB (g/dL)   Date Value   01/17/2020 13.9     PLT (x10E3/uL)   Date Value   01/17/2020 314     INR (no units)   Date Value   01/17/2020 0.9       BUN (mg/dL)   Date Value   01/17/2020 16     Creatinine (mg/dL)   Date Value   01/17/2020 1.16     Sodium (mmol/L)   Date Value   01/17/2020 143     Chloride (mmol/L)   Date Value   01/17/2020 103     Potassium (mmol/L)   Date Value   01/17/2020 3.8       AST/SGOT (IU/L)   Date Value   01/17/2020 30     ALT/SGPT (IU/L)   Date Value   01/17/2020 26     No results found for: GGTP  ALK PHOSPHATASE (IU/L)   Date Value   01/17/2020 69     TOTAL BILIRUBIN (mg/dL)   Date Value   01/17/2020 <0.2           Fl Pacs Record Reportable    Result Date: 1/30/2020  Narrative: FL FLUOROSCOPY SURGERY + REPORT CLINICAL INFORMATION:  Encounter for other specified aftercare. FINDINGS:  A total of 4 fluoroscopic intraprocedural images are submitted for interpretation. Intraoperative images were performed during internal fixation with danuta and screws extending from L3 to the sacrum.       Assessment and Plan:  1. Postop day 1 status post revision of L3-S1 posterior spinal fusion decompression with L3-L4 interbody fusion, posterolateral L3-S1 fusion and bilateral nerve fluid exploration at L4-L5 and L5-S1.  - immediate postop care as her spine surgery protocol directed by attending service.  - given reported intraop blood  loss of 800 cc, will request updated labs this morning including CBC and BMP.    2. Acute postop care  - maintain Dilaudid PCA as in the preceding 12 hours ; monitor through the day for further adjustments as needed   - will add Tylenol 625 mg schedule 4 times a day  - will resume gabapentin but will change to 300 mg twice per day (at home 600 mg nightly)    3. Morbid obesity, BMI slightly over 30  4. Type 2 diabetes mellitus with dyslipidemia on oral medications  5. Essential hypertension   - will resume metformin 500 mg twice a day.  Adjust diet for a carb consistent modification.  Twice a day monitor glucose  - resume Norvasc 10 mg and nebivolol 10 mg daily.  Will hold of Mr. macedo, but may resume the latter upon discharge home    6. Insomnia :   - will trial PRN rozerem     Thank you for inviting us to participate in the care of this pleasant gentleman I will be of your assistance throughout the course of his admission.  Please feel free to contact me if  questions       no melena/no hematochezia/no abdominal pain no melena/no vomiting/no abdominal pain/no hematochezia

## 2020-02-06 ENCOUNTER — APPOINTMENT (OUTPATIENT)
Dept: NEPHROLOGY | Facility: CLINIC | Age: 50
End: 2020-02-06
Payer: COMMERCIAL

## 2020-02-06 VITALS
TEMPERATURE: 98 F | RESPIRATION RATE: 18 BRPM | HEIGHT: 69 IN | HEART RATE: 62 BPM | OXYGEN SATURATION: 95 % | DIASTOLIC BLOOD PRESSURE: 71 MMHG | BODY MASS INDEX: 35.55 KG/M2 | SYSTOLIC BLOOD PRESSURE: 151 MMHG | WEIGHT: 240 LBS

## 2020-02-06 PROCEDURE — 99214 OFFICE O/P EST MOD 30 MIN: CPT

## 2020-02-06 NOTE — ASSESSMENT
[FreeTextEntry1] : 1.  Renal transplant - also with b/l native nephrectomies.  Early on likely ACR treated with solumedrol 750mgs x3.  Creatinine stable around 1.   Will repeat labs today.  \par 2.  Immunosuppression - target tacro 5-7, myfortic 360mgs BID and pred 5. has on and off mild diarrhea.  \par 3.  HTN- elevated.  Increase losartan to 100mgs. \par 4.  Transaminitis - Pt saw Dr. Norton - Has fatty liver/ WATKINS.  \par 5.  Diarrhea - largely resolved.  Mild may be related to myfortic. \par 6.  NODAT - Pt not on insulin.  Following with endocrinology.  \par 7. Brain aneurysm - stable.  following with neurosurg.  \par 9. Health maintanence - discussed need for annual skin cancer screening, vaccinations and GYN care.  \par \par Pt will return to see Dr. Alan and f/u with us in 6 months.  \par

## 2020-02-06 NOTE — HISTORY OF PRESENT ILLNESS
[FreeTextEntry1] : Ms. Vanegas is a 47 y.o female with a history of PCKD s/p LURT from ex  and bilateral native nephrectomies on 4/2/18.\par Course was complicated with slow function assumed to be early ACR (low tacro levels) treated with solumedrol 750mgs.  Pt also had Cdiff treated with vancomycin.   \par \par Interval events:\par PT recently diagnosed with NODAT.  Had to start insulin and also on sulfonylurea.  Her vision and memory feel worse but improving since her sugars have improved.  She has changed her diet.  Last creatinine 0.99 in January.  Has a CGM in place.

## 2020-02-06 NOTE — PHYSICAL EXAM
[General Appearance - Alert] : alert [General Appearance - Well Nourished] : well nourished [Sclera] : the sclera and conjunctiva were normal [Extraocular Movements] : extraocular movements were intact [Outer Ear] : the ears and nose were normal in appearance [Neck Appearance] : the appearance of the neck was normal [Jugular Venous Distention Increased] : there was no jugular-venous distention [Neck Cervical Mass (___cm)] : no neck mass was observed [Auscultation Breath Sounds / Voice Sounds] : lungs were clear to auscultation bilaterally [Exaggerated Use Of Accessory Muscles For Inspiration] : no accessory muscle use [Respiration, Rhythm And Depth] : normal respiratory rhythm and effort [Heart Rate And Rhythm] : heart rate was normal and rhythm regular [Heart Sounds] : normal S1 and S2 [Heart Sounds Gallop] : no gallops [Heart Sounds Pericardial Friction Rub] : no pericardial rub [Murmurs] : no murmurs [Bowel Sounds] : normal bowel sounds [Abdomen Tenderness] : non-tender [Abdomen Soft] : soft [Cervical Lymph Nodes Enlarged Posterior Bilaterally] : posterior cervical [Abdomen Mass (___ Cm)] : no abdominal mass palpated [Cervical Lymph Nodes Enlarged Anterior Bilaterally] : anterior cervical [Supraclavicular Lymph Nodes Enlarged Bilaterally] : supraclavicular [Axillary Lymph Nodes Enlarged Bilaterally] : axillary [No CVA Tenderness] : no ~M costovertebral angle tenderness [Abnormal Walk] : normal gait [Musculoskeletal - Swelling] : no joint swelling seen [Nail Clubbing] : no clubbing  or cyanosis of the fingernails [Skin Color & Pigmentation] : normal skin color and pigmentation [Skin Turgor] : normal skin turgor [] : no rash [Skin Lesions] : no skin lesions [Oriented To Time, Place, And Person] : oriented to person, place, and time [Impaired Insight] : insight and judgment were intact [Cranial Nerves] : cranial nerves 2-12 were intact [Mood] : the mood was normal [Affect] : the affect was normal [FreeTextEntry1] : trace LE edema.

## 2020-02-07 LAB
ALBUMIN SERPL ELPH-MCNC: 4 G/DL
ALP BLD-CCNC: 40 U/L
ALT SERPL-CCNC: 43 U/L
ANION GAP SERPL CALC-SCNC: 12 MMOL/L
APPEARANCE: CLEAR
AST SERPL-CCNC: 28 U/L
BACTERIA: NEGATIVE
BASOPHILS # BLD AUTO: 0.04 K/UL
BASOPHILS NFR BLD AUTO: 0.5 %
BILIRUB SERPL-MCNC: 0.8 MG/DL
BILIRUBIN URINE: NEGATIVE
BKV DNA SPEC QL NAA+PROBE: NOT DETECTED COPIES/ML
BLOOD URINE: NEGATIVE
BUN SERPL-MCNC: 16 MG/DL
CALCIUM SERPL-MCNC: 9.4 MG/DL
CHLORIDE SERPL-SCNC: 106 MMOL/L
CO2 SERPL-SCNC: 22 MMOL/L
COLOR: NORMAL
CREAT SERPL-MCNC: 1.08 MG/DL
CREAT SPEC-SCNC: 69 MG/DL
CREAT/PROT UR: 0.1 RATIO
EOSINOPHIL # BLD AUTO: 0.23 K/UL
EOSINOPHIL NFR BLD AUTO: 2.7 %
GLUCOSE QUALITATIVE U: NEGATIVE
GLUCOSE SERPL-MCNC: 136 MG/DL
HCT VFR BLD CALC: 39.8 %
HGB BLD-MCNC: 12.6 G/DL
HYALINE CASTS: 2 /LPF
IMM GRANULOCYTES NFR BLD AUTO: 0.4 %
KETONES URINE: NEGATIVE
LDH SERPL-CCNC: 206 U/L
LEUKOCYTE ESTERASE URINE: NEGATIVE
LYMPHOCYTES # BLD AUTO: 2.08 K/UL
LYMPHOCYTES NFR BLD AUTO: 24.9 %
MAGNESIUM SERPL-MCNC: 1.8 MG/DL
MAN DIFF?: NORMAL
MCHC RBC-ENTMCNC: 31.7 GM/DL
MCHC RBC-ENTMCNC: 32.1 PG
MCV RBC AUTO: 101.5 FL
MICROSCOPIC-UA: NORMAL
MONOCYTES # BLD AUTO: 0.65 K/UL
MONOCYTES NFR BLD AUTO: 7.8 %
NEUTROPHILS # BLD AUTO: 5.34 K/UL
NEUTROPHILS NFR BLD AUTO: 63.7 %
NITRITE URINE: NEGATIVE
PH URINE: 6
PHOSPHATE SERPL-MCNC: 2.9 MG/DL
PLATELET # BLD AUTO: 161 K/UL
POTASSIUM SERPL-SCNC: 4.7 MMOL/L
PROT SERPL-MCNC: 6.3 G/DL
PROT UR-MCNC: 7 MG/DL
PROTEIN URINE: NEGATIVE
RBC # BLD: 3.92 M/UL
RBC # FLD: 13.3 %
RED BLOOD CELLS URINE: 3 /HPF
SODIUM SERPL-SCNC: 140 MMOL/L
SPECIFIC GRAVITY URINE: 1.01
SQUAMOUS EPITHELIAL CELLS: 2 /HPF
TACROLIMUS SERPL-MCNC: 6.2 NG/ML
URATE SERPL-MCNC: 6.1 MG/DL
UROBILINOGEN URINE: NORMAL
WBC # FLD AUTO: 8.37 K/UL
WHITE BLOOD CELLS URINE: 1 /HPF

## 2020-02-09 DIAGNOSIS — R74.8 ABNORMAL LEVELS OF OTHER SERUM ENZYMES: ICD-10-CM

## 2020-02-09 DIAGNOSIS — Z87.42 PERSONAL HISTORY OF OTHER DISEASES OF THE FEMALE GENITAL TRACT: ICD-10-CM

## 2020-02-09 DIAGNOSIS — Z86.19 PERSONAL HISTORY OF OTHER INFECTIOUS AND PARASITIC DISEASES: ICD-10-CM

## 2020-02-09 DIAGNOSIS — Z71.6 TOBACCO ABUSE COUNSELING: ICD-10-CM

## 2020-02-09 DIAGNOSIS — F41.8 OTHER SPECIFIED ANXIETY DISORDERS: ICD-10-CM

## 2020-02-09 DIAGNOSIS — N85.01 BENIGN ENDOMETRIAL HYPERPLASIA: ICD-10-CM

## 2020-02-09 DIAGNOSIS — Z85.828 PERSONAL HISTORY OF OTHER MALIGNANT NEOPLASM OF SKIN: ICD-10-CM

## 2020-02-09 DIAGNOSIS — Z87.898 PERSONAL HISTORY OF OTHER SPECIFIED CONDITIONS: ICD-10-CM

## 2020-02-09 DIAGNOSIS — A41.9 SEPSIS, UNSPECIFIED ORGANISM: ICD-10-CM

## 2020-02-09 DIAGNOSIS — Z87.01 PERSONAL HISTORY OF PNEUMONIA (RECURRENT): ICD-10-CM

## 2020-02-09 DIAGNOSIS — N83.209 UNSPECIFIED OVARIAN CYST, UNSPECIFIED SIDE: ICD-10-CM

## 2020-02-09 DIAGNOSIS — J06.9 ACUTE UPPER RESPIRATORY INFECTION, UNSPECIFIED: ICD-10-CM

## 2020-02-09 DIAGNOSIS — R68.89 OTHER GENERAL SYMPTOMS AND SIGNS: ICD-10-CM

## 2020-02-09 DIAGNOSIS — Z87.39 PERSONAL HISTORY OF OTHER DISEASES OF THE MUSCULOSKELETAL SYSTEM AND CONNECTIVE TISSUE: ICD-10-CM

## 2020-02-09 DIAGNOSIS — N83.201 UNSPECIFIED OVARIAN CYST, RIGHT SIDE: ICD-10-CM

## 2020-02-09 DIAGNOSIS — N39.0 SEPSIS, UNSPECIFIED ORGANISM: ICD-10-CM

## 2020-02-09 DIAGNOSIS — Z86.59 PERSONAL HISTORY OF OTHER MENTAL AND BEHAVIORAL DISORDERS: ICD-10-CM

## 2020-02-09 DIAGNOSIS — Z09 ENCOUNTER FOR FOLLOW-UP EXAMINATION AFTER COMPLETED TREATMENT FOR CONDITIONS OTHER THAN MALIGNANT NEOPLASM: ICD-10-CM

## 2020-02-09 DIAGNOSIS — Z13.31 ENCOUNTER FOR SCREENING FOR DEPRESSION: ICD-10-CM

## 2020-02-09 DIAGNOSIS — K75.81 NONALCOHOLIC STEATOHEPATITIS (NASH): ICD-10-CM

## 2020-02-09 DIAGNOSIS — R92.2 INCONCLUSIVE MAMMOGRAM: ICD-10-CM

## 2020-02-09 DIAGNOSIS — Z01.419 ENCOUNTER FOR GYNECOLOGICAL EXAMINATION (GENERAL) (ROUTINE) W/OUT ABNORMAL FINDINGS: ICD-10-CM

## 2020-02-10 ENCOUNTER — APPOINTMENT (OUTPATIENT)
Dept: NEPHROLOGY | Facility: CLINIC | Age: 50
End: 2020-02-10
Payer: COMMERCIAL

## 2020-02-10 VITALS
HEART RATE: 74 BPM | SYSTOLIC BLOOD PRESSURE: 125 MMHG | HEIGHT: 69 IN | WEIGHT: 242 LBS | BODY MASS INDEX: 35.84 KG/M2 | DIASTOLIC BLOOD PRESSURE: 63 MMHG

## 2020-02-10 LAB
CMV DNA SPEC QL NAA+PROBE: NOT DETECTED
CMVPCR LOG: NOT DETECTED LOG10IU/ML

## 2020-02-10 PROCEDURE — 99214 OFFICE O/P EST MOD 30 MIN: CPT

## 2020-02-10 RX ORDER — GLIMEPIRIDE 2 MG/1
2 TABLET ORAL
Qty: 180 | Refills: 1 | Status: DISCONTINUED | COMMUNITY
Start: 2020-01-14 | End: 2020-02-10

## 2020-02-14 ENCOUNTER — TRANSCRIPTION ENCOUNTER (OUTPATIENT)
Age: 50
End: 2020-02-14

## 2020-03-05 ENCOUNTER — TRANSCRIPTION ENCOUNTER (OUTPATIENT)
Age: 50
End: 2020-03-05

## 2020-03-10 ENCOUNTER — APPOINTMENT (OUTPATIENT)
Dept: ENDOCRINOLOGY | Facility: CLINIC | Age: 50
End: 2020-03-10
Payer: COMMERCIAL

## 2020-03-10 VITALS — BODY MASS INDEX: 34.78 KG/M2 | HEIGHT: 69 IN | WEIGHT: 234.8 LBS

## 2020-03-10 PROCEDURE — G0108 DIAB MANAGE TRN  PER INDIV: CPT

## 2020-03-13 ENCOUNTER — EMERGENCY (EMERGENCY)
Facility: HOSPITAL | Age: 50
LOS: 1 days | End: 2020-03-13
Admitting: EMERGENCY MEDICINE
Payer: COMMERCIAL

## 2020-03-13 DIAGNOSIS — Z90.49 ACQUIRED ABSENCE OF OTHER SPECIFIED PARTS OF DIGESTIVE TRACT: Chronic | ICD-10-CM

## 2020-03-13 DIAGNOSIS — Z98.890 OTHER SPECIFIED POSTPROCEDURAL STATES: Chronic | ICD-10-CM

## 2020-03-13 DIAGNOSIS — N20.0 CALCULUS OF KIDNEY: Chronic | ICD-10-CM

## 2020-03-13 DIAGNOSIS — Z98.51 TUBAL LIGATION STATUS: Chronic | ICD-10-CM

## 2020-03-13 DIAGNOSIS — Z90.5 ACQUIRED ABSENCE OF KIDNEY: Chronic | ICD-10-CM

## 2020-03-13 DIAGNOSIS — Z94.0 KIDNEY TRANSPLANT STATUS: Chronic | ICD-10-CM

## 2020-03-13 DIAGNOSIS — I77.0 ARTERIOVENOUS FISTULA, ACQUIRED: Chronic | ICD-10-CM

## 2020-03-13 PROCEDURE — 72125 CT NECK SPINE W/O DYE: CPT | Mod: 26

## 2020-03-13 PROCEDURE — 70450 CT HEAD/BRAIN W/O DYE: CPT | Mod: 26

## 2020-03-13 PROCEDURE — 99284 EMERGENCY DEPT VISIT MOD MDM: CPT

## 2020-03-15 LAB
25(OH)D3 SERPL-MCNC: 35.1 NG/ML
ALBUMIN SERPL ELPH-MCNC: 4.1 G/DL
ANION GAP SERPL CALC-SCNC: 13 MMOL/L
APPEARANCE: ABNORMAL
BACTERIA: ABNORMAL
BASOPHILS # BLD AUTO: 0.04 K/UL
BASOPHILS NFR BLD AUTO: 0.5 %
BILIRUBIN URINE: NEGATIVE
BLOOD URINE: NEGATIVE
BUN SERPL-MCNC: 18 MG/DL
CALCIUM SERPL-MCNC: 9 MG/DL
CHLORIDE SERPL-SCNC: 106 MMOL/L
CO2 SERPL-SCNC: 22 MMOL/L
COLOR: YELLOW
CREAT SERPL-MCNC: 1.04 MG/DL
CREAT SPEC-SCNC: 248 MG/DL
CREAT/PROT UR: 0.1 RATIO
EOSINOPHIL # BLD AUTO: 0.16 K/UL
EOSINOPHIL NFR BLD AUTO: 1.9 %
GLUCOSE QUALITATIVE U: NEGATIVE
GLUCOSE SERPL-MCNC: 97 MG/DL
HCT VFR BLD CALC: 38.4 %
HGB BLD-MCNC: 12.1 G/DL
HYALINE CASTS: 0 /LPF
IMM GRANULOCYTES NFR BLD AUTO: 0.2 %
KETONES URINE: NEGATIVE
LEUKOCYTE ESTERASE URINE: NEGATIVE
LYMPHOCYTES # BLD AUTO: 2.13 K/UL
LYMPHOCYTES NFR BLD AUTO: 25.9 %
MAGNESIUM SERPL-MCNC: 1.9 MG/DL
MAN DIFF?: NORMAL
MCHC RBC-ENTMCNC: 31.5 GM/DL
MCHC RBC-ENTMCNC: 32.4 PG
MCV RBC AUTO: 102.9 FL
MICROSCOPIC-UA: NORMAL
MONOCYTES # BLD AUTO: 0.68 K/UL
MONOCYTES NFR BLD AUTO: 8.3 %
NEUTROPHILS # BLD AUTO: 5.19 K/UL
NEUTROPHILS NFR BLD AUTO: 63.2 %
NITRITE URINE: NEGATIVE
PH URINE: 5.5
PHOSPHATE SERPL-MCNC: 2.8 MG/DL
PLATELET # BLD AUTO: 164 K/UL
POTASSIUM SERPL-SCNC: 4.4 MMOL/L
PROT UR-MCNC: 29 MG/DL
PROTEIN URINE: ABNORMAL
RBC # BLD: 3.73 M/UL
RBC # FLD: 13.2 %
RED BLOOD CELLS URINE: 1 /HPF
SODIUM SERPL-SCNC: 140 MMOL/L
SPECIFIC GRAVITY URINE: 1.03
SQUAMOUS EPITHELIAL CELLS: >27 /HPF
TACROLIMUS SERPL-MCNC: 5.9 NG/ML
URATE SERPL-MCNC: 6.6 MG/DL
UROBILINOGEN URINE: NORMAL
WBC # FLD AUTO: 8.22 K/UL
WHITE BLOOD CELLS URINE: 3 /HPF

## 2020-03-16 ENCOUNTER — APPOINTMENT (OUTPATIENT)
Dept: NEPHROLOGY | Facility: CLINIC | Age: 50
End: 2020-03-16

## 2020-03-16 ENCOUNTER — TRANSCRIPTION ENCOUNTER (OUTPATIENT)
Age: 50
End: 2020-03-16

## 2020-03-20 LAB — BACTERIA UR CULT: NORMAL

## 2020-03-20 NOTE — ASSESSMENT
[FreeTextEntry1] : Labs/medications reviewed\par \par Continue current treatment\par \par Script for new lab work ordered to be done in March\par \par Patient to follow with transplant team again in 6 months\par \par F.U in 1 month\par \par Addendum:\par MD Waqas spoke to patient about the possible use of oral hypoglycemic agents in the SGLT2 class\par Patient to discuss this suggestion with her endocrinologist\par \par Patient's UA abnormal. Patient stating she is having some urinary "dribbling", mild flank discomfort- will order urine culture\par Script for lab work ordered to be done in April \par Urine culture negative

## 2020-03-20 NOTE — REVIEW OF SYSTEMS
[Eyesight Problems] : eyesight problems [Negative] : Heme/Lymph [de-identified] : intermittent numbness/tingling in fingers of right hand (non-functioning fistula in right arm)

## 2020-03-20 NOTE — PHYSICAL EXAM
[Sclera] : the sclera and conjunctiva were normal [Hearing Threshold Finger Rub Not [place]] : hearing was normal [Outer Ear] : the ears and nose were normal in appearance [Examination Of The Oral Cavity] : the lips and gums were normal [General Appearance - Alert] : alert [General Appearance - In No Acute Distress] : in no acute distress [General Appearance - Well Nourished] : well nourished [General Appearance - Well Developed] : well developed [Strabismus] : no strabismus was seen [General Appearance - Well-Appearing] : healthy appearing [Neck Appearance] : the appearance of the neck was normal [Neck Cervical Mass (___cm)] : no neck mass was observed [Jugular Venous Distention Increased] : there was no jugular-venous distention [Exaggerated Use Of Accessory Muscles For Inspiration] : no accessory muscle use [Respiration, Rhythm And Depth] : normal respiratory rhythm and effort [Heart Rate And Rhythm] : heart rate was normal and rhythm regular [Apical Impulse] : the apical impulse was normal [Auscultation Breath Sounds / Voice Sounds] : lungs were clear to auscultation bilaterally [Heart Sounds] : normal S1 and S2 [Heart Sounds Gallop] : no gallops [Heart Sounds Pericardial Friction Rub] : no pericardial rub [Murmurs] : no murmurs [Abdominal Aorta] : the abdominal aorta was normal [Pitting Edema] : pitting edema present [Arterial Pulses Carotid] : carotid pulses were normal with no bruits [___ +] : bilateral [unfilled]+ pretibial pitting edema [Bowel Sounds] : normal bowel sounds [Abdomen Soft] : soft [Abdomen Tenderness] : non-tender [] : no hepato-splenomegaly [Abdomen Mass (___ Cm)] : no abdominal mass palpated [Cervical Lymph Nodes Enlarged Posterior Bilaterally] : posterior cervical [Cervical Lymph Nodes Enlarged Anterior Bilaterally] : anterior cervical [No CVA Tenderness] : no ~M costovertebral angle tenderness [Involuntary Movements] : no involuntary movements were seen [___ (cm) Fistula] : [unfilled] (cm) fistula [Skin Color & Pigmentation] : normal skin color and pigmentation [Skin Turgor] : normal skin turgor [Oriented To Time, Place, And Person] : oriented to person, place, and time [No Focal Deficits] : no focal deficits [Skin Lesions] : no skin lesions [Impaired Insight] : insight and judgment were intact [Affect] : the affect was normal [Mood] : the mood was normal [FreeTextEntry1] : non-functioning

## 2020-03-20 NOTE — HISTORY OF PRESENT ILLNESS
[___ Month(s) Ago] : [unfilled] month(s) ago [Good Compliance] : good compliance with treatment [FreeTextEntry1] : HX:  PCKD s/p LURT from ex  and bilateral native nephrectomies on 4/2/18.\par Course was complicated with slow function assumed to be early ACR (low tacro levels) treated with solumedrol 750mgs.\par Pt also had Cdiff treated with vancomycin. \par \par Saw transplant team on February 06, 2020.\par \par Tacro increased since last visit, now WNL. Remains on Prednisone 5mg daily\par \par PT recently diagnosed with NODAT. Had to start insulin and also on sulfonylurea. \par Having vision issues but blood sugar control is improving\par Being followed by Endocrinology, Has freestyle rosa for DM control (since 01/15/20)\par Patient has been working with dietician at Endocrinology office, modified diet\par Endocrinology recently increased glimepiride dose to 4mg PO daily\par \par Here for follow-up visit\par \par Patient feels well today\par \par

## 2020-03-26 ENCOUNTER — EMERGENCY (EMERGENCY)
Facility: HOSPITAL | Age: 50
LOS: 1 days | End: 2020-03-26
Admitting: EMERGENCY MEDICINE
Payer: OTHER MISCELLANEOUS

## 2020-03-26 DIAGNOSIS — Z94.0 KIDNEY TRANSPLANT STATUS: Chronic | ICD-10-CM

## 2020-03-26 DIAGNOSIS — N20.0 CALCULUS OF KIDNEY: Chronic | ICD-10-CM

## 2020-03-26 DIAGNOSIS — Z90.5 ACQUIRED ABSENCE OF KIDNEY: Chronic | ICD-10-CM

## 2020-03-26 DIAGNOSIS — Z98.890 OTHER SPECIFIED POSTPROCEDURAL STATES: Chronic | ICD-10-CM

## 2020-03-26 DIAGNOSIS — Z90.49 ACQUIRED ABSENCE OF OTHER SPECIFIED PARTS OF DIGESTIVE TRACT: Chronic | ICD-10-CM

## 2020-03-26 DIAGNOSIS — Z98.51 TUBAL LIGATION STATUS: Chronic | ICD-10-CM

## 2020-03-26 DIAGNOSIS — I77.0 ARTERIOVENOUS FISTULA, ACQUIRED: Chronic | ICD-10-CM

## 2020-03-26 PROCEDURE — 73130 X-RAY EXAM OF HAND: CPT | Mod: 26,LT

## 2020-03-26 PROCEDURE — 99053 MED SERV 10PM-8AM 24 HR FAC: CPT

## 2020-03-26 PROCEDURE — 99283 EMERGENCY DEPT VISIT LOW MDM: CPT

## 2020-04-20 ENCOUNTER — APPOINTMENT (OUTPATIENT)
Dept: FAMILY MEDICINE | Facility: CLINIC | Age: 50
End: 2020-04-20

## 2020-04-25 ENCOUNTER — MESSAGE (OUTPATIENT)
Age: 50
End: 2020-04-25

## 2020-04-29 ENCOUNTER — NON-APPOINTMENT (OUTPATIENT)
Age: 50
End: 2020-04-29

## 2020-04-30 ENCOUNTER — TRANSCRIPTION ENCOUNTER (OUTPATIENT)
Age: 50
End: 2020-04-30

## 2020-04-30 ENCOUNTER — APPOINTMENT (OUTPATIENT)
Dept: ENDOCRINOLOGY | Facility: CLINIC | Age: 50
End: 2020-04-30
Payer: COMMERCIAL

## 2020-04-30 PROCEDURE — 99443: CPT

## 2020-05-07 DIAGNOSIS — R82.90 UNSPECIFIED ABNORMAL FINDINGS IN URINE: ICD-10-CM

## 2020-05-07 DIAGNOSIS — Z86.59 PERSONAL HISTORY OF OTHER MENTAL AND BEHAVIORAL DISORDERS: ICD-10-CM

## 2020-05-07 DIAGNOSIS — R11.0 NAUSEA: ICD-10-CM

## 2020-05-07 DIAGNOSIS — R51 HEADACHE: ICD-10-CM

## 2020-05-08 ENCOUNTER — APPOINTMENT (OUTPATIENT)
Dept: NEPHROLOGY | Facility: CLINIC | Age: 50
End: 2020-05-08
Payer: COMMERCIAL

## 2020-05-08 PROCEDURE — 99214 OFFICE O/P EST MOD 30 MIN: CPT

## 2020-05-13 ENCOUNTER — TRANSCRIPTION ENCOUNTER (OUTPATIENT)
Age: 50
End: 2020-05-13

## 2020-06-02 ENCOUNTER — TRANSCRIPTION ENCOUNTER (OUTPATIENT)
Age: 50
End: 2020-06-02

## 2020-06-02 LAB
25(OH)D3 SERPL-MCNC: 28.9 NG/ML
ALBUMIN SERPL ELPH-MCNC: 4.2 G/DL
ANION GAP SERPL CALC-SCNC: 13 MMOL/L
BASOPHILS # BLD AUTO: 0.05 K/UL
BASOPHILS NFR BLD AUTO: 0.4 %
BUN SERPL-MCNC: 19 MG/DL
CALCIUM SERPL-MCNC: 9.2 MG/DL
CHLORIDE SERPL-SCNC: 103 MMOL/L
CO2 SERPL-SCNC: 24 MMOL/L
CREAT SERPL-MCNC: 1.15 MG/DL
EOSINOPHIL # BLD AUTO: 0.22 K/UL
EOSINOPHIL NFR BLD AUTO: 1.9 %
GLUCOSE SERPL-MCNC: 195 MG/DL
HCT VFR BLD CALC: 40 %
HGB BLD-MCNC: 12.8 G/DL
IMM GRANULOCYTES NFR BLD AUTO: 0.5 %
LYMPHOCYTES # BLD AUTO: 2.57 K/UL
LYMPHOCYTES NFR BLD AUTO: 22.2 %
MAGNESIUM SERPL-MCNC: 1.7 MG/DL
MAN DIFF?: NORMAL
MCHC RBC-ENTMCNC: 32 GM/DL
MCHC RBC-ENTMCNC: 32.7 PG
MCV RBC AUTO: 102 FL
MONOCYTES # BLD AUTO: 0.84 K/UL
MONOCYTES NFR BLD AUTO: 7.3 %
NEUTROPHILS # BLD AUTO: 7.82 K/UL
NEUTROPHILS NFR BLD AUTO: 67.7 %
PHOSPHATE SERPL-MCNC: 3 MG/DL
PLATELET # BLD AUTO: 192 K/UL
POTASSIUM SERPL-SCNC: 4.3 MMOL/L
RBC # BLD: 3.92 M/UL
RBC # FLD: 13.4 %
SODIUM SERPL-SCNC: 139 MMOL/L
URATE SERPL-MCNC: 6.1 MG/DL
WBC # FLD AUTO: 11.56 K/UL

## 2020-06-03 LAB
APPEARANCE: CLEAR
BACTERIA: NEGATIVE
BILIRUBIN URINE: NEGATIVE
BLOOD URINE: NEGATIVE
CALCIUM OXALATE CRYSTALS: ABNORMAL
COLOR: YELLOW
CREAT SPEC-SCNC: 216 MG/DL
CREAT/PROT UR: 0.1 RATIO
ESTIMATED AVERAGE GLUCOSE: 126 MG/DL
GLUCOSE QUALITATIVE U: NEGATIVE
HBA1C MFR BLD HPLC: 6 %
HYALINE CASTS: 1 /LPF
KETONES URINE: NEGATIVE
LEUKOCYTE ESTERASE URINE: NEGATIVE
MICROSCOPIC-UA: NORMAL
NITRITE URINE: NEGATIVE
PH URINE: 6
PROT UR-MCNC: 14 MG/DL
PROTEIN URINE: NORMAL
RED BLOOD CELLS URINE: 4 /HPF
SPECIFIC GRAVITY URINE: 1.03
SQUAMOUS EPITHELIAL CELLS: 2 /HPF
TACROLIMUS SERPL-MCNC: 4.6 NG/ML
UROBILINOGEN URINE: NORMAL
WHITE BLOOD CELLS URINE: 2 /HPF

## 2020-06-08 NOTE — BRIEF OPERATIVE NOTE - NSICDXBRIEFOPLAUNCH_GEN_ALL_CORE
PULMONARY PROGRESS NOTES


Subjective


Patient intubated on 3/23 , s/p trach 4/6, 


transferred back to ICU 6/5 for syncope with collapse


developed anemia, blood drainage from RLQ abdomen drain site, and surrounding 

firmness  / developed septic shock 6/7 from abdomen source, required levo 6/7


s/p 3 new drains 6/7 with brown color drainage


was place on AVAPS 6/7 post 


On low dose levo for hypotension, now off. received aggressive IVF, improving 

renal function and HR/ BP


Vitals





Vital Signs








  Date Time  Temp Pulse Resp B/P (MAP) Pulse Ox O2 Delivery O2 Flow Rate FiO2


 


6/8/20 11:00  104 20 95/65 (75) 100 BiPAP/CPAP  


 


6/8/20 08:00 97.7       





 97.7       


 


6/7/20 17:36       15.0 








General:  Alert, No acute distress


Lungs:  Other (diminshed in bases, Rhonci in LLL)


Cardiovascular:  S1, S2


Abdomen:  Soft, Non-tender, Other (bleeding from Sx. site RLQ and firmness)


Extremities:  Other (+1 BLE edema)


Skin:  Warm, Dry


Labs





Laboratory Tests








Test


 6/6/20


13:06 6/6/20


13:53 6/6/20


16:30 6/6/20


18:12


 


White Blood Count


 16.9 x10^3/uL


(4.0-11.0) 


 


 





 


Red Blood Count


 2.48 x10^6/uL


(3.50-5.40) 


 


 





 


Hemoglobin


 7.2 g/dL


(12.0-15.5) 


 


 





 


Hematocrit


 22.1 %


(36.0-47.0) 


 


 





 


Mean Corpuscular Volume 89 fL ()    


 


Mean Corpuscular Hemoglobin 29 pg (25-35)    


 


Mean Corpuscular Hemoglobin


Concent 33 g/dL


(31-37) 


 


 





 


Red Cell Distribution Width


 19.5 %


(11.5-14.5) 


 


 





 


Platelet Count


 448 x10^3/uL


(140-400) 


 


 





 


Glucose (Fingerstick)


 


 216 mg/dL


(70-99) 


 199 mg/dL


(70-99)


 


O2 Saturation   82 % (92-99)  


 


Arterial Blood pH


 


 


 7.36


(7.35-7.45) 





 


Arterial Blood pCO2 at


Patient Temp 


 


 39 mmHg


(35-46) 





 


Arterial Blood pO2 at Patient


Temp 


 


 53 mmHg


() 





 


Arterial Blood HCO3


 


 


 22 mmol/L


(21-28) 





 


Arterial Blood Base Excess


 


 


 -4 mmol/L


(-3-3) 





 


FiO2   21  


 


Test


 6/6/20


18:24 6/7/20


00:27 6/7/20


05:15 6/7/20


06:50


 


White Blood Count


 18.7 x10^3/uL


(4.0-11.0) 


 20.0 x10^3/uL


(4.0-11.0) 





 


Red Blood Count


 3.07 x10^6/uL


(3.50-5.40) 


 3.06 x10^6/uL


(3.50-5.40) 





 


Hemoglobin


 9.1 g/dL


(12.0-15.5) 


 8.7 g/dL


(12.0-15.5) 





 


Hematocrit


 27.2 %


(36.0-47.0) 


 27.3 %


(36.0-47.0) 





 


Mean Corpuscular Volume 89 fL ()   89 fL ()  


 


Mean Corpuscular Hemoglobin 30 pg (25-35)   29 pg (25-35)  


 


Mean Corpuscular Hemoglobin


Concent 33 g/dL


(31-37) 


 32 g/dL


(31-37) 





 


Red Cell Distribution Width


 18.0 %


(11.5-14.5) 


 18.5 %


(11.5-14.5) 





 


Platelet Count


 476 x10^3/uL


(140-400) 


 451 x10^3/uL


(140-400) 





 


Glucose (Fingerstick)


 


 247 mg/dL


(70-99) 


 





 


Neutrophils (%) (Auto)   88 % (31-73)  


 


Lymphocytes (%) (Auto)   8 % (24-48)  


 


Monocytes (%) (Auto)   3 % (0-9)  


 


Eosinophils (%) (Auto)   0 % (0-3)  


 


Basophils (%) (Auto)   0 % (0-3)  


 


Neutrophils # (Auto)


 


 


 17.6 x10^3/uL


(1.8-7.7) 





 


Lymphocytes # (Auto)


 


 


 1.7 x10^3/uL


(1.0-4.8) 





 


Monocytes # (Auto)


 


 


 0.6 x10^3/uL


(0.0-1.1) 





 


Eosinophils # (Auto)


 


 


 0.0 x10^3/uL


(0.0-0.7) 





 


Basophils # (Auto)


 


 


 0.0 x10^3/uL


(0.0-0.2) 





 


Sodium Level


 


 


 147 mmol/L


(136-145) 





 


Potassium Level


 


 


 5.3 mmol/L


(3.5-5.1) 





 


Chloride Level


 


 


 113 mmol/L


() 





 


Carbon Dioxide Level


 


 


 25 mmol/L


(21-32) 





 


Anion Gap   9 (6-14)  


 


Blood Urea Nitrogen


 


 


 74 mg/dL


(7-20) 





 


Creatinine


 


 


 1.9 mg/dL


(0.6-1.0) 





 


Estimated GFR


(Cockcroft-Gault) 


 


 28.1 


 





 


BUN/Creatinine Ratio   39 (6-20)  


 


Glucose Level


 


 


 241 mg/dL


(70-99) 





 


Calcium Level


 


 


 10.8 mg/dL


(8.5-10.1) 





 


Total Bilirubin


 


 


 0.6 mg/dL


(0.2-1.0) 





 


Aspartate Amino Transf


(AST/SGOT) 


 


 27 U/L (15-37) 


 





 


Alanine Aminotransferase


(ALT/SGPT) 


 


 20 U/L (14-59) 


 





 


Alkaline Phosphatase


 


 


 104 U/L


() 





 


Total Protein


 


 


 6.3 g/dL


(6.4-8.2) 





 


Albumin


 


 


 1.8 g/dL


(3.4-5.0) 





 


Albumin/Globulin Ratio   0.4 (1.0-1.7)  


 


Prothrombin Time


 


 


 


 16.0 SEC


(11.7-14.0)


 


Prothromb Time International


Ratio 


 


 


 1.3 (0.8-1.1) 





 


Activated Partial


Thromboplast Time 


 


 


 32 SEC (24-38) 





 


Lactic Acid Level


 


 


 


 1.7 mmol/L


(0.4-2.0)


 


Test


 6/7/20


08:08 6/7/20


11:30 6/7/20


13:20 6/7/20


18:05


 


Hemoglobin


 8.3 g/dL


(12.0-15.5) 


 9.6 g/dL


(12.0-15.5) 





 


Hematocrit


 26.0 %


(36.0-47.0) 


 28.9 %


(36.0-47.0) 





 


Mean Corpuscular Hemoglobin


Concent 32 g/dL


(31-37) 


 33 g/dL


(31-37) 





 


Urine Collection Type  Unknown   


 


Urine Color  Yellow   


 


Urine Clarity  Cloudy   


 


Urine pH  5.0 (<5.0-8.0)   


 


Urine Specific Gravity


 


 1.020


(1.000-1.030) 


 





 


Urine Protein


 


 30 mg/dL


(NEG-TRACE) 


 





 


Urine Glucose (UA)


 


 Negative mg/dL


(NEG) 


 





 


Urine Ketones (Stick)


 


 Negative mg/dL


(NEG) 


 





 


Urine Blood  Negative (NEG)   


 


Urine Nitrite  Negative (NEG)   


 


Urine Bilirubin  Negative (NEG)   


 


Urine Urobilinogen Dipstick


 


 0.2 mg/dL (0.2


mg/dL) 


 





 


Urine Leukocyte Esterase  Small (NEG)   


 


Urine RBC  0 /HPF (0-2)   


 


Urine WBC


 


 11-20 /HPF


(0-4) 


 





 


Urine Squamous Epithelial


Cells 


 Mod /LPF 


 


 





 


Urine Transitional Epithelial


Cells 


 Mod /LPF 


 


 





 


Urine Amorphous Sediment  Present /HPF   


 


Urine Bacteria


 


 Few /HPF


(0-FEW) 


 





 


Urine Hyaline Casts  Many /HPF   


 


Urine Granular Casts  Many /HPF   


 


Urine Mucus  Marked /LPF   


 


Urine Yeast  Present /HPF   


 


White Blood Count


 


 


 20.9 x10^3/uL


(4.0-11.0) 





 


Red Blood Count


 


 


 3.23 x10^6/uL


(3.50-5.40) 





 


Mean Corpuscular Volume   89 fL ()  


 


Mean Corpuscular Hemoglobin   30 pg (25-35)  


 


Red Cell Distribution Width


 


 


 17.0 %


(11.5-14.5) 





 


Platelet Count


 


 


 433 x10^3/uL


(140-400) 





 


O2 Saturation    95 % (92-99) 


 


Arterial Blood pH


 


 


 


 7.25


(7.35-7.45)


 


Arterial Blood pCO2 at


Patient Temp 


 


 


 48 mmHg


(35-46)


 


Arterial Blood pO2 at Patient


Temp 


 


 


 92 mmHg


()


 


Arterial Blood HCO3


 


 


 


 21 mmol/L


(21-28)


 


Arterial Blood Base Excess


 


 


 


 -6 mmol/L


(-3-3)


 


FiO2    50 


 


Test


 6/7/20


18:30 6/7/20


18:37 6/7/20


20:45 6/8/20


00:30


 


White Blood Count


 21.2 x10^3/uL


(4.0-11.0) 


 


 19.8 x10^3/uL


(4.0-11.0)


 


Red Blood Count


 2.51 x10^6/uL


(3.50-5.40) 


 


 3.14 x10^6/uL


(3.50-5.40)


 


Hemoglobin


 7.4 g/dL


(12.0-15.5) 


 


 9.4 g/dL


(12.0-15.5)


 


Hematocrit


 22.6 %


(36.0-47.0) 


 


 28.0 %


(36.0-47.0)


 


Mean Corpuscular Volume 90 fL ()    89 fL () 


 


Mean Corpuscular Hemoglobin 30 pg (25-35)    30 pg (25-35) 


 


Mean Corpuscular Hemoglobin


Concent 33 g/dL


(31-37) 


 


 34 g/dL


(31-37)


 


Red Cell Distribution Width


 17.3 %


(11.5-14.5) 


 


 16.8 %


(11.5-14.5)


 


Platelet Count


 310 x10^3/uL


(140-400) 


 


 325 x10^3/uL


(140-400)


 


Glucose (Fingerstick)


 


 234 mg/dL


(70-99) 


 





 


O2 Saturation   97 % (92-99)  


 


Arterial Blood pH


 


 


 7.27


(7.35-7.45) 





 


Arterial Blood pCO2 at


Patient Temp 


 


 44 mmHg


(35-46) 





 


Arterial Blood pO2 at Patient


Temp 


 


 108 mmHg


() 





 


Arterial Blood HCO3


 


 


 20 mmol/L


(21-28) 





 


Arterial Blood Base Excess


 


 


 -7 mmol/L


(-3-3) 





 


FiO2   40  


 


Test


 6/8/20


00:45 6/8/20


06:50 6/8/20


06:59 6/8/20


08:15


 


Glucose (Fingerstick)


 320 mg/dL


(70-99) 


 149 mg/dL


(70-99) 





 


White Blood Count


 


 13.8 x10^3/uL


(4.0-11.0) 


 





 


Red Blood Count


 


 3.03 x10^6/uL


(3.50-5.40) 


 





 


Hemoglobin


 


 8.8 g/dL


(12.0-15.5) 


 





 


Hematocrit


 


 26.9 %


(36.0-47.0) 


 





 


Mean Corpuscular Volume  89 fL ()   


 


Mean Corpuscular Hemoglobin  29 pg (25-35)   


 


Mean Corpuscular Hemoglobin


Concent 


 33 g/dL


(31-37) 


 





 


Red Cell Distribution Width


 


 17.2 %


(11.5-14.5) 


 





 


Platelet Count


 


 311 x10^3/uL


(140-400) 


 





 


Neutrophils (%) (Auto)  88 % (31-73)   


 


Lymphocytes (%) (Auto)  7 % (24-48)   


 


Monocytes (%) (Auto)  4 % (0-9)   


 


Eosinophils (%) (Auto)  1 % (0-3)   


 


Basophils (%) (Auto)  0 % (0-3)   


 


Neutrophils # (Auto)


 


 12.1 x10^3/uL


(1.8-7.7) 


 





 


Lymphocytes # (Auto)


 


 0.9 x10^3/uL


(1.0-4.8) 


 





 


Monocytes # (Auto)


 


 0.6 x10^3/uL


(0.0-1.1) 


 





 


Eosinophils # (Auto)


 


 0.1 x10^3/uL


(0.0-0.7) 


 





 


Basophils # (Auto)


 


 0.0 x10^3/uL


(0.0-0.2) 


 





 


Segmented Neutrophils %  63 % (35-66)   


 


Band Neutrophils %  25 % (0-9)   


 


Lymphocytes %  8 % (24-48)   


 


Monocytes %  4 % (0-10)   


 


Toxic Granulation  Present   


 


Dohle Bodies  Present   


 


Platelet Estimate


 


 Adequate


(ADEQUATE) 


 





 


Anisocytosis  Slight   


 


Sodium Level


 


 151 mmol/L


(136-145) 


 





 


Potassium Level


 


 4.1 mmol/L


(3.5-5.1) 


 





 


Chloride Level


 


 118 mmol/L


() 


 





 


Carbon Dioxide Level


 


 25 mmol/L


(21-32) 


 





 


Anion Gap  8 (6-14)   


 


Blood Urea Nitrogen


 


 58 mg/dL


(7-20) 


 





 


Creatinine


 


 1.3 mg/dL


(0.6-1.0) 


 





 


Estimated GFR


(Cockcroft-Gault) 


 43.5 


 


 





 


BUN/Creatinine Ratio  45 (6-20)   


 


Glucose Level


 


 165 mg/dL


(70-99) 


 





 


Calcium Level


 


 9.9 mg/dL


(8.5-10.1) 


 





 


Total Bilirubin


 


 0.5 mg/dL


(0.2-1.0) 


 





 


Aspartate Amino Transf


(AST/SGOT) 


 15 U/L (15-37) 


 


 





 


Alanine Aminotransferase


(ALT/SGPT) 


 12 U/L (14-59) 


 


 





 


Alkaline Phosphatase


 


 95 U/L


() 


 





 


Total Protein


 


 5.3 g/dL


(6.4-8.2) 


 





 


Albumin


 


 1.5 g/dL


(3.4-5.0) 


 





 


Albumin/Globulin Ratio  0.4 (1.0-1.7)   


 


O2 Saturation    98 % (92-99) 


 


Arterial Blood pH


 


 


 


 7.34


(7.35-7.45)


 


Arterial Blood pCO2 at


Patient Temp 


 


 


 40 mmHg


(35-46)


 


Arterial Blood pO2 at Patient


Temp 


 


 


 126 mmHg


()


 


Arterial Blood HCO3


 


 


 


 21 mmol/L


(21-28)


 


Arterial Blood Base Excess


 


 


 


 -5 mmol/L


(-3-3)


 


FiO2    40% bipap 








Laboratory Tests








Test


 6/7/20


11:30 6/7/20


13:20 6/7/20


18:05 6/7/20


18:30


 


Urine Collection Type Unknown    


 


Urine Color Yellow    


 


Urine Clarity Cloudy    


 


Urine pH 5.0 (<5.0-8.0)    


 


Urine Specific Gravity


 1.020


(1.000-1.030) 


 


 





 


Urine Protein


 30 mg/dL


(NEG-TRACE) 


 


 





 


Urine Glucose (UA)


 Negative mg/dL


(NEG) 


 


 





 


Urine Ketones (Stick)


 Negative mg/dL


(NEG) 


 


 





 


Urine Blood Negative (NEG)    


 


Urine Nitrite Negative (NEG)    


 


Urine Bilirubin Negative (NEG)    


 


Urine Urobilinogen Dipstick


 0.2 mg/dL (0.2


mg/dL) 


 


 





 


Urine Leukocyte Esterase Small (NEG)    


 


Urine RBC 0 /HPF (0-2)    


 


Urine WBC


 11-20 /HPF


(0-4) 


 


 





 


Urine Squamous Epithelial


Cells Mod /LPF 


 


 


 





 


Urine Transitional Epithelial


Cells Mod /LPF 


 


 


 





 


Urine Amorphous Sediment Present /HPF    


 


Urine Bacteria


 Few /HPF


(0-FEW) 


 


 





 


Urine Hyaline Casts Many /HPF    


 


Urine Granular Casts Many /HPF    


 


Urine Mucus Marked /LPF    


 


Urine Yeast Present /HPF    


 


White Blood Count


 


 20.9 x10^3/uL


(4.0-11.0) 


 21.2 x10^3/uL


(4.0-11.0)


 


Red Blood Count


 


 3.23 x10^6/uL


(3.50-5.40) 


 2.51 x10^6/uL


(3.50-5.40)


 


Hemoglobin


 


 9.6 g/dL


(12.0-15.5) 


 7.4 g/dL


(12.0-15.5)


 


Hematocrit


 


 28.9 %


(36.0-47.0) 


 22.6 %


(36.0-47.0)


 


Mean Corpuscular Volume  89 fL ()   90 fL () 


 


Mean Corpuscular Hemoglobin  30 pg (25-35)   30 pg (25-35) 


 


Mean Corpuscular Hemoglobin


Concent 


 33 g/dL


(31-37) 


 33 g/dL


(31-37)


 


Red Cell Distribution Width


 


 17.0 %


(11.5-14.5) 


 17.3 %


(11.5-14.5)


 


Platelet Count


 


 433 x10^3/uL


(140-400) 


 310 x10^3/uL


(140-400)


 


O2 Saturation   95 % (92-99)  


 


Arterial Blood pH


 


 


 7.25


(7.35-7.45) 





 


Arterial Blood pCO2 at


Patient Temp 


 


 48 mmHg


(35-46) 





 


Arterial Blood pO2 at Patient


Temp 


 


 92 mmHg


() 





 


Arterial Blood HCO3


 


 


 21 mmol/L


(21-28) 





 


Arterial Blood Base Excess


 


 


 -6 mmol/L


(-3-3) 





 


FiO2   50  


 


Test


 6/7/20


18:37 6/7/20


20:45 6/8/20


00:30 6/8/20


00:45


 


Glucose (Fingerstick)


 234 mg/dL


(70-99) 


 


 320 mg/dL


(70-99)


 


O2 Saturation  97 % (92-99)   


 


Arterial Blood pH


 


 7.27


(7.35-7.45) 


 





 


Arterial Blood pCO2 at


Patient Temp 


 44 mmHg


(35-46) 


 





 


Arterial Blood pO2 at Patient


Temp 


 108 mmHg


() 


 





 


Arterial Blood HCO3


 


 20 mmol/L


(21-28) 


 





 


Arterial Blood Base Excess


 


 -7 mmol/L


(-3-3) 


 





 


FiO2  40   


 


White Blood Count


 


 


 19.8 x10^3/uL


(4.0-11.0) 





 


Red Blood Count


 


 


 3.14 x10^6/uL


(3.50-5.40) 





 


Hemoglobin


 


 


 9.4 g/dL


(12.0-15.5) 





 


Hematocrit


 


 


 28.0 %


(36.0-47.0) 





 


Mean Corpuscular Volume   89 fL ()  


 


Mean Corpuscular Hemoglobin   30 pg (25-35)  


 


Mean Corpuscular Hemoglobin


Concent 


 


 34 g/dL


(31-37) 





 


Red Cell Distribution Width


 


 


 16.8 %


(11.5-14.5) 





 


Platelet Count


 


 


 325 x10^3/uL


(140-400) 





 


Test


 6/8/20


06:50 6/8/20


06:59 6/8/20


08:15 





 


White Blood Count


 13.8 x10^3/uL


(4.0-11.0) 


 


 





 


Red Blood Count


 3.03 x10^6/uL


(3.50-5.40) 


 


 





 


Hemoglobin


 8.8 g/dL


(12.0-15.5) 


 


 





 


Hematocrit


 26.9 %


(36.0-47.0) 


 


 





 


Mean Corpuscular Volume 89 fL ()    


 


Mean Corpuscular Hemoglobin 29 pg (25-35)    


 


Mean Corpuscular Hemoglobin


Concent 33 g/dL


(31-37) 


 


 





 


Red Cell Distribution Width


 17.2 %


(11.5-14.5) 


 


 





 


Platelet Count


 311 x10^3/uL


(140-400) 


 


 





 


Neutrophils (%) (Auto) 88 % (31-73)    


 


Lymphocytes (%) (Auto) 7 % (24-48)    


 


Monocytes (%) (Auto) 4 % (0-9)    


 


Eosinophils (%) (Auto) 1 % (0-3)    


 


Basophils (%) (Auto) 0 % (0-3)    


 


Neutrophils # (Auto)


 12.1 x10^3/uL


(1.8-7.7) 


 


 





 


Lymphocytes # (Auto)


 0.9 x10^3/uL


(1.0-4.8) 


 


 





 


Monocytes # (Auto)


 0.6 x10^3/uL


(0.0-1.1) 


 


 





 


Eosinophils # (Auto)


 0.1 x10^3/uL


(0.0-0.7) 


 


 





 


Basophils # (Auto)


 0.0 x10^3/uL


(0.0-0.2) 


 


 





 


Segmented Neutrophils % 63 % (35-66)    


 


Band Neutrophils % 25 % (0-9)    


 


Lymphocytes % 8 % (24-48)    


 


Monocytes % 4 % (0-10)    


 


Toxic Granulation Present    


 


Dohle Bodies Present    


 


Platelet Estimate


 Adequate


(ADEQUATE) 


 


 





 


Anisocytosis Slight    


 


Sodium Level


 151 mmol/L


(136-145) 


 


 





 


Potassium Level


 4.1 mmol/L


(3.5-5.1) 


 


 





 


Chloride Level


 118 mmol/L


() 


 


 





 


Carbon Dioxide Level


 25 mmol/L


(21-32) 


 


 





 


Anion Gap 8 (6-14)    


 


Blood Urea Nitrogen


 58 mg/dL


(7-20) 


 


 





 


Creatinine


 1.3 mg/dL


(0.6-1.0) 


 


 





 


Estimated GFR


(Cockcroft-Gault) 43.5 


 


 


 





 


BUN/Creatinine Ratio 45 (6-20)    


 


Glucose Level


 165 mg/dL


(70-99) 


 


 





 


Calcium Level


 9.9 mg/dL


(8.5-10.1) 


 


 





 


Total Bilirubin


 0.5 mg/dL


(0.2-1.0) 


 


 





 


Aspartate Amino Transf


(AST/SGOT) 15 U/L (15-37) 


 


 


 





 


Alanine Aminotransferase


(ALT/SGPT) 12 U/L (14-59) 


 


 


 





 


Alkaline Phosphatase


 95 U/L


() 


 


 





 


Total Protein


 5.3 g/dL


(6.4-8.2) 


 


 





 


Albumin


 1.5 g/dL


(3.4-5.0) 


 


 





 


Albumin/Globulin Ratio 0.4 (1.0-1.7)    


 


Glucose (Fingerstick)


 


 149 mg/dL


(70-99) 


 





 


O2 Saturation   98 % (92-99)  


 


Arterial Blood pH


 


 


 7.34


(7.35-7.45) 





 


Arterial Blood pCO2 at


Patient Temp 


 


 40 mmHg


(35-46) 





 


Arterial Blood pO2 at Patient


Temp 


 


 126 mmHg


() 





 


Arterial Blood HCO3


 


 


 21 mmol/L


(21-28) 





 


Arterial Blood Base Excess


 


 


 -5 mmol/L


(-3-3) 





 


FiO2   40% bipap  








Medications





Active Scripts








 Medications  Dose


 Route/Sig


 Max Daily Dose Days Date Category


 


 Bisoprolol


 Fumarate 5 Mg


 Tablet  10 Mg


 PO DAILY


   3/16/20 Reported








Comments


CXR 6/6/2020








IMPRESSION: Lines and tubes as described above. Left greater than right 


lower lobe opacities most likely pulmonary edema and left greater than 


right mild pleural effusions are stable. Superimposed left lower lobe 


pneumonia is not excluded.





ct abdomen /pelvis 6/6


1. Removal of the percutaneous pigtail drainage catheters since the prior 


exam. Sequela of pancreatitis with extensive pseudocysts again 


demonstrated, the right-sided collections are slightly larger since the 


prior exam, the left-sided collections are stable. See above.


2. Moderate to large left pleural effusion with atelectasis and collapse 


of most of the left lower lobe, stable. Small right pleural effusion is 


stable.


3. Gallstone.





Impression


.


IMPRESSION:


1.  Acute hypoxemic respiratory failure secondary to ARDS status post trach, 

developed anemia 6/7, blood drainage from RLQ abdomen drain site, and 

surrounding firmness  / developed septic shock 6/7 from abdomen source, required

levo 6/7


s/p 3 new drains 6/7 with brown color drainage,was placed on AVAPS 6/7 post 

procedure after receiving narcotics. much better now


was On low dose levo for hypotension, now off. received aggressive IVF, 

improving renal function and HR/ BP





2.  Gallstone pancreatitis, now with ongoing bleeding from prior drain. Anemic. 

s/p Tx 4 units, .


3.  septic shock, recurrent 6/7, source abdomen. , off levo now, much better


4.  Acute kidney injury-, Off HD--renal function decling. suspect JED on CKD due

to hypotension , improved now


5.  Acute gallstone pancreatitis.


6.  Hypoalbuminemia.


7.  Moderate persistent effusions, s/p left thora  5/12, reaccumulation of left 

effusion. O2 requirement not changed. will benefit from repeat thora in am


8.  New Fever- ,hypotension. suspect recurrent sepsis/ likely pancreatic source.

 Per ID, per surgery--


9.  Chronic anemia-- ongoing / s/p PRBC


10. Covid 19 testing negative


11. Moderate to large ascites-S/P paracentisis


12.S/P paracentisis with 4 liters removed on 4/15/20


13. S/P IR drain placement on 5/8/2020, removal


14. Depression/Anxiety 


1





Plan


.


1. dc AVAPS . place back on trach shield--  PMV/capping as tolerated/ prn 

suction.


2. Follow all cultures


3. Follow surgery recs-- Acute pancreatitis with persistent necrosis ---- 4/27 

status post ROBERT drain placement + C paropsilosis; 5/6 + yeast & high amylase; s/p

additional drains on 5/8, removal of drains and now increase pseudocyst and 

increase fluid collection. likely infected fluid/ sepsis. Initiated BS abx.


4. Follow ID recs for ABX-- 


5. Follow nephrology recs --- 


6. Continue TPN 


7. monitor HGB,  4 units since 6/6


8. hold off on thoracentesis . effusion small to moderate , monitor for now


9. Check amalyse and lipase,


10. s/p  thoracentesis, 5/12, 3 litres removed


11. d/w IR in detail


     cct 40 min





DVT/GI PPX: / protonix--- holding lovenox 2/2 anemia


PT/OT


D/W SHARYN STEWART MD                  Jun 8, 2020 11:28 <--- Click to Launch ICDx for PreOp, PostOp and Procedure

## 2020-06-10 ENCOUNTER — APPOINTMENT (OUTPATIENT)
Dept: ENDOCRINOLOGY | Facility: CLINIC | Age: 50
End: 2020-06-10
Payer: COMMERCIAL

## 2020-06-10 PROCEDURE — G0108 DIAB MANAGE TRN  PER INDIV: CPT

## 2020-06-17 ENCOUNTER — APPOINTMENT (OUTPATIENT)
Dept: FAMILY MEDICINE | Facility: CLINIC | Age: 50
End: 2020-06-17
Payer: COMMERCIAL

## 2020-06-17 VITALS
SYSTOLIC BLOOD PRESSURE: 141 MMHG | DIASTOLIC BLOOD PRESSURE: 88 MMHG | HEART RATE: 68 BPM | BODY MASS INDEX: 35.55 KG/M2 | HEIGHT: 69 IN | OXYGEN SATURATION: 97 % | TEMPERATURE: 99.1 F | WEIGHT: 240 LBS

## 2020-06-17 VITALS — TEMPERATURE: 98.4 F

## 2020-06-17 LAB — CYTOLOGY CVX/VAG DOC THIN PREP: NORMAL

## 2020-06-17 PROCEDURE — 36415 COLL VENOUS BLD VENIPUNCTURE: CPT

## 2020-06-17 PROCEDURE — G0447 BEHAVIOR COUNSEL OBESITY 15M: CPT | Mod: NC

## 2020-06-17 PROCEDURE — 99396 PREV VISIT EST AGE 40-64: CPT | Mod: 25

## 2020-06-17 NOTE — ASSESSMENT
[FreeTextEntry1] : 50 y/o F s/p Kidney transplant 4/2018 presents today for CPE:\par \par HCM:\par -Complete lab done 6/2/20\par -TSH, Lipid profile and COVID antibody today.\par \par S/P kidney transplant 4/2018:\par -F/up w/ nephrology.\par -on Envarsus, Mycophenolate and prednisone\par \par Diabetes post Renal transplant:\par -Seen by Endocrinology, Dr bermeo.\par -On Humalog sliding scale and Lantus\par -On Glimepiride\par \par Anxiety:\par -on Clonazepam\par -I-STOP check.\par -Refill #60\par \par HTN;\par -on Losartan and carvedilol.\par \par GERD;\par -on famotidine\par \par Menorrhagia:\par -s/p Bxp w/ gyn.: benign\par -s/p IUD mirena placed.\par -f/up w/ gyn.\par \par

## 2020-06-17 NOTE — PHYSICAL EXAM
[No Acute Distress] : no acute distress [Well Nourished] : well nourished [Well-Appearing] : well-appearing [Well Developed] : well developed [Normal Sclera/Conjunctiva] : normal sclera/conjunctiva [PERRL] : pupils equal round and reactive to light [EOMI] : extraocular movements intact [Normal Outer Ear/Nose] : the outer ears and nose were normal in appearance [Normal Oropharynx] : the oropharynx was normal [No JVD] : no jugular venous distention [Supple] : supple [Thyroid Normal, No Nodules] : the thyroid was normal and there were no nodules present [No Lymphadenopathy] : no lymphadenopathy [No Respiratory Distress] : no respiratory distress  [Clear to Auscultation] : lungs were clear to auscultation bilaterally [Normal Rate] : normal rate  [No Accessory Muscle Use] : no accessory muscle use [Regular Rhythm] : with a regular rhythm [Normal S1, S2] : normal S1 and S2 [No Murmur] : no murmur heard [No Carotid Bruits] : no carotid bruits [No Abdominal Bruit] : a ~M bruit was not heard ~T in the abdomen [No Varicosities] : no varicosities [Pedal Pulses Present] : the pedal pulses are present [No Edema] : there was no peripheral edema [No Extremity Clubbing/Cyanosis] : no extremity clubbing/cyanosis [Soft] : abdomen soft [No Palpable Aorta] : no palpable aorta [Non Tender] : non-tender [Non-distended] : non-distended [No Masses] : no abdominal mass palpated [No HSM] : no HSM [Normal Bowel Sounds] : normal bowel sounds [Normal Posterior Cervical Nodes] : no posterior cervical lymphadenopathy [Normal Anterior Cervical Nodes] : no anterior cervical lymphadenopathy [No Spinal Tenderness] : no spinal tenderness [No CVA Tenderness] : no CVA  tenderness [No Joint Swelling] : no joint swelling [Grossly Normal Strength/Tone] : grossly normal strength/tone [Normal Gait] : normal gait [No Rash] : no rash [Coordination Grossly Intact] : coordination grossly intact [No Focal Deficits] : no focal deficits [Deep Tendon Reflexes (DTR)] : deep tendon reflexes were 2+ and symmetric [Normal Affect] : the affect was normal [Normal Insight/Judgement] : insight and judgment were intact

## 2020-06-17 NOTE — HEALTH RISK ASSESSMENT
[Fair] :  ~his/her~ mood as fair [No falls in past year] : Patient reported no falls in the past year [No] : In the past 12 months have you used drugs other than those required for medical reasons? No [2] : 2) Feeling down, depressed, or hopeless for more than half of the days (2) [Patient reported mammogram was normal] : Patient reported mammogram was normal [Patient reported PAP Smear was normal] : Patient reported PAP Smear was normal [HIV test declined] : HIV test declined [Hepatitis C test declined] : Hepatitis C test declined [Alone] : lives alone [None] : None [] :  [Employed] : employed [Sexually Active] : sexually active [Fully functional (bathing, dressing, toileting, transferring, walking, feeding)] : Fully functional (bathing, dressing, toileting, transferring, walking, feeding) [Fully functional (using the telephone, shopping, preparing meals, housekeeping, doing laundry, using] : Fully functional and needs no help or supervision to perform IADLs (using the telephone, shopping, preparing meals, housekeeping, doing laundry, using transportation, managing medications and managing finances) [Smoke Detector] : smoke detector [Seat Belt] :  uses seat belt [With Patient/Caregiver] : With Patient/Caregiver [Name: ___] : Health Care Proxy's Name: [unfilled]  [Designated Healthcare Proxy] : Designated healthcare proxy [Relationship: ___] : Relationship: [unfilled] [] : No [de-identified] : quit 4/2018 [de-identified] : no [Language] : denies difficulty with language [Change in mental status noted] : No change in mental status noted [Handling Complex Tasks] : denies difficulty handling complex tasks [Reports changes in hearing] : Reports no changes in hearing [Reports changes in vision] : Reports no changes in vision [Reports changes in dental health] : Reports no changes in dental health [MammogramDate] : 3/2019 [PapSmearDate] : 3/2019 [BoneDensityComments] : never done [PapSmearComments] : Gyn: Dr Finn [ColonoscopyComments] : never done [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

## 2020-06-17 NOTE — PAST MEDICAL HISTORY
[Menstruating] : menstruating [unknown] : the patient is unsure of the date of her LMP [Irregular Cycle Intervals] : are  irregular [Total Preg ___] : G[unfilled] [Living ___] : Living: [unfilled] [de-identified] : methrorragia

## 2020-06-17 NOTE — HISTORY OF PRESENT ILLNESS
[FreeTextEntry1] : Annual physical [de-identified] : 48 y/o F with hx Kidney transplant 1 4/2018, on Immunosuppressant therapy  presents today for annual physical.\par Pt seen regularly by Nephrology./ Transplant team.\par Seen by Gyn for evaluation of vaginal bleeding. Bxp done. 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

## 2020-06-17 NOTE — COUNSELING
[Fall prevention counseling provided] : Fall prevention counseling provided [Behavioral health counseling provided] : Behavioral health counseling provided [Benefits of weight loss discussed] : Benefits of weight loss discussed [Potential consequences of obesity discussed] : Potential consequences of obesity discussed [Encouraged to maintain food diary] : Encouraged to maintain food diary [Encouraged to increase physical activity] : Encouraged to increase physical activity [Target Wt Loss Goal ___] : Weight Loss Goals: Target weight loss goal [unfilled] lbs [____ min/wk Activity] : [unfilled] min/wk activity [None] : None [Good understanding] : Patient has a good understanding of lifestyle changes and steps needed to achieve self management goal [FreeTextEntry4] : 15

## 2020-06-18 LAB
CHOLEST SERPL-MCNC: 180 MG/DL
CHOLEST/HDLC SERPL: 5.1 RATIO
HDLC SERPL-MCNC: 35 MG/DL
LDLC SERPL CALC-MCNC: 106 MG/DL
TRIGL SERPL-MCNC: 193 MG/DL
TSH SERPL-ACNC: 3.29 UIU/ML

## 2020-06-19 LAB
SARS-COV-2 IGG SERPL IA-ACNC: 0.1 INDEX
SARS-COV-2 IGG SERPL QL IA: NEGATIVE
STONE COMMENTS: NORMAL
STONE, AMMONIUM URINE: 28 MEQ/DY
STONE, BRUSHITE: 0.58
STONE, CALCIUM OXALATE: 1.57
STONE, CALCIUM URINE: 135 MG/DAY
STONE, CITRATE URINE: 262 MG/DAY
STONE, CREATININE URINE: 1340 MG/DAY
STONE, MAGNESIUM URINE: 137 MG/DAY
STONE, OXALATE URINE: 34 MG/DAY
STONE, PH URINE: 5.3
STONE, PHOSPHOROUS URINE: 1529 MG/DAY
STONE, POTASSIUM URINE: 52 MEQ/DY
STONE, SODIUM URATE: 0.97
STONE, SODIUM URINE: 83 MEQ/DY
STONE, STRUVITE: 0.09
STONE, SULFATE URINE: 12 MMOL/DAY
STONE, URIC ACID URINE: 4.83
STONE, URIC ACID URINE: 570 MG/DAY
SUPER SATURATION INDEX WITH RESPECT TO: NORMAL
SUSPECTED PROBLEM IS: NORMAL
THE PATIENT HAS: NORMAL
TOTAL VOLUME URINE: 1.39 CD:134269781

## 2020-06-19 RX ORDER — PEN NEEDLE, DIABETIC 29 G X1/2"
31G X 5 MM NEEDLE, DISPOSABLE MISCELLANEOUS
Qty: 4 | Refills: 3 | Status: DISCONTINUED | COMMUNITY
Start: 2020-01-14 | End: 2020-06-19

## 2020-06-19 RX ORDER — FLASH GLUCOSE SENSOR
KIT MISCELLANEOUS
Qty: 6 | Refills: 1 | Status: DISCONTINUED | COMMUNITY
Start: 2020-01-14 | End: 2020-06-19

## 2020-06-19 NOTE — ASSESSMENT
[FreeTextEntry1] : PLAN:\par Mediations reviewed - continue current treatment\par New labs ordered\par Maintain adequate hydration. Work on improving diet, diabetes control. Follow with Endo.\par Lower extremity edema: continue wearing compression stockings, elevate legs at end of day. Low sodium intake\par F/U in office after visit with Transplant in August\par Call with any questions/concern\par \par ADDENDUM:\par UA - many Ca oxalate crystals\par increase daily hydration >2L\par

## 2020-06-19 NOTE — HISTORY OF PRESENT ILLNESS
[FreeTextEntry1] : Telehealth visit\par \par Last seen by Nephrology in February 2020\par LRRT 4/2/18\par \par PCP: Ivette Gurrola\par Endocrinologist: Dr. Berman\par \par PMH: kidney stones, HTN, HLD, PCKD, Met. acidosis, Vitamin D deficiency\par \par PCKD s/p LURT from ex- and bilateral native nephrectomies on 4/2/18.\par Course was complicated with slow function assumed to be early ACR (low tacro levels) treated with solumedrol 750mgs. Pt also had C-diff treated with vancomycin.\par \par Saw transplant team on February 06, 2020. Plan to see transplant again in August 2020.\par \par Recently diagnosed with NODAT, following with endocrinology. 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 She was tested for COVID-19 on 04/28- result was negative.\par \par She has not monitoring her blood sugar levels as frequently as she should and has not been eating as well as she would like or drinking as much water as she should. She occasionally has headaches She knows she needs to improve her diet and diabetes monitoring. She has also not been able to attend yoga classes like she wanted to. Still having some issues with blurry vision but vision has improved since last visit.\par \par Patient has noticed some swelling in her lower extremities which has improved over the past three days after wearing compression stockings to work.\par \par Patient feels well and has no physical complaints during this visit.\par \par Unable to perform complete physical exam due to telehealth limitations.\par \par \par \par

## 2020-06-24 ENCOUNTER — APPOINTMENT (OUTPATIENT)
Dept: OBGYN | Facility: CLINIC | Age: 50
End: 2020-06-24
Payer: COMMERCIAL

## 2020-06-24 ENCOUNTER — LABORATORY RESULT (OUTPATIENT)
Age: 50
End: 2020-06-24

## 2020-06-24 VITALS
WEIGHT: 240 LBS | BODY MASS INDEX: 35.55 KG/M2 | SYSTOLIC BLOOD PRESSURE: 142 MMHG | HEIGHT: 69 IN | DIASTOLIC BLOOD PRESSURE: 82 MMHG

## 2020-06-24 DIAGNOSIS — Z94.0 OTHER LONG TERM (CURRENT) DRUG THERAPY: ICD-10-CM

## 2020-06-24 DIAGNOSIS — Z86.39 PERSONAL HISTORY OF OTHER ENDOCRINE, NUTRITIONAL AND METABOLIC DISEASE: ICD-10-CM

## 2020-06-24 DIAGNOSIS — Q61.3 POLYCYSTIC KIDNEY, UNSPECIFIED: ICD-10-CM

## 2020-06-24 DIAGNOSIS — Z12.31 ENCOUNTER FOR SCREENING MAMMOGRAM FOR MALIGNANT NEOPLASM OF BREAST: ICD-10-CM

## 2020-06-24 DIAGNOSIS — Z79.899 OTHER LONG TERM (CURRENT) DRUG THERAPY: ICD-10-CM

## 2020-06-24 DIAGNOSIS — R82.90 UNSPECIFIED ABNORMAL FINDINGS IN URINE: ICD-10-CM

## 2020-06-24 DIAGNOSIS — Z87.19 PERSONAL HISTORY OF OTHER DISEASES OF THE DIGESTIVE SYSTEM: ICD-10-CM

## 2020-06-24 LAB
DATE COLLECTED: NORMAL
HEMOCCULT SP1 STL QL: NEGATIVE

## 2020-06-24 PROCEDURE — 99396 PREV VISIT EST AGE 40-64: CPT

## 2020-06-24 PROCEDURE — 82270 OCCULT BLOOD FECES: CPT

## 2020-06-24 NOTE — PHYSICAL EXAM
[Awake] : awake [Mass] : no breast mass [Acute Distress] : no acute distress [Alert] : alert [Nipple Discharge] : no nipple discharge [Soft] : soft [Axillary LAD] : no axillary lymphadenopathy [Distended] : not distended [Tender] : non tender [Depressed Mood] : not depressed [Oriented x3] : oriented to person, place, and time [Flat Affect] : affect not flat [Normal] : uterus [Labia Minora] : labia minora [No Bleeding] : there was no active vaginal bleeding [Labia Majora] : labia major [IUD String] : had an IUD string protruding out [Pap Obtained] : a Pap smear was performed [No Tenderness] : no rectal tenderness [Uterine Adnexae] : were not tender and not enlarged [Tenderness] : nontender [Occult Blood] : occult blood test from digital rectal exam was negative

## 2020-06-24 NOTE — HISTORY OF PRESENT ILLNESS
[Last Pap ___] : Last cervical pap smear was [unfilled] [Last Mammogram ___] : Last Mammogram was [unfilled] [Pregnancy History] : pregnancy history: [Perimenopausal] : is perimenopausal [Definite:  ___ (Date)] : the last menstrual period was [unfilled] [Menarche Age: ____] : age at menarche was [unfilled] [Sexually Active] : is sexually active [Monogamous] : is monogamous [Male ___] : [unfilled] male [No] : no [Burning] : no burning [Itching] : no itching [Mass] : no mass [Stinging] : no stinging [Lesion] : no lesion [Soreness] : no soreness [Discharge] : no discharge [Diffused Pain] : no diffused pain [Localized Pain] : no localized pain [Mass (___cm)] : no palpable mass [Nipple Discharge] : no nipple discharge [Skin Color Change] : no skin color change [Hot Flashes] : no hot flashes [Night Sweats] : no night sweats [Vaginal Itching] : no vaginal itching [Dyspareunia] : no dyspareunia [Mood Changes] : no mood changes [Contraception] : does not use contraception

## 2020-06-24 NOTE — REVIEW OF SYSTEMS
[Nl] : Integumentary [Fever] : no fever [Chills] : no chills [Heart Rate Is Fast] : the heart rate was not fast [Chest Pain] : no chest pain [Palpitations] : no palpitations [Dyspnea] : no shortness of breath [Abdominal Pain] : no abdominal pain [Cough] : no cough [SOB on Exertion] : no shortness of breath during exertion [Abn Vag Bleeding] : no abnormal vaginal bleeding [Vomiting] : no vomiting [Constipation] : no constipation

## 2020-06-26 ENCOUNTER — TRANSCRIPTION ENCOUNTER (OUTPATIENT)
Age: 50
End: 2020-06-26

## 2020-06-27 ENCOUNTER — TRANSCRIPTION ENCOUNTER (OUTPATIENT)
Age: 50
End: 2020-06-27

## 2020-06-28 ENCOUNTER — TRANSCRIPTION ENCOUNTER (OUTPATIENT)
Age: 50
End: 2020-06-28

## 2020-06-29 ENCOUNTER — TRANSCRIPTION ENCOUNTER (OUTPATIENT)
Age: 50
End: 2020-06-29

## 2020-07-15 ENCOUNTER — RESULT REVIEW (OUTPATIENT)
Age: 50
End: 2020-07-15

## 2020-07-15 ENCOUNTER — OUTPATIENT (OUTPATIENT)
Dept: OUTPATIENT SERVICES | Facility: HOSPITAL | Age: 50
LOS: 1 days | End: 2020-07-15
Payer: COMMERCIAL

## 2020-07-15 ENCOUNTER — APPOINTMENT (OUTPATIENT)
Dept: MAMMOGRAPHY | Facility: CLINIC | Age: 50
End: 2020-07-15
Payer: COMMERCIAL

## 2020-07-15 ENCOUNTER — APPOINTMENT (OUTPATIENT)
Dept: ULTRASOUND IMAGING | Facility: CLINIC | Age: 50
End: 2020-07-15
Payer: COMMERCIAL

## 2020-07-15 DIAGNOSIS — Z98.890 OTHER SPECIFIED POSTPROCEDURAL STATES: Chronic | ICD-10-CM

## 2020-07-15 DIAGNOSIS — I77.0 ARTERIOVENOUS FISTULA, ACQUIRED: Chronic | ICD-10-CM

## 2020-07-15 DIAGNOSIS — Z94.0 KIDNEY TRANSPLANT STATUS: Chronic | ICD-10-CM

## 2020-07-15 DIAGNOSIS — Z00.00 ENCOUNTER FOR GENERAL ADULT MEDICAL EXAMINATION WITHOUT ABNORMAL FINDINGS: ICD-10-CM

## 2020-07-15 DIAGNOSIS — R92.8 OTHER ABNORMAL AND INCONCLUSIVE FINDINGS ON DIAGNOSTIC IMAGING OF BREAST: ICD-10-CM

## 2020-07-15 DIAGNOSIS — Z90.49 ACQUIRED ABSENCE OF OTHER SPECIFIED PARTS OF DIGESTIVE TRACT: Chronic | ICD-10-CM

## 2020-07-15 DIAGNOSIS — Z90.5 ACQUIRED ABSENCE OF KIDNEY: Chronic | ICD-10-CM

## 2020-07-15 DIAGNOSIS — N20.0 CALCULUS OF KIDNEY: Chronic | ICD-10-CM

## 2020-07-15 DIAGNOSIS — Z98.51 TUBAL LIGATION STATUS: Chronic | ICD-10-CM

## 2020-07-15 PROCEDURE — 77063 BREAST TOMOSYNTHESIS BI: CPT | Mod: 26

## 2020-07-15 PROCEDURE — 76641 ULTRASOUND BREAST COMPLETE: CPT | Mod: 26,50

## 2020-07-15 PROCEDURE — 77067 SCR MAMMO BI INCL CAD: CPT

## 2020-07-15 PROCEDURE — 77067 SCR MAMMO BI INCL CAD: CPT | Mod: 26

## 2020-07-15 PROCEDURE — 76641 ULTRASOUND BREAST COMPLETE: CPT

## 2020-07-15 PROCEDURE — 77063 BREAST TOMOSYNTHESIS BI: CPT

## 2020-07-19 LAB
CYTOLOGY CVX/VAG DOC THIN PREP: ABNORMAL
HPV HIGH+LOW RISK DNA PNL CVX: DETECTED

## 2020-08-03 ENCOUNTER — TRANSCRIPTION ENCOUNTER (OUTPATIENT)
Age: 50
End: 2020-08-03

## 2020-08-03 LAB
25(OH)D3 SERPL-MCNC: 31 NG/ML
ALBUMIN SERPL ELPH-MCNC: 4.3 G/DL
ANION GAP SERPL CALC-SCNC: 12 MMOL/L
BASOPHILS # BLD AUTO: 0.03 K/UL
BASOPHILS NFR BLD AUTO: 0.4 %
BUN SERPL-MCNC: 21 MG/DL
CALCIUM SERPL-MCNC: 9.4 MG/DL
CHLORIDE SERPL-SCNC: 104 MMOL/L
CO2 SERPL-SCNC: 23 MMOL/L
CREAT SERPL-MCNC: 1.1 MG/DL
CREAT SPEC-SCNC: 172 MG/DL
CREAT/PROT UR: 0.1 RATIO
EOSINOPHIL # BLD AUTO: 0.17 K/UL
EOSINOPHIL NFR BLD AUTO: 2 %
GLUCOSE SERPL-MCNC: 143 MG/DL
HCT VFR BLD CALC: 38.3 %
HGB BLD-MCNC: 12.3 G/DL
IMM GRANULOCYTES NFR BLD AUTO: 0.4 %
LYMPHOCYTES # BLD AUTO: 1.97 K/UL
LYMPHOCYTES NFR BLD AUTO: 23.7 %
MAGNESIUM SERPL-MCNC: 1.8 MG/DL
MAN DIFF?: NORMAL
MCHC RBC-ENTMCNC: 32.1 GM/DL
MCHC RBC-ENTMCNC: 32.7 PG
MCV RBC AUTO: 101.9 FL
MONOCYTES # BLD AUTO: 0.7 K/UL
MONOCYTES NFR BLD AUTO: 8.4 %
NEUTROPHILS # BLD AUTO: 5.41 K/UL
NEUTROPHILS NFR BLD AUTO: 65.1 %
PHOSPHATE SERPL-MCNC: 3.6 MG/DL
PLATELET # BLD AUTO: 155 K/UL
POTASSIUM SERPL-SCNC: 4.4 MMOL/L
PROT UR-MCNC: 12 MG/DL
RBC # BLD: 3.76 M/UL
RBC # FLD: 13.3 %
SODIUM SERPL-SCNC: 139 MMOL/L
TACROLIMUS SERPL-MCNC: 5.6 NG/ML
URATE SERPL-MCNC: 7.4 MG/DL
WBC # FLD AUTO: 8.31 K/UL

## 2020-08-03 RX ORDER — BLOOD SUGAR DIAGNOSTIC
STRIP MISCELLANEOUS DAILY
Qty: 100 | Refills: 1 | Status: DISCONTINUED | COMMUNITY
Start: 2020-01-15 | End: 2020-08-03

## 2020-08-03 RX ORDER — LANCETS 30 GAUGE
EACH MISCELLANEOUS
Qty: 1 | Refills: 1 | Status: DISCONTINUED | COMMUNITY
Start: 2020-01-15 | End: 2020-08-03

## 2020-08-04 ENCOUNTER — TRANSCRIPTION ENCOUNTER (OUTPATIENT)
Age: 50
End: 2020-08-04

## 2020-08-10 ENCOUNTER — APPOINTMENT (OUTPATIENT)
Dept: NEPHROLOGY | Facility: CLINIC | Age: 50
End: 2020-08-10
Payer: COMMERCIAL

## 2020-08-10 VITALS — DIASTOLIC BLOOD PRESSURE: 80 MMHG | SYSTOLIC BLOOD PRESSURE: 140 MMHG

## 2020-08-10 PROCEDURE — 99214 OFFICE O/P EST MOD 30 MIN: CPT

## 2020-08-10 NOTE — PHYSICAL EXAM
[General Appearance - Alert] : alert [General Appearance - Well Nourished] : well nourished [Sclera] : the sclera and conjunctiva were normal [Outer Ear] : the ears and nose were normal in appearance [Extraocular Movements] : extraocular movements were intact [Neck Appearance] : the appearance of the neck was normal [Neck Cervical Mass (___cm)] : no neck mass was observed [Jugular Venous Distention Increased] : there was no jugular-venous distention [Respiration, Rhythm And Depth] : normal respiratory rhythm and effort [Exaggerated Use Of Accessory Muscles For Inspiration] : no accessory muscle use [Auscultation Breath Sounds / Voice Sounds] : lungs were clear to auscultation bilaterally [Heart Rate And Rhythm] : heart rate was normal and rhythm regular [Heart Sounds Gallop] : no gallops [Heart Sounds] : normal S1 and S2 [Heart Sounds Pericardial Friction Rub] : no pericardial rub [Murmurs] : no murmurs [Bowel Sounds] : normal bowel sounds [Abdomen Soft] : soft [Abdomen Tenderness] : non-tender [Abdomen Mass (___ Cm)] : no abdominal mass palpated [Cervical Lymph Nodes Enlarged Posterior Bilaterally] : posterior cervical [Cervical Lymph Nodes Enlarged Anterior Bilaterally] : anterior cervical [Axillary Lymph Nodes Enlarged Bilaterally] : axillary [Supraclavicular Lymph Nodes Enlarged Bilaterally] : supraclavicular [No CVA Tenderness] : no ~M costovertebral angle tenderness [Abnormal Walk] : normal gait [Nail Clubbing] : no clubbing  or cyanosis of the fingernails [Musculoskeletal - Swelling] : no joint swelling seen [Skin Color & Pigmentation] : normal skin color and pigmentation [Skin Turgor] : normal skin turgor [] : no rash [Skin Lesions] : no skin lesions [Cranial Nerves] : cranial nerves 2-12 were intact [Impaired Insight] : insight and judgment were intact [Oriented To Time, Place, And Person] : oriented to person, place, and time [Affect] : the affect was normal [Mood] : the mood was normal [FreeTextEntry1] : midline incision healed.

## 2020-08-10 NOTE — HISTORY OF PRESENT ILLNESS
[FreeTextEntry1] : Ms. Vanegas is a 47 y.o female with a history of PCKD s/p LURT from ex  and bilateral native nephrectomies on 4/2/18.\par Course was complicated with slow function assumed to be early ACR (low tacro levels) treated with solumedrol 750mgs.  Pt also had Cdiff treated with vancomycin.   \par \par Interval events:\par PT is ok, CGM in place.  Had 24 hour urine which revealed high uric acid, phosphate and low citrate.  She is now on allopurinol.  Has been gaining weight.  Still working but did not contract COVID.  No GI symptoms.

## 2020-08-10 NOTE — ASSESSMENT
[FreeTextEntry1] : 1.  Renal transplant - also with b/l native nephrectomies.  Early on likely ACR treated with solumedrol 750mgs x3.  Creatinine stable around 1.   Will repeat labs today.  \par 2.  Immunosuppression - target tacro 5-7, myfortic 360mgs BID and pred 5. Pt has had a lot of trouble with weight gain and hyperglycemia.  Therefore after discussion will lower prednisone to 2.5mgs daily.  \par 3.  HTN- controlled. \par 4.  Transaminitis - Pt saw Dr. Norton - Has fatty liver/ WATKINS.  \par 5.  Diarrhea - largely resolved.  Mild may be related to myfortic. \par 6.  NODAT - Pt not on insulin.  Sugars improving.  Following with endocrinology.  \par 7. Brain aneurysm - stable.  following with neurosurg.  \par 9. Health maintenance - discussed need for annual skin cancer screening, vaccinations and GYN care.  \par 10.  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  Native kidneys were removed.  \par \par Pt will return to see Dr. Alan and f/u with us in 6 months.  \par

## 2020-08-11 ENCOUNTER — APPOINTMENT (OUTPATIENT)
Dept: DERMATOLOGY | Facility: CLINIC | Age: 50
End: 2020-08-11

## 2020-08-16 NOTE — ED PROVIDER NOTE - CHPI ED SYMPTOMS POS
PRE-SEDATION ASSESSMENT    CONSENT  Consent for procedure and sedation obtained: Yes    MEDICAL HISTORY  Significant medical/surgical history: Yes  Past Complications with Sedation/Anesthesia: No  Significant Family History: No  Smoking History: No  Alcohol/Drug abuse: No  Possible Pregnancy (LMP): Not Applicable  Cardiac History: No  Respiratory History: No    PHYSICAL EXAM  History and Physical Reviewed: Patient has valid H&P within 30 days. I have reviewed and there are no changes.  Airway Risk History: No previous history  Airway Anatomy : Class II  Heart : Normal  Lungs : Normal  LOC/Mental Status : Normal    OTHER FINDINGS  Reviewed current medications and allergies: Yes  Pertinent lab/diagnostic test reviewed: Yes    SEDATION RISK ASSESSMENT  Risk Status ASA: Class III - Severe systemic disease, limits activity, is not incapacitated  Plan for Sedation: Moderate Sedation  EKG Monitoring: Yes    NARRATIVE FINDINGS      CONGESTION/CHILLS/BODY ACHES/COUGH/FEVER

## 2020-08-18 ENCOUNTER — APPOINTMENT (OUTPATIENT)
Dept: OBGYN | Facility: CLINIC | Age: 50
End: 2020-08-18
Payer: COMMERCIAL

## 2020-08-18 VITALS
BODY MASS INDEX: 35.55 KG/M2 | SYSTOLIC BLOOD PRESSURE: 132 MMHG | HEIGHT: 69 IN | WEIGHT: 240 LBS | DIASTOLIC BLOOD PRESSURE: 80 MMHG

## 2020-08-18 PROCEDURE — 57456 ENDOCERV CURETTAGE W/SCOPE: CPT

## 2020-08-18 NOTE — PROCEDURE
[Colposcopy] : colposcopy [Risks] : risks [Patient] : patient [Alternatives] : alternatives [Benefits] : benefits [Consent Obtained] : written consent was obtained prior to the procedure [ASCUS] : atypical squamous cells of undetermined significance [Normal Staining] : normal staining [Acetowhite ___ o'clock] : no ascetowhite changes [HPV high risk] : PCR positive for high risk HPV [Biopsies Taken: # ___] : no biopsies were taken of the cervix [ECC Done] : Endocervical curettage was performed.  [Tolerated Well] : the patient tolerated the procedure well [No Complications] : there were no complications

## 2020-08-18 NOTE — HISTORY OF PRESENT ILLNESS
[___ Month(s) Ago] : [unfilled] month(s) ago [Good] : being in good health [Last Mammogram ___] : Last Mammogram was [unfilled] [Last Pap ___] : Last cervical pap smear was [unfilled] [Reproductive Age] : is of reproductive age [Pregnancy History] : pregnancy history: [Menarche Age: ____] : age at menarche was [unfilled] [Contraception] : uses contraception [IUD] : has an intrauterine device [Tubal Occlusion] : has had a tubal occlusion [Sexually Active] : is sexually active [Male ___] : [unfilled] male [No] : no [Menstrual Problems] : reports normal menses

## 2020-08-18 NOTE — END OF VISIT
[FreeTextEntry3] : I, Martha Dawn, solely acted as scribe for Dr. Eugenie Finn on 08/18/2020 .\par All medical entries made by the Scribe were at my, Dr. Finn's, direction and personally dictated by me on 08/18/2020. I have reviewed the chart and agree that the record accurately reflects my personal performance of the history, physical exam, assessment and plan. I have also personally directed, reviewed, and agreed with the chart.

## 2020-08-18 NOTE — PHYSICAL EXAM
[Awake] : awake [Alert] : alert [Oriented x3] : oriented to person, place, and time [Normal] : cervix [Acute Distress] : no acute distress [Depressed Mood] : not depressed [Flat Affect] : affect not flat

## 2020-08-21 LAB — CORE LAB BIOPSY: NORMAL

## 2020-08-30 NOTE — PROGRESS NOTE ADULT - PROBLEM/PLAN-7
DISPLAY PLAN FREE TEXT
DISPLAY PLAN FREE TEXT
no
DISPLAY PLAN FREE TEXT

## 2020-09-01 ENCOUNTER — APPOINTMENT (OUTPATIENT)
Dept: OTOLARYNGOLOGY | Facility: CLINIC | Age: 50
End: 2020-09-01

## 2020-09-03 ENCOUNTER — LABORATORY RESULT (OUTPATIENT)
Age: 50
End: 2020-09-03

## 2020-09-03 ENCOUNTER — APPOINTMENT (OUTPATIENT)
Dept: FAMILY MEDICINE | Facility: CLINIC | Age: 50
End: 2020-09-03
Payer: COMMERCIAL

## 2020-09-03 VITALS
RESPIRATION RATE: 15 BRPM | DIASTOLIC BLOOD PRESSURE: 80 MMHG | BODY MASS INDEX: 35.55 KG/M2 | TEMPERATURE: 98.4 F | WEIGHT: 240 LBS | SYSTOLIC BLOOD PRESSURE: 118 MMHG | HEIGHT: 69 IN | OXYGEN SATURATION: 98 % | HEART RATE: 80 BPM

## 2020-09-03 PROCEDURE — 36415 COLL VENOUS BLD VENIPUNCTURE: CPT

## 2020-09-03 PROCEDURE — 99214 OFFICE O/P EST MOD 30 MIN: CPT | Mod: 25

## 2020-09-03 NOTE — ASSESSMENT
[FreeTextEntry1] : 50 y/o F s/p Kidney transplant 4/2018\par \par Confusional episodes/ Presyncope> Absent Seizure?\par -neurology evaluation schedule for 9/25/20 with Dr Kilgore.\par \par HCM:\par -Complete lab done 6/2/20\par \par S/P kidney transplant 4/2018:\par -F/up w/ nephrology.\par -on Envarsus, Mycophenolate and prednisone\par \par Diabetes post Renal transplant:\par -Seen by Endocrinology, Dr bermeo.\par -On Humalog sliding scale and Lantus\par -On Glimepiride\par \par Anxiety:\par -on Clonazepam\par -I-STOP check.\par -Refill #60\par \par HTN;\par -on Losartan and carvedilol.\par \par GERD;\par -on famotidine\par \par Menorrhagia:\par -s/p Bxp w/ gyn.: benign\par -s/p IUD mirena placed.\par -f/up w/ gyn.\par \par  \par \par  \par

## 2020-09-03 NOTE — HISTORY OF PRESENT ILLNESS
[FreeTextEntry1] : Dizziness/  [de-identified] : 50 y/o F with hx Kidney transplant 1 4/2018, on Immunosuppressant therapy, DM post transplant.\par Pt seen regularly by Nephrology./ Transplant team.\par Seen by Gyn for evaluation of vaginal bleeding. Bxp done. D/C and Mirena was placed.\par Pt presents today for evaluation of episodes of dizziness and feeling in slow motion for aprox 3 weeks, Feeling like disconnected. States episodes last for several minutes associated with weakness in LE's. denies any seizure activity. States her Glucose is always > 100 (has transdermal monitor). Only 1 episode of Hypoglycemia in the past. 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

## 2020-09-03 NOTE — PHYSICAL EXAM
[Well Nourished] : well nourished [No Acute Distress] : no acute distress [Well Developed] : well developed [Well-Appearing] : well-appearing [Normal Sclera/Conjunctiva] : normal sclera/conjunctiva [PERRL] : pupils equal round and reactive to light [Normal Outer Ear/Nose] : the outer ears and nose were normal in appearance [EOMI] : extraocular movements intact [Normal Oropharynx] : the oropharynx was normal [No Lymphadenopathy] : no lymphadenopathy [No JVD] : no jugular venous distention [Supple] : supple [Thyroid Normal, No Nodules] : the thyroid was normal and there were no nodules present [No Respiratory Distress] : no respiratory distress  [Clear to Auscultation] : lungs were clear to auscultation bilaterally [No Accessory Muscle Use] : no accessory muscle use [Normal Rate] : normal rate  [Regular Rhythm] : with a regular rhythm [Normal S1, S2] : normal S1 and S2 [No Murmur] : no murmur heard [No Carotid Bruits] : no carotid bruits [No Abdominal Bruit] : a ~M bruit was not heard ~T in the abdomen [No Varicosities] : no varicosities [Pedal Pulses Present] : the pedal pulses are present [No Edema] : there was no peripheral edema [No Palpable Aorta] : no palpable aorta [No Extremity Clubbing/Cyanosis] : no extremity clubbing/cyanosis [Soft] : abdomen soft [Non Tender] : non-tender [Non-distended] : non-distended [No Masses] : no abdominal mass palpated [No HSM] : no HSM [Normal Bowel Sounds] : normal bowel sounds [Normal Posterior Cervical Nodes] : no posterior cervical lymphadenopathy [Normal Anterior Cervical Nodes] : no anterior cervical lymphadenopathy [No CVA Tenderness] : no CVA  tenderness [No Spinal Tenderness] : no spinal tenderness [No Joint Swelling] : no joint swelling [No Rash] : no rash [Grossly Normal Strength/Tone] : grossly normal strength/tone [Coordination Grossly Intact] : coordination grossly intact [No Focal Deficits] : no focal deficits [Normal Gait] : normal gait [Normal Affect] : the affect was normal [Normal Insight/Judgement] : insight and judgment were intact

## 2020-09-04 ENCOUNTER — APPOINTMENT (OUTPATIENT)
Dept: NEUROLOGY | Facility: CLINIC | Age: 50
End: 2020-09-04
Payer: COMMERCIAL

## 2020-09-04 VITALS — BODY MASS INDEX: 35.55 KG/M2 | WEIGHT: 240 LBS | HEIGHT: 69 IN | TEMPERATURE: 98.4 F

## 2020-09-04 PROCEDURE — 99204 OFFICE O/P NEW MOD 45 MIN: CPT

## 2020-09-04 NOTE — CONSULT LETTER
[Dear  ___] : Dear  [unfilled], [Consult Closing:] : Thank you very much for allowing me to participate in the care of this patient.  If you have any questions, please do not hesitate to contact me. [Please see my note below.] : Please see my note below. [Consult Letter:] : I had the pleasure of evaluating your patient, [unfilled]. [Sincerely,] : Sincerely, [FreeTextEntry3] : Eddie Kilgore M.D., Ph.D. DPN-N\par [facility]\par Neurology at [place]\par Medical Director of Stroke Services\par [facility]\par

## 2020-09-04 NOTE — PHYSICAL EXAM
[General Appearance - Alert] : alert [General Appearance - In No Acute Distress] : in no acute distress [General Appearance - Well Nourished] : well nourished [General Appearance - Well Developed] : well developed [Person] : oriented to person [Place] : oriented to place [Time] : oriented to time [Short Term Intact] : short term memory intact [Remote Intact] : remote memory intact [Registration Intact] : recent registration memory intact [Span Intact] : the attention span was normal [Concentration Intact] : normal concentrating ability [Visual Intact] : visual attention was ~T not ~L decreased [Naming Objects] : no difficulty naming common objects [Repeating Phrases] : no difficulty repeating a phrase [Fluency] : fluency intact [Comprehension] : comprehension intact [Current Events] : adequate knowledge of current events [Past History] : adequate knowledge of personal past history [Cranial Nerves Optic (II)] : visual acuity intact bilaterally,  visual fields full to confrontation, pupils equal round and reactive to light [Cranial Nerves Oculomotor (III)] : extraocular motion intact [Cranial Nerves Trigeminal (V)] : facial sensation intact symmetrically [Cranial Nerves Facial (VII)] : face symmetrical [Cranial Nerves Vestibulocochlear (VIII)] : hearing was intact bilaterally [Cranial Nerves Glossopharyngeal (IX)] : tongue and palate midline [Cranial Nerves Accessory (XI - Cranial And Spinal)] : head turning and shoulder shrug symmetric [Cranial Nerves Hypoglossal (XII)] : there was no tongue deviation with protrusion [Motor Strength] : muscle strength was normal in all four extremities [No Muscle Atrophy] : normal bulk in all four extremities [Paresis Pronator Drift Right-Sided] : no pronator drift on the right [Paresis Pronator Drift Left-Sided] : no pronator drift on the left [Motor Strength Lower Extremities Bilaterally] : strength was normal in both lower extremities [Motor Strength Upper Extremities Bilaterally] : strength was normal in both upper extremities [Sensation Tactile Decrease] : light touch was intact [Sensation Pain / Temperature Decrease] : pain and temperature was intact [Sensation Vibration Decrease] : vibration was intact [Proprioception] : proprioception was intact [Balance] : balance was intact [Tremor] : no tremor present [Coordination - Dysmetria Impaired Finger-to-Nose Bilateral] : not present [2+] : Patella left 2+ [Sclera] : the sclera and conjunctiva were normal [PERRL With Normal Accommodation] : pupils were equal in size, round, reactive to light, with normal accommodation [Extraocular Movements] : extraocular movements were intact [No APD] : no afferent pupillary defect [Optic Disc Abnormality] : the optic disc were normal in size and color [Full Visual Field] : full visual field [No MEMO] : no internuclear ophthalmoplegia [Papilledema Of Both Eyes] : no papilledema [Edema] : there was no peripheral edema [Disc Blurred Margins Both Eyes] : sharp margins [Motor Tone] : muscle strength and tone were normal [Abnormal Walk] : normal gait [Involuntary Movements] : no involuntary movements were seen

## 2020-09-04 NOTE — ASSESSMENT
[FreeTextEntry1] : This is a 49-year-old woman with episodes where she is having conversations but feels almost out of body the at that time. This may represent complex partial seizure. It's happening 2 or 3 times per week. We may go to capture one of these episodes on a prolonged/48 hour EEG. I will make arrangements for her to have an ambulatory 48 hour EEG. I will call her when I have the results and treat her as needed. This does not sound like repetitive TIAs. I will see her back in the office any time from depending upon the results of the EEG.

## 2020-09-04 NOTE — HISTORY OF PRESENT ILLNESS
[FreeTextEntry1] : Initial office visit September 4, 2020\par ; This is a 49-year-old woman presents today for episodic altered mental status. She states she has episodes where she may be standing and talking and then feel as if she is out of body. She can sometimes hear self talking. Sometimes she feels that she is going to fall. She is not exactly sure how long these episodes last but she thinks it's brief because she doesn't fall and the person she's talking to doesn't notice it. This is been going on for several months now. She has a history of cavernous carotid aneurysm which is followed by neurosurgeon. She has a recent history of diabetes. She is status post renal transplant. She is here today for neurologic evaluation.

## 2020-09-09 LAB
ANION GAP SERPL CALC-SCNC: 13 MMOL/L
BUN SERPL-MCNC: 20 MG/DL
CALCIUM SERPL-MCNC: 9.8 MG/DL
CHLORIDE SERPL-SCNC: 101 MMOL/L
CO2 SERPL-SCNC: 23 MMOL/L
CREAT SERPL-MCNC: 1.13 MG/DL
GLUCOSE SERPL-MCNC: 189 MG/DL
MAGNESIUM SERPL-MCNC: 1.8 MG/DL
POTASSIUM SERPL-SCNC: 4.9 MMOL/L
SODIUM SERPL-SCNC: 138 MMOL/L
TACROLIMUS SERPL-MCNC: 6.8 NG/ML
URATE SERPL-MCNC: 4.5 MG/DL

## 2020-09-10 ENCOUNTER — APPOINTMENT (OUTPATIENT)
Dept: DERMATOLOGY | Facility: CLINIC | Age: 50
End: 2020-09-10
Payer: COMMERCIAL

## 2020-09-10 PROCEDURE — 17110 DESTRUCTION B9 LES UP TO 14: CPT

## 2020-09-10 PROCEDURE — 99203 OFFICE O/P NEW LOW 30 MIN: CPT | Mod: 25

## 2020-09-15 ENCOUNTER — APPOINTMENT (OUTPATIENT)
Dept: NEUROLOGY | Facility: CLINIC | Age: 50
End: 2020-09-15
Payer: COMMERCIAL

## 2020-09-15 PROCEDURE — 93040 RHYTHM ECG WITH REPORT: CPT

## 2020-09-15 PROCEDURE — 95819 EEG AWAKE AND ASLEEP: CPT

## 2020-09-16 PROCEDURE — 95700 EEG CONT REC W/VID EEG TECH: CPT

## 2020-09-16 PROCEDURE — 95708 EEG WO VID EA 12-26HR UNMNTR: CPT

## 2020-09-16 PROCEDURE — 95721 EEG PHY/QHP>36<60 HR W/O VID: CPT

## 2020-09-20 ENCOUNTER — RESULT REVIEW (OUTPATIENT)
Age: 50
End: 2020-09-20

## 2020-09-20 ENCOUNTER — OUTPATIENT (OUTPATIENT)
Dept: OUTPATIENT SERVICES | Facility: HOSPITAL | Age: 50
LOS: 1 days | End: 2020-09-20
Payer: COMMERCIAL

## 2020-09-20 ENCOUNTER — APPOINTMENT (OUTPATIENT)
Dept: MRI IMAGING | Facility: CLINIC | Age: 50
End: 2020-09-20
Payer: COMMERCIAL

## 2020-09-20 DIAGNOSIS — Z98.890 OTHER SPECIFIED POSTPROCEDURAL STATES: Chronic | ICD-10-CM

## 2020-09-20 DIAGNOSIS — Z94.0 KIDNEY TRANSPLANT STATUS: Chronic | ICD-10-CM

## 2020-09-20 DIAGNOSIS — I77.0 ARTERIOVENOUS FISTULA, ACQUIRED: Chronic | ICD-10-CM

## 2020-09-20 DIAGNOSIS — Z90.5 ACQUIRED ABSENCE OF KIDNEY: Chronic | ICD-10-CM

## 2020-09-20 DIAGNOSIS — I72.0 ANEURYSM OF CAROTID ARTERY: ICD-10-CM

## 2020-09-20 DIAGNOSIS — N20.0 CALCULUS OF KIDNEY: Chronic | ICD-10-CM

## 2020-09-20 DIAGNOSIS — Z90.49 ACQUIRED ABSENCE OF OTHER SPECIFIED PARTS OF DIGESTIVE TRACT: Chronic | ICD-10-CM

## 2020-09-20 DIAGNOSIS — Z00.00 ENCOUNTER FOR GENERAL ADULT MEDICAL EXAMINATION WITHOUT ABNORMAL FINDINGS: ICD-10-CM

## 2020-09-20 DIAGNOSIS — Z98.51 TUBAL LIGATION STATUS: Chronic | ICD-10-CM

## 2020-09-20 PROCEDURE — A9585: CPT

## 2020-09-20 PROCEDURE — 70546 MR ANGIOGRAPH HEAD W/O&W/DYE: CPT

## 2020-09-20 PROCEDURE — 70546 MR ANGIOGRAPH HEAD W/O&W/DYE: CPT | Mod: 26

## 2020-09-23 ENCOUNTER — APPOINTMENT (OUTPATIENT)
Dept: NEPHROLOGY | Facility: CLINIC | Age: 50
End: 2020-09-23
Payer: COMMERCIAL

## 2020-09-23 VITALS
BODY MASS INDEX: 36.43 KG/M2 | HEIGHT: 69 IN | DIASTOLIC BLOOD PRESSURE: 86 MMHG | SYSTOLIC BLOOD PRESSURE: 160 MMHG | HEART RATE: 79 BPM | WEIGHT: 246 LBS | OXYGEN SATURATION: 96 %

## 2020-09-23 LAB
25(OH)D3 SERPL-MCNC: 35.9 NG/ML
ALBUMIN SERPL ELPH-MCNC: 4.1 G/DL
ANION GAP SERPL CALC-SCNC: 10 MMOL/L
APPEARANCE: ABNORMAL
BACTERIA: ABNORMAL
BASOPHILS # BLD AUTO: 0.04 K/UL
BASOPHILS NFR BLD AUTO: 0.5 %
BILIRUBIN URINE: NEGATIVE
BLOOD URINE: NEGATIVE
BUN SERPL-MCNC: 21 MG/DL
CALCIUM SERPL-MCNC: 9 MG/DL
CHLORIDE SERPL-SCNC: 102 MMOL/L
CMV IGG SERPL QL: <0.2 U/ML
CMV IGG SERPL-IMP: NEGATIVE
CMV IGM SERPL QL: <8 AU/ML
CMV IGM SERPL QL: NEGATIVE
CO2 SERPL-SCNC: 24 MMOL/L
COLOR: NORMAL
CREAT SERPL-MCNC: 1.23 MG/DL
CREAT SPEC-SCNC: 90 MG/DL
CREAT/PROT UR: 0.1 RATIO
EBV EA AB SER IA-ACNC: <5 U/ML
EBV EA AB TITR SER IF: POSITIVE
EBV EA IGG SER QL IA: >600 U/ML
EBV EA IGG SER-ACNC: NEGATIVE
EBV EA IGM SER IA-ACNC: NEGATIVE
EBV PATRN SPEC IB-IMP: NORMAL
EBV VCA IGG SER IA-ACNC: 145 U/ML
EBV VCA IGM SER QL IA: <10 U/ML
EOSINOPHIL # BLD AUTO: 0.21 K/UL
EOSINOPHIL NFR BLD AUTO: 2.7 %
EPSTEIN-BARR VIRUS CAPSID ANTIGEN IGG: POSITIVE
GLUCOSE QUALITATIVE U: NEGATIVE
GLUCOSE SERPL-MCNC: 255 MG/DL
HCT VFR BLD CALC: 36.3 %
HGB BLD-MCNC: 11.8 G/DL
HYALINE CASTS: 0 /LPF
IMM GRANULOCYTES NFR BLD AUTO: 0.3 %
KETONES URINE: NEGATIVE
LEUKOCYTE ESTERASE URINE: NEGATIVE
LYMPHOCYTES # BLD AUTO: 1.96 K/UL
LYMPHOCYTES NFR BLD AUTO: 25.1 %
MAGNESIUM SERPL-MCNC: 1.5 MG/DL
MAN DIFF?: NORMAL
MCHC RBC-ENTMCNC: 32.5 GM/DL
MCHC RBC-ENTMCNC: 32.9 PG
MCV RBC AUTO: 101.1 FL
MICROSCOPIC-UA: NORMAL
MONOCYTES # BLD AUTO: 0.61 K/UL
MONOCYTES NFR BLD AUTO: 7.8 %
NEUTROPHILS # BLD AUTO: 4.96 K/UL
NEUTROPHILS NFR BLD AUTO: 63.6 %
NITRITE URINE: NEGATIVE
PH URINE: 6
PHOSPHATE SERPL-MCNC: 3.2 MG/DL
PLATELET # BLD AUTO: 172 K/UL
POTASSIUM SERPL-SCNC: 4.6 MMOL/L
PROT UR-MCNC: 6 MG/DL
PROTEIN URINE: NEGATIVE
RBC # BLD: 3.59 M/UL
RBC # FLD: 13.7 %
RED BLOOD CELLS URINE: 3 /HPF
SODIUM SERPL-SCNC: 135 MMOL/L
SPECIFIC GRAVITY URINE: 1.01
SQUAMOUS EPITHELIAL CELLS: 12 /HPF
TACROLIMUS SERPL-MCNC: 6.7 NG/ML
URATE SERPL-MCNC: 5 MG/DL
UROBILINOGEN URINE: NORMAL
WBC # FLD AUTO: 7.8 K/UL
WHITE BLOOD CELLS URINE: 2 /HPF

## 2020-09-23 PROCEDURE — 99215 OFFICE O/P EST HI 40 MIN: CPT

## 2020-09-23 RX ORDER — INSULIN GLARGINE 100 [IU]/ML
100 INJECTION, SOLUTION SUBCUTANEOUS
Refills: 3 | Status: DISCONTINUED | COMMUNITY
Start: 2020-01-14 | End: 2020-09-23

## 2020-09-23 RX ORDER — CYANOCOBALAMIN (VITAMIN B-12) 500 MCG
400 LOZENGE ORAL DAILY
Qty: 180 | Refills: 2 | Status: ACTIVE | COMMUNITY
Start: 2019-06-21

## 2020-09-23 NOTE — PHYSICAL EXAM
[General Appearance - Alert] : alert [General Appearance - In No Acute Distress] : in no acute distress [Sclera] : the sclera and conjunctiva were normal [Strabismus] : no strabismus was seen [Outer Ear] : the ears and nose were normal in appearance [Hearing Threshold Finger Rub Not [place]] : hearing was normal [Examination Of The Oral Cavity] : the lips and gums were normal [Neck Appearance] : the appearance of the neck was normal [Neck Cervical Mass (___cm)] : no neck mass was observed [Jugular Venous Distention Increased] : there was no jugular-venous distention [Respiration, Rhythm And Depth] : normal respiratory rhythm and effort [Exaggerated Use Of Accessory Muscles For Inspiration] : no accessory muscle use [Auscultation Breath Sounds / Voice Sounds] : lungs were clear to auscultation bilaterally [Apical Impulse] : the apical impulse was normal [Heart Rate And Rhythm] : heart rate was normal and rhythm regular [Heart Sounds] : normal S1 and S2 [Murmurs] : no murmurs [Pitting Edema] : pitting edema present [___ +] : bilateral [unfilled]+ pretibial pitting edema [Bowel Sounds] : normal bowel sounds [Abdomen Soft] : soft [Abdomen Tenderness] : non-tender [Abdomen Mass (___ Cm)] : no abdominal mass palpated [No CVA Tenderness] : no ~M costovertebral angle tenderness [___ (cm) Fistula] : [unfilled] (cm) fistula [Skin Color & Pigmentation] : normal skin color and pigmentation [Skin Turgor] : normal skin turgor [Skin Lesions] : no skin lesions [No Focal Deficits] : no focal deficits [Oriented To Time, Place, And Person] : oriented to person, place, and time [Impaired Insight] : insight and judgment were intact [Affect] : the affect was normal [Mood] : the mood was normal [] : normal voice and communication [Edema] : there was no peripheral edema [Nail Clubbing] : no clubbing  or cyanosis of the fingernails [Motor Tone] : muscle strength and tone were normal [FreeTextEntry1] : non-functioning

## 2020-09-23 NOTE — HISTORY OF PRESENT ILLNESS
[___ Month(s) Ago] : [unfilled] month(s) ago [Good Compliance] : good compliance with treatment [FreeTextEntry1] : 49 year old woman with PCKD s/p LURT from ex  and bilateral native nephrectomies on 4/2/18.\par Course was complicated with slow function assumed to be early ACR (low tacro levels) treated with solumedrol 750mgs. Pt also had Cdiff treated with vancomycin. 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 \par \par \par Here for follow-up visit\par \par Patient feels well today\par Has new onset episode AMS; seeing Neurology\par EEG, MRI, MRV non conclusive- aneurysm unchanged \par Assessed to have partial complex seizures; planned to start on Keppra.\par Does not have any other complaint. 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

## 2020-09-23 NOTE — ASSESSMENT
[FreeTextEntry1] : PCKD with b/l nephrectomies\par LURT in 2018: stable allograft function. UA without hematuria /proteinuria\par Continue current immunosuppressive regimen\par \par \par HTN- BP well controlled overall- high today, didn’t take her medicines\par Continue current regimen\par \par NODAT: on insulin and Glimepiride. Prednisone dose decreased to 2.5 mg\par Weight loss d be beneficial\par Endocrinology follow up\par \par \par Hypomagnesemia; mild-  in setting of tacrolimus use/ diarrhea with MMF\par Restart Mag -Oxide 400 mg daily\par \par ? partial complex seizures\par Ok to start Keprra from renal perspective\par \par RTC in 3 months\par

## 2020-09-23 NOTE — REVIEW OF SYSTEMS
[Eyesight Problems] : eyesight problems [Negative] : Heme/Lymph [de-identified] : intermittent numbness/tingling in fingers of right hand (non-functioning fistula in right arm)

## 2020-09-25 ENCOUNTER — RESULT CHARGE (OUTPATIENT)
Age: 50
End: 2020-09-25

## 2020-09-25 ENCOUNTER — LABORATORY RESULT (OUTPATIENT)
Age: 50
End: 2020-09-25

## 2020-09-25 ENCOUNTER — APPOINTMENT (OUTPATIENT)
Dept: ENDOCRINOLOGY | Facility: CLINIC | Age: 50
End: 2020-09-25
Payer: COMMERCIAL

## 2020-09-25 VITALS
HEIGHT: 69 IN | DIASTOLIC BLOOD PRESSURE: 74 MMHG | BODY MASS INDEX: 36.43 KG/M2 | HEART RATE: 71 BPM | SYSTOLIC BLOOD PRESSURE: 110 MMHG | WEIGHT: 246 LBS | OXYGEN SATURATION: 98 %

## 2020-09-25 LAB
BKV DNA SPEC QL NAA+PROBE: NOT DETECTED COPIES/ML
GLUCOSE BLDC GLUCOMTR-MCNC: 229

## 2020-09-25 PROCEDURE — 95251 CONT GLUC MNTR ANALYSIS I&R: CPT

## 2020-09-25 PROCEDURE — 99214 OFFICE O/P EST MOD 30 MIN: CPT | Mod: 25

## 2020-09-25 PROCEDURE — 82962 GLUCOSE BLOOD TEST: CPT

## 2020-09-25 RX ORDER — INSULIN LISPRO 100 [IU]/ML
100 INJECTION, SOLUTION INTRAVENOUS; SUBCUTANEOUS
Qty: 1 | Refills: 0 | Status: DISCONTINUED | COMMUNITY
Start: 2020-01-14 | End: 2020-09-25

## 2020-09-25 NOTE — REVIEW OF SYSTEMS
[SOB on Exertion] : shortness of breath on exertion [Diarrhea] : diarrhea [Polyuria] : polyuria [Polydipsia] : polydipsia [Recent Weight Gain (___ Lbs)] : no recent weight gain [Recent Weight Loss (___ Lbs)] : no recent weight loss [Chest Pain] : no chest pain [Palpitations] : no palpitations [Shortness Of Breath] : no shortness of breath [Nausea] : no nausea [Abdominal Pain] : no abdominal pain [Vomiting] : no vomiting

## 2020-09-25 NOTE — ASSESSMENT
[FreeTextEntry1] : 49 year old female with NODAT s/p kidney transplant in 2018. She is having post prandial hyperglycemia.\par -Increase sliding scale by two units.\par -Continue Dexcom. May need new transmitter to fix discrepancy between CGM and FS. Patient will call Dexcom.\par -Continue Lantus and Glimepiride.\par -Will review Dexcom again in 1-2 weeks.\par -May consider additional agents- DPPIV inhibitors vs GLP1 agonist. Will d/w Dr Berman.\par -Check A1c and C peptide now.\par -RTO for follow-up in 2-3 months.

## 2020-09-25 NOTE — HISTORY OF PRESENT ILLNESS
[FreeTextEntry1] : Type: NODAT\par Severity: moderate\par Duration: 2020\par Onset: s/p kidney transplant\par Associated symptoms: polyuria, polydipsia\par \par Modifying factors-\par Current meds for glycemic control:\par Glimepiride 4 mg daily\par Lantus 22 units daily\par Humalog sliding scale\par 150-199 = 6\par 200-249 = 8\par 250-299 = 10\par >300 = 12\par \par Diet: cut out soda and most junk food, decreased bread, trying to watch\par Weight: stable, but trying to lose\par Exercise: walking daily\par \par SMBG with Dexcom G6. Avg , SD 52 with 30% in range, 70% high. No lows. Reviewed CGMS. BG rises after meals.\par Current B, glass of milk and peanut butter 1 hour ago. 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

## 2020-09-28 ENCOUNTER — APPOINTMENT (OUTPATIENT)
Dept: DERMATOLOGY | Facility: CLINIC | Age: 50
End: 2020-09-28

## 2020-09-28 ENCOUNTER — APPOINTMENT (OUTPATIENT)
Dept: ENDOCRINOLOGY | Facility: CLINIC | Age: 50
End: 2020-09-28

## 2020-09-29 ENCOUNTER — TRANSCRIPTION ENCOUNTER (OUTPATIENT)
Age: 50
End: 2020-09-29

## 2020-09-30 ENCOUNTER — APPOINTMENT (OUTPATIENT)
Dept: NEUROSURGERY | Facility: HOSPITAL | Age: 50
End: 2020-09-30

## 2020-10-01 ENCOUNTER — TRANSCRIPTION ENCOUNTER (OUTPATIENT)
Age: 50
End: 2020-10-01

## 2020-10-06 ENCOUNTER — TRANSCRIPTION ENCOUNTER (OUTPATIENT)
Age: 50
End: 2020-10-06

## 2020-10-08 ENCOUNTER — APPOINTMENT (OUTPATIENT)
Dept: CHRONIC DISEASE MANAGEMENT | Facility: CLINIC | Age: 50
End: 2020-10-08

## 2020-10-09 ENCOUNTER — TRANSCRIPTION ENCOUNTER (OUTPATIENT)
Age: 50
End: 2020-10-09

## 2020-10-19 ENCOUNTER — TRANSCRIPTION ENCOUNTER (OUTPATIENT)
Age: 50
End: 2020-10-19

## 2020-10-20 ENCOUNTER — TRANSCRIPTION ENCOUNTER (OUTPATIENT)
Age: 50
End: 2020-10-20

## 2020-10-21 ENCOUNTER — APPOINTMENT (OUTPATIENT)
Dept: CHRONIC DISEASE MANAGEMENT | Facility: CLINIC | Age: 50
End: 2020-10-21

## 2020-10-21 ENCOUNTER — NON-APPOINTMENT (OUTPATIENT)
Age: 50
End: 2020-10-21

## 2020-11-03 ENCOUNTER — TRANSCRIPTION ENCOUNTER (OUTPATIENT)
Age: 50
End: 2020-11-03

## 2020-11-04 ENCOUNTER — RX RENEWAL (OUTPATIENT)
Age: 50
End: 2020-11-04

## 2020-11-04 RX ORDER — SEMAGLUTIDE 1.34 MG/ML
2 INJECTION, SOLUTION SUBCUTANEOUS
Qty: 3 | Refills: 1 | Status: DISCONTINUED | COMMUNITY
Start: 2020-10-01 | End: 2020-11-04

## 2020-11-05 ENCOUNTER — APPOINTMENT (OUTPATIENT)
Dept: ENDOCRINOLOGY | Facility: CLINIC | Age: 50
End: 2020-11-05
Payer: COMMERCIAL

## 2020-11-05 ENCOUNTER — EMERGENCY (EMERGENCY)
Facility: HOSPITAL | Age: 50
LOS: 0 days | Discharge: HOME | End: 2020-11-05
Payer: COMMERCIAL

## 2020-11-05 VITALS
HEIGHT: 68 IN | RESPIRATION RATE: 18 BRPM | OXYGEN SATURATION: 96 % | DIASTOLIC BLOOD PRESSURE: 59 MMHG | TEMPERATURE: 98 F | HEART RATE: 71 BPM | SYSTOLIC BLOOD PRESSURE: 123 MMHG

## 2020-11-05 VITALS
DIASTOLIC BLOOD PRESSURE: 80 MMHG | BODY MASS INDEX: 35.99 KG/M2 | HEIGHT: 69 IN | TEMPERATURE: 97.3 F | WEIGHT: 243 LBS | SYSTOLIC BLOOD PRESSURE: 122 MMHG

## 2020-11-05 DIAGNOSIS — Z88.8 ALLERGY STATUS TO OTHER DRUGS, MEDICAMENTS AND BIOLOGICAL SUBSTANCES: ICD-10-CM

## 2020-11-05 DIAGNOSIS — Z20.828 CONTACT WITH AND (SUSPECTED) EXPOSURE TO OTHER VIRAL COMMUNICABLE DISEASES: ICD-10-CM

## 2020-11-05 DIAGNOSIS — Z98.890 OTHER SPECIFIED POSTPROCEDURAL STATES: Chronic | ICD-10-CM

## 2020-11-05 DIAGNOSIS — Z98.51 TUBAL LIGATION STATUS: Chronic | ICD-10-CM

## 2020-11-05 DIAGNOSIS — Z90.49 ACQUIRED ABSENCE OF OTHER SPECIFIED PARTS OF DIGESTIVE TRACT: Chronic | ICD-10-CM

## 2020-11-05 DIAGNOSIS — Z90.5 ACQUIRED ABSENCE OF KIDNEY: Chronic | ICD-10-CM

## 2020-11-05 DIAGNOSIS — Z94.0 KIDNEY TRANSPLANT STATUS: Chronic | ICD-10-CM

## 2020-11-05 DIAGNOSIS — F41.9 ANXIETY DISORDER, UNSPECIFIED: ICD-10-CM

## 2020-11-05 DIAGNOSIS — K21.9 GASTRO-ESOPHAGEAL REFLUX DISEASE WITHOUT ESOPHAGITIS: ICD-10-CM

## 2020-11-05 DIAGNOSIS — N18.4 CHRONIC KIDNEY DISEASE, STAGE 4 (SEVERE): ICD-10-CM

## 2020-11-05 DIAGNOSIS — R43.9 UNSPECIFIED DISTURBANCES OF SMELL AND TASTE: ICD-10-CM

## 2020-11-05 DIAGNOSIS — N20.0 CALCULUS OF KIDNEY: Chronic | ICD-10-CM

## 2020-11-05 DIAGNOSIS — Z87.442 PERSONAL HISTORY OF URINARY CALCULI: ICD-10-CM

## 2020-11-05 DIAGNOSIS — Z94.0 KIDNEY TRANSPLANT STATUS: ICD-10-CM

## 2020-11-05 DIAGNOSIS — I77.0 ARTERIOVENOUS FISTULA, ACQUIRED: Chronic | ICD-10-CM

## 2020-11-05 LAB — SARS-COV-2 RNA SPEC QL NAA+PROBE: SIGNIFICANT CHANGE UP

## 2020-11-05 PROCEDURE — 95251 CONT GLUC MNTR ANALYSIS I&R: CPT

## 2020-11-05 PROCEDURE — 99283 EMERGENCY DEPT VISIT LOW MDM: CPT

## 2020-11-05 PROCEDURE — 99072 ADDL SUPL MATRL&STAF TM PHE: CPT

## 2020-11-05 PROCEDURE — 99214 OFFICE O/P EST MOD 30 MIN: CPT | Mod: 25

## 2020-11-05 PROCEDURE — U0003: CPT

## 2020-11-05 NOTE — ED STATDOCS - CLINICAL SUMMARY MEDICAL DECISION MAKING FREE TEXT BOX
patient presents with decreased tatse and concern for COVID exposure.  As patient is nontoxic appearing will test for COVID and d/c.  Quarantine reviewed and return precautions reviewed. 0

## 2020-11-05 NOTE — ED STATDOCS - OBJECTIVE STATEMENT
48 yo female presents for decreased taste. Pt was seen by her doctor due to a transplant history and on meds and advised to be tested first.

## 2020-11-05 NOTE — ED STATDOCS - PATIENT PORTAL LINK FT
You can access the FollowMyHealth Patient Portal offered by [facility] by registering at the following website: [email or web address]. By joining [employer]’s FollowMyHealth portal, you will also be able to view your health information using other applications (apps) compatible with our system.

## 2020-11-05 NOTE — ED STATDOCS - PSH
Ankle fracture - Left  brumstein barajas procedure 2000  AV fistula  right forearm extremity AV fistula radiocephalic ( 2018)  H/O shoulder surgery  left  H/O tubal ligation  (1996)  History of ventral hernia repair    Kidney calculi  cysto/lithotripsy multiple  Renal transplant recipient    S/P cholecystectomy  (2013)  Status post nephrectomy  bilateral  Tubal ligation  1995

## 2020-11-05 NOTE — ED STATDOCS - DOMESTIC TRAVEL HIGH RISK QUESTION
Message from [id number]:  Original authorizing provider: ZORAIDA DOHERTY PA-C    Carrillo Dunlap would like a refill of the following medications:  amphetamine-dextroamphetamine (ADDERALL XR) 30 MG per 24 hr capsule [ZORAIDA DOHERTY PA-C]  amphetamine-dextroamphetamine (ADDERALL XR) 20 MG per 24 hr capsule [ZORAIDA DOHERTY PA-C]    Preferred pharmacy: Other - [facility]-[address] [address]    Comment:  Valentino Hercules, I spoke with Maria Ines, the psych pharmacist on June 14th. So far so good. We though I should stay on the 30mg and 20mg Adderall and reconnect September, 6th. Maria Ines [facility] Phone: [phone] I just filled the last written prescription for the 20mg and 30mg generic adderall. Can you prepare 2 or 3 more sets for staring July 20th? FYI My new preferred pharmacy is: [facility] on [address] [address] [phone] Thanks, Nirmal   Unable to Assess

## 2020-11-05 NOTE — ED ADULT TRIAGE NOTE - CHIEF COMPLAINT QUOTE
Pt requesting COVID test due to GI symptoms. Patient evaluated, treated, and discharged by PA. Refer to PA note for assessment.  Discharge instructions given verbally and understood by patient. Self-quarantine and COVID-19 information provided to patient.

## 2020-11-05 NOTE — ED ADULT TRIAGE NOTE - CAS ELECT INFOMATION PROVIDED
DC instructions/DISCHARGE INSTRUCTIONS REVIEWED WITH PATIENT VERBALLY, PT VERBALIZED UNDERSTANDING OF DISCHARGE INSTRUCTIONS. PAPER COPY OF DISCHARGE INSTRUCTIONS GIVEN TO PATIENT WITH SELF CARLENE AND COVID 19 INFORMATION.

## 2020-11-06 ENCOUNTER — TRANSCRIPTION ENCOUNTER (OUTPATIENT)
Age: 50
End: 2020-11-06

## 2020-11-10 LAB
ALBUMIN SERPL ELPH-MCNC: 4.2 G/DL
ALP BLD-CCNC: 45 U/L
ALT SERPL-CCNC: 32 U/L
AMYLASE/CREAT SERPL: 52 U/L
ANION GAP SERPL CALC-SCNC: 13 MMOL/L
AST SERPL-CCNC: 20 U/L
BASOPHILS # BLD AUTO: 0.04 K/UL
BASOPHILS NFR BLD AUTO: 0.5 %
BILIRUB SERPL-MCNC: 0.7 MG/DL
BUN SERPL-MCNC: 27 MG/DL
CALCIUM SERPL-MCNC: 9.5 MG/DL
CHLORIDE SERPL-SCNC: 101 MMOL/L
CO2 SERPL-SCNC: 23 MMOL/L
CREAT SERPL-MCNC: 1.18 MG/DL
EOSINOPHIL # BLD AUTO: 0.16 K/UL
EOSINOPHIL NFR BLD AUTO: 1.9 %
GLUCOSE SERPL-MCNC: 161 MG/DL
HCT VFR BLD CALC: 37.8 %
HGB BLD-MCNC: 11.9 G/DL
IMM GRANULOCYTES NFR BLD AUTO: 0.2 %
LPL SERPL-CCNC: 48 U/L
LYMPHOCYTES # BLD AUTO: 1.47 K/UL
LYMPHOCYTES NFR BLD AUTO: 17.6 %
MAN DIFF?: NORMAL
MCHC RBC-ENTMCNC: 31.5 GM/DL
MCHC RBC-ENTMCNC: 32.2 PG
MCV RBC AUTO: 102.4 FL
MONOCYTES # BLD AUTO: 0.62 K/UL
MONOCYTES NFR BLD AUTO: 7.4 %
NEUTROPHILS # BLD AUTO: 6.04 K/UL
NEUTROPHILS NFR BLD AUTO: 72.4 %
PLATELET # BLD AUTO: 175 K/UL
POTASSIUM SERPL-SCNC: 4.9 MMOL/L
PROT SERPL-MCNC: 6.4 G/DL
RBC # BLD: 3.69 M/UL
RBC # FLD: 13.9 %
SODIUM SERPL-SCNC: 137 MMOL/L
TACROLIMUS SERPL-MCNC: 9.7 NG/ML
WBC # FLD AUTO: 8.35 K/UL

## 2020-11-11 ENCOUNTER — TRANSCRIPTION ENCOUNTER (OUTPATIENT)
Age: 50
End: 2020-11-11

## 2020-11-24 ENCOUNTER — APPOINTMENT (OUTPATIENT)
Dept: NUTRITION | Facility: CLINIC | Age: 50
End: 2020-11-24

## 2020-11-25 ENCOUNTER — APPOINTMENT (OUTPATIENT)
Dept: CHRONIC DISEASE MANAGEMENT | Facility: CLINIC | Age: 50
End: 2020-11-25

## 2020-11-25 ENCOUNTER — APPOINTMENT (OUTPATIENT)
Dept: ENDOCRINOLOGY | Facility: CLINIC | Age: 50
End: 2020-11-25
Payer: COMMERCIAL

## 2020-11-25 ENCOUNTER — NON-APPOINTMENT (OUTPATIENT)
Age: 50
End: 2020-11-25

## 2020-11-25 PROCEDURE — G0108 DIAB MANAGE TRN  PER INDIV: CPT

## 2020-11-30 ENCOUNTER — TRANSCRIPTION ENCOUNTER (OUTPATIENT)
Age: 50
End: 2020-11-30

## 2020-12-09 ENCOUNTER — TRANSCRIPTION ENCOUNTER (OUTPATIENT)
Age: 50
End: 2020-12-09

## 2020-12-10 ENCOUNTER — TRANSCRIPTION ENCOUNTER (OUTPATIENT)
Age: 50
End: 2020-12-10

## 2020-12-11 ENCOUNTER — TRANSCRIPTION ENCOUNTER (OUTPATIENT)
Age: 50
End: 2020-12-11

## 2020-12-11 ENCOUNTER — APPOINTMENT (OUTPATIENT)
Dept: ORTHOPEDIC SURGERY | Facility: CLINIC | Age: 50
End: 2020-12-11

## 2020-12-14 ENCOUNTER — APPOINTMENT (OUTPATIENT)
Dept: FAMILY MEDICINE | Facility: CLINIC | Age: 50
End: 2020-12-14
Payer: COMMERCIAL

## 2020-12-14 ENCOUNTER — APPOINTMENT (OUTPATIENT)
Dept: NEUROLOGY | Facility: CLINIC | Age: 50
End: 2020-12-14
Payer: COMMERCIAL

## 2020-12-14 VITALS
BODY MASS INDEX: 36.43 KG/M2 | WEIGHT: 246 LBS | DIASTOLIC BLOOD PRESSURE: 80 MMHG | OXYGEN SATURATION: 96 % | TEMPERATURE: 97.3 F | RESPIRATION RATE: 14 BRPM | HEIGHT: 69 IN | SYSTOLIC BLOOD PRESSURE: 130 MMHG | HEART RATE: 71 BPM

## 2020-12-14 PROCEDURE — 99072 ADDL SUPL MATRL&STAF TM PHE: CPT

## 2020-12-14 PROCEDURE — 90471 IMMUNIZATION ADMIN: CPT

## 2020-12-14 PROCEDURE — 90750 HZV VACC RECOMBINANT IM: CPT

## 2020-12-14 PROCEDURE — 99213 OFFICE O/P EST LOW 20 MIN: CPT | Mod: 95

## 2020-12-14 NOTE — CONSULT LETTER
[Dear  ___] : Dear  [unfilled], [Courtesy Letter:] : I had the pleasure of seeing your patient, [unfilled], in my office today. [Please see my note below.] : Please see my note below. [Consult Closing:] : Thank you very much for allowing me to participate in the care of this patient.  If you have any questions, please do not hesitate to contact me. [Sincerely,] : Sincerely, [FreeTextEntry3] : Eddie Kilgore M.D., Ph.D. DPN-N\par [facility]\par Neurology at [place]\par Medical Director of Stroke Services\par [facility]\par

## 2020-12-14 NOTE — ASSESSMENT
[FreeTextEntry1] : This is a 49-year-old woman with episodes where she feels as if she is going in slow motion. 40 R. EEG was unrevealing. She is also having severe episodes of nausea and vomiting. Like to do an MRI of her brain with and without contrast to both evaluate for possible seizure focus as well as look for a central cause of her vomiting. I will call her with the results once is obtained. I will see her back in the office if needed.

## 2020-12-14 NOTE — HISTORY OF PRESENT ILLNESS
[Home] : at home, [unfilled] , at the time of the visit. [Medical Office: ([facility])___] : at the medical office located in  [Verbal consent obtained from patient] : the patient, [unfilled] [FreeTextEntry1] : Initial office visit September 4, 2020:\par This is a 49-year-old woman presents today for episodic altered mental status. She states she has episodes where she may be standing and talking and then feel as if she is out of body. She can sometimes hear self talking. Sometimes she feels that she is going to fall. She is not exactly sure how long these episodes last but she thinks it's brief because she doesn't fall and the person she's talking to doesn't notice it. This is been going on for several months now. She has a history of cavernous carotid aneurysm which is followed by neurosurgeon. She has a recent history of diabetes. She is status post renal transplant. She is here today for neurologic evaluation.\par \par Followup visit December 14, 2020:\par This is a 49-year-old woman who presents today for neurologic followup. She was previously seen with intermittent altered mental status. She states that she feels at times as if things go in slow motion. She had a extended EEG which did not show seizure activity. She today is complaining of intractable nausea and vomiting. She had been placed on Ozempic  which caused severe nausea. After 6 weeks it was stopped. She's been off of it now for almost 6 weeks and she continues to have nausea. She states she is very sensitive gag reflex and is very sensitive to smells. She does not have any dizziness or vertigo. She is here today to see if there is a central cause to her nausea and vomiting.\par

## 2020-12-14 NOTE — DATA REVIEWED
[de-identified] : She had an MRA and MRV done in September. She had a stable right ICA aneurysm without evidence of large vessel occlusion or high-grade stenosis and the [place] of [place]. The MRV did not show any venous sinus thrombosis. [de-identified] : She had a 48-hour EEG which did not show any evidence for seizure. There was no epileptiform correlates to reported symptoms of dizziness and lightheadedness

## 2020-12-15 ENCOUNTER — NON-APPOINTMENT (OUTPATIENT)
Age: 50
End: 2020-12-15

## 2020-12-16 ENCOUNTER — APPOINTMENT (OUTPATIENT)
Dept: NEPHROLOGY | Facility: CLINIC | Age: 50
End: 2020-12-16
Payer: COMMERCIAL

## 2020-12-16 VITALS
OXYGEN SATURATION: 95 % | HEIGHT: 69 IN | HEART RATE: 67 BPM | BODY MASS INDEX: 36.58 KG/M2 | RESPIRATION RATE: 14 BRPM | TEMPERATURE: 97.6 F | DIASTOLIC BLOOD PRESSURE: 75 MMHG | SYSTOLIC BLOOD PRESSURE: 140 MMHG | WEIGHT: 247 LBS

## 2020-12-16 PROCEDURE — 99215 OFFICE O/P EST HI 40 MIN: CPT

## 2020-12-16 PROCEDURE — 99072 ADDL SUPL MATRL&STAF TM PHE: CPT

## 2020-12-16 RX ORDER — CHOLECALCIFEROL (VITAMIN D3) 1250 MCG
1.25 MG CAPSULE ORAL
Qty: 12 | Refills: 3 | Status: DISCONTINUED | COMMUNITY
Start: 2019-07-12 | End: 2020-12-16

## 2020-12-16 NOTE — PLAN
[FreeTextEntry1] : Patient given Shingrix vaccine in right deltoid IM by RN, she tolerated well.  Denies any adverse reaction to injections.  Is returning for second dose in February.  NABILA, RN

## 2020-12-16 NOTE — HISTORY OF PRESENT ILLNESS
[___ Month(s) Ago] : [unfilled] month(s) ago [Good Compliance] : good compliance with treatment [FreeTextEntry1] : 49 year old woman with PCKD s/p LURT from ex  and bilateral native nephrectomies on 4/2/18.\par Course was complicated with slow function assumed to be early ACR (low tacro levels) treated with solumedrol 750mgs. Pt also had Cdiff treated with vancomycin. 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

## 2020-12-16 NOTE — ASSESSMENT
[FreeTextEntry1] : PCKD with b/l nephrectomies\par LURT in 2018: stable allograft function. UA without hematuria /proteinuria\par Continue current immunosuppressive regimen\par \par \par HTN- BP well controlled overall- high today, didn’t take her medicines\par Continue current regimen\par \par NODAT: on insulin and Glimepiride. Prednisone dose decreased to 2.5 mg\par Weight loss d be beneficial\par Endocrinology follow up\par \par \par Hypomagnesemia;  in setting of tacrolimus use/ diarrhea with MMF\par continue Mag -Oxide 400 mg daily\par \par \par \par RTC in 3 months\par

## 2020-12-16 NOTE — PHYSICAL EXAM
[General Appearance - Alert] : alert [General Appearance - In No Acute Distress] : in no acute distress [Sclera] : the sclera and conjunctiva were normal [Strabismus] : no strabismus was seen [Outer Ear] : the ears and nose were normal in appearance [Hearing Threshold Finger Rub Not [place]] : hearing was normal [Examination Of The Oral Cavity] : the lips and gums were normal [Neck Appearance] : the appearance of the neck was normal [Neck Cervical Mass (___cm)] : no neck mass was observed [Jugular Venous Distention Increased] : there was no jugular-venous distention [Respiration, Rhythm And Depth] : normal respiratory rhythm and effort [Exaggerated Use Of Accessory Muscles For Inspiration] : no accessory muscle use [Auscultation Breath Sounds / Voice Sounds] : lungs were clear to auscultation bilaterally [Apical Impulse] : the apical impulse was normal [Heart Rate And Rhythm] : heart rate was normal and rhythm regular [Heart Sounds] : normal S1 and S2 [Murmurs] : no murmurs [Edema] : there was no peripheral edema [Bowel Sounds] : normal bowel sounds [Abdomen Soft] : soft [Abdomen Tenderness] : non-tender [] : no hepato-splenomegaly [Abdomen Mass (___ Cm)] : no abdominal mass palpated [No CVA Tenderness] : no ~M costovertebral angle tenderness [Nail Clubbing] : no clubbing  or cyanosis of the fingernails [Motor Tone] : muscle strength and tone were normal [___ (cm) Fistula] : [unfilled] (cm) fistula [Skin Color & Pigmentation] : normal skin color and pigmentation [Skin Turgor] : normal skin turgor [Skin Lesions] : no skin lesions [No Focal Deficits] : no focal deficits [Oriented To Time, Place, And Person] : oriented to person, place, and time [Impaired Insight] : insight and judgment were intact [Affect] : the affect was normal [Mood] : the mood was normal [FreeTextEntry1] : non-functioning

## 2020-12-21 LAB
25(OH)D3 SERPL-MCNC: 65.3 NG/ML
ALBUMIN SERPL ELPH-MCNC: 4.1 G/DL
ALP BLD-CCNC: 43 U/L
ALT SERPL-CCNC: 30 U/L
ANION GAP SERPL CALC-SCNC: 14 MMOL/L
APPEARANCE: ABNORMAL
AST SERPL-CCNC: 49 U/L
BACTERIA: NEGATIVE
BASOPHILS # BLD AUTO: 0.03 K/UL
BASOPHILS NFR BLD AUTO: 0.4 %
BILIRUB SERPL-MCNC: 0.6 MG/DL
BILIRUBIN URINE: NEGATIVE
BLOOD URINE: NEGATIVE
BUN SERPL-MCNC: 22 MG/DL
CALCIUM OXALATE CRYSTALS: ABNORMAL
CALCIUM SERPL-MCNC: 9.6 MG/DL
CHLORIDE SERPL-SCNC: 104 MMOL/L
CHOLEST SERPL-MCNC: 153 MG/DL
CO2 SERPL-SCNC: 19 MMOL/L
COLOR: YELLOW
CREAT SERPL-MCNC: 1.29 MG/DL
CREAT SPEC-SCNC: 146 MG/DL
CREAT/PROT UR: 0.1 RATIO
EOSINOPHIL # BLD AUTO: 0.19 K/UL
EOSINOPHIL NFR BLD AUTO: 2.5 %
GLUCOSE QUALITATIVE U: NEGATIVE
GLUCOSE SERPL-MCNC: 154 MG/DL
GRANULAR CASTS: 0 /LPF
HCT VFR BLD CALC: 38.4 %
HDLC SERPL-MCNC: 30 MG/DL
HGB BLD-MCNC: 12.4 G/DL
HYALINE CASTS: 0 /LPF
IMM GRANULOCYTES NFR BLD AUTO: 0.1 %
KETONES URINE: NEGATIVE
LDH SERPL-CCNC: 538 U/L
LDLC SERPL CALC-MCNC: 65 MG/DL
LEUKOCYTE ESTERASE URINE: NEGATIVE
LYMPHOCYTES # BLD AUTO: 1.75 K/UL
LYMPHOCYTES NFR BLD AUTO: 22.9 %
MAGNESIUM SERPL-MCNC: 1.7 MG/DL
MAN DIFF?: NORMAL
MCHC RBC-ENTMCNC: 32 PG
MCHC RBC-ENTMCNC: 32.3 GM/DL
MCV RBC AUTO: 99.2 FL
MICROSCOPIC-UA: NORMAL
MONOCYTES # BLD AUTO: 0.59 K/UL
MONOCYTES NFR BLD AUTO: 7.7 %
NEUTROPHILS # BLD AUTO: 5.07 K/UL
NEUTROPHILS NFR BLD AUTO: 66.4 %
NITRITE URINE: NEGATIVE
NONHDLC SERPL-MCNC: 123 MG/DL
PH URINE: 5.5
PHOSPHATE SERPL-MCNC: 3.5 MG/DL
PLATELET # BLD AUTO: 164 K/UL
POTASSIUM SERPL-SCNC: 5.3 MMOL/L
PROT SERPL-MCNC: 6.5 G/DL
PROT UR-MCNC: 8 MG/DL
PROTEIN URINE: NEGATIVE
RBC # BLD: 3.87 M/UL
RBC # FLD: 13.7 %
RED BLOOD CELLS URINE: 4 /HPF
SODIUM SERPL-SCNC: 136 MMOL/L
SPECIFIC GRAVITY URINE: 1.02
SQUAMOUS EPITHELIAL CELLS: 2 /HPF
TACROLIMUS SERPL-MCNC: 6.6 NG/ML
TRIGL SERPL-MCNC: 286 MG/DL
URATE SERPL-MCNC: 5.7 MG/DL
URINE COMMENTS: NORMAL
UROBILINOGEN URINE: NORMAL
WBC # FLD AUTO: 7.64 K/UL
WHITE BLOOD CELLS URINE: 1 /HPF

## 2020-12-23 ENCOUNTER — NON-APPOINTMENT (OUTPATIENT)
Age: 50
End: 2020-12-23

## 2020-12-23 ENCOUNTER — APPOINTMENT (OUTPATIENT)
Dept: CHRONIC DISEASE MANAGEMENT | Facility: CLINIC | Age: 50
End: 2020-12-23

## 2020-12-23 PROBLEM — Z12.31 ENCOUNTER FOR SCREENING MAMMOGRAM FOR BREAST CANCER: Status: RESOLVED | Noted: 2020-06-24 | Resolved: 2020-12-23

## 2020-12-26 ENCOUNTER — APPOINTMENT (OUTPATIENT)
Dept: MRI IMAGING | Facility: CLINIC | Age: 50
End: 2020-12-26
Payer: COMMERCIAL

## 2020-12-26 ENCOUNTER — OUTPATIENT (OUTPATIENT)
Dept: OUTPATIENT SERVICES | Facility: HOSPITAL | Age: 50
LOS: 1 days | End: 2020-12-26
Payer: COMMERCIAL

## 2020-12-26 DIAGNOSIS — Z90.49 ACQUIRED ABSENCE OF OTHER SPECIFIED PARTS OF DIGESTIVE TRACT: Chronic | ICD-10-CM

## 2020-12-26 DIAGNOSIS — Z98.51 TUBAL LIGATION STATUS: Chronic | ICD-10-CM

## 2020-12-26 DIAGNOSIS — Z90.5 ACQUIRED ABSENCE OF KIDNEY: Chronic | ICD-10-CM

## 2020-12-26 DIAGNOSIS — Z98.890 OTHER SPECIFIED POSTPROCEDURAL STATES: Chronic | ICD-10-CM

## 2020-12-26 DIAGNOSIS — Z00.8 ENCOUNTER FOR OTHER GENERAL EXAMINATION: ICD-10-CM

## 2020-12-26 DIAGNOSIS — N20.0 CALCULUS OF KIDNEY: Chronic | ICD-10-CM

## 2020-12-26 DIAGNOSIS — I77.0 ARTERIOVENOUS FISTULA, ACQUIRED: Chronic | ICD-10-CM

## 2020-12-26 DIAGNOSIS — R11.2 NAUSEA WITH VOMITING, UNSPECIFIED: ICD-10-CM

## 2020-12-26 DIAGNOSIS — Z94.0 KIDNEY TRANSPLANT STATUS: Chronic | ICD-10-CM

## 2020-12-26 PROCEDURE — 70553 MRI BRAIN STEM W/O & W/DYE: CPT | Mod: 26

## 2020-12-26 PROCEDURE — 70553 MRI BRAIN STEM W/O & W/DYE: CPT

## 2020-12-26 PROCEDURE — A9585: CPT

## 2020-12-29 ENCOUNTER — TRANSCRIPTION ENCOUNTER (OUTPATIENT)
Age: 50
End: 2020-12-29

## 2020-12-29 NOTE — PHYSICAL EXAM
[Alert] : alert [Well Nourished] : well nourished [No Acute Distress] : no acute distress [Well Developed] : well developed [Normal Sclera/Conjunctiva] : normal sclera/conjunctiva [EOMI] : extra ocular movement intact [No Proptosis] : no proptosis [Normal Oropharynx] : the oropharynx was normal [No Respiratory Distress] : no respiratory distress [No Accessory Muscle Use] : no accessory muscle use [Clear to Auscultation] : lungs were clear to auscultation bilaterally [Normal S1, S2] : normal S1 and S2 [Normal Rate] : heart rate was normal [Regular Rhythm] : with a regular rhythm [No Edema] : no peripheral edema [Normal Anterior Cervical Nodes] : no anterior cervical lymphadenopathy [No Spinal Tenderness] : no spinal tenderness [Normal Gait] : normal gait [Normal Strength/Tone] : muscle strength and tone were normal [Oriented x3] : oriented to person, place, and time [Normal Affect] : the affect was normal [Normal Mood] : the mood was normal [Acanthosis Nigricans] : no acanthosis nigricans [de-identified] : mild thyromegaly

## 2020-12-29 NOTE — HISTORY OF PRESENT ILLNESS
[FreeTextEntry1] : Nausea and occasional vomiting since starting Ozempic. Denies abdomianl pain but endorses cramping/bloating. Also with diarrhea and change in taste- lost of sweet taste perception. Denies fever, cough, SOB.\par \par Type: NODAT\par Severity: moderate\par Duration: January 2020\par Onset: s/p kidney transplant\par Associated symptoms: polyuria, polydipsia\par \par Modifying factors-\par Current meds for glycemic control:\par Ozempic 0.25 mg weekly- frequent nausea with occasional vomiting. \par Glimepiride 4 mg daily\par Lantus 22 units daily\par Humalog sliding scale\par 150-199 = 8\par 200-249 = 10\par 250-299 = 12\par >300 = 14\par \par Diet: cut out soda and most junk food, decreased bread, trying to watch\par Weight: stable, but trying to lose\par Exercise: walking daily\par \par SMBG with Dexcom G6. Avg , SD 44 with 56% in range, 44% high. No lows. Reviewed CGMS. 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

## 2020-12-29 NOTE — ASSESSMENT
[FreeTextEntry1] : 49 year old female with NODAT s/p kidney transplant in 2018. She is having post prandial hyperglycemia and fasting hyperglycemia >200.\par -Stop Ozempic. Check amylase and lipase now.\par -Increase sliding scale by two units.\par -Increase Lantus to 24 units daily.\par -Continue Glimepiride.\par -Continue Dexcom. \par -Discussed pump therapy at length. Patient will follow-up with CDE in our office to view different pump options.\par -RTO for follow-up in 2-3 months. \par -Advised COVID-19 PCR testing given nausea, diarrhea, loss of taste, immunocompromised status, and work related exposure.\par -Glucose sensor/pump necessity: This patient with diabetes performs four or more glucose checks per day utilizing a home blood glucose monitor since diagnosis in January 2020. The patient is treated with insulin via three or more injections daily. Over the past 6 months, this patient has required frequent adjustments to their insulin treatment on the basis of therapeutic continuous glucose monitoring results. In addition, the patient has been to our office for an evaluation of their diabetes control within the past six months, and attends a comprehensive diabetic education program, most recently in November 2020.

## 2020-12-29 NOTE — REVIEW OF SYSTEMS
[Nausea] : nausea [Vomiting] : vomiting [Diarrhea] : diarrhea [Polyuria] : polyuria [Nocturia] : nocturia [Anxiety] : anxiety [Polydipsia] : polydipsia [Decreased Appetite] : appetite not decreased [Recent Weight Gain (___ Lbs)] : no recent weight gain [Recent Weight Loss (___ Lbs)] : no recent weight loss [Chest Pain] : no chest pain [Palpitations] : no palpitations [Shortness Of Breath] : no shortness of breath [Cough] : no cough [FreeTextEntry7] : cramping

## 2020-12-31 ENCOUNTER — TRANSCRIPTION ENCOUNTER (OUTPATIENT)
Age: 50
End: 2020-12-31

## 2021-01-04 LAB
BKV DNA SPEC QL NAA+PROBE: NOT DETECTED COPIES/ML
CMV DNA SPEC QL NAA+PROBE: NOT DETECTED IU/ML

## 2021-01-05 ENCOUNTER — APPOINTMENT (OUTPATIENT)
Dept: ENDOCRINOLOGY | Facility: CLINIC | Age: 51
End: 2021-01-05
Payer: COMMERCIAL

## 2021-01-05 PROCEDURE — P0004: CPT

## 2021-01-08 ENCOUNTER — NON-APPOINTMENT (OUTPATIENT)
Age: 51
End: 2021-01-08

## 2021-01-08 ENCOUNTER — TRANSCRIPTION ENCOUNTER (OUTPATIENT)
Age: 51
End: 2021-01-08

## 2021-01-12 ENCOUNTER — APPOINTMENT (OUTPATIENT)
Dept: ENDOCRINOLOGY | Facility: CLINIC | Age: 51
End: 2021-01-12
Payer: COMMERCIAL

## 2021-01-12 PROCEDURE — ZZZZZ: CPT

## 2021-01-19 ENCOUNTER — TRANSCRIPTION ENCOUNTER (OUTPATIENT)
Age: 51
End: 2021-01-19

## 2021-01-19 LAB
25(OH)D3 SERPL-MCNC: 61.4 NG/ML
ALBUMIN SERPL ELPH-MCNC: 4.3 G/DL
ALP BLD-CCNC: 49 U/L
ALT SERPL-CCNC: 26 U/L
ANION GAP SERPL CALC-SCNC: 15 MMOL/L
APPEARANCE: ABNORMAL
AST SERPL-CCNC: 18 U/L
BACTERIA: NEGATIVE
BASOPHILS # BLD AUTO: 0.05 K/UL
BASOPHILS NFR BLD AUTO: 0.6 %
BILIRUB SERPL-MCNC: 0.8 MG/DL
BILIRUBIN URINE: NEGATIVE
BKV DNA SPEC QL NAA+PROBE: NOT DETECTED COPIES/ML
BLOOD URINE: NEGATIVE
BUN SERPL-MCNC: 25 MG/DL
CALCIUM OXALATE CRYSTALS: ABNORMAL
CALCIUM SERPL-MCNC: 9.6 MG/DL
CALCIUM SERPL-MCNC: 9.6 MG/DL
CHLORIDE SERPL-SCNC: 104 MMOL/L
CHOLEST SERPL-MCNC: 142 MG/DL
CMV DNA SPEC QL NAA+PROBE: NOT DETECTED IU/ML
CO2 SERPL-SCNC: 20 MMOL/L
COLOR: YELLOW
CREAT SERPL-MCNC: 1.43 MG/DL
CREAT SPEC-SCNC: 273 MG/DL
CREAT/PROT UR: 0.1 RATIO
EOSINOPHIL # BLD AUTO: 0.2 K/UL
EOSINOPHIL NFR BLD AUTO: 2.4 %
ESTIMATED AVERAGE GLUCOSE: 131 MG/DL
GLUCOSE QUALITATIVE U: NEGATIVE
GLUCOSE SERPL-MCNC: 200 MG/DL
HBA1C MFR BLD HPLC: 6.2 %
HCT VFR BLD CALC: 37.3 %
HDLC SERPL-MCNC: 32 MG/DL
HGB BLD-MCNC: 12.1 G/DL
HYALINE CASTS: 0 /LPF
IMM GRANULOCYTES NFR BLD AUTO: 0.4 %
KETONES URINE: NEGATIVE
LDH SERPL-CCNC: 206 U/L
LDLC SERPL CALC-MCNC: 54 MG/DL
LEUKOCYTE ESTERASE URINE: NEGATIVE
LYMPHOCYTES # BLD AUTO: 1.96 K/UL
LYMPHOCYTES NFR BLD AUTO: 23.6 %
MAGNESIUM SERPL-MCNC: 1.6 MG/DL
MAN DIFF?: NORMAL
MCHC RBC-ENTMCNC: 32.4 GM/DL
MCHC RBC-ENTMCNC: 32.4 PG
MCV RBC AUTO: 99.7 FL
MICROSCOPIC-UA: NORMAL
MONOCYTES # BLD AUTO: 0.58 K/UL
MONOCYTES NFR BLD AUTO: 7 %
NEUTROPHILS # BLD AUTO: 5.48 K/UL
NEUTROPHILS NFR BLD AUTO: 66 %
NITRITE URINE: NEGATIVE
NONHDLC SERPL-MCNC: 109 MG/DL
PARATHYROID HORMONE INTACT: 26 PG/ML
PH URINE: 6
PHOSPHATE SERPL-MCNC: 3.4 MG/DL
PLATELET # BLD AUTO: 191 K/UL
POTASSIUM SERPL-SCNC: 4.5 MMOL/L
PROT SERPL-MCNC: 6.5 G/DL
PROT UR-MCNC: 14 MG/DL
PROTEIN URINE: NORMAL
RBC # BLD: 3.74 M/UL
RBC # FLD: 14 %
RED BLOOD CELLS URINE: 2 /HPF
SODIUM SERPL-SCNC: 138 MMOL/L
SPECIFIC GRAVITY URINE: 1.03
SQUAMOUS EPITHELIAL CELLS: 13 /HPF
TACROLIMUS SERPL-MCNC: 10.7 NG/ML
TRIGL SERPL-MCNC: 275 MG/DL
URATE SERPL-MCNC: 5.6 MG/DL
UROBILINOGEN URINE: NORMAL
WBC # FLD AUTO: 8.3 K/UL
WHITE BLOOD CELLS URINE: 2 /HPF

## 2021-01-20 ENCOUNTER — TRANSCRIPTION ENCOUNTER (OUTPATIENT)
Age: 51
End: 2021-01-20

## 2021-01-26 ENCOUNTER — TRANSCRIPTION ENCOUNTER (OUTPATIENT)
Age: 51
End: 2021-01-26

## 2021-01-26 NOTE — PROGRESS NOTE ADULT - SUBJECTIVE AND OBJECTIVE BOX
Subjective:      Benito Somers is a 65 y.o. female who presents with Immunizations (2nd Shingles Vaccination) and Medication Refill            1. Primary osteoarthritis of left knee  The patient's current medical issue is well controlled on the current therapy with no new symptoms or worsening    - ibuprofen (MOTRIN) 800 MG Tab; Take 1 Tab by mouth every 8 hours as needed.  Dispense: 90 Tab; Refill: 3  - ibandronate (BONIVA) 150 MG tablet; TAKE ONE TABLET BY MOUTH EVERY 30 DAYS  Dispense: 3 Tab; Refill: 3    2. Other osteoporosis without current pathological fracture  The patient's current medical issue is well controlled on the current therapy with no new symptoms or worsening    - ibandronate (BONIVA) 150 MG tablet; TAKE ONE TABLET BY MOUTH EVERY 30 DAYS  Dispense: 3 Tab; Refill: 3    3. Hypothyroidism due to acquired atrophy of thyroid  Patient is being treated for hypothyroidism, currently taking meds, no symptoms of fast or slow heartbeat and energy level steady. No new hair loss or skin symptoms. Labs have been checked in past year and are stable on current dose.  controlled    - ibandronate (BONIVA) 150 MG tablet; TAKE ONE TABLET BY MOUTH EVERY 30 DAYS  Dispense: 3 Tab; Refill: 3    4. Obesity (BMI 30-39.9)  Patient has a diagnosis of obesity. They were counseled today on increasing exercise and  extensively counseled on a low carb diet     - ibandronate (BONIVA) 150 MG tablet; TAKE ONE TABLET BY MOUTH EVERY 30 DAYS  Dispense: 3 Tab; Refill: 3    5. Need for vaccination    - Zoster Vac Recomb Adjuvanted (SHINGRIX) 50 MCG/0.5ML Recon Susp; Inject 0.5 mL into the shoulder, thigh, or buttocks one time for 1 dose.  Dispense: 1 Each; Refill: 0    Past Medical History:  No date: Allergy  No date: Arthritis  No date: Osteoporosis  No date: Thyroid disease  Past Surgical History:  No date: ABDOMINAL HYSTERECTOMY TOTAL  No date: APPENDECTOMY  No date: SINUSCOPY  No date: TONSILLECTOMY  Social History    Tobacco Use       Smoking status: Former Smoker        Packs/day: 1.00        Years: 7.00        Pack years: 7        Quit date: 1978        Years since quittin.0      Smokeless tobacco: Never Used    Alcohol use: Yes      Alcohol/week: 0.6 oz      Types: 1 Glasses of wine per week      Frequency: 2-4 times a month      Drinks per session: 1 or 2      Comment: occ    Drug use: No    Review of patient's family history indicates:  Problem: Lung Disease      Relation: Father          Age of Onset: (Not Specified)      Current Outpatient Medications: •  ibuprofen (MOTRIN) 800 MG Tab, Take 1 Tab by mouth every 8 hours as needed., Disp: 90 Tab, Rfl: 3•  ibandronate (BONIVA) 150 MG tablet, TAKE ONE TABLET BY MOUTH EVERY 30 DAYS, Disp: 3 Tab, Rfl: 3•  Zoster Vac Recomb Adjuvanted (SHINGRIX) 50 MCG/0.5ML Recon Susp, Inject 0.5 mL into the shoulder, thigh, or buttocks one time for 1 dose., Disp: 1 Each, Rfl: 0•  levothyroxine (SYNTHROID) 75 MCG Tab, TAKE ONE TABLET BY MOUTH EVERY MORNING ON AN EMPTY STOMACH, Disp: 90 Tab, Rfl: 0•  valACYclovir (VALTREX) 500 MG Tab, TAKE ONE TABLET BY MOUTH TWICE A DAY, Disp: 60 Tab, Rfl: 4•  meclizine (ANTIVERT) 12.5 MG Tab, Take 1 Tab by mouth 3 times a day as needed., Disp: 30 Tab, Rfl: 0•  meloxicam (MOBIC) 7.5 MG Tab, Take 7.5 mg by mouth every day., Disp: , Rfl: •  triamcinolone acetonide (KENALOG) 0.1 % Cream, Apply 1 g to affected area(s) 2 times a day. (Patient not taking: Reported on 2020), Disp: 1 Tube, Rfl: 2•  triamcinolone (NASACORT ALLERGY 24HR) 55 MCG/ACT nasal inhaler, Spray 2 Sprays in nose every day., Disp: , Rfl:     Patient was instructed on the use of medications, either prescriptions or OTC and informed on when the appropriate follow up time period should be. In addition, patient was also instructed that should any acute worsening occur that they should notify this clinic asap or call 911.          Review of Systems   Constitutional: Negative.  Negative for chills and  Renal :    Follow up: Pyelonephritis, infected PCKD cyst, C Diff colitis , CKD -4,     no fever,  still with flank pain    Allergies:  Demerol HCl (Hives)  Demerol HCl (Hives; Rash)  Phenergan (Hives; Rash)  Phenergan Fortis (Hives)    Antibiotics:  vancomycin    Solution 250 milliGRAM(s) Oral every 6 hours  aztreonam  IVPB 2000 milliGRAM(s) IV Intermittent every 12 hours    REVIEW OF SYSTEMS:    CONSTITUTIONAL:  No Fever, + chills  HEENT:   No diplopia or blurred vision.  No earache, sore throat or runny nose.  CARDIOVASCULAR:  No pressure, squeezing, strangling, tightness, heaviness or aching about the chest, neck, axilla or epigastrium.  RESPIRATORY:  No cough, shortness of breath  GASTROINTESTINAL:  No nausea, vomiting or diarrhea.  GENITOURINARY:  No dysuria, frequency or urgency. + Rt flank pain  MUSCULOSKELETAL:  no joint aches, no muscle pain  SKIN:  No change in skin, hair or nails.  NEUROLOGIC:  No paresthesias, fasciculations  PSYCHIATRIC:  No disorder of thought or mood.  ENDOCRINE:  No heat or cold intolerance  HEMATOLOGICAL:  No easy bruising or bleeding.     Physical Exam:  Vital Signs Last 24 Hrs  T(C): 37.1 (27 Sep 2017 10:26), Max: 37.4 (26 Sep 2017 16:14)  T(F): 98.7 (27 Sep 2017 10:26), Max: 99.4 (27 Sep 2017 05:09)  HR: 55 (27 Sep 2017 10:26) (55 - 61)  BP: 107/52 (27 Sep 2017 10:26) (106/60 - 120/60)  RR: 20 (27 Sep 2017 10:26) (18 - 20)  SpO2: 97% (27 Sep 2017 05:09) (96% - 97%)    GEN: NAD, pleasant  HEENT: normocephalic and atraumatic. EOMI. PERRL.    NECK: Supple.   LUNGS: Clear to auscultation.  HEART: Regular rate and rhythm   ABDOMEN: Soft, nontender, and nondistended.  Positive bowel sounds.    : + Rt CVA tenderness  EXTREMITIES: Without any edema.  MSK: no joint swelling  NEUROLOGIC: Cranial nerves II through XII are grossly intact.  PSYCHIATRIC: Appropriate affect .  SKIN: No ulceration or induration present.      Labs:  09-26    141  |  106  |  35.0<H>  ----------------------------<  87  4.3   |  21.0<L>  |  3.06<H>    Ca    8.5<L>      26 Sep 2017 05:34  Phos  4.7     09-26  Mg     2.0     09-26                 9.2    8.5   )-----------( 292      ( 26 Sep 2017 05:34 )             28.3     RECENT CULTURES:  Culture - Blood in AM (09.24.17 @ 05:38)    Specimen Source: .Blood Blood    Culture Results:   No growth at 48 hours      Culture - Blood (09.22.17 @ 19:06)    Specimen Source: .Blood Blood-Peripheral    Culture Results:   No growth at 48 hours      Culture - Blood (09.22.17 @ 17:07)    Specimen Source: .Blood Blood-Peripheral    Culture Results:   No growth at 48 hours      Culture - Acid Fast - CSF (09.18.17 @ 12:18)    Specimen Source: .CSF CSF    Acid Fast Bacilli Smear:   No acid fast bacilli seen by fluorochrome stain      Culture - Blood (09.18.17 @ 10:50)    Specimen Source: .Blood Blood-Peripheral    Culture Results:   No growth at 5 days.      Culture - Blood (09.18.17 @ 10:03)    Specimen Source: .Blood Blood-Peripheral    Culture Results:   No growth at 5 days.      Culture - Fungal, CSF (09.17.17 @ 14:52)    Specimen Source: .CSF CSF    Culture Results:   No fungus isolated at 1 week.  Culture in progress      Culture - CSF with Gram Stain . (09.17.17 @ 14:48)    Gram Stain:   No White blood cells  No organisms seen    Specimen Source: .CSF CSF    Culture Results:   No growth at 3 days.      Culture - Urine (09.16.17 @ 13:20)    Specimen Source: .Urine Clean Catch (Midstream)    Culture Results:   No growth      Culture - Urine (09.15.17 @ 19:43)    Specimen Source: .Urine Clean Catch (Midstream)    Culture Results:   No growth      Culture - Blood (09.15.17 @ 17:27)    Specimen Source: .Blood Blood-Peripheral    Culture Results:   No growth at 5 days.      Culture - Blood (09.15.17 @ 15:55)    Specimen Source: .Blood Blood-Peripheral    Culture Results:   No growth at 5 days.      Culture - Urine (09.13.17 @ 23:45)    -  Amikacin: S <=16    -  Ampicillin: R >16    -  Ampicillin/Sulbactam: S <=8/4    -  Aztreonam: S <=4    -  Cefazolin: S <=8    -  Cefepime: S <=4    -  Cefoxitin: S <=8    -  Ceftazidime: S <=1    -  Ceftriaxone: S <=1    -  Ciprofloxacin: S <=1    -  Ertapenem: S <=1    -  Gentamicin: S <=4    -  Imipenem: S <=1    -  Levofloxacin: S <=2    -  Meropenem: S <=1    -  Nitrofurantoin: S <=32    -  Piperacillin/Tazobactam: S <=16    -  Tobramycin: S <=4    -  Trimethoprim/Sulfamethoxazole: S <=2/38    Specimen Source: .Urine Clean Catch (Midstream)    Culture Results:   >100,000 CFU/ml Klebsiella pneumoniae    Organism Identification: Klebsiella pneumoniae    Organism: Klebsiella pneumoniae    Method Type: TITI        EXAM:  ABDOMEN (MRI) W O CON                        PROCEDURE DATE:  09/18/2017    INTERPRETATION:  Exam Type: ABDOMEN (MRI) W O CON   Exam Date and Time:  9/18/2017 8:11 PM   Indication:  Abdominal pain, polycystic kidney disease, fever   Comparison: CT 9/16/2017, ultrasound 9/13/2017, MRI 8/18/2013   Procedure: MRI of the abdomen without contrast was performed on a 1.5   Lidia magnet. Contrast was not administered secondary to renal   insufficiency.  Findings:  Innumerable cystic lesions throughout bilateral enlarged kidneys as seen   on multiple prior exams compatible with known polycystic kidney disease.   Multiple bilateral hemorrhagic renal cyst identified. Limited evaluation   of solid masses given lack of IV contrast however no definite findings to   suggest solid lesions. Minimal to mild infiltrative stranding is seen   surrounding the proximal right ureter as seen on CT of prior day. No   hydronephrosis bilaterally. No bilateral perinephric collections.   Aortocaval lymph node measuring 1.4 cm unchanged. Several left renal   calcifications as seen on CT.  Several left hepatic cysts are unchanged. No hepatic steatosis.   Subcentimeter peggy hepatis and portacaval lymph nodes likely reactive in   nature. Spleen is enlarged measuring 14 cm. Prior cholecystectomy. No   intrahepatic or extra hepatic biliary dilatation. Pancreas is of normal   signal intensity with tiny less than 2 mm pancreatic body and pancreatic   tail cyst. Pancreatic duct is of normal caliber.  Thickening of the   bilateral adrenal glands without focal mass.  Patchy opacities in the right lung base may represent atelectasis or   pneumonia, clinically correlate. Small umbilical fat-containing hernia.   Mild degenerative changes.  IMPRESSION:  Findings compatible with polycystic kidney disease as seen on prior   exams. No hydronephrosis bilaterally. Minimal to mild infiltrative   changes surrounding the proximal right ureter as seen on recent CT. No   perinephric collections.  Patchy opacities in the right lung base may represent atelectasis or   pneumonia, clinically correlate      EXAM:  NM MULTI DAY PROCEDURE                        EXAM:  NM INFLAMMATORY LOC WBC WB                        EXAM:  NM INFLAMMATORY LOC SPECT                        PROCEDURE DATE:  09/21/2017    INTERPRETATION:  RADIOPHARMACEUTICAL:  0.5 mCi indium labeled autologous   leukocytes, I.V. injected on 9/20/2017.  CLINICAL INFORMATION: 46-year-old female with history of polycystic   kidney disease, presents with recurrent fever, UTI, C. difficile   infection, and abdominal pain, to evaluate for infection.  TECHNIQUE:  Whole-body images were obtained in the anterior and posterior   projections approximately 24 hours after radiotracer administration.   SPECT of the abdomen was also performed.  COMPARISON: No prior labeled leukocyte study available.  FINDINGS: There is labeled leukocyte accumulation in the right mid   abdomen, in the region of the right kidney.  IMPRESSION:   Abnormal indium labeled leukocyte scan.  Findings suspicious for infection of the right kidney        A : 47 y/o F with Pyelonephritis, infected PCKD cyst, C Diff colitis    Problem/Plan - 1:  ·  Problem: Klebsiella pneumoniae infection.  Plan: Patient has Flank pain and findings of  pyelonephritis vs Hemorrhagic cysts  To treat for full course of pyelonephritis with IV aztreonam and complete 1 weeks of Vantin 200mg BID starting tomorrow  Continue Aztreonam, last dose today  Urine cx with Klebsiella Pneumoniae sensitive to Aztreonam  Blood cultures no growth  fevers resolved  2 weeks of antibiotics will be 9/27/17  Vantin till 10/4/17.     Problem/Plan - 2:  ·  Problem: Pyelonephritis, acute.  Plan: Culture with Klebsiella pneumoniae sensitive to Aztreonam  Continue antibiotics as above.     Problem/Plan - 3:  ·  Problem: Clostridium difficile diarrhea.  Plan: Continue PO Vancomycin till 10/9/17  No Diarrhea.     Problem/Plan - 4:  ·  Problem: Polycystic kidney disease.  Plan: Antibiotics adjusted to renal dosing. fever.   HENT: Negative.  Negative for hearing loss.    Eyes: Negative.  Negative for blurred vision and double vision.   Respiratory: Negative.  Negative for cough and hemoptysis.    Cardiovascular: Negative.  Negative for chest pain and palpitations.   Gastrointestinal: Negative.  Negative for heartburn and nausea.   Genitourinary: Negative.  Negative for dysuria.   Musculoskeletal: Negative.  Negative for myalgias.   Skin: Negative.  Negative for rash.   Neurological: Negative.  Negative for dizziness, tingling and headaches.   Endo/Heme/Allergies: Negative.  Does not bruise/bleed easily.   Psychiatric/Behavioral: Negative.  Negative for depression and suicidal ideas.   All other systems reviewed and are negative.         Objective:     /72   Pulse 78   Temp 36.4 °C (97.5 °F) (Temporal)   Wt 87.6 kg (193 lb 1.6 oz)   LMP  (LMP Unknown)   SpO2 96%   BMI 31.17 kg/m²      Physical Exam  Vitals signs and nursing note reviewed.   Constitutional:       General: She is not in acute distress.     Appearance: She is well-developed. She is not diaphoretic.   HENT:      Head: Normocephalic and atraumatic.      Mouth/Throat:      Pharynx: No oropharyngeal exudate.   Eyes:      Pupils: Pupils are equal, round, and reactive to light.   Cardiovascular:      Rate and Rhythm: Normal rate and regular rhythm.      Heart sounds: Normal heart sounds. No murmur. No friction rub. No gallop.    Pulmonary:      Effort: Pulmonary effort is normal. No respiratory distress.      Breath sounds: Normal breath sounds. No wheezing or rales.   Chest:      Chest wall: No tenderness.   Neurological:      Mental Status: She is alert and oriented to person, place, and time.   Psychiatric:         Behavior: Behavior normal.         Thought Content: Thought content normal.         Judgment: Judgment normal.                 Assessment/Plan:        1. Primary osteoarthritis of left knee    - ibuprofen (MOTRIN) 800 MG Tab; Take 1 Tab by mouth  every 8 hours as needed.  Dispense: 90 Tab; Refill: 3  - ibandronate (BONIVA) 150 MG tablet; TAKE ONE TABLET BY MOUTH EVERY 30 DAYS  Dispense: 3 Tab; Refill: 3    2. Other osteoporosis without current pathological fracture    - ibandronate (BONIVA) 150 MG tablet; TAKE ONE TABLET BY MOUTH EVERY 30 DAYS  Dispense: 3 Tab; Refill: 3    3. Hypothyroidism due to acquired atrophy of thyroid    - ibandronate (BONIVA) 150 MG tablet; TAKE ONE TABLET BY MOUTH EVERY 30 DAYS  Dispense: 3 Tab; Refill: 3    4. Obesity (BMI 30-39.9)    - ibandronate (BONIVA) 150 MG tablet; TAKE ONE TABLET BY MOUTH EVERY 30 DAYS  Dispense: 3 Tab; Refill: 3    5. Need for vaccination    - Zoster Vac Recomb Adjuvanted (SHINGRIX) 50 MCG/0.5ML Recon Susp; Inject 0.5 mL into the shoulder, thigh, or buttocks one time for 1 dose.  Dispense: 1 Each; Refill: 0

## 2021-01-28 ENCOUNTER — APPOINTMENT (OUTPATIENT)
Dept: TRANSPLANT | Facility: CLINIC | Age: 51
End: 2021-01-28

## 2021-01-29 ENCOUNTER — NON-APPOINTMENT (OUTPATIENT)
Age: 51
End: 2021-01-29

## 2021-01-29 LAB
ALBUMIN SERPL ELPH-MCNC: 4.2 G/DL
ALP BLD-CCNC: 44 U/L
ALT SERPL-CCNC: 18 U/L
ANION GAP SERPL CALC-SCNC: 11 MMOL/L
APPEARANCE: ABNORMAL
AST SERPL-CCNC: 18 U/L
BACTERIA: ABNORMAL
BASOPHILS # BLD AUTO: 0.04 K/UL
BASOPHILS NFR BLD AUTO: 0.6 %
BILIRUB SERPL-MCNC: 0.9 MG/DL
BILIRUBIN URINE: NEGATIVE
BLOOD URINE: NEGATIVE
BUN SERPL-MCNC: 16 MG/DL
CALCIUM SERPL-MCNC: 9.2 MG/DL
CALCIUM SERPL-MCNC: 9.2 MG/DL
CHLORIDE SERPL-SCNC: 104 MMOL/L
CO2 SERPL-SCNC: 24 MMOL/L
COLOR: YELLOW
CREAT SERPL-MCNC: 1.17 MG/DL
CREAT SPEC-SCNC: 257 MG/DL
CREAT/PROT UR: 0.1 RATIO
EOSINOPHIL # BLD AUTO: 0.18 K/UL
EOSINOPHIL NFR BLD AUTO: 2.6 %
GLUCOSE QUALITATIVE U: NEGATIVE
GLUCOSE SERPL-MCNC: 142 MG/DL
HCT VFR BLD CALC: 36.7 %
HGB BLD-MCNC: 11.7 G/DL
HYALINE CASTS: 0 /LPF
IMM GRANULOCYTES NFR BLD AUTO: 0.1 %
KETONES URINE: NEGATIVE
LDH SERPL-CCNC: 197 U/L
LEUKOCYTE ESTERASE URINE: NEGATIVE
LYMPHOCYTES # BLD AUTO: 1.73 K/UL
LYMPHOCYTES NFR BLD AUTO: 25.4 %
MAGNESIUM SERPL-MCNC: 1.7 MG/DL
MAN DIFF?: NORMAL
MCHC RBC-ENTMCNC: 31.8 PG
MCHC RBC-ENTMCNC: 31.9 GM/DL
MCV RBC AUTO: 99.7 FL
MICROSCOPIC-UA: NORMAL
MONOCYTES # BLD AUTO: 0.56 K/UL
MONOCYTES NFR BLD AUTO: 8.2 %
NEUTROPHILS # BLD AUTO: 4.29 K/UL
NEUTROPHILS NFR BLD AUTO: 63.1 %
NITRITE URINE: NEGATIVE
PARATHYROID HORMONE INTACT: 44 PG/ML
PH URINE: 5.5
PHOSPHATE SERPL-MCNC: 3.6 MG/DL
PLATELET # BLD AUTO: 158 K/UL
POTASSIUM SERPL-SCNC: 4.3 MMOL/L
PROT SERPL-MCNC: 6.4 G/DL
PROT UR-MCNC: 25 MG/DL
PROTEIN URINE: ABNORMAL
RBC # BLD: 3.68 M/UL
RBC # FLD: 14.2 %
RED BLOOD CELLS URINE: 4 /HPF
SODIUM SERPL-SCNC: 139 MMOL/L
SPECIFIC GRAVITY URINE: 1.03
SQUAMOUS EPITHELIAL CELLS: 14 /HPF
TACROLIMUS SERPL-MCNC: 7.1 NG/ML
URATE SERPL-MCNC: 5 MG/DL
UROBILINOGEN URINE: NORMAL
WBC # FLD AUTO: 6.81 K/UL
WHITE BLOOD CELLS URINE: 9 /HPF

## 2021-02-03 LAB
BKV DNA SPEC QL NAA+PROBE: NOT DETECTED COPIES/ML
CMV DNA SPEC QL NAA+PROBE: NOT DETECTED IU/ML

## 2021-02-15 ENCOUNTER — APPOINTMENT (OUTPATIENT)
Dept: ENDOCRINOLOGY | Facility: CLINIC | Age: 51
End: 2021-02-15

## 2021-02-15 ENCOUNTER — TRANSCRIPTION ENCOUNTER (OUTPATIENT)
Age: 51
End: 2021-02-15

## 2021-02-16 ENCOUNTER — TRANSCRIPTION ENCOUNTER (OUTPATIENT)
Age: 51
End: 2021-02-16

## 2021-02-17 ENCOUNTER — APPOINTMENT (OUTPATIENT)
Dept: NEUROSURGERY | Facility: CLINIC | Age: 51
End: 2021-02-17
Payer: COMMERCIAL

## 2021-02-17 PROCEDURE — 99441: CPT | Mod: 95

## 2021-02-17 RX ORDER — DICLOFENAC SODIUM 1% 10 MG/G
1 GEL TOPICAL
Qty: 500 | Refills: 0 | Status: DISCONTINUED | COMMUNITY
Start: 2020-12-10 | End: 2021-02-17

## 2021-02-17 RX ORDER — GLIMEPIRIDE 4 MG/1
4 TABLET ORAL
Qty: 90 | Refills: 1 | Status: DISCONTINUED | COMMUNITY
Start: 2020-02-10 | End: 2021-02-17

## 2021-02-17 RX ORDER — INSULIN GLARGINE 100 [IU]/ML
100 INJECTION, SOLUTION SUBCUTANEOUS DAILY
Qty: 3 | Refills: 3 | Status: DISCONTINUED | COMMUNITY
Start: 2020-01-14 | End: 2021-02-17

## 2021-02-17 RX ORDER — AMOXICILLIN 500 MG/1
500 TABLET, FILM COATED ORAL
Qty: 21 | Refills: 0 | Status: DISCONTINUED | COMMUNITY
Start: 2021-01-20 | End: 2021-02-17

## 2021-02-22 ENCOUNTER — APPOINTMENT (OUTPATIENT)
Dept: FAMILY MEDICINE | Facility: CLINIC | Age: 51
End: 2021-02-22
Payer: COMMERCIAL

## 2021-02-22 VITALS
WEIGHT: 247 LBS | SYSTOLIC BLOOD PRESSURE: 124 MMHG | RESPIRATION RATE: 16 BRPM | DIASTOLIC BLOOD PRESSURE: 80 MMHG | TEMPERATURE: 98.7 F | BODY MASS INDEX: 36.58 KG/M2 | OXYGEN SATURATION: 96 % | HEIGHT: 69 IN | HEART RATE: 67 BPM

## 2021-02-22 DIAGNOSIS — Z01.419 ENCOUNTER FOR GYNECOLOGICAL EXAMINATION (GENERAL) (ROUTINE) W/OUT ABNORMAL FINDINGS: ICD-10-CM

## 2021-02-22 DIAGNOSIS — R42 DIZZINESS AND GIDDINESS: ICD-10-CM

## 2021-02-22 DIAGNOSIS — Z87.898 PERSONAL HISTORY OF OTHER SPECIFIED CONDITIONS: ICD-10-CM

## 2021-02-22 DIAGNOSIS — Z01.411 ENCOUNTER FOR GYNECOLOGICAL EXAMINATION (GENERAL) (ROUTINE) WITH ABNORMAL FINDINGS: ICD-10-CM

## 2021-02-22 DIAGNOSIS — R55 DIZZINESS AND GIDDINESS: ICD-10-CM

## 2021-02-22 PROCEDURE — 99072 ADDL SUPL MATRL&STAF TM PHE: CPT

## 2021-02-22 PROCEDURE — 90471 IMMUNIZATION ADMIN: CPT

## 2021-02-22 PROCEDURE — 90750 HZV VACC RECOMBINANT IM: CPT

## 2021-02-22 NOTE — ADDENDUM
[FreeTextEntry1] : Shingrix vaccine administered. 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 , RN

## 2021-02-23 ENCOUNTER — APPOINTMENT (OUTPATIENT)
Dept: ENDOCRINOLOGY | Facility: CLINIC | Age: 51
End: 2021-02-23
Payer: COMMERCIAL

## 2021-02-23 VITALS
WEIGHT: 247 LBS | BODY MASS INDEX: 37.44 KG/M2 | SYSTOLIC BLOOD PRESSURE: 142 MMHG | HEIGHT: 68 IN | HEART RATE: 78 BPM | OXYGEN SATURATION: 96 % | DIASTOLIC BLOOD PRESSURE: 70 MMHG

## 2021-02-23 PROCEDURE — 99214 OFFICE O/P EST MOD 30 MIN: CPT | Mod: 25

## 2021-02-23 PROCEDURE — 99072 ADDL SUPL MATRL&STAF TM PHE: CPT

## 2021-02-23 PROCEDURE — 95251 CONT GLUC MNTR ANALYSIS I&R: CPT

## 2021-02-23 NOTE — ASSESSMENT
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 Glycemic control markedly improved on Control IQ, but she is having hyperglycemia requiring auto bolus after meals.\par -Adjust ICR from 5 to 4.5.\par -May also need basal adjustment at next visit.\par -Call office with lows.\par -Needs dilated eye exam, referral given.\par -Repeat A1c and RTO for follow-up in 3 months.\par -Glucose sensor/pump necessity: This patient with diabetes performs four or more glucose checks per day utilizing a home blood glucose monitor since diagnosis in January 2020. The patient is treated with insulin via three or more injections daily. Over the past 6 months, this patient has required frequent adjustments to their insulin treatment on the basis of therapeutic continuous glucose monitoring results. In addition, the patient has been to our office for an evaluation of their diabetes control within the past six months, and attends a comprehensive diabetic education program, most recently in November 2020. \par

## 2021-02-23 NOTE — PHYSICAL EXAM

## 2021-02-23 NOTE — REVIEW OF SYSTEMS
[Blurred Vision] : blurred vision [Nausea] : nausea [Recent Weight Gain (___ Lbs)] : no recent weight gain [Recent Weight Loss (___ Lbs)] : no recent weight loss

## 2021-02-23 NOTE — HISTORY OF PRESENT ILLNESS
[FreeTextEntry1] : Type: NODAT\par Severity: moderate\par Duration: January 2020\par Onset: s/p kidney transplant\par Associated symptoms: polyuria, polydipsia\par \par Modifying factors-\par Current meds for glycemic control:\par Tandem Control IQ with Humalog\par Reviewed CGMS. Avg  with 66% of values in range and 34% high. None low. Frequent auto bolus delivery after meals.\par \par On glimepiride in the past, d/c'ed once started on CSII. Trial of Ozempic resulting in nausea/vomiting.\par \par Diet: cut out soda and most junk food, decreased bread, trying to watch\par Weight: stable, but trying to lose\par Exercise: walking daily\par \par Eye exam: needs exam, reports blurry vision\par Foot exam: denies neuropathy\par Heart disease: none\par Kidney disease: s/p kidney transplant for PCKD in 2018\par \par Works as nurse manager at [employer]

## 2021-03-01 NOTE — ASSESSMENT
[FreeTextEntry1] : IMPRESSION: \par Aneurysm is outside of the brain compartment.  \par \par Stable laterally projecting aneurysm from the right ICA as described. No other aneurysm is identified in the intracranial circulation. \par \par No high-grade narrowing or large vessel occlusion. \par \par No high-grade stenosis or occlusion is seen in the dural venous sinus. \par \par \par PLAN:\par 1. MRA BRAIN W/O IN 3 YEARS\par 2. F/U with Neurologist\par

## 2021-03-01 NOTE — HISTORY OF PRESENT ILLNESS
[Home] : at home, [unfilled] , at the time of the visit. 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 She was hospitalized at [facility] ([facility]) from 9/13/17 to 9/27/17. Pt was hospitalized with acute pyelonephritis, sepsis due to klebsiella, C-D iff and complain of severe headache 10/10 with photophobia. Headache was unrelieved with Dilaudid iv, Fioricet, Imitrex with some relief with Percocet. Pt was then given a head MRA without contrast that revealed the aneurysm. A LP at [facility] was negative for xanthochromia.\par \par \par Today she is participating in a telehealth visit to discuss MRA/MRV results done 9/2020.  She reports that she has some "weird" episodes at times regarding expressing herself verbally and she has followed up with Neurology.  She has a negative EEG and MRI.  She is now on an insulin pump because of uncontrolled diabetes.  \par \par She has a hx of polycystic kidney disease and had bilateral native nephrectomy of polycystic kidney with living donor renal transplantation on 4/4/2018.\par \par Her Nephrologist is Dr. Waqas Raygoza\par

## 2021-03-01 NOTE — DATA REVIEWED
[de-identified] : EXAM: MR VENOGRAM BRAIN WAW IC \par \par EXAM: MR ANGIO BRAIN WAW IC \par \par \par PROCEDURE DATE: 09/20/2020 \par \par \par \par INTERPRETATION: INTRACRANIAL MRA AND MR VENOGRAM WITH AND WITHOUT CONTRAST. \par \par HISTORY: Carotid artery aneurysm. \par \par TECHNIQUE: 3-D time-of-flight imaging of the [place]. 3-D or MIP reconstructions were performed. Magnetic resonance venography of the head was performed without contrast utilizing 3-D phase contrast technique. Multiplanar MIP images were generated. \par \par Contrast: 10 mL of intravenous contrast Gadavist was administered without complications. \par \par COMPARISON: MR angiogram of the brain, 7/11/2019. \par \par FINDINGS: \par \par MRA brain: \par \par The bilateral petrous and cavernous carotid arteries are patent. The bilateral anterior and middle cerebral arteries are patent. There is a focal outpouching from the cavernous portion of the right ICA projecting laterally measuring 2.3 x 1.6 mm, stable since prior examinations. \par \par The distal vertebral arteries are patent. The basilar artery is patent. The posterior cerebral arteries and superior cerebellar arteries are patent. \par \par There is no evidence of substantial arterial stenosis or occlusion. There is no evidence of vascular malformation. \par \par MRV brain: \par \par The superior sagittal sinus, bilateral transverse sinuses, and bilateral sigmoid sinuses are patent and demonstrate proper directional flow. The visualized transverse sinuses are codominant. The vein of Fernando, straight sinus, and sinus confluence are patent. \par \par The internal cerebral veins, thalamostriate veins, and basal veins of Rhys are patent. \par \par \par IMPRESSION: \par \par Stable laterally projecting aneurysm from the right ICA as described. No other aneurysm is identified in the intracranial circulation. \par \par No high-grade narrowing or large vessel occlusion. \par \par No high-grade stenosis or occlusion is seen in the dural venous sinus. \par \par \par \par \par \par \par \par \par \par NGHIA FRANK M.D., ATTENDING RADIOLOGIST \par This document has been electronically signed. Sep 21 2020 1:45PM \par

## 2021-03-02 ENCOUNTER — TRANSCRIPTION ENCOUNTER (OUTPATIENT)
Age: 51
End: 2021-03-02

## 2021-03-04 ENCOUNTER — TRANSCRIPTION ENCOUNTER (OUTPATIENT)
Age: 51
End: 2021-03-04

## 2021-03-07 LAB
ALBUMIN SERPL ELPH-MCNC: 4 G/DL
ANION GAP SERPL CALC-SCNC: 9 MMOL/L
APPEARANCE: CLEAR
BACTERIA: ABNORMAL
BASOPHILS # BLD AUTO: 0.05 K/UL
BASOPHILS NFR BLD AUTO: 0.6 %
BILIRUBIN URINE: NEGATIVE
BLOOD URINE: NEGATIVE
BUN SERPL-MCNC: 20 MG/DL
CALCIUM SERPL-MCNC: 9.4 MG/DL
CHLORIDE SERPL-SCNC: 103 MMOL/L
CO2 SERPL-SCNC: 27 MMOL/L
COLOR: NORMAL
CREAT SERPL-MCNC: 1.21 MG/DL
CREAT SPEC-SCNC: 65 MG/DL
CREAT/PROT UR: 0.1 RATIO
EOSINOPHIL # BLD AUTO: 0.21 K/UL
EOSINOPHIL NFR BLD AUTO: 2.5 %
GLUCOSE QUALITATIVE U: NEGATIVE
GLUCOSE SERPL-MCNC: 148 MG/DL
HCT VFR BLD CALC: 37.9 %
HGB BLD-MCNC: 12.5 G/DL
HYALINE CASTS: 1 /LPF
IMM GRANULOCYTES NFR BLD AUTO: 0.4 %
KETONES URINE: NEGATIVE
LEUKOCYTE ESTERASE URINE: NEGATIVE
LYMPHOCYTES # BLD AUTO: 1.88 K/UL
LYMPHOCYTES NFR BLD AUTO: 22.6 %
MAN DIFF?: NORMAL
MCHC RBC-ENTMCNC: 32.7 PG
MCHC RBC-ENTMCNC: 33 GM/DL
MCV RBC AUTO: 99.2 FL
MICROSCOPIC-UA: NORMAL
MONOCYTES # BLD AUTO: 0.63 K/UL
MONOCYTES NFR BLD AUTO: 7.6 %
NEUTROPHILS # BLD AUTO: 5.52 K/UL
NEUTROPHILS NFR BLD AUTO: 66.3 %
NITRITE URINE: NEGATIVE
PH URINE: 6
PHOSPHATE SERPL-MCNC: 3.6 MG/DL
PLATELET # BLD AUTO: 163 K/UL
POTASSIUM SERPL-SCNC: 4.5 MMOL/L
PROT UR-MCNC: 5 MG/DL
PROTEIN URINE: NEGATIVE
RBC # BLD: 3.82 M/UL
RBC # FLD: 13.5 %
RED BLOOD CELLS URINE: 3 /HPF
SODIUM SERPL-SCNC: 139 MMOL/L
SPECIFIC GRAVITY URINE: 1.01
SQUAMOUS EPITHELIAL CELLS: 6 /HPF
TACROLIMUS SERPL-MCNC: 4.9 NG/ML
UROBILINOGEN URINE: NORMAL
WBC # FLD AUTO: 8.32 K/UL
WHITE BLOOD CELLS URINE: 2 /HPF

## 2021-03-10 ENCOUNTER — APPOINTMENT (OUTPATIENT)
Dept: NEPHROLOGY | Facility: CLINIC | Age: 51
End: 2021-03-10
Payer: COMMERCIAL

## 2021-03-10 VITALS
SYSTOLIC BLOOD PRESSURE: 156 MMHG | HEART RATE: 70 BPM | BODY MASS INDEX: 37.28 KG/M2 | HEIGHT: 68 IN | WEIGHT: 246 LBS | TEMPERATURE: 97.6 F | DIASTOLIC BLOOD PRESSURE: 86 MMHG

## 2021-03-10 DIAGNOSIS — Z87.448 PERSONAL HISTORY OF OTHER DISEASES OF URINARY SYSTEM: ICD-10-CM

## 2021-03-10 DIAGNOSIS — Z86.39 PERSONAL HISTORY OF OTHER ENDOCRINE, NUTRITIONAL AND METABOLIC DISEASE: ICD-10-CM

## 2021-03-10 DIAGNOSIS — Z86.79 PERSONAL HISTORY OF OTHER DISEASES OF THE CIRCULATORY SYSTEM: ICD-10-CM

## 2021-03-10 DIAGNOSIS — Z87.440 PERSONAL HISTORY OF URINARY (TRACT) INFECTIONS: ICD-10-CM

## 2021-03-10 DIAGNOSIS — Z09 ENCOUNTER FOR FOLLOW-UP EXAMINATION AFTER COMPLETED TREATMENT FOR CONDITIONS OTHER THAN MALIGNANT NEOPLASM: ICD-10-CM

## 2021-03-10 DIAGNOSIS — A04.72 ENTEROCOLITIS DUE TO CLOSTRIDIUM DIFFICILE, NOT SPECIFIED AS RECURRENT: ICD-10-CM

## 2021-03-10 DIAGNOSIS — Z87.442 PERSONAL HISTORY OF URINARY CALCULI: ICD-10-CM

## 2021-03-10 DIAGNOSIS — Z87.718 PERSONAL HISTORY OF OTHER SPECIFIED (CORRECTED) CONGENITAL MALFORMATIONS OF GENITOURINARY SYSTEM: ICD-10-CM

## 2021-03-10 DIAGNOSIS — N17.9 ACUTE KIDNEY FAILURE, UNSPECIFIED: ICD-10-CM

## 2021-03-10 PROCEDURE — 99215 OFFICE O/P EST HI 40 MIN: CPT

## 2021-03-10 PROCEDURE — 99072 ADDL SUPL MATRL&STAF TM PHE: CPT

## 2021-03-10 NOTE — PHYSICAL EXAM
[General Appearance - Alert] : alert [General Appearance - In No Acute Distress] : in no acute distress [Sclera] : the sclera and conjunctiva were normal [PERRL With Normal Accommodation] : pupils were equal in size, round, and reactive to light [Extraocular Movements] : extraocular movements were intact [Outer Ear] : the ears and nose were normal in appearance [Oropharynx] : the oropharynx was normal [Neck Appearance] : the appearance of the neck was normal [Neck Cervical Mass (___cm)] : no neck mass was observed [Jugular Venous Distention Increased] : there was no jugular-venous distention [Thyroid Diffuse Enlargement] : the thyroid was not enlarged [Thyroid Nodule] : there were no palpable thyroid nodules [Auscultation Breath Sounds / Voice Sounds] : lungs were clear to auscultation bilaterally [Heart Rate And Rhythm] : heart rate was normal and rhythm regular [Heart Sounds] : normal S1 and S2 [Heart Sounds Gallop] : no gallops [Murmurs] : no murmurs [Heart Sounds Pericardial Friction Rub] : no pericardial rub [Full Pulse] : the pedal pulses are present [Edema] : there was no peripheral edema [Bowel Sounds] : normal bowel sounds [Abdomen Soft] : soft [Abdomen Tenderness] : non-tender [Abdomen Mass (___ Cm)] : no abdominal mass palpated [Cervical Lymph Nodes Enlarged Posterior Bilaterally] : posterior cervical [Cervical Lymph Nodes Enlarged Anterior Bilaterally] : anterior cervical [Supraclavicular Lymph Nodes Enlarged Bilaterally] : supraclavicular [No CVA Tenderness] : no ~M costovertebral angle tenderness [No Spinal Tenderness] : no spinal tenderness [Abnormal Walk] : normal gait [Nail Clubbing] : no clubbing  or cyanosis of the fingernails [Musculoskeletal - Swelling] : no joint swelling seen [Motor Tone] : muscle strength and tone were normal [Skin Color & Pigmentation] : normal skin color and pigmentation [Skin Turgor] : normal skin turgor [] : no rash [Sensation] : the sensory exam was normal to light touch and pinprick [No Focal Deficits] : no focal deficits [Oriented To Time, Place, And Person] : oriented to person, place, and time [Impaired Insight] : insight and judgment were intact [Affect] : the affect was normal

## 2021-03-12 ENCOUNTER — TRANSCRIPTION ENCOUNTER (OUTPATIENT)
Age: 51
End: 2021-03-12

## 2021-03-13 ENCOUNTER — TRANSCRIPTION ENCOUNTER (OUTPATIENT)
Age: 51
End: 2021-03-13

## 2021-03-14 LAB
CREAT SPEC-SCNC: 146 MG/DL
CREAT/PROT UR: 0.1 RATIO
PROT UR-MCNC: 16 MG/DL

## 2021-03-15 ENCOUNTER — TRANSCRIPTION ENCOUNTER (OUTPATIENT)
Age: 51
End: 2021-03-15

## 2021-03-16 LAB — BACTERIA UR CULT: ABNORMAL

## 2021-03-16 NOTE — ASSESSMENT
[FreeTextEntry1] : 1)   Kidney Transplant recipient; \par Stable allograft function\par Tacrolimus level at goal\par \par 2) Asymptomatic bacteruria\par U/A-few bacteria- C& S ordered today\par \par 3)  DM\par Continue insulin pump as prescribed to optimize glycemic control \par Weight loss encouraged\par continue f/u with Dr. Berman\par \par 4) HTN:\par BP elevated today; previously at goal\par Pt reports stress this am and just took meds\par Plan to monitor BP at home and inform if BP > 140/90\par Continue low Na diet\par \par RTC 3-4 months\par \par Addendum\par UCx with 10-50 cfu E fecalis sensitve to ampicillin\par will treat with amoxicillin 500 mg q12h for 3 days\par disucssed with pt

## 2021-03-16 NOTE — HISTORY OF PRESENT ILLNESS
[___ Month(s) Ago] : [unfilled] month(s) ago [Ordering Test(s) ___] : ordering [unfilled] [Stable] : stable [Good Compliance] : good compliance with treatment [Good Tolerance] : good tolerance of treatment [Good Symptom Control] : good symptom control [Transplant Date ___] : done [unfilled] [Transplant Center ___] : performed at [unfilled] [Nephrologist (Transplant): ___] : The transplant nephrologist was Dr. 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  \par Enrolled in [facility] Research Study Re: Antibody formation in Pt's that receive COVID vaccine after solid organ transplant.  Pt states she was just told she has developed antibodies. \par \par Pt just took her BP medications upon arrival to appt. \par \par Recently started Insulin Pump under care of Dr. Berman.

## 2021-03-16 NOTE — REVIEW OF SYSTEMS
[Arthralgias] : arthralgias [Negative] : Heme/Lymph [FreeTextEntry9] : chronic neck pain [de-identified] : had recent dermatology-full body exam [de-identified] : Diabetic recently started Insulin pump

## 2021-03-18 ENCOUNTER — APPOINTMENT (OUTPATIENT)
Dept: OPHTHALMOLOGY | Facility: CLINIC | Age: 51
End: 2021-03-18
Payer: COMMERCIAL

## 2021-03-18 ENCOUNTER — NON-APPOINTMENT (OUTPATIENT)
Age: 51
End: 2021-03-18

## 2021-03-18 PROCEDURE — 99072 ADDL SUPL MATRL&STAF TM PHE: CPT

## 2021-03-18 PROCEDURE — 92004 COMPRE OPH EXAM NEW PT 1/>: CPT

## 2021-03-22 ENCOUNTER — EMERGENCY (EMERGENCY)
Facility: HOSPITAL | Age: 51
LOS: 1 days | Discharge: HOME | End: 2021-03-22
Attending: EMERGENCY MEDICINE
Payer: COMMERCIAL

## 2021-03-22 ENCOUNTER — TRANSCRIPTION ENCOUNTER (OUTPATIENT)
Age: 51
End: 2021-03-22

## 2021-03-22 VITALS
SYSTOLIC BLOOD PRESSURE: 155 MMHG | DIASTOLIC BLOOD PRESSURE: 73 MMHG | HEIGHT: 68 IN | TEMPERATURE: 98 F | OXYGEN SATURATION: 95 % | RESPIRATION RATE: 20 BRPM | HEART RATE: 69 BPM

## 2021-03-22 DIAGNOSIS — Z90.49 ACQUIRED ABSENCE OF OTHER SPECIFIED PARTS OF DIGESTIVE TRACT: Chronic | ICD-10-CM

## 2021-03-22 DIAGNOSIS — Z90.5 ACQUIRED ABSENCE OF KIDNEY: Chronic | ICD-10-CM

## 2021-03-22 DIAGNOSIS — N20.0 CALCULUS OF KIDNEY: Chronic | ICD-10-CM

## 2021-03-22 DIAGNOSIS — Z98.890 OTHER SPECIFIED POSTPROCEDURAL STATES: Chronic | ICD-10-CM

## 2021-03-22 DIAGNOSIS — Z94.0 KIDNEY TRANSPLANT STATUS: Chronic | ICD-10-CM

## 2021-03-22 DIAGNOSIS — I77.0 ARTERIOVENOUS FISTULA, ACQUIRED: Chronic | ICD-10-CM

## 2021-03-22 DIAGNOSIS — Z98.51 TUBAL LIGATION STATUS: Chronic | ICD-10-CM

## 2021-03-22 PROCEDURE — 73080 X-RAY EXAM OF ELBOW: CPT | Mod: 26,RT

## 2021-03-22 PROCEDURE — 93971 EXTREMITY STUDY: CPT | Mod: 26,RT

## 2021-03-22 PROCEDURE — 99285 EMERGENCY DEPT VISIT HI MDM: CPT

## 2021-03-22 PROCEDURE — 73080 X-RAY EXAM OF ELBOW: CPT

## 2021-03-22 PROCEDURE — 93971 EXTREMITY STUDY: CPT

## 2021-03-22 PROCEDURE — 99284 EMERGENCY DEPT VISIT MOD MDM: CPT | Mod: 25

## 2021-03-22 PROCEDURE — 73030 X-RAY EXAM OF SHOULDER: CPT | Mod: 26,RT

## 2021-03-22 PROCEDURE — 73030 X-RAY EXAM OF SHOULDER: CPT

## 2021-03-22 RX ORDER — ACETAMINOPHEN 500 MG
650 TABLET ORAL ONCE
Refills: 0 | Status: COMPLETED | OUTPATIENT
Start: 2021-03-22 | End: 2021-03-22

## 2021-03-22 RX ADMIN — Medication 650 MILLIGRAM(S): at 22:16

## 2021-03-22 NOTE — ED PROVIDER NOTE - ATTENDING CONTRIBUTION TO CARE
I personally saw the patient with the PA, and completed the key components of the history and physical exam. I then discussed the management plan with the PA.    gen in nad resp clear cardiac no murmur abd soft msk skin neurovasc intact no sign cellulitis   agree with pa plan of care

## 2021-03-22 NOTE — ED PROVIDER NOTE - PHYSICAL EXAMINATION
Const: Awake, alert and oriented. In no acute distress. Well appearing.  HEENT: NC/AT. Moist mucous membranes.  Eyes: No scleral icterus. EOMI.  Neck:. Soft and supple. Full ROM without pain.  Cardiac: +S1/S2. No murmurs. Peripheral pulses 2+ and symmetric. No LE edema.  Resp: Speaking in full sentences. No evidence of respiratory distress. No wheezes, rales or rhonchi.  Abd: Soft, non-tender, non-distended. Normal bowel sounds in all 4 quadrants. No guarding or rebound.  Back: Spine midline and non-tender. No CVAT.  MSK: Tenderness over left shoulder/left elbow on palpation FROM in all extremities neurovasculary intact, radial pulse 2+, hand  5/5   Skin: No rashes, abrasions or lacerations. no signs of infection noted, no erythema or linear streaking   Lymph: No cervical lymphadenopathy.  Neuro: Awake, alert & oriented x 3. CN II-XII intact, finger to nose intact neurovascularly intact muscle strength 5/5 x 4 extremities gait without ataxia

## 2021-03-22 NOTE — ED PROVIDER NOTE - PATIENT PORTAL LINK FT
You can access the FollowMyHealth Patient Portal offered by [facility] by registering at the following website: [email or web address]. By joining [facility]’s FollowMyHealth portal, you will also be able to view your health information using other applications (apps) compatible with our system.

## 2021-03-22 NOTE — ED ADULT TRIAGE NOTE - CHIEF COMPLAINT QUOTE
sent by urgent care for r/o blood clot in right upper arm. per patient pain started yesterday but today was increasing which made her leave work and go to . denies chest pain, SOB, in NAD in triage.

## 2021-03-22 NOTE — ED PROVIDER NOTE - OBJECTIVE STATEMENT
pt is a 51 y/o female with a pmhx of kidney transplant presenting to the ed for evaluation. pt stating that she was sent in by urgent care to evaluate for blood clot in the right upper arm. pt states in the past day she started experiencing pain near her right elbow that has progressively worsened. pt states pain is now from her shoulder down. pt denies injuries or trauma to the area. pt denies recent covid infection, no hx of dvt or pe. pt denies cp, sob, fever cough abd pain nausea vomiting back pain dysuria, numbness or loss of senastion abrasions or lacerations. pt states has old fistula in her right arm - due to hx of kidney transplant

## 2021-05-18 ENCOUNTER — APPOINTMENT (OUTPATIENT)
Dept: ENDOCRINOLOGY | Facility: CLINIC | Age: 51
End: 2021-05-18

## 2021-06-01 ENCOUNTER — TRANSCRIPTION ENCOUNTER (OUTPATIENT)
Age: 51
End: 2021-06-01

## 2021-06-08 LAB
APPEARANCE: CLEAR
BACTERIA: ABNORMAL
BASOPHILS # BLD AUTO: 0.03 K/UL
BASOPHILS NFR BLD AUTO: 0.4 %
BILIRUBIN URINE: NEGATIVE
BLOOD URINE: NEGATIVE
COLOR: NORMAL
EOSINOPHIL # BLD AUTO: 0.16 K/UL
EOSINOPHIL NFR BLD AUTO: 1.9 %
ESTIMATED AVERAGE GLUCOSE: 97 MG/DL
GLUCOSE QUALITATIVE U: NEGATIVE
HBA1C MFR BLD HPLC: 5 %
HCT VFR BLD CALC: 40.1 %
HGB BLD-MCNC: 12.9 G/DL
HYALINE CASTS: 5 /LPF
IMM GRANULOCYTES NFR BLD AUTO: 0.1 %
KETONES URINE: NEGATIVE
LEUKOCYTE ESTERASE URINE: NEGATIVE
LYMPHOCYTES # BLD AUTO: 1.93 K/UL
LYMPHOCYTES NFR BLD AUTO: 22.8 %
MAN DIFF?: NORMAL
MCHC RBC-ENTMCNC: 32.2 GM/DL
MCHC RBC-ENTMCNC: 33.5 PG
MCV RBC AUTO: 104.2 FL
MICROSCOPIC-UA: NORMAL
MONOCYTES # BLD AUTO: 0.71 K/UL
MONOCYTES NFR BLD AUTO: 8.4 %
NEUTROPHILS # BLD AUTO: 5.63 K/UL
NEUTROPHILS NFR BLD AUTO: 66.4 %
NITRITE URINE: NEGATIVE
PH URINE: 6
PLATELET # BLD AUTO: 176 K/UL
PROTEIN URINE: NEGATIVE
RBC # BLD: 3.85 M/UL
RBC # FLD: 13.7 %
RED BLOOD CELLS URINE: 2 /HPF
SPECIFIC GRAVITY URINE: 1.01
SQUAMOUS EPITHELIAL CELLS: 4 /HPF
TACROLIMUS SERPL-MCNC: 5.9 NG/ML
UROBILINOGEN URINE: NORMAL
WBC # FLD AUTO: 8.47 K/UL
WHITE BLOOD CELLS URINE: 1 /HPF

## 2021-06-09 LAB
25(OH)D3 SERPL-MCNC: 45.9 NG/ML
ALBUMIN SERPL ELPH-MCNC: 4.1 G/DL
ANION GAP SERPL CALC-SCNC: 16 MMOL/L
BUN SERPL-MCNC: 17 MG/DL
CALCIUM SERPL-MCNC: 9.4 MG/DL
CALCIUM SERPL-MCNC: 9.4 MG/DL
CHLORIDE SERPL-SCNC: 102 MMOL/L
CHOLEST SERPL-MCNC: 156 MG/DL
CO2 SERPL-SCNC: 23 MMOL/L
CREAT SERPL-MCNC: 1.04 MG/DL
CREAT SPEC-SCNC: 77 MG/DL
CREAT/PROT UR: 0.1 RATIO
GLUCOSE SERPL-MCNC: 118 MG/DL
HDLC SERPL-MCNC: 35 MG/DL
LDLC SERPL CALC-MCNC: 86 MG/DL
NONHDLC SERPL-MCNC: 122 MG/DL
PARATHYROID HORMONE INTACT: 51 PG/ML
PHOSPHATE SERPL-MCNC: 3.6 MG/DL
POTASSIUM SERPL-SCNC: 4.2 MMOL/L
PROT UR-MCNC: 4 MG/DL
SODIUM SERPL-SCNC: 141 MMOL/L
TRIGL SERPL-MCNC: 179 MG/DL

## 2021-06-15 LAB
25(OH)D3 SERPL-MCNC: 44.5 NG/ML
ALBUMIN SERPL ELPH-MCNC: 4.1 G/DL
ANION GAP SERPL CALC-SCNC: 18 MMOL/L
BUN SERPL-MCNC: 17 MG/DL
CALCIUM SERPL-MCNC: 9.4 MG/DL
CHLORIDE SERPL-SCNC: 104 MMOL/L
CHOLEST SERPL-MCNC: 155 MG/DL
CO2 SERPL-SCNC: 20 MMOL/L
CREAT SERPL-MCNC: 1.05 MG/DL
GLUCOSE SERPL-MCNC: 117 MG/DL
HDLC SERPL-MCNC: 33 MG/DL
LDLC SERPL CALC-MCNC: 86 MG/DL
MAGNESIUM SERPL-MCNC: 1.7 MG/DL
NONHDLC SERPL-MCNC: 122 MG/DL
PHOSPHATE SERPL-MCNC: 3.7 MG/DL
POTASSIUM SERPL-SCNC: 4.3 MMOL/L
SODIUM SERPL-SCNC: 142 MMOL/L
TRIGL SERPL-MCNC: 181 MG/DL

## 2021-06-16 ENCOUNTER — APPOINTMENT (OUTPATIENT)
Dept: NEPHROLOGY | Facility: CLINIC | Age: 51
End: 2021-06-16
Payer: COMMERCIAL

## 2021-06-16 VITALS
DIASTOLIC BLOOD PRESSURE: 76 MMHG | SYSTOLIC BLOOD PRESSURE: 154 MMHG | HEART RATE: 66 BPM | WEIGHT: 242 LBS | BODY MASS INDEX: 36.68 KG/M2 | RESPIRATION RATE: 16 BRPM | OXYGEN SATURATION: 95 % | HEIGHT: 68 IN

## 2021-06-16 PROCEDURE — 99215 OFFICE O/P EST HI 40 MIN: CPT

## 2021-06-16 PROCEDURE — 99072 ADDL SUPL MATRL&STAF TM PHE: CPT

## 2021-06-16 NOTE — PHYSICAL EXAM
[General Appearance - Alert] : alert [General Appearance - In No Acute Distress] : in no acute distress [Sclera] : the sclera and conjunctiva were normal [Strabismus] : no strabismus was seen [Outer Ear] : the ears and nose were normal in appearance [Hearing Threshold Finger Rub Not [place]] : hearing was normal [Examination Of The Oral Cavity] : the lips and gums were normal [Neck Appearance] : the appearance of the neck was normal [Neck Cervical Mass (___cm)] : no neck mass was observed [Jugular Venous Distention Increased] : there was no jugular-venous distention [Respiration, Rhythm And Depth] : normal respiratory rhythm and effort [Exaggerated Use Of Accessory Muscles For Inspiration] : no accessory muscle use [Auscultation Breath Sounds / Voice Sounds] : lungs were clear to auscultation bilaterally [Apical Impulse] : the apical impulse was normal [Heart Rate And Rhythm] : heart rate was normal and rhythm regular [Heart Sounds] : normal S1 and S2 [Murmurs] : no murmurs [Edema] : there was no peripheral edema [Bowel Sounds] : normal bowel sounds [Abdomen Soft] : soft [] : no hepato-splenomegaly [Abdomen Tenderness] : non-tender [Abdomen Mass (___ Cm)] : no abdominal mass palpated [No CVA Tenderness] : no ~M costovertebral angle tenderness [Motor Tone] : muscle strength and tone were normal [Nail Clubbing] : no clubbing  or cyanosis of the fingernails [___ (cm) Fistula] : [unfilled] (cm) fistula [Skin Color & Pigmentation] : normal skin color and pigmentation [Skin Turgor] : normal skin turgor [Skin Lesions] : no skin lesions [No Focal Deficits] : no focal deficits [Oriented To Time, Place, And Person] : oriented to person, place, and time [Impaired Insight] : insight and judgment were intact [Affect] : the affect was normal [Mood] : the mood was normal [FreeTextEntry1] : non-functioning

## 2021-06-16 NOTE — REVIEW OF SYSTEMS
[Eyesight Problems] : eyesight problems [Negative] : Heme/Lymph [de-identified] : intermittent numbness/tingling in fingers of right hand (non-functioning fistula in right arm)

## 2021-06-16 NOTE — ASSESSMENT
[FreeTextEntry1] : PCKD with b/l nephrectomies\par LURT in 2018: stable allograft function. UA without hematuria /proteinuria\par Continue current immunosuppressive regimen\par Derm/ Opthalmology follow up\par \par HTN- BP above goal- took BP meds not too long ago this Am\par advised to keep Bp log\par  \par NODAT: on insulin and Glimepiride. Prednisone dose decreased to 2.5 mg\par HbA1c improved\par Endocrinology follow up\par \par \par Hypomagnesemia;  in setting of tacrolimus use/ diarrhea with MMF\par Mg++ levels stable\par continue Mag -Oxide 400 mg daily\par \par \par \par RTC in 3 months\par

## 2021-06-16 NOTE — HISTORY OF PRESENT ILLNESS
[___ Month(s) Ago] : [unfilled] month(s) ago [Good Compliance] : good compliance with treatment [FreeTextEntry1] : 50 year old woman with PCKD s/p LURT from ex  and bilateral native nephrectomies on 4/2/18.\par Course was complicated with slow function assumed to be early ACR (low tacro levels) treated with solumedrol 750mgs. Pt also had Cdiff treated with vancomycin. 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

## 2021-06-18 ENCOUNTER — RX RENEWAL (OUTPATIENT)
Age: 51
End: 2021-06-18

## 2021-07-06 ENCOUNTER — APPOINTMENT (OUTPATIENT)
Dept: OBGYN | Facility: CLINIC | Age: 51
End: 2021-07-06

## 2021-07-13 ENCOUNTER — APPOINTMENT (OUTPATIENT)
Dept: OBGYN | Facility: CLINIC | Age: 51
End: 2021-07-13
Payer: COMMERCIAL

## 2021-07-13 VITALS
DIASTOLIC BLOOD PRESSURE: 84 MMHG | TEMPERATURE: 97.6 F | WEIGHT: 245 LBS | BODY MASS INDEX: 37.13 KG/M2 | HEIGHT: 68 IN | SYSTOLIC BLOOD PRESSURE: 138 MMHG

## 2021-07-13 DIAGNOSIS — Z01.419 ENCOUNTER FOR GYNECOLOGICAL EXAMINATION (GENERAL) (ROUTINE) W/OUT ABNORMAL FINDINGS: ICD-10-CM

## 2021-07-13 PROCEDURE — 99072 ADDL SUPL MATRL&STAF TM PHE: CPT

## 2021-07-13 PROCEDURE — 99396 PREV VISIT EST AGE 40-64: CPT

## 2021-07-14 LAB — HPV HIGH+LOW RISK DNA PNL CVX: NOT DETECTED

## 2021-07-14 NOTE — PHYSICAL EXAM
[Appropriately responsive] : appropriately responsive [Alert] : alert [No Acute Distress] : no acute distress [Soft] : soft [Non-tender] : non-tender [Non-distended] : non-distended [Oriented x3] : oriented x3 [Examination Of The Breasts] : a normal appearance [No Discharge] : no discharge [No Masses] : no breast masses were palpable [Labia Majora] : normal [Labia Minora] : normal [No Bleeding] : There was no active vaginal bleeding [Normal] : normal [Uterine Adnexae] : non-palpable

## 2021-07-14 NOTE — DISCUSSION/SUMMARY
[FreeTextEntry1] :  The benefits of adequate vitamin D for bone health were reviewed. \par   The patient was informed regarding the benefits of a screening colonoscopy.\par   The importance of safer-sex was discussed with the patient.\par We reviewed ASCCP/ACOG guidelines for pap smears, she is on immunosuppressive treatment and will need annual pap smears. \par \par She is interested in trial of paroxetine for vasomotor symptoms. Rx sent to pharmacy with correct usage reviewed.

## 2021-07-14 NOTE — HISTORY OF PRESENT ILLNESS
[LMP unknown] : LMP unknown [N] : Patient reports normal menses [IUD] : has an intrauterine device [Tubal Occlusion] : has had a tubal occlusion [Y] : Positive pregnancy history [unknown] : Patient is unsure of the date of her LMP [Currently Active] : currently active [Men] : men [No] : No [Patient refuses STI testing] : Patient refuses STI testing [FreeTextEntry1] : Angeline is a  LMP unknown with IUD who presents for annual exam. She is without complaints. Denies pelvic pain, abnormal bleeding, or vaginal discharge. Denies issues with bowel or bladder function. \par She has been bothered by hot flashes and would like treatment. \par She is sexually active with 1 male partner (s). Denies pain with intercourse. She is sexually satisfied. \par Feels safe at home. Denies depression/abuse. Endorses seatbelt use.\par Immunizations: Received covid vaccine. \par   [Mammogramdate] : 7/15/20 [TextBox_19] : br2 [BreastSonogramDate] : 7/15/20 [TextBox_25] : br2 [PapSmeardate] : 6/24/20 [TextBox_31] : ascus [BoneDensityDate] : never [HPVDate] : 6/24/20 [TextBox_78] : positive [de-identified] : mirena placed 2019 [PGHxTotal] : 2 [[facility] Center[facility]FullTerm] : 2 [[facility] Neu[facility] InstitutexLiving] : 2 [Hot Flashes] : hot flashes [Night Sweats] : night sweats

## 2021-07-14 NOTE — REVIEW OF SYSTEMS
[Anxiety] : anxiety [Negative] : Heme/Lymph [Patient Intake Form Reviewed] : Patient intake form was reviewed [Fever] : no fever [Chills] : no chills [Dyspnea] : no dyspnea [Chest Pain] : no chest pain [Abdominal Pain] : no abdominal pain [Abn Vaginal bleeding] : no abnormal vaginal bleeding [Headache] : no headache [Depression] : no depression [de-identified] : excessive sweating/thirst

## 2021-07-19 LAB — CYTOLOGY CVX/VAG DOC THIN PREP: NORMAL

## 2021-07-24 ENCOUNTER — RESULT REVIEW (OUTPATIENT)
Age: 51
End: 2021-07-24

## 2021-07-24 ENCOUNTER — APPOINTMENT (OUTPATIENT)
Dept: ULTRASOUND IMAGING | Facility: CLINIC | Age: 51
End: 2021-07-24

## 2021-07-24 ENCOUNTER — APPOINTMENT (OUTPATIENT)
Dept: MAMMOGRAPHY | Facility: CLINIC | Age: 51
End: 2021-07-24

## 2021-07-24 ENCOUNTER — OUTPATIENT (OUTPATIENT)
Dept: OUTPATIENT SERVICES | Facility: HOSPITAL | Age: 51
LOS: 1 days | End: 2021-07-24
Payer: COMMERCIAL

## 2021-07-24 DIAGNOSIS — Z94.0 KIDNEY TRANSPLANT STATUS: Chronic | ICD-10-CM

## 2021-07-24 DIAGNOSIS — Z98.890 OTHER SPECIFIED POSTPROCEDURAL STATES: Chronic | ICD-10-CM

## 2021-07-24 DIAGNOSIS — N20.0 CALCULUS OF KIDNEY: Chronic | ICD-10-CM

## 2021-07-24 DIAGNOSIS — Z90.49 ACQUIRED ABSENCE OF OTHER SPECIFIED PARTS OF DIGESTIVE TRACT: Chronic | ICD-10-CM

## 2021-07-24 DIAGNOSIS — I77.0 ARTERIOVENOUS FISTULA, ACQUIRED: Chronic | ICD-10-CM

## 2021-07-24 DIAGNOSIS — Z98.51 TUBAL LIGATION STATUS: Chronic | ICD-10-CM

## 2021-07-24 DIAGNOSIS — R92.8 OTHER ABNORMAL AND INCONCLUSIVE FINDINGS ON DIAGNOSTIC IMAGING OF BREAST: ICD-10-CM

## 2021-07-24 DIAGNOSIS — Z90.5 ACQUIRED ABSENCE OF KIDNEY: Chronic | ICD-10-CM

## 2021-07-24 PROCEDURE — 77063 BREAST TOMOSYNTHESIS BI: CPT | Mod: 26

## 2021-07-24 PROCEDURE — 77067 SCR MAMMO BI INCL CAD: CPT | Mod: 26

## 2021-07-24 PROCEDURE — 76641 ULTRASOUND BREAST COMPLETE: CPT

## 2021-07-24 PROCEDURE — 76641 ULTRASOUND BREAST COMPLETE: CPT | Mod: 26,50

## 2021-07-24 PROCEDURE — 77063 BREAST TOMOSYNTHESIS BI: CPT

## 2021-07-24 PROCEDURE — 77067 SCR MAMMO BI INCL CAD: CPT

## 2021-07-26 ENCOUNTER — TRANSCRIPTION ENCOUNTER (OUTPATIENT)
Age: 51
End: 2021-07-26

## 2021-08-04 ENCOUNTER — APPOINTMENT (OUTPATIENT)
Dept: FAMILY MEDICINE | Facility: CLINIC | Age: 51
End: 2021-08-04
Payer: COMMERCIAL

## 2021-08-04 VITALS
TEMPERATURE: 98 F | WEIGHT: 246 LBS | SYSTOLIC BLOOD PRESSURE: 132 MMHG | HEIGHT: 68 IN | BODY MASS INDEX: 37.28 KG/M2 | HEART RATE: 87 BPM | DIASTOLIC BLOOD PRESSURE: 80 MMHG | OXYGEN SATURATION: 96 % | RESPIRATION RATE: 16 BRPM

## 2021-08-04 DIAGNOSIS — Z12.11 ENCOUNTER FOR SCREENING FOR MALIGNANT NEOPLASM OF COLON: ICD-10-CM

## 2021-08-04 DIAGNOSIS — Z12.39 ENCOUNTER FOR OTHER SCREENING FOR MALIGNANT NEOPLASM OF BREAST: ICD-10-CM

## 2021-08-04 PROCEDURE — 99212 OFFICE O/P EST SF 10 MIN: CPT

## 2021-08-04 NOTE — HEALTH RISK ASSESSMENT
[No] : In the past 12 months have you used drugs other than those required for medical reasons? No [No falls in past year] : Patient reported no falls in the past year [0] : 2) Feeling down, depressed, or hopeless: Not at all (0) [PHQ-2 Negative - No further assessment needed] : PHQ-2 Negative - No further assessment needed [] : No [Audit-CScore] : 0 [de-identified] : none [de-identified] : healthy

## 2021-08-04 NOTE — HISTORY OF PRESENT ILLNESS
[FreeTextEntry1] : medication refills [de-identified] : Pt presents requesting medication refills - Klonopin. Pt offers no acute complains.

## 2021-08-04 NOTE — ASSESSMENT
[FreeTextEntry1] : Pt presents for medication refills for anxiety. I-STOP was reviewed, Reference #: [id number]. # 60 tab Klonopin 1 mg were e-prescribed.\par \par Continue follow up with Nephrology / Transplant team as scheduled.\par \par RTO for CPE with Dr Javier.

## 2021-08-05 ENCOUNTER — RX RENEWAL (OUTPATIENT)
Age: 51
End: 2021-08-05

## 2021-08-17 ENCOUNTER — RX RENEWAL (OUTPATIENT)
Age: 51
End: 2021-08-17

## 2021-08-31 ENCOUNTER — TRANSCRIPTION ENCOUNTER (OUTPATIENT)
Age: 51
End: 2021-08-31

## 2021-09-01 ENCOUNTER — APPOINTMENT (OUTPATIENT)
Dept: FAMILY MEDICINE | Facility: CLINIC | Age: 51
End: 2021-09-01
Payer: COMMERCIAL

## 2021-09-01 VITALS
TEMPERATURE: 98.1 F | RESPIRATION RATE: 16 BRPM | BODY MASS INDEX: 38.04 KG/M2 | DIASTOLIC BLOOD PRESSURE: 80 MMHG | HEIGHT: 68 IN | SYSTOLIC BLOOD PRESSURE: 130 MMHG | WEIGHT: 251 LBS | OXYGEN SATURATION: 94 % | HEART RATE: 66 BPM

## 2021-09-01 PROCEDURE — 99396 PREV VISIT EST AGE 40-64: CPT

## 2021-09-01 NOTE — HEALTH RISK ASSESSMENT
[Good] : ~his/her~  mood as  good [Yes] : Yes [Monthly or less (1 pt)] : Monthly or less (1 point) [1 or 2 (0 pts)] : 1 or 2 (0 points) [Never (0 pts)] : Never (0 points) [No] : In the past 12 months have you used drugs other than those required for medical reasons? No [No falls in past year] : Patient reported no falls in the past year [0] : 2) Feeling down, depressed, or hopeless: Not at all (0) [PHQ-2 Negative - No further assessment needed] : PHQ-2 Negative - No further assessment needed [No Retinopathy] : No retinopathy [Patient reported mammogram was normal] : Patient reported mammogram was normal [Patient reported PAP Smear was normal] : Patient reported PAP Smear was normal [None] : None [With Family] : lives with family [# of Members in Household ___] :  household currently consist of [unfilled] member(s) [Employed] : employed [] :  [# Of Children ___] : has [unfilled] children [Sexually Active] : sexually active [Feels Safe at Home] : Feels safe at home [Fully functional (bathing, dressing, toileting, transferring, walking, feeding)] : Fully functional (bathing, dressing, toileting, transferring, walking, feeding) [Fully functional (using the telephone, shopping, preparing meals, housekeeping, doing laundry, using] : Fully functional and needs no help or supervision to perform IADLs (using the telephone, shopping, preparing meals, housekeeping, doing laundry, using transportation, managing medications and managing finances) [Smoke Detector] : smoke detector [Carbon Monoxide Detector] : carbon monoxide detector [Seat Belt] :  uses seat belt [Sunscreen] : uses sunscreen [With Patient/Caregiver] : , with patient/caregiver [Reviewed no changes] : Reviewed, no changes [Aggressive treatment] : aggressive treatment [] : No [Audit-CScore] : 1 [de-identified] : none [de-identified] : regular [[id number]] : 0 [EyeExamDate] : 03/21/ [Change in mental status noted] : No change in mental status noted [Language] : denies difficulty with language [Behavior] : denies difficulty with behavior [Learning/Retaining New Information] : denies difficulty learning/retaining new information [Handling Complex Tasks] : denies difficulty handling complex tasks [Reasoning] : denies difficulty with reasoning [Spatial Ability and Orientation] : denies difficulty with spatial ability and orientation [High Risk Behavior] : no high risk behavior [Reports changes in hearing] : Reports no changes in hearing [Reports changes in vision] : Reports no changes in vision [Reports changes in dental health] : Reports no changes in dental health [Guns at Home] : no guns at home [MammogramDate] : 07/21 [MammogramComments] : BI-RADS 2 [PapSmearDate] : 07/21 [PapSmearComments] : Dr Desai [HIVDate] : 11/17 [HepatitisCDate] : 11/17 [de-identified] : wears hearing aids [AdvancecareDate] : 09/21

## 2021-09-01 NOTE — PAST MEDICAL HISTORY
[Menstruating] : menstruating [Menarche Age ____] : age at menarche was [unfilled] [Irregular Cycle Intervals] : are  irregular [Total Preg ___] : G[unfilled] [Live Births ___] : P[unfilled]  [Full Term ___] : Full Term: [unfilled] [Living ___] : Living: [unfilled] [FreeTextEntry1] : s/p IUD (2019)

## 2021-09-01 NOTE — COUNSELING
[Fall prevention counseling provided] : Fall prevention counseling provided [Adequate lighting] : Adequate lighting [No throw rugs] : No throw rugs [Use proper foot wear] : Use proper foot wear [Behavioral health counseling provided] : Behavioral health counseling provided [Sleep ___ hours/day] : Sleep [unfilled] hours/day [Engage in a relaxing activity] : Engage in a relaxing activity [AUDIT-C Screening administered and reviewed] : AUDIT-C Screening administered and reviewed [Benefits of weight loss discussed] : Benefits of weight loss discussed [Encouraged to increase physical activity] : Encouraged to increase physical activity [Decrease Portions] : decrease portions [____ min/wk Activity] : [unfilled] min/wk activity [None] : None [Good understanding] : Patient has a good understanding of lifestyle changes and steps needed to achieve self management goal

## 2021-09-01 NOTE — HISTORY OF PRESENT ILLNESS
[FreeTextEntry1] : physical exam. [de-identified] : 51 y/o female with PMHx significant for  anxiety on Klonopin, carotid artery aneurysm, complex partial seizures, T2DM on insulin pump, HLD, HTN, CKD s/p Kidney Transplant recipient 2017, presenting to the office for CPE, offers no acute complains.

## 2021-09-01 NOTE — ASSESSMENT
[FreeTextEntry1] : 51 y/o female with PMHx significant for  anxiety on Klonopin, carotid artery aneurysm, complex partial seizures, T2DM on insulin pump, HLD, HTN, CKD s/p Kidney Transplant recipient 2017, presenting to the office for CPE.\par \par ENDO: h/o T2DM on insulin pump\par -Pt followed by Endocrinology Dr Gavin Sánchez\par -Current on Humalog via Pump\par -Last A1c 6/21 5.0\par \par CVS: h/o HTN, HLD\par -Carvedilol 25 mg bid, Losartan 100 mg daily, Pravastatin 20 mg.\par -Diet and exercise encouraged \par \par RENAL: CKD s/p Kidney transplant\par -Following with Dr Rivera\par -Continue Dapsone, Envarsus, Mycophenolate, Prednisone, Vitamin D 3\par \par PSYCH: Anxiety\par -On Klonopin, no Rx generated.\par \par HCM: \par -Blood work as per specialists\par -Mammogram 7/21\par -PAP 7/21\par -Colonoscopy- never done, FIOBT card given\par -Covid vaccine PFIZER 12/31/20, 1/21/21

## 2021-09-09 ENCOUNTER — APPOINTMENT (OUTPATIENT)
Dept: DERMATOLOGY | Facility: CLINIC | Age: 51
End: 2021-09-09
Payer: COMMERCIAL

## 2021-09-09 PROCEDURE — 99213 OFFICE O/P EST LOW 20 MIN: CPT

## 2021-09-13 ENCOUNTER — LABORATORY RESULT (OUTPATIENT)
Age: 51
End: 2021-09-13

## 2021-09-15 ENCOUNTER — NON-APPOINTMENT (OUTPATIENT)
Age: 51
End: 2021-09-15

## 2021-09-15 ENCOUNTER — APPOINTMENT (OUTPATIENT)
Dept: NEPHROLOGY | Facility: CLINIC | Age: 51
End: 2021-09-15
Payer: COMMERCIAL

## 2021-09-15 VITALS
DIASTOLIC BLOOD PRESSURE: 78 MMHG | BODY MASS INDEX: 37.59 KG/M2 | WEIGHT: 248 LBS | HEART RATE: 60 BPM | SYSTOLIC BLOOD PRESSURE: 154 MMHG | HEIGHT: 68 IN | OXYGEN SATURATION: 93 %

## 2021-09-15 PROCEDURE — 99215 OFFICE O/P EST HI 40 MIN: CPT

## 2021-09-15 RX ORDER — AMOXICILLIN 500 MG/1
500 CAPSULE ORAL
Qty: 6 | Refills: 0 | Status: DISCONTINUED | COMMUNITY
Start: 2021-03-16 | End: 2021-09-15

## 2021-09-15 NOTE — HISTORY OF PRESENT ILLNESS
[___ Month(s) Ago] : [unfilled] month(s) ago [Good Compliance] : good compliance with treatment [FreeTextEntry1] : 50 year old woman with PCKD s/p LURT from ex  and bilateral native nephrectomies on 4/2/18.\par Course was complicated with slow function assumed to be early ACR (low tacro levels) treated with solumedrol 750mgs. Pt also had Cdiff treated with vancomycin. 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

## 2021-09-15 NOTE — PHYSICAL EXAM
[General Appearance - Alert] : alert [General Appearance - In No Acute Distress] : in no acute distress [Sclera] : the sclera and conjunctiva were normal [Strabismus] : no strabismus was seen [Outer Ear] : the ears and nose were normal in appearance [Hearing Threshold Finger Rub Not [place]] : hearing was normal [Examination Of The Oral Cavity] : the lips and gums were normal [Neck Appearance] : the appearance of the neck was normal [Neck Cervical Mass (___cm)] : no neck mass was observed [Jugular Venous Distention Increased] : there was no jugular-venous distention [Respiration, Rhythm And Depth] : normal respiratory rhythm and effort [Exaggerated Use Of Accessory Muscles For Inspiration] : no accessory muscle use [Auscultation Breath Sounds / Voice Sounds] : lungs were clear to auscultation bilaterally [Apical Impulse] : the apical impulse was normal [Heart Rate And Rhythm] : heart rate was normal and rhythm regular [Heart Sounds] : normal S1 and S2 [Murmurs] : no murmurs [Edema] : there was no peripheral edema [Bowel Sounds] : normal bowel sounds [Abdomen Soft] : soft [Abdomen Tenderness] : non-tender [] : no hepato-splenomegaly [Abdomen Mass (___ Cm)] : no abdominal mass palpated [No CVA Tenderness] : no ~M costovertebral angle tenderness [Nail Clubbing] : no clubbing  or cyanosis of the fingernails [Motor Tone] : muscle strength and tone were normal [___ (cm) Fistula] : [unfilled] (cm) fistula [Skin Turgor] : normal skin turgor [Skin Color & Pigmentation] : normal skin color and pigmentation [Skin Lesions] : no skin lesions [No Focal Deficits] : no focal deficits [Oriented To Time, Place, And Person] : oriented to person, place, and time [Impaired Insight] : insight and judgment were intact [Affect] : the affect was normal [Mood] : the mood was normal [FreeTextEntry1] : non-functioning

## 2021-09-15 NOTE — ASSESSMENT
[FreeTextEntry1] : PCKD with b/l nephrectomies\par LURT in 2018: stable allograft function. UA without hematuria /proteinuria\par Continue current immunosuppressive regimen\par Derm/ Opthalmology follow up\par \par HTN- BP above goal; previously stable\par advised to keep Bp log\par  \par NODAT: on insulin and Glimepiride. Prednisone dose decreased to 2.5 mg\par HbA1c improved\par Endocrinology follow up\par \par \par Hypomagnesemia;  in setting of tacrolimus use/ diarrhea with MMF\par Mg++ levels stable\par continue Mag -Oxide 400 mg daily\par \par \par \par RTC in 3 months\par

## 2021-09-15 NOTE — REVIEW OF SYSTEMS
[Eyesight Problems] : eyesight problems [Negative] : Heme/Lymph [de-identified] : intermittent numbness/tingling in fingers of right hand (non-functioning fistula in right arm)

## 2021-09-17 ENCOUNTER — APPOINTMENT (OUTPATIENT)
Dept: ENDOCRINOLOGY | Facility: CLINIC | Age: 51
End: 2021-09-17
Payer: COMMERCIAL

## 2021-09-17 VITALS
BODY MASS INDEX: 37.59 KG/M2 | HEIGHT: 68 IN | WEIGHT: 248 LBS | SYSTOLIC BLOOD PRESSURE: 128 MMHG | HEART RATE: 69 BPM | DIASTOLIC BLOOD PRESSURE: 80 MMHG

## 2021-09-17 DIAGNOSIS — E13.9 OTHER SPECIFIED DIABETES MELLITUS W/OUT COMPLICATIONS: ICD-10-CM

## 2021-09-17 LAB
25(OH)D3 SERPL-MCNC: 35.2 NG/ML
ALBUMIN SERPL ELPH-MCNC: 4 G/DL
ANION GAP SERPL CALC-SCNC: 16 MMOL/L
APPEARANCE: ABNORMAL
BACTERIA: ABNORMAL
BASOPHILS # BLD AUTO: 0.04 K/UL
BASOPHILS NFR BLD AUTO: 0.5 %
BILIRUBIN URINE: NEGATIVE
BLOOD URINE: NORMAL
BUN SERPL-MCNC: 19 MG/DL
CALCIUM OXALATE CRYSTALS: ABNORMAL
CALCIUM SERPL-MCNC: 9.2 MG/DL
CALCIUM SERPL-MCNC: 9.2 MG/DL
CHLORIDE SERPL-SCNC: 99 MMOL/L
CO2 SERPL-SCNC: 23 MMOL/L
COLOR: YELLOW
CREAT SERPL-MCNC: 0.97 MG/DL
CREAT SPEC-SCNC: 216 MG/DL
CREAT/PROT UR: 0.1 RATIO
EOSINOPHIL # BLD AUTO: 0.16 K/UL
EOSINOPHIL NFR BLD AUTO: 2 %
ESTIMATED AVERAGE GLUCOSE: 120 MG/DL
GLUCOSE BLDC GLUCOMTR-MCNC: 206
GLUCOSE QUALITATIVE U: NEGATIVE
GLUCOSE SERPL-MCNC: 156 MG/DL
HBA1C MFR BLD HPLC: 5.8 %
HCT VFR BLD CALC: 38.2 %
HGB BLD-MCNC: 12.2 G/DL
HYALINE CASTS: 0 /LPF
IMM GRANULOCYTES NFR BLD AUTO: 0.4 %
KETONES URINE: NEGATIVE
LEUKOCYTE ESTERASE URINE: NEGATIVE
LYMPHOCYTES # BLD AUTO: 1.54 K/UL
LYMPHOCYTES NFR BLD AUTO: 19.5 %
MAGNESIUM SERPL-MCNC: 1.6 MG/DL
MAN DIFF?: NORMAL
MCHC RBC-ENTMCNC: 31.9 GM/DL
MCHC RBC-ENTMCNC: 32.6 PG
MCV RBC AUTO: 102.1 FL
MICROSCOPIC-UA: NORMAL
MONOCYTES # BLD AUTO: 0.59 K/UL
MONOCYTES NFR BLD AUTO: 7.5 %
NEUTROPHILS # BLD AUTO: 5.55 K/UL
NEUTROPHILS NFR BLD AUTO: 70.1 %
NITRITE URINE: NEGATIVE
PARATHYROID HORMONE INTACT: 67 PG/ML
PH URINE: 6
PHOSPHATE SERPL-MCNC: 3.4 MG/DL
PLATELET # BLD AUTO: 158 K/UL
POTASSIUM SERPL-SCNC: 4.3 MMOL/L
PROT UR-MCNC: 16 MG/DL
PROTEIN URINE: ABNORMAL
RBC # BLD: 3.74 M/UL
RBC # FLD: 13.8 %
RED BLOOD CELLS URINE: 2 /HPF
SODIUM SERPL-SCNC: 137 MMOL/L
SPECIFIC GRAVITY URINE: 1.03
SQUAMOUS EPITHELIAL CELLS: 17 /HPF
URINE COMMENTS: NORMAL
UROBILINOGEN URINE: NORMAL
WBC # FLD AUTO: 7.91 K/UL
WHITE BLOOD CELLS URINE: 4 /HPF

## 2021-09-17 PROCEDURE — 82962 GLUCOSE BLOOD TEST: CPT

## 2021-09-17 PROCEDURE — 95251 CONT GLUC MNTR ANALYSIS I&R: CPT

## 2021-09-17 PROCEDURE — 99214 OFFICE O/P EST MOD 30 MIN: CPT | Mod: 25

## 2021-09-17 NOTE — ASSESSMENT
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 Glycemic control worsening due to failure to meal bolus, and moley need to change ICR\par -Adjust ICR from 4.5 to 4.2.\par \par -Repeat A1c and RTO for follow-up in 3 months.\par -Glucose sensor/pump necessity: This patient with diabetes performs four or more glucose checks per day utilizing a home blood glucose monitor since diagnosis in January 2020. The patient is treated with insulin via three or more injections daily. Over the past 6 months, this patient has required frequent adjustments to their insulin treatment on the basis of therapeutic continuous glucose monitoring results. In addition, the patient has been to our office for an evaluation of their diabetes control within the past six months, and attends a comprehensive diabetic education program, most recently in November 2020. \par

## 2021-09-17 NOTE — HISTORY OF PRESENT ILLNESS
[FreeTextEntry1] : Type: NODAT\par Severity: moderate\par Duration: January 2020\par Onset: s/p kidney transplant\par Associated symptoms: polyuria, polydipsia\par \par Modifying factors-\par Current meds for glycemic control:\par Tandem Control IQ with Humalog\par Reviewed CGMS. Avg  with 66% of values in range and 34% high. None low. Frequent auto bolus delivery after meals.\par \par On glimepiride in the past, d/c'ed once started on CSII. 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

## 2021-09-21 LAB — TACROLIMUS SERPL-MCNC: 13.6 NG/ML

## 2021-09-21 NOTE — PHYSICAL THERAPY INITIAL EVALUATION ADULT - LIVES WITH, PROFILE
Pt appears to be resting comfortably on cot. Pt assisted to the restroom at this time.       Larry Rankin RN  09/21/21 [id number] children

## 2021-11-16 ENCOUNTER — OUTPATIENT (OUTPATIENT)
Dept: OUTPATIENT SERVICES | Facility: HOSPITAL | Age: 51
LOS: 1 days | End: 2021-11-16

## 2021-11-16 DIAGNOSIS — Z98.51 TUBAL LIGATION STATUS: Chronic | ICD-10-CM

## 2021-11-16 DIAGNOSIS — I77.0 ARTERIOVENOUS FISTULA, ACQUIRED: Chronic | ICD-10-CM

## 2021-11-16 DIAGNOSIS — Z94.0 KIDNEY TRANSPLANT STATUS: Chronic | ICD-10-CM

## 2021-11-16 DIAGNOSIS — Z98.890 OTHER SPECIFIED POSTPROCEDURAL STATES: Chronic | ICD-10-CM

## 2021-11-16 DIAGNOSIS — N20.0 CALCULUS OF KIDNEY: Chronic | ICD-10-CM

## 2021-11-16 DIAGNOSIS — Z90.5 ACQUIRED ABSENCE OF KIDNEY: Chronic | ICD-10-CM

## 2021-11-16 DIAGNOSIS — Z90.49 ACQUIRED ABSENCE OF OTHER SPECIFIED PARTS OF DIGESTIVE TRACT: Chronic | ICD-10-CM

## 2021-12-06 NOTE — DIETITIAN INITIAL EVALUATION ADULT. - EST PROTEIN NEEDS4
90.2 W Plasty Text: The lesion was extirpated to the level of the fat with a #15 scalpel blade.  Given the location of the defect, shape of the defect and the proximity to free margins a W-plasty was deemed most appropriate for repair.  Using a sterile surgical marker, the appropriate transposition arms of the W-plasty were drawn incorporating the defect and placing the expected incisions within the relaxed skin tension lines where possible.    The area thus outlined was incised deep to adipose tissue with a #15 scalpel blade.  The skin margins were undermined to an appropriate distance in all directions utilizing iris scissors.  The opposing transposition arms were then transposed into place in opposite direction and anchored with interrupted buried subcutaneous sutures.

## 2021-12-08 NOTE — PROGRESS NOTE ADULT - SUBJECTIVE AND OBJECTIVE BOX
Transplant Surgery - Multidisciplinary Rounds  --------------------------------------------------------------    STATUS POST: s/p LDRT and bilateral nephrectomy  POST-OPERATIVE DAY #3    Patient seen & examined in AM  Transferred to the floor yesterday from SICU  No acute events overnight  Denies flatus and BM.  Pain is appropriately controlled.  No n/v.    MEDICATIONS  (STANDING):  docusate sodium 100 milliGRAM(s) Oral three times a day  famotidine    Tablet 20 milliGRAM(s) Oral daily  HYDROmorphone PCA (1 mG/mL) 30 milliLiter(s) PCA Continuous PCA Continuous  methylPREDNISolone sodium succinate Injectable 30 milliGRAM(s) IV Push two times a day  mycophenolate mofetil 1000 milliGRAM(s) Oral two times a day  nystatin    Suspension 264634 Unit(s) Oral four times a day  senna 2 Tablet(s) Oral at bedtime  sodium chloride 0.45% 1000 milliLiter(s) (75 mL/Hr) IV Continuous <Continuous>  sodium chloride 0.9%. 1000 milliLiter(s) (10 mL/Hr) IV Continuous <Continuous>  tacrolimus 2 milliGRAM(s) Oral two times a day  trimethoprim   80 mG/sulfamethoxazole 400 mG 1 Tablet(s) Oral daily  valGANciclovir 450 milliGRAM(s) Oral daily    MEDICATIONS  (PRN):  acetaminophen   Tablet. 975 milliGRAM(s) Oral every 6 hours PRN Mild Pain (1 - 3)  HYDROmorphone  Injectable 0.5 milliGRAM(s) IV Push every 3 hours PRN Severe Breakthrough Pain  HYDROmorphone PCA (1 mG/mL) Rescue Clinician Bolus 0.5 milliGRAM(s) IV Push every 15 minutes PRN for Pain Scale GREATER THAN 6  naloxone Injectable 0.1 milliGRAM(s) IV Push every 3 minutes PRN For ANY of the following changes in patient status:  A. RR LESS THAN 10 breaths per minute, B. Oxygen saturation LESS THAN 90%, C. Sedation score of 6  ondansetron Injectable 4 milliGRAM(s) IV Push every 6 hours PRN Nausea  oxyCODONE    IR 5 milliGRAM(s) Oral every 6 hours PRN Moderate Pain (4 - 6)    PAST MEDICAL & SURGICAL HISTORY:  ESRD (end stage renal disease)  CKD (chronic kidney disease) stage 4, GFR 15-29 ml/min: no dialysis  Anxiety  GERD (gastroesophageal reflux disease)  Polycystic kidney disease  Kidney stones  Arthropathy NOS - shoulder  Hyperlipidemia  AV fistula: right forearm extremity AV fistula radiocephalic ( 2018)  H/O shoulder surgery: left  History of ventral hernia repair  H/O tubal ligation: (1996)  Kidney calculi: cysto/lithotripsy multiple  S/P cholecystectomy: (2013)  Tubal ligation: 1995  Ankle fracture - Left: [facility] procedure 2000    Vital Signs Last 24 Hrs  T(C): 36.9 (05 Apr 2018 05:19), Max: 36.9 (04 Apr 2018 21:00)  T(F): 98.4 (05 Apr 2018 05:19), Max: 98.5 (04 Apr 2018 21:00)  HR: 72 (05 Apr 2018 05:19) (66 - 81)  BP: 117/68 (05 Apr 2018 05:19) (94/57 - 117/68)  BP(mean): 76 (04 Apr 2018 13:00) (72 - 78)  RR: 18 (05 Apr 2018 05:19) (5 - 18)  SpO2: 95% (05 Apr 2018 05:19) (88% - 96%)    I&O's Summary    04 Apr 2018 07:01  -  05 Apr 2018 07:00  --------------------------------------------------------  IN: 2200 mL / OUT: 2880 mL / NET: -680 mL               9.9    14.1  )-----------( 77       ( 05 Apr 2018 06:18 )             27.8     04-05    141  |  105  |  48<H>  ----------------------------<  104<H>  3.7   |  22  |  2.92<H>    Ca    7.5<L>      05 Apr 2018 06:19  Phos  4.4     04-05  Mg     2.6     04-05    TPro  4.6<L>  /  Alb  2.3<L>  /  TBili  0.3  /  DBili  x   /  AST  24  /  ALT  20  /  AlkPhos  28<L>  04-05    Tacrolimus (), Serum: 7.6 ng/mL (04-04 @ 07:37)    Physical Exam  Constitutional: well developed, well nourished  Neuro: AAOx3  Eyes: anicteric, PERRLA  ENMT: nc/at, abrasion to low lip  Neck: clean dressing on R neck, no hematoma  Respiratory: CTA B/L  Cardiovascular: RRR  Gastrointestinal: soft abdomen, mildly distended, appropriate kira-incisional tenderness, dressing intact, drains serosanguinous  Genitourinary: urinary catheter in place with clear urine  Extremities: SCD's in place and working bilaterally  Vascular: equal DP pulses bilaterally  Musculoskeletal: Moving all extremities Transplant Surgery - Multidisciplinary Rounds  --------------------------------------------------------------    STATUS POST: s/p LDRT and bilateral nephrectomy  POST-OPERATIVE DAY #3    SUBJECTIVE     Interval events: Patient seen & examined in AM. Transferred to the floor yesterday from SICU. Tolerating some sips of clears. Denies flatus or BM.  Pain is appropriately controlled with PCA.  No N/V. Had some vaginal bleeding overnight (small amount of old blood). Desaturations to 88% on RA while at rest. Denies CP or SOB.    All remaining systems reviewed and negative except as noted above.    MEDICATIONS  (STANDING):  docusate sodium 100 milliGRAM(s) Oral three times a day  famotidine    Tablet 20 milliGRAM(s) Oral daily  HYDROmorphone PCA (1 mG/mL) 30 milliLiter(s) PCA Continuous PCA Continuous  methylPREDNISolone sodium succinate Injectable 30 milliGRAM(s) IV Push two times a day  mycophenolate mofetil 1000 milliGRAM(s) Oral two times a day  nystatin    Suspension [id number] Unit(s) Oral four times a day  senna 2 Tablet(s) Oral at bedtime  sodium chloride 0.45% 1000 milliLiter(s) (75 mL/Hr) IV Continuous <Continuous>  sodium chloride 0.9%. 1000 milliLiter(s) (10 mL/Hr) IV Continuous <Continuous>  tacrolimus 2 milliGRAM(s) Oral two times a day  trimethoprim   80 mG/sulfamethoxazole 400 mG 1 Tablet(s) Oral daily  valGANciclovir 450 milliGRAM(s) Oral daily    MEDICATIONS  (PRN):  acetaminophen   Tablet. 975 milliGRAM(s) Oral every 6 hours PRN Mild Pain (1 - 3)  HYDROmorphone  Injectable 0.5 milliGRAM(s) IV Push every 3 hours PRN Severe Breakthrough Pain  HYDROmorphone PCA (1 mG/mL) Rescue Clinician Bolus 0.5 milliGRAM(s) IV Push every 15 minutes PRN for Pain Scale GREATER THAN 6  naloxone Injectable 0.1 milliGRAM(s) IV Push every 3 minutes PRN For ANY of the following changes in patient status:  A. RR LESS THAN 10 breaths per minute, B. Oxygen saturation LESS THAN 90%, C. Sedation score of 6  ondansetron Injectable 4 milliGRAM(s) IV Push every 6 hours PRN Nausea  oxyCODONE    IR 5 milliGRAM(s) Oral every 6 hours PRN Moderate Pain (4 - 6)    OBJECTIVE    Vital Signs Last 24 Hrs  T(C): 36.9 (05 Apr 2018 05:19), Max: 36.9 (04 Apr 2018 21:00)  T(F): 98.4 (05 Apr 2018 05:19), Max: 98.5 (04 Apr 2018 21:00)  HR: 72 (05 Apr 2018 05:19) (66 - 81)  BP: 117/68 (05 Apr 2018 05:19) (94/57 - 117/68)  BP(mean): 76 (04 Apr 2018 13:00) (72 - 78)  RR: 18 (05 Apr 2018 05:19) (5 - 18)  SpO2: 95% (05 Apr 2018 05:19) (88% - 96%)    I&O's Summary    Roberts 2025   JPs 120 and 250    Physical Exam  Constitutional: well developed, well nourished  Neuro: AAOx3  Eyes: anicteric, PERRLA  ENMT: nc/at  Neck: clean dressing on R neck, no hematoma  Respiratory: CTA B/L  Cardiovascular: RRR  Gastrointestinal: soft abdomen, mildly distended, appropriate kira-incisional tenderness, incision C/D/I with staples, drains serosanguinous  Genitourinary: urinary catheter in place with clear urine  Extremities: SCD's in place and working bilaterally  Vascular: equal DP pulses bilaterally  Musculoskeletal: Moving all extremities    LABS             9.9    14.1  )-----------( 77       ( 05 Apr 2018 06:18 )             27.8     04-05    141  |  105  |  48<H>  ----------------------------<  104<H>  3.7   |  22  |  2.92<H>    Ca    7.5<L>      05 Apr 2018 06:19  Phos  4.4     04-05  Mg     2.6     04-05    TPro  4.6<L>  /  Alb  2.3<L>  /  TBili  0.3  /  DBili  x   /  AST  24  /  ALT  20  /  AlkPhos  28<L>  04-05    Tacrolimus (), Serum: 7.6 ng/mL (04-04 @ 07:37) Ambulatory

## 2021-12-13 ENCOUNTER — RX RENEWAL (OUTPATIENT)
Age: 51
End: 2021-12-13

## 2021-12-15 ENCOUNTER — APPOINTMENT (OUTPATIENT)
Dept: NEPHROLOGY | Facility: CLINIC | Age: 51
End: 2021-12-15
Payer: COMMERCIAL

## 2021-12-15 VITALS
WEIGHT: 251 LBS | OXYGEN SATURATION: 98 % | BODY MASS INDEX: 38.04 KG/M2 | SYSTOLIC BLOOD PRESSURE: 152 MMHG | HEART RATE: 65 BPM | HEIGHT: 68 IN | DIASTOLIC BLOOD PRESSURE: 78 MMHG

## 2021-12-15 PROCEDURE — 99215 OFFICE O/P EST HI 40 MIN: CPT

## 2021-12-15 RX ORDER — INSULIN LISPRO 100 [IU]/ML
100 INJECTION, SOLUTION INTRAVENOUS; SUBCUTANEOUS
Qty: 15 | Refills: 2 | Status: DISCONTINUED | COMMUNITY
Start: 2021-01-06 | End: 2021-12-15

## 2021-12-15 RX ORDER — INSULIN LISPRO 100 [IU]/ML
100 INJECTION, SOLUTION INTRAVENOUS; SUBCUTANEOUS
Qty: 3 | Refills: 1 | Status: DISCONTINUED | COMMUNITY
Start: 2020-01-15 | End: 2021-12-15

## 2021-12-15 NOTE — ASSESSMENT
[FreeTextEntry1] : PCKD with b/l nephrectomies\par LURT in 2018: stable allograft function. 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 Prednisone dose decreased to 2.5 mg\par HbA1c improved\par Endocrinology follow up\par \par \par Hypomagnesemia;  in setting of tacrolimus use/ diarrhea with MMF\par Mg++ levels stable\par continue Mag -Oxide 400 mg daily\par \par \par \par

## 2021-12-15 NOTE — PHYSICAL EXAM
[General Appearance - Alert] : alert [General Appearance - In No Acute Distress] : in no acute distress [Sclera] : the sclera and conjunctiva were normal [Strabismus] : no strabismus was seen [Outer Ear] : the ears and nose were normal in appearance [Hearing Threshold Finger Rub Not [place]] : hearing was normal [Examination Of The Oral Cavity] : the lips and gums were normal [Neck Appearance] : the appearance of the neck was normal [Neck Cervical Mass (___cm)] : no neck mass was observed [Jugular Venous Distention Increased] : there was no jugular-venous distention [Respiration, Rhythm And Depth] : normal respiratory rhythm and effort [Exaggerated Use Of Accessory Muscles For Inspiration] : no accessory muscle use [Auscultation Breath Sounds / Voice Sounds] : lungs were clear to auscultation bilaterally [Apical Impulse] : the apical impulse was normal [Heart Rate And Rhythm] : heart rate was normal and rhythm regular [Heart Sounds] : normal S1 and S2 [Murmurs] : no murmurs [Edema] : there was no peripheral edema [Bowel Sounds] : normal bowel sounds [Abdomen Soft] : soft [Abdomen Tenderness] : non-tender [] : no hepato-splenomegaly [Abdomen Mass (___ Cm)] : no abdominal mass palpated [No CVA Tenderness] : no ~M costovertebral angle tenderness [Nail Clubbing] : no clubbing  or cyanosis of the fingernails [Motor Tone] : muscle strength and tone were normal [___ (cm) Fistula] : [unfilled] (cm) fistula [Skin Color & Pigmentation] : normal skin color and pigmentation [Skin Turgor] : normal skin turgor [Skin Lesions] : no skin lesions [No Focal Deficits] : no focal deficits [Oriented To Time, Place, And Person] : oriented to person, place, and time [Impaired Insight] : insight and judgment were intact [Affect] : the affect was normal [Mood] : the mood was normal [FreeTextEntry1] : non-functioning

## 2021-12-15 NOTE — REVIEW OF SYSTEMS
[Eyesight Problems] : eyesight problems [Negative] : Heme/Lymph [de-identified] : intermittent numbness/tingling in fingers of right hand (non-functioning fistula in right arm)

## 2021-12-15 NOTE — HISTORY OF PRESENT ILLNESS
[___ Month(s) Ago] : [unfilled] month(s) ago [Good Compliance] : good compliance with treatment [FreeTextEntry1] : 50 year old woman with PCKD s/p LURT from ex  and bilateral native nephrectomies on 4/2/18.\par Course was complicated with slow function assumed to be early ACR (low tacro levels) treated with solumedrol 750mgs. Pt also had Cdiff treated with vancomycin. 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

## 2021-12-20 ENCOUNTER — RX RENEWAL (OUTPATIENT)
Age: 51
End: 2021-12-20

## 2021-12-21 ENCOUNTER — RX RENEWAL (OUTPATIENT)
Age: 51
End: 2021-12-21

## 2021-12-21 ENCOUNTER — OUTPATIENT (OUTPATIENT)
Dept: OUTPATIENT SERVICES | Facility: HOSPITAL | Age: 51
LOS: 1 days | End: 2021-12-21
Payer: COMMERCIAL

## 2021-12-21 ENCOUNTER — APPOINTMENT (OUTPATIENT)
Dept: DISASTER EMERGENCY | Facility: HOSPITAL | Age: 51
End: 2021-12-21

## 2021-12-21 ENCOUNTER — OUTPATIENT (OUTPATIENT)
Dept: OUTPATIENT SERVICES | Facility: HOSPITAL | Age: 51
LOS: 1 days | End: 2021-12-21

## 2021-12-21 VITALS
TEMPERATURE: 99 F | SYSTOLIC BLOOD PRESSURE: 176 MMHG | WEIGHT: 235.89 LBS | HEART RATE: 68 BPM | OXYGEN SATURATION: 96 % | HEIGHT: 69 IN | DIASTOLIC BLOOD PRESSURE: 69 MMHG | RESPIRATION RATE: 16 BRPM

## 2021-12-21 VITALS
OXYGEN SATURATION: 96 % | RESPIRATION RATE: 17 BRPM | HEART RATE: 62 BPM | SYSTOLIC BLOOD PRESSURE: 157 MMHG | TEMPERATURE: 99 F | DIASTOLIC BLOOD PRESSURE: 71 MMHG

## 2021-12-21 DIAGNOSIS — Z90.5 ACQUIRED ABSENCE OF KIDNEY: Chronic | ICD-10-CM

## 2021-12-21 DIAGNOSIS — Z94.0 KIDNEY TRANSPLANT STATUS: Chronic | ICD-10-CM

## 2021-12-21 DIAGNOSIS — I77.0 ARTERIOVENOUS FISTULA, ACQUIRED: Chronic | ICD-10-CM

## 2021-12-21 DIAGNOSIS — N20.0 CALCULUS OF KIDNEY: Chronic | ICD-10-CM

## 2021-12-21 DIAGNOSIS — Z90.49 ACQUIRED ABSENCE OF OTHER SPECIFIED PARTS OF DIGESTIVE TRACT: Chronic | ICD-10-CM

## 2021-12-21 DIAGNOSIS — Z98.51 TUBAL LIGATION STATUS: Chronic | ICD-10-CM

## 2021-12-21 DIAGNOSIS — Z98.890 OTHER SPECIFIED POSTPROCEDURAL STATES: Chronic | ICD-10-CM

## 2021-12-21 DIAGNOSIS — U07.1 COVID-19: ICD-10-CM

## 2021-12-21 PROCEDURE — M0243: CPT

## 2021-12-21 RX ORDER — SODIUM CHLORIDE 9 MG/ML
250 INJECTION INTRAMUSCULAR; INTRAVENOUS; SUBCUTANEOUS
Refills: 0 | Status: COMPLETED | OUTPATIENT
Start: 2021-12-21 | End: 2021-12-21

## 2021-12-21 RX ADMIN — SODIUM CHLORIDE 310 MILLILITER(S): 9 INJECTION INTRAMUSCULAR; INTRAVENOUS; SUBCUTANEOUS at 14:55

## 2021-12-21 NOTE — CHART NOTE - NSCHARTNOTEFT_GEN_A_CORE
CC: Monoclonal Antibody Infusion/COVID 19 Positive      History: Patient presents for infusion of monoclonal antibody infusion. Patient has been screened and was deemed to be a candidate.    Symptoms/ Criteria: Fever, malaise, body aches, HA, loss of taste/ smell, GI Symptoms    Risk Profile includes: Renal Transplant receiving immunosuppressive tx,  BMI >25, HTN, DM    casirivimab/imdevimab in sodium chloride 0.9% (EUA) IVPB 100 milliLiter(s) IV Intermittent once  sodium chloride 0.9%. 250 milliLiter(s) IV Continuous <Continuous>      PMHx:  Infection due to severe acute respiratory syndrome coronavirus 2 (SARS-CoV-2)    Family history of heart disease (Father)    Family history of hypertension    MEWS Score    Hypertension    Hyperlipidemia    Gout    Arthropathy NOS - shoulder    Kidney stones    Polycystic kidney disease    Obesity    Kidney polycystic disease    Kidney stones    GERD (gastroesophageal reflux disease)    Anxiety    CKD (chronic kidney disease) stage 4, GFR 15-29 ml/min    ESRD (end stage renal disease)    Ankle fracture - Left    Tubal ligation    S/P cholecystectomy    Kidney calculi    H/O tubal ligation    History of ventral hernia repair    H/O shoulder surgery    AV fistula    End-stage renal disease (ESRD)    Renal transplant recipient    Status post nephrectomy        HR: 68 (12-21-21 @ 14:10) (68 - 68)  BP: 176/69 (12-21-21 @ 14:10) (176/69 - 176/69)  RR: 16 (12-21-21 @ 14:10) (16 - 16)  SpO2: 96% (12-21-21 @ 14:10) (96% - 96%)      PE:   Appearance: NAD	  HEENT:   Normal oral mucosa.   Lymphatic: No lymphadenopathy  Cardiovascular: Normal S1 S2, No JVD, No murmurs, No edema  Respiratory: Lungs clear to auscultation	  Gastrointestinal:  Soft, Non-tender. No guarding   Skin: warm and dry  Neurologic: Non-focal  Extremities: Normal range of motion.     ASSESSMENT:  Pt is a 50y year old Female Covid +  referred to the infusion center for Monoclonal antibody infusion (Regeneron).  COVID Vaccination Status: Fully vaccinated x3 with Pfizer-last dose 9/2021. Pt had a tubal ligation-pregnancy waiver signed.      PLAN:  - infusion procedure explained to patient   - Consent for monoclonal antibody infusion obtained   - Risk & benefits discussed/all questions answered  - infuse Regeneron over 21 minutes  - will observe patient for one hour post infusion  and then if stable discharge home with outpt follow up as planned by PMD.    POST INFUSION ASSESSMENT:   DISCHARGE at approximately  1  hour post infusion    - Patient tolerated infusion well denies complaints of chest pain/SOB/dizziness/ palps  - VSS for discharge home  - D/C instructions given/ fact sheet included.  - Patient to follow-up with PCP as needed.

## 2021-12-27 ENCOUNTER — RX RENEWAL (OUTPATIENT)
Age: 51
End: 2021-12-27

## 2022-01-06 ENCOUNTER — APPOINTMENT (OUTPATIENT)
Dept: NEPHROLOGY | Facility: CLINIC | Age: 52
End: 2022-01-06

## 2022-01-08 ENCOUNTER — OUTPATIENT (OUTPATIENT)
Dept: OUTPATIENT SERVICES | Facility: HOSPITAL | Age: 52
LOS: 1 days | End: 2022-01-08

## 2022-01-08 DIAGNOSIS — Z90.5 ACQUIRED ABSENCE OF KIDNEY: Chronic | ICD-10-CM

## 2022-01-08 DIAGNOSIS — Z98.51 TUBAL LIGATION STATUS: Chronic | ICD-10-CM

## 2022-01-08 DIAGNOSIS — I77.0 ARTERIOVENOUS FISTULA, ACQUIRED: Chronic | ICD-10-CM

## 2022-01-08 DIAGNOSIS — Z98.890 OTHER SPECIFIED POSTPROCEDURAL STATES: Chronic | ICD-10-CM

## 2022-01-08 DIAGNOSIS — N20.0 CALCULUS OF KIDNEY: Chronic | ICD-10-CM

## 2022-01-08 DIAGNOSIS — Z90.49 ACQUIRED ABSENCE OF OTHER SPECIFIED PARTS OF DIGESTIVE TRACT: Chronic | ICD-10-CM

## 2022-01-08 DIAGNOSIS — Z94.0 KIDNEY TRANSPLANT STATUS: Chronic | ICD-10-CM

## 2022-01-13 LAB — HBA1C MFR BLD HPLC: 6.1

## 2022-01-14 ENCOUNTER — APPOINTMENT (OUTPATIENT)
Dept: ENDOCRINOLOGY | Facility: CLINIC | Age: 52
End: 2022-01-14
Payer: COMMERCIAL

## 2022-01-14 PROCEDURE — 99214 OFFICE O/P EST MOD 30 MIN: CPT | Mod: 95

## 2022-01-17 ENCOUNTER — EMERGENCY (EMERGENCY)
Facility: HOSPITAL | Age: 52
LOS: 1 days | Discharge: HOME | End: 2022-01-17
Admitting: EMERGENCY MEDICINE
Payer: COMMERCIAL

## 2022-01-17 DIAGNOSIS — Z90.5 ACQUIRED ABSENCE OF KIDNEY: Chronic | ICD-10-CM

## 2022-01-17 DIAGNOSIS — Z90.49 ACQUIRED ABSENCE OF OTHER SPECIFIED PARTS OF DIGESTIVE TRACT: Chronic | ICD-10-CM

## 2022-01-17 DIAGNOSIS — I77.0 ARTERIOVENOUS FISTULA, ACQUIRED: Chronic | ICD-10-CM

## 2022-01-17 DIAGNOSIS — N20.0 CALCULUS OF KIDNEY: Chronic | ICD-10-CM

## 2022-01-17 DIAGNOSIS — Z94.0 KIDNEY TRANSPLANT STATUS: Chronic | ICD-10-CM

## 2022-01-17 DIAGNOSIS — Z98.890 OTHER SPECIFIED POSTPROCEDURAL STATES: Chronic | ICD-10-CM

## 2022-01-17 DIAGNOSIS — Z98.51 TUBAL LIGATION STATUS: Chronic | ICD-10-CM

## 2022-01-17 PROCEDURE — 93010 ELECTROCARDIOGRAM REPORT: CPT

## 2022-01-17 PROCEDURE — 99284 EMERGENCY DEPT VISIT MOD MDM: CPT

## 2022-01-18 NOTE — HISTORY OF PRESENT ILLNESS
[Home] : at home, [unfilled] , at the time of the visit. [Other Location: e.g. Home (Enter Location, City,State)___] : at [unfilled] [Verbal consent obtained from patient] : the patient, [unfilled] [FreeTextEntry1] : Type: NODAT\par Severity: moderate\par Duration: January 2020\par Onset: s/p kidney transplant\par Associated symptoms: polyuria, polydipsia\par \par Modifying factors-\par Current meds for glycemic control:\par Tandem Control IQ with Humalog\par Reviewed CGMS. Avg  with 66% of values in range and 34% high. None low. Frequent auto bolus delivery after meals.\par \par On glimepiride in the past, d/c'ed once started on CSII. 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

## 2022-01-18 NOTE — ASSESSMENT
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 Glycemic control worsening due to failure to meal bolus, \par \par -Glucose sensor/pump necessity: This patient with diabetes performs four or more glucose checks per day utilizing a home blood glucose monitor since diagnosis in January 2020. The patient is treated with insulin via three or more injections daily. Over the past 6 months, this patient has required frequent adjustments to their insulin treatment on the basis of therapeutic continuous glucose monitoring results. In addition, the patient has been to our office for an evaluation of their diabetes control within the past six months, and attends a comprehensive diabetic education program, most recently in November 2020. \par

## 2022-01-19 ENCOUNTER — APPOINTMENT (OUTPATIENT)
Dept: NEPHROLOGY | Facility: CLINIC | Age: 52
End: 2022-01-19
Payer: COMMERCIAL

## 2022-01-19 VITALS
OXYGEN SATURATION: 95 % | RESPIRATION RATE: 16 BRPM | SYSTOLIC BLOOD PRESSURE: 172 MMHG | BODY MASS INDEX: 38.8 KG/M2 | HEIGHT: 68 IN | TEMPERATURE: 98.4 F | WEIGHT: 256 LBS | HEART RATE: 76 BPM | DIASTOLIC BLOOD PRESSURE: 83 MMHG

## 2022-01-19 PROCEDURE — 99214 OFFICE O/P EST MOD 30 MIN: CPT

## 2022-01-19 NOTE — ASSESSMENT
[FreeTextEntry1] : 1.  Renal transplant - also with b/l native nephrectomies.  Early on likely ACR treated with solumedrol 750mgs x3.  Creatinine stable around 1.   Will repeat labs today.  \par 2.  Immunosuppression - target tacro 5-7, myfortic 360mgs BID and pred 2.5mgs daily for NODAT and weight gain. \par 3.  HTN-  BP elevated, start aldactone 25mgs daily.  Pt does not want to take nifedipine.  \par 4.  Transaminitis - Pt saw Dr. Norton - Has fatty liver/ WATKINS.  \par 5.  Diarrhea - largely resolved.  Mild may be related to myfortic. \par 6.  NODAT - Now on insulin.  Sugars improving.  Following with endocrinology.  \par 7. Brain aneurysm - stable.  following with neurosurg.  \par 9. Health maintenance - discussed need for annual skin cancer screening, vaccinations and GYN care.  \par 10.  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  Native kidneys were removed.  \par 11.  Covid19 - pt has recovered.  \par \par Labs in 3 weeks then 3 months.  F/u in 6 months. \par

## 2022-01-19 NOTE — HISTORY OF PRESENT ILLNESS
[FreeTextEntry1] : Ms. Vanegas is a 47 y.o female with a history of PCKD s/p LURT from ex  and bilateral native nephrectomies on 4/2/18.\par Course was complicated with slow function assumed to be early ACR (low tacro levels) treated with solumedrol 750mgs.  Pt also had Cdiff treated with vancomycin.   \par \par  [de-identified] : PT is ok, CGM in place.  Had 24 hour urine which revealed high uric acid, phosphate and low citrate.  She is now on allopurinol.  Has been gaining weight.  Still working but did not contract COVID. \par \par Developed Covid on December 21st and received Regeneron MAB.  Pt was placed on Ozempic in the past but made her nauseated.  Going to try Trulicity.  Did not take BP meds today but checking at home.

## 2022-01-20 LAB
25(OH)D3 SERPL-MCNC: 32.7 NG/ML
ALBUMIN SERPL ELPH-MCNC: 4.2 G/DL
ALP BLD-CCNC: 64 U/L
ALT SERPL-CCNC: 30 U/L
ANION GAP SERPL CALC-SCNC: 14 MMOL/L
APPEARANCE: ABNORMAL
AST SERPL-CCNC: 28 U/L
BACTERIA: ABNORMAL
BASOPHILS # BLD AUTO: 0.04 K/UL
BASOPHILS NFR BLD AUTO: 0.4 %
BILIRUB SERPL-MCNC: 0.8 MG/DL
BILIRUBIN URINE: NEGATIVE
BKV DNA SPEC QL NAA+PROBE: NOT DETECTED COPIES/ML
BLOOD URINE: NEGATIVE
BUN SERPL-MCNC: 20 MG/DL
CALCIUM SERPL-MCNC: 9.3 MG/DL
CALCIUM SERPL-MCNC: 9.3 MG/DL
CHLORIDE SERPL-SCNC: 101 MMOL/L
CHOLEST SERPL-MCNC: 148 MG/DL
CMV DNA SPEC QL NAA+PROBE: NOT DETECTED IU/ML
CO2 SERPL-SCNC: 22 MMOL/L
COLOR: NORMAL
COVID-19 SPIKE DOMAIN ANTIBODY INTERPRETATION: POSITIVE
CREAT SERPL-MCNC: 1.03 MG/DL
CREAT SPEC-SCNC: 74 MG/DL
CREAT/PROT UR: 0.1 RATIO
EOSINOPHIL # BLD AUTO: 0.25 K/UL
EOSINOPHIL NFR BLD AUTO: 2.7 %
GLUCOSE QUALITATIVE U: NEGATIVE
GLUCOSE SERPL-MCNC: 161 MG/DL
HCT VFR BLD CALC: 38.9 %
HDLC SERPL-MCNC: 38 MG/DL
HGB BLD-MCNC: 12.4 G/DL
HYALINE CASTS: 2 /LPF
IMM GRANULOCYTES NFR BLD AUTO: 0.4 %
KETONES URINE: NEGATIVE
LDLC SERPL CALC-MCNC: 68 MG/DL
LEUKOCYTE ESTERASE URINE: NEGATIVE
LYMPHOCYTES # BLD AUTO: 1.68 K/UL
LYMPHOCYTES NFR BLD AUTO: 17.8 %
MAGNESIUM SERPL-MCNC: 1.6 MG/DL
MAN DIFF?: NORMAL
MCHC RBC-ENTMCNC: 31.9 GM/DL
MCHC RBC-ENTMCNC: 32 PG
MCV RBC AUTO: 100.3 FL
MICROSCOPIC-UA: NORMAL
MONOCYTES # BLD AUTO: 0.64 K/UL
MONOCYTES NFR BLD AUTO: 6.8 %
NEUTROPHILS # BLD AUTO: 6.78 K/UL
NEUTROPHILS NFR BLD AUTO: 71.9 %
NITRITE URINE: NEGATIVE
NONHDLC SERPL-MCNC: 109 MG/DL
PARATHYROID HORMONE INTACT: 55 PG/ML
PH URINE: 5.5
PHOSPHATE SERPL-MCNC: 3.1 MG/DL
PLATELET # BLD AUTO: 164 K/UL
POTASSIUM SERPL-SCNC: 5.2 MMOL/L
PROT SERPL-MCNC: 6.8 G/DL
PROT UR-MCNC: 7 MG/DL
PROTEIN URINE: NEGATIVE
RBC # BLD: 3.88 M/UL
RBC # FLD: 14.7 %
RED BLOOD CELLS URINE: 2 /HPF
SARS-COV-2 AB SERPL IA-ACNC: >250 U/ML
SODIUM SERPL-SCNC: 137 MMOL/L
SPECIFIC GRAVITY URINE: 1.01
SQUAMOUS EPITHELIAL CELLS: 3 /HPF
TACROLIMUS SERPL-MCNC: 5.8 NG/ML
TRIGL SERPL-MCNC: 207 MG/DL
URATE SERPL-MCNC: 4.7 MG/DL
UROBILINOGEN URINE: NORMAL
WBC # FLD AUTO: 9.43 K/UL
WHITE BLOOD CELLS URINE: 3 /HPF

## 2022-01-27 ENCOUNTER — TRANSCRIPTION ENCOUNTER (OUTPATIENT)
Age: 52
End: 2022-01-27

## 2022-01-27 DIAGNOSIS — R51.9 HEADACHE, UNSPECIFIED: ICD-10-CM

## 2022-01-27 DIAGNOSIS — I10 ESSENTIAL (PRIMARY) HYPERTENSION: ICD-10-CM

## 2022-01-27 DIAGNOSIS — R19.7 DIARRHEA, UNSPECIFIED: ICD-10-CM

## 2022-01-27 DIAGNOSIS — Z86.16 PERSONAL HISTORY OF COVID-19: ICD-10-CM

## 2022-01-27 DIAGNOSIS — Z94.0 KIDNEY TRANSPLANT STATUS: ICD-10-CM

## 2022-01-27 DIAGNOSIS — Z87.891 PERSONAL HISTORY OF NICOTINE DEPENDENCE: ICD-10-CM

## 2022-01-27 DIAGNOSIS — R11.2 NAUSEA WITH VOMITING, UNSPECIFIED: ICD-10-CM

## 2022-01-31 ENCOUNTER — APPOINTMENT (OUTPATIENT)
Dept: FAMILY MEDICINE | Facility: CLINIC | Age: 52
End: 2022-01-31
Payer: COMMERCIAL

## 2022-01-31 VITALS
RESPIRATION RATE: 16 BRPM | SYSTOLIC BLOOD PRESSURE: 140 MMHG | OXYGEN SATURATION: 97 % | HEIGHT: 68 IN | BODY MASS INDEX: 38.65 KG/M2 | HEART RATE: 89 BPM | WEIGHT: 255 LBS | DIASTOLIC BLOOD PRESSURE: 82 MMHG | TEMPERATURE: 98 F

## 2022-01-31 DIAGNOSIS — Z87.891 PERSONAL HISTORY OF NICOTINE DEPENDENCE: ICD-10-CM

## 2022-01-31 DIAGNOSIS — Z80.0 FAMILY HISTORY OF MALIGNANT NEOPLASM OF DIGESTIVE ORGANS: ICD-10-CM

## 2022-01-31 DIAGNOSIS — Z83.511 FAMILY HISTORY OF GLAUCOMA: ICD-10-CM

## 2022-01-31 DIAGNOSIS — Z82.49 FAMILY HISTORY OF ISCHEMIC HEART DISEASE AND OTHER DISEASES OF THE CIRCULATORY SYSTEM: ICD-10-CM

## 2022-01-31 DIAGNOSIS — Z82.5 FAMILY HISTORY OF ASTHMA AND OTHER CHRONIC LOWER RESPIRATORY DISEASES: ICD-10-CM

## 2022-01-31 DIAGNOSIS — Z78.9 OTHER SPECIFIED HEALTH STATUS: ICD-10-CM

## 2022-01-31 DIAGNOSIS — Z80.3 FAMILY HISTORY OF MALIGNANT NEOPLASM OF BREAST: ICD-10-CM

## 2022-01-31 DIAGNOSIS — Z83.3 FAMILY HISTORY OF DIABETES MELLITUS: ICD-10-CM

## 2022-01-31 DIAGNOSIS — Z97.5 PRESENCE OF (INTRAUTERINE) CONTRACEPTIVE DEVICE: ICD-10-CM

## 2022-01-31 DIAGNOSIS — Z80.1 FAMILY HISTORY OF MALIGNANT NEOPLASM OF TRACHEA, BRONCHUS AND LUNG: ICD-10-CM

## 2022-01-31 PROCEDURE — 99213 OFFICE O/P EST LOW 20 MIN: CPT

## 2022-02-09 ENCOUNTER — APPOINTMENT (OUTPATIENT)
Dept: TRANSPLANT | Facility: CLINIC | Age: 52
End: 2022-02-09

## 2022-02-11 ENCOUNTER — APPOINTMENT (OUTPATIENT)
Dept: FAMILY MEDICINE | Facility: CLINIC | Age: 52
End: 2022-02-11
Payer: COMMERCIAL

## 2022-02-11 VITALS
RESPIRATION RATE: 16 BRPM | BODY MASS INDEX: 38.65 KG/M2 | HEIGHT: 68 IN | TEMPERATURE: 97.2 F | DIASTOLIC BLOOD PRESSURE: 78 MMHG | OXYGEN SATURATION: 96 % | HEART RATE: 90 BPM | WEIGHT: 255 LBS | SYSTOLIC BLOOD PRESSURE: 138 MMHG

## 2022-02-11 PROCEDURE — 36415 COLL VENOUS BLD VENIPUNCTURE: CPT

## 2022-02-11 PROCEDURE — 99214 OFFICE O/P EST MOD 30 MIN: CPT | Mod: 25

## 2022-02-11 NOTE — ASSESSMENT
[FreeTextEntry1] : 52 y/o F s/p Kidney transplant 4/2018\par \par Acute Colitis:\par -R/O C.Diff.\par -Pt schedule for complete blood tests order by nephrology tomorrow.\par -If Negative C.Diff> will order Stool culture.\par -GI eval advise.\par \par Confusional episodes/ Presyncope> Absent Seizure?\par -neurology evaluation appreciated  9/25/20 with Dr Kilgore.\par \par HCM:\par -Complete lab done 6/2/20\par \par S/P kidney transplant 4/2018:\par -F/up w/ nephrology.\par -on Envarsus, Mycophenolate, dapsone and prednisone\par \par Diabetes post Renal transplant:\par -Seen by Endocrinology, Dr bermeo.\par -On Insulin pump\par -On trulicity.\par \par Anxiety:\par -on Clonazepam\par -On Paroxetine 10mg\par -I-STOP check.\par -Refill #60\par \par HTN;\par -on Losartan and carvedilol.\par -On spironolactone\par \par GERD;\par -on famotidine\par \par Menorrhagia:\par -s/p Bxp w/ gyn.: benign\par -s/p IUD mirena placed.\par -f/up w/ gyn.\par \par  \par \par  \par  \par

## 2022-02-11 NOTE — PHYSICAL EXAM
[Normal] : normal rate, regular rhythm, normal S1 and S2 and no murmur heard [Diffuse] : there was tenderness diffusely on palpation

## 2022-02-11 NOTE — HISTORY OF PRESENT ILLNESS
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 She states initially was > 10 episodes/ day. Now states is explosive diarrhea 4-5/day. States Imodium help. Diarrhea is liquid.\par She had hx recurrent C.Diff and had fecal transplant after kidney transplant. She is eating well. No fever.

## 2022-02-18 LAB
ALBUMIN SERPL ELPH-MCNC: 4.1 G/DL
ALP BLD-CCNC: 61 U/L
ALT SERPL-CCNC: 19 U/L
ANION GAP SERPL CALC-SCNC: 12 MMOL/L
APPEARANCE: ABNORMAL
AST SERPL-CCNC: 13 U/L
BACTERIA: NEGATIVE
BASOPHILS # BLD AUTO: 0.02 K/UL
BASOPHILS NFR BLD AUTO: 0.2 %
BILIRUB SERPL-MCNC: 0.5 MG/DL
BILIRUBIN URINE: NEGATIVE
BLOOD URINE: NEGATIVE
BUN SERPL-MCNC: 22 MG/DL
CALCIUM SERPL-MCNC: 9.6 MG/DL
CALCIUM SERPL-MCNC: 9.6 MG/DL
CHLORIDE SERPL-SCNC: 103 MMOL/L
CO2 SERPL-SCNC: 24 MMOL/L
COLOR: NORMAL
COVID-19 SPIKE DOMAIN ANTIBODY INTERPRETATION: POSITIVE
CREAT SERPL-MCNC: 1.23 MG/DL
CREAT SPEC-SCNC: 109 MG/DL
CREAT/PROT UR: 0.1 RATIO
EOSINOPHIL # BLD AUTO: 0.31 K/UL
EOSINOPHIL NFR BLD AUTO: 3.7 %
ESTIMATED AVERAGE GLUCOSE: 114 MG/DL
GLUCOSE QUALITATIVE U: NEGATIVE
GLUCOSE SERPL-MCNC: 126 MG/DL
HBA1C MFR BLD HPLC: 5.6 %
HCT VFR BLD CALC: 37.3 %
HGB BLD-MCNC: 11.8 G/DL
HYALINE CASTS: 3 /LPF
IMM GRANULOCYTES NFR BLD AUTO: 0.2 %
KETONES URINE: NEGATIVE
LEUKOCYTE ESTERASE URINE: NEGATIVE
LYMPHOCYTES # BLD AUTO: 1.53 K/UL
LYMPHOCYTES NFR BLD AUTO: 18.3 %
MAGNESIUM SERPL-MCNC: 1.8 MG/DL
MAN DIFF?: NORMAL
MCHC RBC-ENTMCNC: 31.6 GM/DL
MCHC RBC-ENTMCNC: 32.9 PG
MCV RBC AUTO: 103.9 FL
MICROSCOPIC-UA: NORMAL
MONOCYTES # BLD AUTO: 0.62 K/UL
MONOCYTES NFR BLD AUTO: 7.4 %
NEUTROPHILS # BLD AUTO: 5.85 K/UL
NEUTROPHILS NFR BLD AUTO: 70.2 %
NITRITE URINE: NEGATIVE
PARATHYROID HORMONE INTACT: 30 PG/ML
PH URINE: 5.5
PHOSPHATE SERPL-MCNC: 3.8 MG/DL
PLATELET # BLD AUTO: 174 K/UL
POTASSIUM SERPL-SCNC: 4.6 MMOL/L
PROT SERPL-MCNC: 6.3 G/DL
PROT UR-MCNC: 6 MG/DL
PROTEIN URINE: NEGATIVE
RBC # BLD: 3.59 M/UL
RBC # FLD: 14.2 %
RED BLOOD CELLS URINE: 2 /HPF
SARS-COV-2 AB SERPL IA-ACNC: >250 U/ML
SODIUM SERPL-SCNC: 139 MMOL/L
SPECIFIC GRAVITY URINE: 1.01
SQUAMOUS EPITHELIAL CELLS: 9 /HPF
TACROLIMUS SERPL-MCNC: 9 NG/ML
URATE SERPL-MCNC: 5 MG/DL
UROBILINOGEN URINE: NORMAL
WBC # FLD AUTO: 8.35 K/UL
WHITE BLOOD CELLS URINE: 1 /HPF

## 2022-02-23 LAB
C DIFF TOX B STL QL CT TISS CULT: NORMAL
C DIFF TOXIN B QL PCR REFLEX: NORMAL
GDH ANTIGEN: NOT DETECTED
Lab: NORMAL
TOXIN A AND B: NOT DETECTED

## 2022-02-28 ENCOUNTER — EMERGENCY (EMERGENCY)
Facility: HOSPITAL | Age: 52
LOS: 1 days | Discharge: HOME | End: 2022-02-28
Admitting: EMERGENCY MEDICINE
Payer: OTHER MISCELLANEOUS

## 2022-02-28 DIAGNOSIS — Z98.51 TUBAL LIGATION STATUS: Chronic | ICD-10-CM

## 2022-02-28 DIAGNOSIS — Z90.5 ACQUIRED ABSENCE OF KIDNEY: Chronic | ICD-10-CM

## 2022-02-28 DIAGNOSIS — Z98.890 OTHER SPECIFIED POSTPROCEDURAL STATES: Chronic | ICD-10-CM

## 2022-02-28 DIAGNOSIS — Z94.0 KIDNEY TRANSPLANT STATUS: Chronic | ICD-10-CM

## 2022-02-28 DIAGNOSIS — I77.0 ARTERIOVENOUS FISTULA, ACQUIRED: Chronic | ICD-10-CM

## 2022-02-28 DIAGNOSIS — Z90.49 ACQUIRED ABSENCE OF OTHER SPECIFIED PARTS OF DIGESTIVE TRACT: Chronic | ICD-10-CM

## 2022-02-28 DIAGNOSIS — N20.0 CALCULUS OF KIDNEY: Chronic | ICD-10-CM

## 2022-02-28 PROCEDURE — 99284 EMERGENCY DEPT VISIT MOD MDM: CPT

## 2022-02-28 PROCEDURE — 99053 MED SERV 10PM-8AM 24 HR FAC: CPT
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
## 2022-03-02 DIAGNOSIS — S09.8XXA OTHER SPECIFIED INJURIES OF HEAD, INITIAL ENCOUNTER: ICD-10-CM

## 2022-03-02 DIAGNOSIS — Z04.2 ENCOUNTER FOR EXAMINATION AND OBSERVATION FOLLOWING WORK ACCIDENT: ICD-10-CM
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
## 2022-03-02 DIAGNOSIS — Z94.0 KIDNEY TRANSPLANT STATUS: ICD-10-CM

## 2022-03-02 DIAGNOSIS — S16.1XXA STRAIN OF MUSCLE, FASCIA AND TENDON AT NECK LEVEL, INITIAL ENCOUNTER: ICD-10-CM
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
## 2022-03-02 DIAGNOSIS — I10 ESSENTIAL (PRIMARY) HYPERTENSION: ICD-10-CM

## 2022-03-14 ENCOUNTER — RX RENEWAL (OUTPATIENT)
Age: 52
End: 2022-03-14

## 2022-03-21 ENCOUNTER — NON-APPOINTMENT (OUTPATIENT)
Age: 52
End: 2022-03-21

## 2022-03-21 ENCOUNTER — APPOINTMENT (OUTPATIENT)
Dept: OPHTHALMOLOGY | Facility: CLINIC | Age: 52
End: 2022-03-21
Payer: COMMERCIAL

## 2022-03-21 PROCEDURE — 92014 COMPRE OPH EXAM EST PT 1/>: CPT

## 2022-03-25 ENCOUNTER — APPOINTMENT (OUTPATIENT)
Dept: NEPHROLOGY | Facility: CLINIC | Age: 52
End: 2022-03-25
Payer: COMMERCIAL

## 2022-03-25 ENCOUNTER — LABORATORY RESULT (OUTPATIENT)
Age: 52
End: 2022-03-25

## 2022-03-25 VITALS — OXYGEN SATURATION: 97 % | SYSTOLIC BLOOD PRESSURE: 170 MMHG | DIASTOLIC BLOOD PRESSURE: 60 MMHG | HEART RATE: 84 BPM

## 2022-03-25 VITALS — HEIGHT: 68 IN | BODY MASS INDEX: 38.8 KG/M2 | WEIGHT: 256 LBS

## 2022-03-25 DIAGNOSIS — G40.209 LOCALIZATION-RELATED (FOCAL) (PARTIAL) SYMPTOMATIC EPILEPSY AND EPILEPTIC SYNDROMES WITH COMPLEX PARTIAL SEIZURES, NOT INTRACTABLE, W/OUT STATUS EPILEPTICUS: ICD-10-CM

## 2022-03-25 PROCEDURE — 99214 OFFICE O/P EST MOD 30 MIN: CPT | Mod: 25

## 2022-03-25 PROCEDURE — 36415 COLL VENOUS BLD VENIPUNCTURE: CPT

## 2022-03-25 RX ORDER — TRETINOIN 0.5 MG/G
0.05 CREAM TOPICAL
Qty: 45 | Refills: 0 | Status: DISCONTINUED | COMMUNITY
Start: 2020-09-10 | End: 2022-03-25

## 2022-03-25 NOTE — PHYSICAL EXAM
[General Appearance - Alert] : alert [General Appearance - In No Acute Distress] : in no acute distress [Sclera] : the sclera and conjunctiva were normal [Strabismus] : no strabismus was seen [Outer Ear] : the ears and nose were normal in appearance [Hearing Threshold Finger Rub Not Titus] : hearing was normal [Examination Of The Oral Cavity] : the lips and gums were normal [Neck Appearance] : the appearance of the neck was normal [Neck Cervical Mass (___cm)] : no neck mass was observed [Jugular Venous Distention Increased] : there was no jugular-venous distention [Respiration, Rhythm And Depth] : normal respiratory rhythm and effort [Exaggerated Use Of Accessory Muscles For Inspiration] : no accessory muscle use [Auscultation Breath Sounds / Voice Sounds] : lungs were clear to auscultation bilaterally [Apical Impulse] : the apical impulse was normal [Heart Rate And Rhythm] : heart rate was normal and rhythm regular [Heart Sounds] : normal S1 and S2 [Murmurs] : no murmurs [Edema] : there was no peripheral edema [Bowel Sounds] : normal bowel sounds [Abdomen Tenderness] : non-tender [Abdomen Soft] : soft [] : no hepato-splenomegaly [Abdomen Mass (___ Cm)] : no abdominal mass palpated [No CVA Tenderness] : no ~M costovertebral angle tenderness [Nail Clubbing] : no clubbing  or cyanosis of the fingernails [Motor Tone] : muscle strength and tone were normal [___ (cm) Fistula] : [unfilled] (cm) fistula [Skin Color & Pigmentation] : normal skin color and pigmentation [Skin Turgor] : normal skin turgor [Skin Lesions] : no skin lesions [No Focal Deficits] : no focal deficits [Oriented To Time, Place, And Person] : oriented to person, place, and time [Impaired Insight] : insight and judgment were intact [Affect] : the affect was normal [Mood] : the mood was normal [FreeTextEntry1] : non-functioning

## 2022-03-25 NOTE — ASSESSMENT
[FreeTextEntry1] : PCKD with b/l nephrectomies\par LURT in 2018: stable allograft function. 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 Prednisone dose decreased to 2.5 mg\par HbA1c improved\par Endocrinology follow up\par \par \par Hypomagnesemia;  in setting of tacrolimus use/ diarrhea with MMF\par Mg++ levels stable\par continue Mag -Oxide 400 mg daily\par \par + Oral Thrush ( On Antibiotics per Dentist )\par \par Post - Transplant weight gain, \par \par FTashaU Dr. Mcdonough in 5 -22, \par \par \par

## 2022-03-25 NOTE — REVIEW OF SYSTEMS
[Eyesight Problems] : eyesight problems [Negative] : Heme/Lymph [de-identified] : intermittent numbness/tingling in fingers of right hand (non-functioning fistula in right arm)

## 2022-03-25 NOTE — HISTORY OF PRESENT ILLNESS
[___ Month(s) Ago] : [unfilled] month(s) ago [Good Compliance] : good compliance with treatment [FreeTextEntry1] : 50 year old woman with PCKD s/p LURT from ex  and bilateral native nephrectomies on 4/2/18.\par Course was complicated with slow function assumed to be early ACR (low tacro levels) treated with solumedrol 750 mgs. Pt also had Cdiff treated with vancomycin. 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

## 2022-03-26 ENCOUNTER — TRANSCRIPTION ENCOUNTER (OUTPATIENT)
Age: 52
End: 2022-03-26

## 2022-03-26 DIAGNOSIS — R19.7 DIARRHEA, UNSPECIFIED: ICD-10-CM

## 2022-03-26 DIAGNOSIS — Z92.89 PERSONAL HISTORY OF OTHER MEDICAL TREATMENT: ICD-10-CM

## 2022-03-26 DIAGNOSIS — K52.9 NONINFECTIVE GASTROENTERITIS AND COLITIS, UNSPECIFIED: ICD-10-CM

## 2022-03-26 DIAGNOSIS — Z86.59 PERSONAL HISTORY OF OTHER MENTAL AND BEHAVIORAL DISORDERS: ICD-10-CM

## 2022-03-26 LAB
25(OH)D3 SERPL-MCNC: 24.2 NG/ML
ALBUMIN SERPL ELPH-MCNC: 4.1 G/DL
ANION GAP SERPL CALC-SCNC: 14 MMOL/L
BASOPHILS # BLD AUTO: 0.05 K/UL
BASOPHILS NFR BLD AUTO: 0.6 %
BUN SERPL-MCNC: 22 MG/DL
CALCIUM SERPL-MCNC: 9.3 MG/DL
CHLORIDE SERPL-SCNC: 103 MMOL/L
CO2 SERPL-SCNC: 21 MMOL/L
CREAT SERPL-MCNC: 1.07 MG/DL
CREAT SPEC-SCNC: 218 MG/DL
CREAT/PROT UR: 0.1 RATIO
EGFR: 63 ML/MIN/1.73M2
EOSINOPHIL # BLD AUTO: 0.33 K/UL
EOSINOPHIL NFR BLD AUTO: 3.8 %
ESTIMATED AVERAGE GLUCOSE: 126 MG/DL
GLUCOSE SERPL-MCNC: 161 MG/DL
HBA1C MFR BLD HPLC: 6 %
HCT VFR BLD CALC: 39.5 %
HGB BLD-MCNC: 12.6 G/DL
IMM GRANULOCYTES NFR BLD AUTO: 0.2 %
LYMPHOCYTES # BLD AUTO: 1.58 K/UL
LYMPHOCYTES NFR BLD AUTO: 18 %
MAGNESIUM SERPL-MCNC: 1.7 MG/DL
MAN DIFF?: NORMAL
MCHC RBC-ENTMCNC: 31.9 GM/DL
MCHC RBC-ENTMCNC: 33.5 PG
MCV RBC AUTO: 105.1 FL
MONOCYTES # BLD AUTO: 0.58 K/UL
MONOCYTES NFR BLD AUTO: 6.6 %
NEUTROPHILS # BLD AUTO: 6.2 K/UL
NEUTROPHILS NFR BLD AUTO: 70.8 %
PHOSPHATE SERPL-MCNC: 2.9 MG/DL
PLATELET # BLD AUTO: 176 K/UL
POTASSIUM SERPL-SCNC: 4.6 MMOL/L
PROT UR-MCNC: 18 MG/DL
RBC # BLD: 3.76 M/UL
RBC # FLD: 13.9 %
SODIUM SERPL-SCNC: 138 MMOL/L
TACROLIMUS SERPL-MCNC: 8.5 NG/ML
URATE SERPL-MCNC: 5.1 MG/DL
WBC # FLD AUTO: 8.76 K/UL

## 2022-03-26 RX ORDER — INSULIN PUMP CARTRIDGE
CARTRIDGE (EA) SUBCUTANEOUS
Qty: 3 | Refills: 1 | Status: DISCONTINUED | COMMUNITY
Start: 2022-03-21 | End: 2022-03-26

## 2022-03-26 RX ORDER — INFUSION SET FOR INSULIN PUMP
INFUSION SETS-PARAPHERNALIA MISCELLANEOUS
Qty: 3 | Refills: 1 | Status: DISCONTINUED | COMMUNITY
Start: 2022-03-21 | End: 2022-03-26

## 2022-03-31 ENCOUNTER — TRANSCRIPTION ENCOUNTER (OUTPATIENT)
Age: 52
End: 2022-03-31

## 2022-04-04 ENCOUNTER — APPOINTMENT (OUTPATIENT)
Dept: NEPHROLOGY | Facility: CLINIC | Age: 52
End: 2022-04-04
Payer: COMMERCIAL

## 2022-04-04 ENCOUNTER — APPOINTMENT (OUTPATIENT)
Dept: GASTROENTEROLOGY | Facility: CLINIC | Age: 52
End: 2022-04-04
Payer: COMMERCIAL

## 2022-04-04 VITALS
BODY MASS INDEX: 38.95 KG/M2 | WEIGHT: 257 LBS | RESPIRATION RATE: 16 BRPM | HEIGHT: 68 IN | HEART RATE: 78 BPM | SYSTOLIC BLOOD PRESSURE: 144 MMHG | OXYGEN SATURATION: 94 % | TEMPERATURE: 97.7 F | DIASTOLIC BLOOD PRESSURE: 85 MMHG

## 2022-04-04 VITALS
HEART RATE: 78 BPM | SYSTOLIC BLOOD PRESSURE: 144 MMHG | DIASTOLIC BLOOD PRESSURE: 80 MMHG | OXYGEN SATURATION: 94 % | HEIGHT: 68 IN | BODY MASS INDEX: 38.8 KG/M2 | TEMPERATURE: 97.4 F | RESPIRATION RATE: 16 BRPM | WEIGHT: 256 LBS

## 2022-04-04 DIAGNOSIS — Z91.89 OTHER SPECIFIED PERSONAL RISK FACTORS, NOT ELSEWHERE CLASSIFIED: ICD-10-CM

## 2022-04-04 DIAGNOSIS — G89.29 EPIGASTRIC PAIN: ICD-10-CM

## 2022-04-04 DIAGNOSIS — R10.13 EPIGASTRIC PAIN: ICD-10-CM

## 2022-04-04 PROCEDURE — 99214 OFFICE O/P EST MOD 30 MIN: CPT

## 2022-04-04 PROCEDURE — 99204 OFFICE O/P NEW MOD 45 MIN: CPT

## 2022-04-04 NOTE — ASSESSMENT
[FreeTextEntry1] : ROLLY MEDEIROS 51 year F with ry of PCKD s/p LURT from ex  and bilateral native nephrectomies on 4/2/18, complicated with slow function assumed to be early ACR (low tacro levels) treated with Solumedrol 750mgs. Pt also had Cdiff treated with vancomycin, Covid on December 21st and received Regeneron MAB, post TXP DM on Insulin pump, hypertension, C diff x 5 times (vancomycin/Dificid/FMT pills) and kidney stones, here for explosive diarrhea x years and nausea/vomiting since her TXP surgery.\par \par 1. Diarrhea\par - I will schedule TTG IgA, TSH and stool studies, elastatse/calpro.\par - A colonoscopy with biopsies.\par \par 2. Nausea and vomiting\par - I will schedule an endoscopy and GES.\par \par Follow up in 2 months.

## 2022-04-04 NOTE — ASSESSMENT
[FreeTextEntry1] : 1.  Renal transplant - also with b/l native nephrectomies.  Early on likely ACR treated with solumedrol 750mgs x3.  Creatinine stable around 1.   Will repeat labs today.  \par 2.  Immunosuppression - target tacro 5-7, myfortic 360mgs BID and pred 2.5mgs daily for NODAT and weight gain. \par 3.  HTN-  BP improved on aldactone 25mgs daily. \par 4.  Transaminitis - Pt saw Dr. Norton - Has fatty liver/ WATKINS.  \par 5.  Diarrhea - largely resolved.  Mild may be related to myfortic.  Following with GI. \par 6.  NODAT - Now on insulin.  Sugars improving.  Following with endocrinology.  \par 7. Brain aneurysm - stable.  following with neurosurg.  \par 9. Health maintenance - discussed need for annual skin cancer screening, vaccinations and GYN care.  \par 10.  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  Native kidneys were removed.  \par 11.  Covid19 - pt has recovered.  \par \par Pt will f/u with Dr. Alan, return to transplant annually.  \par

## 2022-04-04 NOTE — ADDENDUM
[FreeTextEntry1] : The risks and benefits of my recommendations, as well as other treatment options were discussed with the patient today. Questions were answered.\par \par Please feel free to contact for any questions or concerns at my office  in the telephone numbers listed below.\par \par [address], [address], [place], [place], [address] Telephone: [phone] Fax: [phone]\par \par \par

## 2022-04-04 NOTE — CONSULT LETTER
[Dear  ___] : Dear  [unfilled], [Consult Letter:] : I had the pleasure of evaluating your patient, [unfilled]. [Please see my note below.] : Please see my note below. [Consult Closing:] : Thank you very much for allowing me to participate in the care of this patient.  If you have any questions, please do not hesitate to contact me. [Sincerely,] : Sincerely, [FreeTextEntry3] : Jeff Vuong MD\par \par Assistant Clinical Professor \par Division of Gastroenterology at [facility]\par [facility]\par [facility] at [facility]\par [facility]\par \par [address], [address], [place] [address]\par Tel: [phone] | Fax: [phone]\par \par Twitter (Personal): @[email or web address] \par \par \par

## 2022-04-04 NOTE — HISTORY OF PRESENT ILLNESS
[FreeTextEntry1] : Ms. Vanegas is a 47 y.o female with a history of PCKD s/p LURT from ex  and bilateral native nephrectomies on 4/2/18.\par Course was complicated with slow function assumed to be early ACR (low tacro levels) treated with solumedrol 750mgs.  Pt also had Cdiff treated with vancomycin.   \par \par  [de-identified] : PT is ok, CGM in place.  Had 24 hour urine which revealed high uric acid, phosphate and low citrate.  She is now on allopurinol.  Has been gaining weight.  Still working but did not contract COVID. \par \par Developed Covid on December 21st and received Regeneron MAB.  Pt now on Trulicity for  about 2 weeks.  Has not lost weight yet.  Had oral surgery and developed thrush - being treated with clotrimazole troches.  She has been working, feels well overall.

## 2022-04-04 NOTE — HISTORY OF PRESENT ILLNESS
[Cholelithiasis] : cholelithiasis [Kidney Stone] : kidney stone [Abdominal Surgery] : abdominal surgery [Cholecystectomy] : cholecystectomy [Heartburn] : denies heartburn [Constipation] : denies constipation [Yellow Skin Or Eyes (Jaundice)] : denies jaundice [Rectal Pain] : denies rectal pain [Wt Gain ___ Lbs] : no recent weight gain [Wt Loss ___ Lbs] : no recent weight loss [Nausea] : nausea [Vomiting] : vomiting [Diarrhea] : diarrhea [Abdominal Pain] : abdominal pain [Abdominal Swelling] : abdominal swelling [GERD] : no gastroesophageal reflux disease [Hiatus Hernia] : no hiatus hernia [Peptic Ulcer Disease] : no peptic ulcer disease [Pancreatitis] : no pancreatitis [Inflammatory Bowel Disease] : no inflammatory bowel disease [Irritable Bowel Syndrome] : no irritable bowel syndrome [Diverticulitis] : no diverticulitis [Alcohol Abuse] : no alcohol abuse [Malignancy] : no malignancy [Appendectomy] : no appendectomy [de-identified] : ROLLY MEDEIROS 51 year F with ry of PCKD s/p LURT from ex  and bilateral native nephrectomies on 4/2/18, complicated with slow function assumed to be early ACR (low tacro levels) treated with Solumedrol 750mgs. Pt also had Cdiff treated with vancomycin, Covid on December 21st and received Regeneron MAB, post TXP DM on Insulin pump, hypertension, C diff x 5 times (vancomycin/Dificid/FMT pills) and kidney stones, here for explosive diarrhea x years and nausea/vomiting since her TXP surgery.\par \par Never had a colonoscopy.\par \par Patient suffers from chronic explosive diarrhea for the past 7 years complicated by C diff. She had taken vancomycin, Dificid and then FMT freeze dried pills with recovery each time. Her latest stools are negative for C diff. She also complains of nausea and vomiting with abdominal bloating since surgery.\par \par Patient states of a good appetite, no loss of weight, bowel movement once daily, formed and brown stools without blood or mucus. Denies abdominal pain, melena, hematochezia, or hematemesis.\par \par FH: 2nd degree relative with colon cancer: maternal GM aged 58 years. \par \par \par \par

## 2022-04-08 ENCOUNTER — TRANSCRIPTION ENCOUNTER (OUTPATIENT)
Age: 52
End: 2022-04-08

## 2022-04-11 ENCOUNTER — TRANSCRIPTION ENCOUNTER (OUTPATIENT)
Age: 52
End: 2022-04-11

## 2022-04-12 LAB
C DIFF TOXIN B QL PCR REFLEX: NORMAL
CALPROTECTIN FECAL: <16 UG/G
GDH ANTIGEN: NOT DETECTED
GI PCR PANEL, STOOL: NORMAL
TOXIN A AND B: NOT DETECTED
TSH SERPL-ACNC: 2.98 UIU/ML
TTG IGA SER IA-ACNC: 1.2 U/ML
TTG IGA SER-ACNC: NEGATIVE
TTG IGG SER IA-ACNC: <1.2 U/ML
TTG IGG SER IA-ACNC: NEGATIVE

## 2022-04-13 ENCOUNTER — APPOINTMENT (OUTPATIENT)
Dept: GASTROENTEROLOGY | Facility: HOSPITAL | Age: 52
End: 2022-04-13

## 2022-04-13 ENCOUNTER — RESULT REVIEW (OUTPATIENT)
Age: 52
End: 2022-04-13

## 2022-04-13 ENCOUNTER — OUTPATIENT (OUTPATIENT)
Dept: OUTPATIENT SERVICES | Facility: HOSPITAL | Age: 52
LOS: 1 days | Discharge: HOME | End: 2022-04-13
Payer: COMMERCIAL

## 2022-04-13 VITALS
DIASTOLIC BLOOD PRESSURE: 79 MMHG | SYSTOLIC BLOOD PRESSURE: 116 MMHG | OXYGEN SATURATION: 95 % | HEART RATE: 85 BPM | RESPIRATION RATE: 20 BRPM | TEMPERATURE: 98 F

## 2022-04-13 VITALS
RESPIRATION RATE: 13 BRPM | SYSTOLIC BLOOD PRESSURE: 120 MMHG | OXYGEN SATURATION: 92 % | DIASTOLIC BLOOD PRESSURE: 68 MMHG | HEART RATE: 85 BPM

## 2022-04-13 DIAGNOSIS — I77.0 ARTERIOVENOUS FISTULA, ACQUIRED: Chronic | ICD-10-CM

## 2022-04-13 DIAGNOSIS — Z94.0 KIDNEY TRANSPLANT STATUS: Chronic | ICD-10-CM

## 2022-04-13 DIAGNOSIS — Z12.11 ENCOUNTER FOR SCREENING FOR MALIGNANT NEOPLASM OF COLON: ICD-10-CM

## 2022-04-13 DIAGNOSIS — N20.0 CALCULUS OF KIDNEY: Chronic | ICD-10-CM

## 2022-04-13 DIAGNOSIS — Z98.890 OTHER SPECIFIED POSTPROCEDURAL STATES: Chronic | ICD-10-CM

## 2022-04-13 DIAGNOSIS — Z98.51 TUBAL LIGATION STATUS: Chronic | ICD-10-CM

## 2022-04-13 DIAGNOSIS — Z90.5 ACQUIRED ABSENCE OF KIDNEY: Chronic | ICD-10-CM

## 2022-04-13 DIAGNOSIS — Z90.49 ACQUIRED ABSENCE OF OTHER SPECIFIED PARTS OF DIGESTIVE TRACT: Chronic | ICD-10-CM

## 2022-04-13 LAB
GLUCOSE BLDC GLUCOMTR-MCNC: 115 MG/DL — HIGH (ref 70–99)
GLUCOSE BLDC GLUCOMTR-MCNC: 127 MG/DL — HIGH (ref 70–99)
PANCREATIC ELASTASE, FECAL: 500

## 2022-04-13 PROCEDURE — 45380 COLONOSCOPY AND BIOPSY: CPT

## 2022-04-13 PROCEDURE — 44361 SMALL BOWEL ENDOSCOPY/BIOPSY: CPT

## 2022-04-13 PROCEDURE — 88305 TISSUE EXAM BY PATHOLOGIST: CPT | Mod: 26

## 2022-04-13 NOTE — ASU PATIENT PROFILE, ADULT - NSICDXPASTMEDICALHX_GEN_ALL_CORE_FT
PAST MEDICAL HISTORY:  Anxiety     Arthropathy NOS - shoulder     CKD (chronic kidney disease) stage 4, GFR 15-29 ml/min no dialysis    ESRD (end stage renal disease)     GERD (gastroesophageal reflux disease)     Hyperlipidemia     Kidney stones     Polycystic kidney disease

## 2022-04-13 NOTE — ASU PATIENT PROFILE, ADULT - NSICDXPASTSURGICALHX_GEN_ALL_CORE_FT
PAST SURGICAL HISTORY:  Ankle fracture - Left brumstein barajas procedure 2000    AV fistula right forearm extremity AV fistula radiocephalic ( 2018)    H/O shoulder surgery left    H/O tubal ligation (1996)    History of ventral hernia repair     Kidney calculi cysto/lithotripsy multiple    Renal transplant recipient     S/P cholecystectomy (2013)    Status post nephrectomy bilateral    Tubal ligation 1995

## 2022-04-18 ENCOUNTER — TRANSCRIPTION ENCOUNTER (OUTPATIENT)
Age: 52
End: 2022-04-18

## 2022-04-18 LAB — SURGICAL PATHOLOGY STUDY: SIGNIFICANT CHANGE UP

## 2022-04-22 ENCOUNTER — TRANSCRIPTION ENCOUNTER (OUTPATIENT)
Age: 52
End: 2022-04-22

## 2022-04-29 ENCOUNTER — RX RENEWAL (OUTPATIENT)
Age: 52
End: 2022-04-29

## 2022-05-05 ENCOUNTER — APPOINTMENT (OUTPATIENT)
Dept: NUCLEAR MEDICINE | Facility: HOSPITAL | Age: 52
End: 2022-05-05
Payer: COMMERCIAL

## 2022-05-05 ENCOUNTER — OUTPATIENT (OUTPATIENT)
Dept: OUTPATIENT SERVICES | Facility: HOSPITAL | Age: 52
LOS: 1 days | End: 2022-05-05

## 2022-05-05 DIAGNOSIS — Z94.0 KIDNEY TRANSPLANT STATUS: Chronic | ICD-10-CM

## 2022-05-05 DIAGNOSIS — I77.0 ARTERIOVENOUS FISTULA, ACQUIRED: Chronic | ICD-10-CM

## 2022-05-05 DIAGNOSIS — R11.2 NAUSEA WITH VOMITING, UNSPECIFIED: ICD-10-CM

## 2022-05-05 DIAGNOSIS — Z98.51 TUBAL LIGATION STATUS: Chronic | ICD-10-CM

## 2022-05-05 DIAGNOSIS — Z98.890 OTHER SPECIFIED POSTPROCEDURAL STATES: Chronic | ICD-10-CM

## 2022-05-05 DIAGNOSIS — Z90.5 ACQUIRED ABSENCE OF KIDNEY: Chronic | ICD-10-CM

## 2022-05-05 DIAGNOSIS — N20.0 CALCULUS OF KIDNEY: Chronic | ICD-10-CM

## 2022-05-05 DIAGNOSIS — R10.13 EPIGASTRIC PAIN: ICD-10-CM

## 2022-05-05 DIAGNOSIS — Z90.49 ACQUIRED ABSENCE OF OTHER SPECIFIED PARTS OF DIGESTIVE TRACT: Chronic | ICD-10-CM

## 2022-05-05 PROCEDURE — 78264 GASTRIC EMPTYING IMG STUDY: CPT | Mod: 26

## 2022-05-11 ENCOUNTER — TRANSCRIPTION ENCOUNTER (OUTPATIENT)
Age: 52
End: 2022-05-11

## 2022-05-16 ENCOUNTER — TRANSCRIPTION ENCOUNTER (OUTPATIENT)
Age: 52
End: 2022-05-16

## 2022-05-18 ENCOUNTER — APPOINTMENT (OUTPATIENT)
Dept: ENDOCRINOLOGY | Facility: CLINIC | Age: 52
End: 2022-05-18
Payer: COMMERCIAL

## 2022-05-18 VITALS
OXYGEN SATURATION: 95 % | WEIGHT: 256 LBS | DIASTOLIC BLOOD PRESSURE: 84 MMHG | SYSTOLIC BLOOD PRESSURE: 138 MMHG | BODY MASS INDEX: 38.8 KG/M2 | HEIGHT: 68 IN | HEART RATE: 86 BPM

## 2022-05-18 LAB — GLUCOSE BLDC GLUCOMTR-MCNC: 185

## 2022-05-18 PROCEDURE — 99214 OFFICE O/P EST MOD 30 MIN: CPT | Mod: 25

## 2022-05-18 PROCEDURE — 95251 CONT GLUC MNTR ANALYSIS I&R: CPT

## 2022-05-18 PROCEDURE — 82962 GLUCOSE BLOOD TEST: CPT

## 2022-05-18 NOTE — HISTORY OF PRESENT ILLNESS
[FreeTextEntry1] : Type: NODAT\par Severity: moderate\par Duration: January 2020\par Onset: s/p kidney transplant\par Associated symptoms: polyuria, polydipsia\par \par Modifying factors-\par Current meds for glycemic control:\par Tandem Control IQ with Humalog\par Reviewed CGMS. Avg  with 66% of values in range and 34% high. None low. Frequent auto bolus delivery after meals.\par \par On glimepiride in the past, d/c'ed once started on CSII. Trial of Ozempic resulting in nausea/vomiting. Tolerating trulicity without increase in adverse effects. Has chronic nausea and diarrhea; gi workup unrevealing.\par \par Diet: cut out soda and most junk food, decreased bread, trying to watch\par Weight: stable, but trying to lose\par Exercise: walking daily\par \par Eye exam: needs exam, reports blurry vision\par Foot exam: denies neuropathy\par Heart disease: none\par Kidney disease: s/p kidney transplant for PCKD in 2018\par s/p COVID 1/2022\par \par Works as nurse manager at [employer]  [Continuous Glucose Monitoring] : Continuous Glucose Monitoring: Yes [Dexcom] : Dexcom [FreeTextEntry2] : 82 [FreeTextEntry3] : 17 [FreeTextEntry4] : 1 [FreeTextEntry5] : 146 [FreeTextEntry7] : 33

## 2022-05-18 NOTE — ASSESSMENT
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 Glycemic control improving control with combination of pump therapy and trulicity.\par Labs reviewed\par \par -Glucose sensor/pump necessity: This patient with diabetes performs four or more glucose checks per day utilizing a home blood glucose monitor since diagnosis in January 2020. The patient is treated with insulin via three or more injections daily. Over the past 6 months, this patient has required frequent adjustments to their insulin treatment on the basis of therapeutic continuous glucose monitoring results. In addition, the patient has been to our office for an evaluation of their diabetes control within the past six months, and attends a comprehensive diabetic education program, most recently in November 2020. \par

## 2022-05-24 ENCOUNTER — TRANSCRIPTION ENCOUNTER (OUTPATIENT)
Age: 52
End: 2022-05-24

## 2022-05-26 DIAGNOSIS — R82.71 BACTERIURIA: ICD-10-CM

## 2022-05-27 ENCOUNTER — APPOINTMENT (OUTPATIENT)
Dept: NEPHROLOGY | Facility: CLINIC | Age: 52
End: 2022-05-27

## 2022-05-31 ENCOUNTER — RX RENEWAL (OUTPATIENT)
Age: 52
End: 2022-05-31

## 2022-05-31 LAB
25(OH)D3 SERPL-MCNC: 63.9 NG/ML
ALBUMIN SERPL ELPH-MCNC: 4.4 G/DL
ANION GAP SERPL CALC-SCNC: 13 MMOL/L
BASOPHILS # BLD AUTO: 0.04 K/UL
BASOPHILS NFR BLD AUTO: 0.4 %
BUN SERPL-MCNC: 23 MG/DL
CALCIUM SERPL-MCNC: 9.7 MG/DL
CHLORIDE SERPL-SCNC: 103 MMOL/L
CO2 SERPL-SCNC: 23 MMOL/L
CREAT SERPL-MCNC: 1.21 MG/DL
CREAT SPEC-SCNC: 231 MG/DL
CREAT/PROT UR: 0.1 RATIO
EGFR: 54 ML/MIN/1.73M2
EOSINOPHIL # BLD AUTO: 0.29 K/UL
EOSINOPHIL NFR BLD AUTO: 3 %
ESTIMATED AVERAGE GLUCOSE: 137 MG/DL
GLUCOSE SERPL-MCNC: 118 MG/DL
HBA1C MFR BLD HPLC: 6.4 %
HCT VFR BLD CALC: 38.3 %
HGB BLD-MCNC: 13 G/DL
IMM GRANULOCYTES NFR BLD AUTO: 0.4 %
LYMPHOCYTES # BLD AUTO: 2.46 K/UL
LYMPHOCYTES NFR BLD AUTO: 25.8 %
MAGNESIUM SERPL-MCNC: 1.6 MG/DL
MAN DIFF?: NORMAL
MCHC RBC-ENTMCNC: 33.9 GM/DL
MCHC RBC-ENTMCNC: 34.5 PG
MCV RBC AUTO: 101.6 FL
MONOCYTES # BLD AUTO: 0.77 K/UL
MONOCYTES NFR BLD AUTO: 8.1 %
NEUTROPHILS # BLD AUTO: 5.95 K/UL
NEUTROPHILS NFR BLD AUTO: 62.3 %
PHOSPHATE SERPL-MCNC: 3.4 MG/DL
PLATELET # BLD AUTO: 196 K/UL
POTASSIUM SERPL-SCNC: 4.5 MMOL/L
PROT UR-MCNC: 12 MG/DL
RBC # BLD: 3.77 M/UL
RBC # FLD: 13.5 %
SODIUM SERPL-SCNC: 139 MMOL/L
TACROLIMUS SERPL-MCNC: 7.3 NG/ML
URATE SERPL-MCNC: 5.8 MG/DL
WBC # FLD AUTO: 9.55 K/UL

## 2022-06-06 ENCOUNTER — APPOINTMENT (OUTPATIENT)
Dept: GASTROENTEROLOGY | Facility: CLINIC | Age: 52
End: 2022-06-06
Payer: COMMERCIAL

## 2022-06-06 VITALS
OXYGEN SATURATION: 98 % | HEIGHT: 68 IN | TEMPERATURE: 98 F | SYSTOLIC BLOOD PRESSURE: 125 MMHG | HEART RATE: 86 BPM | DIASTOLIC BLOOD PRESSURE: 80 MMHG

## 2022-06-06 PROCEDURE — 99213 OFFICE O/P EST LOW 20 MIN: CPT

## 2022-06-06 NOTE — HISTORY OF PRESENT ILLNESS
[Heartburn] : denies heartburn [Constipation] : denies constipation [Yellow Skin Or Eyes (Jaundice)] : denies jaundice [Rectal Pain] : denies rectal pain [Nausea] : nausea [Vomiting] : vomiting [Diarrhea] : diarrhea [Abdominal Pain] : abdominal pain [Abdominal Swelling] : abdominal swelling [Cholelithiasis] : cholelithiasis [Kidney Stone] : kidney stone [Abdominal Surgery] : abdominal surgery [Cholecystectomy] : cholecystectomy [Wt Gain ___ Lbs] : no recent weight gain [Wt Loss ___ Lbs] : no recent weight loss [GERD] : no gastroesophageal reflux disease [Hiatus Hernia] : no hiatus hernia [Peptic Ulcer Disease] : no peptic ulcer disease [Pancreatitis] : no pancreatitis [Inflammatory Bowel Disease] : no inflammatory bowel disease [Irritable Bowel Syndrome] : no irritable bowel syndrome [Diverticulitis] : no diverticulitis [Alcohol Abuse] : no alcohol abuse [Malignancy] : no malignancy [Appendectomy] : no appendectomy [de-identified] : ROLLY MEDEIROS 51 year F with ry of PCKD s/p LURT from ex  and bilateral native nephrectomies on 4/2/18, complicated with slow function assumed to be early ACR (low tacro levels) treated with Solumedrol 750mgs. Pt also had Cdiff treated with vancomycin, Covid on December 21st and received Regeneron MAB, post TXP DM on Insulin pump, hypertension, C diff x 5 times (vancomycin/Dificid/FMT pills) and kidney stones, here for explosive diarrhea x years and nausea/vomiting since her TXP surgery.\par Never had a colonoscopy.\par Patient suffers from chronic explosive diarrhea for the past 7 years complicated by C diff. She had taken vancomycin, Dificid and then FMT freeze dried pills with recovery each time. Her latest stools are negative for C diff. She also complains of nausea and vomiting with abdominal bloating since surgery.\par Patient states of a good appetite, no loss of weight, bowel movement once daily, formed and brown stools without blood or mucus. Denies abdominal pain, melena, hematochezia, or hematemesis.\par FH: 2nd degree relative with colon cancer: maternal GM aged 58 years. \par \par Interval history: endoscopy with biopsies and colonoscopy normal. GES delayed emptying of liquids but solids normal transit time. \par Office visit: 06/06/2022\par Felling well. 5-7 BM daily after meals but no nocturnal symptoms. Reluctant to take Imodium. \par

## 2022-06-06 NOTE — ADDENDUM
[FreeTextEntry1] : The risks and benefits of my recommendations, as well as other treatment options were discussed with the patient today. Questions were answered.\par \par Please feel free to contact for any questions or concerns at my office  in the telephone numbers listed below.\par \par [address], [address], [place], [address] Telephone: [phone] Fax: [phone]\par \par \par

## 2022-06-06 NOTE — PHYSICAL EXAM
[General Appearance - Alert] : alert [General Appearance - In No Acute Distress] : in no acute distress [PERRL With Normal Accommodation] : pupils were equal in size, round, and reactive to light [Sclera] : the sclera and conjunctiva were normal [Extraocular Movements] : extraocular movements were intact [Outer Ear] : the ears and nose were normal in appearance [Oropharynx] : the oropharynx was normal [Neck Appearance] : the appearance of the neck was normal [Neck Cervical Mass (___cm)] : no neck mass was observed [Jugular Venous Distention Increased] : there was no jugular-venous distention [Thyroid Diffuse Enlargement] : the thyroid was not enlarged [Thyroid Nodule] : there were no palpable thyroid nodules [Auscultation Breath Sounds / Voice Sounds] : lungs were clear to auscultation bilaterally [Heart Rate And Rhythm] : heart rate was normal and rhythm regular [Heart Sounds] : normal S1 and S2 [Heart Sounds Gallop] : no gallops [Murmurs] : no murmurs [Heart Sounds Pericardial Friction Rub] : no pericardial rub [Full Pulse] : the pedal pulses are present [Edema] : there was no peripheral edema [Cervical Lymph Nodes Enlarged Posterior Bilaterally] : posterior cervical [Cervical Lymph Nodes Enlarged Anterior Bilaterally] : anterior cervical [Supraclavicular Lymph Nodes Enlarged Bilaterally] : supraclavicular [Axillary Lymph Nodes Enlarged Bilaterally] : axillary [Femoral Lymph Nodes Enlarged Bilaterally] : femoral [Inguinal Lymph Nodes Enlarged Bilaterally] : inguinal [No CVA Tenderness] : no ~M costovertebral angle tenderness [No Spinal Tenderness] : no spinal tenderness [Abnormal Walk] : normal gait [Nail Clubbing] : no clubbing  or cyanosis of the fingernails [Musculoskeletal - Swelling] : no joint swelling seen [Motor Tone] : muscle strength and tone were normal [Skin Color & Pigmentation] : normal skin color and pigmentation [Skin Turgor] : normal skin turgor [] : no rash [Deep Tendon Reflexes (DTR)] : deep tendon reflexes were 2+ and symmetric [Sensation] : the sensory exam was normal to light touch and pinprick [No Focal Deficits] : no focal deficits [Oriented To Time, Place, And Person] : oriented to person, place, and time [Impaired Insight] : insight and judgment were intact [Affect] : the affect was normal [Normal] : normal [Soft, Nontender] : the abdomen was soft and nontender [No Mass] : no masses were palpated [No HSM] : no hepatosplenomegaly noted

## 2022-06-06 NOTE — ASSESSMENT
[FreeTextEntry1] : ROLLY MEDEIROS 51 year F with ry of PCKD s/p LURT from ex  and bilateral native nephrectomies on 4/2/18, complicated with slow function assumed to be early ACR (low tacro levels) treated with Solumedrol 750mgs. Pt also had Cdiff treated with vancomycin, Covid on December 21st and received Regeneron MAB, post TXP DM on Insulin pump, hypertension, C diff x 5 times (vancomycin/Dificid/FMT pills) and kidney stones, here for explosive diarrhea x years and nausea/vomiting since her TXP surgery.\par \par 1. Diarrhea IBS\par - TTG IgA, TSH and stool studies, elastase/calpro, Endoscopy[py and colonoscopy were normal.\par - A colonoscopy with biopsies.\par \par 2. Nausea and vomiting\par -  GES shows delayed gastric emptying for liquids.\par \par Follow up in 6 months.

## 2022-06-06 NOTE — CONSULT LETTER
[Dear  ___] : Dear  [unfilled], [Consult Letter:] : I had the pleasure of evaluating your patient, [unfilled]. [Please see my note below.] : Please see my note below. [Consult Closing:] : Thank you very much for allowing me to participate in the care of this patient.  If you have any questions, please do not hesitate to contact me. [Sincerely,] : Sincerely, [FreeTextEntry3] : Jeff Vuong MD\par \par Assistant Clinical Professor \par Division of Gastroenterology at [facility]\par [facility]\par [facility] at [facility]\par [facility] at [facility]\par \par [address], [address], [place] [address]\par Tel: [phone] | Fax: [phone]\par \par Twitter (Personal): @[email or web address] \par \par \par

## 2022-07-10 ENCOUNTER — RX RENEWAL (OUTPATIENT)
Age: 52
End: 2022-07-10

## 2022-07-15 ENCOUNTER — RX RENEWAL (OUTPATIENT)
Age: 52
End: 2022-07-15

## 2022-07-15 ENCOUNTER — APPOINTMENT (OUTPATIENT)
Dept: OBGYN | Facility: CLINIC | Age: 52
End: 2022-07-15

## 2022-07-15 VITALS
WEIGHT: 256 LBS | HEIGHT: 68 IN | BODY MASS INDEX: 38.8 KG/M2 | DIASTOLIC BLOOD PRESSURE: 72 MMHG | SYSTOLIC BLOOD PRESSURE: 122 MMHG

## 2022-07-15 DIAGNOSIS — Z12.11 ENCOUNTER FOR SCREENING FOR MALIGNANT NEOPLASM OF COLON: ICD-10-CM

## 2022-07-15 DIAGNOSIS — R92.2 INCONCLUSIVE MAMMOGRAM: ICD-10-CM

## 2022-07-15 PROCEDURE — 36415 COLL VENOUS BLD VENIPUNCTURE: CPT

## 2022-07-15 PROCEDURE — 99396 PREV VISIT EST AGE 40-64: CPT

## 2022-07-15 NOTE — PLAN
[FreeTextEntry1] : F/U pap, mammo and sono.\par \par She has the Mirena IUD in place. Will check hormone panel and sonogram. Low likelihood of pathology given Mirena IUD however if endometrium thickened on sono will need hysteroscopy with biopsy.

## 2022-07-15 NOTE — PHYSICAL EXAM
[Appropriately responsive] : appropriately responsive [Alert] : alert [No Acute Distress] : no acute distress [Soft] : soft [Non-tender] : non-tender [Non-distended] : non-distended [Oriented x3] : oriented x3 [Examination Of The Breasts] : a normal appearance [No Masses] : no breast masses were palpable [Labia Majora] : normal [Labia Minora] : normal [Atrophy] : atrophy [Dry Mucosa] : dry mucosa [IUD String] : an IUD string was noted [Normal] : normal [Uterine Adnexae] : normal

## 2022-07-15 NOTE — HISTORY OF PRESENT ILLNESS
[LMP unknown] : LMP unknown [Y] : Positive pregnancy history [unknown] : Patient is unsure of the date of her LMP [No] : Patient does not have concerns regarding sex [FreeTextEntry1] : \par 50 yo here for an annual exam. She has had some spotting on and off. \par \par SHe recently had a normal colonoscopy.\par \par She is doing well in terms of her diabetes and renal standpoint. [Mammogramdate] : 07/24/21 [TextBox_19] : BR2 [BreastSonogramDate] : 07/24/21 [TextBox_25] : BR2 [PapSmeardate] : 07/13/21 [TextBox_31] : NEG [ColonoscopyDate] : 2022 [HPVDate] : 07/13/21 [TextBox_78] : NEG [PGHxTotal] : 2 [[facility]] : 2 [[facility]] : 2

## 2022-07-18 LAB — HPV HIGH+LOW RISK DNA PNL CVX: NOT DETECTED

## 2022-07-19 ENCOUNTER — TRANSCRIPTION ENCOUNTER (OUTPATIENT)
Age: 52
End: 2022-07-19

## 2022-07-19 ENCOUNTER — APPOINTMENT (OUTPATIENT)
Dept: NEPHROLOGY | Facility: CLINIC | Age: 52
End: 2022-07-19

## 2022-07-19 VITALS
TEMPERATURE: 97.4 F | OXYGEN SATURATION: 95 % | HEART RATE: 80 BPM | HEIGHT: 68 IN | DIASTOLIC BLOOD PRESSURE: 77 MMHG | WEIGHT: 248 LBS | RESPIRATION RATE: 16 BRPM | BODY MASS INDEX: 37.59 KG/M2 | SYSTOLIC BLOOD PRESSURE: 147 MMHG

## 2022-07-19 LAB
BASOPHILS # BLD AUTO: 0.03 K/UL
BASOPHILS NFR BLD AUTO: 0.3 %
EOSINOPHIL # BLD AUTO: 0.21 K/UL
EOSINOPHIL NFR BLD AUTO: 2 %
ESTIMATED AVERAGE GLUCOSE: 126 MG/DL
FSH SERPL-MCNC: 6 IU/L
HBA1C MFR BLD HPLC: 6 %
HCT VFR BLD CALC: 41.1 %
HGB BLD-MCNC: 13.7 G/DL
IMM GRANULOCYTES NFR BLD AUTO: 0.3 %
LH SERPL-ACNC: 3.8 IU/L
LYMPHOCYTES # BLD AUTO: 2.2 K/UL
LYMPHOCYTES NFR BLD AUTO: 21.3 %
MAN DIFF?: NORMAL
MCHC RBC-ENTMCNC: 33.3 GM/DL
MCHC RBC-ENTMCNC: 34.9 PG
MCV RBC AUTO: 104.8 FL
MONOCYTES # BLD AUTO: 0.88 K/UL
MONOCYTES NFR BLD AUTO: 8.5 %
NEUTROPHILS # BLD AUTO: 6.98 K/UL
NEUTROPHILS NFR BLD AUTO: 67.6 %
PLATELET # BLD AUTO: 197 K/UL
PROLACTIN SERPL-MCNC: 29.2 NG/ML
RBC # BLD: 3.92 M/UL
RBC # FLD: 13.7 %
TSH SERPL-ACNC: 1.94 UIU/ML
WBC # FLD AUTO: 10.33 K/UL

## 2022-07-19 PROCEDURE — 99214 OFFICE O/P EST MOD 30 MIN: CPT

## 2022-07-19 RX ORDER — CLOTRIMAZOLE 10 MG/1
10 LOZENGE ORAL 4 TIMES DAILY
Qty: 60 | Refills: 1 | Status: DISCONTINUED | COMMUNITY
Start: 2022-03-25 | End: 2022-07-19

## 2022-07-19 NOTE — HISTORY OF PRESENT ILLNESS
[FreeTextEntry1] : Ms. Vanegas is a 47 y.o female with a history of PCKD s/p LURT from ex  and bilateral native nephrectomies on 4/2/18.\par Course was complicated with slow function assumed to be early ACR (low tacro levels) treated with solumedrol 750mgs.  Pt also had Cdiff treated with vancomycin.   \par \par Had 24 hour urine which revealed high uric acid, phosphate and low citrate.  She is now on allopurinol.  \par \par Developed Covid on December 21st 2021and received Regeneron MAB.   [de-identified] : Pt has been having on and off diarrhea.  Saw GI and had extensive w/u which was essentially negative.  Also noticed right calf larger than left.  Occasional foot pain.

## 2022-07-19 NOTE — ASSESSMENT
[FreeTextEntry1] : 1.  Renal transplant - also with b/l native nephrectomies.  Early on likely ACR treated with solumedrol 750mgs x3.  Creatinine stable around 1.   Will repeat labs today.  \par 2.  Immunosuppression - target tacro 5-7, myfortic 360mgs BID and pred 2.5mgs daily for NODAT and weight gain.  Diarrhea may be related to myfortic.  Hold allopurinol and will plan to start imuran instead of myfortic. \par 3.  HTN-  BP improved on aldactone 25mgs daily. \par 4.  Transaminitis - Pt saw Dr. Norton - Has fatty liver/ WATKINS.  Stable. \par 5.  Diarrhea - on and off with negative w/u.  May be myfortic.  Change to imuran once stable off of allopurinol. \par 6.  NODAT - Now on insulin.  Sugars improving.  Following with endocrinology.  \par 7. Brain aneurysm - stable.  following with neurosurg.  \par 9. Health maintenance - discussed need for annual skin cancer screening, vaccinations and GYN care.  \par 10.  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  Native kidneys were removed.  No stones in transplant and UA normal.  Will hold allopurinol and repeat 24 hour urine in the future.  \par 11.  Covid19 - pt has recovered.  \par 12.  Edema - mainly right leg.  \par check RLE duplex\par stop allopurinol\par F/u in 6 weeks and consider change to imuran.

## 2022-07-21 ENCOUNTER — APPOINTMENT (OUTPATIENT)
Dept: ULTRASOUND IMAGING | Facility: CLINIC | Age: 52
End: 2022-07-21

## 2022-07-22 LAB — CYTOLOGY CVX/VAG DOC THIN PREP: NORMAL

## 2022-07-25 ENCOUNTER — APPOINTMENT (OUTPATIENT)
Dept: ANTEPARTUM | Facility: CLINIC | Age: 52
End: 2022-07-25

## 2022-07-25 ENCOUNTER — APPOINTMENT (OUTPATIENT)
Dept: OBGYN | Facility: CLINIC | Age: 52
End: 2022-07-25

## 2022-07-25 ENCOUNTER — ASOB RESULT (OUTPATIENT)
Age: 52
End: 2022-07-25

## 2022-07-25 ENCOUNTER — NON-APPOINTMENT (OUTPATIENT)
Age: 52
End: 2022-07-25

## 2022-07-25 VITALS
DIASTOLIC BLOOD PRESSURE: 78 MMHG | SYSTOLIC BLOOD PRESSURE: 122 MMHG | HEIGHT: 68 IN | BODY MASS INDEX: 38.8 KG/M2 | TEMPERATURE: 97.6 F | WEIGHT: 256 LBS

## 2022-07-25 DIAGNOSIS — Z01.419 ENCOUNTER FOR GYNECOLOGICAL EXAMINATION (GENERAL) (ROUTINE) W/OUT ABNORMAL FINDINGS: ICD-10-CM

## 2022-07-25 DIAGNOSIS — Z92.89 PERSONAL HISTORY OF OTHER MEDICAL TREATMENT: ICD-10-CM

## 2022-07-25 LAB
ALBUMIN SERPL ELPH-MCNC: 4.5 G/DL
ALP BLD-CCNC: 54 U/L
ALT SERPL-CCNC: 20 U/L
ANION GAP SERPL CALC-SCNC: 12 MMOL/L
APPEARANCE: ABNORMAL
AST SERPL-CCNC: 17 U/L
BACTERIA: ABNORMAL
BASOPHILS # BLD AUTO: 0.04 K/UL
BASOPHILS NFR BLD AUTO: 0.4 %
BILIRUB SERPL-MCNC: 0.4 MG/DL
BILIRUBIN URINE: NEGATIVE
BKV DNA SPEC QL NAA+PROBE: NOT DETECTED IU/ML
BLOOD URINE: NORMAL
BUN SERPL-MCNC: 19 MG/DL
CALCIUM SERPL-MCNC: 10.2 MG/DL
CALCIUM SERPL-MCNC: 10.2 MG/DL
CHLORIDE SERPL-SCNC: 102 MMOL/L
CMV DNA SPEC QL NAA+PROBE: NOT DETECTED IU/ML
CMVPCR LOG: NOT DETECTED LOG10IU/ML
CO2 SERPL-SCNC: 25 MMOL/L
COLOR: NORMAL
CREAT SERPL-MCNC: 1.29 MG/DL
CREAT SPEC-SCNC: 89 MG/DL
CREAT/PROT UR: 0.1 RATIO
EGFR: 50 ML/MIN/1.73M2
EOSINOPHIL # BLD AUTO: 0.26 K/UL
EOSINOPHIL NFR BLD AUTO: 2.5 %
GLUCOSE QUALITATIVE U: NEGATIVE
GLUCOSE SERPL-MCNC: 107 MG/DL
HCT VFR BLD CALC: 42 %
HGB BLD-MCNC: 14.7 G/DL
HYALINE CASTS: 0 /LPF
IMM GRANULOCYTES NFR BLD AUTO: 0.4 %
KETONES URINE: NEGATIVE
LDH SERPL-CCNC: 189 U/L
LEUKOCYTE ESTERASE URINE: NEGATIVE
LYMPHOCYTES # BLD AUTO: 2.39 K/UL
LYMPHOCYTES NFR BLD AUTO: 22.5 %
MAGNESIUM SERPL-MCNC: 1.8 MG/DL
MAN DIFF?: NORMAL
MCHC RBC-ENTMCNC: 35 GM/DL
MCHC RBC-ENTMCNC: 35.8 PG
MCV RBC AUTO: 102.2 FL
MICROSCOPIC-UA: NORMAL
MONOCYTES # BLD AUTO: 1.08 K/UL
MONOCYTES NFR BLD AUTO: 10.2 %
NEUTROPHILS # BLD AUTO: 6.79 K/UL
NEUTROPHILS NFR BLD AUTO: 64 %
NITRITE URINE: NEGATIVE
PARATHYROID HORMONE INTACT: 17 PG/ML
PH URINE: 6
PHOSPHATE SERPL-MCNC: 3.6 MG/DL
PLATELET # BLD AUTO: 221 K/UL
POTASSIUM SERPL-SCNC: 4.5 MMOL/L
PROT SERPL-MCNC: 6.9 G/DL
PROT UR-MCNC: 5 MG/DL
PROTEIN URINE: NEGATIVE
RBC # BLD: 4.11 M/UL
RBC # FLD: 13.1 %
RED BLOOD CELLS URINE: 9 /HPF
SODIUM SERPL-SCNC: 139 MMOL/L
SPECIFIC GRAVITY URINE: 1.01
SQUAMOUS EPITHELIAL CELLS: 6 /HPF
TACROLIMUS SERPL-MCNC: 9 NG/ML
URATE SERPL-MCNC: 5.5 MG/DL
URINE COMMENTS: NORMAL
UROBILINOGEN URINE: NORMAL
WBC # FLD AUTO: 10.6 K/UL
WHITE BLOOD CELLS URINE: 2 /HPF

## 2022-07-25 PROCEDURE — 99213 OFFICE O/P EST LOW 20 MIN: CPT

## 2022-07-25 PROCEDURE — 76830 TRANSVAGINAL US NON-OB: CPT

## 2022-07-29 ENCOUNTER — OUTPATIENT (OUTPATIENT)
Dept: OUTPATIENT SERVICES | Facility: HOSPITAL | Age: 52
LOS: 1 days | End: 2022-07-29
Payer: COMMERCIAL

## 2022-07-29 ENCOUNTER — APPOINTMENT (OUTPATIENT)
Dept: MAMMOGRAPHY | Facility: CLINIC | Age: 52
End: 2022-07-29

## 2022-07-29 ENCOUNTER — APPOINTMENT (OUTPATIENT)
Dept: ULTRASOUND IMAGING | Facility: CLINIC | Age: 52
End: 2022-07-29

## 2022-07-29 ENCOUNTER — RESULT REVIEW (OUTPATIENT)
Age: 52
End: 2022-07-29

## 2022-07-29 DIAGNOSIS — Z90.5 ACQUIRED ABSENCE OF KIDNEY: Chronic | ICD-10-CM

## 2022-07-29 DIAGNOSIS — Z94.0 KIDNEY TRANSPLANT STATUS: Chronic | ICD-10-CM

## 2022-07-29 DIAGNOSIS — Z98.890 OTHER SPECIFIED POSTPROCEDURAL STATES: Chronic | ICD-10-CM

## 2022-07-29 DIAGNOSIS — N20.0 CALCULUS OF KIDNEY: Chronic | ICD-10-CM

## 2022-07-29 DIAGNOSIS — Z90.49 ACQUIRED ABSENCE OF OTHER SPECIFIED PARTS OF DIGESTIVE TRACT: Chronic | ICD-10-CM

## 2022-07-29 DIAGNOSIS — Z94.0 KIDNEY TRANSPLANT STATUS: ICD-10-CM

## 2022-07-29 DIAGNOSIS — Z98.51 TUBAL LIGATION STATUS: Chronic | ICD-10-CM

## 2022-07-29 DIAGNOSIS — I77.0 ARTERIOVENOUS FISTULA, ACQUIRED: Chronic | ICD-10-CM

## 2022-07-29 DIAGNOSIS — Z00.8 ENCOUNTER FOR OTHER GENERAL EXAMINATION: ICD-10-CM

## 2022-07-29 PROCEDURE — 93971 EXTREMITY STUDY: CPT | Mod: 26,RT

## 2022-07-29 PROCEDURE — 77063 BREAST TOMOSYNTHESIS BI: CPT

## 2022-07-29 PROCEDURE — 76641 ULTRASOUND BREAST COMPLETE: CPT | Mod: 26,50

## 2022-07-29 PROCEDURE — 76641 ULTRASOUND BREAST COMPLETE: CPT

## 2022-07-29 PROCEDURE — 77063 BREAST TOMOSYNTHESIS BI: CPT | Mod: 26

## 2022-07-29 PROCEDURE — 77067 SCR MAMMO BI INCL CAD: CPT | Mod: 26

## 2022-07-29 PROCEDURE — 77067 SCR MAMMO BI INCL CAD: CPT

## 2022-07-29 PROCEDURE — 93971 EXTREMITY STUDY: CPT

## 2022-08-01 ENCOUNTER — TRANSCRIPTION ENCOUNTER (OUTPATIENT)
Age: 52
End: 2022-08-01

## 2022-08-01 NOTE — DISCUSSION/SUMMARY
[FreeTextEntry1] : Today's sonogram findings reviewed. Repeat imaging needed to follow up on RT ovarian cyst noted in three months. Prescription for a transvaginal sonogram given. \par \par Blood work results reviewed with patient. FSH level was normal, which shows patient she is not in menopause as of yet.\par \par Order for a repeat prolactin level given. Patient will have this drawn when she returns to her nephrologist. \par \par F/u for pelvic sono in three months or as needed.

## 2022-08-01 NOTE — END OF VISIT
[FreeTextEntry3] : I, Sorin Kong solely acted as scribe for Dr. Eugenie Finn on 07/25/2022 \par All medical entries made by the Scribe were at my, Dr. Finn’s, direction and personally\par dictated by me on 07/25/2022 . I have reviewed the chart and agree that the record\par accurately reflects my personal performance of the history, physical exam, assessment and plan. I\par have also personally directed, reviewed, and agreed with the chart.

## 2022-08-01 NOTE — HISTORY OF PRESENT ILLNESS
[LMP unknown] : LMP unknown [N] : Patient reports normal menses [IUD] : has an intrauterine device [Y] : Positive pregnancy history [unknown] : Patient is unsure of the date of her LMP [Menarche Age: ____] : age at menarche was [unfilled] [No] : Patient does not have concerns regarding sex [Currently Active] : currently active [FreeTextEntry1] : ROLLY 50 yo  LMP unknown is here today for a sonogram follow up. She is doing well. \par \par Transvaginal sonogram results reviewed with patient. Results showed: uterus appears normal without myomas. The endometrium has the IUD in the proper location. RT ovary has a simple cyst 2.6 cm. Unable to visualize LT ovary. No CDS free fluid.  [Mammogramdate] : 07/24/21 [TextBox_19] : BR2 [BreastSonogramDate] : 07/24/21 [TextBox_25] : BR2 [PapSmeardate] : 07/15/22 [TextBox_31] : NEG [ColonoscopyDate] : 2022 [HPVDate] : 07/15/22 [TextBox_78] : NEG [de-identified] : TUBAL LIGATION AND MIRENA [PGHxTotal] : 2 [[facility] : 2 [[facility]] : 2

## 2022-08-05 ENCOUNTER — APPOINTMENT (OUTPATIENT)
Dept: TRANSPLANT | Facility: CLINIC | Age: 52
End: 2022-08-05

## 2022-08-16 ENCOUNTER — RX RENEWAL (OUTPATIENT)
Age: 52
End: 2022-08-16

## 2022-08-23 ENCOUNTER — LABORATORY RESULT (OUTPATIENT)
Age: 52
End: 2022-08-23

## 2022-08-26 ENCOUNTER — NON-APPOINTMENT (OUTPATIENT)
Age: 52
End: 2022-08-26

## 2022-08-26 LAB
ALBUMIN SERPL ELPH-MCNC: 4.2 G/DL
ALP BLD-CCNC: 47 U/L
ALT SERPL-CCNC: 26 U/L
ANION GAP SERPL CALC-SCNC: 11 MMOL/L
APPEARANCE: CLEAR
AST SERPL-CCNC: 18 U/L
BACTERIA: NEGATIVE
BASOPHILS # BLD AUTO: 0 K/UL
BASOPHILS NFR BLD AUTO: 0 %
BILIRUB SERPL-MCNC: 0.5 MG/DL
BILIRUBIN URINE: NEGATIVE
BKV DNA SPEC QL NAA+PROBE: NOT DETECTED IU/ML
BLOOD URINE: NEGATIVE
BUN SERPL-MCNC: 15 MG/DL
CALCIUM SERPL-MCNC: 9.7 MG/DL
CALCIUM SERPL-MCNC: 9.7 MG/DL
CHLORIDE SERPL-SCNC: 101 MMOL/L
CMV DNA SPEC QL NAA+PROBE: NOT DETECTED IU/ML
CMVPCR LOG: NOT DETECTED LOG10IU/ML
CO2 SERPL-SCNC: 27 MMOL/L
COLOR: COLORLESS
CREAT SERPL-MCNC: 1.3 MG/DL
CREAT SPEC-SCNC: 27 MG/DL
CREAT/PROT UR: NORMAL RATIO
EGFR: 50 ML/MIN/1.73M2
EOSINOPHIL # BLD AUTO: 0.35 K/UL
EOSINOPHIL NFR BLD AUTO: 3.5 %
GLUCOSE QUALITATIVE U: NEGATIVE
GLUCOSE SERPL-MCNC: 163 MG/DL
HCT VFR BLD CALC: 44.9 %
HGB BLD-MCNC: 14.6 G/DL
HYALINE CASTS: 1 /LPF
KETONES URINE: NEGATIVE
LDH SERPL-CCNC: 202 U/L
LEUKOCYTE ESTERASE URINE: NEGATIVE
LYMPHOCYTES # BLD AUTO: 1.98 K/UL
LYMPHOCYTES NFR BLD AUTO: 19.8 %
MAGNESIUM SERPL-MCNC: 2 MG/DL
MAN DIFF?: NORMAL
MCHC RBC-ENTMCNC: 32.5 GM/DL
MCHC RBC-ENTMCNC: 34.9 PG
MCV RBC AUTO: 107.4 FL
MICROSCOPIC-UA: NORMAL
MONOCYTES # BLD AUTO: 0.69 K/UL
MONOCYTES NFR BLD AUTO: 6.9 %
NEUTROPHILS # BLD AUTO: 6.99 K/UL
NEUTROPHILS NFR BLD AUTO: 69.8 %
NITRITE URINE: NEGATIVE
PARATHYROID HORMONE INTACT: 25 PG/ML
PH URINE: 6.5
PHOSPHATE SERPL-MCNC: 3.5 MG/DL
PLATELET # BLD AUTO: 188 K/UL
POTASSIUM SERPL-SCNC: 5.1 MMOL/L
PROT SERPL-MCNC: 6.4 G/DL
PROT UR-MCNC: <4 MG/DL
PROTEIN URINE: NEGATIVE
RBC # BLD: 4.18 M/UL
RBC # FLD: 13.3 %
RED BLOOD CELLS URINE: 1 /HPF
SODIUM SERPL-SCNC: 139 MMOL/L
SPECIFIC GRAVITY URINE: 1
SQUAMOUS EPITHELIAL CELLS: 1 /HPF
TACROLIMUS SERPL-MCNC: 4.7 NG/ML
URATE SERPL-MCNC: 8.3 MG/DL
UROBILINOGEN URINE: NORMAL
WBC # FLD AUTO: 10.02 K/UL
WHITE BLOOD CELLS URINE: 0 /HPF

## 2022-09-01 ENCOUNTER — LABORATORY RESULT (OUTPATIENT)
Age: 52
End: 2022-09-01

## 2022-09-01 ENCOUNTER — APPOINTMENT (OUTPATIENT)
Dept: NEPHROLOGY | Facility: CLINIC | Age: 52
End: 2022-09-01

## 2022-09-01 VITALS
WEIGHT: 246 LBS | DIASTOLIC BLOOD PRESSURE: 83 MMHG | HEART RATE: 69 BPM | RESPIRATION RATE: 16 BRPM | SYSTOLIC BLOOD PRESSURE: 136 MMHG | BODY MASS INDEX: 37.28 KG/M2 | OXYGEN SATURATION: 96 % | HEIGHT: 68 IN

## 2022-09-01 PROCEDURE — 99214 OFFICE O/P EST MOD 30 MIN: CPT

## 2022-09-01 RX ORDER — OMEGA-3/DHA/EPA/FISH OIL 300-1000MG
400 CAPSULE ORAL
Refills: 0 | Status: DISCONTINUED | COMMUNITY
End: 2022-09-01

## 2022-09-01 RX ORDER — MYCOPHENILIC ACID 360 MG/1
360 TABLET, DELAYED RELEASE ORAL
Qty: 180 | Refills: 3 | Status: DISCONTINUED | COMMUNITY
Start: 2018-04-23 | End: 2022-09-01

## 2022-09-01 RX ORDER — DAPSONE 100 MG/1
100 TABLET ORAL DAILY
Qty: 90 | Refills: 3 | Status: DISCONTINUED | COMMUNITY
Start: 2020-04-14 | End: 2022-09-01

## 2022-09-01 RX ORDER — ALLOPURINOL 100 MG/1
100 TABLET ORAL
Qty: 180 | Refills: 3 | Status: DISCONTINUED | COMMUNITY
Start: 2020-08-04 | End: 2022-09-01

## 2022-09-06 ENCOUNTER — NON-APPOINTMENT (OUTPATIENT)
Age: 52
End: 2022-09-06

## 2022-09-06 LAB
ALBUMIN SERPL ELPH-MCNC: 4.3 G/DL
ALP BLD-CCNC: 48 U/L
ALT SERPL-CCNC: 23 U/L
ANION GAP SERPL CALC-SCNC: 13 MMOL/L
APPEARANCE: ABNORMAL
AST SERPL-CCNC: 18 U/L
BACTERIA: NEGATIVE
BASOPHILS # BLD AUTO: 0.05 K/UL
BASOPHILS NFR BLD AUTO: 0.4 %
BILIRUB SERPL-MCNC: 0.5 MG/DL
BILIRUBIN URINE: NEGATIVE
BKV DNA SPEC QL NAA+PROBE: NOT DETECTED IU/ML
BLOOD URINE: NEGATIVE
BUN SERPL-MCNC: 18 MG/DL
CALCIUM OXALATE CRYSTALS: ABNORMAL
CALCIUM SERPL-MCNC: 10.3 MG/DL
CALCIUM SERPL-MCNC: 10.3 MG/DL
CHLORIDE SERPL-SCNC: 102 MMOL/L
CMV DNA SPEC QL NAA+PROBE: NOT DETECTED IU/ML
CMVPCR LOG: NOT DETECTED LOG10IU/ML
CO2 SERPL-SCNC: 25 MMOL/L
COLOR: NORMAL
CREAT SERPL-MCNC: 1.51 MG/DL
CREAT SPEC-SCNC: 65 MG/DL
CREAT/PROT UR: 0.1 RATIO
EGFR: 42 ML/MIN/1.73M2
EOSINOPHIL # BLD AUTO: 0.22 K/UL
EOSINOPHIL NFR BLD AUTO: 1.9 %
GLUCOSE QUALITATIVE U: NEGATIVE
GLUCOSE SERPL-MCNC: 96 MG/DL
HCT VFR BLD CALC: 42.1 %
HGB BLD-MCNC: 14 G/DL
HYALINE CASTS: 0 /LPF
IMM GRANULOCYTES NFR BLD AUTO: 0.4 %
KETONES URINE: NEGATIVE
LDH SERPL-CCNC: 188 U/L
LEUKOCYTE ESTERASE URINE: NEGATIVE
LYMPHOCYTES # BLD AUTO: 2.52 K/UL
LYMPHOCYTES NFR BLD AUTO: 22.3 %
MAGNESIUM SERPL-MCNC: 1.7 MG/DL
MAN DIFF?: NORMAL
MCHC RBC-ENTMCNC: 33.3 GM/DL
MCHC RBC-ENTMCNC: 35.4 PG
MCV RBC AUTO: 106.3 FL
MICROSCOPIC-UA: NORMAL
MONOCYTES # BLD AUTO: 0.89 K/UL
MONOCYTES NFR BLD AUTO: 7.9 %
NEUTROPHILS # BLD AUTO: 7.58 K/UL
NEUTROPHILS NFR BLD AUTO: 67.1 %
NITRITE URINE: NEGATIVE
PARATHYROID HORMONE INTACT: 22 PG/ML
PH URINE: 6
PHOSPHATE SERPL-MCNC: 5 MG/DL
PLATELET # BLD AUTO: 157 K/UL
POTASSIUM SERPL-SCNC: 5 MMOL/L
PROT SERPL-MCNC: 6.6 G/DL
PROT UR-MCNC: 4 MG/DL
PROTEIN URINE: NEGATIVE
RBC # BLD: 3.96 M/UL
RBC # FLD: 12.8 %
RED BLOOD CELLS URINE: 4 /HPF
SODIUM SERPL-SCNC: 141 MMOL/L
SPECIFIC GRAVITY URINE: 1.01
SQUAMOUS EPITHELIAL CELLS: 2 /HPF
TACROLIMUS SERPL-MCNC: 8.9 NG/ML
URATE SERPL-MCNC: 9.3 MG/DL
UROBILINOGEN URINE: NORMAL
WBC # FLD AUTO: 11.3 K/UL
WHITE BLOOD CELLS URINE: 1 /HPF

## 2022-09-06 NOTE — ASSESSMENT
[FreeTextEntry1] : 1.  Renal transplant - also with b/l native nephrectomies.  Early on likely ACR treated with solumedrol 750mgs x3.  Creatinine stable around 1.   Will repeat labs today.  \par 2.  Immunosuppression - target tacro 5-7, myfortic 360mgs BID and pred 2.5mgs daily for NODAT and weight gain.  Diarrhea may be related to myfortic.  Change from myfortic to imuran 100mgs daily.   Stopped allopurinol over a month ago. \par 3.  HTN-  BP controlled\par 4.  Transaminitis - Pt saw Dr. Norton - Has fatty liver/ WATKINS.  Stable. \par 5.  Diarrhea - on and off with negative w/u.  May be myfortic.  Change to imuran once stable off of allopurinol.  Check stool for Cdiff and GI pcr.  \par 6.  NODAT - Now on insulin.  Sugars improving.  Following with endocrinology.  \par 7. Brain aneurysm - stable.  following with neurosurg.  \par 9. Health maintenance - discussed need for annual skin cancer screening, vaccinations and GYN care.  \par 10.  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  Native kidneys were removed.  No stones in transplant and UA normal.  Will hold allopurinol and repeat 24 hour urine in the future.  \par 11.  Covid19 - pt has recovered.  \par 12.  Edema - mainly right leg, no DVT on sonogram. \par \par f/u in 1 month to reassess after imuran started.

## 2022-09-06 NOTE — HISTORY OF PRESENT ILLNESS
[FreeTextEntry1] : Ms. Vanegas is a 47 y.o female with a history of PCKD s/p LURT from ex  and bilateral native nephrectomies on 4/2/18.\par Course was complicated with slow function assumed to be early ACR (low tacro levels) treated with solumedrol 750mgs.  Pt also had Cdiff treated with vancomycin.   \par \par Had 24 hour urine which revealed high uric acid, phosphate and low citrate.  She is now on allopurinol.  \par \par Developed Covid on December 21st 2021and received Regeneron MAB.   [de-identified] : Pt has been having on and off diarrhea.  Saw GI and had extensive w/u which was essentially negative. Lost some weight.

## 2022-09-08 ENCOUNTER — APPOINTMENT (OUTPATIENT)
Dept: DERMATOLOGY | Facility: CLINIC | Age: 52
End: 2022-09-08

## 2022-09-10 LAB — PROLACTIN SERPL-MCNC: 26.7 NG/ML

## 2022-09-14 ENCOUNTER — INPATIENT (INPATIENT)
Facility: HOSPITAL | Age: 52
LOS: 0 days | Discharge: OTHER INSTITUTION | DRG: 698 | End: 2022-09-15
Attending: HOSPITALIST | Admitting: HOSPITALIST
Payer: COMMERCIAL

## 2022-09-14 VITALS
WEIGHT: 240.08 LBS | OXYGEN SATURATION: 96 % | RESPIRATION RATE: 22 BRPM | TEMPERATURE: 102 F | DIASTOLIC BLOOD PRESSURE: 56 MMHG | SYSTOLIC BLOOD PRESSURE: 110 MMHG | HEART RATE: 83 BPM | HEIGHT: 68 IN

## 2022-09-14 DIAGNOSIS — Z98.890 OTHER SPECIFIED POSTPROCEDURAL STATES: Chronic | ICD-10-CM

## 2022-09-14 DIAGNOSIS — Z90.49 ACQUIRED ABSENCE OF OTHER SPECIFIED PARTS OF DIGESTIVE TRACT: Chronic | ICD-10-CM

## 2022-09-14 DIAGNOSIS — N20.0 CALCULUS OF KIDNEY: Chronic | ICD-10-CM

## 2022-09-14 DIAGNOSIS — I77.0 ARTERIOVENOUS FISTULA, ACQUIRED: Chronic | ICD-10-CM

## 2022-09-14 DIAGNOSIS — Z94.0 KIDNEY TRANSPLANT STATUS: Chronic | ICD-10-CM

## 2022-09-14 DIAGNOSIS — Z90.5 ACQUIRED ABSENCE OF KIDNEY: Chronic | ICD-10-CM

## 2022-09-14 DIAGNOSIS — Z98.51 TUBAL LIGATION STATUS: Chronic | ICD-10-CM

## 2022-09-14 LAB
ALBUMIN SERPL ELPH-MCNC: 4 G/DL — SIGNIFICANT CHANGE UP (ref 3.3–5.2)
ALP SERPL-CCNC: 46 U/L — SIGNIFICANT CHANGE UP (ref 40–120)
ALT FLD-CCNC: 13 U/L — SIGNIFICANT CHANGE UP
ANION GAP SERPL CALC-SCNC: 14 MMOL/L — SIGNIFICANT CHANGE UP (ref 5–17)
APTT BLD: 27.1 SEC — LOW (ref 27.5–35.5)
AST SERPL-CCNC: 14 U/L — SIGNIFICANT CHANGE UP
BASOPHILS # BLD AUTO: 0.03 K/UL — SIGNIFICANT CHANGE UP (ref 0–0.2)
BASOPHILS NFR BLD AUTO: 0.2 % — SIGNIFICANT CHANGE UP (ref 0–2)
BILIRUB SERPL-MCNC: 1.1 MG/DL — SIGNIFICANT CHANGE UP (ref 0.4–2)
BUN SERPL-MCNC: 16.1 MG/DL — SIGNIFICANT CHANGE UP (ref 8–20)
CALCIUM SERPL-MCNC: 9.6 MG/DL — SIGNIFICANT CHANGE UP (ref 8.4–10.5)
CHLORIDE SERPL-SCNC: 100 MMOL/L — SIGNIFICANT CHANGE UP (ref 98–107)
CO2 SERPL-SCNC: 23 MMOL/L — SIGNIFICANT CHANGE UP (ref 22–29)
CREAT SERPL-MCNC: 1.38 MG/DL — HIGH (ref 0.5–1.3)
EGFR: 46 ML/MIN/1.73M2 — LOW
EOSINOPHIL # BLD AUTO: 0.02 K/UL — SIGNIFICANT CHANGE UP (ref 0–0.5)
EOSINOPHIL NFR BLD AUTO: 0.2 % — SIGNIFICANT CHANGE UP (ref 0–6)
FLUAV AG NPH QL: SIGNIFICANT CHANGE UP
FLUBV AG NPH QL: SIGNIFICANT CHANGE UP
GLUCOSE SERPL-MCNC: 115 MG/DL — HIGH (ref 70–99)
HCT VFR BLD CALC: 38.9 % — SIGNIFICANT CHANGE UP (ref 34.5–45)
HGB BLD-MCNC: 13.3 G/DL — SIGNIFICANT CHANGE UP (ref 11.5–15.5)
IMM GRANULOCYTES NFR BLD AUTO: 0.5 % — SIGNIFICANT CHANGE UP (ref 0–1.5)
INR BLD: 1.19 RATIO — HIGH (ref 0.88–1.16)
LACTATE BLDV-MCNC: 1.2 MMOL/L — SIGNIFICANT CHANGE UP (ref 0.5–2)
LYMPHOCYTES # BLD AUTO: 0.85 K/UL — LOW (ref 1–3.3)
LYMPHOCYTES # BLD AUTO: 6.5 % — LOW (ref 13–44)
MCHC RBC-ENTMCNC: 34.2 GM/DL — SIGNIFICANT CHANGE UP (ref 32–36)
MCHC RBC-ENTMCNC: 34.8 PG — HIGH (ref 27–34)
MCV RBC AUTO: 101.8 FL — HIGH (ref 80–100)
MONOCYTES # BLD AUTO: 1.23 K/UL — HIGH (ref 0–0.9)
MONOCYTES NFR BLD AUTO: 9.5 % — SIGNIFICANT CHANGE UP (ref 2–14)
NEUTROPHILS # BLD AUTO: 10.79 K/UL — HIGH (ref 1.8–7.4)
NEUTROPHILS NFR BLD AUTO: 83.1 % — HIGH (ref 43–77)
PLATELET # BLD AUTO: 155 K/UL — SIGNIFICANT CHANGE UP (ref 150–400)
POTASSIUM SERPL-MCNC: 4.1 MMOL/L — SIGNIFICANT CHANGE UP (ref 3.5–5.3)
POTASSIUM SERPL-SCNC: 4.1 MMOL/L — SIGNIFICANT CHANGE UP (ref 3.5–5.3)
PROT SERPL-MCNC: 6.5 G/DL — LOW (ref 6.6–8.7)
PROTHROM AB SERPL-ACNC: 13.8 SEC — HIGH (ref 10.5–13.4)
RBC # BLD: 3.82 M/UL — SIGNIFICANT CHANGE UP (ref 3.8–5.2)
RBC # FLD: 12.4 % — SIGNIFICANT CHANGE UP (ref 10.3–14.5)
RSV RNA NPH QL NAA+NON-PROBE: SIGNIFICANT CHANGE UP
SARS-COV-2 RNA SPEC QL NAA+PROBE: SIGNIFICANT CHANGE UP
SODIUM SERPL-SCNC: 137 MMOL/L — SIGNIFICANT CHANGE UP (ref 135–145)
WBC # BLD: 12.98 K/UL — HIGH (ref 3.8–10.5)
WBC # FLD AUTO: 12.98 K/UL — HIGH (ref 3.8–10.5)

## 2022-09-14 PROCEDURE — 93010 ELECTROCARDIOGRAM REPORT: CPT

## 2022-09-14 PROCEDURE — 71045 X-RAY EXAM CHEST 1 VIEW: CPT | Mod: 26

## 2022-09-14 PROCEDURE — 99291 CRITICAL CARE FIRST HOUR: CPT

## 2022-09-14 RX ORDER — CEFEPIME 1 G/1
1000 INJECTION, POWDER, FOR SOLUTION INTRAMUSCULAR; INTRAVENOUS ONCE
Refills: 0 | Status: DISCONTINUED | OUTPATIENT
Start: 2022-09-14 | End: 2022-09-14

## 2022-09-14 RX ORDER — ONDANSETRON 8 MG/1
8 TABLET, FILM COATED ORAL ONCE
Refills: 0 | Status: COMPLETED | OUTPATIENT
Start: 2022-09-14 | End: 2022-09-14

## 2022-09-14 RX ORDER — VANCOMYCIN HCL 1 G
1650 VIAL (EA) INTRAVENOUS ONCE
Refills: 0 | Status: DISCONTINUED | OUTPATIENT
Start: 2022-09-14 | End: 2022-09-14

## 2022-09-14 RX ORDER — CEFEPIME 1 G/1
2000 INJECTION, POWDER, FOR SOLUTION INTRAMUSCULAR; INTRAVENOUS ONCE
Refills: 0 | Status: COMPLETED | OUTPATIENT
Start: 2022-09-14 | End: 2022-09-14

## 2022-09-14 RX ORDER — ACETAMINOPHEN 500 MG
1000 TABLET ORAL ONCE
Refills: 0 | Status: COMPLETED | OUTPATIENT
Start: 2022-09-14 | End: 2022-09-14

## 2022-09-14 RX ORDER — ONDANSETRON 8 MG/1
4 TABLET, FILM COATED ORAL ONCE
Refills: 0 | Status: COMPLETED | OUTPATIENT
Start: 2022-09-14 | End: 2022-09-14

## 2022-09-14 RX ORDER — SODIUM CHLORIDE 9 MG/ML
2000 INJECTION INTRAMUSCULAR; INTRAVENOUS; SUBCUTANEOUS ONCE
Refills: 0 | Status: COMPLETED | OUTPATIENT
Start: 2022-09-14 | End: 2022-09-14

## 2022-09-14 RX ORDER — METOCLOPRAMIDE HCL 10 MG
10 TABLET ORAL ONCE
Refills: 0 | Status: COMPLETED | OUTPATIENT
Start: 2022-09-14 | End: 2022-09-14

## 2022-09-14 RX ORDER — VANCOMYCIN HCL 1 G
1000 VIAL (EA) INTRAVENOUS ONCE
Refills: 0 | Status: COMPLETED | OUTPATIENT
Start: 2022-09-14 | End: 2022-09-14

## 2022-09-14 RX ADMIN — ONDANSETRON 4 MILLIGRAM(S): 8 TABLET, FILM COATED ORAL at 18:12

## 2022-09-14 RX ADMIN — CEFEPIME 100 MILLIGRAM(S): 1 INJECTION, POWDER, FOR SOLUTION INTRAMUSCULAR; INTRAVENOUS at 18:21

## 2022-09-14 RX ADMIN — Medication 250 MILLIGRAM(S): at 19:56

## 2022-09-14 RX ADMIN — Medication 400 MILLIGRAM(S): at 22:57

## 2022-09-14 RX ADMIN — Medication 1000 MILLIGRAM(S): at 20:00

## 2022-09-14 RX ADMIN — Medication 1000 MILLIGRAM(S): at 22:57

## 2022-09-14 RX ADMIN — Medication 400 MILLIGRAM(S): at 18:11

## 2022-09-14 RX ADMIN — ONDANSETRON 8 MILLIGRAM(S): 8 TABLET, FILM COATED ORAL at 22:00

## 2022-09-14 RX ADMIN — SODIUM CHLORIDE 2000 MILLILITER(S): 9 INJECTION INTRAMUSCULAR; INTRAVENOUS; SUBCUTANEOUS at 20:00

## 2022-09-14 RX ADMIN — CEFEPIME 2000 MILLIGRAM(S): 1 INJECTION, POWDER, FOR SOLUTION INTRAMUSCULAR; INTRAVENOUS at 20:00

## 2022-09-14 RX ADMIN — SODIUM CHLORIDE 2000 MILLILITER(S): 9 INJECTION INTRAMUSCULAR; INTRAVENOUS; SUBCUTANEOUS at 18:12

## 2022-09-14 RX ADMIN — Medication 10 MILLIGRAM(S): at 21:52

## 2022-09-14 NOTE — ED ADULT TRIAGE NOTE - CHIEF COMPLAINT QUOTE
Patient ambulated into ED with steady gait, Pt c/o feeling sick for 2 week, today woke up with a fever and vomiting and abd pain and diarrhea. Tylenol at 7 am. not tolerating PO intake. Hx of DM.

## 2022-09-14 NOTE — ED PROVIDER NOTE - PROGRESS NOTE DETAILS
Chris FRANCES: [facility] states no beds available and requesting patient to be admitted to [facility] for now

## 2022-09-14 NOTE — ED ADULT NURSE REASSESSMENT NOTE - NS ED NURSE REASSESS COMMENT FT1
pt remains AOX3, on supplemental 02 but titrating down. pt now c.o chills and shaking, c.o increasing nausea. refusing rectal temp, ER MD at bedside and pt to be medicated further. no resp distress, IV site patent, awaiting transfer.

## 2022-09-14 NOTE — ED ADULT NURSE NOTE - OBJECTIVE STATEMENT
pt comes to ED with reports of fevers nausea and body pain. pt reports she is a renal transplant patient, had both kidneys removed and one transplanted in 2018. pt febrile on arrival, even and unlabored resps present, skin warm to touch, abd pain soft non distended non tender, no urinary complaints. no SOB no chest pain, reports she had been feeling off the past 2 weeks, was at her doctor's office and had a med changed a few weeks ago.

## 2022-09-14 NOTE — ED PROVIDER NOTE - NSRECPHYSICIAN_ED_A_ED_FT
Bri A Young is a 71 year old female here for  Chief Complaint   Patient presents with   • Office Visit     Denies latex allergy or sensitivity.    Medication verified, no changes.  PCP and Pharmacy verified.    Social History     Tobacco Use   Smoking Status Former Smoker   • Packs/day: 0.25   • Years: 3.00   • Pack years: 0.75   • Types: Cigarettes   • Quit date: 1970   • Years since quittin.2   Smokeless Tobacco Never Used     Advance Directives Filed: No    ECOG:   ECOG [21 1523]   ECOG Performance Status 1       Vitals:    Visit Vitals  /74   Pulse 72   Temp 98.3 °F (36.8 °C)   Resp 14   Wt 79.7 kg (175 lb 11.3 oz)   SpO2 96%   BMI 28.36 kg/m²       These vital signs are:  Within defined parameters (Per Reference \"Defined Limits Hospital Outpatient Department (HOD)\")    Height: No.  Ht Readings from Last 1 Encounters:   21 5' 6\" (1.676 m)     Weight:Yes, shoes on.  Wt Readings from Last 3 Encounters:   21 79.7 kg (175 lb 11.3 oz)   21 82.7 kg (182 lb 5.1 oz)   21 81 kg (178 lb 9.2 oz)       BMI: Body mass index is 28.36 kg/m².    REVIEW OF SYSTEMS  GENERAL:  Patient denies fevers, chills, night sweats, excessive fatigue, change in appetite, weight loss, dizziness, but complains of: headache and frequently in the morning, reports feeeling \"tired behind eyes\"  ALLERGIC/IMMUNOLOGIC: Verified allergies: Yes  EYES:  Patient denies significant visual difficulties, double vision, blurred vision  ENT/MOUTH: Patient denies problems with hearing, sore throat, sinus drainage, mouth sores  ENDOCRINE:  Patient denies diabetes, thyroid disease, hormone replacement, hot flashes  HEMATOLOGIC/LYMPHATIC: Patient denies easy bruising, bleeding, tender lymph nodes, swollen lymph nodes  BREASTS: Patient denies abnormal masses of breast, nipple discharge, pain  RESPIRATORY:  Patient denies lung pain with breathing, cough, coughing up blood, shortness of breath  CARDIOVASCULAR:  Patient  denies anginal chest pain, palpitations, shortness of breath when lying flat, peripheral edema  GASTROINTESTINAL: Patient denies abdominal pain , nausea, vomiting, diarrhea, GI bleeding, constipation, change in bowel habits, heartburn, sensation of feeling full, difficulty swallowing  : Patient denies abnormal genital masses, blood in the urine, frequency, urgency, burning with urination, hesitancy, incontinence, vaginal bleeding, discharge  MUSCULOSKELETAL:  Patient denies joint pain, bone pain, joint swelling, redness, decreased range of motion, but complains of: left hip pain, working with physical therapy  SKIN:  Patient denies chronic rashes, inflammation, ulcerations, skin changes, itching  NEUROLOGIC:  Patient denies loss of balance, areas of focal weakness, abnormal gait, sensory problems, numbness, tingling  PSYCHIATRIC: Patient denies insomnia, depression, anxiety    This patient reported abnormal symptoms that needed immediate verbal communication: No     Sharonda, V

## 2022-09-15 ENCOUNTER — TRANSCRIPTION ENCOUNTER (OUTPATIENT)
Age: 52
End: 2022-09-15

## 2022-09-15 ENCOUNTER — INPATIENT (INPATIENT)
Facility: HOSPITAL | Age: 52
LOS: 10 days | Discharge: HOME | DRG: 871 | End: 2022-09-26
Attending: TRANSPLANT SURGERY | Admitting: INTERNAL MEDICINE
Payer: COMMERCIAL

## 2022-09-15 VITALS
DIASTOLIC BLOOD PRESSURE: 68 MMHG | HEART RATE: 71 BPM | OXYGEN SATURATION: 96 % | TEMPERATURE: 98 F | SYSTOLIC BLOOD PRESSURE: 110 MMHG | RESPIRATION RATE: 20 BRPM

## 2022-09-15 VITALS
OXYGEN SATURATION: 95 % | TEMPERATURE: 101 F | SYSTOLIC BLOOD PRESSURE: 160 MMHG | HEART RATE: 82 BPM | RESPIRATION RATE: 18 BRPM | DIASTOLIC BLOOD PRESSURE: 67 MMHG

## 2022-09-15 DIAGNOSIS — R50.9 FEVER, UNSPECIFIED: ICD-10-CM

## 2022-09-15 DIAGNOSIS — Z94.0 KIDNEY TRANSPLANT STATUS: Chronic | ICD-10-CM

## 2022-09-15 DIAGNOSIS — Z90.49 ACQUIRED ABSENCE OF OTHER SPECIFIED PARTS OF DIGESTIVE TRACT: Chronic | ICD-10-CM

## 2022-09-15 DIAGNOSIS — Z98.51 TUBAL LIGATION STATUS: Chronic | ICD-10-CM

## 2022-09-15 DIAGNOSIS — Z90.5 ACQUIRED ABSENCE OF KIDNEY: Chronic | ICD-10-CM

## 2022-09-15 DIAGNOSIS — Z98.890 OTHER SPECIFIED POSTPROCEDURAL STATES: Chronic | ICD-10-CM

## 2022-09-15 DIAGNOSIS — N20.0 CALCULUS OF KIDNEY: Chronic | ICD-10-CM

## 2022-09-15 DIAGNOSIS — A41.9 SEPSIS, UNSPECIFIED ORGANISM: ICD-10-CM

## 2022-09-15 DIAGNOSIS — I77.0 ARTERIOVENOUS FISTULA, ACQUIRED: Chronic | ICD-10-CM

## 2022-09-15 LAB
ALBUMIN SERPL ELPH-MCNC: 3.4 G/DL — SIGNIFICANT CHANGE UP (ref 3.3–5.2)
ALBUMIN SERPL ELPH-MCNC: 3.5 G/DL — SIGNIFICANT CHANGE UP (ref 3.3–5)
ALP SERPL-CCNC: 40 U/L — SIGNIFICANT CHANGE UP (ref 40–120)
ALP SERPL-CCNC: 42 U/L — SIGNIFICANT CHANGE UP (ref 40–120)
ALT FLD-CCNC: 11 U/L — SIGNIFICANT CHANGE UP
ALT FLD-CCNC: 11 U/L — SIGNIFICANT CHANGE UP (ref 10–45)
ANION GAP SERPL CALC-SCNC: 10 MMOL/L — SIGNIFICANT CHANGE UP (ref 5–17)
ANION GAP SERPL CALC-SCNC: 12 MMOL/L — SIGNIFICANT CHANGE UP (ref 5–17)
APPEARANCE UR: CLEAR — SIGNIFICANT CHANGE UP
AST SERPL-CCNC: 13 U/L — SIGNIFICANT CHANGE UP
AST SERPL-CCNC: 13 U/L — SIGNIFICANT CHANGE UP (ref 10–40)
BASOPHILS # BLD AUTO: 0 K/UL — SIGNIFICANT CHANGE UP (ref 0–0.2)
BASOPHILS # BLD AUTO: 0.04 K/UL — SIGNIFICANT CHANGE UP (ref 0–0.2)
BASOPHILS NFR BLD AUTO: 0 % — SIGNIFICANT CHANGE UP (ref 0–2)
BASOPHILS NFR BLD AUTO: 0.3 % — SIGNIFICANT CHANGE UP (ref 0–2)
BILIRUB SERPL-MCNC: 0.8 MG/DL — SIGNIFICANT CHANGE UP (ref 0.2–1.2)
BILIRUB SERPL-MCNC: 1.2 MG/DL — SIGNIFICANT CHANGE UP (ref 0.4–2)
BILIRUB UR-MCNC: NEGATIVE — SIGNIFICANT CHANGE UP
BLD GP AB SCN SERPL QL: NEGATIVE — SIGNIFICANT CHANGE UP
BUN SERPL-MCNC: 14 MG/DL — SIGNIFICANT CHANGE UP (ref 7–23)
BUN SERPL-MCNC: 14.5 MG/DL — SIGNIFICANT CHANGE UP (ref 8–20)
CALCIUM SERPL-MCNC: 8.3 MG/DL — LOW (ref 8.4–10.5)
CALCIUM SERPL-MCNC: 8.4 MG/DL — SIGNIFICANT CHANGE UP (ref 8.4–10.5)
CHLORIDE SERPL-SCNC: 105 MMOL/L — SIGNIFICANT CHANGE UP (ref 98–107)
CHLORIDE SERPL-SCNC: 106 MMOL/L — SIGNIFICANT CHANGE UP (ref 96–108)
CO2 SERPL-SCNC: 20 MMOL/L — LOW (ref 22–29)
CO2 SERPL-SCNC: 20 MMOL/L — LOW (ref 22–31)
COLOR SPEC: YELLOW — SIGNIFICANT CHANGE UP
CREAT SERPL-MCNC: 1.13 MG/DL — SIGNIFICANT CHANGE UP (ref 0.5–1.3)
CREAT SERPL-MCNC: 1.17 MG/DL — SIGNIFICANT CHANGE UP (ref 0.5–1.3)
DIFF PNL FLD: ABNORMAL
EGFR: 56 ML/MIN/1.73M2 — LOW
EGFR: 59 ML/MIN/1.73M2 — LOW
EOSINOPHIL # BLD AUTO: 0.04 K/UL — SIGNIFICANT CHANGE UP (ref 0–0.5)
EOSINOPHIL # BLD AUTO: 0.08 K/UL — SIGNIFICANT CHANGE UP (ref 0–0.5)
EOSINOPHIL NFR BLD AUTO: 0.3 % — SIGNIFICANT CHANGE UP (ref 0–6)
EOSINOPHIL NFR BLD AUTO: 0.9 % — SIGNIFICANT CHANGE UP (ref 0–6)
EPI CELLS # UR: SIGNIFICANT CHANGE UP
GLUCOSE SERPL-MCNC: 101 MG/DL — HIGH (ref 70–99)
GLUCOSE SERPL-MCNC: 149 MG/DL — HIGH (ref 70–99)
GLUCOSE UR QL: NEGATIVE MG/DL — SIGNIFICANT CHANGE UP
HCT VFR BLD CALC: 35.5 % — SIGNIFICANT CHANGE UP (ref 34.5–45)
HCT VFR BLD CALC: 35.7 % — SIGNIFICANT CHANGE UP (ref 34.5–45)
HGB BLD-MCNC: 11.7 G/DL — SIGNIFICANT CHANGE UP (ref 11.5–15.5)
HGB BLD-MCNC: 11.9 G/DL — SIGNIFICANT CHANGE UP (ref 11.5–15.5)
IMM GRANULOCYTES NFR BLD AUTO: 0.6 % — SIGNIFICANT CHANGE UP (ref 0–1.5)
KETONES UR-MCNC: NEGATIVE — SIGNIFICANT CHANGE UP
LACTATE SERPL-SCNC: 0.9 MMOL/L — SIGNIFICANT CHANGE UP (ref 0.5–2)
LEUKOCYTE ESTERASE UR-ACNC: ABNORMAL
LYMPHOCYTES # BLD AUTO: 0.54 K/UL — LOW (ref 1–3.3)
LYMPHOCYTES # BLD AUTO: 1.21 K/UL — SIGNIFICANT CHANGE UP (ref 1–3.3)
LYMPHOCYTES # BLD AUTO: 6.1 % — LOW (ref 13–44)
LYMPHOCYTES # BLD AUTO: 9.7 % — LOW (ref 13–44)
MAGNESIUM SERPL-MCNC: 1.5 MG/DL — LOW (ref 1.6–2.6)
MANUAL SMEAR VERIFICATION: SIGNIFICANT CHANGE UP
MCHC RBC-ENTMCNC: 32.8 GM/DL — SIGNIFICANT CHANGE UP (ref 32–36)
MCHC RBC-ENTMCNC: 33.5 GM/DL — SIGNIFICANT CHANGE UP (ref 32–36)
MCHC RBC-ENTMCNC: 34.1 PG — HIGH (ref 27–34)
MCHC RBC-ENTMCNC: 35 PG — HIGH (ref 27–34)
MCV RBC AUTO: 104.1 FL — HIGH (ref 80–100)
MCV RBC AUTO: 104.4 FL — HIGH (ref 80–100)
MONOCYTES # BLD AUTO: 0.23 K/UL — SIGNIFICANT CHANGE UP (ref 0–0.9)
MONOCYTES # BLD AUTO: 1.11 K/UL — HIGH (ref 0–0.9)
MONOCYTES NFR BLD AUTO: 2.6 % — SIGNIFICANT CHANGE UP (ref 2–14)
MONOCYTES NFR BLD AUTO: 8.9 % — SIGNIFICANT CHANGE UP (ref 2–14)
NEUTROPHILS # BLD AUTO: 10.04 K/UL — HIGH (ref 1.8–7.4)
NEUTROPHILS # BLD AUTO: 7.95 K/UL — HIGH (ref 1.8–7.4)
NEUTROPHILS NFR BLD AUTO: 80.2 % — HIGH (ref 43–77)
NEUTROPHILS NFR BLD AUTO: 86.9 % — HIGH (ref 43–77)
NEUTS BAND # BLD: 3.5 % — SIGNIFICANT CHANGE UP (ref 0–8)
NITRITE UR-MCNC: NEGATIVE — SIGNIFICANT CHANGE UP
NRBC # BLD: 1 /100 — HIGH (ref 0–0)
PH UR: 6 — SIGNIFICANT CHANGE UP (ref 5–8)
PHOSPHATE SERPL-MCNC: 2.2 MG/DL — LOW (ref 2.5–4.5)
PLAT MORPH BLD: NORMAL — SIGNIFICANT CHANGE UP
PLATELET # BLD AUTO: 121 K/UL — LOW (ref 150–400)
PLATELET # BLD AUTO: 145 K/UL — LOW (ref 150–400)
POTASSIUM SERPL-MCNC: 4.2 MMOL/L — SIGNIFICANT CHANGE UP (ref 3.5–5.3)
POTASSIUM SERPL-MCNC: 4.3 MMOL/L — SIGNIFICANT CHANGE UP (ref 3.5–5.3)
POTASSIUM SERPL-SCNC: 4.2 MMOL/L — SIGNIFICANT CHANGE UP (ref 3.5–5.3)
POTASSIUM SERPL-SCNC: 4.3 MMOL/L — SIGNIFICANT CHANGE UP (ref 3.5–5.3)
PROCALCITONIN SERPL-MCNC: 2.03 NG/ML — HIGH (ref 0.02–0.1)
PROT SERPL-MCNC: 6 G/DL — LOW (ref 6.6–8.7)
PROT SERPL-MCNC: 6.2 G/DL — SIGNIFICANT CHANGE UP (ref 6–8.3)
PROT UR-MCNC: NEGATIVE — SIGNIFICANT CHANGE UP
RBC # BLD: 3.4 M/UL — LOW (ref 3.8–5.2)
RBC # BLD: 3.43 M/UL — LOW (ref 3.8–5.2)
RBC # FLD: 12.8 % — SIGNIFICANT CHANGE UP (ref 10.3–14.5)
RBC # FLD: 12.8 % — SIGNIFICANT CHANGE UP (ref 10.3–14.5)
RBC BLD AUTO: SIGNIFICANT CHANGE UP
RBC CASTS # UR COMP ASSIST: ABNORMAL /HPF (ref 0–4)
RH IG SCN BLD-IMP: POSITIVE — SIGNIFICANT CHANGE UP
SODIUM SERPL-SCNC: 136 MMOL/L — SIGNIFICANT CHANGE UP (ref 135–145)
SODIUM SERPL-SCNC: 137 MMOL/L — SIGNIFICANT CHANGE UP (ref 135–145)
SP GR SPEC: 1.01 — SIGNIFICANT CHANGE UP (ref 1.01–1.02)
TACROLIMUS SERPL-MCNC: 2.8 NG/ML — SIGNIFICANT CHANGE UP
UROBILINOGEN FLD QL: NEGATIVE MG/DL — SIGNIFICANT CHANGE UP
WBC # BLD: 12.51 K/UL — HIGH (ref 3.8–10.5)
WBC # BLD: 8.79 K/UL — SIGNIFICANT CHANGE UP (ref 3.8–10.5)
WBC # FLD AUTO: 12.51 K/UL — HIGH (ref 3.8–10.5)
WBC # FLD AUTO: 8.79 K/UL — SIGNIFICANT CHANGE UP (ref 3.8–10.5)
WBC UR QL: ABNORMAL /HPF (ref 0–5)

## 2022-09-15 PROCEDURE — 36415 COLL VENOUS BLD VENIPUNCTURE: CPT

## 2022-09-15 PROCEDURE — 99223 1ST HOSP IP/OBS HIGH 75: CPT

## 2022-09-15 PROCEDURE — 12345: CPT | Mod: NC

## 2022-09-15 PROCEDURE — 99222 1ST HOSP IP/OBS MODERATE 55: CPT

## 2022-09-15 PROCEDURE — 96376 TX/PRO/DX INJ SAME DRUG ADON: CPT

## 2022-09-15 PROCEDURE — 74176 CT ABD & PELVIS W/O CONTRAST: CPT

## 2022-09-15 PROCEDURE — 80053 COMPREHEN METABOLIC PANEL: CPT

## 2022-09-15 PROCEDURE — 81001 URINALYSIS AUTO W/SCOPE: CPT

## 2022-09-15 PROCEDURE — 80197 ASSAY OF TACROLIMUS: CPT

## 2022-09-15 PROCEDURE — 87086 URINE CULTURE/COLONY COUNT: CPT

## 2022-09-15 PROCEDURE — 85025 COMPLETE CBC W/AUTO DIFF WBC: CPT

## 2022-09-15 PROCEDURE — 99291 CRITICAL CARE FIRST HOUR: CPT

## 2022-09-15 PROCEDURE — 74176 CT ABD & PELVIS W/O CONTRAST: CPT | Mod: 26

## 2022-09-15 PROCEDURE — 87040 BLOOD CULTURE FOR BACTERIA: CPT

## 2022-09-15 PROCEDURE — 85730 THROMBOPLASTIN TIME PARTIAL: CPT

## 2022-09-15 PROCEDURE — 96365 THER/PROPH/DIAG IV INF INIT: CPT

## 2022-09-15 PROCEDURE — 87637 SARSCOV2&INF A&B&RSV AMP PRB: CPT

## 2022-09-15 PROCEDURE — 96375 TX/PRO/DX INJ NEW DRUG ADDON: CPT

## 2022-09-15 PROCEDURE — 71045 X-RAY EXAM CHEST 1 VIEW: CPT

## 2022-09-15 PROCEDURE — 96368 THER/DIAG CONCURRENT INF: CPT

## 2022-09-15 PROCEDURE — 85610 PROTHROMBIN TIME: CPT

## 2022-09-15 PROCEDURE — 84145 PROCALCITONIN (PCT): CPT

## 2022-09-15 PROCEDURE — 83605 ASSAY OF LACTIC ACID: CPT

## 2022-09-15 PROCEDURE — 96366 THER/PROPH/DIAG IV INF ADDON: CPT

## 2022-09-15 PROCEDURE — 93005 ELECTROCARDIOGRAM TRACING: CPT

## 2022-09-15 RX ORDER — CEFEPIME 1 G/1
2 INJECTION, POWDER, FOR SOLUTION INTRAMUSCULAR; INTRAVENOUS
Qty: 0 | Refills: 0 | DISCHARGE
Start: 2022-09-15

## 2022-09-15 RX ORDER — FAMOTIDINE 10 MG/ML
1 INJECTION INTRAVENOUS
Qty: 0 | Refills: 0 | DISCHARGE

## 2022-09-15 RX ORDER — FAMOTIDINE 10 MG/ML
20 INJECTION INTRAVENOUS DAILY
Refills: 0 | Status: DISCONTINUED | OUTPATIENT
Start: 2022-09-15 | End: 2022-09-15

## 2022-09-15 RX ORDER — SODIUM CHLORIDE 9 MG/ML
1000 INJECTION INTRAMUSCULAR; INTRAVENOUS; SUBCUTANEOUS
Refills: 0 | Status: DISCONTINUED | OUTPATIENT
Start: 2022-09-15 | End: 2022-09-17

## 2022-09-15 RX ORDER — ATORVASTATIN CALCIUM 80 MG/1
10 TABLET, FILM COATED ORAL AT BEDTIME
Refills: 0 | Status: DISCONTINUED | OUTPATIENT
Start: 2022-09-15 | End: 2022-09-26

## 2022-09-15 RX ORDER — FAMOTIDINE 10 MG/ML
20 INJECTION INTRAVENOUS DAILY
Refills: 0 | Status: DISCONTINUED | OUTPATIENT
Start: 2022-09-15 | End: 2022-09-18

## 2022-09-15 RX ORDER — CLONAZEPAM 1 MG
1 TABLET ORAL
Qty: 0 | Refills: 0 | DISCHARGE

## 2022-09-15 RX ORDER — CEFEPIME 1 G/1
2000 INJECTION, POWDER, FOR SOLUTION INTRAMUSCULAR; INTRAVENOUS EVERY 8 HOURS
Refills: 0 | Status: DISCONTINUED | OUTPATIENT
Start: 2022-09-15 | End: 2022-09-15

## 2022-09-15 RX ORDER — TACROLIMUS 5 MG/1
1 CAPSULE ORAL
Refills: 0 | Status: DISCONTINUED | OUTPATIENT
Start: 2022-09-16 | End: 2022-09-22

## 2022-09-15 RX ORDER — INSULIN LISPRO 100/ML
1 VIAL (ML) SUBCUTANEOUS
Refills: 0 | Status: DISCONTINUED | OUTPATIENT
Start: 2022-09-15 | End: 2022-09-16

## 2022-09-15 RX ORDER — HYDROMORPHONE HYDROCHLORIDE 2 MG/ML
0.5 INJECTION INTRAMUSCULAR; INTRAVENOUS; SUBCUTANEOUS EVERY 4 HOURS
Refills: 0 | Status: DISCONTINUED | OUTPATIENT
Start: 2022-09-15 | End: 2022-09-15

## 2022-09-15 RX ORDER — TACROLIMUS 5 MG/1
1 CAPSULE ORAL DAILY
Refills: 0 | Status: DISCONTINUED | OUTPATIENT
Start: 2022-09-15 | End: 2022-09-15

## 2022-09-15 RX ORDER — HEPARIN SODIUM 5000 [USP'U]/ML
5000 INJECTION INTRAVENOUS; SUBCUTANEOUS EVERY 12 HOURS
Refills: 0 | Status: DISCONTINUED | OUTPATIENT
Start: 2022-09-15 | End: 2022-09-15

## 2022-09-15 RX ORDER — AZATHIOPRINE 100 MG/1
100 TABLET ORAL DAILY
Refills: 0 | Status: DISCONTINUED | OUTPATIENT
Start: 2022-09-15 | End: 2022-09-15

## 2022-09-15 RX ORDER — CLONAZEPAM 1 MG
1 TABLET ORAL DAILY
Refills: 0 | Status: DISCONTINUED | OUTPATIENT
Start: 2022-09-15 | End: 2022-09-17

## 2022-09-15 RX ORDER — SODIUM CHLORIDE 9 MG/ML
1000 INJECTION, SOLUTION INTRAVENOUS
Refills: 0 | Status: DISCONTINUED | OUTPATIENT
Start: 2022-09-15 | End: 2022-09-15

## 2022-09-15 RX ORDER — OXYCODONE HYDROCHLORIDE 5 MG/1
5 TABLET ORAL EVERY 4 HOURS
Refills: 0 | Status: DISCONTINUED | OUTPATIENT
Start: 2022-09-15 | End: 2022-09-16

## 2022-09-15 RX ORDER — ACETAMINOPHEN 500 MG
650 TABLET ORAL EVERY 6 HOURS
Refills: 0 | Status: DISCONTINUED | OUTPATIENT
Start: 2022-09-15 | End: 2022-09-16

## 2022-09-15 RX ORDER — MEROPENEM 1 G/30ML
1000 INJECTION INTRAVENOUS ONCE
Refills: 0 | Status: COMPLETED | OUTPATIENT
Start: 2022-09-15 | End: 2022-09-15

## 2022-09-15 RX ORDER — VANCOMYCIN HCL 1 G
1750 VIAL (EA) INTRAVENOUS EVERY 24 HOURS
Refills: 0 | Status: DISCONTINUED | OUTPATIENT
Start: 2022-09-15 | End: 2022-09-15

## 2022-09-15 RX ORDER — AZATHIOPRINE 100 MG/1
2 TABLET ORAL
Qty: 0 | Refills: 0 | DISCHARGE

## 2022-09-15 RX ORDER — MEROPENEM 1 G/30ML
1000 INJECTION INTRAVENOUS EVERY 8 HOURS
Refills: 0 | Status: DISCONTINUED | OUTPATIENT
Start: 2022-09-16 | End: 2022-09-16

## 2022-09-15 RX ORDER — MEROPENEM 1 G/30ML
1 INJECTION INTRAVENOUS EVERY 8 HOURS
Refills: 0 | Status: DISCONTINUED | OUTPATIENT
Start: 2022-09-15 | End: 2022-09-15

## 2022-09-15 RX ORDER — VANCOMYCIN HCL 1 G
1750 VIAL (EA) INTRAVENOUS
Qty: 0 | Refills: 0 | DISCHARGE
Start: 2022-09-15

## 2022-09-15 RX ORDER — HYDROMORPHONE HYDROCHLORIDE 2 MG/ML
1 INJECTION INTRAMUSCULAR; INTRAVENOUS; SUBCUTANEOUS EVERY 4 HOURS
Refills: 0 | Status: DISCONTINUED | OUTPATIENT
Start: 2022-09-15 | End: 2022-09-15

## 2022-09-15 RX ORDER — ONDANSETRON 8 MG/1
4 TABLET, FILM COATED ORAL EVERY 8 HOURS
Refills: 0 | Status: DISCONTINUED | OUTPATIENT
Start: 2022-09-15 | End: 2022-09-15

## 2022-09-15 RX ORDER — CARVEDILOL PHOSPHATE 80 MG/1
25 CAPSULE, EXTENDED RELEASE ORAL EVERY 12 HOURS
Refills: 0 | Status: DISCONTINUED | OUTPATIENT
Start: 2022-09-15 | End: 2022-09-15

## 2022-09-15 RX ORDER — CLONAZEPAM 1 MG
1 TABLET ORAL DAILY
Refills: 0 | Status: DISCONTINUED | OUTPATIENT
Start: 2022-09-15 | End: 2022-09-15

## 2022-09-15 RX ORDER — NALOXONE HYDROCHLORIDE 4 MG/.1ML
0.4 SPRAY NASAL ONCE
Refills: 0 | Status: DISCONTINUED | OUTPATIENT
Start: 2022-09-15 | End: 2022-09-15

## 2022-09-15 RX ORDER — ACETAMINOPHEN 500 MG
650 TABLET ORAL EVERY 6 HOURS
Refills: 0 | Status: DISCONTINUED | OUTPATIENT
Start: 2022-09-15 | End: 2022-09-15

## 2022-09-15 RX ORDER — MEROPENEM 1 G/30ML
INJECTION INTRAVENOUS
Refills: 0 | Status: DISCONTINUED | OUTPATIENT
Start: 2022-09-15 | End: 2022-09-16

## 2022-09-15 RX ORDER — LANOLIN ALCOHOL/MO/W.PET/CERES
3 CREAM (GRAM) TOPICAL AT BEDTIME
Refills: 0 | Status: DISCONTINUED | OUTPATIENT
Start: 2022-09-15 | End: 2022-09-15

## 2022-09-15 RX ADMIN — Medication 5 MILLIGRAM(S): at 06:05

## 2022-09-15 RX ADMIN — Medication 10 MILLIGRAM(S): at 12:14

## 2022-09-15 RX ADMIN — Medication 250 MILLIGRAM(S): at 12:15

## 2022-09-15 RX ADMIN — SODIUM CHLORIDE 100 MILLILITER(S): 9 INJECTION INTRAMUSCULAR; INTRAVENOUS; SUBCUTANEOUS at 17:43

## 2022-09-15 RX ADMIN — Medication 1 MILLIGRAM(S): at 21:40

## 2022-09-15 RX ADMIN — HYDROMORPHONE HYDROCHLORIDE 1 MILLIGRAM(S): 2 INJECTION INTRAMUSCULAR; INTRAVENOUS; SUBCUTANEOUS at 03:45

## 2022-09-15 RX ADMIN — Medication 650 MILLIGRAM(S): at 06:31

## 2022-09-15 RX ADMIN — TACROLIMUS 1 MILLIGRAM(S): 5 CAPSULE ORAL at 06:06

## 2022-09-15 RX ADMIN — AZATHIOPRINE 100 MILLIGRAM(S): 100 TABLET ORAL at 06:08

## 2022-09-15 RX ADMIN — Medication 650 MILLIGRAM(S): at 18:25

## 2022-09-15 RX ADMIN — CEFEPIME 100 MILLIGRAM(S): 1 INJECTION, POWDER, FOR SOLUTION INTRAMUSCULAR; INTRAVENOUS at 15:29

## 2022-09-15 RX ADMIN — Medication 650 MILLIGRAM(S): at 17:43

## 2022-09-15 RX ADMIN — HYDROMORPHONE HYDROCHLORIDE 1 MILLIGRAM(S): 2 INJECTION INTRAMUSCULAR; INTRAVENOUS; SUBCUTANEOUS at 04:30

## 2022-09-15 RX ADMIN — HYDROMORPHONE HYDROCHLORIDE 1 MILLIGRAM(S): 2 INJECTION INTRAMUSCULAR; INTRAVENOUS; SUBCUTANEOUS at 12:18

## 2022-09-15 RX ADMIN — SODIUM CHLORIDE 125 MILLILITER(S): 9 INJECTION, SOLUTION INTRAVENOUS at 03:44

## 2022-09-15 RX ADMIN — CEFEPIME 100 MILLIGRAM(S): 1 INJECTION, POWDER, FOR SOLUTION INTRAMUSCULAR; INTRAVENOUS at 06:31

## 2022-09-15 RX ADMIN — CARVEDILOL PHOSPHATE 25 MILLIGRAM(S): 80 CAPSULE, EXTENDED RELEASE ORAL at 06:05

## 2022-09-15 RX ADMIN — FAMOTIDINE 20 MILLIGRAM(S): 10 INJECTION INTRAVENOUS at 12:14

## 2022-09-15 RX ADMIN — Medication 1 MILLIGRAM(S): at 15:17

## 2022-09-15 RX ADMIN — MEROPENEM 100 MILLIGRAM(S): 1 INJECTION INTRAVENOUS at 20:50

## 2022-09-15 RX ADMIN — HYDROMORPHONE HYDROCHLORIDE 1 MILLIGRAM(S): 2 INJECTION INTRAMUSCULAR; INTRAVENOUS; SUBCUTANEOUS at 12:48

## 2022-09-15 NOTE — DISCHARGE NOTE PROVIDER - NSDCCPCAREPLAN_GEN_ALL_CORE_FT
PRINCIPAL DISCHARGE DIAGNOSIS  Diagnosis: Sepsis  Assessment and Plan of Treatment:       SECONDARY DISCHARGE DIAGNOSES  Diagnosis: Fever  Assessment and Plan of Treatment:

## 2022-09-15 NOTE — H&P ADULT - NSHPPHYSICALEXAM_GEN_ALL_CORE
Vital Signs Last 24 Hrs  T(C): 37.2 (15 Sep 2022 00:19), Max: 39 (14 Sep 2022 16:04)  T(F): 98.9 (15 Sep 2022 00:19), Max: 102.2 (14 Sep 2022 16:04)  HR: 69 (15 Sep 2022 00:19) (56 - 83)  BP: 114/67 (15 Sep 2022 00:19) (104/52 - 114/67)  BP(mean): --  RR: 20 (15 Sep 2022 00:19) (20 - 26)  SpO2: 96% (15 Sep 2022 00:19) (88% - 97%)    Parameters below as of 15 Sep 2022 00:19  Patient On (Oxygen Delivery Method): nasal cannula  O2 Flow (L/min): 2  O2 Concentration (%): 100    Constitutional: NAD, VSS  Head: NC/AT  Eyes: PERRL, EOMI, anicteric sclera, conjunctiva WNL  ENT: Normal Pharynx, MMM, No tonsillar exudate/erythema  Neck: Supple, Non-tender  Chest: Non-tender, no rashes  Cardio: RRR, s1/s2, no appreciable murmurs/rubs/gallops  Resp: BS CTA bilaterally, no wheezing/rhonchi/rales  Abd: Soft, Non-tender, Non-distended, no rebound/guarding/rigidity, +R Flank TTP  : not examined  Rectal: not examined  MSK: moving all extremities, no motor weakness, full ROM x4  Ext: palpable distal pulses, good capillary refill, no clubbing/cyanosis/edema  Psych: appropriate, cooperative  Neuro: CN II-XII grossly intact, no focal deficits  Skin: Warm/Dry. No rashes.

## 2022-09-15 NOTE — H&P ADULT - NSHPPHYSICALEXAM_GEN_ALL_CORE
PHYSICAL EXAM:  Constitutional: Well developed / well nourished  Eyes: Anicteric, PERRLA  ENMT: nc/at  Neck: Supple  Respiratory: CTA B/L  Cardiovascular: RRR  Gastrointestinal: Soft abdomen, NT, ND  Genitourinary:  Voiding spontaneously  Extremities: SCD's in place and working bilaterally  Vascular: Palpable dp pulses bilaterally  Neurological: A&O x3  Skin: Wound healed, biopsy site intact  Musculoskeletal: Moving all extremities  Psychiatric: Responsive

## 2022-09-15 NOTE — CONSULT NOTE ADULT - SUBJECTIVE AND OBJECTIVE BOX
HPI:  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 s/p BL Nephrectomy/Single Donor Renal Transplant, Post Transplant DM, HTN, HLD, GERD, MDD, Anxiety presents to [facility] ER c/o body aches, R flank pain, fevers/chills, nausea/vomiting, diarrhea. Patient septic with Tmax 102.2F and leukocytosis. Lactate negative. Cultures sent. Given broad spectrum empiric IV ABX and IVFB. Tylenol for Fever. Patient and exhusband at bedside confirmed immunosuppressants but unable to confirm all other medications. Nephrologist Dr Dejesus. Difficulty tolerating PO intake due to N/V. Currently on insulin pump for DM. Discussed potential transfer with ER Attending who called and spoke with [facility] Transplant Service Dr Yen who recommended transfer but no currently bed availability or ETA. No other complaints per patient.  (15 Sep 2022 03:50)    Pt is known to our service, she is a patient in out practice, sees Dr Berman  In brief, 51 year old female with new-onset DM after renal transplant in 2020. She is currently using Humalog in the [employer] Tslim insulin pump system and Dexcom CGM.  She also uses Trulicity 0.75 mg weekly at home.  Pump settting: Control IQ mode  12 am Basal 1.1, ISF 1:15, ICR 1:4.5, Target 110  6pm Basal 1.0, ISF 1:15, ICR 1:4.2, Target 110  CGM data reviewed - glucoses 130-150's today, was higher yesterday      PAST MEDICAL & SURGICAL HISTORY:  Hyperlipidemia  Arthropathy NOS - shoulder  Kidney stones  Polycystic kidney disease  GERD (gastroesophageal reflux disease)  Anxiety  CKD (chronic kidney disease) stage 4, GFR 15-29 ml/min, no dialysis  ESRD (end stage renal disease)  Ankle fracture - Left brumstein barajas procedure 2000  Tubal ligation 1995  S/P cholecystectomy (2013)  Kidney calculi cysto/lithotripsy multiple  History of ventral hernia repair  H/O shoulder surgery left  AV fistula right forearm extremity AV fistula radiocephalic ( 2018)  Renal transplant recipient  Status post nephrectomy bilateral    FAMILY HISTORY:  No pertinent family history in first degree relatives    SOCIAL HISTORY: denies tobacco, illicit drugs or EtOh use    MEDICATIONS  (STANDING):  azaTHIOprine 100 milliGRAM(s) Oral daily  carvedilol 25 milliGRAM(s) Oral every 12 hours  cefepime   IVPB 2000 milliGRAM(s) IV Intermittent every 8 hours  famotidine    Tablet 20 milliGRAM(s) Oral daily  heparin   Injectable 5000 Unit(s) SubCutaneous every 12 hours  lactated ringers. 1000 milliLiter(s) (125 mL/Hr) IV Continuous <Continuous>  naloxone Injectable 0.4 milliGRAM(s) IV Push once  PARoxetine 10 milliGRAM(s) Oral daily  predniSONE   Tablet 5 milliGRAM(s) Oral daily  tacrolimus ER Tablet (ENVARSUS XR) 1 milliGRAM(s) Oral daily  vancomycin  IVPB 1750 milliGRAM(s) IV Intermittent every 24 hours    MEDICATIONS  (PRN):  acetaminophen     Tablet .. 650 milliGRAM(s) Oral every 6 hours PRN Temp greater or equal to 38C (100.4F), Mild Pain (1 - 3)  aluminum hydroxide/magnesium hydroxide/simethicone Suspension 30 milliLiter(s) Oral every 4 hours PRN Dyspepsia  clonazePAM  Tablet 1 milliGRAM(s) Oral daily PRN Anxiety  HYDROmorphone  Injectable 0.5 milliGRAM(s) IV Push every 4 hours PRN Moderate Pain (4 - 6)  HYDROmorphone  Injectable 1 milliGRAM(s) IV Push every 4 hours PRN Severe Pain (7 - 10)  melatonin 3 milliGRAM(s) Oral at bedtime PRN Insomnia  ondansetron Injectable 4 milliGRAM(s) IV Push every 8 hours PRN Nausea and/or Vomiting      ALLERGIES: Demerol HCl (Hives)  Phenergan (Hives; Rash)    REVIEW OF SYSTEMS:  CONSTITUTIONAL:  (+) weakness, fevers or chills  HEAD: No headache  EYES:  No blurry vision  RESPIRATORY:  No cough. No wheezing. No shortness of breath  CARDIOVASCULAR:  No chest pain. No palpitations  GASTROINTESTINAL:  No abdominal or epigastric pain. (+) nausea  GENITOURINARY:  No dysuria  NEUROLOGICAL:  No numbness in extremities  ENDO: No excessive thirst.    Vital Signs Last 24 Hrs  T(C): 37.6 (15 Sep 2022 08:11), Max: 39 (14 Sep 2022 16:04)  T(F): 99.6 (15 Sep 2022 08:11), Max: 102.2 (14 Sep 2022 16:04)  HR: 69 (15 Sep 2022 08:11) (56 - 83)  BP: 104/61 (15 Sep 2022 08:11) (104/52 - 146/51)  BP(mean): --  RR: 20 (15 Sep 2022 08:11) (20 - 26)  SpO2: 94% (15 Sep 2022 08:11) (88% - 97%)    Parameters below as of 15 Sep 2022 08:11  Patient On (Oxygen Delivery Method): nasal cannula    Physical Exam:  General appearance: Obese, NAD  Eyes:  EOMI  Lungs: Normal respiratory excursion. Lungs clear no w/r/r  CV: Normal S1S2, regular. No m/r/g.   Abdomen: Soft, nontender  Musculoskeletal: No cyanosis, clubbing, or edema.  Skin: Warm and moist. Pump site looks okay  Neuro: Cranial nerves intact.   Psych: Normal affect, good judgement/insight      LABS:                        11.9   12.51 )-----------( 145      ( 15 Sep 2022 06:35 )             35.5     09-15    137  |  105  |  14.5  ----------------------------<  149<H>  4.3   |  20.0<L>  |  1.13    Ca    8.4      15 Sep 2022 06:35    TPro  6.0<L>  /  Alb  3.4  /  TBili  1.2  /  DBili  x   /  AST  13  /  ALT  11  /  AlkPhos  42  09-15    LIVER FUNCTIONS - ( 15 Sep 2022 06:35 )  Alb: 3.4 g/dL / Pro: 6.0 g/dL / ALK PHOS: 42 U/L / ALT: 11 U/L / AST: 13 U/L / GGT: x                   
[facility] DIVISION OF KIDNEY DISEASES AND HYPERTENSION -- INITIAL CONSULT NOTE  --------------------------------------------------------------------------------  HPI: 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 s/p BL Nephrectomy/Single Donor Renal Transplant, Post Transplant DM, HTN, HLD, GERD, MDD, Anxiety presents to [facility] ER c/o body aches, R flank pain, fevers/chills, nausea/vomiting, diarrhea. Patient septic with Tmax 102.2F and leukocytosis. Lactate negative. Cultures sent. Given broad spectrum empiric IV ABX and IVFB. Tylenol for Fever. Patient and exhusband at bedside confirmed immunosuppressants but unable to confirm all other medications. Nephrologist Dr Dejesus. Difficulty tolerating PO intake due to N/V. Currently on insulin pump for DM. Discussed potential transfer with ER Attending who called and spoke with [facility] Transplant Service Dr Yen who recommended transfer but no currently bed availability or ETA. No other complaints per patient.     ROS negative unless mentioned.    Above Reviewed,      Nephrology : Transplant Kidney Management,     PAST HISTORY  --------------------------------------------------------------------------------  PAST MEDICAL & SURGICAL HISTORY:  Hyperlipidemia      Arthropathy NOS - shoulder      Kidney stones      Polycystic kidney disease      GERD (gastroesophageal reflux disease)      Anxiety      CKD (chronic kidney disease) stage 4, GFR 15-29 ml/min  no dialysis      ESRD (end stage renal disease)      Ankle fracture - Left  brumstein barajas procedure 2000      Tubal ligation  1995      S/P cholecystectomy  (2013)      Kidney calculi  cysto/lithotripsy multiple      H/O tubal ligation  (1996)      History of ventral hernia repair      H/O shoulder surgery  left      AV fistula  right forearm extremity AV fistula radiocephalic ( 2018)      Renal transplant recipient      Status post nephrectomy  bilateral        FAMILY HISTORY:  No pertinent family history in first degree relatives      PAST SOCIAL HISTORY:    ALLERGIES & MEDICATIONS  --------------------------------------------------------------------------------  Allergies    Demerol HCl (Hives)  Phenergan (Hives; Rash)    Intolerances      Standing Inpatient Medications  azaTHIOprine 100 milliGRAM(s) Oral daily  carvedilol 25 milliGRAM(s) Oral every 12 hours  cefepime   IVPB 2000 milliGRAM(s) IV Intermittent every 8 hours  famotidine    Tablet 20 milliGRAM(s) Oral daily  heparin   Injectable 5000 Unit(s) SubCutaneous every 12 hours  insulin lispro (HumaLOG) Pump - Peds 1 Each SubCutaneous Continuous Pump  lactated ringers. 1000 milliLiter(s) IV Continuous <Continuous>  naloxone Injectable 0.4 milliGRAM(s) IV Push once  PARoxetine 10 milliGRAM(s) Oral daily  predniSONE   Tablet 5 milliGRAM(s) Oral daily  tacrolimus ER Tablet (ENVARSUS XR) 1 milliGRAM(s) Oral daily  vancomycin  IVPB 1750 milliGRAM(s) IV Intermittent every 24 hours    PRN Inpatient Medications  acetaminophen     Tablet .. 650 milliGRAM(s) Oral every 6 hours PRN  aluminum hydroxide/magnesium hydroxide/simethicone Suspension 30 milliLiter(s) Oral every 4 hours PRN  clonazePAM  Tablet 1 milliGRAM(s) Oral daily PRN  HYDROmorphone  Injectable 0.5 milliGRAM(s) IV Push every 4 hours PRN  HYDROmorphone  Injectable 1 milliGRAM(s) IV Push every 4 hours PRN  melatonin 3 milliGRAM(s) Oral at bedtime PRN  ondansetron Injectable 4 milliGRAM(s) IV Push every 8 hours PRN      REVIEW OF SYSTEMS  --------------------------------------------------------------------------------  Gen: No weight changes, fatigue, ++ fevers/chills, weakness  Skin: No rashes  Head/Eyes/Ears/Mouth: No headache; Normal hearing; Normal vision w/o blurriness; No sinus pain/discomfort, sore throat  Respiratory: No dyspnea, cough, wheezing, hemoptysis  CV: No chest pain, PND, orthopnea  GI: + abdominal pain, diarrhea, nausea, vomiting, No melena, hematochezia  : ++ increased frequency, dysuria, No hematuria, nocturia  MSK: No joint pain/swelling; + back pain; no edema  Neuro: No dizziness/lightheadedness, weakness, seizures, numbness, tingling  Heme: No easy bruising or bleeding  Endo: No heat/cold intolerance  Psych: No significant nervousness, anxiety, stress, depression    All other systems were reviewed and are negative, except as noted.    VITALS/PHYSICAL EXAM  --------------------------------------------------------------------------------  T(C): 37.6 (09-15-22 @ 08:11), Max: 39 (09-14-22 @ 16:04)  HR: 69 (09-15-22 @ 08:11) (56 - 83)  BP: 104/61 (09-15-22 @ 08:11) (104/52 - 146/51)  RR: 20 (09-15-22 @ 08:11) (20 - 26)  SpO2: 94% (09-15-22 @ 08:11) (88% - 97%)  Height (cm): 172.7 (09-14-22 @ 16:04)  Weight (kg): 108.9 (09-14-22 @ 16:04)  BMI (kg/m2): 36.5 (09-14-22 @ 16:04)  BSA (m2): 2.21 (09-14-22 @ 16:04)    Physical Exam:  	Gen: NAD, well-appearing, Pale, Cushingoid,   	HEENT: PERRL, supple neck,   	Pulm: CTA B/L  	CV: RRR, S1S2; no rub  	Back: No spinal or CVA tenderness; no sacral edema  	Abd: +BS, soft, nontender/nondistended, Allograft firm,   	: No suprapubic tenderness  	UE: Warm, FROM, no clubbing, intact strength; no edema; no asterixis  	LE: Warm, FROM, no clubbing, intact strength; no edema  	Neuro: No focal deficits, intact gait  	Psych: Normal affect and mood  	Skin: Warm, without rashes  	Vascular access: NA    LABS/STUDIES  --------------------------------------------------------------------------------              11.9   12.51 >-----------<  145      [09-15-22 @ 06:35]              35.5     137  |  105  |  14.5  ----------------------------<  149      [09-15-22 @ 06:35]  4.3   |  20.0  |  1.13        Ca     8.4     [09-15-22 @ 06:35]    TPro  6.0  /  Alb  3.4  /  TBili  1.2  /  DBili  x   /  AST  13  /  ALT  11  /  AlkPhos  42  [09-15-22 @ 06:35]    PT/INR: PT 13.8 , INR 1.19       [09-14-22 @ 18:00]  PTT: 27.1       [09-14-22 @ 18:00]    Creatinine Trend:  SCr 1.13 [09-15 @ 06:35]  SCr 1.38 [09-14 @ 18:00]    Urinalysis - [09-14-22 @ 23:55]      Color Yellow / Appearance Clear / SG 1.010 / pH 6.0      Gluc Negative / Ketone Negative  / Bili Negative / Urobili Negative       Blood Small / Protein Negative / Leuk Est Small / Nitrite Negative      RBC 3-5 / WBC 6-10 / Hyaline  / Gran  / Sq Epi  / Non Sq Epi Occasional / Bacteria     CT Abdomen and Pelvis No Cont (09.15.22 @ 12:11)    ACC: [id number] EXAM:  CT ABDOMEN AND PELVIS                          PROCEDURE DATE:  09/15/2022      INTERPRETATION:  CLINICAL INFORMATION: Fever and sepsis.    COMPARISON: 7/5/2018.    CONTRAST/COMPLICATIONS:  IV Contrast: NONE  Oral Contrast:NONE  Complications: None reported at time of study completion    PROCEDURE:  CT of the Abdomen and Pelvis was performed.  Sagittal and coronal reformats were performed.    FINDINGS:  LOWER CHEST: There is trace left pleural effusion. Linear atelectasis   versus scarring is seen within the left lung base..    LIVER: Within normal limits.  BILE DUCTS: Normal caliber.  GALLBLADDER: Cholecystectomy.  SPLEEN: Within normal limits.  PANCREAS: Within normal limits.  ADRENALS: Within normal limits.  KIDNEYS/URETERS: Bilateral kidneys have been removed. There is a right   pelvic transplanted kidney. There is mild surrounding perinephric   stranding of the right pelvic kidney. There is no hydronephrosis.    BLADDER: There is questionable wall thickening of the bladder.  REPRODUCTIVE ORGANS: There is a T-shaped intrauterine device seen within   the uterus. There is a 3.6 cm simple appearing cyst of the medial left   adnexa. This is increased in size when compared to previous exam. This is   possibly paraovarian. Additional cluster left ovarian cysts are noted   measuring 2.2 cm in diameter. These are new compared to previous exam.   The right ovary is unremarkable.    BOWEL: No bowel obstruction. Appendix is not visualized. Small bowel   loops appear adherent to the intra-abdominal wall, likely related to   adhesions there is colonic diverticulosis. There is no focal   diverticulitis.  PERITONEUM: No ascites.  VESSELS: Atherosclerotic changes.  RETROPERITONEUM/LYMPH NODES: No lymphadenopathy.  ABDOMINAL WALL: [place] ventral hernia containing unobstructed small   bowel loops. Postsurgical changes of the intra-abdominal wall   appreciated..  BONES: Mild degenerative changes of the spine appreciated..      IMPRESSION:    New left trace pleural effusion.    Post bilateral nephrectomy changes, with right pelvic transplant kidney   noted. There is mild perinephric stranding of the transplant kidney,   nonspecific. There is no urinary tract obstruction.    Question of urinary bladder wall thickening which may suggest cystitis.   Correlation with urinalysis is advised.    Multiple left adnexal cystic lesions. The largest lesion is noted   medially and is possibly paraovarian. This is increased in size compared   to the previous exam.Additional left ovarian cystic lesions are   appreciated, new compared to previous exam. If the patient is   perimenopausal, these are likely physiologic. In a postmenopausal patient   is considered abnormal.  Dedicated GYN risk assessment should dictate the need for continued   surveillance versus further imaging or surgical workup.    VERTEBRAL BODY ANALYSIS: No Vertebral fracture or low bone density   identified.      SHIRA RUIZ MD; Attending Radiologist  This document has been electronically signed. Sep 15 2022  1:28PM    Creatinine, Serum: 1.13 mg/dL (09.15.22 @ 06:35)     Historical Values  Creatinine, Serum: 1.13 mg/dL (09.15.22 @ 06:35)

## 2022-09-15 NOTE — H&P ADULT - HISTORY OF PRESENT ILLNESS
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 s/p BL Nephrectomy/Single Donor Renal Transplant, Post Transplant DM, HTN, HLD, GERD, MDD, Anxiety presents to [facility] ER c/o body aches, R flank pain, fevers/chills, nausea/vomiting, diarrhea. Patient septic with Tmax 102.2F and leukocytosis. Lactate negative. Cultures sent. Given broad spectrum empiric IV ABX and IVFB. Tylenol for Fever. Patient and exhusband at bedside confirmed immunosuppressants but unable to confirm all other medications. Nephrologist Dr Dejesus. Difficulty tolerating PO intake due to N/V. Currently on insulin pump for DM. Discussed potential transfer with ER Attending who called and spoke with [facility] Transplant Service Dr Yen who recommended transfer but no currently bed availability or ETA. No other complaints per patient.     ROS negative unless mentioned.

## 2022-09-15 NOTE — DISCHARGE NOTE NURSING/CASE MANAGEMENT/SOCIAL WORK - NSDCPEFALRISK_GEN_ALL_CORE
For information on Fall & Injury Prevention, visit: [email or web address] OR  [email or web address] OR  https://www.cdc.gov/steadi/patient.html

## 2022-09-15 NOTE — H&P ADULT - HISTORY OF PRESENT ILLNESS
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 In [facility] ED Tmax 102.2F and leukocytosis. Lactate negative. Cultures sent. Given broad spectrum empiric IV ABX and IVF. Tylenol for Fever. Pt with difficulty tolerating PO intake due to N/V. Currently on insulin pump for DM.  Pt transferred to [facility] under transplant nephrology for further workup of sepsis.  Pt reports that she felt hot with rigors for couple of days and when worsened she decided to go to the ER.

## 2022-09-15 NOTE — H&P ADULT - ASSESSMENT
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     PLAN:  Sepsis 2/2 Acute UTI/Pyelonephritis c/b Renal Transplant  -SIRS +Tmax 102.2F/WBC 13/Tachycardia + UTI  -Admit to Medicine  -Repeat Labs/trend WBC  -Lactate Negative  -UA positive pyuria 5-10 on immunosuppressants  -UCX/BCX pending   -Prior UCX +Enterococcus faecalis and E.Coli   -Vanco 1.75g IV q24  -Vanco Trough prior 4th Dose  -Cefepime 2g IV q8  -Check CT Abd/Pelvis WO -> pending    ESRD 2/2 PCKD s/p BL Nephrectomy/Renal Transplant  -Avoid Nephrotoxins with Renal Xplant and Morphine due to renal metabolism  -Dilaudid IV PRN for Flank Pain  -No longer on Dapsone or Myfortiq  -Prednisone 5mg PO q24  -Envarsus 1mg PO q24  -Imuran 100mg PO q24  -Check Tacrolimus level  -Check Doppler US Renal Transplant -> pending  -Nephrology Consulted (Anjelica)  -Discussed with ER Attending who called [facility] Transplant Service spoke with Dr Yen who recommended transfer but currently no beds or ETA for bed availability. F/u Transfer in AM.     HTN, HLD  -Hold Losartan 100mg Po q24  -Hold Spironolactone 25mg PO q24  -Coreg 25mg BID  -Pravastatin 20mg PO q24    MDD, Anxiety  -Paroxetine 10mg PO q24  -Klonopin 1mg PO PRN    Post-Transplant DM  -Hold Trulicity  -continue Insulin Pump per patient/Dexcom checks  -Accuchecks  -Endo Consulted for pump management    GERD  -Pepcid 20mg q24    Medication Reconciliation  -Patient unable to remember all medications. Uses [facility]. Pharmacy records reviewed no meds. Dr First also no meds. Obtained list from Nephro note. Please confirm med rec in AM. Asked patient to bring med list from home.

## 2022-09-15 NOTE — DISCHARGE NOTE PROVIDER - HOSPITAL COURSE
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 Patient septic with Tmax 102.2F and leukocytosis. Lactate negative. Cultures sent. Given broad spectrum empiric IV ABX and IVFB. Tylenol for Fever. Patient and exhusband at bedside confirmed immunosuppressants but unable to confirm all other medications. Nephrologist Dr Dejesus. Difficulty tolerating PO intake due to N/V. Currently on insulin pump for DM. Discussed potential transfer with ER Attending who called and spoke with [facility] Transplant Service Dr Yen     patient is now awaitin gbed at [facility]

## 2022-09-15 NOTE — H&P ADULT - NSCORESITESY/N_GEN_A_CORE_RD
HPI:     Becca Jenkins is a 28 y.o. female who presents today for:   Chief Complaint   Patient presents with    Annual Exam     last pap 12/30/16-WNL     Follow Up After Procedure     check Nexplanon placed 8/21/18       HPI  Here for annual exam and nexplanon check. 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 Has 2 children- 7 and 10 weeks. Working on healthy eating and adding exercise. GYNECOLOGIC HISTORY:  Menstrual History: Patient's last menstrual period was 08/20/2018.   Sexually Transmitted Infections: No  History of Ectopic Pregnancy:No  Menarche: 12  Cycles irregular  Duration of cycles: 4-5  Dysmenorrhea: none  Sexually active: yes  Dyspareunia: none    Current Outpatient Prescriptions   Medication Sig Dispense Refill    Prenatal Vit-Fe Fumarate-FA (PRENATAL PO) Take by mouth       Current Facility-Administered Medications   Medication Dose Route Frequency Provider Last Rate Last Dose    etonogestrel (NEXPLANON) implant 68 mg  68 mg Subdermal Continuous De Downs MD         No Known Allergies    Past Medical History:   Diagnosis Date    Allergic rhinitis     Anxiety     Headache     Mental disorder     anxiety     Denies family history of breast, ovarian, uterus, colon CA     Past Surgical History:   Procedure Laterality Date    DILATION AND CURETTAGE OF UTERUS  2012    INSERTION OF CONTRACEPTIVE CAPSULE Left 08/21/2018    Nexplanon     Family History   Problem Relation Age of Onset    High Blood Pressure Mother     High Cholesterol Mother     High Blood Pressure Maternal Grandmother     High Cholesterol Maternal Grandmother     Cancer Maternal Grandfather         ? environmental exposure    Cancer Paternal Grandmother         ovarian    Cancer Paternal Grandfather         esophageal CA     Social History   Substance Use Topics    Smoking status: Never Smoker    Smokeless tobacco: Never Used    Alcohol use No        Subjective:      Review of Systems   Constitutional: Negative for appetite change and fatigue. HENT: Negative for congestion and hearing loss. Eyes: Negative for visual disturbance. Respiratory: Negative for cough and shortness of breath. Cardiovascular: Negative for chest pain and palpitations. Gastrointestinal: Negative for abdominal distention, abdominal pain, constipation and diarrhea. Genitourinary: Negative for flank pain, frequency, menstrual problem, pelvic pain and vaginal discharge. Musculoskeletal: Negative for back pain. Neurological: Negative for syncope and headaches. Psychiatric/Behavioral: Negative for behavioral problems. Objective:     /82 (Site: Right Upper Arm, Position: Sitting, Cuff Size: Large Adult)   Ht 5' 1\" (1.549 m)   Wt 180 lb (81.6 kg)   LMP 08/20/2018   Breastfeeding? No   BMI 34.01 kg/m²   Physical Exam   Constitutional: She is oriented to person, place, and time. She appears well-developed and well-nourished. Eyes: Pupils are equal, round, and reactive to light. Neck: No tracheal deviation present. No thyromegaly present. Cardiovascular: Normal rate, regular rhythm and normal heart sounds. Pulmonary/Chest: Effort normal and breath sounds normal. No respiratory distress. Right breast exhibits no inverted nipple. Left breast exhibits no inverted nipple, no mass, no nipple discharge, no skin change and no tenderness. Breasts are symmetrical.   Abdominal: Soft. Bowel sounds are normal. She exhibits no distension and no mass. There is no tenderness. There is no CVA tenderness. Hernia confirmed negative in the right inguinal area and confirmed negative in the left inguinal area. Genitourinary: No breast swelling, tenderness, discharge or bleeding. There is no rash or lesion on the right labia. There is no rash or lesion on the left labia. Uterus is not deviated, not enlarged and not fixed. Cervix exhibits no motion tenderness, no discharge and no friability.  Right adnexum displays no mass, no tenderness and no fullness. Left adnexum displays no mass, no tenderness and no fullness. No tenderness in the vagina. No vaginal discharge found. Musculoskeletal: She exhibits no edema or tenderness. Lymphadenopathy:     She has no cervical adenopathy. Right: No inguinal adenopathy present. Left: No inguinal adenopathy present. Neurological: She is alert and oriented to person, place, and time. Skin: Skin is warm and dry. Psychiatric: She has a normal mood and affect. Her behavior is normal. Judgment and thought content normal.         Assessment:      Diagnosis Orders   1. Encounter for gynecological examination  PAP SMEAR     Nexplanon in place    Plan:   1. Pap collected. Discussed new pap smear guidelines. Desires re-pap in 1 year. 2. Breast self exam reviewed  3. Calcium and Vitamin D dosing reviewed. 4. Seat belt use reviewed  5. Family planning reviewed.   Birth control nexplanon    Electronically signed by Thanh Lara on 9/7/2018 No

## 2022-09-15 NOTE — H&P ADULT - NS ATTEND AMEND GEN_ALL_CORE FT
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  Change antibiotics to meropenem and consult ID  Check GI pcr, adenovirus, EBV and CMV pcr.    IV fluid for sepsis.    Hold imuran.

## 2022-09-15 NOTE — DISCHARGE NOTE NURSING/CASE MANAGEMENT/SOCIAL WORK - PATIENT PORTAL LINK FT
You can access the FollowMyHealth Patient Portal offered by [facility] by registering at the following website: [email or web address]. By joining [facility]’s FollowMyHealth portal, you will also be able to view your health information using other applications (apps) compatible with our system.

## 2022-09-15 NOTE — DISCHARGE NOTE PROVIDER - NSDCMRMEDTOKEN_GEN_ALL_CORE_FT
Admelog 100 units/mL injectable solution:   carvedilol 25 mg oral tablet: 1 tab(s) orally 2 times a day  cefepime 2 g intravenous injection: 2 gram(s) intravenous every 8 hours  Envarsus XR 1 mg oral tablet, extended release: 1 tab(s) orally once a day (in the morning)  Imuran 50 mg oral tablet: 2 tab(s) orally once a day  KlonoPIN 1 mg oral tablet: 1 tab(s) orally once a day, As Needed  losartan 100 mg oral tablet: 1 tab(s) orally once a day  PARoxetine 10 mg oral tablet: 1 tab(s) orally once a day  Pepcid 20 mg oral tablet: 1 tab(s) orally once a day  pravastatin 20 mg oral tablet: 1 tab(s) orally once a day  predniSONE 5 mg oral tablet: 1 tab(s) orally once a day  spironolactone 25 mg oral tablet: 1 tab(s) orally once a day  Trulicity Pen 0.75 mg/0.5 mL subcutaneous solution: 1 dose(s) subcutaneous once a week  vancomycin 250 mg intravenous injection: 1750 milligram(s) intravenous every 24 hours

## 2022-09-15 NOTE — H&P ADULT - NSHPREVIEWOFSYSTEMS_GEN_ALL_CORE
REVIEW OF SYSTEMS  --------------------------------------------------------------------------------  Gen: No weight changes, fatigue, +fevers+chills, weakness  Skin: No rashes  Head/Eyes/Ears/Mouth: No headache; Normal hearing; Normal vision w/o blurriness; No sinus pain/discomfort, sore throat  Respiratory: No dyspnea, cough, wheezing, hemoptysis  CV: No chest pain, PND, orthopnea  GI: No abdominal pain, diarrhea, constipation, nausea, vomiting, melena, hematochezia  : No increased frequency, dysuria, hematuria, nocturia  MSK: No joint pain/swelling; no back pain; no edema  Neuro: No dizziness/lightheadedness, weakness, seizures, numbness, tingling  Heme: No easy bruising or bleeding  Endo: No heat/cold intolerance  Psych: No significant nervousness, anxiety, stress, depression  All other systems were reviewed and are negative, except as noted.

## 2022-09-15 NOTE — DISCHARGE NOTE PROVIDER - NSDCFUSCHEDAPPT_GEN_ALL_CORE_FT
Kevon Walter  [facility] Physician [facility]  DERM [address]  Scheduled Appointment: 09/29/2022    Dallin Berman  [facility] Physician [facility]  ENDOCRIN [address]  Scheduled Appointment: 10/03/2022    Miko Mcdonough  [facility] Physician [facility]  NEPHRO [address]  Scheduled Appointment: 10/06/2022    [facility] Physician [facility]  ANTEPARTUM [address]  Scheduled Appointment: 10/10/2022    Eugenie Finn  [facility] Physician [facility]  OBGYNGEN [address]   Scheduled Appointment: 10/10/2022    Ivette Gurrola  [facility] Physician [facility]  FamilyMed [address]  Scheduled Appointment: 10/13/2022    Jeff Vuong  [facility] Physician [facility]  GASTRO [address]  Scheduled Appointment: 12/06/2022

## 2022-09-15 NOTE — H&P ADULT - ASSESSMENT
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 with Sepsis 2/2 Acute UTI/Pyelonephritis c/b Renal Transplant on Immunosuppression. Now Transferred for further sepsis workup.    PLAN:  Sepsis  possible 2/2 UTI/peylo  -From [facility] (SIRS +Tmax 102.2F/WBC 13/Tachycardia + UTI)  -Now BP/HR stable febrile  -Admit to Transplant nephrology  -Labs now and daily (send now CMP, CBC, MAg, phos,, culutres blood/u, UA, CMV, EBV)  -FU [facility] UCX/BCX (Prior UCX +Enterococcus faecalis and E.Coli)  -ID consult  -Start meropenem (spiked fever on sefepime vanco at [facility])    Immuno:  -Prednisone 5mg PO q24  -Envarsus 1mg PO q24  -Imuran hold in the setting of infection  -Check Tacrolimus level    DM:  -On insulin pump  -Endo consult called for pump management  -Was at home on (Trulicity)  -Diabetic diet    HTN, HLD  -Monitor BP  -Was at home on (Losartan 100mg QD, Spironolactone 25mg PO q24)  -Coreg 25mg BID  -Pravastatin 20mg PO q24    MDD, Anxiety  -Paroxetine 10mg PO q24  -Klonopin 1mg PO PRN      GERD  -Pepcid 20mg q24

## 2022-09-15 NOTE — CONSULT NOTE ADULT - ASSESSMENT
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    1. T2DM s/p renal transplant - glucoses well controlled  - please allow pt to use insulin pump with Dexcom CGM while in hospital, she can count carbs for meals and bolus for meals, she is currently in Control IQ mode which will adjust basal insulin dosing or suspend insulin depending on data from Dexcom  - pt can report dexcom readings to nurses but advised that FS check must be done in situations of hyper/hypoglycemia or if she feels dexcom in inaccurate  - can resume Trulicity when feeling better  - check HbA1c     2. Sepsis 2/2 Acute UTI/Pyelonephritis c/b Renal Transplant - feeling better  - on IV Vancomycin and Cefepime  - CT abd/pel pending  - f/u cultures    3. Renal transplant  - cont azathiprine, tacrolimus, prednisone  - possible transfer to [facility]
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    1. T2DM s/p renal transplant - glucoses well controlled    2. Sepsis 2/2 Acute UTI/Pyelonephritis c/b Renal Transplant - feeling better  - on IV Vancomycin and Cefepime  - CT abd/pel pending    3. Renal transplant  - On Imuran, , tacrolimus, prednisone,      D/W ED Staff,

## 2022-09-16 DIAGNOSIS — Z94.0 KIDNEY TRANSPLANT STATUS: ICD-10-CM

## 2022-09-16 DIAGNOSIS — E11.9 TYPE 2 DIABETES MELLITUS WITHOUT COMPLICATIONS: ICD-10-CM

## 2022-09-16 DIAGNOSIS — Z79.899 OTHER LONG TERM (CURRENT) DRUG THERAPY: ICD-10-CM

## 2022-09-16 DIAGNOSIS — H92.09 OTALGIA, UNSPECIFIED EAR: ICD-10-CM

## 2022-09-16 DIAGNOSIS — M26.609 UNSPECIFIED TEMPOROMANDIBULAR JOINT DISORDER, UNSPECIFIED SIDE: ICD-10-CM

## 2022-09-16 DIAGNOSIS — J38.3 OTHER DISEASES OF VOCAL CORDS: ICD-10-CM

## 2022-09-16 DIAGNOSIS — Z96.41 PRESENCE OF INSULIN PUMP (EXTERNAL) (INTERNAL): ICD-10-CM

## 2022-09-16 DIAGNOSIS — I10 ESSENTIAL (PRIMARY) HYPERTENSION: ICD-10-CM

## 2022-09-16 DIAGNOSIS — N39.0 URINARY TRACT INFECTION, SITE NOT SPECIFIED: ICD-10-CM

## 2022-09-16 LAB
ALBUMIN SERPL ELPH-MCNC: 3.3 G/DL — SIGNIFICANT CHANGE UP (ref 3.3–5)
ALP SERPL-CCNC: 34 U/L — LOW (ref 40–120)
ALT FLD-CCNC: 7 U/L — LOW (ref 10–45)
ANION GAP SERPL CALC-SCNC: 9 MMOL/L — SIGNIFICANT CHANGE UP (ref 5–17)
AST SERPL-CCNC: 12 U/L — SIGNIFICANT CHANGE UP (ref 10–40)
BASOPHILS # BLD AUTO: 0.02 K/UL — SIGNIFICANT CHANGE UP (ref 0–0.2)
BASOPHILS NFR BLD AUTO: 0.3 % — SIGNIFICANT CHANGE UP (ref 0–2)
BILIRUB SERPL-MCNC: 0.6 MG/DL — SIGNIFICANT CHANGE UP (ref 0.2–1.2)
BUN SERPL-MCNC: 13 MG/DL — SIGNIFICANT CHANGE UP (ref 7–23)
CALCIUM SERPL-MCNC: 8.1 MG/DL — LOW (ref 8.4–10.5)
CHLORIDE SERPL-SCNC: 108 MMOL/L — SIGNIFICANT CHANGE UP (ref 96–108)
CMV DNA CSF QL NAA+PROBE: SIGNIFICANT CHANGE UP
CMV DNA SPEC NAA+PROBE-LOG#: SIGNIFICANT CHANGE UP LOG10IU/ML
CO2 SERPL-SCNC: 20 MMOL/L — LOW (ref 22–31)
CREAT SERPL-MCNC: 1.21 MG/DL — SIGNIFICANT CHANGE UP (ref 0.5–1.3)
CULTURE RESULTS: SIGNIFICANT CHANGE UP
EBV DNA SERPL NAA+PROBE-ACNC: SIGNIFICANT CHANGE UP IU/ML
EBVPCR LOG: SIGNIFICANT CHANGE UP LOG10IU/ML
EGFR: 54 ML/MIN/1.73M2 — LOW
EOSINOPHIL # BLD AUTO: 0.1 K/UL — SIGNIFICANT CHANGE UP (ref 0–0.5)
EOSINOPHIL NFR BLD AUTO: 1.4 % — SIGNIFICANT CHANGE UP (ref 0–6)
GI PCR PANEL: SIGNIFICANT CHANGE UP
GLUCOSE SERPL-MCNC: 109 MG/DL — HIGH (ref 70–99)
HCT VFR BLD CALC: 31.8 % — LOW (ref 34.5–45)
HGB BLD-MCNC: 10.7 G/DL — LOW (ref 11.5–15.5)
IMM GRANULOCYTES NFR BLD AUTO: 0.4 % — SIGNIFICANT CHANGE UP (ref 0–0.9)
LYMPHOCYTES # BLD AUTO: 0.7 K/UL — LOW (ref 1–3.3)
LYMPHOCYTES # BLD AUTO: 10.1 % — LOW (ref 13–44)
MAGNESIUM SERPL-MCNC: 1.5 MG/DL — LOW (ref 1.6–2.6)
MCHC RBC-ENTMCNC: 33.6 GM/DL — SIGNIFICANT CHANGE UP (ref 32–36)
MCHC RBC-ENTMCNC: 35.2 PG — HIGH (ref 27–34)
MCV RBC AUTO: 104.6 FL — HIGH (ref 80–100)
MONOCYTES # BLD AUTO: 0.65 K/UL — SIGNIFICANT CHANGE UP (ref 0–0.9)
MONOCYTES NFR BLD AUTO: 9.4 % — SIGNIFICANT CHANGE UP (ref 2–14)
NEUTROPHILS # BLD AUTO: 5.43 K/UL — SIGNIFICANT CHANGE UP (ref 1.8–7.4)
NEUTROPHILS NFR BLD AUTO: 78.4 % — HIGH (ref 43–77)
NRBC # BLD: 0 /100 WBCS — SIGNIFICANT CHANGE UP (ref 0–0)
PHOSPHATE SERPL-MCNC: 2.1 MG/DL — LOW (ref 2.5–4.5)
PLATELET # BLD AUTO: 112 K/UL — LOW (ref 150–400)
POTASSIUM SERPL-MCNC: 4 MMOL/L — SIGNIFICANT CHANGE UP (ref 3.5–5.3)
POTASSIUM SERPL-SCNC: 4 MMOL/L — SIGNIFICANT CHANGE UP (ref 3.5–5.3)
PROT SERPL-MCNC: 5.8 G/DL — LOW (ref 6–8.3)
RAPID RVP RESULT: SIGNIFICANT CHANGE UP
RBC # BLD: 3.04 M/UL — LOW (ref 3.8–5.2)
RBC # FLD: 12.7 % — SIGNIFICANT CHANGE UP (ref 10.3–14.5)
SARS-COV-2 RNA SPEC QL NAA+PROBE: SIGNIFICANT CHANGE UP
SODIUM SERPL-SCNC: 137 MMOL/L — SIGNIFICANT CHANGE UP (ref 135–145)
SPECIMEN SOURCE: SIGNIFICANT CHANGE UP
TACROLIMUS SERPL-MCNC: 3.7 NG/ML — SIGNIFICANT CHANGE UP
WBC # BLD: 6.93 K/UL — SIGNIFICANT CHANGE UP (ref 3.8–10.5)
WBC # FLD AUTO: 6.93 K/UL — SIGNIFICANT CHANGE UP (ref 3.8–10.5)

## 2022-09-16 PROCEDURE — 99223 1ST HOSP IP/OBS HIGH 75: CPT

## 2022-09-16 PROCEDURE — 99222 1ST HOSP IP/OBS MODERATE 55: CPT | Mod: 25

## 2022-09-16 PROCEDURE — 99222 1ST HOSP IP/OBS MODERATE 55: CPT

## 2022-09-16 PROCEDURE — 99221 1ST HOSP IP/OBS SF/LOW 40: CPT

## 2022-09-16 PROCEDURE — 31575 DIAGNOSTIC LARYNGOSCOPY: CPT

## 2022-09-16 RX ORDER — HYDROMORPHONE HYDROCHLORIDE 2 MG/ML
0.5 INJECTION INTRAMUSCULAR; INTRAVENOUS; SUBCUTANEOUS ONCE
Refills: 0 | Status: DISCONTINUED | OUTPATIENT
Start: 2022-09-16 | End: 2022-09-16

## 2022-09-16 RX ORDER — HYDROMORPHONE HYDROCHLORIDE 2 MG/ML
1 INJECTION INTRAMUSCULAR; INTRAVENOUS; SUBCUTANEOUS ONCE
Refills: 0 | Status: DISCONTINUED | OUTPATIENT
Start: 2022-09-16 | End: 2022-09-16

## 2022-09-16 RX ORDER — MAGNESIUM SULFATE 500 MG/ML
2 VIAL (ML) INJECTION ONCE
Refills: 0 | Status: COMPLETED | OUTPATIENT
Start: 2022-09-16 | End: 2022-09-16

## 2022-09-16 RX ORDER — INFLUENZA VIRUS VACCINE 15; 15; 15; 15 UG/.5ML; UG/.5ML; UG/.5ML; UG/.5ML
0.5 SUSPENSION INTRAMUSCULAR ONCE
Refills: 0 | Status: DISCONTINUED | OUTPATIENT
Start: 2022-09-16 | End: 2022-09-26

## 2022-09-16 RX ORDER — CEFEPIME 1 G/1
1000 INJECTION, POWDER, FOR SOLUTION INTRAMUSCULAR; INTRAVENOUS EVERY 8 HOURS
Refills: 0 | Status: DISCONTINUED | OUTPATIENT
Start: 2022-09-16 | End: 2022-09-19

## 2022-09-16 RX ORDER — TRAMADOL HYDROCHLORIDE 50 MG/1
25 TABLET ORAL EVERY 6 HOURS
Refills: 0 | Status: DISCONTINUED | OUTPATIENT
Start: 2022-09-16 | End: 2022-09-23

## 2022-09-16 RX ORDER — TRAMADOL HYDROCHLORIDE 50 MG/1
50 TABLET ORAL EVERY 6 HOURS
Refills: 0 | Status: DISCONTINUED | OUTPATIENT
Start: 2022-09-16 | End: 2022-09-23

## 2022-09-16 RX ORDER — ACETAMINOPHEN 500 MG
1000 TABLET ORAL ONCE
Refills: 0 | Status: COMPLETED | OUTPATIENT
Start: 2022-09-17 | End: 2022-09-17

## 2022-09-16 RX ORDER — ONDANSETRON 8 MG/1
4 TABLET, FILM COATED ORAL ONCE
Refills: 0 | Status: COMPLETED | OUTPATIENT
Start: 2022-09-16 | End: 2022-09-16

## 2022-09-16 RX ORDER — INSULIN LISPRO 100/ML
1 VIAL (ML) SUBCUTANEOUS
Refills: 0 | Status: DISCONTINUED | OUTPATIENT
Start: 2022-09-16 | End: 2022-09-18

## 2022-09-16 RX ORDER — ONDANSETRON 8 MG/1
4 TABLET, FILM COATED ORAL EVERY 6 HOURS
Refills: 0 | Status: DISCONTINUED | OUTPATIENT
Start: 2022-09-16 | End: 2022-09-26

## 2022-09-16 RX ADMIN — MEROPENEM 100 MILLIGRAM(S): 1 INJECTION INTRAVENOUS at 13:36

## 2022-09-16 RX ADMIN — SODIUM CHLORIDE 100 MILLILITER(S): 9 INJECTION INTRAMUSCULAR; INTRAVENOUS; SUBCUTANEOUS at 17:52

## 2022-09-16 RX ADMIN — Medication 25 GRAM(S): at 08:53

## 2022-09-16 RX ADMIN — Medication 10 MILLIGRAM(S): at 13:36

## 2022-09-16 RX ADMIN — Medication 5 MILLIGRAM(S): at 06:56

## 2022-09-16 RX ADMIN — HYDROMORPHONE HYDROCHLORIDE 1 MILLIGRAM(S): 2 INJECTION INTRAMUSCULAR; INTRAVENOUS; SUBCUTANEOUS at 04:18

## 2022-09-16 RX ADMIN — HYDROMORPHONE HYDROCHLORIDE 0.5 MILLIGRAM(S): 2 INJECTION INTRAMUSCULAR; INTRAVENOUS; SUBCUTANEOUS at 15:47

## 2022-09-16 RX ADMIN — Medication 650 MILLIGRAM(S): at 20:00

## 2022-09-16 RX ADMIN — HYDROMORPHONE HYDROCHLORIDE 0.5 MILLIGRAM(S): 2 INJECTION INTRAMUSCULAR; INTRAVENOUS; SUBCUTANEOUS at 21:30

## 2022-09-16 RX ADMIN — TACROLIMUS 1 MILLIGRAM(S): 5 CAPSULE ORAL at 07:49

## 2022-09-16 RX ADMIN — Medication 650 MILLIGRAM(S): at 19:14

## 2022-09-16 RX ADMIN — HYDROMORPHONE HYDROCHLORIDE 0.5 MILLIGRAM(S): 2 INJECTION INTRAMUSCULAR; INTRAVENOUS; SUBCUTANEOUS at 13:35

## 2022-09-16 RX ADMIN — CEFEPIME 100 MILLIGRAM(S): 1 INJECTION, POWDER, FOR SOLUTION INTRAMUSCULAR; INTRAVENOUS at 20:55

## 2022-09-16 RX ADMIN — HYDROMORPHONE HYDROCHLORIDE 0.5 MILLIGRAM(S): 2 INJECTION INTRAMUSCULAR; INTRAVENOUS; SUBCUTANEOUS at 20:54

## 2022-09-16 RX ADMIN — TRAMADOL HYDROCHLORIDE 50 MILLIGRAM(S): 50 TABLET ORAL at 21:30

## 2022-09-16 RX ADMIN — TRAMADOL HYDROCHLORIDE 50 MILLIGRAM(S): 50 TABLET ORAL at 20:54

## 2022-09-16 RX ADMIN — FAMOTIDINE 20 MILLIGRAM(S): 10 INJECTION INTRAVENOUS at 13:36

## 2022-09-16 RX ADMIN — ATORVASTATIN CALCIUM 10 MILLIGRAM(S): 80 TABLET, FILM COATED ORAL at 20:54

## 2022-09-16 RX ADMIN — MEROPENEM 100 MILLIGRAM(S): 1 INJECTION INTRAVENOUS at 06:57

## 2022-09-16 RX ADMIN — ONDANSETRON 4 MILLIGRAM(S): 8 TABLET, FILM COATED ORAL at 03:48

## 2022-09-16 NOTE — CONSULT NOTE ADULT - SUBJECTIVE AND OBJECTIVE BOX
CC: bilateral ear pain and headache    HPI: 50yo female with PMHx ESRD 2/2 PCKD s/p BL Nephrectomy/Single Donor Renal Transplant, Post Transplant DM, HTN, HLD, GERD, MDD, Anxiety transferred from [facility] on 9/15 with c/o body aches, R flank pain, fevers/chills, nausea/vomiting, diarrhea. In [facility] ED Tmax 102.2F and leukocytosis. Lactate negative. Cultures sent. Given broad spectrum empiric IV ABX and IVF. Tylenol for Fever. Pt with difficulty tolerating PO intake due to N/V. Currently on insulin pump for DM.  Pt transferred to [facility] under transplant nephrology for further workup of sepsis.  Pt reports that she felt hot with rigors for couple of days and when worsened she decided to go to the ER.       PAST MEDICAL & SURGICAL HISTORY:  Hyperlipidemia      Arthropathy NOS - shoulder      Kidney stones      Polycystic kidney disease      GERD (gastroesophageal reflux disease)      Anxiety      CKD (chronic kidney disease) stage 4, GFR 15-29 ml/min  no dialysis      ESRD (end stage renal disease)      Ankle fracture - Left  brumstein barajas procedure 2000      Tubal ligation  1995      S/P cholecystectomy  (2013)      Kidney calculi  cysto/lithotripsy multiple      H/O tubal ligation  (1996)      History of ventral hernia repair      H/O shoulder surgery  left      AV fistula  right forearm extremity AV fistula radiocephalic ( 2018)      Renal transplant recipient      Status post nephrectomy  bilateral        Allergies    Demerol HCl (Hives)  Phenergan (Hives; Rash)    Intolerances      MEDICATIONS  (STANDING):  atorvastatin 10 milliGRAM(s) Oral at bedtime  famotidine    Tablet 20 milliGRAM(s) Oral daily  meropenem  IVPB      meropenem  IVPB 1000 milliGRAM(s) IV Intermittent every 8 hours  PARoxetine 10 milliGRAM(s) Oral daily  predniSONE   Tablet 5 milliGRAM(s) Oral daily  sodium chloride 0.9%. 1000 milliLiter(s) (100 mL/Hr) IV Continuous <Continuous>  tacrolimus ER Tablet (ENVARSUS XR) 1 milliGRAM(s) Oral <User Schedule>    MEDICATIONS  (PRN):  acetaminophen     Tablet .. 650 milliGRAM(s) Oral every 6 hours PRN Temp greater or equal to 38C (100.4F), Mild Pain (1 - 3)  clonazePAM  Tablet 1 milliGRAM(s) Oral daily PRN anxiety      Social History: **??**    Family history: **??**    ROS:   ENT: all negative except as noted in HPI   CV: denies palpitations  Pulm: denies SOB, cough, hemoptysis  GI: denies change in apetite, indigestion, n/v  : denies pertinent urinary symptoms, urgency  Neuro: denies numbness/tingling, loss of sensation  Psych: denies anxiety  MS: denies muscle weakness, instability  Heme: denies easy bruising or bleeding  Endo: denies heat/cold intolerance, excessive sweating  Vascular: denies LE edema    Vital Signs Last 24 Hrs  T(C): 37.1 (16 Sep 2022 12:57), Max: 38.3 (15 Sep 2022 17:24)  T(F): 98.8 (16 Sep 2022 12:57), Max: 100.9 (15 Sep 2022 17:24)  HR: 58 (16 Sep 2022 12:57) (54 - 82)  BP: 122/59 (16 Sep 2022 12:57) (110/68 - 160/67)  BP(mean): --  RR: 18 (16 Sep 2022 12:57) (18 - 20)  SpO2: 95% (16 Sep 2022 12:57) (92% - 96%)    Parameters below as of 16 Sep 2022 12:57  Patient On (Oxygen Delivery Method): room air                              10.7   6.93  )-----------( 112      ( 16 Sep 2022 07:01 )             31.8    09-16    137  |  108  |  13  ----------------------------<  109<H>  4.0   |  20<L>  |  1.21    Ca    8.1<L>      16 Sep 2022 06:58  Phos  2.1     09-16  Mg     1.5     09-16    TPro  5.8<L>  /  Alb  3.3  /  TBili  0.6  /  DBili  x   /  AST  12  /  ALT  7<L>  /  AlkPhos  34<L>  09-16   PT/INR - ( 14 Sep 2022 18:00 )   PT: 13.8 sec;   INR: 1.19 ratio         PTT - ( 14 Sep 2022 18:00 )  PTT:27.1 sec    PHYSICAL EXAM:  Gen: NAD  Skin: No rashes, bruises, or lesions  Head: Normocephalic, Atraumatic  Face: no edema, erythema, or fluctuance. Parotid glands soft without mass  Eyes: no scleral injection  Ears: Right: ear canal clear, TM intact without effusion or erythema. No evidence of any fluid drainage. No mastoid tenderness, erythema, or ear bulging            Left: ear canal clear, TM intact without effusion or erythema. No evidence of any fluid drainage. No mastoid tenderness, erythema, or ear bulging  Nose: Nares bilaterally patent, no discharge  Mouth: No Stridor / Drooling / Trismus.  Mucosa moist, tongue/uvula midline, oropharynx clear  Neck: Flat, supple, no lymphadenopathy, trachea midline, no masses  Lymphatic: No lymphadenopathy  Resp: breathing easily, no stridor  CV: no peripheral edema/cyanosis  GI: nondistended   Peripheral vascular: no JVD or edema  Neuro: facial nerve intact, no facial droop      Diagnostic Nasal Endoscopy: (Scope #2 used)    Fiberoptic Indirect laryngoscopy:  (Scope #2 used)    IMAGING/ADDITIONAL STUDIES:  CC: bilateral ear pain and headache    HPI: 50yo female with PMHx ESRD 2/2 PCKD s/p BL Nephrectomy/Single Donor Renal Transplant, Post Transplant DM, HTN, HLD, GERD, MDD, Anxiety transferred from [facility] on 9/15 with c/o body aches, R flank pain, fevers/chills, nausea/vomiting, diarrhea. In [facility] ED Tmax 102.2F and leukocytosis. Given broad spectrum empiric IV ABX and IVF. Patient is being worked up for sepsis currently. We are asked to evaluate the patient for c/o bilateral ear pain with headaches. Patient states the pain and headaches started about a week ago but has worsened over the past two days. Patient has history of bilateral chronic ear infections that were treated when she was a child with antibiotics. Denies having tubes placed. Patient has history of hearing loss and saw an ENT about 2 years ago and currently sees an Audiologist. Patient states Right ear pain feels like pressure and is more severe than the left. Hearing loss is the same on both sides. Patient states she has tinnitus on Right ear that is very minimal. Denies pain upon palpation of the ear, discharge, vertigo. Patient c/o sinus like pressure around her ears and her temple area.     PAST MEDICAL & SURGICAL HISTORY:  Hyperlipidemia      Arthropathy NOS - shoulder      Kidney stones      Polycystic kidney disease      GERD (gastroesophageal reflux disease)      Anxiety      CKD (chronic kidney disease) stage 4, GFR 15-29 ml/min  no dialysis      ESRD (end stage renal disease)      Ankle fracture - Left  brumstein barajas procedure 2000      Tubal ligation  1995      S/P cholecystectomy  (2013)      Kidney calculi  cysto/lithotripsy multiple      H/O tubal ligation  (1996)      History of ventral hernia repair      H/O shoulder surgery  left      AV fistula  right forearm extremity AV fistula radiocephalic ( 2018)      Renal transplant recipient      Status post nephrectomy  bilateral        Allergies    Demerol HCl (Hives)  Phenergan (Hives; Rash)    Intolerances      MEDICATIONS  (STANDING):  atorvastatin 10 milliGRAM(s) Oral at bedtime  famotidine    Tablet 20 milliGRAM(s) Oral daily  meropenem  IVPB      meropenem  IVPB 1000 milliGRAM(s) IV Intermittent every 8 hours  PARoxetine 10 milliGRAM(s) Oral daily  predniSONE   Tablet 5 milliGRAM(s) Oral daily  sodium chloride 0.9%. 1000 milliLiter(s) (100 mL/Hr) IV Continuous <Continuous>  tacrolimus ER Tablet (ENVARSUS XR) 1 milliGRAM(s) Oral <User Schedule>    MEDICATIONS  (PRN):  acetaminophen     Tablet .. 650 milliGRAM(s) Oral every 6 hours PRN Temp greater or equal to 38C (100.4F), Mild Pain (1 - 3)  clonazePAM  Tablet 1 milliGRAM(s) Oral daily PRN anxiety      Social History: Non    ROS:   ENT: all negative except as noted in HPI   CV: denies palpitations  Pulm: denies SOB, cough, hemoptysis  GI: denies change in apetite, indigestion, n/v  : denies pertinent urinary symptoms, urgency  Neuro: denies numbness/tingling, loss of sensation  Psych: denies anxiety  MS: denies muscle weakness, instability  Heme: denies easy bruising or bleeding  Endo: denies heat/cold intolerance, excessive sweating  Vascular: denies LE edema    Vital Signs Last 24 Hrs  T(C): 37.1 (16 Sep 2022 12:57), Max: 38.3 (15 Sep 2022 17:24)  T(F): 98.8 (16 Sep 2022 12:57), Max: 100.9 (15 Sep 2022 17:24)  HR: 58 (16 Sep 2022 12:57) (54 - 82)  BP: 122/59 (16 Sep 2022 12:57) (110/68 - 160/67)  BP(mean): --  RR: 18 (16 Sep 2022 12:57) (18 - 20)  SpO2: 95% (16 Sep 2022 12:57) (92% - 96%)    Parameters below as of 16 Sep 2022 12:57  Patient On (Oxygen Delivery Method): room air                              10.7   6.93  )-----------( 112      ( 16 Sep 2022 07:01 )             31.8    09-16    137  |  108  |  13  ----------------------------<  109<H>  4.0   |  20<L>  |  1.21    Ca    8.1<L>      16 Sep 2022 06:58  Phos  2.1     09-16  Mg     1.5     09-16    TPro  5.8<L>  /  Alb  3.3  /  TBili  0.6  /  DBili  x   /  AST  12  /  ALT  7<L>  /  AlkPhos  34<L>  09-16   PT/INR - ( 14 Sep 2022 18:00 )   PT: 13.8 sec;   INR: 1.19 ratio         PTT - ( 14 Sep 2022 18:00 )  PTT:27.1 sec    PHYSICAL EXAM:  Gen: NAD  Skin: No rashes, bruises, or lesions  Head: Normocephalic, Atraumatic  Face: no edema, erythema, or fluctuance. Parotid glands soft without mass  Eyes: no scleral injection  Ears: Right: ear canal clear, TM + fluid, bulging. No erythema. No evidence of any fluid drainage. No mastoid tenderness, erythema, or ear bulging            Left: ear canal clear, TM intact without effusion or erythema. No evidence of any fluid drainage. No mastoid tenderness, erythema, or ear bulging  Nose: Nares bilaterally patent, no discharge  Mouth: No Stridor / Drooling / Trismus.  Mucosa moist, tongue/uvula midline, oropharynx clear  Neck: Flat, supple, no lymphadenopathy, trachea midline, no masses  Lymphatic: No lymphadenopathy  Resp: breathing easily, no stridor  CV: no peripheral edema/cyanosis  Neuro: facial nerve intact, no facial droop     CC: bilateral ear pain and headache    HPI: 50yo female with PMHx ESRD 2/2 PCKD s/p BL Nephrectomy/Single Donor Renal Transplant, Post Transplant DM, HTN, HLD, GERD, MDD, Anxiety transferred from [facility] on 9/15 with c/o body aches, R flank pain, fevers/chills, nausea/vomiting, diarrhea. In [facility] ED Tmax 102.2F and leukocytosis. Given broad spectrum empiric IV ABX and IVF. Patient is being worked up for sepsis currently. We are asked to evaluate the patient for c/o bilateral ear pain with headaches. Patient states the pain and headaches started about a week ago but has worsened over the past two days. Patient has history of bilateral chronic ear infections that were treated when she was a child with antibiotics. Denies having tubes placed. Patient has history of hearing loss and saw an ENT about 2 years ago and currently sees an Audiologist. Patient states Right ear pain feels like pressure and is more severe than the left. Hearing loss is the same on both sides. Patient states she has tinnitus on Right ear that is very minimal. Denies pain upon palpation of the ear, discharge, vertigo. Patient c/o sinus like pressure around her ears and her temple area.     PAST MEDICAL & SURGICAL HISTORY:  Hyperlipidemia      Arthropathy NOS - shoulder      Kidney stones      Polycystic kidney disease      GERD (gastroesophageal reflux disease)      Anxiety      CKD (chronic kidney disease) stage 4, GFR 15-29 ml/min  no dialysis      ESRD (end stage renal disease)      Ankle fracture - Left  brumstein barajas procedure 2000      Tubal ligation  1995      S/P cholecystectomy  (2013)      Kidney calculi  cysto/lithotripsy multiple      H/O tubal ligation  (1996)      History of ventral hernia repair      H/O shoulder surgery  left      AV fistula  right forearm extremity AV fistula radiocephalic ( 2018)      Renal transplant recipient      Status post nephrectomy  bilateral        Allergies    Demerol HCl (Hives)  Phenergan (Hives; Rash)    Intolerances      MEDICATIONS  (STANDING):  atorvastatin 10 milliGRAM(s) Oral at bedtime  famotidine    Tablet 20 milliGRAM(s) Oral daily  meropenem  IVPB      meropenem  IVPB 1000 milliGRAM(s) IV Intermittent every 8 hours  PARoxetine 10 milliGRAM(s) Oral daily  predniSONE   Tablet 5 milliGRAM(s) Oral daily  sodium chloride 0.9%. 1000 milliLiter(s) (100 mL/Hr) IV Continuous <Continuous>  tacrolimus ER Tablet (ENVARSUS XR) 1 milliGRAM(s) Oral <User Schedule>    MEDICATIONS  (PRN):  acetaminophen     Tablet .. 650 milliGRAM(s) Oral every 6 hours PRN Temp greater or equal to 38C (100.4F), Mild Pain (1 - 3)  clonazePAM  Tablet 1 milliGRAM(s) Oral daily PRN anxiety      Social History: Non    ROS:   ENT: all negative except as noted in HPI   CV: denies palpitations  Pulm: denies SOB, cough, hemoptysis  GI: denies change in apetite, indigestion, n/v  : denies pertinent urinary symptoms, urgency  Neuro: denies numbness/tingling, loss of sensation  Psych: denies anxiety  MS: denies muscle weakness, instability  Heme: denies easy bruising or bleeding  Endo: denies heat/cold intolerance, excessive sweating  Vascular: denies LE edema    Vital Signs Last 24 Hrs  T(C): 37.1 (16 Sep 2022 12:57), Max: 38.3 (15 Sep 2022 17:24)  T(F): 98.8 (16 Sep 2022 12:57), Max: 100.9 (15 Sep 2022 17:24)  HR: 58 (16 Sep 2022 12:57) (54 - 82)  BP: 122/59 (16 Sep 2022 12:57) (110/68 - 160/67)  BP(mean): --  RR: 18 (16 Sep 2022 12:57) (18 - 20)  SpO2: 95% (16 Sep 2022 12:57) (92% - 96%)    Parameters below as of 16 Sep 2022 12:57  Patient On (Oxygen Delivery Method): room air                              10.7   6.93  )-----------( 112      ( 16 Sep 2022 07:01 )             31.8    09-16    137  |  108  |  13  ----------------------------<  109<H>  4.0   |  20<L>  |  1.21    Ca    8.1<L>      16 Sep 2022 06:58  Phos  2.1     09-16  Mg     1.5     09-16    TPro  5.8<L>  /  Alb  3.3  /  TBili  0.6  /  DBili  x   /  AST  12  /  ALT  7<L>  /  AlkPhos  34<L>  09-16   PT/INR - ( 14 Sep 2022 18:00 )   PT: 13.8 sec;   INR: 1.19 ratio         PTT - ( 14 Sep 2022 18:00 )  PTT:27.1 sec    PHYSICAL EXAM:  Gen: NAD  Skin: No rashes, bruises, or lesions  Head: Normocephalic, Atraumatic  Face: no edema, erythema, or fluctuance. Parotid glands soft without mass  Eyes: no scleral injection  Ears: Right: ear canal clear, TM inact without effusion or erythema. No evidence of any fluid drainage. No mastoid tenderness, erythema, or ear bulging            Left: ear canal clear, TM intact without effusion or erythema. No evidence of any fluid drainage. No mastoid tenderness, erythema, or ear bulging  Nose: Nares bilaterally patent, no discharge  Mouth: + TPP of right TMJ. No Stridor / Drooling / Trismus.  Mucosa moist, tongue/uvula midline, oropharynx clear  Neck: Flat, supple, no lymphadenopathy, trachea midline, no masses  Lymphatic: No lymphadenopathy  Resp: breathing easily, no stridor  CV: no peripheral edema/cyanosis  Neuro: facial nerve intact, no facial droop    Fiberoptic Indirect Laryngoscopy (scope #3 used):    Reason for Laryngoscopy: hoarseness    Patient was unable to cooperate with mirror.  Nasopharynx, oropharynx, and hypopharynx clear, no bleeding. Tongue base, posterior pharyngeal wall, vallecula, epiglottis, and subglottis appear normal. No erythema, edema, pooling of secretions, masses or lesions. Airway patent, no foreign body visualized. No glottic/supraglottic edema. True vocal cords, arytenoids, vestibular folds, ventricles, pyriform sinuses, and aryepiglottic folds appear normal bilaterally. Vocal cords mobile with good contact b/l. + ecchymosis of right cord.

## 2022-09-16 NOTE — ADVANCED PRACTICE NURSE CONSULT - RECOMMEDATIONS
Patient needs to fill out attestation form, checked chart with NM Shanti and they were not in there (pt was transfer from [facility]). RNs to document carb intake and bolus amounts in flowsheets, patient aware to let RN staff know how much she is taking in and what her dexcom reading is at the time of meal for documentation purposes. Formal endocrine consult to follow today by endocrine attending.

## 2022-09-16 NOTE — CONSULT NOTE ADULT - NS ATTEND AMEND GEN_ALL_CORE FT
pt with ear pain, unclear etiology   does have some superior retraction in the TM on the right but unclear if can account for extent of discomfort  does have tenderness to the lateral pterygoid but no pain on opening, and pain is not sharp  on pharyngeal/laryngeal exam has right VF ecchymosis/hematoma - not raised, would star  ton reflux Tx, advised voice reset, likely form recent cough, voice is off. would hold on medrol due to DM and transplant, on already   would do flonase for possible ETD, soft diet and warm compress for possible TMJ

## 2022-09-16 NOTE — PATIENT PROFILE ADULT - FUNCTIONAL ASSESSMENT - BASIC MOBILITY ASSESSMENT TYPE
Render Post-Care Instructions In Note?: yes Consent: The patient's consent was obtained including but not limited to risks of crusting, scabbing, blistering, scarring, darker or lighter pigmentary change, recurrence, incomplete removal and infection. Duration Of Freeze Thaw-Cycle (Seconds): 5 Application Tool (Optional): Liquid Nitrogen Sprayer Number Of Freeze-Thaw Cycles: 1 freeze-thaw cycle Detail Level: Detailed Post-Care Instructions: I reviewed with the patient in detail post-care instructions. Patient is to wear sunprotection, and avoid picking at any of the treated lesions. Pt may apply Vaseline to crusted or scabbing areas. Render Note In Bullet Format When Appropriate: No Medical Necessity Clause: This procedure was medically necessary because the lesions that were treated were: Spray Paint Text: The liquid nitrogen was applied to the skin utilizing a spray paint frosting technique. Medical Necessity Information: It is in your best interest to select a reason for this procedure from the list below. All of these items fulfill various CMS LCD requirements except the new and changing color options. Detail Level: Simple Admission

## 2022-09-16 NOTE — CONSULT NOTE ADULT - SUBJECTIVE AND OBJECTIVE BOX
[facility] DIVISION OF KIDNEY DISEASES AND HYPERTENSION -- INITIAL CONSULT NOTE  --------------------------------------------------------------------------------  HPI:    Patient is a 51 year old female with PMH of PCKD s/p bilateral nephrectomies, s/p LDRT in 4/2018, DM, HTN who presented to the hospital with complaints of nausea, vomiting, and diarrhea. The patient states her symptoms started about one week ago and progressively worsened. She also admitted to experiencing fevers at home with Tmax 101 and chills at home as well. She admitted to multiple episodes of vomiting with diarrhea as well. She denied noticing any blood in her stool or her vomit. She states she was still able to take her medications including her immunosuppressive medications. The patient initially presented to an OSH where she was febrile. During the encounter she stated that her nausea and vomiting has improved. She denied any more diarrhea as well. She admitted to diffuse abdominal pain and r sided flank pain. She denied any dysuria.     PAST HISTORY  --------------------------------------------------------------------------------  PAST MEDICAL & SURGICAL HISTORY:  Hyperlipidemia      Arthropathy NOS - shoulder      Kidney stones      Polycystic kidney disease      GERD (gastroesophageal reflux disease)      Anxiety      CKD (chronic kidney disease) stage 4, GFR 15-29 ml/min  no dialysis      ESRD (end stage renal disease)      Ankle fracture - Left  brumstein barajas procedure 2000      Tubal ligation  1995      S/P cholecystectomy  (2013)      Kidney calculi  cysto/lithotripsy multiple      H/O tubal ligation  (1996)      History of ventral hernia repair      H/O shoulder surgery  left      AV fistula  right forearm extremity AV fistula radiocephalic ( 2018)      Renal transplant recipient      Status post nephrectomy  bilateral        FAMILY HISTORY:  No pertinent family history in first degree relatives      Social History:  Former Smoker. No curretn etoh/tobacco/drug abuse (15 Sep 2022 18:18)        ALLERGIES & MEDICATIONS  --------------------------------------------------------------------------------  Allergies    Demerol HCl (Hives)  Phenergan (Hives; Rash)    Intolerances      Standing Inpatient Medications  atorvastatin 10 milliGRAM(s) Oral at bedtime  famotidine    Tablet 20 milliGRAM(s) Oral daily  meropenem  IVPB      meropenem  IVPB 1000 milliGRAM(s) IV Intermittent every 8 hours  PARoxetine 10 milliGRAM(s) Oral daily  predniSONE   Tablet 5 milliGRAM(s) Oral daily  sodium chloride 0.9%. 1000 milliLiter(s) IV Continuous <Continuous>  tacrolimus ER Tablet (ENVARSUS XR) 1 milliGRAM(s) Oral <User Schedule>    PRN Inpatient Medications  acetaminophen     Tablet .. 650 milliGRAM(s) Oral every 6 hours PRN  clonazePAM  Tablet 1 milliGRAM(s) Oral daily PRN      REVIEW OF SYSTEMS  All negative except as mentioned above.         VITALS/PHYSICAL EXAM  --------------------------------------------------------------------------------  T(C): 37.6 (09-16-22 @ 08:49), Max: 38.3 (09-15-22 @ 17:24)  HR: 56 (09-16-22 @ 08:49) (54 - 82)  BP: 157/76 (09-16-22 @ 08:49) (110/68 - 160/67)  RR: 18 (09-16-22 @ 08:49) (18 - 20)  SpO2: 92% (09-16-22 @ 08:49) (92% - 96%)  Wt(kg): --  Height (cm): 172.7 (09-14-22 @ 16:04)  Weight (kg): 108.9 (09-14-22 @ 16:04)  BMI (kg/m2): 36.5 (09-14-22 @ 16:04)  BSA (m2): 2.21 (09-14-22 @ 16:04)      09-15-22 @ 07:01  -  09-16-22 @ 07:00  --------------------------------------------------------  IN: 0 mL / OUT: 850 mL / NET: -850 mL    09-16-22 @ 07:01  -  09-16-22 @ 11:20  --------------------------------------------------------  IN: 0 mL / OUT: 300 mL / NET: -300 mL      Physical Exam:  	Gen: in mild acutert distress   	HEENT: no scelral icterus, moist oral mucosa. No thrush. Supple neck, no JVD  	Pulm: normal respiratory effort, lungs clear to auscultation bilaterally   	CV: regular rate and rhythm, S1 and S2 normal, no murmur   	Abd: tenderness to palpation diffusely                   Transplant non tender, no bruit  	: mild suprapubic tenderness          Back: R sided CVA tenderness          Extremities: No edema. Distal pulses 2+ bilaterally           Skin: warm no rash, no cyanosis   	Neuro: Alert and oriented to person, place and time. Normal speech. Normal affect.       LABS/STUDIES  --------------------------------------------------------------------------------              10.7   6.93  >-----------<  112      [09-16-22 @ 07:01]              31.8     137  |  108  |  13  ----------------------------<  109      [09-16-22 @ 06:58]  4.0   |  20  |  1.21        Ca     8.1     [09-16-22 @ 06:58]      Mg     1.5     [09-16-22 @ 06:58]      Phos  2.1     [09-16-22 @ 06:58]    TPro  5.8  /  Alb  3.3  /  TBili  0.6  /  DBili  x   /  AST  12  /  ALT  7   /  AlkPhos  34  [09-16-22 @ 06:58]    PT/INR: PT 13.8 , INR 1.19       [09-14-22 @ 18:00]  PTT: 27.1       [09-14-22 @ 18:00]      Creatinine Trend:  SCr 1.21 [09-16 @ 06:58]  SCr 1.17 [09-15 @ 18:51]  SCr 1.13 [09-15 @ 06:35]  SCr 1.38 [09-14 @ 18:00]    Urinalysis - [09-14-22 @ 23:55]      Color Yellow / Appearance Clear / SG 1.010 / pH 6.0      Gluc Negative / Ketone Negative  / Bili Negative / Urobili Negative       Blood Small / Protein Negative / Leuk Est Small / Nitrite Negative      RBC 3-5 / WBC 6-10 / Hyaline  / Gran  / Sq Epi  / Non Sq Epi Occasional / Bacteria             TacrolimusTacrolimus (), Serum: 3.7 ng/mL (09-16 @ 07:03)  Tacrolimus (), Serum: 2.8 ng/mL (09-15 @ 06:35)    Cyclosporine  Sirolimus  Mycophenolate  BK PCR  CMV PCR  Parvo PCR  EBV PCR

## 2022-09-16 NOTE — CONSULT NOTE ADULT - PROBLEM SELECTOR RECOMMENDATION 9
- recommend flonase 1 spray to b/l nares bid  - Follow up outpatient with Dr. Morataya/Rosemarie/Denzel [phone]
Pt. with history of PCKD s/p bilateral nephrectomies, s/p LDRT in 4/2018 presenting with nausea, vomiting and diarrhea. Baseline renal functionwith SCr ~1mg/dL. Currently renal function at baseline. CT imaging demonstrating some perinpehric stranding, which could be in setting of UTI. No evidence of hydronephrosis or renal calculi. Monitor labs and urine output. Avoid NSAIDs, ACEI/ARBS, RCA and nephrotoxins. Dose medications as per eGFR.
Check A1C  While inpatient CHO diet and FS AC and HS  Goal Glucose 100-180  Encouraged patient to allow staff to check her FS  She can continue on insulin pump for now  Please inform endocrine team if patient will be NPO or need pump removal for procedure  She will be dced on insulin pump and trulicity  F/u with Dr Berman as outpt

## 2022-09-16 NOTE — CONSULT NOTE ADULT - SUBJECTIVE AND OBJECTIVE BOX
Patient is a 51y old  Female who presents with a chief complaint of Transfer from [facility] with fever and chills r/o Sepsis (15 Sep 2022 18:18)    HPI:  51F with ESRD 2/2 PCKD s/p BL Nephrectomy/Single Donor Renal Transplant, DM, HTN, HLD, GERD, MDD, Anxiety presented to [facility] ER on 2022 with complaining body aches, R flank pain, fevers/chills, nausea/vomiting, diarrhea. ID consulted for sepsis.     Per chart review, patient was found to be febrile Tmax 102.2F with leukocytosis in [facility]. Was given broad spectrum empiric IV ABX and IVF.                 PLAN:  Sepsis  possible 2/2 UTI/peylo  -From [facility] (SIRS +Tmax 102.2F/WBC 13/Tachycardia + UTI)  -Now BP/HR stable febrile  -Admit to Transplant nephrology  -Labs now and daily (send now CMP, CBC, MAg, phos,, culutres blood/u, UA, CMV, EBV)  -FU [facility] UCX/BCX (Prior UCX +Enterococcus faecalis and E.Coli)  -ID consult  -Start meropenem (spiked fever on sefepime vanco at [facility])      PAST MEDICAL & SURGICAL HISTORY:  Hyperlipidemia  Arthropathy NOS - shoulder  Kidney stones  Polycystic kidney disease  GERD (gastroesophageal reflux disease)  Anxiety  CKD (chronic kidney disease) stage 4, GFR 15-29 ml/min  no dialysis  ESRD (end stage renal disease)  Ankle fracture - Left  brumstein barajas procedure   Tubal ligation    S/P cholecystectomy  ()  Kidney calculi  cysto/lithotripsy multiple  H/O tubal ligation  ()  History of ventral hernia repair  H/O shoulder surgery  left  AV fistula  right forearm extremity AV fistula radiocephalic ( )  Renal transplant recipient  Status post nephrectomy  bilateral          Allergies  Demerol HCl (Hives)  Phenergan (Hives; Rash)    ANTIMICROBIALS (past 90 days)  MEDICATIONS  (STANDING):  meropenem  IVPB 1000 every 8 hours (9/15 --- )    MEDICATIONS  (STANDING):  acetaminophen     Tablet .. 650 every 6 hours PRN  atorvastatin 10 at bedtime  clonazePAM  Tablet 1 daily PRN  famotidine    Tablet 20 daily  PARoxetine 10 daily  predniSONE   Tablet 5 daily  tacrolimus ER Tablet (ENVARSUS XR) 1 <User Schedule>    SOCIAL HISTORY:       FAMILY HISTORY:  No pertinent family history in first degree relatives      REVIEW OF SYSTEMS  [  ] ROS unobtainable because:    [  ] All other systems negative except as noted below:	    Constitutional:  [ ] fever [ ] chills  [ ] weight loss  [ ] weakness  Skin:  [ ] rash [ ] phlebitis	  Eyes: [ ] icterus [ ] pain  [ ] discharge	  ENMT: [ ] sore throat  [ ] thrush [ ] ulcers [ ] exudates  Respiratory: [ ] dyspnea [ ] hemoptysis [ ] cough [ ] sputum	  Cardiovascular:  [ ] chest pain [ ] palpitations [ ] edema	  Gastrointestinal:  [ ] nausea [ ] vomiting [ ] diarrhea [ ] constipation [ ] pain	  Genitourinary:  [ ] dysuria [ ] frequency [ ] hematuria [ ] discharge [ ] flank pain  [ ] incontinence  Musculoskeletal:  [ ] myalgias [ ] arthralgias [ ] arthritis  [ ] back pain  Neurological:  [ ] headache [ ] seizures  [ ] confusion/altered mental status  Psychiatric:  [ ] anxiety [ ] depression	  Hematology/Lymphatics:  [ ] lymphadenopathy  Endocrine:  [ ] adrenal [ ] thyroid  Allergic/Immunologic:	 [ ] transplant [ ] seasonal    Vital Signs Last 24 Hrs  T(F): 99.6 (22 @ 08:49), Max: 102.2 (22 @ 16:04)  Vital Signs Last 24 Hrs  HR: 56 (22 @ 08:49) (54 - 82)  BP: 157/76 (22 @ 08:49) (110/68 - 160/67)  RR: 18 (22 @ 08:49)  SpO2: 92% (22 @ 08:49) (92% - 96%)  Wt(kg): --    Physical Exam:  Constitutional:  well preserved, comfortable  Head/Eyes: no icterus, PERRL, EOMI  ENT:  supple; no thrush  LUNGS:  CTA  CVS:  normal S1, S2, no murmur  Abd:  soft, non-tender; non-distended  Ext:  no edema  Vascular:  IV site no erythema tenderness or discharge  MSK:  joints without swelling  Neuro: AAO X 3, non- focal                            10.7   6.93  )-----------( 112      ( 16 Sep 2022 07:01 )             31.8   -    137  |  108  |  13  ----------------------------<  109<H>  4.0   |  20<L>  |  1.21    Ca    8.1<L>      16 Sep 2022 06:58  Phos  2.1       Mg     1.5         TPro  5.8<L>  /  Alb  3.3  /  TBili  0.6  /  DBili  x   /  AST  12  /  ALT  7<L>  /  AlkPhos  34<L>      Urinalysis Basic - ( 14 Sep 2022 23:55 )    Color: Yellow / Appearance: Clear / S.010 / pH: x  Gluc: x / Ketone: Negative  / Bili: Negative / Urobili: Negative mg/dL   Blood: x / Protein: Negative / Nitrite: Negative   Leuk Esterase: Small / RBC: 3-5 /HPF / WBC 6-10 /HPF   Sq Epi: x / Non Sq Epi: Occasional / Bacteria: x    MICROBIOLOGY:  Culture - Blood (collected 14 Sep 2022 18:05)  Source: .Blood Blood-Peripheral  Preliminary Report (16 Sep 2022 01:02):    No growth to date.    Culture - Blood (collected 14 Sep 2022 18:00)  Source: .Blood Blood-Peripheral  Preliminary Report (16 Sep 2022 01:02):    No growth to date.      RADIOLOGY:  imaging below personally reviewed and agree with findings  < from: CT Abdomen and Pelvis No Cont (09.15.22 @ 12:11) >  IMPRESSION:    New left trace pleural effusion.    Post bilateral nephrectomy changes, with right pelvic transplant kidney   noted. There is mild perinephric stranding of the transplant kidney,   nonspecific. There is no urinary tract obstruction.    Question of urinary bladder wall thickening which may suggest cystitis.   Correlation with urinalysis is advised.    Multiple left adnexal cystic lesions. The largest lesion is noted   medially and is possibly paraovarian. This is increased in size compared   to the previous exam.Additional left ovarian cystic lesions are   appreciated, new compared to previous exam. If the patient is   perimenopausal, these are likely physiologic. In a postmenopausal patient   is considered abnormal.  Dedicated GYN risk assessment should dictate the need for continued   surveillance versus further imaging or surgical workup.    VERTEBRAL BODY ANALYSIS: No Vertebral fracture or low bone density   identified.    < end of copied text >   Patient is a 51y old  Female who presents with a chief complaint of Transfer from [facility] with fever and chills r/o Sepsis (15 Sep 2022 18:18)    HPI:  51F with ESRD 2/2 PCKD s/p BL Nephrectomy/Single Donor Renal Transplant, DM, HTN, HLD, GERD, MDD, Anxiety presented to [facility] ER on 2022 with complaining body aches, R flank pain, fevers/chills, nausea/vomiting, diarrhea. ID consulted for sepsis.     Per chart review, patient was found to be febrile Tmax 102.2F with leukocytosis in [facility]. Was given broad spectrum empiric IV ABX and IVF.     Patient states she started having watery diarrhea about a week ago, which has worsen, including rigors, nausea and vomiting. She states having the worse episode of emesis 3 days ago, having projectile vomit about 20 times that day. Denies any subjective fever but does feel chills and rigors. Denies cough, dyspnea or dysuria. Patient also noted some R flank pain.     Denies any sick contact. Reports recent travel early August to [place] and [place], but felt well during and after the trip. Denies any tick bites or rash. Denies any recent outside food including restaurant.     Febrile 100.9F, VSS 95% on RA  WBC 12 > 6  Cr 1.33 > 1.21  UA with 6-10 WBC, occasional SQE, small LE, neg nitrite  CTAP with mild perinephric stranding on R transplanted kidney, urinary bladder wall thickening, multiple adnexal cystic lesions with new L ovarian cystic lesions noted      Prior UCx with E faecalis (), Ecoli ()        PAST MEDICAL & SURGICAL HISTORY:  Hyperlipidemia  Arthropathy NOS - shoulder  Kidney stones  Polycystic kidney disease  GERD (gastroesophageal reflux disease)  Anxiety  CKD (chronic kidney disease) stage 4, GFR 15-29 ml/min  no dialysis  ESRD (end stage renal disease)  Ankle fracture - Left  brumstein barajas procedure   Tubal ligation    S/P cholecystectomy  ()  Kidney calculi  cysto/lithotripsy multiple  H/O tubal ligation  ()  History of ventral hernia repair  H/O shoulder surgery  left  AV fistula  right forearm extremity AV fistula radiocephalic ( )  Renal transplant recipient  Status post nephrectomy  bilateral          Allergies  Demerol HCl (Hives)  Phenergan (Hives; Rash)    ANTIMICROBIALS (past 90 days)  MEDICATIONS  (STANDING):  meropenem  IVPB 1000 every 8 hours (9/15 --- )    MEDICATIONS  (STANDING):  acetaminophen     Tablet .. 650 every 6 hours PRN  atorvastatin 10 at bedtime  clonazePAM  Tablet 1 daily PRN  famotidine    Tablet 20 daily  PARoxetine 10 daily  predniSONE   Tablet 5 daily  tacrolimus ER Tablet (ENVARSUS XR) 1 <User Schedule>    SOCIAL HISTORY:   occasional ETOH and tobacco smoking, denies IVDU    FAMILY HISTORY:  No pertinent family history in first degree relatives      REVIEW OF SYSTEMS  [  ] ROS unobtainable because:    [x  ] All other systems negative except as noted below:	    Constitutional:  [ ] fever [x ] chills  [ ] weight loss  [ ] weakness  Skin:  [ ] rash [ ] phlebitis	  Eyes: [ ] icterus [ ] pain  [ ] discharge	  ENMT: [ ] sore throat  [ ] thrush [ ] ulcers [ ] exudates  Respiratory: [ ] dyspnea [ ] hemoptysis [ ] cough [ ] sputum	  Cardiovascular:  [ ] chest pain [ ] palpitations [ ] edema	  Gastrointestinal:  [x ] nausea [x ] vomiting [ x] diarrhea [ ] constipation [  ] pain	  Genitourinary:  [ ] dysuria [ ] frequency [ ] hematuria [ ] discharge [x] flank pain  [ ] incontinence  Musculoskeletal:  [ ] myalgias [ ] arthralgias [ ] arthritis  [ ] back pain  Neurological:  [ ] headache [ ] seizures  [ ] confusion/altered mental status  Psychiatric:  [ ] anxiety [ ] depression	  Hematology/Lymphatics:  [ ] lymphadenopathy  Endocrine:  [ ] adrenal [ ] thyroid  Allergic/Immunologic:	 [ ] transplant [ ] seasonal    Vital Signs Last 24 Hrs  T(F): 99.6 (22 @ 08:49), Max: 102.2 (22 @ 16:04)  Vital Signs Last 24 Hrs  HR: 56 (22 @ 08:49) (54 - 82)  BP: 157/76 (22 @ 08:49) (110/68 - 160/67)  RR: 18 (22 @ 08:49)  SpO2: 92% (22 @ 08:49) (92% - 96%)  Wt(kg): --    Physical Exam:  Constitutional:  well preserved, comfortable  Head/Eyes: no icterus, PERRL, EOMI  ENT:  supple; no thrush  LUNGS:  CTA  CVS:  normal S1, S2, no murmur  Abd:  soft, mild diffuse tenderness; non-distended +R Flank tenderness  Ext:  no edema  Vascular:  IV site no erythema tenderness or discharge  MSK:  joints without swelling  Neuro: AAO X 3, non- focal                            10.7   6.93  )-----------( 112      ( 16 Sep 2022 07:01 )             31.8   -    137  |  108  |  13  ----------------------------<  109<H>  4.0   |  20<L>  |  1.21    Ca    8.1<L>      16 Sep 2022 06:58  Phos  2.1       Mg     1.5         TPro  5.8<L>  /  Alb  3.3  /  TBili  0.6  /  DBili  x   /  AST  12  /  ALT  7<L>  /  AlkPhos  34<L>      Urinalysis Basic - ( 14 Sep 2022 23:55 )    Color: Yellow / Appearance: Clear / S.010 / pH: x  Gluc: x / Ketone: Negative  / Bili: Negative / Urobili: Negative mg/dL   Blood: x / Protein: Negative / Nitrite: Negative   Leuk Esterase: Small / RBC: 3-5 /HPF / WBC 6-10 /HPF   Sq Epi: x / Non Sq Epi: Occasional / Bacteria: x    MICROBIOLOGY:  Culture - Blood (collected 14 Sep 2022 18:05)  Source: .Blood Blood-Peripheral  Preliminary Report (16 Sep 2022 01:02):    No growth to date.    Culture - Blood (collected 14 Sep 2022 18:00)  Source: .Blood Blood-Peripheral  Preliminary Report (16 Sep 2022 01:02):    No growth to date.      RADIOLOGY:  imaging below personally reviewed and agree with findings  < from: CT Abdomen and Pelvis No Cont (09.15.22 @ 12:11) >  IMPRESSION:    New left trace pleural effusion.    Post bilateral nephrectomy changes, with right pelvic transplant kidney   noted. There is mild perinephric stranding of the transplant kidney,   nonspecific. There is no urinary tract obstruction.    Question of urinary bladder wall thickening which may suggest cystitis.   Correlation with urinalysis is advised.    Multiple left adnexal cystic lesions. The largest lesion is noted   medially and is possibly paraovarian. This is increased in size compared   to the previous exam.Additional left ovarian cystic lesions are   appreciated, new compared to previous exam. If the patient is   perimenopausal, these are likely physiologic. In a postmenopausal patient   is considered abnormal.  Dedicated GYN risk assessment should dictate the need for continued   surveillance versus further imaging or surgical workup.    VERTEBRAL BODY ANALYSIS: No Vertebral fracture or low bone density   identified.    < end of copied text >   Patient is a 51y old  Female who presents with a chief complaint of Transfer from [facility] with fever and chills r/o Sepsis (15 Sep 2022 18:18)    HPI:  51F with ESRD 2/2 PCKD s/p BL Nephrectomy/Single Donor Renal Transplant (), DM, HTN, HLD, GERD, MDD, Anxiety presented to [facility] ER on 2022 with complaining body aches, R flank pain, fevers/chills, nausea/vomiting, diarrhea. ID consulted for sepsis.     Per chart review, patient was found to be febrile Tmax 102.2F with leukocytosis in [facility]. Was given broad spectrum empiric IV ABX and IVF.     Patient states she started having watery diarrhea about a week ago, which has worsen, including rigors, nausea and vomiting. She states having the worse episode of emesis 3 days ago, having projectile vomit about 20 times that day. Denies any subjective fever but does feel chills and rigors. Denies cough, dyspnea or dysuria. Patient also noted some R flank pain.     Denies any sick contact. Reports recent travel early August to [place] and [place], but felt well during and after the trip. Denies any tick bites or rash. Denies any recent outside food including restaurant.     Febrile 100.9F, VSS 95% on RA  WBC 12 > 6  Cr 1.33 > 1.21  UA with 6-10 WBC, occasional SQE, small LE, neg nitrite  CTAP with mild perinephric stranding on R transplanted kidney, urinary bladder wall thickening, multiple adnexal cystic lesions with new L ovarian cystic lesions noted      Prior UCx with E faecalis (), Ecoli ()        PAST MEDICAL & SURGICAL HISTORY:  Hyperlipidemia  Arthropathy NOS - shoulder  Kidney stones  Polycystic kidney disease  GERD (gastroesophageal reflux disease)  Anxiety  CKD (chronic kidney disease) stage 4, GFR 15-29 ml/min  no dialysis  ESRD (end stage renal disease)  Ankle fracture - Left  brumstein barajas procedure   Tubal ligation    S/P cholecystectomy  ()  Kidney calculi  cysto/lithotripsy multiple  H/O tubal ligation  ()  History of ventral hernia repair  H/O shoulder surgery  left  AV fistula  right forearm extremity AV fistula radiocephalic ( )  Renal transplant recipient  Status post nephrectomy  bilateral          Allergies  Demerol HCl (Hives)  Phenergan (Hives; Rash)    ANTIMICROBIALS (past 90 days)  MEDICATIONS  (STANDING):  meropenem  IVPB 1000 every 8 hours (9/15 --- )    MEDICATIONS  (STANDING):  acetaminophen     Tablet .. 650 every 6 hours PRN  atorvastatin 10 at bedtime  clonazePAM  Tablet 1 daily PRN  famotidine    Tablet 20 daily  PARoxetine 10 daily  predniSONE   Tablet 5 daily  tacrolimus ER Tablet (ENVARSUS XR) 1 <User Schedule>    SOCIAL HISTORY:   occasional ETOH and tobacco smoking, denies IVDU    FAMILY HISTORY:  No pertinent family history in first degree relatives      REVIEW OF SYSTEMS  [  ] ROS unobtainable because:    [x  ] All other systems negative except as noted below:	    Constitutional:  [ ] fever [x ] chills  [ ] weight loss  [ ] weakness  Skin:  [ ] rash [ ] phlebitis	  Eyes: [ ] icterus [ ] pain  [ ] discharge	  ENMT: [ ] sore throat  [ ] thrush [ ] ulcers [ ] exudates  Respiratory: [ ] dyspnea [ ] hemoptysis [ ] cough [ ] sputum	  Cardiovascular:  [ ] chest pain [ ] palpitations [ ] edema	  Gastrointestinal:  [x ] nausea [x ] vomiting [ x] diarrhea [ ] constipation [  ] pain	  Genitourinary:  [ ] dysuria [ ] frequency [ ] hematuria [ ] discharge [x] flank pain  [ ] incontinence  Musculoskeletal:  [ ] myalgias [ ] arthralgias [ ] arthritis  [ ] back pain  Neurological:  [ ] headache [ ] seizures  [ ] confusion/altered mental status  Psychiatric:  [ ] anxiety [ ] depression	  Hematology/Lymphatics:  [ ] lymphadenopathy  Endocrine:  [ ] adrenal [ ] thyroid  Allergic/Immunologic:	 [ ] transplant [ ] seasonal    Vital Signs Last 24 Hrs  T(F): 99.6 (22 @ 08:49), Max: 102.2 (22 @ 16:04)  Vital Signs Last 24 Hrs  HR: 56 (22 @ 08:49) (54 - 82)  BP: 157/76 (22 @ 08:49) (110/68 - 160/67)  RR: 18 (22 @ 08:49)  SpO2: 92% (22 @ 08:49) (92% - 96%)  Wt(kg): --    Physical Exam:  Constitutional:  well preserved, comfortable  Head/Eyes: no icterus, PERRL, EOMI  ENT:  supple; no thrush  LUNGS:  CTA  CVS:  normal S1, S2, no murmur  Abd:  soft, mild diffuse tenderness; non-distended +R Flank tenderness  Ext:  no edema  Vascular:  IV site no erythema tenderness or discharge  MSK:  joints without swelling  Neuro: AAO X 3, non- focal                            10.7   6.93  )-----------( 112      ( 16 Sep 2022 07:01 )             31.8       137  |  108  |  13  ----------------------------<  109<H>  4.0   |  20<L>  |  1.21    Ca    8.1<L>      16 Sep 2022 06:58  Phos  2.1       Mg     1.5         TPro  5.8<L>  /  Alb  3.3  /  TBili  0.6  /  DBili  x   /  AST  12  /  ALT  7<L>  /  AlkPhos  34<L>      Urinalysis Basic - ( 14 Sep 2022 23:55 )    Color: Yellow / Appearance: Clear / S.010 / pH: x  Gluc: x / Ketone: Negative  / Bili: Negative / Urobili: Negative mg/dL   Blood: x / Protein: Negative / Nitrite: Negative   Leuk Esterase: Small / RBC: 3-5 /HPF / WBC 6-10 /HPF   Sq Epi: x / Non Sq Epi: Occasional / Bacteria: x    MICROBIOLOGY:  Culture - Blood (collected 14 Sep 2022 18:05)  Source: .Blood Blood-Peripheral  Preliminary Report (16 Sep 2022 01:02):    No growth to date.    Culture - Blood (collected 14 Sep 2022 18:00)  Source: .Blood Blood-Peripheral  Preliminary Report (16 Sep 2022 01:02):    No growth to date.      RADIOLOGY:  imaging below personally reviewed and agree with findings  < from: CT Abdomen and Pelvis No Cont (09.15.22 @ 12:11) >  IMPRESSION:    New left trace pleural effusion.    Post bilateral nephrectomy changes, with right pelvic transplant kidney   noted. There is mild perinephric stranding of the transplant kidney,   nonspecific. There is no urinary tract obstruction.    Question of urinary bladder wall thickening which may suggest cystitis.   Correlation with urinalysis is advised.    Multiple left adnexal cystic lesions. The largest lesion is noted   medially and is possibly paraovarian. This is increased in size compared   to the previous exam.Additional left ovarian cystic lesions are   appreciated, new compared to previous exam. If the patient is   perimenopausal, these are likely physiologic. In a postmenopausal patient   is considered abnormal.  Dedicated GYN risk assessment should dictate the need for continued   surveillance versus further imaging or surgical workup.    VERTEBRAL BODY ANALYSIS: No Vertebral fracture or low bone density   identified.    < end of copied text >   Patient is a 51y old  Female who presents with a chief complaint of Transfer from [facility] with fever and chills r/o Sepsis (15 Sep 2022 18:18)    HPI:  51F with ESRD 2/2 PCKD s/p BL Nephrectomy/Single Donor Renal Transplant (), DM, HTN, HLD, GERD, MDD, Anxiety presented to [facility] ER on 2022 with complaining body aches, R flank pain, fevers/chills, nausea/vomiting, diarrhea. ID consulted for sepsis.     Per chart review, patient was found to be febrile Tmax 102.2F with leukocytosis in [facility]. Was given broad spectrum empiric IV ABX and IVF.     Patient states she started having watery diarrhea about a week ago, which has worsen, including rigors, nausea and vomiting. She states having the worse episode of emesis 3 days ago, having projectile vomit about 20 times that day. Denies any subjective fever but does feel chills and rigors. Denies cough, dyspnea or dysuria. Patient also noted some R flank pain.     Denies any sick contact. Reports recent travel early August to [place] and [place], but felt well during and after the trip. Denies any tick bites or rash. Denies any recent outside food including restaurant.     Febrile 100.9F, VSS 95% on RA  WBC 12 > 6  Cr 1.33 > 1.21  UA with 6-10 WBC, occasional SQE, small LE, neg nitrite  CTAP with mild perinephric stranding on R transplanted kidney, urinary bladder wall thickening, multiple adnexal cystic lesions with new L ovarian cystic lesions noted      Prior UCx with E faecalis (), Ecoli ()    PAST MEDICAL & SURGICAL HISTORY:  Hyperlipidemia  Arthropathy NOS - shoulder  Kidney stones  Polycystic kidney disease  GERD (gastroesophageal reflux disease)  Anxiety  CKD (chronic kidney disease) stage 4, GFR 15-29 ml/min  no dialysis  ESRD (end stage renal disease)  Ankle fracture - Left  brumstein barajas procedure   Tubal ligation    S/P cholecystectomy  ()  Kidney calculi  cysto/lithotripsy multiple  H/O tubal ligation  ()  History of ventral hernia repair  H/O shoulder surgery  left  AV fistula  right forearm extremity AV fistula radiocephalic ( )  Renal transplant recipient  Status post nephrectomy  bilateral          Allergies  Demerol HCl (Hives)  Phenergan (Hives; Rash)    ANTIMICROBIALS (past 90 days)  MEDICATIONS  (STANDING):  meropenem  IVPB 1000 every 8 hours (9/15 --- )    MEDICATIONS  (STANDING):  acetaminophen     Tablet .. 650 every 6 hours PRN  atorvastatin 10 at bedtime  clonazePAM  Tablet 1 daily PRN  famotidine    Tablet 20 daily  PARoxetine 10 daily  predniSONE   Tablet 5 daily  tacrolimus ER Tablet (ENVARSUS XR) 1 <User Schedule>    SOCIAL HISTORY:   occasional ETOH and tobacco smoking, denies IVDU    FAMILY HISTORY:  No pertinent family history in first degree relatives      REVIEW OF SYSTEMS  [  ] ROS unobtainable because:    [x  ] All other systems negative except as noted below:	    Constitutional:  [ ] fever [x ] chills  [ ] weight loss  [ ] weakness  Skin:  [ ] rash [ ] phlebitis	  Eyes: [ ] icterus [ ] pain  [ ] discharge	  ENMT: [ ] sore throat  [ ] thrush [ ] ulcers [ ] exudates  Respiratory: [ ] dyspnea [ ] hemoptysis [ ] cough [ ] sputum	  Cardiovascular:  [ ] chest pain [ ] palpitations [ ] edema	  Gastrointestinal:  [x ] nausea [x ] vomiting [ x] diarrhea [ ] constipation [  ] pain	  Genitourinary:  [ ] dysuria [ ] frequency [ ] hematuria [ ] discharge [x] flank pain  [ ] incontinence  Musculoskeletal:  [ ] myalgias [ ] arthralgias [ ] arthritis  [ ] back pain  Neurological:  [ ] headache [ ] seizures  [ ] confusion/altered mental status  Psychiatric:  [ ] anxiety [ ] depression	  Hematology/Lymphatics:  [ ] lymphadenopathy  Endocrine:  [ ] adrenal [ ] thyroid  Allergic/Immunologic:	 [ ] transplant [ ] seasonal    Vital Signs Last 24 Hrs  T(F): 99.6 (22 @ 08:49), Max: 102.2 (22 @ 16:04)  Vital Signs Last 24 Hrs  HR: 56 (22 @ 08:49) (54 - 82)  BP: 157/76 (22 @ 08:49) (110/68 - 160/67)  RR: 18 (22 @ 08:49)  SpO2: 92% (22 @ 08:49) (92% - 96%)  Wt(kg): --    Physical Exam:  Constitutional:  well preserved, comfortable  Head/Eyes: no icterus, PERRL, EOMI  ENT:  supple; no thrush  LUNGS:  CTA  CVS:  normal S1, S2, no murmur  Abd:  soft, mild diffuse tenderness; non-distended +R Flank tenderness  Ext:  no edema  Vascular:  IV site no erythema tenderness or discharge  MSK:  joints without swelling  Neuro: AAO X 3, non- focal                            10.7   6.93  )-----------( 112      ( 16 Sep 2022 07:01 )             31.8       137  |  108  |  13  ----------------------------<  109<H>  4.0   |  20<L>  |  1.21    Ca    8.1<L>      16 Sep 2022 06:58  Phos  2.1       Mg     1.5         TPro  5.8<L>  /  Alb  3.3  /  TBili  0.6  /  DBili  x   /  AST  12  /  ALT  7<L>  /  AlkPhos  34<L>      Urinalysis Basic - ( 14 Sep 2022 23:55 )    Color: Yellow / Appearance: Clear / S.010 / pH: x  Gluc: x / Ketone: Negative  / Bili: Negative / Urobili: Negative mg/dL   Blood: x / Protein: Negative / Nitrite: Negative   Leuk Esterase: Small / RBC: 3-5 /HPF / WBC 6-10 /HPF   Sq Epi: x / Non Sq Epi: Occasional / Bacteria: x    MICROBIOLOGY:  Culture - Blood (collected 14 Sep 2022 18:05)  Source: .Blood Blood-Peripheral  Preliminary Report (16 Sep 2022 01:02):    No growth to date.    Culture - Blood (collected 14 Sep 2022 18:00)  Source: .Blood Blood-Peripheral  Preliminary Report (16 Sep 2022 01:02):    No growth to date.      RADIOLOGY:    imaging below personally reviewed and agree with findings    < from: CT Abdomen and Pelvis No Cont (09.15.22 @ 12:11) >  IMPRESSION:    New left trace pleural effusion.    Post bilateral nephrectomy changes, with right pelvic transplant kidney   noted. There is mild perinephric stranding of the transplant kidney,   nonspecific. There is no urinary tract obstruction.    Question of urinary bladder wall thickening which may suggest cystitis.   Correlation with urinalysis is advised.    Multiple left adnexal cystic lesions. The largest lesion is noted   medially and is possibly paraovarian. This is increased in size compared   to the previous exam.Additional left ovarian cystic lesions are   appreciated, new compared to previous exam. If the patient is   perimenopausal, these are likely physiologic. In a postmenopausal patient   is considered abnormal.  Dedicated GYN risk assessment should dictate the need for continued   surveillance versus further imaging or surgical workup.    VERTEBRAL BODY ANALYSIS: No Vertebral fracture or low bone density   identified.    < end of copied text >

## 2022-09-16 NOTE — CONSULT NOTE ADULT - ASSESSMENT
51 yr old F with PMHX ESRD 2/2 PCKD s/p BL Nephrectomy/Single Donor Renal Transplant, Post Transplant DM, HTN, HLD, GERD here with sepsis due to UTI. Uses T slim and DEXCOM with Control IQ (Admelog).

## 2022-09-16 NOTE — CONSULT NOTE ADULT - PROBLEM SELECTOR RECOMMENDATION 2
Home immunosuppressive therapy with Envarsus 1mg daily, Imuran 100mg daily, and prednisone 5mg daily. Currently Imuran on hold due to acute infection. Continue with Envarsus and prednisone. Monitor tacrolimus levels 30 minutes prior to AM dose.
Patient will need to complete attestation forms  Nurses should document carb intake and boluses in flowsheet  Patient has arrangement for supplies
- warm compresses  - soft diet  - nsaids if no contraindication  - dental eval for bite guard

## 2022-09-16 NOTE — CONSULT NOTE ADULT - SUBJECTIVE AND OBJECTIVE BOX
HPI:  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 s/p BL Nephrectomy/Single Donor Renal Transplant, Post Transplant DM, HTN, HLD, GERD, MDD, Anxiety presented to [facility] ER on 9/14/2022 with c/o body aches, R flank pain, fevers/chills, nausea/vomiting, diarrhea. In [facility] ED Tmax 102.2F and leukocytosis. Lactate negative. Cultures sent. Given broad spectrum empiric IV ABX and IVF. Tylenol for Fever. Pt with difficulty tolerating PO intake due to N/V. Currently on insulin pump for DM.  Pt transferred to [facility] under transplant nephrology for further workup of sepsis.  Pt reports that she felt hot with rigors for couple of days and when worsened she decided to go to the ER.         (15 Sep 2022 18:18)      PAST MEDICAL & SURGICAL HISTORY:  Hyperlipidemia      Arthropathy NOS - shoulder      Kidney stones      Polycystic kidney disease      GERD (gastroesophageal reflux disease)      Anxiety      CKD (chronic kidney disease) stage 4, GFR 15-29 ml/min  no dialysis      ESRD (end stage renal disease)      Ankle fracture - Left  brumstein barajas procedure 2000      Tubal ligation  1995      S/P cholecystectomy  (2013)      Kidney calculi  cysto/lithotripsy multiple      H/O tubal ligation  (1996)      History of ventral hernia repair      H/O shoulder surgery  left      AV fistula  right forearm extremity AV fistula radiocephalic ( 2018)      Renal transplant recipient      Status post nephrectomy  bilateral          FAMILY HISTORY:  No pertinent family history in first degree relatives        Social History:    Outpatient Medications:    MEDICATIONS  (STANDING):  atorvastatin 10 milliGRAM(s) Oral at bedtime  famotidine    Tablet 20 milliGRAM(s) Oral daily  meropenem  IVPB      meropenem  IVPB 1000 milliGRAM(s) IV Intermittent every 8 hours  PARoxetine 10 milliGRAM(s) Oral daily  predniSONE   Tablet 5 milliGRAM(s) Oral daily  sodium chloride 0.9%. 1000 milliLiter(s) (100 mL/Hr) IV Continuous <Continuous>  tacrolimus ER Tablet (ENVARSUS XR) 1 milliGRAM(s) Oral <User Schedule>    MEDICATIONS  (PRN):  acetaminophen     Tablet .. 650 milliGRAM(s) Oral every 6 hours PRN Temp greater or equal to 38C (100.4F), Mild Pain (1 - 3)  clonazePAM  Tablet 1 milliGRAM(s) Oral daily PRN anxiety      Allergies    Demerol HCl (Hives)  Phenergan (Hives; Rash)    Intolerances      Review of Systems:  Constitutional: No fever  Eyes: No blurry vision  Neuro: No tremors  HEENT: No pain  Cardiovascular: No chest pain, palpitations  Respiratory: No SOB, no cough  GI: No nausea, vomiting, abdominal pain  : No dysuria  Skin: no rash  Psych: no depression  Endocrine: no polyuria, polydipsia  Hem/lymph: no swelling  Osteoporosis: no fractures    ALL OTHER SYSTEMS REVIEWED AND NEGATIVE    UNABLE TO OBTAIN    PHYSICAL EXAM:  VITALS: T(C): 37.6 (09-16-22 @ 08:49)  T(F): 99.6 (09-16-22 @ 08:49), Max: 100.9 (09-15-22 @ 17:24)  HR: 56 (09-16-22 @ 08:49) (54 - 82)  BP: 157/76 (09-16-22 @ 08:49) (110/68 - 160/67)  RR:  (18 - 20)  SpO2:  (92% - 96%)  Wt(kg): --  GENERAL: NAD, well-groomed, well-developed  EYES: No proptosis, no lid lag, anicteric  HEENT:  Atraumatic, Normocephalic, moist mucous membranes  THYROID: Normal size, no palpable nodules  RESPIRATORY: Clear to auscultation bilaterally; No rales, rhonchi, wheezing, or rubs  CARDIOVASCULAR: Regular rate and rhythm; No murmurs; no peripheral edema  GI: Soft, nontender, non distended, normal bowel sounds  SKIN: Dry, intact, No rashes or lesions  MUSCULOSKELETAL: Full range of motion, normal strength  NEURO: sensation intact, extraocular movements intact, no tremor, normal reflexes  PSYCH: Alert and oriented x 3, normal affect, normal mood  CUSHING'S SIGNS: no striae                              10.7   6.93  )-----------( 112      ( 16 Sep 2022 07:01 )             31.8       09-16    137  |  108  |  13  ----------------------------<  109<H>  4.0   |  20<L>  |  1.21    eGFR: 54<L>    Ca    8.1<L>      09-16  Mg     1.5     09-16  Phos  2.1     09-16    TPro  5.8<L>  /  Alb  3.3  /  TBili  0.6  /  DBili  x   /  AST  12  /  ALT  7<L>  /  AlkPhos  34<L>  09-16      Thyroid Function Tests:              Radiology:                  HPI:  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 s/p BL Nephrectomy/Single Donor Renal Transplant, Post Transplant DM, HTN, HLD, GERD, MDD, Anxiety presented to [facility] ER on 9/14/2022 with c/o body aches, R flank pain, fevers/chills, nausea/vomiting, diarrhea. In [facility] ED Tmax 102.2F and leukocytosis. Lactate negative. Cultures sent. Given broad spectrum empiric IV ABX and IVF. Tylenol for Fever. Pt with difficulty tolerating PO intake due to N/V. Currently on insulin pump for DM.  Pt transferred to [facility] under transplant nephrology for further workup of sepsis.  Pt reports that she felt hot with rigors for couple of days and when worsened she decided to go to the ER.        Endocrine History:    51 yr old F with PMHX ESRD 2/2 PCKD s/p BL Nephrectomy/Single Donor Renal Transplant, Post Transplant DM, HTN, HLD, GERD here with sepsis due to UTI. Follows with endocrinologist Dr Berman. Uses T slim and DEXCOM with Control IQ (Admelog).  Dexcom is on her right inner arm (due to be changed Tuesday 9/20) and pump site is on right lower abdomen (changed last night, due to be changed Sunday 9/18) she has 1 set left as well as 1 extra Dexcom sensor, family will be bringing more supplies today. Has no known retinopathy or neuropathy. No Hx of CAD or CVA.  She has the following settings:  Basal rate:  12a-6a 1.1  6a-12a 1  TDD 24.6    ISF 15    ICR  12a-6a 4.5  6a-12a 4.2    Target     Patient also takes Trulicity 0.75mg subq weekly. She tries to moderate her intake of carbs but occasionally does cheat and drinks regular soda.       PAST MEDICAL & SURGICAL HISTORY:  Hyperlipidemia      Arthropathy NOS - shoulder      Kidney stones      Polycystic kidney disease      GERD (gastroesophageal reflux disease)      Anxiety      CKD (chronic kidney disease) stage 4, GFR 15-29 ml/min  no dialysis      ESRD (end stage renal disease)      Ankle fracture - Left  brumstein barajas procedure 2000      Tubal ligation  1995      S/P cholecystectomy  (2013)      Kidney calculi  cysto/lithotripsy multiple      H/O tubal ligation  (1996)      History of ventral hernia repair      H/O shoulder surgery  left      AV fistula  right forearm extremity AV fistula radiocephalic ( 2018)      Renal transplant recipient      Status post nephrectomy  bilateral          FAMILY HISTORY:  noncontributory        Social History: No illicit drug use, No ETOH abuse    Outpatient Medications:  · 	cefepime 2 g intravenous injection: 2 gram(s) intravenous every 8 hours  · 	vancomycin 250 mg intravenous injection: 1750 milligram(s) intravenous every 24 hours  · 	predniSONE 5 mg oral tablet: 1 tab(s) orally once a day  · 	Imuran 50 mg oral tablet: 2 tab(s) orally once a day  · 	Envarsus XR 1 mg oral tablet, extended release: 1 tab(s) orally once a day (in the morning)  · 	Admelog 100 units/mL injectable solution:   · 	carvedilol 25 mg oral tablet: 1 tab(s) orally 2 times a day  · 	losartan 100 mg oral tablet: 1 tab(s) orally once a day  · 	Pepcid 20 mg oral tablet: 1 tab(s) orally once a day  · 	PARoxetine 10 mg oral tablet: 1 tab(s) orally once a day  · 	Trulicity Pen 0.75 mg/0.5 mL subcutaneous solution: 1 dose(s) subcutaneous once a week  · 	KlonoPIN 1 mg oral tablet: 1 tab(s) orally once a day, As Needed  · 	pravastatin 20 mg oral tablet: 1 tab(s) orally once a day  · 	spironolactone 25 mg oral tablet: 1 tab(s) orally once a day    MEDICATIONS  (STANDING):  atorvastatin 10 milliGRAM(s) Oral at bedtime  famotidine    Tablet 20 milliGRAM(s) Oral daily  meropenem  IVPB      meropenem  IVPB 1000 milliGRAM(s) IV Intermittent every 8 hours  PARoxetine 10 milliGRAM(s) Oral daily  predniSONE   Tablet 5 milliGRAM(s) Oral daily  sodium chloride 0.9%. 1000 milliLiter(s) (100 mL/Hr) IV Continuous <Continuous>  tacrolimus ER Tablet (ENVARSUS XR) 1 milliGRAM(s) Oral <User Schedule>    MEDICATIONS  (PRN):  acetaminophen     Tablet .. 650 milliGRAM(s) Oral every 6 hours PRN Temp greater or equal to 38C (100.4F), Mild Pain (1 - 3)  clonazePAM  Tablet 1 milliGRAM(s) Oral daily PRN anxiety      Allergies    Demerol HCl (Hives)  Phenergan (Hives; Rash)    Intolerances      Review of Systems:  Constitutional: No fever  Eyes: No blurry vision  Neuro: No tremors  HEENT: No pain  Cardiovascular: No chest pain, palpitations  Respiratory: No SOB, no cough  GI: No nausea, vomiting, abdominal pain  : No dysuria  Skin: no rash  Psych: no depression  Endocrine: no polyuria, polydipsia  Hem/lymph: no swelling  Osteoporosis: no fractures    ALL OTHER SYSTEMS REVIEWED AND NEGATIVE        PHYSICAL EXAM:  VITALS: T(C): 37.6 (09-16-22 @ 08:49)  T(F): 99.6 (09-16-22 @ 08:49), Max: 100.9 (09-15-22 @ 17:24)  HR: 56 (09-16-22 @ 08:49) (54 - 82)  BP: 157/76 (09-16-22 @ 08:49) (110/68 - 160/67)  RR:  (18 - 20)  SpO2:  (92% - 96%)  Wt(kg): --  GENERAL: NAD, well-groomed, well-developed  EYES: No proptosis, no lid lag, anicteric  HEENT:  Atraumatic, Normocephalic, moist mucous membranes  RESPIRATORY: Clear to auscultation bilaterally  CARDIOVASCULAR: Regular rate and rhythm  GI: Soft, nontender, non distended, normal bowel sounds  SKIN: Dry, intact, No rashes or lesions  PSYCH: Alert and oriented x 3, normal affect, normal mood                                10.7   6.93  )-----------( 112      ( 16 Sep 2022 07:01 )             31.8       09-16    137  |  108  |  13  ----------------------------<  109<H>  4.0   |  20<L>  |  1.21    eGFR: 54<L>    Ca    8.1<L>      09-16  Mg     1.5     09-16  Phos  2.1     09-16    TPro  5.8<L>  /  Alb  3.3  /  TBili  0.6  /  DBili  x   /  AST  12  /  ALT  7<L>  /  AlkPhos  34<L>  09-16

## 2022-09-16 NOTE — PATIENT PROFILE ADULT - FALL HARM RISK - UNIVERSAL INTERVENTIONS
Bed in lowest position, wheels locked, appropriate side rails in place/Call bell, personal items and telephone in reach/Instruct patient to call for assistance before getting out of bed or chair/Non-slip footwear when patient is out of bed/[place] to call system/Physically safe environment - no spills, clutter or unnecessary equipment/Purposeful Proactive Rounding/Room/bathroom lighting operational, light cord in reach

## 2022-09-16 NOTE — PATIENT PROFILE ADULT - PATIENT REPRESENTATIVE: ( YOU CAN CHOOSE ANY PERSON THAT CAN ASSIST YOU WITH YOUR HEALTH CARE PREFERENCES, DOES NOT HAVE TO BE A SPOUSE, IMMEDIATE FAMILY OR SIGNIFICANT OTHER/PARTNER)
Addended by: ANTONIO SANTOS on: 9/24/2019 08:40 AM     Modules accepted: Orders     same name as above

## 2022-09-16 NOTE — CONSULT NOTE ADULT - ASSESSMENT
WORK UP          DIAGNOSIS and IMPRESSION          RECOMMENDATIONS        PT TO BE SEEN. PRELIM NOTE  PENDING RECS. PLEASE WAIT FOR FINAL RECS AFTER DISCUSSION WITH ATTENDING#    Edmund Lopez DO, PGY-4   ID fellow  [employer] Teams Preferred  After 5pm/weekends call [phone]   WORK UP  WBC 12 > 6  Cr 1.33 > 1.21  UA with 6-10 WBC, occasional SQE, small LE, neg nitrite  CTAP with mild perinephric stranding on R transplanted kidney, urinary bladder wall thickening, multiple adnexal cystic lesions with new L ovarian cystic lesions noted      Prior UCx with E faecalis (2021), Ecoli (2019)    DIAGNOSIS and IMPRESSION  51F with ESRD 2/2 PCKD s/p BL Nephrectomy/Single Donor Renal Transplant, DM, HTN, HLD, GERD, MDD, Anxiety presented to [facility] ER on 9/14/2022 with complaining body aches, R flank pain, fevers/chills, nausea/vomiting, diarrhea. ID consulted for sepsis.     #Diarrhea / Nausea / Vomiting / Rigors    - unclear etiology ?GI and/or ? source    RECOMMENDATIONS  - c/w Meropenem for now, but likely can narrow to zosyn empirically  - obtain GI PCR, Cdiff PCR, Stool O/P  - f/up CMV VL, Adenovirus, BCx x2  - f/up repeat UA/Ucx   - ?GYN eval for multiple adnexal lesions including new L ovarian cystic lesions     Above recommendations are Preliminary until Attending's Addendum which includes Final Recommendations    Edmund Lopez DO, PGY-4   ID fellow  [employer] Teams Preferred  After 5pm/weekends call [phone]   WORK UP  WBC 12 > 6  Cr 1.33 > 1.21  UA with 6-10 WBC, occasional SQE, small LE, neg nitrite  CTAP with mild perinephric stranding on R transplanted kidney, urinary bladder wall thickening, multiple adnexal cystic lesions with new L ovarian cystic lesions noted      Prior UCx with E faecalis (2021), Ecoli (2019)    DIAGNOSIS and IMPRESSION  51F with ESRD 2/2 PCKD s/p BL Nephrectomy/Single Donor Renal Transplant (2018), DM, HTN, HLD, GERD, MDD, Anxiety presented to [facility] ER on 9/14/2022 with complaining body aches, R flank pain, fevers/chills, nausea/vomiting, diarrhea. ID consulted for sepsis.     #Diarrhea / Nausea / Vomiting / Rigors    - unclear etiology ?GI and/or ? source    RECOMMENDATIONS  - c/w Meropenem for now, but likely can narrow to zosyn empirically  - obtain GI PCR, Cdiff PCR, Stool O/P  - f/up CMV VL, Adenovirus, BCx x2  - f/up repeat UA/Ucx   - ?GYN eval for multiple adnexal lesions including new L ovarian cystic lesions     Above recommendations are Preliminary until Attending's Addendum which includes Final Recommendations    Edmund Lopez DO, PGY-4   ID fellow  [employer] Teams Preferred  After 5pm/weekends call [phone]   WORK UP  WBC 12 > 6  Cr 1.33 > 1.21  UA with 6-10 WBC, occasional SQE, small LE, neg nitrite  CTAP with mild perinephric stranding on R transplanted kidney, urinary bladder wall thickening, multiple adnexal cystic lesions with new L ovarian cystic lesions noted      Prior UCx with E faecalis (2021), Ecoli (2019)    DIAGNOSIS and IMPRESSION  51F with ESRD 2/2 PCKD s/p BL Nephrectomy/Single Donor Renal Transplant (2018), DM, HTN, HLD, GERD, MDD, Anxiety presented to [facility] ER on 9/14/2022 with complaining body aches, R flank pain, fevers/chills, nausea/vomiting, diarrhea. ID consulted for sepsis.     #Diarrhea / Nausea / Vomiting / Rigors    - unclear etiology ?GI and/or ? source  - low concerns for ESBL    RECOMMENDATIONS  - narrow back down to Cefepime   - obtain GI PCR, Cdiff PCR, Stool O/P  - f/up CMV VL, Adenovirus, BCx x2  - f/up repeat UA/Ucx   - ?GYN eval for multiple adnexal lesions including new L ovarian cystic lesions     Above recommendations are Preliminary until Attending's Addendum which includes Final Recommendations    Edmund Lopez DO, PGY-4   ID fellow  [employer] Teams Preferred  After 5pm/weekends call [phone]   WORK UP  WBC 12 > 6  Cr 1.33 > 1.21  UA with 6-10 WBC, occasional SQE, small LE, neg nitrite  CTAP with mild perinephric stranding on R transplanted kidney, urinary bladder wall thickening, multiple adnexal cystic lesions with new L ovarian cystic lesions noted      Prior UCx with E faecalis (2021), Ecoli (2019)    DIAGNOSIS and IMPRESSION  51F with ESRD 2/2 PCKD s/p BL Nephrectomy/Single Donor Renal Transplant (2018), DM, HTN, HLD, GERD, MDD, Anxiety presented to [facility] ER on 9/14/2022 with complaining body aches, R flank pain, fevers/chills, nausea/vomiting, diarrhea. ID consulted for sepsis.     #Diarrhea / Nausea / Vomiting / Rigors    - unclear etiology ?GI and/or ? source  - low concerns for ESBL    RECOMMENDATIONS  - narrow back down to Cefepime 1g IV Q8H  - obtain GI PCR, Cdiff PCR, Stool O/P  - f/up CMV VL, Adenovirus, BCx x2  - f/up repeat UA/Ucx   - ?GYN eval for multiple adnexal lesions including new L ovarian cystic lesions     Above recommendations are Preliminary until Attending's Addendum which includes Final Recommendations    Edmund Lopez DO, PGY-4   ID fellow  [employer] Teams Preferred  After 5pm/weekends call [phone]

## 2022-09-16 NOTE — CONSULT NOTE ADULT - ASSESSMENT
52yo female with PMHx ESRD 2/2 PCKD s/p BL Nephrectomy/Single Donor Renal Transplant, Post Transplant DM, HTN, HLD, GERD, MDD, Anxiety transferred from [facility] on 9/15 with c/o body aches, R flank pain, fevers/chills, nausea/vomiting, diarrhea. In [facility] ED Tmax 102.2F and leukocytosis. Patient c/o bilateral ear pain and hearing loss. 50yo female seen for bilateral ear pain and hearing loss. On exam, bilateral ears unremarkable, + TTP of right TMJ consistent with TMJ disorder. Pt also c/o hoarseness, indirect laryngoscopy shows ecchymosis of right vocal cord likely 2/2 coughing.

## 2022-09-16 NOTE — CONSULT NOTE ADULT - PROBLEM SELECTOR PROBLEM 2
Insulin pump status
TMJ dysfunction
Immunosuppressive management encounter following kidney transplant

## 2022-09-16 NOTE — PROGRESS NOTE ADULT - ASSESSMENT
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 s/p BL Nephrectomy/Single Donor Renal Transplant, Post Transplant DM, HTN, HLD, GERD, pt was admitted at [facility] with Sepsis 2/2 Acute UTI/Pyelonephritis c/b Renal Transplant on Immunosuppression. Now Transferred for further sepsis workup.    PLAN:  - f/u RVP and GI PCR results  - Meropenem    Immuno:  -Prednisone 5mg PO q24  -Envarsus 1mg PO q24  -Imuran hold in the setting of infection  -Check Tacrolimus level    DM:  -On insulin pump  -Endo consult called for pump management  -Was at home on (Trulicity)  -Diabetic diet    HTN, HLD  -Monitor BP  -Was at home on (Losartan 100mg QD, Spironolactone 25mg PO q24)  -Coreg 25mg BID  -Pravastatin 20mg PO q24    MDD, Anxiety  -Paroxetine 10mg PO q24  -Klonopin 1mg PO PRN    GERD  -Pepcid 20mg q24    WINSTON Carver, PGY-3   [facility]   Transplant Surgery   [id number]

## 2022-09-16 NOTE — CONSULT NOTE ADULT - ASSESSMENT
51F  premenopausal female  PMHX ESRD 2/2 PCKD s/p BL Nephrectomy/Single Donor Renal Transplant (2018), Post Transplant DM, HTN, HLD, GERD, MDD, Anxiet currently admitted to transplant service for r/o sepsis. GYN consulted in setting of CT scan showing left adnexal cysts.  Patient recently had TVUS showing simple 2.2cm R adnexal cyst with left ovary not visualized. Patient is premenopausal and cysts on CT scan are simple appearing with no complex or concerning features. Patient is asymptomatic and has close outpatient GYN follow up. Low concern for ovarian cysts or Mirena IUD as source for sepsis in setting of patient's lack of pelvic pain, lack of painful intercourse.   - Recommend outpatient TVUS to follow up adnexal cysts.   - Patient has follow up scheduled with outpatient GYN Dr. Finn  - Please re-consult GYN with any further questions or concerns.     Patient seen and d/w Dr. Aubrey Garza-Ward PGY2

## 2022-09-16 NOTE — ADVANCED PRACTICE NURSE CONSULT - ASSESSMENT
Patient seen at bedside on 6 Monti. She is on Tslim and Dexcom, running in control IQ mode, current dexcom reading 116 trending straight. Dexcom is on her right inner arm (due to be changed Tuesday 9/20) and pump site is on right lower abdomen (changed last night, due to be changed Sunday 9/18) she has 1 set left as well as 1 extra Dexcom sensor, family will be bringing more supplies today. States she was diagnosed with diabetes after her transplant 4 years ago. Sees Dr. Berman for her Diabetes management who put her on a pump 3 years ago due to high insulin needs. Cannot recall her last A1C exactly but remembers it was in the 6s. Patient refusing hospital FS, spoke with patient regarding hospital policy on POCT and she is still adamant about not getting her FS taken (dexcom correlating to BMP from this AM), states when she had it done in the ambulance it hurt and was not done on the correct part of the finger. Educated patient to let the RNs know what her Dexcom is reading and how much insulin she takes with each meal, patient verbalized understanding. Patient running in control IQ mode but able to teachback how to place pump on temp basal decrease in the event she is running lower than normal due to nausea and poor PO intake. Patient states she feels low glucose symptoms and will alert the staff if her Dexcom is reading low. Settings are as follows:    Basal:  12a-6a 1.1 u/hour  6a-12a 1 u/hour  TDD: 24.6U    ICR  12a-6a 4.5  6a-12a 4.2    ISF  15    BGT  110 mg/dL

## 2022-09-16 NOTE — CONSULT NOTE ADULT - PROBLEM SELECTOR RECOMMENDATION 3
Pt. with UA showing some small esterase and blood. Currently being treated for UTI with Meropenem. Noted ot be febrile yesterday evening. UCx pending at this time, will follow up.     If any questions, please feel free to contact me     Romaine Ascencio  Nephrology Fellow  [facility] Pager: [phone]
Mgmt per primary team      Zoe Stovall DO
- voice rest, should resolve with time  - Follow up outpatient with Dr. Morataya/Rosemarie/Denzel [phone]

## 2022-09-16 NOTE — CONSULT NOTE ADULT - SUBJECTIVE AND OBJECTIVE BOX
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 GYN consulted in setting of CT scan showing left adnexal cysts. Patient is follows closely with outpatient GYN. She recently had annual visit in July and had TVUS at that time showing 2.2cm cyst on R adnexa (reports in HIE), left ovary not visualized. Pt was instructed to repeat the TVUS for which she is planning to do at the end of this month. Pt has been amenorrheic on Mirena IUD for the past 3 years but recently started having daily spotting for the last 3-4 months. Per outpatient GYN notes, FSH was normal and patient is not yet in menopause.  Patient denies abnormal or foul smelling vaginal discharge. She denies pelvic pain, denies pain with intercourse, dysuria. She reports that her abdomen is "tender" from all of the vomiting and diarrhea that she has had in the past few days but that she never had any pelvic discomfort during this stay or prior to it.     Name of GYN Physician: Eugenie Finn MD ([facility])  POB:  NSVDx2 s/p BTL  Pgyn: Mirena IUD in place since 2019 (had heavy bleeding for 1 year following transplant) s/p D&C with benign pathology, Denies fibroids, cysts,  STI's. reports hx of abnormal pap smears with benign biopsies.   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   +occasional social alcohol use    Hospital Meds:   MEDICATIONS  (STANDING):  atorvastatin 10 milliGRAM(s) Oral at bedtime  cefepime   IVPB 1000 milliGRAM(s) IV Intermittent every 8 hours  famotidine    Tablet 20 milliGRAM(s) Oral daily  influenza   Vaccine 0.5 milliLiter(s) IntraMuscular once  insulin lispro (ADMELOG) Pump 1 Each SubCutaneous Continuous Pump  PARoxetine 10 milliGRAM(s) Oral daily  predniSONE   Tablet 5 milliGRAM(s) Oral daily  sodium chloride 0.9%. 1000 milliLiter(s) (100 mL/Hr) IV Continuous <Continuous>  tacrolimus ER Tablet (ENVARSUS XR) 1 milliGRAM(s) Oral <User Schedule>    MEDICATIONS  (PRN):  acetaminophen     Tablet .. 650 milliGRAM(s) Oral every 6 hours PRN Temp greater or equal to 38C (100.4F), Mild Pain (1 - 3)  clonazePAM  Tablet 1 milliGRAM(s) Oral daily PRN anxiety      PAST MEDICAL & SURGICAL HISTORY:  Hyperlipidemia  Arthropathy NOS - shoulder  Kidney stones  Polycystic kidney disease  GERD (gastroesophageal reflux disease)  Anxiety  CKD (chronic kidney disease) stage 4, GFR 15-29 ml/min  no dialysis  ESRD (end stage renal disease)  Ankle fracture - Left  brumstein barajas procedure   Tubal ligation    S/P cholecystectomy  ()  Kidney calculi  cysto/lithotripsy multiple  H/O tubal ligation  ()  History of ventral hernia repair  H/O shoulder surgery  left  AV fistula  right forearm extremity AV fistula radiocephalic ( )  Renal transplant recipient  Status post nephrectomy  bilateral    Vital Signs Last 24 Hrs  T(C): 37.2 (16 Sep 2022 17:39), Max: 37.9 (16 Sep 2022 04:14)  T(F): 98.9 (16 Sep 2022 17:39), Max: 100.3 (16 Sep 2022 04:14)  HR: 57 (16 Sep 2022 17:39) (54 - 73)  BP: 149/77 (16 Sep 2022 17:39) (113/53 - 157/76)  BP(mean): --  RR: 18 (16 Sep 2022 17:39) (18 - 20)  SpO2: 96% (16 Sep 2022 17:39) (92% - 96%)    Parameters below as of 16 Sep 2022 17:39  Patient On (Oxygen Delivery Method): room air        Physical Exam:   General: sitting comfortably in bed, NAD   CV: RRR  Lungs: CTA   Abd: Soft, non-tender, non-distended.  Bowel sounds present.    Ext: non-tender b/l, no edema     LABS:                              10.7   6.93  )-----------( 112      ( 16 Sep 2022 07:01 )             31.8         137  |  108  |  13  ----------------------------<  109<H>  4.0   |  20<L>  |  1.21    Ca    8.1<L>      16 Sep 2022 06:58  Phos  2.1       Mg     1.5         TPro  5.8<L>  /  Alb  3.3  /  TBili  0.6  /  DBili  x   /  AST  12  /  ALT  7<L>  /  AlkPhos  34<L>      I&O's Detail    15 Sep 2022 07:01  -  16 Sep 2022 07:00  --------------------------------------------------------  IN:  Total IN: 0 mL    OUT:    Voided (mL): 850 mL  Total OUT: 850 mL    Total NET: -850 mL      16 Sep 2022 07:01  -  16 Sep 2022 18:54  --------------------------------------------------------  IN:  Total IN: 0 mL    OUT:    Voided (mL): 300 mL  Total OUT: 300 mL    Total NET: -300 mL    Urinalysis Basic - ( 14 Sep 2022 23:55 )    Color: Yellow / Appearance: Clear / S.010 / pH: x  Gluc: x / Ketone: Negative  / Bili: Negative / Urobili: Negative mg/dL   Blood: x / Protein: Negative / Nitrite: Negative   Leuk Esterase: Small / RBC: 3-5 /HPF / WBC 6-10 /HPF   Sq Epi: x / Non Sq Epi: Occasional / Bacteria: x        RADIOLOGY & ADDITIONAL STUDIES:  < from: CT Abdomen and Pelvis No Cont (09.15.22 @ 12:11) >  ACC: [id number] EXAM:  CT ABDOMEN AND PELVIS                          PROCEDURE DATE:  09/15/2022          INTERPRETATION:  CLINICAL INFORMATION: Fever and sepsis.    COMPARISON: 2018.    CONTRAST/COMPLICATIONS:  IV Contrast: NONE  Oral Contrast:NONE  Complications: None reported at time of study completion    PROCEDURE:  CT of the Abdomen and Pelvis was performed.  Sagittal and coronal reformats were performed.    FINDINGS:  LOWER CHEST: There is trace left pleural effusion. Linear atelectasis   versus scarring is seen within the left lung base..    LIVER: Within normal limits.  BILE DUCTS: Normal caliber.  GALLBLADDER: Cholecystectomy.  SPLEEN: Within normal limits.  PANCREAS: Within normal limits.  ADRENALS: Within normal limits.  KIDNEYS/URETERS: Bilateral kidneys have been removed. There is a right   pelvic transplanted kidney. There is mild surrounding perinephric   stranding of the right pelvic kidney. There is no hydronephrosis.    BLADDER: There is questionable wall thickening of the bladder.  REPRODUCTIVE ORGANS: There is a T-shaped intrauterine device seen within   the uterus. There is a 3.6 cm simple appearing cyst of the medial left   adnexa. This is increased in size when compared to previous exam. This is   possibly paraovarian. Additional cluster left ovarian cysts are noted   measuring 2.2 cm in diameter. These are new compared to previous exam.   The right ovary is unremarkable.    BOWEL: No bowel obstruction. Appendix is not visualized. Small bowel   loops appear adherent to the intra-abdominal wall, likely related to   adhesions there is colonic diverticulosis. There is no focal   diverticulitis.  PERITONEUM: No ascites.  VESSELS: Atherosclerotic changes.  RETROPERITONEUM/LYMPH NODES: No lymphadenopathy.  ABDOMINAL WALL: [place] ventral hernia containing unobstructed small   bowel loops. Postsurgical changes of the intra-abdominal wall   appreciated..  BONES: Mild degenerative changes of the spine appreciated..      IMPRESSION:    New left trace pleural effusion.    Post bilateral nephrectomy changes, with right pelvic transplant kidney   noted. There is mild perinephric stranding of the transplant kidney,   nonspecific. There is no urinary tract obstruction.    Question of urinary bladder wall thickening which may suggest cystitis.   Correlation with urinalysis is advised.    Multiple left adnexal cystic lesions. The largest lesion is noted   medially and is possibly paraovarian. This is increased in size compared   to the previous exam.Additional left ovarian cystic lesions are   appreciated, new compared to previous exam. If the patient is   perimenopausal, these are likely physiologic. In a postmenopausal patient   is considered abnormal.  Dedicated GYN risk assessment should dictate the need for continued   surveillance versus further imaging or surgical workup.    VERTEBRAL BODY ANALYSIS: No Vertebral fracture or low bone density   identified.        --- End of Report ---        SHIRA RUIZ MD; Attending Radiologist  This document has been electronically signed. Sep 15 2022  1:28PM    < end of copied text >

## 2022-09-16 NOTE — PROGRESS NOTE ADULT - SUBJECTIVE AND OBJECTIVE BOX
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 In [facility] ED Tmax 102.2F and leukocytosis. Lactate negative. Cultures sent. Given broad spectrum empiric IV ABX and IVF. Tylenol for Fever. Pt with difficulty tolerating PO intake due to N/V. Currently on insulin pump for DM.  Pt transferred to [facility] under transplant nephrology for further workup of sepsis.  Pt reports that she felt hot with rigors for couple of days and when worsened she decided to go to the ER.     Immunosupression:                               Maintenance immunosupression: Env 1, Holding Imuran, Pred 5  Ongoing monitoring for signs of rejection.    Potential Discharge date:    Education:  Medications    Plan of care:  See Below    MEDICATIONS  (STANDING):  atorvastatin 10 milliGRAM(s) Oral at bedtime  famotidine    Tablet 20 milliGRAM(s) Oral daily  meropenem  IVPB      meropenem  IVPB 1000 milliGRAM(s) IV Intermittent every 8 hours  PARoxetine 10 milliGRAM(s) Oral daily  predniSONE   Tablet 5 milliGRAM(s) Oral daily  sodium chloride 0.9%. 1000 milliLiter(s) (100 mL/Hr) IV Continuous <Continuous>  tacrolimus ER Tablet (ENVARSUS XR) 1 milliGRAM(s) Oral <User Schedule>    MEDICATIONS  (PRN):  acetaminophen     Tablet .. 650 milliGRAM(s) Oral every 6 hours PRN Temp greater or equal to 38C (100.4F), Mild Pain (1 - 3)  clonazePAM  Tablet 1 milliGRAM(s) Oral daily PRN anxiety      PAST MEDICAL & SURGICAL HISTORY:  Hyperlipidemia      Arthropathy NOS - shoulder      Kidney stones      Polycystic kidney disease      GERD (gastroesophageal reflux disease)      Anxiety      CKD (chronic kidney disease) stage 4, GFR 15-29 ml/min  no dialysis      ESRD (end stage renal disease)      Ankle fracture - Left  brumstein barajas procedure 2000      Tubal ligation  1995      S/P cholecystectomy  (2013)      Kidney calculi  cysto/lithotripsy multiple      H/O tubal ligation  (1996)      History of ventral hernia repair      H/O shoulder surgery  left      AV fistula  right forearm extremity AV fistula radiocephalic ( 2018)      Renal transplant recipient      Status post nephrectomy  bilateral          Vital Signs Last 24 Hrs  T(C): 37.1 (16 Sep 2022 12:57), Max: 38.3 (15 Sep 2022 17:24)  T(F): 98.8 (16 Sep 2022 12:57), Max: 100.9 (15 Sep 2022 17:24)  HR: 58 (16 Sep 2022 12:57) (54 - 82)  BP: 122/59 (16 Sep 2022 12:57) (110/68 - 160/67)  BP(mean): --  RR: 18 (16 Sep 2022 12:57) (18 - 20)  SpO2: 95% (16 Sep 2022 12:57) (92% - 96%)    Parameters below as of 16 Sep 2022 12:57  Patient On (Oxygen Delivery Method): room air        I&O's Summary    15 Sep 2022 07:01  -  16 Sep 2022 07:00  --------------------------------------------------------  IN: 0 mL / OUT: 850 mL / NET: -850 mL    16 Sep 2022 07:01  -  16 Sep 2022 14:41  --------------------------------------------------------  IN: 0 mL / OUT: 300 mL / NET: -300 mL                              10.7   6.93  )-----------( 112      ( 16 Sep 2022 07:01 )             31.8     09-16    137  |  108  |  13  ----------------------------<  109<H>  4.0   |  20<L>  |  1.21    Ca    8.1<L>      16 Sep 2022 06:58  Phos  2.1     09-16  Mg     1.5     09-16    TPro  5.8<L>  /  Alb  3.3  /  TBili  0.6  /  DBili  x   /  AST  12  /  ALT  7<L>  /  AlkPhos  34<L>  09-16    Tacrolimus (), Serum: 3.7 ng/mL (09-16 @ 07:03)          Review of systems  Gen: No weight changes, fatigue, fevers/chills, weakness  Skin: No rashes  Head/Eyes/Ears/Mouth: No headache; Normal hearing; Normal vision w/o blurriness; No sinus pain/discomfort, sore throat  Respiratory: No dyspnea, cough, wheezing, hemoptysis  CV: No chest pain, PND, orthopnea  GI: denies diarrhea, constipation, nausea, vomiting, melena, hematochezia  : No increased frequency, dysuria, hematuria, nocturia  MSK: No joint pain/swelling; no back pain; no edema  Neuro: No dizziness/lightheadedness, weakness, seizures, numbness, tingling  Heme: No easy bruising or bleeding  Endo: No heat/cold intolerance  Psych: No significant nervousness, anxiety, stress, depression  All other systems were reviewed and are negative, except as noted.      PHYSICAL EXAM:  Constitutional: Well developed / well nourished  Eyes: Anicteric, PERRLA  ENMT: nc/at  Respiratory: CTA B/L  Cardiovascular: RRR  Gastrointestinal: Soft, non distended,   Genitourinary: Voiding spontaneously  Extremities: SCD's in place and working bilaterally,  Vascular: Palpable dp pulses bilaterally  Neurological: A&O x3  Skin: no rashes, ulcerations or lesions;  Musculoskeletal: Moving all extremities  Psychiatric: Responsive

## 2022-09-17 DIAGNOSIS — Z79.52 LONG TERM (CURRENT) USE OF SYSTEMIC STEROIDS: ICD-10-CM

## 2022-09-17 DIAGNOSIS — E78.5 HYPERLIPIDEMIA, UNSPECIFIED: ICD-10-CM

## 2022-09-17 LAB
A1C WITH ESTIMATED AVERAGE GLUCOSE RESULT: 6.2 % — HIGH (ref 4–5.6)
ALBUMIN SERPL ELPH-MCNC: 3.5 G/DL — SIGNIFICANT CHANGE UP (ref 3.3–5)
ALP SERPL-CCNC: 40 U/L — SIGNIFICANT CHANGE UP (ref 40–120)
ALT FLD-CCNC: 10 U/L — SIGNIFICANT CHANGE UP (ref 10–45)
AMYLASE P1 CFR SERPL: 43 U/L — SIGNIFICANT CHANGE UP (ref 25–125)
ANION GAP SERPL CALC-SCNC: 10 MMOL/L — SIGNIFICANT CHANGE UP (ref 5–17)
AST SERPL-CCNC: 16 U/L — SIGNIFICANT CHANGE UP (ref 10–40)
BILIRUB SERPL-MCNC: 0.7 MG/DL — SIGNIFICANT CHANGE UP (ref 0.2–1.2)
BUN SERPL-MCNC: 14 MG/DL — SIGNIFICANT CHANGE UP (ref 7–23)
CALCIUM SERPL-MCNC: 8.7 MG/DL — SIGNIFICANT CHANGE UP (ref 8.4–10.5)
CHLORIDE SERPL-SCNC: 109 MMOL/L — HIGH (ref 96–108)
CK MB CFR SERPL CALC: 1.5 NG/ML — SIGNIFICANT CHANGE UP (ref 0–3.8)
CK SERPL-CCNC: 82 U/L — SIGNIFICANT CHANGE UP (ref 25–170)
CO2 SERPL-SCNC: 20 MMOL/L — LOW (ref 22–31)
CREAT SERPL-MCNC: 1.14 MG/DL — SIGNIFICANT CHANGE UP (ref 0.5–1.3)
EGFR: 58 ML/MIN/1.73M2 — LOW
ESTIMATED AVERAGE GLUCOSE: 131 MG/DL — HIGH (ref 68–114)
GAS PNL BLDA: SIGNIFICANT CHANGE UP
GLUCOSE SERPL-MCNC: 91 MG/DL — SIGNIFICANT CHANGE UP (ref 70–99)
HCT VFR BLD CALC: 35.6 % — SIGNIFICANT CHANGE UP (ref 34.5–45)
HGB BLD-MCNC: 11.2 G/DL — LOW (ref 11.5–15.5)
LIDOCAIN IGE QN: 43 U/L — SIGNIFICANT CHANGE UP (ref 7–60)
MAGNESIUM SERPL-MCNC: 1.8 MG/DL — SIGNIFICANT CHANGE UP (ref 1.6–2.6)
MCHC RBC-ENTMCNC: 31.5 GM/DL — LOW (ref 32–36)
MCHC RBC-ENTMCNC: 33.5 PG — SIGNIFICANT CHANGE UP (ref 27–34)
MCV RBC AUTO: 106.6 FL — HIGH (ref 80–100)
NRBC # BLD: 0 /100 WBCS — SIGNIFICANT CHANGE UP (ref 0–0)
PHOSPHATE SERPL-MCNC: 2.4 MG/DL — LOW (ref 2.5–4.5)
PLATELET # BLD AUTO: 137 K/UL — LOW (ref 150–400)
POTASSIUM SERPL-MCNC: 4.5 MMOL/L — SIGNIFICANT CHANGE UP (ref 3.5–5.3)
POTASSIUM SERPL-SCNC: 4.5 MMOL/L — SIGNIFICANT CHANGE UP (ref 3.5–5.3)
PROT SERPL-MCNC: 6.5 G/DL — SIGNIFICANT CHANGE UP (ref 6–8.3)
RBC # BLD: 3.34 M/UL — LOW (ref 3.8–5.2)
RBC # FLD: 12.7 % — SIGNIFICANT CHANGE UP (ref 10.3–14.5)
SODIUM SERPL-SCNC: 139 MMOL/L — SIGNIFICANT CHANGE UP (ref 135–145)
TACROLIMUS SERPL-MCNC: 5.5 NG/ML — SIGNIFICANT CHANGE UP
TROPONIN T, HIGH SENSITIVITY RESULT: 15 NG/L — SIGNIFICANT CHANGE UP (ref 0–51)
TROPONIN T, HIGH SENSITIVITY RESULT: 16 NG/L — SIGNIFICANT CHANGE UP (ref 0–51)
WBC # BLD: 6.65 K/UL — SIGNIFICANT CHANGE UP (ref 3.8–10.5)
WBC # FLD AUTO: 6.65 K/UL — SIGNIFICANT CHANGE UP (ref 3.8–10.5)

## 2022-09-17 PROCEDURE — 71275 CT ANGIOGRAPHY CHEST: CPT | Mod: 26

## 2022-09-17 PROCEDURE — 93010 ELECTROCARDIOGRAM REPORT: CPT | Mod: 77

## 2022-09-17 PROCEDURE — 99232 SBSQ HOSP IP/OBS MODERATE 35: CPT

## 2022-09-17 PROCEDURE — 71045 X-RAY EXAM CHEST 1 VIEW: CPT | Mod: 26

## 2022-09-17 PROCEDURE — 99233 SBSQ HOSP IP/OBS HIGH 50: CPT

## 2022-09-17 PROCEDURE — 93010 ELECTROCARDIOGRAM REPORT: CPT

## 2022-09-17 RX ORDER — ENOXAPARIN SODIUM 100 MG/ML
100 INJECTION SUBCUTANEOUS ONCE
Refills: 0 | Status: DISCONTINUED | OUTPATIENT
Start: 2022-09-17 | End: 2022-09-17

## 2022-09-17 RX ORDER — CLONAZEPAM 1 MG
1 TABLET ORAL THREE TIMES A DAY
Refills: 0 | Status: DISCONTINUED | OUTPATIENT
Start: 2022-09-17 | End: 2022-09-25

## 2022-09-17 RX ORDER — ENOXAPARIN SODIUM 100 MG/ML
110 INJECTION SUBCUTANEOUS ONCE
Refills: 0 | Status: COMPLETED | OUTPATIENT
Start: 2022-09-17 | End: 2022-09-17

## 2022-09-17 RX ORDER — SENNA PLUS 8.6 MG/1
2 TABLET ORAL AT BEDTIME
Refills: 0 | Status: DISCONTINUED | OUTPATIENT
Start: 2022-09-17 | End: 2022-09-26

## 2022-09-17 RX ORDER — HYDROMORPHONE HYDROCHLORIDE 2 MG/ML
2 INJECTION INTRAMUSCULAR; INTRAVENOUS; SUBCUTANEOUS ONCE
Refills: 0 | Status: DISCONTINUED | OUTPATIENT
Start: 2022-09-17 | End: 2022-09-17

## 2022-09-17 RX ORDER — HYDROMORPHONE HYDROCHLORIDE 2 MG/ML
0.5 INJECTION INTRAMUSCULAR; INTRAVENOUS; SUBCUTANEOUS ONCE
Refills: 0 | Status: DISCONTINUED | OUTPATIENT
Start: 2022-09-17 | End: 2022-09-17

## 2022-09-17 RX ORDER — HEPARIN SODIUM 5000 [USP'U]/ML
5000 INJECTION INTRAVENOUS; SUBCUTANEOUS EVERY 12 HOURS
Refills: 0 | Status: DISCONTINUED | OUTPATIENT
Start: 2022-09-17 | End: 2022-09-17

## 2022-09-17 RX ORDER — FUROSEMIDE 40 MG
40 TABLET ORAL ONCE
Refills: 0 | Status: COMPLETED | OUTPATIENT
Start: 2022-09-17 | End: 2022-09-17

## 2022-09-17 RX ORDER — ACETAMINOPHEN 500 MG
1000 TABLET ORAL ONCE
Refills: 0 | Status: COMPLETED | OUTPATIENT
Start: 2022-09-17 | End: 2022-09-17

## 2022-09-17 RX ORDER — FLUTICASONE PROPIONATE 50 MCG
1 SPRAY, SUSPENSION NASAL
Refills: 0 | Status: DISCONTINUED | OUTPATIENT
Start: 2022-09-17 | End: 2022-09-26

## 2022-09-17 RX ADMIN — Medication 1 SPRAY(S): at 17:35

## 2022-09-17 RX ADMIN — TRAMADOL HYDROCHLORIDE 50 MILLIGRAM(S): 50 TABLET ORAL at 20:35

## 2022-09-17 RX ADMIN — TACROLIMUS 1 MILLIGRAM(S): 5 CAPSULE ORAL at 08:24

## 2022-09-17 RX ADMIN — CEFEPIME 100 MILLIGRAM(S): 1 INJECTION, POWDER, FOR SOLUTION INTRAMUSCULAR; INTRAVENOUS at 22:04

## 2022-09-17 RX ADMIN — ONDANSETRON 4 MILLIGRAM(S): 8 TABLET, FILM COATED ORAL at 00:43

## 2022-09-17 RX ADMIN — Medication 5 MILLIGRAM(S): at 06:29

## 2022-09-17 RX ADMIN — HYDROMORPHONE HYDROCHLORIDE 0.5 MILLIGRAM(S): 2 INJECTION INTRAMUSCULAR; INTRAVENOUS; SUBCUTANEOUS at 22:01

## 2022-09-17 RX ADMIN — Medication 400 MILLIGRAM(S): at 06:24

## 2022-09-17 RX ADMIN — HYDROMORPHONE HYDROCHLORIDE 2 MILLIGRAM(S): 2 INJECTION INTRAMUSCULAR; INTRAVENOUS; SUBCUTANEOUS at 18:21

## 2022-09-17 RX ADMIN — TRAMADOL HYDROCHLORIDE 50 MILLIGRAM(S): 50 TABLET ORAL at 21:05

## 2022-09-17 RX ADMIN — HYDROMORPHONE HYDROCHLORIDE 0.5 MILLIGRAM(S): 2 INJECTION INTRAMUSCULAR; INTRAVENOUS; SUBCUTANEOUS at 12:37

## 2022-09-17 RX ADMIN — TRAMADOL HYDROCHLORIDE 50 MILLIGRAM(S): 50 TABLET ORAL at 12:04

## 2022-09-17 RX ADMIN — Medication 40 MILLIGRAM(S): at 15:24

## 2022-09-17 RX ADMIN — Medication 1000 MILLIGRAM(S): at 01:00

## 2022-09-17 RX ADMIN — HYDROMORPHONE HYDROCHLORIDE 0.5 MILLIGRAM(S): 2 INJECTION INTRAMUSCULAR; INTRAVENOUS; SUBCUTANEOUS at 12:15

## 2022-09-17 RX ADMIN — CEFEPIME 100 MILLIGRAM(S): 1 INJECTION, POWDER, FOR SOLUTION INTRAMUSCULAR; INTRAVENOUS at 06:29

## 2022-09-17 RX ADMIN — Medication 1 MILLIGRAM(S): at 20:34

## 2022-09-17 RX ADMIN — Medication 400 MILLIGRAM(S): at 00:26

## 2022-09-17 RX ADMIN — HYDROMORPHONE HYDROCHLORIDE 0.5 MILLIGRAM(S): 2 INJECTION INTRAMUSCULAR; INTRAVENOUS; SUBCUTANEOUS at 22:31

## 2022-09-17 RX ADMIN — FAMOTIDINE 20 MILLIGRAM(S): 10 INJECTION INTRAVENOUS at 11:29

## 2022-09-17 RX ADMIN — ATORVASTATIN CALCIUM 10 MILLIGRAM(S): 80 TABLET, FILM COATED ORAL at 22:03

## 2022-09-17 RX ADMIN — Medication 10 MILLIGRAM(S): at 11:29

## 2022-09-17 RX ADMIN — TRAMADOL HYDROCHLORIDE 50 MILLIGRAM(S): 50 TABLET ORAL at 11:29

## 2022-09-17 RX ADMIN — ENOXAPARIN SODIUM 110 MILLIGRAM(S): 100 INJECTION SUBCUTANEOUS at 12:16

## 2022-09-17 RX ADMIN — Medication 400 MILLIGRAM(S): at 17:35

## 2022-09-17 RX ADMIN — CEFEPIME 100 MILLIGRAM(S): 1 INJECTION, POWDER, FOR SOLUTION INTRAMUSCULAR; INTRAVENOUS at 14:42

## 2022-09-17 RX ADMIN — Medication 1000 MILLIGRAM(S): at 17:55

## 2022-09-17 RX ADMIN — Medication 1 MILLIGRAM(S): at 06:48

## 2022-09-17 NOTE — PROGRESS NOTE ADULT - ASSESSMENT
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 Now Transferred for further sepsis workup.    PLAN:  - f/u RVP and GI PCR results  - Meropenem    Immuno:  -Prednisone 5mg PO q24  -Envarsus 1mg PO q24  -Imuran hold in the setting of infection  -Check Tacrolimus level    DM:  -On insulin pump  -Endo consult called for pump management  -Was at home on (Trulicity)  -Diabetic diet    HTN, HLD  -Monitor BP  -Was at home on (Losartan 100mg QD, Spironolactone 25mg PO q24)  -Coreg 25mg BID  -Pravastatin 20mg PO q24    MDD, Anxiety  -Paroxetine 10mg PO q24  -Klonopin 1mg PO PRN    GERD  -Pepcid 20mg q24    WINSTON Carver, PGY-3   [facility]   Transplant Surgery   [id number] 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 s/p BL Nephrectomy/Single Donor Renal Transplant 2018, Post Transplant DM, HTN, HLD, GERD,  now transferred for further sepsis workup from [facility]    Infectious w/u s/p DDRT  -good graft function  -pan-culture negative so far, viral studies pending  -continue Cefepime for now per ID  -imaging unremarkable  -cleared by GYN for adnexal cysts/IUD sources for infection  -seen by ENT for ear pain, now resolved, continue Flomax  -SOB this AM likely 2/2 pulmonary edema, Lasix prn, check TTE. CTA neg for PE  -spirometry, wean off NC  -PT/OT  -Lovenox x1 given during SOB episode, start SQH BID  -SCDs    Immunosuppression:  -Prednisone 5  -Envarsus per level  -Imuran hold in the setting of infection    DM:  -On insulin pump  -Endoon baordfor pump management  -Was at home on (Trulicity)  -Diabetic diet    HTN, HLD  -Monitor BP  -Was at home on (Losartan 100mg QD, Spironolactone 25mg PO q24)  -Coreg 25mg BID  -Pravastatin 20mg PO q24    MDD, Anxiety  -Paroxetine 10mg PO q24  -Klonopin 1mg PO PRN    GERD  -Pepcid 20mg q24    WINSTON Carver, PGY-3   [facility]   Transplant Surgery   [id number]

## 2022-09-17 NOTE — PROGRESS NOTE ADULT - SUBJECTIVE AND OBJECTIVE BOX
Transplant Surgery - Multidisciplinary Progress Note  --------------------------------------------------------------  LDRT    4/2/18    HPI: 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 s/p BL Nephrectomy/Single Donor Renal Transplant in 2018, Post Transplant DM, HTN, HLD, GERD, MDD, Anxiety presented to [facility] ER on 9/14/2022 with c/o body aches, R flank pain, fevers/chills, nausea/vomiting, diarrhea. In [facility] ED Tmax 102.2F and leukocytosis. Lactate negative. Cultures sent. Given broad spectrum empiric IV ABX and IVF. Pt with difficulty tolerating PO intake due to N/V. Currently on insulin pump for DM.  Pt transferred to [facility] under transplant nephrology for further workup of sepsis.     Interval events  -SOB this morning      Immunosupression:                               Maintenance immunosupression: Env 1, Holding Imuran, Pred 5  Ongoing monitoring for signs of rejection.    Potential Discharge date:    Education:  Medications    Plan of care:  See Below    MEDICATIONS  (STANDING):  atorvastatin 10 milliGRAM(s) Oral at bedtime  famotidine    Tablet 20 milliGRAM(s) Oral daily  meropenem  IVPB      meropenem  IVPB 1000 milliGRAM(s) IV Intermittent every 8 hours  PARoxetine 10 milliGRAM(s) Oral daily  predniSONE   Tablet 5 milliGRAM(s) Oral daily  sodium chloride 0.9%. 1000 milliLiter(s) (100 mL/Hr) IV Continuous <Continuous>  tacrolimus ER Tablet (ENVARSUS XR) 1 milliGRAM(s) Oral <User Schedule>    MEDICATIONS  (PRN):  acetaminophen     Tablet .. 650 milliGRAM(s) Oral every 6 hours PRN Temp greater or equal to 38C (100.4F), Mild Pain (1 - 3)  clonazePAM  Tablet 1 milliGRAM(s) Oral daily PRN anxiety      PAST MEDICAL & SURGICAL HISTORY:  Hyperlipidemia      Arthropathy NOS - shoulder      Kidney stones      Polycystic kidney disease      GERD (gastroesophageal reflux disease)      Anxiety      CKD (chronic kidney disease) stage 4, GFR 15-29 ml/min  no dialysis      ESRD (end stage renal disease)      Ankle fracture - Left  brumstein barajas procedure 2000      Tubal ligation  1995      S/P cholecystectomy  (2013)      Kidney calculi  cysto/lithotripsy multiple      H/O tubal ligation  (1996)      History of ventral hernia repair      H/O shoulder surgery  left      AV fistula  right forearm extremity AV fistula radiocephalic ( 2018)      Renal transplant recipient      Status post nephrectomy  bilateral          Vital Signs Last 24 Hrs  T(C): 37.1 (16 Sep 2022 12:57), Max: 38.3 (15 Sep 2022 17:24)  T(F): 98.8 (16 Sep 2022 12:57), Max: 100.9 (15 Sep 2022 17:24)  HR: 58 (16 Sep 2022 12:57) (54 - 82)  BP: 122/59 (16 Sep 2022 12:57) (110/68 - 160/67)  BP(mean): --  RR: 18 (16 Sep 2022 12:57) (18 - 20)  SpO2: 95% (16 Sep 2022 12:57) (92% - 96%)    Parameters below as of 16 Sep 2022 12:57  Patient On (Oxygen Delivery Method): room air        I&O's Summary    15 Sep 2022 07:01  -  16 Sep 2022 07:00  --------------------------------------------------------  IN: 0 mL / OUT: 850 mL / NET: -850 mL    16 Sep 2022 07:01  -  16 Sep 2022 14:41  --------------------------------------------------------  IN: 0 mL / OUT: 300 mL / NET: -300 mL                              10.7   6.93  )-----------( 112      ( 16 Sep 2022 07:01 )             31.8     09-16    137  |  108  |  13  ----------------------------<  109<H>  4.0   |  20<L>  |  1.21    Ca    8.1<L>      16 Sep 2022 06:58  Phos  2.1     09-16  Mg     1.5     09-16    TPro  5.8<L>  /  Alb  3.3  /  TBili  0.6  /  DBili  x   /  AST  12  /  ALT  7<L>  /  AlkPhos  34<L>  09-16    Tacrolimus (), Serum: 3.7 ng/mL (09-16 @ 07:03)          Review of systems  Gen: No weight changes, fatigue, fevers/chills, weakness  Skin: No rashes  Head/Eyes/Ears/Mouth: No headache; Normal hearing; Normal vision w/o blurriness; No sinus pain/discomfort, sore throat  Respiratory: No dyspnea, cough, wheezing, hemoptysis  CV: No chest pain, PND, orthopnea  GI: denies diarrhea, constipation, nausea, vomiting, melena, hematochezia  : No increased frequency, dysuria, hematuria, nocturia  MSK: No joint pain/swelling; no back pain; no edema  Neuro: No dizziness/lightheadedness, weakness, seizures, numbness, tingling  Heme: No easy bruising or bleeding  Endo: No heat/cold intolerance  Psych: No significant nervousness, anxiety, stress, depression  All other systems were reviewed and are negative, except as noted.      PHYSICAL EXAM:  Constitutional: Well developed / well nourished  Eyes: Anicteric, PERRLA  ENMT: nc/at  Respiratory: CTA B/L  Cardiovascular: RRR  Gastrointestinal: Soft, non distended,   Genitourinary: Voiding spontaneously  Extremities: SCD's in place and working bilaterally,  Vascular: Palpable dp pulses bilaterally  Neurological: A&O x3  Skin: no rashes, ulcerations or lesions;  Musculoskeletal: Moving all extremities  Psychiatric: Responsive     Transplant Surgery - Multidisciplinary Progress Note  --------------------------------------------------------------  LDRT    4/2/18    HPI: 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 s/p BL Nephrectomy/Single Donor Renal Transplant in 2018, Post Transplant DM, HTN, HLD, GERD, MDD, Anxiety presented to [facility] ER on 9/14/2022 with c/o body aches, R flank pain, fevers/chills, nausea/vomiting, diarrhea. In [facility] ED Tmax 102.2F and leukocytosis. Lactate negative. Cultures sent. Given broad spectrum empiric IV ABX and IVF. Pt with difficulty tolerating PO intake due to N/V. Currently on insulin pump for DM.  Pt transferred to [facility] under transplant nephrology for further workup of sepsis.     Interval events:  -SOB with anxiety this morning, CTA neg for PE  -NC 3-4L O2  -n/v has subsided      Immunosuppression                               Maintenance:   Env per level, Holding Imuran, Pred 5  Ongoing monitoring for signs of rejection.    Potential Discharge date: TBD    Education:  Medications    Plan of care:  See Below      MEDICATIONS  (STANDING):  atorvastatin 10 milliGRAM(s) Oral at bedtime  cefepime   IVPB 1000 milliGRAM(s) IV Intermittent every 8 hours  famotidine    Tablet 20 milliGRAM(s) Oral daily  fluticasone propionate 50 MICROgram(s)/spray Nasal Spray 1 Spray(s) Both Nostrils two times a day  influenza   Vaccine 0.5 milliLiter(s) IntraMuscular once  insulin lispro (ADMELOG) Pump 1 Each SubCutaneous Continuous Pump  PARoxetine 10 milliGRAM(s) Oral daily  predniSONE   Tablet 5 milliGRAM(s) Oral daily  tacrolimus ER Tablet (ENVARSUS XR) 1 milliGRAM(s) Oral <User Schedule>    MEDICATIONS  (PRN):  clonazePAM  Tablet 1 milliGRAM(s) Oral daily PRN anxiety  ondansetron Injectable 4 milliGRAM(s) IV Push every 6 hours PRN Nausea and/or Vomiting  traMADol 25 milliGRAM(s) Oral every 6 hours PRN Moderate Pain (4 - 6)  traMADol 50 milliGRAM(s) Oral every 6 hours PRN Severe Pain (7 - 10)          Vital Signs Last 24 Hrs  T(C): 37.2 (17 Sep 2022 08:27), Max: 37.5 (17 Sep 2022 00:50)  T(F): 99 (17 Sep 2022 08:27), Max: 99.5 (17 Sep 2022 00:50)  HR: 61 (17 Sep 2022 08:27) (57 - 70)  BP: 130/70 (17 Sep 2022 08:27) (130/70 - 170/85)  BP(mean): --  RR: 18 (17 Sep 2022 08:27) (18 - 18)  SpO2: 96% (17 Sep 2022 08:27) (95% - 96%)    Parameters below as of 17 Sep 2022 08:27  Patient On (Oxygen Delivery Method): room air  O2 Flow (L/min): 4      I&O's Summary    16 Sep 2022 07:01  -  17 Sep 2022 07:00  --------------------------------------------------------  IN: 2720 mL / OUT: 300 mL / NET: 2420 mL    17 Sep 2022 07:01  -  17 Sep 2022 16:09  --------------------------------------------------------  IN: 400 mL / OUT: 0 mL / NET: 400 mL                              11.2   6.65  )-----------( 137      ( 17 Sep 2022 06:53 )             35.6     09-17    139  |  109<H>  |  14  ----------------------------<  91  4.5   |  20<L>  |  1.14    Ca    8.7      17 Sep 2022 06:52  Phos  2.4     09-17  Mg     1.8     09-17    TPro  6.5  /  Alb  3.5  /  TBili  0.7  /  DBili  x   /  AST  16  /  ALT  10  /  AlkPhos  40  09-17    Tacrolimus (), Serum: 5.5 ng/mL (09-17 @ 06:53)        Culture - Blood (collected 09-15-22 @ 18:45)  Source: .Blood Blood  Preliminary Report (09-16-22 @ 22:02):    No growth to date.    Culture - Blood (collected 09-15-22 @ 18:30)  Source: .Blood Blood  Preliminary Report (09-16-22 @ 22:02):    No growth to date.    Culture - Urine (collected 09-14-22 @ 23:55)  Source: Clean Catch Clean Catch (Midstream)  Final Report (09-16-22 @ 17:00):    <10,000 CFU/mL Normal Urogenital Angélica    Culture - Blood (collected 09-14-22 @ 18:05)  Source: .Blood Blood-Peripheral  Preliminary Report (09-16-22 @ 01:02):    No growth to date.    Culture - Blood (collected 09-14-22 @ 18:00)  Source: .Blood Blood-Peripheral  Preliminary Report (09-16-22 @ 01:02):    No growth to date.                    Review of systems  Gen: No weight changes, fatigue, fevers/chills, weakness  Skin: No rashes  Head/Eyes/Ears/Mouth: No headache; Normal hearing; Normal vision w/o blurriness; No sinus pain/discomfort, sore throat  Respiratory: No dyspnea, cough, wheezing, hemoptysis  CV: No chest pain, PND, orthopnea  GI: denies diarrhea, constipation, nausea, vomiting, melena, hematochezia  : No increased frequency, dysuria, hematuria, nocturia  MSK: No joint pain/swelling; no back pain; no edema  Neuro: No dizziness/lightheadedness, weakness, seizures, numbness, tingling  Heme: No easy bruising or bleeding  Endo: No heat/cold intolerance  Psych: No significant nervousness, anxiety, stress, depression  All other systems were reviewed and are negative, except as noted.      PHYSICAL EXAM:  Constitutional: Well developed / well nourished  Eyes: Anicteric, PERRLA  ENMT: nc/at  Respiratory: CTA B/L  Cardiovascular: RRR  Gastrointestinal: Soft, non distended, NT.  well healed incisional scar   Genitourinary: Voiding spontaneously  Extremities: SCD's in place and working bilaterally, no edema  Vascular: Palpable dp pulses bilaterally  Neurological: A&O x3  Skin: no rashes, ulcerations or lesions;  Musculoskeletal: Moving all extremities  Psychiatric: Responsive

## 2022-09-17 NOTE — PROGRESS NOTE ADULT - SUBJECTIVE AND OBJECTIVE BOX
[facility] DIVISION OF KIDNEY DISEASES AND HYPERTENSION -- FOLLOW UP NOTE  --------------------------------------------------------------------------------  Chief Complaint:  Sepsis    24 hour events/subjective:  PT feels poorly - developed dyspnea overnight.        PAST HISTORY  --------------------------------------------------------------------------------  No significant changes to PMH, PSH, FHx, SHx, unless otherwise noted    ALLERGIES & MEDICATIONS  --------------------------------------------------------------------------------  Allergies    Demerol HCl (Hives)  Phenergan (Hives; Rash)    Intolerances      Standing Inpatient Medications  atorvastatin 10 milliGRAM(s) Oral at bedtime  cefepime   IVPB 1000 milliGRAM(s) IV Intermittent every 8 hours  famotidine    Tablet 20 milliGRAM(s) Oral daily  fluticasone propionate 50 MICROgram(s)/spray Nasal Spray 1 Spray(s) Both Nostrils two times a day  influenza   Vaccine 0.5 milliLiter(s) IntraMuscular once  insulin lispro (ADMELOG) Pump 1 Each SubCutaneous Continuous Pump  PARoxetine 10 milliGRAM(s) Oral daily  predniSONE   Tablet 5 milliGRAM(s) Oral daily  tacrolimus ER Tablet (ENVARSUS XR) 1 milliGRAM(s) Oral <User Schedule>    PRN Inpatient Medications  clonazePAM  Tablet 1 milliGRAM(s) Oral daily PRN  ondansetron Injectable 4 milliGRAM(s) IV Push every 6 hours PRN  traMADol 25 milliGRAM(s) Oral every 6 hours PRN  traMADol 50 milliGRAM(s) Oral every 6 hours PRN      REVIEW OF SYSTEMS  --------------------------------------------------------------------------------  Gen: No fatigue, fevers/chills, weakness  Skin: No rashes  Head/Eyes/Ears/Mouth: + headache  Respiratory: + dyspnea  CV: No chest pain, PND, orthopnea  GI: No abdominal pain, diarrhea, constipation, nausea, vomiting  Transplant: No pain  : No increased frequency, dysuria, hematuria, nocturia  MSK: muscle aches   Neuro: No dizziness/lightheadedness, weakness, seizures, numbness, tingling  Psych: No significant nervousness, anxiety, stress, depression    All other systems were reviewed and are negative, except as noted.    VITALS/PHYSICAL EXAM  --------------------------------------------------------------------------------  T(C): 37.2 (09-17-22 @ 08:27), Max: 37.5 (09-17-22 @ 00:50)  HR: 61 (09-17-22 @ 08:27) (57 - 70)  BP: 130/70 (09-17-22 @ 08:27) (130/70 - 170/85)  RR: 18 (09-17-22 @ 08:27) (18 - 18)  SpO2: 96% (09-17-22 @ 08:27) (95% - 96%)  Wt(kg): --  Height (cm): 175.3 (09-16-22 @ 17:39)  Weight (kg): 114.9 (09-16-22 @ 17:39)  BMI (kg/m2): 37.4 (09-16-22 @ 17:39)  BSA (m2): 2.28 (09-16-22 @ 17:39)      09-16-22 @ 07:01  -  09-17-22 @ 07:00  --------------------------------------------------------  IN: 2720 mL / OUT: 300 mL / NET: 2420 mL      Physical Exam:  	Gen: appears ill   	HEENT: PERRL  	Pulm: CTA B/L  	CV: RRR, S1S2; no rub  	Back: No spinal or CVA tenderness; no sacral edema  	Abd: +BS, soft, nontender/nondistended                      Transplant: No tenderness, swelling  	: No suprapubic tenderness  	UE: Warm, FROM; no edema; no asterixis  	LE: Warm, FROM; no edema        LABS/STUDIES  --------------------------------------------------------------------------------              11.2   6.65  >-----------<  137      [09-17-22 @ 06:53]              35.6     139  |  109  |  14  ----------------------------<  91      [09-17-22 @ 06:52]  4.5   |  20  |  1.14        Ca     8.7     [09-17-22 @ 06:52]      Mg     1.8     [09-17-22 @ 06:52]      Phos  2.4     [09-17-22 @ 06:52]    TPro  6.5  /  Alb  3.5  /  TBili  0.7  /  DBili  x   /  AST  16  /  ALT  10  /  AlkPhos  40  [09-17-22 @ 06:52]        CK 82      [09-17-22 @ 12:32]    Creatinine Trend:  SCr 1.14 [09-17 @ 06:52]  SCr 1.21 [09-16 @ 06:58]  SCr 1.17 [09-15 @ 18:51]  SCr 1.13 [09-15 @ 06:35]  SCr 1.38 [09-14 @ 18:00]    Tacrolimus (), Serum: 5.5 ng/mL (09-17 @ 06:53)  Tacrolimus (), Serum: 3.7 ng/mL (09-16 @ 07:03)  Tacrolimus (), Serum: 2.8 ng/mL (09-15 @ 06:35)            Urinalysis - [09-14-22 @ 23:55]      Color Yellow / Appearance Clear / SG 1.010 / pH 6.0      Gluc Negative / Ketone Negative  / Bili Negative / Urobili Negative       Blood Small / Protein Negative / Leuk Est Small / Nitrite Negative      RBC 3-5 / WBC 6-10 / Hyaline  / Gran  / Sq Epi  / Non Sq Epi Occasional / Bacteria

## 2022-09-17 NOTE — PROGRESS NOTE ADULT - ASSESSMENT
51 y.o female with PCKD s/p bilateral nephrectomies, s/p LDRT in 4/2018 transferred from OSH with sepsis.  1.  Kidney transplant - renal function is stable.  Stop IVF  2.  Immunosuppression - Held imuran, cont pred and tacrolimus.   3.  Fever - unclear etiology, w/u pending.  Cultures negative.  Adenovirus pcr pending.    4.  Dyspnea - CXR clear.  High suspicion and worry for PE.  Check CTA and start full dose lovenox stat.  Check EKG, cardiac enzymes, ABG.

## 2022-09-17 NOTE — PROGRESS NOTE ADULT - SUBJECTIVE AND OBJECTIVE BOX
HPI:       Patient is a 51y old  Female who presents with a chief complaint of Transfer from [facility] with fever and chills r/o Sepsis (17 Sep 2022 13:20)    Endocrinology consulted for diabetes management.     Home diabetes medications:   - Tandem T slim   Basal rate:  12a-6a 1.1  6a-12a 1  TDD 24.6    ISF 15    ICR  12a-6a 4.5  6a-12a 4.2    Target     Also on Trulicity 0.75 mg weekly     Inpatient diabetes medications:   - T slim as above    Most recent HbA1C: 6.2       INTERVAL HPI/OVERNIGHT EVENTS:  Patient refusing to get finger stick checked by the staff says that she has DEXCOM.   Currently denies polydipsia, polyuria , visual changes, numbness in feet.  Does not feel well, appetite is not good, but denies nausea or vomiting.       Review of systems:   CONSTITUTIONAL:  Feels well, good appetite  CARDIOVASCULAR:  Negative for chest pain or palpitations  RESPIRATORY:  Negative for cough, or SOB   GASTROINTESTINAL:  Negative for nausea, vomiting, or abdominal pain  GENITOURINARY:  Negative frequency, urgency or dysuria     CAPILLARY BLOOD GLUCOSE  139 (17 Sep 2022 08:27)  115 (16 Sep 2022 20:50)  119 (16 Sep 2022 17:39)           MEDICATIONS  (STANDING):  atorvastatin 10 milliGRAM(s) Oral at bedtime  cefepime   IVPB 1000 milliGRAM(s) IV Intermittent every 8 hours  famotidine    Tablet 20 milliGRAM(s) Oral daily  fluticasone propionate 50 MICROgram(s)/spray Nasal Spray 1 Spray(s) Both Nostrils two times a day  influenza   Vaccine 0.5 milliLiter(s) IntraMuscular once  insulin lispro (ADMELOG) Pump 1 Each SubCutaneous Continuous Pump  PARoxetine 10 milliGRAM(s) Oral daily  predniSONE   Tablet 5 milliGRAM(s) Oral daily  tacrolimus ER Tablet (ENVARSUS XR) 1 milliGRAM(s) Oral <User Schedule>    MEDICATIONS  (PRN):  clonazePAM  Tablet 1 milliGRAM(s) Oral daily PRN anxiety  ondansetron Injectable 4 milliGRAM(s) IV Push every 6 hours PRN Nausea and/or Vomiting  traMADol 25 milliGRAM(s) Oral every 6 hours PRN Moderate Pain (4 - 6)  traMADol 50 milliGRAM(s) Oral every 6 hours PRN Severe Pain (7 - 10)      PHYSICAL EXAM  Vital Signs Last 24 Hrs  T(C): 37.2 (17 Sep 2022 08:27), Max: 37.5 (17 Sep 2022 00:50)  T(F): 99 (17 Sep 2022 08:27), Max: 99.5 (17 Sep 2022 00:50)  HR: 61 (17 Sep 2022 08:27) (57 - 70)  BP: 130/70 (17 Sep 2022 08:27) (130/70 - 170/85)  BP(mean): --  RR: 18 (17 Sep 2022 08:27) (18 - 18)  SpO2: 96% (17 Sep 2022 08:27) (95% - 96%)    Parameters below as of 17 Sep 2022 08:27  Patient On (Oxygen Delivery Method): room air  O2 Flow (L/min): 4      GENERAL: feMale laying in bed in NAD  RESPIRATORY: nonlabored breathing, no accessory muscle use  Extremities: Warm, no edema in all 4 exts   NEURO: A&O X3    LABS:                        11.2   6.65  )-----------( 137      ( 17 Sep 2022 06:53 )             35.6     09-17    139  |  109<H>  |  14  ----------------------------<  91  4.5   |  20<L>  |  1.14    Ca    8.7      17 Sep 2022 06:52  Phos  2.4     09-17  Mg     1.8     09-17    TPro  6.5  /  Alb  3.5  /  TBili  0.7  /  DBili  x   /  AST  16  /  ALT  10  /  AlkPhos  40  09-17

## 2022-09-17 NOTE — PROGRESS NOTE ADULT - ASSESSMENT
DIAGNOSIS and IMPRESSION  51F with ESRD 2/2 PCKD s/p BL Nephrectomy/Single Donor Renal Transplant (2018), DM, HTN, HLD, GERD, MDD, Anxiety presented to [facility] ER on 9/14/2022 with complaining body aches, R flank pain, fevers/chills, nausea/vomiting, diarrhea. ID consulted for sepsis.     WORK UP  WBC 12 > 6  Cr 1.33 > 1.21  UA with 6-10 WBC, occasional SQE, small LE, neg nitrite  CTAP with mild perinephric stranding on R transplanted kidney, urinary bladder wall thickening, multiple adnexal cystic lesions with new L ovarian cystic lesions noted    Elevated procalcitonin  Afebrile after 9/15.    Prior UCx with E faecalis (2021), Raissa (2019)    #Diarrhea / Nausea / Vomiting / Rigors    - unclear etiology ?GI and/or ? source  - low concerns for ESBL    RECOMMENDATIONS  - Improved, continue Cefepime 1g IV Q8H  - GI PCR negative Stool Cdiff PCR, Stool O/P not obtained  - CMV PCR and BCx x2 negative   - Check adenovirus PCR  - f/up repeat UA/Ucx - pending collection  - ?GYN eval for multiple adnexal lesions including new L ovarian cystic lesions, H/O polycystic disease     Jake Coles MD  pager [phone]  office [phone]  Over the weekend please call [phone]   DIAGNOSIS and IMPRESSION  51F with ESRD 2/2 PCKD s/p BL Nephrectomy/Single Donor Renal Transplant (2018), DM, HTN, HLD, GERD, MDD, Anxiety presented to [facility] ER on 9/14/2022 with complaining body aches, R flank pain, fevers/chills, nausea/vomiting, diarrhea. ID consulted for sepsis.     WORK UP  WBC 12 > 6  Cr 1.33 > 1.21  UA with 6-10 WBC, occasional SQE, small LE, neg nitrite  CTAP with mild perinephric stranding on R transplanted kidney, urinary bladder wall thickening, multiple adnexal cystic lesions with new L ovarian cystic lesions noted    Elevated procalcitonin  Afebrile after 9/15.    Prior UCx with E faecalis (2021), Ecoli (2019)    #Diarrhea / Nausea / Vomiting / Rigors    - unclear etiology ?GI and/or ? source, possibly pyelonephritis based on scan and tenderness on exam  - low concerns for ESBL    RECOMMENDATIONS  - Improved, continue Cefepime 1g IV Q8H  - GI PCR negative. Stool Cdiff PCR, Stool O/P not obtained- if diarrhea recurs may obtain  - CMV PCR and BCx x2 negative   - Check adenovirus PCR  - f/up repeat UA/Ucx - pending collection  - ?GYN eval for multiple adnexal lesions including new L ovarian cystic lesions, H/O polycystic disease     Jake Coles MD  pager [phone]  office [phone]  Over the weekend please call [phone]

## 2022-09-17 NOTE — PROGRESS NOTE ADULT - SUBJECTIVE AND OBJECTIVE BOX
Patient is a 51y old  Female who presents with a chief complaint of Transfer from [facility] with fever and chills r/o Sepsis (16 Sep 2022 16:26)    Being followed by ID for fever, diarrhea    Interval history:  No acute events      ROS:  No cough, SOB, CP  No N/V/D./abd pain  No other complaints      Antimicrobials:    cefepime   IVPB 1000 milliGRAM(s) IV Intermittent every 8 hours      Vital Signs Last 24 Hrs  T(C): 37.2 (09-17-22 @ 08:27), Max: 37.5 (09-17-22 @ 00:50)  T(F): 99 (09-17-22 @ 08:27), Max: 99.5 (09-17-22 @ 00:50)  HR: 61 (09-17-22 @ 08:27) (57 - 70)  BP: 130/70 (09-17-22 @ 08:27) (122/59 - 170/85)  BP(mean): --  RR: 18 (09-17-22 @ 08:27) (18 - 18)  SpO2: 96% (09-17-22 @ 08:27) (95% - 96%)    Physical Exam:    Constitutional well preserved, NAD     HEENT EOMI, No pallor or icterus    No oral exudate or erythema    Neck supple no LN    Chest Clear to auscultation    CVS S1 S2 WNl No murmur or rub or gallop    Abd soft BS normal No tenderness no masses    Ext No cyanosis clubbing or edema    IV site no erythema tenderness or discharge    Joints no swelling or LOM    CNS AAO X 3 non focal    Lab Data:                          11.2   6.65  )-----------( 137      ( 17 Sep 2022 06:53 )             35.6       09-17    139  |  109<H>  |  14  ----------------------------<  91  4.5   |  20<L>  |  1.14    Ca    8.7      17 Sep 2022 06:52  Phos  2.4     09-17  Mg     1.8     09-17    TPro  6.5  /  Alb  3.5  /  TBili  0.7  /  DBili  x   /  AST  16  /  ALT  10  /  AlkPhos  40  09-17        .Blood Blood  09-15-22   No growth to date.  --  --      .Blood Blood  09-15-22   No growth to date.  --  --      Clean Catch Clean Catch (Midstream)  09-14-22   <10,000 CFU/mL Normal Urogenital Angélica  --  --      .Blood Blood-Peripheral  09-14-22   No growth to date.  --  --      .Blood Blood-Peripheral  09-14-22   No growth to date.  --  --    < from: CT Abdomen and Pelvis No Cont (09.15.22 @ 12:11) >  ACC: [id number] EXAM:  CT ABDOMEN AND PELVIS                          PROCEDURE DATE:  09/15/2022          INTERPRETATION:  CLINICAL INFORMATION: Fever and sepsis.    COMPARISON: 7/5/2018.    CONTRAST/COMPLICATIONS:  IV Contrast: NONE  Oral Contrast:NONE  Complications: None reported at time of study completion    PROCEDURE:  CT of the Abdomen and Pelvis was performed.  Sagittal and coronal reformats were performed.    FINDINGS:  LOWER CHEST: There is trace left pleural effusion. Linear atelectasis   versus scarring is seen within the left lung base..    LIVER: Within normal limits.  BILE DUCTS: Normal caliber.  GALLBLADDER: Cholecystectomy.  SPLEEN: Within normal limits.  PANCREAS: Within normal limits.  ADRENALS: Within normal limits.  KIDNEYS/URETERS: Bilateral kidneys have been removed. There is a right   pelvic transplanted kidney. There is mild surrounding perinephric   stranding of the right pelvic kidney. There is no hydronephrosis.    BLADDER: There is questionable wall thickening of the bladder.  REPRODUCTIVE ORGANS: There is a T-shaped intrauterine device seen within   the uterus. There is a 3.6 cm simple appearing cyst of the medial left   adnexa. This is increased in size when compared to previous exam. This is   possibly paraovarian. Additional cluster left ovarian cysts are noted   measuring 2.2 cm in diameter. These are new compared to previous exam.   The right ovary is unremarkable.    BOWEL: No bowel obstruction. Appendix is not visualized. Small bowel   loops appear adherent to the intra-abdominal wall, likely related to   adhesions there is colonic diverticulosis. There is no focal   diverticulitis.  PERITONEUM: No ascites.  VESSELS: Atherosclerotic changes.  RETROPERITONEUM/LYMPH NODES: No lymphadenopathy.  ABDOMINAL WALL: Widemouth ventral hernia containing unobstructed small   bowel loops. Postsurgical changes of the intra-abdominal wall   appreciated..  BONES: Mild degenerative changes of the spine appreciated..      IMPRESSION:    New left trace pleural effusion.    Post bilateral nephrectomy changes, with right pelvic transplant kidney   noted. There is mild perinephric stranding of the transplant kidney,   nonspecific. There is no urinary tract obstruction.    Question of urinary bladder wall thickening which may suggest cystitis.   Correlation with urinalysis is advised.    Multiple left adnexal cystic lesions. The largest lesion is noted   medially and is possibly paraovarian. This is increased in size compared   to the previous exam.Additional left ovarian cystic lesions are   appreciated, new compared to previous exam. If the patient is   perimenopausal, these are likely physiologic. In a postmenopausal patient   is considered abnormal.  Dedicated GYN risk assessment should dictate the need for continued   surveillance versus further imaging or surgical workup.    VERTEBRAL BODY ANALYSIS: No Vertebral fracture or low bone density   identified.        < end of copied text >                       Patient is a 51y old  Female who presents with a chief complaint of Transfer from [facility] with fever and chills r/o Sepsis (16 Sep 2022 16:26)    Being followed by ID for fever, diarrhea    Interval history:  No acute events      ROS: C/O nausea, no vomiting since yesterday, poor appetite.  Diarrhea less  Has trouble taking deep breath  Lower right sided abdominal pain - less  No cough, SOB, CP  No other complaints      Antimicrobials:    cefepime   IVPB 1000 milliGRAM(s) IV Intermittent every 8 hours      Vital Signs Last 24 Hrs  T(C): 37.2 (09-17-22 @ 08:27), Max: 37.5 (09-17-22 @ 00:50)  T(F): 99 (09-17-22 @ 08:27), Max: 99.5 (09-17-22 @ 00:50)  HR: 61 (09-17-22 @ 08:27) (57 - 70)  BP: 130/70 (09-17-22 @ 08:27) (122/59 - 170/85)  BP(mean): --  RR: 18 (09-17-22 @ 08:27) (18 - 18)  SpO2: 96% (09-17-22 @ 08:27) (95% - 96%)    Physical Exam:    Constitutional well preserved, NAD     HEENT EOMI, No pallor or icterus    No oral exudate or erythema    Neck supple no LN    Chest Clear to auscultation    CVS S1 S2 WNl No murmur or rub or gallop    Abd soft BS normal. mild tenderness RLQ,  no masses    Ext No cyanosis clubbing or edema    IV site no erythema tenderness or discharge    Joints no swelling or LOM    CNS AAO X 3 non focal    Lab Data:                          11.2   6.65  )-----------( 137      ( 17 Sep 2022 06:53 )             35.6       09-17    139  |  109<H>  |  14  ----------------------------<  91  4.5   |  20<L>  |  1.14    Ca    8.7      17 Sep 2022 06:52  Phos  2.4     09-17  Mg     1.8     09-17    TPro  6.5  /  Alb  3.5  /  TBili  0.7  /  DBili  x   /  AST  16  /  ALT  10  /  AlkPhos  40  09-17        .Blood Blood  09-15-22   No growth to date.  --  --      .Blood Blood  09-15-22   No growth to date.  --  --      Clean Catch Clean Catch (Midstream)  09-14-22   <10,000 CFU/mL Normal Urogenital Angélica  --  --      .Blood Blood-Peripheral  09-14-22   No growth to date.  --  --      .Blood Blood-Peripheral  09-14-22   No growth to date.  --  --    < from: CT Abdomen and Pelvis No Cont (09.15.22 @ 12:11) >  ACC: [id number] EXAM:  CT ABDOMEN AND PELVIS                          PROCEDURE DATE:  09/15/2022          INTERPRETATION:  CLINICAL INFORMATION: Fever and sepsis.    COMPARISON: 7/5/2018.    CONTRAST/COMPLICATIONS:  IV Contrast: NONE  Oral Contrast:NONE  Complications: None reported at time of study completion    PROCEDURE:  CT of the Abdomen and Pelvis was performed.  Sagittal and coronal reformats were performed.    FINDINGS:  LOWER CHEST: There is trace left pleural effusion. Linear atelectasis   versus scarring is seen within the left lung base..    LIVER: Within normal limits.  BILE DUCTS: Normal caliber.  GALLBLADDER: Cholecystectomy.  SPLEEN: Within normal limits.  PANCREAS: Within normal limits.  ADRENALS: Within normal limits.  KIDNEYS/URETERS: Bilateral kidneys have been removed. There is a right   pelvic transplanted kidney. There is mild surrounding perinephric   stranding of the right pelvic kidney. There is no hydronephrosis.    BLADDER: There is questionable wall thickening of the bladder.  REPRODUCTIVE ORGANS: There is a T-shaped intrauterine device seen within   the uterus. There is a 3.6 cm simple appearing cyst of the medial left   adnexa. This is increased in size when compared to previous exam. This is   possibly paraovarian. Additional cluster left ovarian cysts are noted   measuring 2.2 cm in diameter. These are new compared to previous exam.   The right ovary is unremarkable.    BOWEL: No bowel obstruction. Appendix is not visualized. Small bowel   loops appear adherent to the intra-abdominal wall, likely related to   adhesions there is colonic diverticulosis. There is no focal   diverticulitis.  PERITONEUM: No ascites.  VESSELS: Atherosclerotic changes.  RETROPERITONEUM/LYMPH NODES: No lymphadenopathy.  ABDOMINAL WALL: Widemouth ventral hernia containing unobstructed small   bowel loops. Postsurgical changes of the intra-abdominal wall   appreciated..  BONES: Mild degenerative changes of the spine appreciated..      IMPRESSION:    New left trace pleural effusion.    Post bilateral nephrectomy changes, with right pelvic transplant kidney   noted. There is mild perinephric stranding of the transplant kidney,   nonspecific. There is no urinary tract obstruction.    Question of urinary bladder wall thickening which may suggest cystitis.   Correlation with urinalysis is advised.    Multiple left adnexal cystic lesions. The largest lesion is noted   medially and is possibly paraovarian. This is increased in size compared   to the previous exam.Additional left ovarian cystic lesions are   appreciated, new compared to previous exam. If the patient is   perimenopausal, these are likely physiologic. In a postmenopausal patient   is considered abnormal.  Dedicated GYN risk assessment should dictate the need for continued   surveillance versus further imaging or surgical workup.    VERTEBRAL BODY ANALYSIS: No Vertebral fracture or low bone density   identified.        < end of copied text >

## 2022-09-18 LAB
ALBUMIN SERPL ELPH-MCNC: 3.3 G/DL — SIGNIFICANT CHANGE UP (ref 3.3–5)
ALP SERPL-CCNC: 41 U/L — SIGNIFICANT CHANGE UP (ref 40–120)
ALT FLD-CCNC: 9 U/L — LOW (ref 10–45)
ANION GAP SERPL CALC-SCNC: 10 MMOL/L — SIGNIFICANT CHANGE UP (ref 5–17)
APPEARANCE UR: ABNORMAL
AST SERPL-CCNC: 9 U/L — LOW (ref 10–40)
BACTERIA # UR AUTO: NEGATIVE — SIGNIFICANT CHANGE UP
BASOPHILS # BLD AUTO: 0.03 K/UL — SIGNIFICANT CHANGE UP (ref 0–0.2)
BASOPHILS NFR BLD AUTO: 0.5 % — SIGNIFICANT CHANGE UP (ref 0–2)
BILIRUB SERPL-MCNC: 0.6 MG/DL — SIGNIFICANT CHANGE UP (ref 0.2–1.2)
BILIRUB UR-MCNC: NEGATIVE — SIGNIFICANT CHANGE UP
BUN SERPL-MCNC: 17 MG/DL — SIGNIFICANT CHANGE UP (ref 7–23)
CALCIUM SERPL-MCNC: 8.6 MG/DL — SIGNIFICANT CHANGE UP (ref 8.4–10.5)
CHLORIDE SERPL-SCNC: 105 MMOL/L — SIGNIFICANT CHANGE UP (ref 96–108)
CO2 SERPL-SCNC: 24 MMOL/L — SIGNIFICANT CHANGE UP (ref 22–31)
COLOR SPEC: SIGNIFICANT CHANGE UP
CREAT SERPL-MCNC: 1.15 MG/DL — SIGNIFICANT CHANGE UP (ref 0.5–1.3)
DIFF PNL FLD: ABNORMAL
EGFR: 58 ML/MIN/1.73M2 — LOW
EOSINOPHIL # BLD AUTO: 0.29 K/UL — SIGNIFICANT CHANGE UP (ref 0–0.5)
EOSINOPHIL NFR BLD AUTO: 5.3 % — SIGNIFICANT CHANGE UP (ref 0–6)
EPI CELLS # UR: 12 /HPF — HIGH
GLUCOSE BLDC GLUCOMTR-MCNC: 136 MG/DL — HIGH (ref 70–99)
GLUCOSE SERPL-MCNC: 127 MG/DL — HIGH (ref 70–99)
GLUCOSE UR QL: NEGATIVE — SIGNIFICANT CHANGE UP
HCT VFR BLD CALC: 31.5 % — LOW (ref 34.5–45)
HGB BLD-MCNC: 10.2 G/DL — LOW (ref 11.5–15.5)
HYALINE CASTS # UR AUTO: 1 /LPF — SIGNIFICANT CHANGE UP (ref 0–2)
IMM GRANULOCYTES NFR BLD AUTO: 0.4 % — SIGNIFICANT CHANGE UP (ref 0–0.9)
KETONES UR-MCNC: NEGATIVE — SIGNIFICANT CHANGE UP
LEUKOCYTE ESTERASE UR-ACNC: ABNORMAL
LYMPHOCYTES # BLD AUTO: 1.19 K/UL — SIGNIFICANT CHANGE UP (ref 1–3.3)
LYMPHOCYTES # BLD AUTO: 21.7 % — SIGNIFICANT CHANGE UP (ref 13–44)
MAGNESIUM SERPL-MCNC: 1.8 MG/DL — SIGNIFICANT CHANGE UP (ref 1.6–2.6)
MCHC RBC-ENTMCNC: 32.4 GM/DL — SIGNIFICANT CHANGE UP (ref 32–36)
MCHC RBC-ENTMCNC: 33.9 PG — SIGNIFICANT CHANGE UP (ref 27–34)
MCV RBC AUTO: 104.7 FL — HIGH (ref 80–100)
MONOCYTES # BLD AUTO: 0.65 K/UL — SIGNIFICANT CHANGE UP (ref 0–0.9)
MONOCYTES NFR BLD AUTO: 11.9 % — SIGNIFICANT CHANGE UP (ref 2–14)
NEUTROPHILS # BLD AUTO: 3.3 K/UL — SIGNIFICANT CHANGE UP (ref 1.8–7.4)
NEUTROPHILS NFR BLD AUTO: 60.2 % — SIGNIFICANT CHANGE UP (ref 43–77)
NITRITE UR-MCNC: NEGATIVE — SIGNIFICANT CHANGE UP
NRBC # BLD: 0 /100 WBCS — SIGNIFICANT CHANGE UP (ref 0–0)
PH UR: 6.5 — SIGNIFICANT CHANGE UP (ref 5–8)
PHOSPHATE SERPL-MCNC: 2.4 MG/DL — LOW (ref 2.5–4.5)
PLATELET # BLD AUTO: 152 K/UL — SIGNIFICANT CHANGE UP (ref 150–400)
POTASSIUM SERPL-MCNC: 3.8 MMOL/L — SIGNIFICANT CHANGE UP (ref 3.5–5.3)
POTASSIUM SERPL-SCNC: 3.8 MMOL/L — SIGNIFICANT CHANGE UP (ref 3.5–5.3)
PROT SERPL-MCNC: 6.1 G/DL — SIGNIFICANT CHANGE UP (ref 6–8.3)
PROT UR-MCNC: ABNORMAL
RBC # BLD: 3.01 M/UL — LOW (ref 3.8–5.2)
RBC # FLD: 12.5 % — SIGNIFICANT CHANGE UP (ref 10.3–14.5)
RBC CASTS # UR COMP ASSIST: 6 /HPF — HIGH (ref 0–4)
SODIUM SERPL-SCNC: 139 MMOL/L — SIGNIFICANT CHANGE UP (ref 135–145)
SP GR SPEC: 1.01 — SIGNIFICANT CHANGE UP (ref 1.01–1.02)
TACROLIMUS SERPL-MCNC: 5.2 NG/ML — SIGNIFICANT CHANGE UP
UROBILINOGEN FLD QL: NEGATIVE — SIGNIFICANT CHANGE UP
WBC # BLD: 5.48 K/UL — SIGNIFICANT CHANGE UP (ref 3.8–10.5)
WBC # FLD AUTO: 5.48 K/UL — SIGNIFICANT CHANGE UP (ref 3.8–10.5)
WBC UR QL: 17 /HPF — HIGH (ref 0–5)

## 2022-09-18 PROCEDURE — 99233 SBSQ HOSP IP/OBS HIGH 50: CPT

## 2022-09-18 PROCEDURE — 99232 SBSQ HOSP IP/OBS MODERATE 35: CPT | Mod: GC

## 2022-09-18 RX ORDER — HYDROMORPHONE HYDROCHLORIDE 2 MG/ML
0.5 INJECTION INTRAMUSCULAR; INTRAVENOUS; SUBCUTANEOUS AT BEDTIME
Refills: 0 | Status: DISCONTINUED | OUTPATIENT
Start: 2022-09-18 | End: 2022-09-25

## 2022-09-18 RX ORDER — SODIUM,POTASSIUM PHOSPHATES 278-250MG
1 POWDER IN PACKET (EA) ORAL ONCE
Refills: 0 | Status: COMPLETED | OUTPATIENT
Start: 2022-09-18 | End: 2022-09-18

## 2022-09-18 RX ORDER — PANTOPRAZOLE SODIUM 20 MG/1
40 TABLET, DELAYED RELEASE ORAL
Refills: 0 | Status: DISCONTINUED | OUTPATIENT
Start: 2022-09-18 | End: 2022-09-26

## 2022-09-18 RX ORDER — INSULIN LISPRO 100/ML
1 VIAL (ML) SUBCUTANEOUS
Refills: 0 | Status: DISCONTINUED | OUTPATIENT
Start: 2022-09-18 | End: 2022-09-20

## 2022-09-18 RX ORDER — MAGNESIUM OXIDE 400 MG ORAL TABLET 241.3 MG
400 TABLET ORAL
Refills: 0 | Status: COMPLETED | OUTPATIENT
Start: 2022-09-18 | End: 2022-09-19

## 2022-09-18 RX ORDER — HYDROMORPHONE HYDROCHLORIDE 2 MG/ML
0.5 INJECTION INTRAMUSCULAR; INTRAVENOUS; SUBCUTANEOUS ONCE
Refills: 0 | Status: DISCONTINUED | OUTPATIENT
Start: 2022-09-18 | End: 2022-09-18

## 2022-09-18 RX ORDER — CARVEDILOL PHOSPHATE 80 MG/1
6.25 CAPSULE, EXTENDED RELEASE ORAL EVERY 12 HOURS
Refills: 0 | Status: DISCONTINUED | OUTPATIENT
Start: 2022-09-18 | End: 2022-09-19

## 2022-09-18 RX ORDER — HEPARIN SODIUM 5000 [USP'U]/ML
5000 INJECTION INTRAVENOUS; SUBCUTANEOUS EVERY 12 HOURS
Refills: 0 | Status: DISCONTINUED | OUTPATIENT
Start: 2022-09-18 | End: 2022-09-26

## 2022-09-18 RX ADMIN — TACROLIMUS 1 MILLIGRAM(S): 5 CAPSULE ORAL at 09:06

## 2022-09-18 RX ADMIN — ATORVASTATIN CALCIUM 10 MILLIGRAM(S): 80 TABLET, FILM COATED ORAL at 21:12

## 2022-09-18 RX ADMIN — Medication 1 SPRAY(S): at 06:35

## 2022-09-18 RX ADMIN — CEFEPIME 100 MILLIGRAM(S): 1 INJECTION, POWDER, FOR SOLUTION INTRAMUSCULAR; INTRAVENOUS at 13:41

## 2022-09-18 RX ADMIN — MAGNESIUM OXIDE 400 MG ORAL TABLET 400 MILLIGRAM(S): 241.3 TABLET ORAL at 17:36

## 2022-09-18 RX ADMIN — HYDROMORPHONE HYDROCHLORIDE 0.5 MILLIGRAM(S): 2 INJECTION INTRAMUSCULAR; INTRAVENOUS; SUBCUTANEOUS at 13:34

## 2022-09-18 RX ADMIN — Medication 1 SPRAY(S): at 17:39

## 2022-09-18 RX ADMIN — PANTOPRAZOLE SODIUM 40 MILLIGRAM(S): 20 TABLET, DELAYED RELEASE ORAL at 13:40

## 2022-09-18 RX ADMIN — HYDROMORPHONE HYDROCHLORIDE 0.5 MILLIGRAM(S): 2 INJECTION INTRAMUSCULAR; INTRAVENOUS; SUBCUTANEOUS at 21:30

## 2022-09-18 RX ADMIN — Medication 30 MILLILITER(S): at 18:22

## 2022-09-18 RX ADMIN — Medication 5 MILLIGRAM(S): at 06:35

## 2022-09-18 RX ADMIN — Medication 10 MILLIGRAM(S): at 13:34

## 2022-09-18 RX ADMIN — TRAMADOL HYDROCHLORIDE 25 MILLIGRAM(S): 50 TABLET ORAL at 21:16

## 2022-09-18 RX ADMIN — CARVEDILOL PHOSPHATE 6.25 MILLIGRAM(S): 80 CAPSULE, EXTENDED RELEASE ORAL at 21:12

## 2022-09-18 RX ADMIN — HYDROMORPHONE HYDROCHLORIDE 0.5 MILLIGRAM(S): 2 INJECTION INTRAMUSCULAR; INTRAVENOUS; SUBCUTANEOUS at 14:04

## 2022-09-18 RX ADMIN — HEPARIN SODIUM 5000 UNIT(S): 5000 INJECTION INTRAVENOUS; SUBCUTANEOUS at 09:08

## 2022-09-18 RX ADMIN — TRAMADOL HYDROCHLORIDE 50 MILLIGRAM(S): 50 TABLET ORAL at 10:27

## 2022-09-18 RX ADMIN — HEPARIN SODIUM 5000 UNIT(S): 5000 INJECTION INTRAVENOUS; SUBCUTANEOUS at 17:38

## 2022-09-18 RX ADMIN — Medication 1 PACKET(S): at 15:11

## 2022-09-18 RX ADMIN — TRAMADOL HYDROCHLORIDE 25 MILLIGRAM(S): 50 TABLET ORAL at 21:43

## 2022-09-18 RX ADMIN — SENNA PLUS 2 TABLET(S): 8.6 TABLET ORAL at 21:12

## 2022-09-18 RX ADMIN — HYDROMORPHONE HYDROCHLORIDE 0.5 MILLIGRAM(S): 2 INJECTION INTRAMUSCULAR; INTRAVENOUS; SUBCUTANEOUS at 21:13

## 2022-09-18 RX ADMIN — TRAMADOL HYDROCHLORIDE 50 MILLIGRAM(S): 50 TABLET ORAL at 17:36

## 2022-09-18 RX ADMIN — TRAMADOL HYDROCHLORIDE 50 MILLIGRAM(S): 50 TABLET ORAL at 18:06

## 2022-09-18 RX ADMIN — TRAMADOL HYDROCHLORIDE 50 MILLIGRAM(S): 50 TABLET ORAL at 10:57

## 2022-09-18 RX ADMIN — CEFEPIME 100 MILLIGRAM(S): 1 INJECTION, POWDER, FOR SOLUTION INTRAMUSCULAR; INTRAVENOUS at 06:35

## 2022-09-18 RX ADMIN — CEFEPIME 100 MILLIGRAM(S): 1 INJECTION, POWDER, FOR SOLUTION INTRAMUSCULAR; INTRAVENOUS at 21:12

## 2022-09-18 NOTE — PROGRESS NOTE ADULT - SUBJECTIVE AND OBJECTIVE BOX
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 ER on 9/14/2022 with c/o body aches, R flank pain, fevers/chills, nausea/vomiting, diarrhea. In [facility] ED Tmax 102.2F and leukocytosis. Lactate negative. Cultures sent. Given broad spectrum empiric IV ABX and IVF. Pt with difficulty tolerating PO intake due to N/V. Currently on insulin pump for DM.  Pt transferred to [facility] under transplant nephrology for further workup of sepsis.     Interval events:  -SOB with anxiety this morning, CTA neg for PE  -NC 3-4L O2  -n/v has subsided      Immunosuppression                               Maintenance:   Env per level, Holding Imuran, Pred 5  Ongoing monitoring for signs of rejection.    Potential Discharge date: TBD    Education:  Medications    Plan of care:  See Below      MEDICATIONS  (STANDING):  atorvastatin 10 milliGRAM(s) Oral at bedtime  cefepime   IVPB 1000 milliGRAM(s) IV Intermittent every 8 hours  famotidine    Tablet 20 milliGRAM(s) Oral daily  fluticasone propionate 50 MICROgram(s)/spray Nasal Spray 1 Spray(s) Both Nostrils two times a day  influenza   Vaccine 0.5 milliLiter(s) IntraMuscular once  insulin lispro (ADMELOG) Pump 1 Each SubCutaneous Continuous Pump  PARoxetine 10 milliGRAM(s) Oral daily  predniSONE   Tablet 5 milliGRAM(s) Oral daily  tacrolimus ER Tablet (ENVARSUS XR) 1 milliGRAM(s) Oral <User Schedule>    MEDICATIONS  (PRN):  clonazePAM  Tablet 1 milliGRAM(s) Oral daily PRN anxiety  ondansetron Injectable 4 milliGRAM(s) IV Push every 6 hours PRN Nausea and/or Vomiting  traMADol 25 milliGRAM(s) Oral every 6 hours PRN Moderate Pain (4 - 6)  traMADol 50 milliGRAM(s) Oral every 6 hours PRN Severe Pain (7 - 10)          Vital Signs Last 24 Hrs  T(C): 37.2 (17 Sep 2022 08:27), Max: 37.5 (17 Sep 2022 00:50)  T(F): 99 (17 Sep 2022 08:27), Max: 99.5 (17 Sep 2022 00:50)  HR: 61 (17 Sep 2022 08:27) (57 - 70)  BP: 130/70 (17 Sep 2022 08:27) (130/70 - 170/85)  BP(mean): --  RR: 18 (17 Sep 2022 08:27) (18 - 18)  SpO2: 96% (17 Sep 2022 08:27) (95% - 96%)    Parameters below as of 17 Sep 2022 08:27  Patient On (Oxygen Delivery Method): room air  O2 Flow (L/min): 4      I&O's Summary    16 Sep 2022 07:01  -  17 Sep 2022 07:00  --------------------------------------------------------  IN: 2720 mL / OUT: 300 mL / NET: 2420 mL    17 Sep 2022 07:01  -  17 Sep 2022 16:09  --------------------------------------------------------  IN: 400 mL / OUT: 0 mL / NET: 400 mL                              11.2   6.65  )-----------( 137      ( 17 Sep 2022 06:53 )             35.6     09-17    139  |  109<H>  |  14  ----------------------------<  91  4.5   |  20<L>  |  1.14    Ca    8.7      17 Sep 2022 06:52  Phos  2.4     09-17  Mg     1.8     09-17    TPro  6.5  /  Alb  3.5  /  TBili  0.7  /  DBili  x   /  AST  16  /  ALT  10  /  AlkPhos  40  09-17    Tacrolimus (), Serum: 5.5 ng/mL (09-17 @ 06:53)        Culture - Blood (collected 09-15-22 @ 18:45)  Source: .Blood Blood  Preliminary Report (09-16-22 @ 22:02):    No growth to date.    Culture - Blood (collected 09-15-22 @ 18:30)  Source: .Blood Blood  Preliminary Report (09-16-22 @ 22:02):    No growth to date.    Culture - Urine (collected 09-14-22 @ 23:55)  Source: Clean Catch Clean Catch (Midstream)  Final Report (09-16-22 @ 17:00):    <10,000 CFU/mL Normal Urogenital Angélica    Culture - Blood (collected 09-14-22 @ 18:05)  Source: .Blood Blood-Peripheral  Preliminary Report (09-16-22 @ 01:02):    No growth to date.    Culture - Blood (collected 09-14-22 @ 18:00)  Source: .Blood Blood-Peripheral  Preliminary Report (09-16-22 @ 01:02):    No growth to date.                    Review of systems  Gen: No weight changes, fatigue, fevers/chills, weakness  Skin: No rashes  Head/Eyes/Ears/Mouth: No headache; Normal hearing; Normal vision w/o blurriness; No sinus pain/discomfort, sore throat  Respiratory: No dyspnea, cough, wheezing, hemoptysis  CV: No chest pain, PND, orthopnea  GI: denies diarrhea, constipation, nausea, vomiting, melena, hematochezia  : No increased frequency, dysuria, hematuria, nocturia  MSK: No joint pain/swelling; no back pain; no edema  Neuro: No dizziness/lightheadedness, weakness, seizures, numbness, tingling  Heme: No easy bruising or bleeding  Endo: No heat/cold intolerance  Psych: No significant nervousness, anxiety, stress, depression  All other systems were reviewed and are negative, except as noted.      PHYSICAL EXAM:  Constitutional: Well developed / well nourished  Eyes: Anicteric, PERRLA  ENMT: nc/at  Respiratory: CTA B/L  Cardiovascular: RRR  Gastrointestinal: Soft, non distended, NT.  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 In [facility] ED Tmax 102.2F and leukocytosis. Lactate negative. Cultures sent. Given broad spectrum empiric IV ABX and IVF. Pt with difficulty tolerating PO intake due to N/V. Currently on insulin pump for DM.  Pt transferred to [facility] for further workup of sepsis.     Interval events:  -remains Afebrile, on Cefepime  -SOB has improved, still requiring some O2  -?GERD like symptoms, likely from recent n/v0  -continues with fatigue and vague viral like symptoms, but overall with slow improvement today      Immunosuppression                               Maintenance:   Env per level, Holding Imuran, Pred 5  Ongoing monitoring for signs of rejection.    Potential Discharge date: TBD    Education:  Medications    Plan of care:  See Below        MEDICATIONS  (STANDING):  atorvastatin 10 milliGRAM(s) Oral at bedtime  cefepime   IVPB 1000 milliGRAM(s) IV Intermittent every 8 hours  fluticasone propionate 50 MICROgram(s)/spray Nasal Spray 1 Spray(s) Both Nostrils two times a day  heparin   Injectable 5000 Unit(s) SubCutaneous every 12 hours  HYDROmorphone  Injectable 0.5 milliGRAM(s) IV Push once  influenza   Vaccine 0.5 milliLiter(s) IntraMuscular once  insulin lispro (ADMELOG) Pump 1 Each SubCutaneous Continuous Pump  pantoprazole    Tablet 40 milliGRAM(s) Oral before breakfast  PARoxetine 10 milliGRAM(s) Oral daily  predniSONE   Tablet 5 milliGRAM(s) Oral daily  senna 2 Tablet(s) Oral at bedtime  tacrolimus ER Tablet (ENVARSUS XR) 1 milliGRAM(s) Oral <User Schedule>    MEDICATIONS  (PRN):  aluminum hydroxide/magnesium hydroxide/simethicone Suspension 30 milliLiter(s) Oral every 4 hours PRN Dyspepsia  clonazePAM  Tablet 1 milliGRAM(s) Oral three times a day PRN anxiety  ondansetron Injectable 4 milliGRAM(s) IV Push every 6 hours PRN Nausea and/or Vomiting  traMADol 25 milliGRAM(s) Oral every 6 hours PRN Moderate Pain (4 - 6)  traMADol 50 milliGRAM(s) Oral every 6 hours PRN Severe Pain (7 - 10)          Vital Signs Last 24 Hrs  T(C): 37.1 (18 Sep 2022 08:59), Max: 37.1 (18 Sep 2022 08:59)  T(F): 98.7 (18 Sep 2022 08:59), Max: 98.7 (18 Sep 2022 08:59)  HR: 60 (18 Sep 2022 08:59) (49 - 72)  BP: 149/78 (18 Sep 2022 08:59) (146/82 - 172/76)  BP(mean): --  RR: 20 (18 Sep 2022 08:59) (16 - 20)  SpO2: 96% (18 Sep 2022 08:59) (94% - 97%)    Parameters below as of 18 Sep 2022 08:59  Patient On (Oxygen Delivery Method): nasal cannula        I&O's Summary    17 Sep 2022 07:01  -  18 Sep 2022 07:00  --------------------------------------------------------  IN: 760 mL / OUT: 0 mL / NET: 760 mL                              10.2   5.48  )-----------( 152      ( 18 Sep 2022 06:34 )             31.5     09-18    139  |  105  |  17  ----------------------------<  127<H>  3.8   |  24  |  1.15    Ca    8.6      18 Sep 2022 06:34  Phos  2.4     09-18  Mg     1.8     09-18    TPro  6.1  /  Alb  3.3  /  TBili  0.6  /  DBili  x   /  AST  9<L>  /  ALT  9<L>  /  AlkPhos  41  09-18    Tacrolimus (), Serum: 5.2 ng/mL (09-18 @ 06:34)        Culture - Blood (collected 09-15-22 @ 18:45)  Source: .Blood Blood  Preliminary Report (09-16-22 @ 22:02):    No growth to date.    Culture - Blood (collected 09-15-22 @ 18:30)  Source: .Blood Blood  Preliminary Report (09-16-22 @ 22:02):    No growth to date.    Culture - Urine (collected 09-14-22 @ 23:55)  Source: Clean Catch Clean Catch (Midstream)  Final Report (09-16-22 @ 17:00):    <10,000 CFU/mL Normal Urogenital Angélica    Culture - Blood (collected 09-14-22 @ 18:05)  Source: .Blood Blood-Peripheral  Preliminary Report (09-16-22 @ 01:02):    No growth to date.    Culture - Blood (collected 09-14-22 @ 18:00)  Source: .Blood Blood-Peripheral  Preliminary Report (09-16-22 @ 01:02):    No growth to date.                    Review of systems  Gen: No weight changes, fatigue, fevers/chills, weakness, see above  Skin: No rashes  Head/Eyes/Ears/Mouth: No headache; Normal hearing; Normal vision w/o blurriness; No sinus pain/discomfort, sore throat, see above  Respiratory: No dyspnea, cough, wheezing, hemoptysis  CV: No chest pain, PND, orthopnea  GI: denies diarrhea, constipation, nausea, vomiting, melena, hematochezia, see above  : No increased frequency, dysuria, hematuria, nocturia  MSK: No joint pain/swelling; no back pain; no edema  Neuro: No dizziness/lightheadedness, weakness, seizures, numbness, tingling  Heme: No easy bruising or bleeding  Endo: No heat/cold intolerance  Psych: No significant nervousness, anxiety, stress, depression  All other systems were reviewed and are negative, except as noted.      PHYSICAL EXAM:  Constitutional: Well developed / well nourished  Eyes: Anicteric, PERRLA  ENMT: nc/at  Respiratory: CTA B/L  Cardiovascular: RRR  Gastrointestinal: Soft, non distended, NT.  well healed incisional scar   Genitourinary: Voiding spontaneously  Extremities: SCD's in place and working bilaterally,  chronic edema ~1+ b/l  Vascular: Palpable dp pulses bilaterally  Neurological: A&O x3  Skin: no rashes, ulcerations or lesions;  Musculoskeletal: Moving all extremities  Psychiatric: Responsive

## 2022-09-18 NOTE — PROGRESS NOTE ADULT - ASSESSMENT
51 y.o female with PCKD s/p bilateral nephrectomies, s/p LDRT in 4/2018 transferred from [facility]SH with sepsis.  1.  Kidney transplant - renal function is stable.    2.  Immunosuppression - Held imuran, cont pred and tacrolimus.   3.  Fever - unclear etiology, w/u pending.  Cultures negative.  Adenovirus pcr pending.    4.  Dyspnea - CXR clear.  CT revealed pulmonary edema - improved with lasix.  Check TTE.   51 y.o female with PCKD s/p bilateral nephrectomies, s/p LDRT in 4/2018 transferred from OSH with sepsis.  1.  Kidney transplant - renal function is stable.    2.  Immunosuppression - Held imuran, cont pred and tacrolimus.   3.  Fever - unclear etiology, w/u pending.  Cultures negative.  Adenovirus pcr pending.  Fevers resolved.   4.  Dyspnea - CXR clear.  CT revealed pulmonary edema but also some upper lobe infiltrates - improved with lasix.  Check TTE.  If worsening status may need to consider fungal disease.  Will check galactomanan and fungitel.  Fevers have improved.

## 2022-09-18 NOTE — PROGRESS NOTE ADULT - SUBJECTIVE AND OBJECTIVE BOX
seen earlier today     Chief Complaint: Type 2 Diabetes Mellitus     INTERVAL HX: provided extensive education on rationale for POC monitoring while hospitalized pt continues to refuse POC FS routinely, but agreeable to POC FS "if staff feel I look weird" such as if she appears hypo, d/w Pt she appeared solmnolent when writer arrived and was concerned for hypoglycemia , needed to gently tap her shoulder to keep her awake during interview, pt reports she has insomnia and doesnt sleep well at baseline and wishes to not take additional meds while here d/w team as well , educated her not sleeping can also cause stress raising bg.   reports eating approx 25% of meals. reviewed pump & dexcom. CGM readings 100s-190s past 24 hours, CGM reading at time of visit 172, control IQ ON ; POC Checked 136 . dexcom on inside of right arm> requested pt to change sensor to approved site of abdomen for accurate readings, pt prefers to place on back of arm instead despite education  noted sob/anxiety over night as well which pt reports has improved     Review of Systems:  General: As above  Cardiovascular: No chest pain  Respiratory: No SOB  GI: +nausea  Endocrine: no  S&Sx of hypoglycemia    Allergies    Demerol HCl (Hives)  Phenergan (Hives; Rash)    Intolerances      MEDICATIONS  (STANDING):  atorvastatin 10 milliGRAM(s) Oral at bedtime  cefepime   IVPB 1000 milliGRAM(s) IV Intermittent every 8 hours  fluticasone propionate 50 MICROgram(s)/spray Nasal Spray 1 Spray(s) Both Nostrils two times a day  heparin   Injectable 5000 Unit(s) SubCutaneous every 12 hours  influenza   Vaccine 0.5 milliLiter(s) IntraMuscular once  insulin lispro (ADMELOG) Pump 1 Each SubCutaneous Continuous Pump  magnesium oxide 400 milliGRAM(s) Oral two times a day with meals  pantoprazole    Tablet 40 milliGRAM(s) Oral before breakfast  PARoxetine 10 milliGRAM(s) Oral daily  potassium phosphate / sodium phosphate Powder (PHOS-NaK) 1 Packet(s) Oral once  predniSONE   Tablet 5 milliGRAM(s) Oral daily  senna 2 Tablet(s) Oral at bedtime  tacrolimus ER Tablet (ENVARSUS XR) 1 milliGRAM(s) Oral <User Schedule>      atorvastatin   10 milliGRAM(s) Oral (09-17-22 @ 22:03)    predniSONE   Tablet   5 milliGRAM(s) Oral (09-18-22 @ 06:35)        PHYSICAL EXAM:  VITALS: T(C): 37.1 (09-18-22 @ 13:00)  T(F): 98.7 (09-18-22 @ 13:00), Max: 98.7 (09-18-22 @ 08:59)  HR: 61 (09-18-22 @ 13:00) (49 - 72)  BP: 144/76 (09-18-22 @ 13:00) (144/76 - 172/76)  RR:  (16 - 20)  SpO2:  (94% - 97%)  Wt(kg): --  GENERAL: female laying in bed  in NAD  pump site RLQ abdomen CDI; Dexcom on INSIDE of right arm  Respiratory: Respirations unlabored   Extremities: Warm   NEURO: drowsy, arousable w/ tactile stimuli      LABS:    POCT Blood Glucose.: 136 mg/dL (09-18-22 @ 13:55)                            10.2   5.48  )-----------( 152      ( 18 Sep 2022 06:34 )             31.5       09-18    139  |  105  |  17  ----------------------------<  127<H>  3.8   |  24  |  1.15    Ca    8.6      18 Sep 2022 06:34  Phos  2.4     09-18  Mg     1.8     09-18    TPro  6.1  /  Alb  3.3  /  TBili  0.6  /  DBili  x   /  AST  9<L>  /  ALT  9<L>  /  AlkPhos  41  09-18      eGFR: 58 mL/min/1.73m2 (18 Sep 2022 06:34)      A1C with Estimated Average Glucose Result: 6.2 % (09-17-22 @ 06:53)      Estimated Average Glucose: 131 mg/dL (09-17-22 @ 06:53)

## 2022-09-18 NOTE — PROGRESS NOTE ADULT - ASSESSMENT
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 s/p BL Nephrectomy/Single Donor Renal Transplant 2018, Post Transplant DM, HTN, HLD, GERD,  now transferred for further sepsis workup from [facility]    Infectious w/u s/p DDRT  -good graft function  -pan-culture negative so far, viral studies pending  -continue Cefepime for now per ID  -imaging unremarkable  -cleared by GYN for adnexal cysts/IUD sources for infection  -seen by ENT for ear pain, now resolved, continue Flomax  -SOB this AM likely 2/2 pulmonary edema, Lasix prn, check TTE. CTA neg for PE  -spirometry, wean off NC  -PT/OT  -Lovenox x1 given during SOB episode, start SQH BID  -SCDs    Immunosuppression:  -Prednisone 5  -Envarsus per level  -Imuran hold in the setting of infection    DM:  -On insulin pump  -Endoon baordfor pump management  -Was at home on (Trulicity)  -Diabetic diet    HTN, HLD  -Monitor BP  -Was at home on (Losartan 100mg QD, Spironolactone 25mg PO q24)  -Coreg 25mg BID  -Pravastatin 20mg PO q24    MDD, Anxiety  -Paroxetine 10mg PO q24  -Klonopin 1mg PO PRN    GERD  -Pepcid 20mg q24    IWNSTON Carver, PGY-3   [facility]   Transplant Surgery   [id number] 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 s/p BL Nephrectomy/Single Donor Renal Transplant 2018, Post Transplant DM, HTN, HLD, GERD,  now transferred for further sepsis workup from [facility]    Infectious w/u s/p DDRT  -good graft function, responded well to Lasix trial yesterday in setting of some pulmonary edema seen on imaging. Will hold today and observe. check TTE  -cardiac w/u negative  -pan-culture negative so far, viral studies pending  -continue Cefepime for now  -imaging unremarkable  -cleared by GYN for adnexal cysts/IUD sources for infection  -seen by ENT for ear pain, now resolved, continue Flonase  -spirometry, wean off NC  -PT/OT  -Lovenox x1 given during SOB episode, now on SQH BID  -SCDs  -analgesia prn     Immunosuppression:  -Prednisone 5  -Envarsus per level  -Imuran hold in the setting of infection    DM:  -On insulin pump  -Endocrine on board pump management  -Was at home on (Trulicity)  -Diabetic diet    HTN, HLD  -Monitor BP  -Was at home on (Losartan 100mg QD, Spironolactone 25mg PO q24). will restart as able  -Pravastatin 20mg PO q24    MDD, Anxiety  -Paroxetine 10mg PO q24  -Klonopin 1mg PO PRN    GERD  -switch to Protonix, add Maalox prn 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 s/p BL Nephrectomy/Single Donor Renal Transplant 2018, Post Transplant DM, HTN, HLD, GERD,  now transferred for further sepsis workup from [facility]    Infectious w/u s/p DDRT  -good graft function, responded well to Lasix trial yesterday in setting of some pulmonary edema seen on imaging. Will hold today and observe. check TTE  -cardiac w/u negative  -pan-culture negative so far, viral studies pending  -check fungitell  -continue Cefepime for now  -imaging unremarkable  -cleared by GYN for adnexal cysts/IUD sources for infection  -seen by ENT for ear pain, now resolved, continue Flonase  -spirometry, wean off NC  -PT/OT  -Lovenox x1 given during SOB episode, now on SQH BID  -SCDs  -analgesia prn     Immunosuppression:  -Prednisone 5  -Envarsus per level  -Imuran hold in the setting of infection    DM:  -On insulin pump  -Endocrine on board pump management  -Was at home on (Trulicity)  -Diabetic diet    HTN, HLD  -Monitor BP  -Was at home on (Losartan 100mg QD, Spironolactone 25mg PO q24). will restart as able  -Pravastatin 20mg PO q24    MDD, Anxiety  -Paroxetine 10mg PO q24  -Klonopin 1mg PO PRN    GERD  -switch to Protonix, add Maalox prn

## 2022-09-18 NOTE — PROGRESS NOTE ADULT - ASSESSMENT
51 yr old F with PMHX ESRD 2/2 PCKD s/p BL Nephrectomy/Single Donor Renal Transplant, Post Transplant DM, HTN, HLD, GERD here with sepsis due to UTI. Uses T slim and DEXCOM with Control IQ (Admelog).          1.  T2DM on insulin pump  - Most recent Hemoglobin A1C 6.2  - Current FS ranges from 100-190 per patient's pump   - Current inpatient regimen: Tslim (humalog pts home med)  - Current diet: CHO  - Please monitor blood glucose values TID AC & QHS while eating regular meals and Q6H while NPO  - Blood glucose goals pre-meal less than 140 mg/dL and random blood glucose less than 180 mg/dL  - Recommendations:  - Continue with insulin pump, setting verified   - I discussed with the patient to allow finger stick ideally should be four times per day, but we should be checking at least once per day to make sure her sugar is at goal & correlating. Patient says she has DEXCOM in place and will let us know if her glucose is not. I advised that in case DEXCOM fail to work, we would need to document her finger stick. Further educated pt that dexcom values can be inaccurate in setting of dehydration, potentially high doses of tylenol , also she has dexcom placed on inside of right arm>requested pt to move dexcom to abdomen (only approved site), pt prefers to place cgm on back of arm instead, reminded pt dexcom values may not be as accurate in first 24 hours of sensor placement.  pt w/ reported poor po/fluid intake , if dehydrated  recommend checking FS routinely,  Pt agreeable to FS only if necessary but not routinely. educated pt on risk of hypoglycemia while on control IQ if CGM readings are inaccurate 2/2 dehydration, pt verbalized understanding of risk of hypoglycemia and wants to continue CGM monitoring instead of POC.   - For now continue with insulin pump   - Pump infusion site last changed 9/16,, has one more set of supplies, she will ask her family to her more supplies, has 1 additional dexcom sensor  -dexcom sensor changed 9/18     Discharge planning:   - Patient will follow up with Dr. Berman as outpatient   - Can be discharged on insulin pump    2. HTN  - BP goal 130/80  - Manage per primary team     3. HLD  - Continue with atorvastatin 10 mg daily   - Manage as outpatient     4. Chronic steroid use  - On prednisone 5 mg daily     Discussed with patient and primary  team Marciano BADILLO   Contact via [employer] Teams during business hours  On evenings and weekends, please call [phone] or page endocrine fellow on call.   Email: [email or web address]   Please note that this patient may be followed by different provider tomorrow.    greater than 50% of the encounter was spent counseling and/or coordination of care.  35 minutes spent on total encounter; The necessity of the time spent during the encounter on this date of service was due to development of plan of care/coordination of care/glycemic control through review of labs, blood glucose values and vital signs.

## 2022-09-18 NOTE — PROGRESS NOTE ADULT - SUBJECTIVE AND OBJECTIVE BOX
[facility] DIVISION OF KIDNEY DISEASES AND HYPERTENSION -- FOLLOW UP NOTE  --------------------------------------------------------------------------------  Chief Complaint:  Fever    24 hour events/subjective:  Pt feels better today, dyspnea improved.  Still does not feel well.       PAST HISTORY  --------------------------------------------------------------------------------  No significant changes to PMH, PSH, FHx, SHx, unless otherwise noted    ALLERGIES & MEDICATIONS  --------------------------------------------------------------------------------  Allergies    Demerol HCl (Hives)  Phenergan (Hives; Rash)    Intolerances      Standing Inpatient Medications  atorvastatin 10 milliGRAM(s) Oral at bedtime  cefepime   IVPB 1000 milliGRAM(s) IV Intermittent every 8 hours  fluticasone propionate 50 MICROgram(s)/spray Nasal Spray 1 Spray(s) Both Nostrils two times a day  heparin   Injectable 5000 Unit(s) SubCutaneous every 12 hours  HYDROmorphone  Injectable 0.5 milliGRAM(s) IV Push once  influenza   Vaccine 0.5 milliLiter(s) IntraMuscular once  insulin lispro (ADMELOG) Pump 1 Each SubCutaneous Continuous Pump  pantoprazole    Tablet 40 milliGRAM(s) Oral before breakfast  PARoxetine 10 milliGRAM(s) Oral daily  predniSONE   Tablet 5 milliGRAM(s) Oral daily  senna 2 Tablet(s) Oral at bedtime  tacrolimus ER Tablet (ENVARSUS XR) 1 milliGRAM(s) Oral <User Schedule>    PRN Inpatient Medications  aluminum hydroxide/magnesium hydroxide/simethicone Suspension 30 milliLiter(s) Oral every 4 hours PRN  clonazePAM  Tablet 1 milliGRAM(s) Oral three times a day PRN  ondansetron Injectable 4 milliGRAM(s) IV Push every 6 hours PRN  traMADol 25 milliGRAM(s) Oral every 6 hours PRN  traMADol 50 milliGRAM(s) Oral every 6 hours PRN      REVIEW OF SYSTEMS  --------------------------------------------------------------------------------  Gen: No fatigue, fevers/chills, weakness  Skin: No rashes  Head/Eyes/Ears/Mouth: No headache;No sore throat  Respiratory: + mild dyspnea  CV: No chest pain, PND, orthopnea  GI: No abdominal pain, diarrhea, constipation, nausea, vomiting  Transplant: No pain  : No increased frequency, dysuria, hematuria, nocturia  MSK: No joint pain/swelling; no back pain; no edema  Neuro: headache  Psych: No significant nervousness, anxiety, stress, depression    All other systems were reviewed and are negative, except as noted.    VITALS/PHYSICAL EXAM  --------------------------------------------------------------------------------  T(C): 37.1 (09-18-22 @ 08:59), Max: 37.1 (09-18-22 @ 08:59)  HR: 60 (09-18-22 @ 08:59) (49 - 72)  BP: 149/78 (09-18-22 @ 08:59) (146/82 - 172/76)  RR: 20 (09-18-22 @ 08:59) (16 - 20)  SpO2: 96% (09-18-22 @ 08:59) (94% - 97%)  Wt(kg): --  Height (cm): 175.3 (09-16-22 @ 17:39)  Weight (kg): 114.9 (09-16-22 @ 17:39)  BMI (kg/m2): 37.4 (09-16-22 @ 17:39)  BSA (m2): 2.28 (09-16-22 @ 17:39)      09-17-22 @ 07:01  -  09-18-22 @ 07:00  --------------------------------------------------------  IN: 760 mL / OUT: 0 mL / NET: 760 mL      Physical Exam:  	Gen: NAD, well-appearing  	HEENT: PERRL, supple neck, clear oropharynx  	Pulm: CTA B/L  	CV: RRR, S1S2; no rub  	Back: No spinal or CVA tenderness; no sacral edema  	Abd: +BS, soft, nontender/nondistended                      Transplant: No tenderness, swelling, incision c/d/i  	: No suprapubic tenderness  	UE: Warm, FROM, intact strength; no edema; no asterixis  	LE: Warm, FROM, intact strength; no edema      LABS/STUDIES  --------------------------------------------------------------------------------              10.2   5.48  >-----------<  152      [09-18-22 @ 06:34]              31.5     139  |  105  |  17  ----------------------------<  127      [09-18-22 @ 06:34]  3.8   |  24  |  1.15        Ca     8.6     [09-18-22 @ 06:34]      Mg     1.8     [09-18-22 @ 06:34]      Phos  2.4     [09-18-22 @ 06:34]    TPro  6.1  /  Alb  3.3  /  TBili  0.6  /  DBili  x   /  AST  9   /  ALT  9   /  AlkPhos  41  [09-18-22 @ 06:34]        CK 82      [09-17-22 @ 12:32]    Creatinine Trend:  SCr 1.15 [09-18 @ 06:34]  SCr 1.14 [09-17 @ 06:52]  SCr 1.21 [09-16 @ 06:58]  SCr 1.17 [09-15 @ 18:51]  SCr 1.13 [09-15 @ 06:35]    Tacrolimus (), Serum: 5.2 ng/mL (09-18 @ 06:34)  Tacrolimus (), Serum: 5.5 ng/mL (09-17 @ 06:53)  Tacrolimus (), Serum: 3.7 ng/mL (09-16 @ 07:03)  Tacrolimus (), Serum: 2.8 ng/mL (09-15 @ 06:35)            Urinalysis - [09-14-22 @ 23:55]      Color Yellow / Appearance Clear / SG 1.010 / pH 6.0      Gluc Negative / Ketone Negative  / Bili Negative / Urobili Negative       Blood Small / Protein Negative / Leuk Est Small / Nitrite Negative      RBC 3-5 / WBC 6-10 / Hyaline  / Gran  / Sq Epi  / Non Sq Epi Occasional / Bacteria

## 2022-09-19 LAB
ALBUMIN SERPL ELPH-MCNC: 3.1 G/DL — LOW (ref 3.3–5)
ALP SERPL-CCNC: 37 U/L — LOW (ref 40–120)
ALT FLD-CCNC: 12 U/L — SIGNIFICANT CHANGE UP (ref 10–45)
ANION GAP SERPL CALC-SCNC: 11 MMOL/L — SIGNIFICANT CHANGE UP (ref 5–17)
AST SERPL-CCNC: 12 U/L — SIGNIFICANT CHANGE UP (ref 10–40)
BASOPHILS # BLD AUTO: 0.03 K/UL — SIGNIFICANT CHANGE UP (ref 0–0.2)
BASOPHILS NFR BLD AUTO: 0.6 % — SIGNIFICANT CHANGE UP (ref 0–2)
BILIRUB SERPL-MCNC: 0.6 MG/DL — SIGNIFICANT CHANGE UP (ref 0.2–1.2)
BUN SERPL-MCNC: 18 MG/DL — SIGNIFICANT CHANGE UP (ref 7–23)
CALCIUM SERPL-MCNC: 9.1 MG/DL — SIGNIFICANT CHANGE UP (ref 8.4–10.5)
CHLORIDE SERPL-SCNC: 105 MMOL/L — SIGNIFICANT CHANGE UP (ref 96–108)
CO2 SERPL-SCNC: 23 MMOL/L — SIGNIFICANT CHANGE UP (ref 22–31)
CREAT SERPL-MCNC: 1.06 MG/DL — SIGNIFICANT CHANGE UP (ref 0.5–1.3)
CULTURE RESULTS: SIGNIFICANT CHANGE UP
EGFR: 64 ML/MIN/1.73M2 — SIGNIFICANT CHANGE UP
EOSINOPHIL # BLD AUTO: 0.35 K/UL — SIGNIFICANT CHANGE UP (ref 0–0.5)
EOSINOPHIL NFR BLD AUTO: 6.4 % — HIGH (ref 0–6)
GLUCOSE SERPL-MCNC: 96 MG/DL — SIGNIFICANT CHANGE UP (ref 70–99)
HADV DNA FLD NAA+PROBE-LOG#: SIGNIFICANT CHANGE UP COPIES/ML
HCT VFR BLD CALC: 32.8 % — LOW (ref 34.5–45)
HGB BLD-MCNC: 10.4 G/DL — LOW (ref 11.5–15.5)
IMM GRANULOCYTES NFR BLD AUTO: 0.4 % — SIGNIFICANT CHANGE UP (ref 0–0.9)
LYMPHOCYTES # BLD AUTO: 1.45 K/UL — SIGNIFICANT CHANGE UP (ref 1–3.3)
LYMPHOCYTES # BLD AUTO: 26.7 % — SIGNIFICANT CHANGE UP (ref 13–44)
MAGNESIUM SERPL-MCNC: 1.8 MG/DL — SIGNIFICANT CHANGE UP (ref 1.6–2.6)
MCHC RBC-ENTMCNC: 31.7 GM/DL — LOW (ref 32–36)
MCHC RBC-ENTMCNC: 34.3 PG — HIGH (ref 27–34)
MCV RBC AUTO: 108.3 FL — HIGH (ref 80–100)
MONOCYTES # BLD AUTO: 0.53 K/UL — SIGNIFICANT CHANGE UP (ref 0–0.9)
MONOCYTES NFR BLD AUTO: 9.7 % — SIGNIFICANT CHANGE UP (ref 2–14)
NEUTROPHILS # BLD AUTO: 3.06 K/UL — SIGNIFICANT CHANGE UP (ref 1.8–7.4)
NEUTROPHILS NFR BLD AUTO: 56.2 % — SIGNIFICANT CHANGE UP (ref 43–77)
NRBC # BLD: 0 /100 WBCS — SIGNIFICANT CHANGE UP (ref 0–0)
PHOSPHATE SERPL-MCNC: 2.9 MG/DL — SIGNIFICANT CHANGE UP (ref 2.5–4.5)
PLATELET # BLD AUTO: 148 K/UL — LOW (ref 150–400)
POTASSIUM SERPL-MCNC: 4.2 MMOL/L — SIGNIFICANT CHANGE UP (ref 3.5–5.3)
POTASSIUM SERPL-SCNC: 4.2 MMOL/L — SIGNIFICANT CHANGE UP (ref 3.5–5.3)
PROT SERPL-MCNC: 6 G/DL — SIGNIFICANT CHANGE UP (ref 6–8.3)
RBC # BLD: 3.03 M/UL — LOW (ref 3.8–5.2)
RBC # FLD: 12.3 % — SIGNIFICANT CHANGE UP (ref 10.3–14.5)
SODIUM SERPL-SCNC: 139 MMOL/L — SIGNIFICANT CHANGE UP (ref 135–145)
SPECIMEN SOURCE: SIGNIFICANT CHANGE UP
TACROLIMUS SERPL-MCNC: 4 NG/ML — SIGNIFICANT CHANGE UP
WBC # BLD: 5.44 K/UL — SIGNIFICANT CHANGE UP (ref 3.8–10.5)
WBC # FLD AUTO: 5.44 K/UL — SIGNIFICANT CHANGE UP (ref 3.8–10.5)

## 2022-09-19 PROCEDURE — 93306 TTE W/DOPPLER COMPLETE: CPT | Mod: 26

## 2022-09-19 PROCEDURE — 99232 SBSQ HOSP IP/OBS MODERATE 35: CPT | Mod: GC

## 2022-09-19 PROCEDURE — 76776 US EXAM K TRANSPL W/DOPPLER: CPT | Mod: 26,RT

## 2022-09-19 PROCEDURE — 99233 SBSQ HOSP IP/OBS HIGH 50: CPT

## 2022-09-19 RX ORDER — NIFEDIPINE 30 MG
30 TABLET, EXTENDED RELEASE 24 HR ORAL ONCE
Refills: 0 | Status: COMPLETED | OUTPATIENT
Start: 2022-09-19 | End: 2022-09-19

## 2022-09-19 RX ORDER — HYDROMORPHONE HYDROCHLORIDE 2 MG/ML
0.5 INJECTION INTRAMUSCULAR; INTRAVENOUS; SUBCUTANEOUS ONCE
Refills: 0 | Status: DISCONTINUED | OUTPATIENT
Start: 2022-09-19 | End: 2022-09-19

## 2022-09-19 RX ORDER — NIFEDIPINE 30 MG
60 TABLET, EXTENDED RELEASE 24 HR ORAL DAILY
Refills: 0 | Status: DISCONTINUED | OUTPATIENT
Start: 2022-09-20 | End: 2022-09-26

## 2022-09-19 RX ADMIN — TRAMADOL HYDROCHLORIDE 50 MILLIGRAM(S): 50 TABLET ORAL at 23:00

## 2022-09-19 RX ADMIN — HYDROMORPHONE HYDROCHLORIDE 0.5 MILLIGRAM(S): 2 INJECTION INTRAMUSCULAR; INTRAVENOUS; SUBCUTANEOUS at 16:20

## 2022-09-19 RX ADMIN — Medication 30 MILLIGRAM(S): at 20:32

## 2022-09-19 RX ADMIN — PANTOPRAZOLE SODIUM 40 MILLIGRAM(S): 20 TABLET, DELAYED RELEASE ORAL at 06:35

## 2022-09-19 RX ADMIN — HEPARIN SODIUM 5000 UNIT(S): 5000 INJECTION INTRAVENOUS; SUBCUTANEOUS at 19:11

## 2022-09-19 RX ADMIN — MAGNESIUM OXIDE 400 MG ORAL TABLET 400 MILLIGRAM(S): 241.3 TABLET ORAL at 10:00

## 2022-09-19 RX ADMIN — HYDROMORPHONE HYDROCHLORIDE 0.5 MILLIGRAM(S): 2 INJECTION INTRAMUSCULAR; INTRAVENOUS; SUBCUTANEOUS at 23:00

## 2022-09-19 RX ADMIN — TACROLIMUS 1 MILLIGRAM(S): 5 CAPSULE ORAL at 10:01

## 2022-09-19 RX ADMIN — Medication 1 SPRAY(S): at 06:47

## 2022-09-19 RX ADMIN — ATORVASTATIN CALCIUM 10 MILLIGRAM(S): 80 TABLET, FILM COATED ORAL at 20:32

## 2022-09-19 RX ADMIN — TRAMADOL HYDROCHLORIDE 50 MILLIGRAM(S): 50 TABLET ORAL at 22:24

## 2022-09-19 RX ADMIN — Medication 30 MILLIGRAM(S): at 17:54

## 2022-09-19 RX ADMIN — HYDROMORPHONE HYDROCHLORIDE 0.5 MILLIGRAM(S): 2 INJECTION INTRAMUSCULAR; INTRAVENOUS; SUBCUTANEOUS at 22:24

## 2022-09-19 RX ADMIN — Medication 5 MILLIGRAM(S): at 05:40

## 2022-09-19 RX ADMIN — Medication 10 MILLIGRAM(S): at 13:31

## 2022-09-19 RX ADMIN — TRAMADOL HYDROCHLORIDE 50 MILLIGRAM(S): 50 TABLET ORAL at 06:15

## 2022-09-19 RX ADMIN — Medication 1 SPRAY(S): at 19:10

## 2022-09-19 RX ADMIN — TRAMADOL HYDROCHLORIDE 50 MILLIGRAM(S): 50 TABLET ORAL at 05:41

## 2022-09-19 RX ADMIN — TRAMADOL HYDROCHLORIDE 50 MILLIGRAM(S): 50 TABLET ORAL at 14:40

## 2022-09-19 RX ADMIN — TRAMADOL HYDROCHLORIDE 50 MILLIGRAM(S): 50 TABLET ORAL at 13:41

## 2022-09-19 RX ADMIN — HYDROMORPHONE HYDROCHLORIDE 0.5 MILLIGRAM(S): 2 INJECTION INTRAMUSCULAR; INTRAVENOUS; SUBCUTANEOUS at 15:50

## 2022-09-19 RX ADMIN — HEPARIN SODIUM 5000 UNIT(S): 5000 INJECTION INTRAVENOUS; SUBCUTANEOUS at 05:42

## 2022-09-19 NOTE — PROGRESS NOTE ADULT - SUBJECTIVE AND OBJECTIVE BOX
DIABETES FOLLOW UP NOTE: Saw pt earlier today    Chief Complaint: Endocrine consult requested for management of T2DM    INTERVAL HX: Pt stable, reports tolerating POs with BG levels at goal most of the time per CGM review for last 24 hours. Noted BG <100s 80s to 90s per CGM and 96 per am BMP without s/sx of hypoglycemia. All other CGM readings in 100s. Pt still refusing POC testing > states she doesn't need it while using CGM. Had echo and renal sono done today. Reviewed insulin pump settings and hx with pt. Noted IQ control T slim basal insulin stopped delivery around 5 am to zero due to lower BG readings.        Review of Systems:  General: As above  Cardiovascular: No chest pain, palpitations  Respiratory: No SOB, no cough  GI: No nausea, vomiting, abdominal pain  Endocrine: No polyuria, polydipsia or S&Sx of hypoglycemia    Allergies    Demerol HCl (Hives)  Phenergan (Hives; Rash)    Intolerances      MEDICATIONS:  atorvastatin 10 milliGRAM(s) Oral at bedtime  insulin lispro (ADMELOG) Pump 1 Each SubCutaneous Continuous Pump  Basal rate:  12a-6a 1.1  6a-12a 1  TDD 24.6    ISF 15    ICR  12a-6a 4.5  6a-12a 4.2    Target     predniSONE   Tablet 5 milliGRAM(s) Oral daily  tacrolimus ER Tablet (ENVARSUS XR) 1 milliGRAM(s) Oral <User Schedule>      PHYSICAL EXAM:  VITALS: T(C): 37 (09-19-22 @ 08:58)  T(F): 98.6 (09-19-22 @ 08:58), Max: 98.8 (09-19-22 @ 01:00)  HR: 51 (09-19-22 @ 08:58) (49 - 52)  BP: 152/79 (09-19-22 @ 08:58) (136/56 - 163/80)  RR:  (18 - 20)  SpO2:  (94% - 96%)  Wt(kg): --  GENERAL: Female laying in bed in NAD. Mother at bedside  HEENT; scattered redness in R side of neck, no tender, itchy > skin intact.  Abdomen: Soft, nontender, non distended, RLQ insulin pump site D&I   Extremities: Warm, no edema in all 4 exts. RUE Dexcom site D&I  NEURO: A&O X3    LABS: Refuses POC testing  POCT Blood Glucose.: 136 mg/dL (09-18-22 @ 13:55)                            10.4   5.44  )-----------( 148      ( 19 Sep 2022 06:13 )             32.8       09-19    139  |  105  |  18  ----------------------------<  96  4.2   |  23  |  1.06    eGFR: 64    Ca    9.1      09-19  Mg     1.8     09-19  Phos  2.9     09-19    TPro  6.0  /  Alb  3.1<L>  /  TBili  0.6  /  DBili  x   /  AST  12  /  ALT  12  /  AlkPhos  37<L>  09-19        A1C with Estimated Average Glucose Result: 6.2 % (09-17-22 @ 06:53)      Estimated Average Glucose: 131 mg/dL (09-17-22 @ 06:53)

## 2022-09-19 NOTE — PROGRESS NOTE ADULT - TIME BILLING
Kidney Recipient with functioning allograft  Creatinine trend noted, stable  DM, HTN, Hyperlipidemia  Reviewed work up for SOB/Fever  Reviewed clinical, lab, available imaging data, progress notes  Reviewed immunosuppression, prophylaxis and management of comorbidities    Suggestions  1.Echo, Transplant kidney doppler to evaluate for JULIA in view of recent pulm. edema   2. Continue current immunosuppression, will restart Imuran on discharge if microbiology data remains negative  Will follow up in transplant clinic on discharge  D/w transplant team  I was present during and reviewed clinical and lab data as well as assessment and plan as documented by the house staff as noted. Please contact if any additional questions with any change in clinical condition or on availability of any additional information or reports.

## 2022-09-19 NOTE — PROGRESS NOTE ADULT - PROBLEM SELECTOR PLAN 2
-Reviewed settings and hx with pt. Will c/w present insulin pump settings but will check POC in am to make sure pt is not having fasting hypoglycemia  - Pump infusion site change due today. Pt aware and mother brought more supplies today.   -dexcom sensor changed 9/18

## 2022-09-19 NOTE — PROGRESS NOTE ADULT - ASSESSMENT
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 s/p BL Nephrectomy/Single Donor Renal Transplant 2018, Post Transplant DM, HTN, HLD, GERD,  now transferred for further sepsis workup from [facility]    Infectious w/u s/p DDRT  -good graft function, will check renal US today  -check TTE  -cardiac w/u negative  -can stop Cefepime  -pan-culture on arrival, negative so far  -UA with large leuks overnight, will f/u repeat UCx   -f/u fungal studies  -imaging unremarkable  -cleared by GYN for adnexal cysts/IUD sources for infection  -seen by ENT for ear pain, now resolved, continue Flonase  -spirometry, wean off NC  -PT/OT  -Lovenox x1 given during SOB episode, now on SQH BID  -SCDs  -analgesia prn     Immunosuppression:  -Prednisone 5  -Envarsus per level  -Imuran hold in the setting of infection    DM:  -On insulin pump  -Endocrine on board pump management  -Was at home on (Trulicity)  -Diabetic diet    HTN, HLD  -Monitor BP  -Was at home on (Losartan 100mg QD, Spironolactone 25mg PO q24). on Coreg, will restart rest prn  -Pravastatin 20mg    MDD, Anxiety  -Paroxetine 10mg PO q24  -Klonopin 1mg PO PRN    GERD  -Protonix, Maalox prn

## 2022-09-19 NOTE — PROGRESS NOTE ADULT - SUBJECTIVE AND OBJECTIVE BOX
[facility] DIVISION OF KIDNEY DISEASES AND HYPERTENSION -- FOLLOW UP NOTE  --------------------------------------------------------------------------------      ROLLY MEDEIROS was seen and examined at bedside. She reports feeling very tired from not being abl eto sleep all night. He nausea and vomiting is gone but she still does not feel right. She denied any chest pain or shortness of breath. No acute issues noted overnight.     Standing Inpatient Medications  atorvastatin 10 milliGRAM(s) Oral at bedtime  carvedilol 6.25 milliGRAM(s) Oral every 12 hours  cefepime   IVPB 1000 milliGRAM(s) IV Intermittent every 8 hours  fluticasone propionate 50 MICROgram(s)/spray Nasal Spray 1 Spray(s) Both Nostrils two times a day  heparin   Injectable 5000 Unit(s) SubCutaneous every 12 hours  influenza   Vaccine 0.5 milliLiter(s) IntraMuscular once  insulin lispro (ADMELOG) Pump 1 Each SubCutaneous Continuous Pump  pantoprazole    Tablet 40 milliGRAM(s) Oral before breakfast  PARoxetine 10 milliGRAM(s) Oral daily  predniSONE   Tablet 5 milliGRAM(s) Oral daily  senna 2 Tablet(s) Oral at bedtime  tacrolimus ER Tablet (ENVARSUS XR) 1 milliGRAM(s) Oral <User Schedule>    PRN Inpatient Medications  aluminum hydroxide/magnesium hydroxide/simethicone Suspension 30 milliLiter(s) Oral every 4 hours PRN  clonazePAM  Tablet 1 milliGRAM(s) Oral three times a day PRN  HYDROmorphone  Injectable 0.5 milliGRAM(s) IV Push at bedtime PRN  ondansetron Injectable 4 milliGRAM(s) IV Push every 6 hours PRN  traMADol 25 milliGRAM(s) Oral every 6 hours PRN  traMADol 50 milliGRAM(s) Oral every 6 hours PRN    ROS:   all negative except as mentioned above.     VITALS/PHYSICAL EXAM  --------------------------------------------------------------------------------  T(C): 37 (09-19-22 @ 08:58), Max: 37.1 (09-18-22 @ 13:00)  HR: 51 (09-19-22 @ 08:58) (49 - 61)  BP: 152/79 (09-19-22 @ 08:58) (136/56 - 163/80)  RR: 18 (09-19-22 @ 08:58) (18 - 20)  SpO2: 96% (09-19-22 @ 08:58) (94% - 97%)  Wt(kg): --        09-18-22 @ 07:01  -  09-19-22 @ 07:00  --------------------------------------------------------  IN: 720 mL / OUT: 2600 mL / NET: -1880 mL      Physical Exam:  	Gen: in mild acute distress   	HEENT: no scleral icterus, moist oral mucosa. No thrush. Supple neck, no JVD  	Pulm: normal respiratory effort, lungs clear to auscultation bilaterally   	CV: regular rate and rhythm, S1 and S2 normal, no murmur   	Abd: tenderness to palpation diffusely                   Transplant non tender, no bruit  	: mild suprapubic tenderness          Back: R sided CVA tenderness          Extremities: No edema. Distal pulses 2+ bilaterally           Skin: warm no rash, no cyanosis   	Neuro: Alert and oriented to person, place and time. Normal speech. Normal affect.       LABS/STUDIES  --------------------------------------------------------------------------------              10.4   5.44  >-----------<  148      [09-19-22 @ 06:13]              32.8     139  |  105  |  18  ----------------------------<  96      [09-19-22 @ 06:13]  4.2   |  23  |  1.06        Ca     9.1     [09-19-22 @ 06:13]      Mg     1.8     [09-19-22 @ 06:13]      Phos  2.9     [09-19-22 @ 06:13]    TPro  6.0  /  Alb  3.1  /  TBili  0.6  /  DBili  x   /  AST  12  /  ALT  12  /  AlkPhos  37  [09-19-22 @ 06:13]        CK 82      [09-17-22 @ 12:32]    Creatinine Trend:  SCr 1.06 [09-19 @ 06:13]  SCr 1.15 [09-18 @ 06:34]  SCr 1.14 [09-17 @ 06:52]  SCr 1.21 [09-16 @ 06:58]  SCr 1.17 [09-15 @ 18:51]    Tacrolimus (), Serum: 4.0 ng/mL (09-19 @ 06:13)  Tacrolimus (), Serum: 5.2 ng/mL (09-18 @ 06:34)  Tacrolimus (), Serum: 5.5 ng/mL (09-17 @ 06:53)  Tacrolimus (), Serum: 3.7 ng/mL (09-16 @ 07:03)              CAPILLARY BLOOD GLUCOSE  116 (18 Sep 2022 18:15)  136 (18 Sep 2022 13:55)      POCT Blood Glucose.: 136 mg/dL (18 Sep 2022 13:55)      Urinalysis - [09-18-22 @ 18:06]      Color Light Yellow / Appearance Slightly Turbid / SG 1.013 / pH 6.5      Gluc Negative / Ketone Negative  / Bili Negative / Urobili Negative       Blood Moderate / Protein Trace / Leuk Est Large / Nitrite Negative      RBC 6 / WBC 17 / Hyaline 1 / Gran  / Sq Epi  / Non Sq Epi 12 / Bacteria Negative

## 2022-09-19 NOTE — PROGRESS NOTE ADULT - PROBLEM SELECTOR PLAN 2
Home immunosuppressive therapy with Envarsus 1mg daily, Imuran 100mg daily, and prednisone 5mg daily. Currently Imuran on hold due to acute infection. Continue with Envarsus and prednisone. Monitor tacrolimus levels 30 minutes prior to AM dose.

## 2022-09-19 NOTE — PROGRESS NOTE ADULT - SUBJECTIVE AND OBJECTIVE BOX
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 In [facility] ED Tmax 102.2F and leukocytosis. Lactate negative. Cultures sent. Given broad spectrum empiric IV ABX and IVF. Pt with difficulty tolerating PO intake due to N/V. Currently on insulin pump for DM.  Pt transferred to [facility] for further workup of sepsis.     Interval events:  -remains Afebrile, on Cefepime  -overall, symptoms with much improvement since arrival  -not requiring O2  -no other complaints at this time      Immunosuppression                               Maintenance:   Env per level, Holding Imuran, Pred 5  Ongoing monitoring for signs of rejection.    Potential Discharge date: TBD    Education:  Medications    Plan of care:  See Below      MEDICATIONS  (STANDING):  atorvastatin 10 milliGRAM(s) Oral at bedtime  carvedilol 6.25 milliGRAM(s) Oral every 12 hours  fluticasone propionate 50 MICROgram(s)/spray Nasal Spray 1 Spray(s) Both Nostrils two times a day  heparin   Injectable 5000 Unit(s) SubCutaneous every 12 hours  influenza   Vaccine 0.5 milliLiter(s) IntraMuscular once  insulin lispro (ADMELOG) Pump 1 Each SubCutaneous Continuous Pump  pantoprazole    Tablet 40 milliGRAM(s) Oral before breakfast  PARoxetine 10 milliGRAM(s) Oral daily  predniSONE   Tablet 5 milliGRAM(s) Oral daily  senna 2 Tablet(s) Oral at bedtime  tacrolimus ER Tablet (ENVARSUS XR) 1 milliGRAM(s) Oral <User Schedule>    MEDICATIONS  (PRN):  aluminum hydroxide/magnesium hydroxide/simethicone Suspension 30 milliLiter(s) Oral every 4 hours PRN Dyspepsia  clonazePAM  Tablet 1 milliGRAM(s) Oral three times a day PRN anxiety  HYDROmorphone  Injectable 0.5 milliGRAM(s) IV Push at bedtime PRN Severe Pain (7 - 10)  ondansetron Injectable 4 milliGRAM(s) IV Push every 6 hours PRN Nausea and/or Vomiting  traMADol 25 milliGRAM(s) Oral every 6 hours PRN Moderate Pain (4 - 6)  traMADol 50 milliGRAM(s) Oral every 6 hours PRN Severe Pain (7 - 10)              Vital Signs Last 24 Hrs  T(C): 37 (19 Sep 2022 08:58), Max: 37.1 (18 Sep 2022 13:00)  T(F): 98.6 (19 Sep 2022 08:58), Max: 98.8 (19 Sep 2022 01:00)  HR: 51 (19 Sep 2022 08:58) (49 - 61)  BP: 152/79 (19 Sep 2022 08:58) (136/56 - 163/80)  BP(mean): --  RR: 18 (19 Sep 2022 08:58) (18 - 20)  SpO2: 96% (19 Sep 2022 08:58) (94% - 97%)    Parameters below as of 19 Sep 2022 08:58  Patient On (Oxygen Delivery Method): room air        I&O's Summary    18 Sep 2022 07:01  -  19 Sep 2022 07:00  --------------------------------------------------------  IN: 720 mL / OUT: 2600 mL / NET: -1880 mL                              10.4   5.44  )-----------( 148      ( 19 Sep 2022 06:13 )             32.8     09-19    139  |  105  |  18  ----------------------------<  96  4.2   |  23  |  1.06    Ca    9.1      19 Sep 2022 06:13  Phos  2.9     09-19  Mg     1.8     09-19    TPro  6.0  /  Alb  3.1<L>  /  TBili  0.6  /  DBili  x   /  AST  12  /  ALT  12  /  AlkPhos  37<L>  09-19    Tacrolimus (), Serum: 4.0 ng/mL (09-19 @ 06:13)        Culture - Blood (collected 09-15-22 @ 18:45)  Source: .Blood Blood  Preliminary Report (09-16-22 @ 22:02):    No growth to date.    Culture - Blood (collected 09-15-22 @ 18:30)  Source: .Blood Blood  Preliminary Report (09-16-22 @ 22:02):    No growth to date.    Culture - Urine (collected 09-14-22 @ 23:55)  Source: Clean Catch Clean Catch (Midstream)  Final Report (09-16-22 @ 17:00):    <10,000 CFU/mL Normal Urogenital Angélica    Culture - Blood (collected 09-14-22 @ 18:05)  Source: .Blood Blood-Peripheral  Preliminary Report (09-16-22 @ 01:02):    No growth to date.    Culture - Blood (collected 09-14-22 @ 18:00)  Source: .Blood Blood-Peripheral  Preliminary Report (09-16-22 @ 01:02):    No growth to date.                      Review of systems  Gen: No weight changes, fatigue, fevers/chills, weakness, see above  Skin: No rashes  Head/Eyes/Ears/Mouth: No headache; Normal hearing; Normal vision w/o blurriness; No sinus pain/discomfort, sore throat, see above  Respiratory: No dyspnea, cough, wheezing, hemoptysis  CV: No chest pain, PND, orthopnea  GI: denies diarrhea, constipation, nausea, vomiting, melena, hematochezia, see above  : No increased frequency, dysuria, hematuria, nocturia  MSK: No joint pain/swelling; no back pain; no edema  Neuro: No dizziness/lightheadedness, weakness, seizures, numbness, tingling  Heme: No easy bruising or bleeding  Endo: No heat/cold intolerance  Psych: No significant nervousness, anxiety, stress, depression  All other systems were reviewed and are negative, except as noted.      PHYSICAL EXAM:  Constitutional: Well developed / well nourished  Eyes: Anicteric, PERRLA  ENMT: nc/at  Respiratory: CTA B/L  Cardiovascular: RRR  Gastrointestinal: Soft, non distended, NT.  well healed incisional scar   Genitourinary: Voiding spontaneously  Extremities: SCD's in place and working bilaterally,  chronic edema ~1+ b/l LE, stable today  Vascular: Palpable dp pulses bilaterally  Neurological: A&O x3  Skin: no rashes, ulcerations or lesions;  Musculoskeletal: Moving all extremities  Psychiatric: Responsive

## 2022-09-19 NOTE — PROGRESS NOTE ADULT - ASSESSMENT
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 DM controlled (A1C 6.2%). Also h/o HTN, HLD, GERD. Here with sepsis due to UTI. BG goal 100 to 180s    On insulin pump+ CGM plus Trulicity 0.75mg subq weekly.     Spoke to pt about BG <100s fasting by BMP and CGM today and by BMP 9/17. Review difference between interstitial and finger stick BG levels and the need to verify BG with POC testing at least in am in case pt has low blood BG levels. Pt agrees to POC fasting testing.

## 2022-09-20 LAB
ALBUMIN SERPL ELPH-MCNC: 3.1 G/DL — LOW (ref 3.3–5)
ALP SERPL-CCNC: 50 U/L — SIGNIFICANT CHANGE UP (ref 40–120)
ALT FLD-CCNC: 11 U/L — SIGNIFICANT CHANGE UP (ref 10–45)
ANION GAP SERPL CALC-SCNC: 14 MMOL/L — SIGNIFICANT CHANGE UP (ref 5–17)
AST SERPL-CCNC: 20 U/L — SIGNIFICANT CHANGE UP (ref 10–40)
BASOPHILS # BLD AUTO: 0.09 K/UL — SIGNIFICANT CHANGE UP (ref 0–0.2)
BASOPHILS NFR BLD AUTO: 1.1 % — SIGNIFICANT CHANGE UP (ref 0–2)
BILIRUB SERPL-MCNC: 0.5 MG/DL — SIGNIFICANT CHANGE UP (ref 0.2–1.2)
BUN SERPL-MCNC: 22 MG/DL — SIGNIFICANT CHANGE UP (ref 7–23)
CALCIUM SERPL-MCNC: 9.2 MG/DL — SIGNIFICANT CHANGE UP (ref 8.4–10.5)
CHLORIDE SERPL-SCNC: 104 MMOL/L — SIGNIFICANT CHANGE UP (ref 96–108)
CO2 SERPL-SCNC: 21 MMOL/L — LOW (ref 22–31)
CREAT SERPL-MCNC: 0.99 MG/DL — SIGNIFICANT CHANGE UP (ref 0.5–1.3)
CULTURE RESULTS: SIGNIFICANT CHANGE UP
EGFR: 69 ML/MIN/1.73M2 — SIGNIFICANT CHANGE UP
EOSINOPHIL # BLD AUTO: 0.43 K/UL — SIGNIFICANT CHANGE UP (ref 0–0.5)
EOSINOPHIL NFR BLD AUTO: 5.2 % — SIGNIFICANT CHANGE UP (ref 0–6)
FUNGITELL: <31 PG/ML — SIGNIFICANT CHANGE UP
GIANT PLATELETS BLD QL SMEAR: PRESENT — SIGNIFICANT CHANGE UP
GLUCOSE BLDC GLUCOMTR-MCNC: 134 MG/DL — HIGH (ref 70–99)
GLUCOSE BLDC GLUCOMTR-MCNC: 155 MG/DL — HIGH (ref 70–99)
GLUCOSE SERPL-MCNC: 111 MG/DL — HIGH (ref 70–99)
HCT VFR BLD CALC: 33.4 % — LOW (ref 34.5–45)
HGB BLD-MCNC: 11.2 G/DL — LOW (ref 11.5–15.5)
LDH SERPL L TO P-CCNC: 228 U/L — SIGNIFICANT CHANGE UP (ref 50–242)
LYMPHOCYTES # BLD AUTO: 1.45 K/UL — SIGNIFICANT CHANGE UP (ref 1–3.3)
LYMPHOCYTES # BLD AUTO: 17.7 % — SIGNIFICANT CHANGE UP (ref 13–44)
MAGNESIUM SERPL-MCNC: 1.6 MG/DL — SIGNIFICANT CHANGE UP (ref 1.6–2.6)
MANUAL SMEAR VERIFICATION: SIGNIFICANT CHANGE UP
MCHC RBC-ENTMCNC: 33.5 GM/DL — SIGNIFICANT CHANGE UP (ref 32–36)
MCHC RBC-ENTMCNC: 34.9 PG — HIGH (ref 27–34)
MCV RBC AUTO: 104 FL — HIGH (ref 80–100)
METAMYELOCYTES # FLD: 1 % — HIGH (ref 0–0)
MONOCYTES # BLD AUTO: 0.68 K/UL — SIGNIFICANT CHANGE UP (ref 0–0.9)
MONOCYTES NFR BLD AUTO: 8.3 % — SIGNIFICANT CHANGE UP (ref 2–14)
NEUTROPHILS # BLD AUTO: 5.47 K/UL — SIGNIFICANT CHANGE UP (ref 1.8–7.4)
NEUTROPHILS NFR BLD AUTO: 66.7 % — SIGNIFICANT CHANGE UP (ref 43–77)
OVALOCYTES BLD QL SMEAR: SLIGHT — SIGNIFICANT CHANGE UP
PHOSPHATE SERPL-MCNC: 3.4 MG/DL — SIGNIFICANT CHANGE UP (ref 2.5–4.5)
PLAT MORPH BLD: NORMAL — SIGNIFICANT CHANGE UP
PLATELET # BLD AUTO: 197 K/UL — SIGNIFICANT CHANGE UP (ref 150–400)
POIKILOCYTOSIS BLD QL AUTO: SLIGHT — SIGNIFICANT CHANGE UP
POLYCHROMASIA BLD QL SMEAR: SLIGHT — SIGNIFICANT CHANGE UP
POTASSIUM SERPL-MCNC: 4.6 MMOL/L — SIGNIFICANT CHANGE UP (ref 3.5–5.3)
POTASSIUM SERPL-SCNC: 4.6 MMOL/L — SIGNIFICANT CHANGE UP (ref 3.5–5.3)
PROCALCITONIN SERPL-MCNC: 0.13 NG/ML — HIGH (ref 0.02–0.1)
PROT SERPL-MCNC: 6.1 G/DL — SIGNIFICANT CHANGE UP (ref 6–8.3)
RBC # BLD: 3.21 M/UL — LOW (ref 3.8–5.2)
RBC # FLD: 12.2 % — SIGNIFICANT CHANGE UP (ref 10.3–14.5)
RBC BLD AUTO: ABNORMAL
SODIUM SERPL-SCNC: 139 MMOL/L — SIGNIFICANT CHANGE UP (ref 135–145)
SPECIMEN SOURCE: SIGNIFICANT CHANGE UP
TACROLIMUS SERPL-MCNC: 3.4 NG/ML — SIGNIFICANT CHANGE UP
WBC # BLD: 8.2 K/UL — SIGNIFICANT CHANGE UP (ref 3.8–10.5)
WBC # FLD AUTO: 8.2 K/UL — SIGNIFICANT CHANGE UP (ref 3.8–10.5)

## 2022-09-20 PROCEDURE — 99232 SBSQ HOSP IP/OBS MODERATE 35: CPT

## 2022-09-20 PROCEDURE — 70450 CT HEAD/BRAIN W/O DYE: CPT | Mod: 26

## 2022-09-20 PROCEDURE — 99233 SBSQ HOSP IP/OBS HIGH 50: CPT | Mod: GC

## 2022-09-20 RX ORDER — INSULIN LISPRO 100/ML
1 VIAL (ML) SUBCUTANEOUS
Refills: 0 | Status: DISCONTINUED | OUTPATIENT
Start: 2022-09-20 | End: 2022-09-24

## 2022-09-20 RX ORDER — FUROSEMIDE 40 MG
20 TABLET ORAL DAILY
Refills: 0 | Status: DISCONTINUED | OUTPATIENT
Start: 2022-09-20 | End: 2022-09-22

## 2022-09-20 RX ADMIN — HEPARIN SODIUM 5000 UNIT(S): 5000 INJECTION INTRAVENOUS; SUBCUTANEOUS at 17:45

## 2022-09-20 RX ADMIN — TRAMADOL HYDROCHLORIDE 50 MILLIGRAM(S): 50 TABLET ORAL at 22:24

## 2022-09-20 RX ADMIN — TRAMADOL HYDROCHLORIDE 50 MILLIGRAM(S): 50 TABLET ORAL at 23:30

## 2022-09-20 RX ADMIN — HYDROMORPHONE HYDROCHLORIDE 0.5 MILLIGRAM(S): 2 INJECTION INTRAMUSCULAR; INTRAVENOUS; SUBCUTANEOUS at 21:06

## 2022-09-20 RX ADMIN — TRAMADOL HYDROCHLORIDE 50 MILLIGRAM(S): 50 TABLET ORAL at 05:21

## 2022-09-20 RX ADMIN — TACROLIMUS 1 MILLIGRAM(S): 5 CAPSULE ORAL at 10:17

## 2022-09-20 RX ADMIN — TRAMADOL HYDROCHLORIDE 50 MILLIGRAM(S): 50 TABLET ORAL at 11:54

## 2022-09-20 RX ADMIN — PANTOPRAZOLE SODIUM 40 MILLIGRAM(S): 20 TABLET, DELAYED RELEASE ORAL at 05:07

## 2022-09-20 RX ADMIN — TRAMADOL HYDROCHLORIDE 50 MILLIGRAM(S): 50 TABLET ORAL at 06:39

## 2022-09-20 RX ADMIN — HYDROMORPHONE HYDROCHLORIDE 0.5 MILLIGRAM(S): 2 INJECTION INTRAMUSCULAR; INTRAVENOUS; SUBCUTANEOUS at 22:06

## 2022-09-20 RX ADMIN — Medication 1 MILLIGRAM(S): at 12:38

## 2022-09-20 RX ADMIN — SENNA PLUS 2 TABLET(S): 8.6 TABLET ORAL at 21:06

## 2022-09-20 RX ADMIN — HEPARIN SODIUM 5000 UNIT(S): 5000 INJECTION INTRAVENOUS; SUBCUTANEOUS at 05:07

## 2022-09-20 RX ADMIN — Medication 1 TABLET(S): at 13:00

## 2022-09-20 RX ADMIN — Medication 10 MILLIGRAM(S): at 11:54

## 2022-09-20 RX ADMIN — Medication 1 SPRAY(S): at 05:08

## 2022-09-20 RX ADMIN — Medication 5 MILLIGRAM(S): at 05:07

## 2022-09-20 RX ADMIN — TRAMADOL HYDROCHLORIDE 50 MILLIGRAM(S): 50 TABLET ORAL at 12:48

## 2022-09-20 RX ADMIN — ATORVASTATIN CALCIUM 10 MILLIGRAM(S): 80 TABLET, FILM COATED ORAL at 21:06

## 2022-09-20 RX ADMIN — Medication 60 MILLIGRAM(S): at 05:07

## 2022-09-20 RX ADMIN — Medication 20 MILLIGRAM(S): at 13:01

## 2022-09-20 RX ADMIN — Medication 1 SPRAY(S): at 17:46

## 2022-09-20 NOTE — PROGRESS NOTE ADULT - ASSESSMENT
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 s/p BL Nephrectomy/Single Donor Renal Transplant 2018, Post Transplant DM, HTN, HLD, GERD,  now transferred for further sepsis workup from [facility]    Infectious w/u s/p DDRT  -good graft function, will check renal US today  -CT head today  -cardiac w/u negative  -can stop Cefepime  -pan-culture NGTD  -f/u fungal studies  -imaging unremarkable  -cleared by GYN for adnexal cysts/IUD sources for infection  -seen by ENT for ear pain, now resolved, continue Flonase  -spirometry, wean off NC  -PT/OT  -Lovenox x1 given during SOB episode, now on SQH BID  -SCDs  -analgesia prn     Immunosuppression:  -Prednisone 5  -Envarsus per level  -Imuran hold in the setting of infection    DM:  -On insulin pump  -Endocrine on board pump management  -Was at home on (Trulicity)  -Diabetic diet    HTN, HLD  -Monitor BP  -Was at home on (Losartan 100mg QD, Spironolactone 25mg PO q24). on Coreg, will restart rest prn  -Pravastatin 20mg    MDD, Anxiety  -Paroxetine 10mg PO q24  -Klonopin 1mg PO PRN    GERD  -Protonix, Maalox prn

## 2022-09-20 NOTE — PROGRESS NOTE ADULT - PROBLEM SELECTOR PLAN 2
Home immunosuppressive therapy with Envarsus 1mg daily, Imuran 100mg daily, and prednisone 5mg daily. Currently Imuran on hold due to acute infection.   will have ID follow up, possible viral etiology for her shortness of breath and initial fever on presentation?   Continue with Envarsus and prednisone. Monitor tacrolimus levels 30 minutes prior to AM dose.    If any questions, please feel free to contact me     Romaine Ascencio  Nephrology Fellow  [facility] Pager: [phone]

## 2022-09-20 NOTE — PROGRESS NOTE ADULT - SUBJECTIVE AND OBJECTIVE BOX
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 In [facility] ED Tmax 102.2F and leukocytosis. Lactate negative. Cultures sent. Given broad spectrum empiric IV ABX and IVF. Pt with difficulty tolerating PO intake due to N/V. Currently on insulin pump for DM.  Pt transferred to [facility] for further workup of sepsis.     Interval events:  -remains Afebrile, on Cefepime  -overall, symptoms with much improvement since arrival  -FK: 4 requiring no change  -TTE: trace pericardial effusion   -UA (+) large leukocytes   -Nifedipine added for HTN      Immunosuppression                               Maintenance:   Env per level, Holding Imuran, Pred 5  Ongoing monitoring for signs of rejection.    Potential Discharge date: TBD    Education:  Medications    Plan of care:  See Below      MEDICATIONS  (STANDING):  atorvastatin 10 milliGRAM(s) Oral at bedtime  fluticasone propionate 50 MICROgram(s)/spray Nasal Spray 1 Spray(s) Both Nostrils two times a day  furosemide    Tablet 20 milliGRAM(s) Oral daily  heparin   Injectable 5000 Unit(s) SubCutaneous every 12 hours  influenza   Vaccine 0.5 milliLiter(s) IntraMuscular once  insulin lispro (ADMELOG) Pump 1 Each SubCutaneous Continuous Pump  NIFEdipine XL 60 milliGRAM(s) Oral daily  pantoprazole    Tablet 40 milliGRAM(s) Oral before breakfast  PARoxetine 10 milliGRAM(s) Oral daily  predniSONE   Tablet 5 milliGRAM(s) Oral daily  senna 2 Tablet(s) Oral at bedtime  tacrolimus ER Tablet (ENVARSUS XR) 1 milliGRAM(s) Oral <User Schedule>  trimethoprim   80 mG/sulfamethoxazole 400 mG 1 Tablet(s) Oral daily    MEDICATIONS  (PRN):  aluminum hydroxide/magnesium hydroxide/simethicone Suspension 30 milliLiter(s) Oral every 4 hours PRN Dyspepsia  clonazePAM  Tablet 1 milliGRAM(s) Oral three times a day PRN anxiety  HYDROmorphone  Injectable 0.5 milliGRAM(s) IV Push at bedtime PRN Severe Pain (7 - 10)  ondansetron Injectable 4 milliGRAM(s) IV Push every 6 hours PRN Nausea and/or Vomiting  traMADol 50 milliGRAM(s) Oral every 6 hours PRN Severe Pain (7 - 10)  traMADol 25 milliGRAM(s) Oral every 6 hours PRN Moderate Pain (4 - 6)      PAST MEDICAL & SURGICAL HISTORY:  Hyperlipidemia      Arthropathy NOS - shoulder      Kidney stones      Polycystic kidney disease      GERD (gastroesophageal reflux disease)      Anxiety      CKD (chronic kidney disease) stage 4, GFR 15-29 ml/min  no dialysis      ESRD (end stage renal disease)      Ankle fracture - Left  brumstein barajas procedure 2000      Tubal ligation  1995      S/P cholecystectomy  (2013)      Kidney calculi  cysto/lithotripsy multiple      H/O tubal ligation  (1996)      History of ventral hernia repair      H/O shoulder surgery  left      AV fistula  right forearm extremity AV fistula radiocephalic ( 2018)      Renal transplant recipient      Status post nephrectomy  bilateral      Vital Signs Last 24 Hrs  T(C): 37.2 (20 Sep 2022 13:00), Max: 37.2 (20 Sep 2022 13:00)  T(F): 99 (20 Sep 2022 13:00), Max: 99 (20 Sep 2022 13:00)  HR: 73 (20 Sep 2022 13:00) (48 - 73)  BP: 127/69 (20 Sep 2022 13:00) (115/60 - 193/80)  BP(mean): --  RR: 18 (20 Sep 2022 13:00) (18 - 18)  SpO2: 93% (20 Sep 2022 13:00) (90% - 97%)    Parameters below as of 20 Sep 2022 13:00  Patient On (Oxygen Delivery Method): room air        I&O's Summary    19 Sep 2022 07:01  -  20 Sep 2022 07:00  --------------------------------------------------------  IN: 720 mL / OUT: 0 mL / NET: 720 mL    20 Sep 2022 07:01  -  20 Sep 2022 16:41  --------------------------------------------------------  IN: 840 mL / OUT: 0 mL / NET: 840 mL                              11.2   8.20  )-----------( 197      ( 20 Sep 2022 06:18 )             33.4     09-20    139  |  104  |  22  ----------------------------<  111<H>  4.6   |  21<L>  |  0.99    Ca    9.2      20 Sep 2022 06:18  Phos  3.4     09-20  Mg     1.6     09-20    TPro  6.1  /  Alb  3.1<L>  /  TBili  0.5  /  DBili  x   /  AST  20  /  ALT  11  /  AlkPhos  50  09-20    Tacrolimus (), Serum: 3.4 ng/mL (09-20 @ 06:25)        Culture - Urine (collected 09-18-22 @ 18:18)  Source: Clean Catch Clean Catch (Midstream)  Final Report (09-19-22 @ 16:22):    <10,000 CFU/mL Normal Urogenital Angélica    Culture - Blood (collected 09-15-22 @ 18:45)  Source: .Blood Blood  Preliminary Report (09-16-22 @ 22:02):    No growth to date.    Culture - Blood (collected 09-15-22 @ 18:30)  Source: .Blood Blood  Preliminary Report (09-16-22 @ 22:02):    No growth to date.    Culture - Urine (collected 09-14-22 @ 23:55)  Source: Clean Catch Clean Catch (Midstream)  Final Report (09-16-22 @ 17:00):    <10,000 CFU/mL Normal Urogenital Angélica    Culture - Blood (collected 09-14-22 @ 18:05)  Source: .Blood Blood-Peripheral  Final Report (09-20-22 @ 01:00):    No Growth Final    Culture - Blood (collected 09-14-22 @ 18:00)  Source: .Blood Blood-Peripheral  Final Report (09-20-22 @ 01:00):    No Growth Final      Review of systems  Gen: No weight changes, fatigue, fevers/chills, weakness, see above  Skin: No rashes  Head/Eyes/Ears/Mouth: No headache; Normal hearing; Normal vision w/o blurriness; No sinus pain/discomfort, sore throat, see above  Respiratory: No dyspnea, cough, wheezing, hemoptysis  CV: No chest pain, PND, orthopnea  GI: denies diarrhea, constipation, nausea, vomiting, melena, hematochezia, see above  : No increased frequency, dysuria, hematuria, nocturia  MSK: No joint pain/swelling; no back pain; no edema  Neuro: No dizziness/lightheadedness, weakness, seizures, numbness, tingling  Heme: No easy bruising or bleeding  Endo: No heat/cold intolerance  Psych: No significant nervousness, anxiety, stress, depression  All other systems were reviewed and are negative, except as noted.      PHYSICAL EXAM:  Constitutional: Well developed / well nourished  Eyes: Anicteric, PERRLA  ENMT: nc/at  Respiratory: CTA B/L  Cardiovascular: RRR  Gastrointestinal: Soft, non distended, NT.  well healed incisional scar   Genitourinary: Voiding spontaneously  Extremities: SCD's in place and working bilaterally,  chronic edema ~1+ b/l LE, stable today  Vascular: Palpable dp pulses bilaterally  Neurological: A&O x3  Skin: no rashes, ulcerations or lesions;  Musculoskeletal: Moving all extremities  Psychiatric: Responsive

## 2022-09-20 NOTE — PROGRESS NOTE ADULT - SUBJECTIVE AND OBJECTIVE BOX
[facility] DIVISION OF KIDNEY DISEASES AND HYPERTENSION -- FOLLOW UP NOTE  --------------------------------------------------------------------------------      ROLLY MEDEIROS was seen and examined at bedside. She reported a persistent headache and states she still does not feel 100% right now. She denied any chest pain, but continues to have some episodes of shortness of breath.         Standing Inpatient Medications  atorvastatin 10 milliGRAM(s) Oral at bedtime  fluticasone propionate 50 MICROgram(s)/spray Nasal Spray 1 Spray(s) Both Nostrils two times a day  furosemide    Tablet 20 milliGRAM(s) Oral daily  heparin   Injectable 5000 Unit(s) SubCutaneous every 12 hours  influenza   Vaccine 0.5 milliLiter(s) IntraMuscular once  insulin lispro (ADMELOG) Pump 1 Each SubCutaneous Continuous Pump  NIFEdipine XL 60 milliGRAM(s) Oral daily  pantoprazole    Tablet 40 milliGRAM(s) Oral before breakfast  PARoxetine 10 milliGRAM(s) Oral daily  predniSONE   Tablet 5 milliGRAM(s) Oral daily  senna 2 Tablet(s) Oral at bedtime  tacrolimus ER Tablet (ENVARSUS XR) 1 milliGRAM(s) Oral <User Schedule>  trimethoprim   80 mG/sulfamethoxazole 400 mG 1 Tablet(s) Oral daily    PRN Inpatient Medications  aluminum hydroxide/magnesium hydroxide/simethicone Suspension 30 milliLiter(s) Oral every 4 hours PRN  clonazePAM  Tablet 1 milliGRAM(s) Oral three times a day PRN  HYDROmorphone  Injectable 0.5 milliGRAM(s) IV Push at bedtime PRN  ondansetron Injectable 4 milliGRAM(s) IV Push every 6 hours PRN  traMADol 50 milliGRAM(s) Oral every 6 hours PRN  traMADol 25 milliGRAM(s) Oral every 6 hours PRN    ROS: all negative except as mentioned above.     VITALS/PHYSICAL EXAM  --------------------------------------------------------------------------------  T(C): 36.4 (09-20-22 @ 09:00), Max: 37.1 (09-19-22 @ 14:46)  HR: 62 (09-20-22 @ 09:00) (48 - 62)  BP: 146/68 (09-20-22 @ 09:00) (115/60 - 193/80)  RR: 18 (09-20-22 @ 09:00) (18 - 18)  SpO2: 90% (09-20-22 @ 09:00) (90% - 97%)  Wt(kg): --        09-19-22 @ 07:01  -  09-20-22 @ 07:00  --------------------------------------------------------  IN: 720 mL / OUT: 0 mL / NET: 720 mL      Physical Exam:  	Gen: in mild acute distress   	HEENT: no scleral icterus, moist oral mucosa. No thrush. Supple neck, no JVD  	Pulm: normal respiratory effort, lungs clear to auscultation bilaterally   	CV: regular rate and rhythm, S1 and S2 normal, no murmur   	Abd: tenderness to palpation diffusely                   Transplant non tender, no bruit  	: mild suprapubic tenderness          Back: R sided CVA tenderness          Extremities: No edema. Distal pulses 2+ bilaterally           Skin: warm no rash, no cyanosis   	Neuro: Alert and oriented to person, place and time. Normal speech. Normal affect.     LABS/STUDIES  --------------------------------------------------------------------------------              11.2   8.20  >-----------<  197      [09-20-22 @ 06:18]              33.4     139  |  104  |  22  ----------------------------<  111      [09-20-22 @ 06:18]  4.6   |  21  |  0.99        Ca     9.2     [09-20-22 @ 06:18]      Mg     1.6     [09-20-22 @ 06:18]      Phos  3.4     [09-20-22 @ 06:18]    TPro  6.1  /  Alb  3.1  /  TBili  0.5  /  DBili  x   /  AST  20  /  ALT  11  /  AlkPhos  50  [09-20-22 @ 06:18]          Creatinine Trend:  SCr 0.99 [09-20 @ 06:18]  SCr 1.06 [09-19 @ 06:13]  SCr 1.15 [09-18 @ 06:34]  SCr 1.14 [09-17 @ 06:52]  SCr 1.21 [09-16 @ 06:58]    Tacrolimus (), Serum: 3.4 ng/mL (09-20 @ 06:25)  Tacrolimus (), Serum: 4.0 ng/mL (09-19 @ 06:13)  Tacrolimus (), Serum: 5.2 ng/mL (09-18 @ 06:34)  Tacrolimus (), Serum: 5.5 ng/mL (09-17 @ 06:53)              CAPILLARY BLOOD GLUCOSE  128 (20 Sep 2022 09:18)  97 (19 Sep 2022 21:33)  105 (19 Sep 2022 18:56)  165 (19 Sep 2022 13:34)      POCT Blood Glucose.: 134 mg/dL (20 Sep 2022 05:05)      Urinalysis - [09-18-22 @ 18:06]      Color Light Yellow / Appearance Slightly Turbid / SG 1.013 / pH 6.5      Gluc Negative / Ketone Negative  / Bili Negative / Urobili Negative       Blood Moderate / Protein Trace / Leuk Est Large / Nitrite Negative      RBC 6 / WBC 17 / Hyaline 1 / Gran  / Sq Epi  / Non Sq Epi 12 / Bacteria Negative

## 2022-09-20 NOTE — PROGRESS NOTE ADULT - TIME BILLING
Kidney Recipient with functioning allograft  Creatinine trend noted, stable  DM, HTN, Hyperlipidemia  Respiratory failure, hypoxemia  Reviewed work up for SOB/Fever so far  Reviewed clinical, lab, available imaging data, progress notes  Reviewed immunosuppression, prophylaxis and management of comorbidities    Suggestions  1.Crypto Ag, PCP sputum analysis, ID follow up  2. Continue current immunosuppression, hold Imuran for now  3. Pulmonary evaluation for persistent hyoxemia  D/w transplant team  I was present during and reviewed clinical and lab data as well as assessment and plan as documented by the house staff as noted. Please contact if any additional questions with any change in clinical condition or on availability of any additional information or reports.

## 2022-09-20 NOTE — PROGRESS NOTE ADULT - SUBJECTIVE AND OBJECTIVE BOX
DIABETES FOLLOW UP NOTE: Saw pt earlier today    Chief Complaint: Endocrine consult requested for management of T2DM    INTERVAL HX: Pt stable, reports tolerating POs with BG levels at goal most of the time per CGM review for last 24 hours. Noted BG >100s in the last 24 hours per CGM. AM BMP BG 11 and  without s/sx of hypoglycemia. Pt still refusing POC testing > states she doesn't need it.  Reviewed insulin pump settings and hx with pt. Redness in R side of neck is gone      Review of Systems:  General: As above  Cardiovascular: No chest pain, palpitations  Respiratory: No SOB, no cough  GI: No nausea, vomiting, abdominal pain  Endocrine: no polyuria, polydipsia or S&Sx of hypoglycemia    Allergies    Demerol HCl (Hives)  Phenergan (Hives; Rash)    Intolerances      MEDICATIONS:  atorvastatin 10 milliGRAM(s) Oral at bedtime  insulin lispro (HumaLOG) Pump 1 Each SubCutaneous Continuous Pump  Basal rate:  12a-6a 1.1  6a-12a 1  TDD 24.6    ISF 15    ICR  12a-6a 4.5  6a-12a 4.2    Target     predniSONE   Tablet 5 milliGRAM(s) Oral daily  tacrolimus ER Tablet (ENVARSUS XR) 1 milliGRAM(s) Oral <User Schedule>  trimethoprim   80 mG/sulfamethoxazole 400 mG 1 Tablet(s) Oral daily      PHYSICAL EXAM:  VITALS: T(C): 37.2 (09-20-22 @ 13:00)  T(F): 99 (09-20-22 @ 13:00), Max: 99 (09-20-22 @ 13:00)  HR: 73 (09-20-22 @ 13:00) (48 - 73)  BP: 127/69 (09-20-22 @ 13:00) (115/60 - 193/80)  RR:  (18 - 18)  SpO2:  (90% - 97%)  Wt(kg): --  GENERAL: Female laying in bed in NAD. Mother at bedside  HEENT; scattered redness in R side of neck disappeared   Abdomen: Soft, nontender, non distended, LLQ insulin pump site D&I   Extremities: Warm, no edema in all 4 exts. RUE Dexcom site D&I  NEURO: A&O X3      LABS:  POCT Blood Glucose.: 134 mg/dL (09-20-22 @ 05:05)  POCT Blood Glucose.: 136 mg/dL (09-18-22 @ 13:55)                            11.2   8.20  )-----------( 197      ( 20 Sep 2022 06:18 )             33.4       09-20    139  |  104  |  22  ----------------------------<  111<H>  4.6   |  21<L>  |  0.99    eGFR: 69    Ca    9.2      09-20  Mg     1.6     09-20  Phos  3.4     09-20    TPro  6.1  /  Alb  3.1<L>  /  TBili  0.5  /  DBili  x   /  AST  20  /  ALT  11  /  AlkPhos  50  09-20      A1C with Estimated Average Glucose Result: 6.2 % (09-17-22 @ 06:53)      Estimated Average Glucose: 131 mg/dL (09-17-22 @ 06:53)

## 2022-09-20 NOTE — PROGRESS NOTE ADULT - ASSESSMENT
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 DM controlled (A1C 6.2%). Also h/o HTN, HLD, GERD. Here with sepsis due to UTI. Tolerating POs  with BG at goal 100 to 180s most of the time per CGM, Pt still refusing POC > allowed one POC testing this morning to make sure she doesn't have am hypoglycemia.   Reviewed pump setting and hx with pt with no changes made to any settings. Using Auto mode    On insulin pump+ CGM plus Trulicity 0.75mg subq weekly.

## 2022-09-20 NOTE — PROGRESS NOTE ADULT - PROBLEM SELECTOR PLAN 2
-Reviewed settings and hx with pt. Will c/w present insulin pump settings   - Pump infusion site changed 9/19 due to change 9/22. Pt aware and mother brought more supplies today.   -dexcom sensor changed 9/18

## 2022-09-21 LAB
ALBUMIN SERPL ELPH-MCNC: 3.5 G/DL — SIGNIFICANT CHANGE UP (ref 3.3–5)
ALP SERPL-CCNC: 53 U/L — SIGNIFICANT CHANGE UP (ref 40–120)
ALT FLD-CCNC: 12 U/L — SIGNIFICANT CHANGE UP (ref 10–45)
ANION GAP SERPL CALC-SCNC: 11 MMOL/L — SIGNIFICANT CHANGE UP (ref 5–17)
AST SERPL-CCNC: 12 U/L — SIGNIFICANT CHANGE UP (ref 10–40)
BASOPHILS # BLD AUTO: 0.07 K/UL — SIGNIFICANT CHANGE UP (ref 0–0.2)
BASOPHILS NFR BLD AUTO: 0.7 % — SIGNIFICANT CHANGE UP (ref 0–2)
BILIRUB SERPL-MCNC: 0.5 MG/DL — SIGNIFICANT CHANGE UP (ref 0.2–1.2)
BUN SERPL-MCNC: 24 MG/DL — HIGH (ref 7–23)
CALCIUM SERPL-MCNC: 10 MG/DL — SIGNIFICANT CHANGE UP (ref 8.4–10.5)
CHLORIDE SERPL-SCNC: 102 MMOL/L — SIGNIFICANT CHANGE UP (ref 96–108)
CO2 SERPL-SCNC: 26 MMOL/L — SIGNIFICANT CHANGE UP (ref 22–31)
CREAT SERPL-MCNC: 1.12 MG/DL — SIGNIFICANT CHANGE UP (ref 0.5–1.3)
EGFR: 60 ML/MIN/1.73M2 — SIGNIFICANT CHANGE UP
EOSINOPHIL # BLD AUTO: 0.46 K/UL — SIGNIFICANT CHANGE UP (ref 0–0.5)
EOSINOPHIL NFR BLD AUTO: 4.4 % — SIGNIFICANT CHANGE UP (ref 0–6)
GLUCOSE BLDC GLUCOMTR-MCNC: 115 MG/DL — HIGH (ref 70–99)
GLUCOSE SERPL-MCNC: 113 MG/DL — HIGH (ref 70–99)
HCT VFR BLD CALC: 36.7 % — SIGNIFICANT CHANGE UP (ref 34.5–45)
HGB BLD-MCNC: 12.3 G/DL — SIGNIFICANT CHANGE UP (ref 11.5–15.5)
IMM GRANULOCYTES NFR BLD AUTO: 1.2 % — HIGH (ref 0–0.9)
LYMPHOCYTES # BLD AUTO: 1.97 K/UL — SIGNIFICANT CHANGE UP (ref 1–3.3)
LYMPHOCYTES # BLD AUTO: 18.7 % — SIGNIFICANT CHANGE UP (ref 13–44)
MAGNESIUM SERPL-MCNC: 1.7 MG/DL — SIGNIFICANT CHANGE UP (ref 1.6–2.6)
MCHC RBC-ENTMCNC: 33.5 GM/DL — SIGNIFICANT CHANGE UP (ref 32–36)
MCHC RBC-ENTMCNC: 33.9 PG — SIGNIFICANT CHANGE UP (ref 27–34)
MCV RBC AUTO: 101.1 FL — HIGH (ref 80–100)
MONOCYTES # BLD AUTO: 0.55 K/UL — SIGNIFICANT CHANGE UP (ref 0–0.9)
MONOCYTES NFR BLD AUTO: 5.2 % — SIGNIFICANT CHANGE UP (ref 2–14)
NEUTROPHILS # BLD AUTO: 7.33 K/UL — SIGNIFICANT CHANGE UP (ref 1.8–7.4)
NEUTROPHILS NFR BLD AUTO: 69.8 % — SIGNIFICANT CHANGE UP (ref 43–77)
NRBC # BLD: 0 /100 WBCS — SIGNIFICANT CHANGE UP (ref 0–0)
PHOSPHATE SERPL-MCNC: 4.5 MG/DL — SIGNIFICANT CHANGE UP (ref 2.5–4.5)
PLATELET # BLD AUTO: 218 K/UL — SIGNIFICANT CHANGE UP (ref 150–400)
POTASSIUM SERPL-MCNC: 4 MMOL/L — SIGNIFICANT CHANGE UP (ref 3.5–5.3)
POTASSIUM SERPL-SCNC: 4 MMOL/L — SIGNIFICANT CHANGE UP (ref 3.5–5.3)
PROT SERPL-MCNC: 6.8 G/DL — SIGNIFICANT CHANGE UP (ref 6–8.3)
RBC # BLD: 3.63 M/UL — LOW (ref 3.8–5.2)
RBC # FLD: 12 % — SIGNIFICANT CHANGE UP (ref 10.3–14.5)
SODIUM SERPL-SCNC: 139 MMOL/L — SIGNIFICANT CHANGE UP (ref 135–145)
WBC # BLD: 10.51 K/UL — HIGH (ref 3.8–10.5)
WBC # FLD AUTO: 10.51 K/UL — HIGH (ref 3.8–10.5)

## 2022-09-21 PROCEDURE — 71045 X-RAY EXAM CHEST 1 VIEW: CPT | Mod: 26

## 2022-09-21 PROCEDURE — 99233 SBSQ HOSP IP/OBS HIGH 50: CPT

## 2022-09-21 PROCEDURE — 99232 SBSQ HOSP IP/OBS MODERATE 35: CPT | Mod: GC

## 2022-09-21 RX ORDER — SODIUM CHLORIDE 9 MG/ML
4 INJECTION INTRAMUSCULAR; INTRAVENOUS; SUBCUTANEOUS ONCE
Refills: 0 | Status: COMPLETED | OUTPATIENT
Start: 2022-09-21 | End: 2022-09-21

## 2022-09-21 RX ORDER — MAGNESIUM SULFATE 500 MG/ML
1 VIAL (ML) INJECTION ONCE
Refills: 0 | Status: COMPLETED | OUTPATIENT
Start: 2022-09-21 | End: 2022-09-21

## 2022-09-21 RX ADMIN — Medication 1 SPRAY(S): at 17:14

## 2022-09-21 RX ADMIN — TRAMADOL HYDROCHLORIDE 50 MILLIGRAM(S): 50 TABLET ORAL at 15:21

## 2022-09-21 RX ADMIN — TRAMADOL HYDROCHLORIDE 50 MILLIGRAM(S): 50 TABLET ORAL at 16:11

## 2022-09-21 RX ADMIN — Medication 100 GRAM(S): at 11:48

## 2022-09-21 RX ADMIN — TACROLIMUS 1 MILLIGRAM(S): 5 CAPSULE ORAL at 08:07

## 2022-09-21 RX ADMIN — HYDROMORPHONE HYDROCHLORIDE 0.5 MILLIGRAM(S): 2 INJECTION INTRAMUSCULAR; INTRAVENOUS; SUBCUTANEOUS at 21:57

## 2022-09-21 RX ADMIN — Medication 5 MILLIGRAM(S): at 06:13

## 2022-09-21 RX ADMIN — HEPARIN SODIUM 5000 UNIT(S): 5000 INJECTION INTRAVENOUS; SUBCUTANEOUS at 06:12

## 2022-09-21 RX ADMIN — SODIUM CHLORIDE 4 MILLILITER(S): 9 INJECTION INTRAMUSCULAR; INTRAVENOUS; SUBCUTANEOUS at 18:06

## 2022-09-21 RX ADMIN — Medication 60 MILLIGRAM(S): at 06:12

## 2022-09-21 RX ADMIN — HEPARIN SODIUM 5000 UNIT(S): 5000 INJECTION INTRAVENOUS; SUBCUTANEOUS at 17:12

## 2022-09-21 RX ADMIN — ATORVASTATIN CALCIUM 10 MILLIGRAM(S): 80 TABLET, FILM COATED ORAL at 21:56

## 2022-09-21 RX ADMIN — PANTOPRAZOLE SODIUM 40 MILLIGRAM(S): 20 TABLET, DELAYED RELEASE ORAL at 06:13

## 2022-09-21 RX ADMIN — TRAMADOL HYDROCHLORIDE 50 MILLIGRAM(S): 50 TABLET ORAL at 22:50

## 2022-09-21 RX ADMIN — Medication 20 MILLIGRAM(S): at 06:12

## 2022-09-21 RX ADMIN — HYDROMORPHONE HYDROCHLORIDE 0.5 MILLIGRAM(S): 2 INJECTION INTRAMUSCULAR; INTRAVENOUS; SUBCUTANEOUS at 22:50

## 2022-09-21 RX ADMIN — Medication 1 SPRAY(S): at 06:13

## 2022-09-21 RX ADMIN — TRAMADOL HYDROCHLORIDE 50 MILLIGRAM(S): 50 TABLET ORAL at 21:56

## 2022-09-21 RX ADMIN — Medication 1 TABLET(S): at 11:51

## 2022-09-21 RX ADMIN — Medication 1 MILLIGRAM(S): at 14:22

## 2022-09-21 RX ADMIN — Medication 10 MILLIGRAM(S): at 11:51

## 2022-09-21 RX ADMIN — SODIUM CHLORIDE 4 MILLILITER(S): 9 INJECTION INTRAMUSCULAR; INTRAVENOUS; SUBCUTANEOUS at 12:55

## 2022-09-21 NOTE — PROGRESS NOTE ADULT - PROBLEM SELECTOR PLAN 2
-Reviewed settings and hx with pt. Will c/w present insulin pump settings   - Pump infusion site  due to change 9/22. Pt aware and mother brought more supplies today.   -dexcom sensor changed 9/18

## 2022-09-21 NOTE — PROGRESS NOTE ADULT - SUBJECTIVE AND OBJECTIVE BOX
Transplant Surgery - Multidisciplinary Progress Note  --------------------------------------------------------------  LDRT    4/2/2018    HPI: 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 s/p BL Nephrectomy/Single Donor Renal Transplant in 2018, Post Transplant DM, HTN, HLD, GERD, MDD, Anxiety presented to [facility] ER on 9/14/2022 with c/o body aches, R flank pain, fevers/chills, nausea/vomiting, diarrhea. In [facility] ED Tmax 102.2F and leukocytosis. Lactate negative. Cultures sent. Given broad spectrum empiric IV ABX and IVF. Pt with difficulty tolerating PO intake due to N/V. Currently on insulin pump for DM.  Pt transferred to [facility] for further workup of sepsis.     Interval events:  -remains Afebrile, on Cefepime  -overall, symptoms with much improvement since arrival  -still requiring O2 via NC 3L.   -fungitell neg, adeno neg, cultures neg. Cefepime was discontinued yesterday   -Head CT done yesterday for persistent HAs was neg.   -started lasix 20 QD yesterday  -TTE: trace pericardial effusion        Immunosuppression                               Maintenance:   Env per level, Holding Imuran, Pred 5  Ongoing monitoring for signs of rejection.    Potential Discharge date: TBD    Education:  Medications    Plan of care:  See Below        MEDICATIONS  (STANDING):  atorvastatin 10 milliGRAM(s) Oral at bedtime  fluticasone propionate 50 MICROgram(s)/spray Nasal Spray 1 Spray(s) Both Nostrils two times a day  furosemide    Tablet 20 milliGRAM(s) Oral daily  heparin   Injectable 5000 Unit(s) SubCutaneous every 12 hours  influenza   Vaccine 0.5 milliLiter(s) IntraMuscular once  insulin lispro (HumaLOG) Pump 1 Each SubCutaneous Continuous Pump  NIFEdipine XL 60 milliGRAM(s) Oral daily  pantoprazole    Tablet 40 milliGRAM(s) Oral before breakfast  PARoxetine 10 milliGRAM(s) Oral daily  predniSONE   Tablet 5 milliGRAM(s) Oral daily  senna 2 Tablet(s) Oral at bedtime  sodium chloride 3%  Inhalation 4 milliLiter(s) Inhalation once  tacrolimus ER Tablet (ENVARSUS XR) 1 milliGRAM(s) Oral <User Schedule>  trimethoprim   80 mG/sulfamethoxazole 400 mG 1 Tablet(s) Oral daily    MEDICATIONS  (PRN):  aluminum hydroxide/magnesium hydroxide/simethicone Suspension 30 milliLiter(s) Oral every 4 hours PRN Dyspepsia  clonazePAM  Tablet 1 milliGRAM(s) Oral three times a day PRN anxiety  HYDROmorphone  Injectable 0.5 milliGRAM(s) IV Push at bedtime PRN Severe Pain (7 - 10)  ondansetron Injectable 4 milliGRAM(s) IV Push every 6 hours PRN Nausea and/or Vomiting  traMADol 50 milliGRAM(s) Oral every 6 hours PRN Severe Pain (7 - 10)  traMADol 25 milliGRAM(s) Oral every 6 hours PRN Moderate Pain (4 - 6)      PAST MEDICAL & SURGICAL HISTORY:  Hyperlipidemia      Arthropathy NOS - shoulder      Kidney stones      Polycystic kidney disease      GERD (gastroesophageal reflux disease)      Anxiety      CKD (chronic kidney disease) stage 4, GFR 15-29 ml/min  no dialysis      ESRD (end stage renal disease)      Ankle fracture - Left  brumstein barajas procedure 2000      Tubal ligation  1995      S/P cholecystectomy  (2013)      Kidney calculi  cysto/lithotripsy multiple      H/O tubal ligation  (1996)      History of ventral hernia repair      H/O shoulder surgery  left      AV fistula  right forearm extremity AV fistula radiocephalic ( 2018)      Renal transplant recipient      Status post nephrectomy  bilateral          Vital Signs Last 24 Hrs  T(C): 36.3 (21 Sep 2022 09:00), Max: 37.2 (20 Sep 2022 13:00)  T(F): 97.4 (21 Sep 2022 09:00), Max: 99 (20 Sep 2022 13:00)  HR: 72 (21 Sep 2022 09:00) (58 - 73)  BP: 137/65 (21 Sep 2022 09:00) (115/64 - 149/78)  BP(mean): --  RR: 18 (21 Sep 2022 09:00) (18 - 18)  SpO2: 94% (21 Sep 2022 09:00) (91% - 97%)    Parameters below as of 21 Sep 2022 09:00  Patient On (Oxygen Delivery Method): nasal cannula  O2 Flow (L/min): 3      I&O's Summary    20 Sep 2022 07:01  -  21 Sep 2022 07:00  --------------------------------------------------------  IN: 1080 mL / OUT: 0 mL / NET: 1080 mL                              12.3   10.51 )-----------( 218      ( 21 Sep 2022 06:39 )             36.7     09-21    139  |  102  |  24<H>  ----------------------------<  113<H>  4.0   |  26  |  1.12    Ca    10.0      21 Sep 2022 06:41  Phos  4.5     09-21  Mg     1.7     09-21    TPro  6.8  /  Alb  3.5  /  TBili  0.5  /  DBili  x   /  AST  12  /  ALT  12  /  AlkPhos  53  09-21    Tacrolimus (), Serum: 3.4 ng/mL (09-20 @ 06:25)        Culture - Urine (collected 09-18-22 @ 18:18)  Source: Clean Catch Clean Catch (Midstream)  Final Report (09-19-22 @ 16:22):    <10,000 CFU/mL Normal Urogenital Angélica    Culture - Blood (collected 09-15-22 @ 18:45)  Source: .Blood Blood  Final Report (09-20-22 @ 22:01):    No Growth Final    Culture - Blood (collected 09-15-22 @ 18:30)  Source: .Blood Blood  Final Report (09-20-22 @ 22:01):    No Growth Final    Culture - Urine (collected 09-14-22 @ 23:55)  Source: Clean Catch Clean Catch (Midstream)  Final Report (09-16-22 @ 17:00):    <10,000 CFU/mL Normal Urogenital Angélica    Culture - Blood (collected 09-14-22 @ 18:05)  Source: .Blood Blood-Peripheral  Final Report (09-20-22 @ 01:00):    No Growth Final    Culture - Blood (collected 09-14-22 @ 18:00)  Source: .Blood Blood-Peripheral  Final Report (09-20-22 @ 01:00):    No Growth Final      Review of systems  Gen: No weight changes, fatigue, fevers/chills, weakness, see above  Skin: No rashes  Head/Eyes/Ears/Mouth: No headache; Normal hearing; Normal vision w/o blurriness; No sinus pain/discomfort, sore throat, see above  Respiratory: No dyspnea, cough, wheezing, hemoptysis  CV: No chest pain, PND, orthopnea  GI: denies diarrhea, constipation, nausea, vomiting, melena, hematochezia, see above  : No increased frequency, dysuria, hematuria, nocturia  MSK: No joint pain/swelling; no back pain; no edema  Neuro: No dizziness/lightheadedness, weakness, seizures, numbness, tingling  Heme: No easy bruising or bleeding  Endo: No heat/cold intolerance  Psych: No significant nervousness, anxiety, stress, depression  All other systems were reviewed and are negative, except as noted.      PHYSICAL EXAM:  Constitutional: Well developed / well nourished  Eyes: Anicteric, PERRLA  ENMT: nc/at  Respiratory: CTA B/L  Cardiovascular: RRR  Gastrointestinal: Soft, non distended, NT.  well healed incisional scar   Genitourinary: Voiding spontaneously  Extremities: SCD's in place and working bilaterally,  chronic edema ~1+ b/l LE, stable today  Vascular: Palpable dp pulses bilaterally  Neurological: A&O x3  Skin: no rashes, ulcerations or lesions;  Musculoskeletal: Moving all extremities  Psychiatric: Responsive

## 2022-09-21 NOTE — PROGRESS NOTE ADULT - TIME BILLING
Kidney Recipient with functioning allograft  Creatinine trend noted, stable  DM, HTN, Hyperlipidemia  Respiratory failure, hypoxemia- unclear etiology  Reviewed work up for SOB/Fever so far  Reviewed clinical, lab, available imaging data, progress notes  Reviewed immunosuppression, prophylaxis and management of comorbidities    Suggestions  1. F/u Crypto Ag, PCP sputum analysis, Transplant ID follow up  2. Continue current immunosuppression, hold Imuran for now  3. Pulmonary consultation for persistent hypoxemia  D/w transplant team  I was present during and reviewed clinical and lab data as well as assessment and plan as documented by the house staff as noted. Please contact if any additional questions with any change in clinical condition or on availability of any additional information or reports.

## 2022-09-21 NOTE — PROGRESS NOTE ADULT - SUBJECTIVE AND OBJECTIVE BOX
[facility] DIVISION OF KIDNEY DISEASES AND HYPERTENSION -- FOLLOW UP NOTE  --------------------------------------------------------------------------------      ROLLY MEDEIROS was seen and examined at bedside. She states she feels much better than when she presented to the hospital. Still having shortness of breath and states worsens on ambulation. Reported a chest discomfort when she takes in a deep breath. No acute issues noted overnight.       Standing Inpatient Medications  atorvastatin 10 milliGRAM(s) Oral at bedtime  fluticasone propionate 50 MICROgram(s)/spray Nasal Spray 1 Spray(s) Both Nostrils two times a day  furosemide    Tablet 20 milliGRAM(s) Oral daily  heparin   Injectable 5000 Unit(s) SubCutaneous every 12 hours  influenza   Vaccine 0.5 milliLiter(s) IntraMuscular once  insulin lispro (HumaLOG) Pump 1 Each SubCutaneous Continuous Pump  magnesium sulfate  IVPB 1 Gram(s) IV Intermittent once  NIFEdipine XL 60 milliGRAM(s) Oral daily  pantoprazole    Tablet 40 milliGRAM(s) Oral before breakfast  PARoxetine 10 milliGRAM(s) Oral daily  predniSONE   Tablet 5 milliGRAM(s) Oral daily  senna 2 Tablet(s) Oral at bedtime  tacrolimus ER Tablet (ENVARSUS XR) 1 milliGRAM(s) Oral <User Schedule>  trimethoprim   80 mG/sulfamethoxazole 400 mG 1 Tablet(s) Oral daily    PRN Inpatient Medications  aluminum hydroxide/magnesium hydroxide/simethicone Suspension 30 milliLiter(s) Oral every 4 hours PRN  clonazePAM  Tablet 1 milliGRAM(s) Oral three times a day PRN  HYDROmorphone  Injectable 0.5 milliGRAM(s) IV Push at bedtime PRN  ondansetron Injectable 4 milliGRAM(s) IV Push every 6 hours PRN  traMADol 50 milliGRAM(s) Oral every 6 hours PRN  traMADol 25 milliGRAM(s) Oral every 6 hours PRN    ROS: All negative except as mentioned above.     VITALS/PHYSICAL EXAM  --------------------------------------------------------------------------------  T(C): 36.3 (09-21-22 @ 09:00), Max: 37.2 (09-20-22 @ 13:00)  HR: 72 (09-21-22 @ 09:00) (58 - 73)  BP: 137/65 (09-21-22 @ 09:00) (115/64 - 149/78)  RR: 18 (09-21-22 @ 09:00) (18 - 18)  SpO2: 94% (09-21-22 @ 09:00) (91% - 97%)  Wt(kg): --        09-20-22 @ 07:01  -  09-21-22 @ 07:00  --------------------------------------------------------  IN: 1080 mL / OUT: 0 mL / NET: 1080 mL      Physical Exam:  	Gen: NAD   	HEENT: no scleral icterus, moist oral mucosa. No thrush. Supple neck, no JVD  	Pulm: normal respiratory effort, lungs clear to auscultation bilaterally   	CV: regular rate and rhythm, S1 and S2 normal, no murmur   	Abd: tenderness to palpation diffusely                   Transplant non tender, no bruit          Extremities: No edema. Distal pulses 2+ bilaterally           Skin: warm no rash, no cyanosis   	Neuro: Alert and oriented to person, place and time. Normal speech. Normal affect.     LABS/STUDIES  --------------------------------------------------------------------------------              12.3   10.51 >-----------<  218      [09-21-22 @ 06:39]              36.7     139  |  102  |  24  ----------------------------<  113      [09-21-22 @ 06:41]  4.0   |  26  |  1.12        Ca     10.0     [09-21-22 @ 06:41]      Mg     1.7     [09-21-22 @ 06:41]      Phos  4.5     [09-21-22 @ 06:41]    TPro  6.8  /  Alb  3.5  /  TBili  0.5  /  DBili  x   /  AST  12  /  ALT  12  /  AlkPhos  53  [09-21-22 @ 06:41]              [09-20-22 @ 14:50]    Creatinine Trend:  SCr 1.12 [09-21 @ 06:41]  SCr 0.99 [09-20 @ 06:18]  SCr 1.06 [09-19 @ 06:13]  SCr 1.15 [09-18 @ 06:34]  SCr 1.14 [09-17 @ 06:52]    Tacrolimus (), Serum: 3.4 ng/mL (09-20 @ 06:25)  Tacrolimus (), Serum: 4.0 ng/mL (09-19 @ 06:13)  Tacrolimus (), Serum: 5.2 ng/mL (09-18 @ 06:34)  Tacrolimus (), Serum: 5.5 ng/mL (09-17 @ 06:53)              CAPILLARY BLOOD GLUCOSE  180 (21 Sep 2022 09:05)  192 (20 Sep 2022 22:00)  213 (20 Sep 2022 13:05)      POCT Blood Glucose.: 115 mg/dL (21 Sep 2022 05:22)  POCT Blood Glucose.: 155 mg/dL (20 Sep 2022 18:04)      Urinalysis - [09-18-22 @ 18:06]      Color Light Yellow / Appearance Slightly Turbid / SG 1.013 / pH 6.5      Gluc Negative / Ketone Negative  / Bili Negative / Urobili Negative       Blood Moderate / Protein Trace / Leuk Est Large / Nitrite Negative      RBC 6 / WBC 17 / Hyaline 1 / Gran  / Sq Epi  / Non Sq Epi 12 / Bacteria Negative

## 2022-09-21 NOTE — PROGRESS NOTE ADULT - SUBJECTIVE AND OBJECTIVE BOX
DIABETES FOLLOW UP NOTE: Saw pt earlier today    Chief Complaint: Endocrine consult requested for management of T2DM    INTERVAL HX: Pt stable, reports tolerating POs with BG levels variable after CT scan yesterday 100s to 200 ac dinner and today after breakfast. Reviewed BG insulin doses and PO intake with pt and noted pt underestimated carb count with rebaound hyperglycemia. Automode adjusting insulin delivery on insulin pump to BG goal.  No hypoglycemia. Pt still refusing POC testing but agreed to test yesterday afternoon and this am. Reviewed insulin pump settings/hx  with pt. Denies any CP/SOB but requires O2. Also reports desaturating when off O2 to 80s. CT scan of head done yesterday without abnormal findings.       Review of Systems:  General: As above  Cardiovascular: No chest pain, palpitations  Respiratory: No SOB, no cough. Needs O2  GI: No nausea, vomiting, abdominal pain  Endocrine: No polyuria, polydipsia or S&Sx of hypoglycemia    Allergies    Demerol HCl (Hives)  Phenergan (Hives; Rash)    Intolerances      MEDICATIONS:  atorvastatin 10 milliGRAM(s) Oral at bedtime  insulin lispro (HumaLOG) Pump 1 Each SubCutaneous Continuous Pump  Basal rate:  12a-6a 1.1  6a-12a 1  TDD 24.6    ISF 15    ICR  12a-6a 4.5  6a-12a 4.2    Target     predniSONE   Tablet 5 milliGRAM(s) Oral daily  tacrolimus ER Tablet (ENVARSUS XR) 1 milliGRAM(s) Oral <User Schedule>  trimethoprim   80 mG/sulfamethoxazole 400 mG 1 Tablet(s) Oral daily      PHYSICAL EXAM:  VITALS: T(C): 37.1 (09-21-22 @ 17:00)  T(F): 98.8 (09-21-22 @ 17:00), Max: 98.8 (09-21-22 @ 17:00)  HR: 70 (09-21-22 @ 17:00) (58 - 72)  BP: 110/58 (09-21-22 @ 17:00) (110/58 - 149/78)  RR:  (18 - 18)  SpO2:  (91% - 97%)  Wt(kg): --  GENERAL: Female laying in bed in NAD. Mother at bedside   Abdomen: Soft, nontender, non distended, LLQ insulin pump site D&I   Extremities: Warm, no edema in all 4 exts. RUE Dexcom site D&I  NEURO: A&O X3    LABS:  POCT Blood Glucose.: 115 mg/dL (09-21-22 @ 05:22)  POCT Blood Glucose.: 155 mg/dL (09-20-22 @ 18:04)  POCT Blood Glucose.: 134 mg/dL (09-20-22 @ 05:05)                            12.3   10.51 )-----------( 218      ( 21 Sep 2022 06:39 )             36.7       09-21    139  |  102  |  24<H>  ----------------------------<  113<H>  4.0   |  26  |  1.12    eGFR: 60    Ca    10.0      09-21  Mg     1.7     09-21  Phos  4.5     09-21    TPro  6.8  /  Alb  3.5  /  TBili  0.5  /  DBili  x   /  AST  12  /  ALT  12  /  AlkPhos  53  09-21       A1C with Estimated Average Glucose Result: 6.2 % (09-17-22 @ 06:53)      Estimated Average Glucose: 131 mg/dL (09-17-22 @ 06:53)

## 2022-09-21 NOTE — PROGRESS NOTE ADULT - ASSESSMENT
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 s/p BL Nephrectomy/Single Donor Renal Transplant 2018, Post Transplant DM, HTN, HLD, GERD,  now transferred for further sepsis workup from [facility]    Infectious w/u s/p DDRT  -good graft function  -cardiac w/u negative  -pan-culture NGTD  -imaging unremarkable  -send sputum culture today  -obtain CXR  -cleared by GYN for adnexal cysts/IUD sources for infection  -seen by ENT for ear pain, now resolved, continue Flonase  -spirometry, wean off NC  -PT/OT  -Continue HSQ  -SCDs  -analgesia prn     Immunosuppression:  -Prednisone 5  -Envarsus per level  -Imuran hold in the setting of infection    DM:  -On insulin pump  -Endocrine on board pump management  -Was at home on (Trulicity)  -Diabetic diet    HTN, HLD  -Monitor BP  -Was at home on (Losartan 100mg QD, Spironolactone 25mg PO q24). on Coreg, will restart rest prn  -Pravastatin 20mg    MDD, Anxiety  -Paroxetine 10mg PO q24  -Klonopin 1mg PO PRN    GERD  -Protonix, Maalox prn

## 2022-09-21 NOTE — PROGRESS NOTE ADULT - PROBLEM SELECTOR PLAN 2
Home immunosuppressive therapy with Envarsus 1mg daily, Imuran 100mg daily, and prednisone 5mg daily. Currently Imuran on hold due to acute infection.   Patient has remained afebrile but continues to be SOB, viral etiology perhaps?  Continue with Envarsus and prednisone. Monitor tacrolimus levels 30 minutes prior to AM dose.    If any questions, please feel free to contact me     Romaine Ascencio  Nephrology Fellow  [facility] Pager: [phone].

## 2022-09-21 NOTE — PROGRESS NOTE ADULT - ASSESSMENT
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 DM controlled (A1C 6.2%). Also h/o HTN, HLD, GERD. Here with sepsis due to UTI. Tolerating POs  with BG mostly at goal 100 to 180s per CGM except when pt miscalculates carbs> underdosing bolus insulin> reviewed carbs on regular diet since pt is drinking juice and regular soda on and off. Pt to include carbs drank as well as eaten with bolus dosing. Pt verbalized understanding. Pt still refusing POC > allowed 2 POC testing in last 24 hours.  No  hypoglycemia.   Reviewed pump setting and hx with pt with no changes made to any settings. Using Auto mode    On insulin pump+ CGM plus Trulicity 0.75mg subq weekly.

## 2022-09-22 DIAGNOSIS — E13.9 OTHER SPECIFIED DIABETES MELLITUS WITHOUT COMPLICATIONS: ICD-10-CM

## 2022-09-22 LAB
ALBUMIN SERPL ELPH-MCNC: 3.5 G/DL — SIGNIFICANT CHANGE UP (ref 3.3–5)
ALP SERPL-CCNC: 54 U/L — SIGNIFICANT CHANGE UP (ref 40–120)
ALT FLD-CCNC: 12 U/L — SIGNIFICANT CHANGE UP (ref 10–45)
ANION GAP SERPL CALC-SCNC: 10 MMOL/L — SIGNIFICANT CHANGE UP (ref 5–17)
AST SERPL-CCNC: 12 U/L — SIGNIFICANT CHANGE UP (ref 10–40)
BASOPHILS # BLD AUTO: 0.07 K/UL — SIGNIFICANT CHANGE UP (ref 0–0.2)
BASOPHILS NFR BLD AUTO: 0.5 % — SIGNIFICANT CHANGE UP (ref 0–2)
BILIRUB SERPL-MCNC: 0.4 MG/DL — SIGNIFICANT CHANGE UP (ref 0.2–1.2)
BUN SERPL-MCNC: 26 MG/DL — HIGH (ref 7–23)
CALCIUM SERPL-MCNC: 9.4 MG/DL — SIGNIFICANT CHANGE UP (ref 8.4–10.5)
CHLORIDE SERPL-SCNC: 103 MMOL/L — SIGNIFICANT CHANGE UP (ref 96–108)
CO2 SERPL-SCNC: 26 MMOL/L — SIGNIFICANT CHANGE UP (ref 22–31)
CREAT SERPL-MCNC: 1.13 MG/DL — SIGNIFICANT CHANGE UP (ref 0.5–1.3)
EGFR: 59 ML/MIN/1.73M2 — LOW
EOSINOPHIL # BLD AUTO: 0.56 K/UL — HIGH (ref 0–0.5)
EOSINOPHIL NFR BLD AUTO: 4.1 % — SIGNIFICANT CHANGE UP (ref 0–6)
GALACTOMANNAN AG SERPL-ACNC: 0.02 INDEX — SIGNIFICANT CHANGE UP (ref 0–0.49)
GLUCOSE BLDC GLUCOMTR-MCNC: 123 MG/DL — HIGH (ref 70–99)
GLUCOSE SERPL-MCNC: 100 MG/DL — HIGH (ref 70–99)
GRAM STN FLD: SIGNIFICANT CHANGE UP
HCT VFR BLD CALC: 36.1 % — SIGNIFICANT CHANGE UP (ref 34.5–45)
HGB BLD-MCNC: 12 G/DL — SIGNIFICANT CHANGE UP (ref 11.5–15.5)
IMM GRANULOCYTES NFR BLD AUTO: 1.5 % — HIGH (ref 0–0.9)
LYMPHOCYTES # BLD AUTO: 15.5 % — SIGNIFICANT CHANGE UP (ref 13–44)
LYMPHOCYTES # BLD AUTO: 2.12 K/UL — SIGNIFICANT CHANGE UP (ref 1–3.3)
MAGNESIUM SERPL-MCNC: 1.7 MG/DL — SIGNIFICANT CHANGE UP (ref 1.6–2.6)
MCHC RBC-ENTMCNC: 33.2 GM/DL — SIGNIFICANT CHANGE UP (ref 32–36)
MCHC RBC-ENTMCNC: 34.1 PG — HIGH (ref 27–34)
MCV RBC AUTO: 102.6 FL — HIGH (ref 80–100)
MONOCYTES # BLD AUTO: 0.87 K/UL — SIGNIFICANT CHANGE UP (ref 0–0.9)
MONOCYTES NFR BLD AUTO: 6.4 % — SIGNIFICANT CHANGE UP (ref 2–14)
NEUTROPHILS # BLD AUTO: 9.87 K/UL — HIGH (ref 1.8–7.4)
NEUTROPHILS NFR BLD AUTO: 72 % — SIGNIFICANT CHANGE UP (ref 43–77)
NRBC # BLD: 0 /100 WBCS — SIGNIFICANT CHANGE UP (ref 0–0)
PHOSPHATE SERPL-MCNC: 3.9 MG/DL — SIGNIFICANT CHANGE UP (ref 2.5–4.5)
PLATELET # BLD AUTO: 244 K/UL — SIGNIFICANT CHANGE UP (ref 150–400)
POTASSIUM SERPL-MCNC: 4.2 MMOL/L — SIGNIFICANT CHANGE UP (ref 3.5–5.3)
POTASSIUM SERPL-SCNC: 4.2 MMOL/L — SIGNIFICANT CHANGE UP (ref 3.5–5.3)
PROT SERPL-MCNC: 6.6 G/DL — SIGNIFICANT CHANGE UP (ref 6–8.3)
RBC # BLD: 3.52 M/UL — LOW (ref 3.8–5.2)
RBC # FLD: 12.1 % — SIGNIFICANT CHANGE UP (ref 10.3–14.5)
SODIUM SERPL-SCNC: 139 MMOL/L — SIGNIFICANT CHANGE UP (ref 135–145)
SPECIMEN SOURCE: SIGNIFICANT CHANGE UP
TACROLIMUS SERPL-MCNC: 2.8 NG/ML — SIGNIFICANT CHANGE UP
WBC # BLD: 13.69 K/UL — HIGH (ref 3.8–10.5)
WBC # FLD AUTO: 13.69 K/UL — HIGH (ref 3.8–10.5)

## 2022-09-22 PROCEDURE — 99232 SBSQ HOSP IP/OBS MODERATE 35: CPT | Mod: GC

## 2022-09-22 PROCEDURE — 99232 SBSQ HOSP IP/OBS MODERATE 35: CPT

## 2022-09-22 PROCEDURE — 99222 1ST HOSP IP/OBS MODERATE 55: CPT

## 2022-09-22 RX ORDER — TACROLIMUS 5 MG/1
2 CAPSULE ORAL
Refills: 0 | Status: DISCONTINUED | OUTPATIENT
Start: 2022-09-23 | End: 2022-09-26

## 2022-09-22 RX ORDER — MAGNESIUM OXIDE 400 MG ORAL TABLET 241.3 MG
400 TABLET ORAL
Refills: 0 | Status: DISCONTINUED | OUTPATIENT
Start: 2022-09-22 | End: 2022-09-26

## 2022-09-22 RX ORDER — FUROSEMIDE 40 MG
40 TABLET ORAL DAILY
Refills: 0 | Status: DISCONTINUED | OUTPATIENT
Start: 2022-09-23 | End: 2022-09-26

## 2022-09-22 RX ORDER — TACROLIMUS 5 MG/1
1 CAPSULE ORAL ONCE
Refills: 0 | Status: COMPLETED | OUTPATIENT
Start: 2022-09-22 | End: 2022-09-22

## 2022-09-22 RX ORDER — FUROSEMIDE 40 MG
20 TABLET ORAL ONCE
Refills: 0 | Status: COMPLETED | OUTPATIENT
Start: 2022-09-22 | End: 2022-09-22

## 2022-09-22 RX ADMIN — TACROLIMUS 1 MILLIGRAM(S): 5 CAPSULE ORAL at 13:47

## 2022-09-22 RX ADMIN — TRAMADOL HYDROCHLORIDE 50 MILLIGRAM(S): 50 TABLET ORAL at 22:00

## 2022-09-22 RX ADMIN — Medication 1 SPRAY(S): at 17:04

## 2022-09-22 RX ADMIN — Medication 20 MILLIGRAM(S): at 10:37

## 2022-09-22 RX ADMIN — HYDROMORPHONE HYDROCHLORIDE 0.5 MILLIGRAM(S): 2 INJECTION INTRAMUSCULAR; INTRAVENOUS; SUBCUTANEOUS at 22:21

## 2022-09-22 RX ADMIN — Medication 1 MILLIGRAM(S): at 21:05

## 2022-09-22 RX ADMIN — Medication 10 MILLIGRAM(S): at 12:13

## 2022-09-22 RX ADMIN — TACROLIMUS 1 MILLIGRAM(S): 5 CAPSULE ORAL at 08:12

## 2022-09-22 RX ADMIN — TRAMADOL HYDROCHLORIDE 50 MILLIGRAM(S): 50 TABLET ORAL at 07:45

## 2022-09-22 RX ADMIN — HEPARIN SODIUM 5000 UNIT(S): 5000 INJECTION INTRAVENOUS; SUBCUTANEOUS at 17:04

## 2022-09-22 RX ADMIN — Medication 1 MILLIGRAM(S): at 17:04

## 2022-09-22 RX ADMIN — Medication 1 TABLET(S): at 13:47

## 2022-09-22 RX ADMIN — MAGNESIUM OXIDE 400 MG ORAL TABLET 400 MILLIGRAM(S): 241.3 TABLET ORAL at 17:04

## 2022-09-22 RX ADMIN — PANTOPRAZOLE SODIUM 40 MILLIGRAM(S): 20 TABLET, DELAYED RELEASE ORAL at 06:45

## 2022-09-22 RX ADMIN — Medication 5 MILLIGRAM(S): at 06:45

## 2022-09-22 RX ADMIN — TRAMADOL HYDROCHLORIDE 50 MILLIGRAM(S): 50 TABLET ORAL at 21:05

## 2022-09-22 RX ADMIN — ATORVASTATIN CALCIUM 10 MILLIGRAM(S): 80 TABLET, FILM COATED ORAL at 21:04

## 2022-09-22 RX ADMIN — HYDROMORPHONE HYDROCHLORIDE 0.5 MILLIGRAM(S): 2 INJECTION INTRAMUSCULAR; INTRAVENOUS; SUBCUTANEOUS at 22:45

## 2022-09-22 RX ADMIN — Medication 60 MILLIGRAM(S): at 06:45

## 2022-09-22 RX ADMIN — Medication 1 SPRAY(S): at 06:46

## 2022-09-22 RX ADMIN — HEPARIN SODIUM 5000 UNIT(S): 5000 INJECTION INTRAVENOUS; SUBCUTANEOUS at 06:46

## 2022-09-22 RX ADMIN — TRAMADOL HYDROCHLORIDE 50 MILLIGRAM(S): 50 TABLET ORAL at 06:45

## 2022-09-22 RX ADMIN — SENNA PLUS 2 TABLET(S): 8.6 TABLET ORAL at 21:04

## 2022-09-22 RX ADMIN — Medication 20 MILLIGRAM(S): at 06:45

## 2022-09-22 NOTE — PROGRESS NOTE ADULT - SUBJECTIVE AND OBJECTIVE BOX
[facility] DIVISION OF KIDNEY DISEASES AND HYPERTENSION -- FOLLOW UP NOTE  --------------------------------------------------------------------------------      ROLLY MEDEIROS was seen and examined at bedside. She states she feels much better than when she presented to the hospital. Still having shortness of breath and states worsens on ambulation. Continues to require oxygen supplementation. No acute issues noted overnight.     Standing Inpatient Medications  atorvastatin 10 milliGRAM(s) Oral at bedtime  fluticasone propionate 50 MICROgram(s)/spray Nasal Spray 1 Spray(s) Both Nostrils two times a day  furosemide    Tablet 20 milliGRAM(s) Oral once  heparin   Injectable 5000 Unit(s) SubCutaneous every 12 hours  influenza   Vaccine 0.5 milliLiter(s) IntraMuscular once  insulin lispro (HumaLOG) Pump 1 Each SubCutaneous Continuous Pump  NIFEdipine XL 60 milliGRAM(s) Oral daily  pantoprazole    Tablet 40 milliGRAM(s) Oral before breakfast  PARoxetine 10 milliGRAM(s) Oral daily  predniSONE   Tablet 5 milliGRAM(s) Oral daily  senna 2 Tablet(s) Oral at bedtime  tacrolimus ER Tablet (ENVARSUS XR) 1 milliGRAM(s) Oral <User Schedule>  trimethoprim   80 mG/sulfamethoxazole 400 mG 1 Tablet(s) Oral daily    PRN Inpatient Medications  aluminum hydroxide/magnesium hydroxide/simethicone Suspension 30 milliLiter(s) Oral every 4 hours PRN  clonazePAM  Tablet 1 milliGRAM(s) Oral three times a day PRN  HYDROmorphone  Injectable 0.5 milliGRAM(s) IV Push at bedtime PRN  ondansetron Injectable 4 milliGRAM(s) IV Push every 6 hours PRN  traMADol 50 milliGRAM(s) Oral every 6 hours PRN  traMADol 25 milliGRAM(s) Oral every 6 hours PRN    ROS: all negative except as mentioned above.     VITALS/PHYSICAL EXAM  --------------------------------------------------------------------------------  T(C): 36.9 (09-22-22 @ 08:47), Max: 37.1 (09-21-22 @ 17:00)  HR: 67 (09-22-22 @ 08:47) (61 - 70)  BP: 131/68 (09-22-22 @ 08:47) (110/58 - 146/67)  RR: 18 (09-22-22 @ 08:47) (18 - 18)  SpO2: 91% (09-22-22 @ 08:47) (91% - 96%)  Wt(kg): --        09-21-22 @ 07:01  -  09-22-22 @ 07:00  --------------------------------------------------------  IN: 960 mL / OUT: 2350 mL / NET: -1390 mL      Physical Exam:  	Gen: NAD   	HEENT: no scleral icterus, moist oral mucosa. No thrush. Supple neck, no JVD  	Pulm: normal respiratory effort, lungs clear to auscultation bilaterally   	CV: regular rate and rhythm, S1 and S2 normal, no murmur   	Abd: tenderness to palpation diffusely                   Transplant non tender, no bruit          Extremities: No edema. Distal pulses 2+ bilaterally           Skin: warm no rash, no cyanosis   	Neuro: Alert and oriented to person, place and time. Normal speech. Normal affect.    LABS/STUDIES  --------------------------------------------------------------------------------              12.0   13.69 >-----------<  244      [09-22-22 @ 07:05]              36.1     139  |  103  |  26  ----------------------------<  100      [09-22-22 @ 07:00]  4.2   |  26  |  1.13        Ca     9.4     [09-22-22 @ 07:00]      Mg     1.7     [09-22-22 @ 07:00]      Phos  3.9     [09-22-22 @ 07:00]    TPro  6.6  /  Alb  3.5  /  TBili  0.4  /  DBili  x   /  AST  12  /  ALT  12  /  AlkPhos  54  [09-22-22 @ 07:00]              [09-20-22 @ 14:50]    Creatinine Trend:  SCr 1.13 [09-22 @ 07:00]  SCr 1.12 [09-21 @ 06:41]  SCr 0.99 [09-20 @ 06:18]  SCr 1.06 [09-19 @ 06:13]  SCr 1.15 [09-18 @ 06:34]    Tacrolimus (), Serum: 2.8 ng/mL (09-22 @ 07:05)  Tacrolimus (), Serum: 3.4 ng/mL (09-20 @ 06:25)  Tacrolimus (), Serum: 4.0 ng/mL (09-19 @ 06:13)  Tacrolimus (), Serum: 5.2 ng/mL (09-18 @ 06:34)              CAPILLARY BLOOD GLUCOSE  153 (21 Sep 2022 22:00)  136 (21 Sep 2022 18:42)  173 (21 Sep 2022 13:29)      POCT Blood Glucose.: 123 mg/dL (22 Sep 2022 05:23)      Urinalysis - [09-18-22 @ 18:06]      Color Light Yellow / Appearance Slightly Turbid / SG 1.013 / pH 6.5      Gluc Negative / Ketone Negative  / Bili Negative / Urobili Negative       Blood Moderate / Protein Trace / Leuk Est Large / Nitrite Negative      RBC 6 / WBC 17 / Hyaline 1 / Gran  / Sq Epi  / Non Sq Epi 12 / Bacteria Negative

## 2022-09-22 NOTE — PROGRESS NOTE ADULT - TIME BILLING
Kidney Recipient with functioning allograft  Creatinine trend noted, stable  DM, HTN, Hyperlipidemia  Respiratory failure, hypoxemia- unclear etiology  Reviewed work up for SOB/Fever so far  Reviewed clinical, lab, available imaging data, progress notes  Reviewed immunosuppression, prophylaxis and management of comorbidities  Suggestions  1. Agree with diuresis  2. Continue current immunosuppression   3. Pulmonary consultation for persistent hypoxemia  D/w transplant team  I was present during and reviewed clinical and lab data as well as assessment and plan as documented by the house staff as noted. Please contact if any additional questions with any change in clinical condition or on availability of any additional information or reports.

## 2022-09-22 NOTE — DIETITIAN INITIAL EVALUATION ADULT - REASON FOR ADMISSION
Pt is a 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 s/p BL Nephrectomy/Single Donor Renal Transplant in 2018, Post Transplant DM, HTN, HLD, GERD, MDD, Anxiety presented to [facility] ER on 9/14/2022 with c/o body aches, R flank pain, fevers/chills, nausea/vomiting, diarrhea. In [facility] ED Tmax 102.2F and leukocytosis. Lactate negative. Cultures sent. Given broad spectrum empiric IV ABX and IVF. Pt with difficulty tolerating PO intake due to N/V. Currently on insulin pump for DM.  Pt transferred to [facility] for further workup of sepsis.

## 2022-09-22 NOTE — PROGRESS NOTE ADULT - PROBLEM SELECTOR PLAN 2
-Reviewed settings and hx with pt. Will c/w present insulin pump settings   - Pump infusion site  due today. Pt aware and mother brought more supplies today.   -dexcom sensor changed 9/18.

## 2022-09-22 NOTE — CONSULT NOTE ADULT - ASSESSMENT
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 Imaging with b/l effusions and atelectasis. Suspect her hypoxia might be driven by her atelectasis.       # Hypoxemia  # Bilateral atelectasis and pleural effusions  - Would diurese as able for pleural effusions and LE edema  - Defer to renal team regarding diureses.   - Pt will get bedside lung ultrasound tomorrow to evaluate extent of pleural effusions and atelectasis  - Recommend intensive incentive spirometry  - Early mobilization and out of bed as much as possible      # Needs outpatient ANDRIY Eval   - Pt also notes significant snoring  - Has morning headaches, daytime sleepiness, has fallen asleep during important daytime evens.   - Large body habitus with increased neck circumference  - Concerning for high risk of ANDRIY  - Pt requires a sleep study as an outpatient.

## 2022-09-22 NOTE — PROGRESS NOTE ADULT - PROBLEM SELECTOR PLAN 2
Home immunosuppressive therapy with Envarsus 1mg daily, Imuran 100mg daily, and prednisone 5mg daily. Currently Imuran on hold due to acute infection.   Continue with Envarsus and prednisone. Monitor tacrolimus levels 30 minutes prior to AM dose.    If any questions, please feel free to contact me     Romaine Ascencio  Nephrology Fellow  [facility] Pager: [phone].

## 2022-09-22 NOTE — PROGRESS NOTE ADULT - ASSESSMENT
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 s/p BL Nephrectomy/Single Donor Renal Transplant 2018, Post Transplant DM, HTN, HLD, GERD,  now transferred for further sepsis workup from [facility]    Infectious w/u s/p DDRT  -good graft function  -cardiac w/u negative  -pan-culture NGTD  -imaging unremarkable  -sputum cx/crypto pending  -cleared by GYN for adnexal cysts/IUD sources for infection  -seen by ENT for ear pain, now resolved, continue Flonase  -spirometry, wean off NC, Pulmonary consulted  -increase Lasix to 40QD  -PT/OT  -Continue HSQ  -SCDs  -analgesia prn     Immunosuppression:  -Prednisone 5  -Envarsus per level  -Imuran hold in the setting of infection    DM:  -On insulin pump  -Endocrine on board pump management  -Was at home on (Trulicity)  -Diabetic diet    HTN, HLD  -Monitor BP, continue Nifedipine. Coreg discontinued--bradycardia  -continue statin    MDD, Anxiety  -Paroxetine 10mg PO q24  -Klonopin 1mg PO PRN    GERD  -Protonix, Maalox prn

## 2022-09-22 NOTE — PROGRESS NOTE ADULT - SUBJECTIVE AND OBJECTIVE BOX
Transplant Surgery - Multidisciplinary Progress Note  --------------------------------------------------------------  LDRT    4/2/2018    HPI: 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 s/p BL Nephrectomy/Single Donor Renal Transplant in 2018, Post Transplant DM, HTN, HLD, GERD, MDD, Anxiety presented to [facility] ER on 9/14/2022 with c/o body aches, R flank pain, fevers/chills, nausea/vomiting, diarrhea. In [facility] ED Tmax 102.2F and leukocytosis. Lactate negative. Cultures sent. Given broad spectrum empiric IV ABX and IVF. Pt with difficulty tolerating PO intake due to N/V. Currently on insulin pump for DM.  Pt transferred to [facility] for further workup of sepsis.     Interval events:  -remains Afebrile, off Cefepime  -overall, symptoms with much improvement since arrival, however still requiring O2 via NC 2-3L.     Immunosuppression                               Maintenance:   Env per level, Holding Imuran, Pred 5  Ongoing monitoring for signs of rejection.    Potential Discharge date: TBD    Education:  Medications    Plan of care:  See Below        MEDICATIONS  (STANDING):  atorvastatin 10 milliGRAM(s) Oral at bedtime  fluticasone propionate 50 MICROgram(s)/spray Nasal Spray 1 Spray(s) Both Nostrils two times a day  heparin   Injectable 5000 Unit(s) SubCutaneous every 12 hours  influenza   Vaccine 0.5 milliLiter(s) IntraMuscular once  insulin lispro (HumaLOG) Pump 1 Each SubCutaneous Continuous Pump  NIFEdipine XL 60 milliGRAM(s) Oral daily  pantoprazole    Tablet 40 milliGRAM(s) Oral before breakfast  PARoxetine 10 milliGRAM(s) Oral daily  predniSONE   Tablet 5 milliGRAM(s) Oral daily  senna 2 Tablet(s) Oral at bedtime  tacrolimus ER Tablet (ENVARSUS XR) 1 milliGRAM(s) Oral once  trimethoprim   80 mG/sulfamethoxazole 400 mG 1 Tablet(s) Oral daily    MEDICATIONS  (PRN):  aluminum hydroxide/magnesium hydroxide/simethicone Suspension 30 milliLiter(s) Oral every 4 hours PRN Dyspepsia  clonazePAM  Tablet 1 milliGRAM(s) Oral three times a day PRN anxiety  HYDROmorphone  Injectable 0.5 milliGRAM(s) IV Push at bedtime PRN Severe Pain (7 - 10)  ondansetron Injectable 4 milliGRAM(s) IV Push every 6 hours PRN Nausea and/or Vomiting  traMADol 50 milliGRAM(s) Oral every 6 hours PRN Severe Pain (7 - 10)  traMADol 25 milliGRAM(s) Oral every 6 hours PRN Moderate Pain (4 - 6)              Vital Signs Last 24 Hrs  T(C): 36.9 (22 Sep 2022 08:47), Max: 37.1 (21 Sep 2022 17:00)  T(F): 98.4 (22 Sep 2022 08:47), Max: 98.8 (21 Sep 2022 17:00)  HR: 67 (22 Sep 2022 08:47) (61 - 70)  BP: 131/68 (22 Sep 2022 08:47) (110/58 - 146/67)  BP(mean): --  RR: 18 (22 Sep 2022 08:47) (18 - 18)  SpO2: 91% (22 Sep 2022 08:47) (91% - 96%)    Parameters below as of 22 Sep 2022 08:47  Patient On (Oxygen Delivery Method): room air        I&O's Summary    21 Sep 2022 07:01  -  22 Sep 2022 07:00  --------------------------------------------------------  IN: 960 mL / OUT: 2350 mL / NET: -1390 mL    22 Sep 2022 07:01  -  22 Sep 2022 13:06  --------------------------------------------------------  IN: 360 mL / OUT: 1600 mL / NET: -1240 mL                              12.0   13.69 )-----------( 244      ( 22 Sep 2022 07:05 )             36.1     09-22    139  |  103  |  26<H>  ----------------------------<  100<H>  4.2   |  26  |  1.13    Ca    9.4      22 Sep 2022 07:00  Phos  3.9     09-22  Mg     1.7     09-22    TPro  6.6  /  Alb  3.5  /  TBili  0.4  /  DBili  x   /  AST  12  /  ALT  12  /  AlkPhos  54  09-22    Tacrolimus (), Serum: 2.8 ng/mL (09-22 @ 07:05)        Culture - Urine (collected 09-18-22 @ 18:18)  Source: Clean Catch Clean Catch (Midstream)  Final Report (09-19-22 @ 16:22):    <10,000 CFU/mL Normal Urogenital Angélica    Culture - Blood (collected 09-15-22 @ 18:45)  Source: .Blood Blood  Final Report (09-20-22 @ 22:01):    No Growth Final    Culture - Blood (collected 09-15-22 @ 18:30)  Source: .Blood Blood  Final Report (09-20-22 @ 22:01):    No Growth Final                  Review of systems  Gen: No weight changes, fatigue, fevers/chills, weakness, see above  Skin: No rashes  Head/Eyes/Ears/Mouth: No headache; Normal hearing; Normal vision w/o blurriness; No sinus pain/discomfort, sore throat, see above  Respiratory: No dyspnea, cough, wheezing, hemoptysis  CV: No chest pain, PND, orthopnea  GI: denies diarrhea, constipation, nausea, vomiting, melena, hematochezia, see above  : No increased frequency, dysuria, hematuria, nocturia  MSK: No joint pain/swelling; no back pain; no edema  Neuro: No dizziness/lightheadedness, weakness, seizures, numbness, tingling  Heme: No easy bruising or bleeding  Endo: No heat/cold intolerance  Psych: No significant nervousness, anxiety, stress, depression  All other systems were reviewed and are negative, except as noted.      PHYSICAL EXAM:  Constitutional: Well developed / well nourished  Eyes: Anicteric, PERRLA  ENMT: nc/at  Respiratory: CTA B/L  Cardiovascular: RRR  Gastrointestinal: Soft, non distended, NT.  well healed incisional scar   Genitourinary: Voiding spontaneously  Extremities: SCD's in place and working bilaterally,  chronic edema ~1+ b/l LE, stable today  Vascular: Palpable dp pulses bilaterally  Neurological: A&O x3  Skin: no rashes, ulcerations or lesions;  Musculoskeletal: Moving all extremities  Psychiatric: Responsive

## 2022-09-22 NOTE — CONSULT NOTE ADULT - SUBJECTIVE AND OBJECTIVE BOX
CHIEF COMPLAINT:Patient is a 51y old  Female who presents with a chief complaint of Transfer from [facility] with fever and chills r/o Sepsis (22 Sep 2022 13:55)      HPI:  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 s/p BL Nephrectomy/Single Donor Renal Transplant, Post Transplant DM, HTN, HLD, GERD, MDD, Anxiety presented to [facility] ER on 9/14/2022 with c/o body aches, R flank pain, fevers/chills, nausea/vomiting, diarrhea. Had worsening progressively  diarrhea for which she was switched from myfortic to immuran. Symptoms progressed so she went to the ED. Notes that she felt like she was panting leading up to the hospitalization, which she had initially attributed to her vomiting.    Found to be hypoxic in the ED and placed on 2L NC. Pt reports today she walked to the bathroom without oxygen and desaturated to 82%. Pulmonology consulted for hypoxemia and new oxygen requirement.         (15 Sep 2022 18:18)      PAST MEDICAL & SURGICAL HISTORY:  Hyperlipidemia      Arthropathy NOS - shoulder      Kidney stones      Polycystic kidney disease      GERD (gastroesophageal reflux disease)      Anxiety      CKD (chronic kidney disease) stage 4, GFR 15-29 ml/min  no dialysis      ESRD (end stage renal disease)      Ankle fracture - Left  brumstein barajas procedure 2000      Tubal ligation  1995      S/P cholecystectomy  (2013)      Kidney calculi  cysto/lithotripsy multiple      H/O tubal ligation  (1996)      History of ventral hernia repair      H/O shoulder surgery  left      AV fistula  right forearm extremity AV fistula radiocephalic ( 2018)      Renal transplant recipient      Status post nephrectomy  bilateral          FAMILY HISTORY:  No pertinent family history in first degree relatives        SOCIAL HISTORY:  Smoking: [ ] Never Smoked [ ] Former Smoker (__ packs x ___ years) [ ] Current Smoker  (__ packs x ___ years)  Substance Use: [ ] Never Used [ ] Used ____  EtOH Use:  Marital Status: [ ] Single [ ]  [ ]  [ ]   Sexual History:   Occupation:  Recent Travel:  Country of Birth:  Advance Directives:    Allergies    Demerol HCl (Hives)  Phenergan (Hives; Rash)    Intolerances        HOME MEDICATIONS:  Home Medications:  Admelog 100 units/mL injectable solution:  (15 Sep 2022 03:20)  carvedilol 25 mg oral tablet: 1 tab(s) orally 2 times a day (15 Sep 2022 03:32)  Envarsus XR 1 mg oral tablet, extended release: 1 tab(s) orally once a day (in the morning) (15 Sep 2022 03:18)  Imuran 50 mg oral tablet: 2 tab(s) orally once a day (15 Sep 2022 03:18)  KlonoPIN 1 mg oral tablet: 1 tab(s) orally once a day, As Needed (15 Sep 2022 03:36)  losartan 100 mg oral tablet: 1 tab(s) orally once a day (15 Sep 2022 03:32)  PARoxetine 10 mg oral tablet: 1 tab(s) orally once a day (15 Sep 2022 03:33)  Pepcid 20 mg oral tablet: 1 tab(s) orally once a day (15 Sep 2022 03:32)  pravastatin 20 mg oral tablet: 1 tab(s) orally once a day (15 Sep 2022 03:37)  predniSONE 5 mg oral tablet: 1 tab(s) orally once a day (15 Sep 2022 03:18)  spironolactone 25 mg oral tablet: 1 tab(s) orally once a day (15 Sep 2022 03:37)  Trulicity Pen 0.75 mg/0.5 mL subcutaneous solution: 1 dose(s) subcutaneous once a week (15 Sep 2022 03:36)      REVIEW OF SYSTEMS:  Constitutional: [ ] negative [ ] fevers [ ] chills [ ] weight loss [ ] weight gain  HEENT: [ ] negative [ ] dry eyes [ ] eye irritation [ ] postnasal drip [ ] nasal congestion  CV: [ ] negative  [ ] chest pain [ ] orthopnea [ ] palpitations [ ] murmur  Resp: [ ] negative [ ] cough [ ] shortness of breath [ ] dyspnea [ ] wheezing [ ] sputum [ ] hemoptysis  GI: [ ] negative [ ] nausea [ ] vomiting [ ] diarrhea [ ] constipation [ ] abd pain [ ] dysphagia   : [ ] negative [ ] dysuria [ ] nocturia [ ] hematuria [ ] increased urinary frequency  Musculoskeletal: [ ] negative [ ] back pain [ ] myalgias [ ] arthralgias [ ] fracture  Skin: [ ] negative [ ] rash [ ] itch  Neurological: [ ] negative [ ] headache [ ] dizziness [ ] syncope [ ] weakness [ ] numbness  Psychiatric: [ ] negative [ ] anxiety [ ] depression  Endocrine: [ ] negative [ ] diabetes [ ] thyroid problem  Hematologic/Lymphatic: [ ] negative [ ] anemia [ ] bleeding problem  Allergic/Immunologic: [ ] negative [ ] itchy eyes [ ] nasal discharge [ ] hives [ ] angioedema  [x] All other systems negative  [ ] Unable to assess ROS because ________    OBJECTIVE:  ICU Vital Signs Last 24 Hrs  T(C): 36.5 (22 Sep 2022 17:04), Max: 36.9 (22 Sep 2022 01:00)  T(F): 97.7 (22 Sep 2022 17:04), Max: 98.5 (22 Sep 2022 01:00)  HR: 60 (22 Sep 2022 18:08) (60 - 86)  BP: 123/67 (22 Sep 2022 18:08) (118/68 - 150/72)  BP(mean): --  ABP: --  ABP(mean): --  RR: 18 (22 Sep 2022 18:08) (18 - 18)  SpO2: 97% (22 Sep 2022 18:08) (91% - 97%)    O2 Parameters below as of 22 Sep 2022 18:08  Patient On (Oxygen Delivery Method): nasal cannula  O2 Flow (L/min): 2            09-21 @ 07:01  -  09-22 @ 07:00  --------------------------------------------------------  IN: 960 mL / OUT: 2350 mL / NET: -1390 mL    09-22 @ 07:01  -  09-22 @ 21:48  --------------------------------------------------------  IN: 600 mL / OUT: 2250 mL / NET: -1650 mL      CAPILLARY BLOOD GLUCOSE  86 (22 Sep 2022 19:00)      POCT Blood Glucose.: 123 mg/dL (22 Sep 2022 05:23)      PHYSICAL EXAM:  GENERAL: NAD, well-groomed, well-developed  HEAD:  Atraumatic, Normocephalic  EYES: EOMI, conjunctiva and sclera clear  ENMT: Moist mucous membranes  CHEST/LUNG: Clear to auscultation bilaterally; No rales, rhonchi, wheezing, or rubs  HEART: Regular rate and rhythm; No murmurs, rubs, or gallops  ABDOMEN: Nondistended  VASCULAR: No  cyanosis, or edema  SKIN: No rashes or lesions  NERVOUS SYSTEM:  Alert & Oriented X3, Good concentration    HOSPITAL MEDICATIONS:  Standing Meds:  atorvastatin 10 milliGRAM(s) Oral at bedtime  fluticasone propionate 50 MICROgram(s)/spray Nasal Spray 1 Spray(s) Both Nostrils two times a day  heparin   Injectable 5000 Unit(s) SubCutaneous every 12 hours  influenza   Vaccine 0.5 milliLiter(s) IntraMuscular once  insulin lispro (HumaLOG) Pump 1 Each SubCutaneous Continuous Pump  magnesium oxide 400 milliGRAM(s) Oral three times a day with meals  NIFEdipine XL 60 milliGRAM(s) Oral daily  pantoprazole    Tablet 40 milliGRAM(s) Oral before breakfast  PARoxetine 10 milliGRAM(s) Oral daily  predniSONE   Tablet 5 milliGRAM(s) Oral daily  senna 2 Tablet(s) Oral at bedtime  trimethoprim   80 mG/sulfamethoxazole 400 mG 1 Tablet(s) Oral daily      PRN Meds:  aluminum hydroxide/magnesium hydroxide/simethicone Suspension 30 milliLiter(s) Oral every 4 hours PRN  clonazePAM  Tablet 1 milliGRAM(s) Oral three times a day PRN  HYDROmorphone  Injectable 0.5 milliGRAM(s) IV Push at bedtime PRN  ondansetron Injectable 4 milliGRAM(s) IV Push every 6 hours PRN  traMADol 50 milliGRAM(s) Oral every 6 hours PRN  traMADol 25 milliGRAM(s) Oral every 6 hours PRN      LABS:    09-22    139  |  103  |  26<H>  ----------------------------<  100<H>  4.2   |  26  |  1.13  09-21    139  |  102  |  24<H>  ----------------------------<  113<H>  4.0   |  26  |  1.12  09-20    139  |  104  |  22  ----------------------------<  111<H>  4.6   |  21<L>  |  0.99    Ca    9.4      22 Sep 2022 07:00  Ca    10.0      21 Sep 2022 06:41  Ca    9.2      20 Sep 2022 06:18  Phos  3.9     09-22  Mg     1.7     09-22    TPro  6.6  /  Alb  3.5  /  TBili  0.4  /  DBili  x   /  AST  12  /  ALT  12  /  AlkPhos  54  09-22  TPro  6.8  /  Alb  3.5  /  TBili  0.5  /  DBili  x   /  AST  12  /  ALT  12  /  AlkPhos  53  09-21  TPro  6.1  /  Alb  3.1<L>  /  TBili  0.5  /  DBili  x   /  AST  20  /  ALT  11  /  AlkPhos  50  09-20    Magnesium, Serum: 1.7 mg/dL (09-22-22 @ 07:00)  Magnesium, Serum: 1.7 mg/dL (09-21-22 @ 06:41)  Magnesium, Serum: 1.6 mg/dL (09-20-22 @ 06:18)    Phosphorus Level, Serum: 3.9 mg/dL (09-22-22 @ 07:00)  Phosphorus Level, Serum: 4.5 mg/dL (09-21-22 @ 06:41)  Phosphorus Level, Serum: 3.4 mg/dL (09-20-22 @ 06:18)                                              12.0   13.69 )-----------( 244      ( 22 Sep 2022 07:05 )             36.1                         12.3   10.51 )-----------( 218      ( 21 Sep 2022 06:39 )             36.7                         11.2   8.20  )-----------( 197      ( 20 Sep 2022 06:18 )             33.4     CAPILLARY BLOOD GLUCOSE  86 (22 Sep 2022 19:00)  261 (22 Sep 2022 13:05)  253 (22 Sep 2022 10:02)  153 (21 Sep 2022 22:00)      POCT Blood Glucose.: 123 mg/dL (22 Sep 2022 05:23)        MICROBIOLOGY:     Culture - Sputum (collected 22 Sep 2022 10:57)  Source: .Sputum Sputum cup  Gram Stain (22 Sep 2022 19:27):    No polymorphonuclear leukocytes per low power field    Few Squamous epithelial cells per low power field    Moderate Gram positive cocci in pairs, chains and clusters per oil power    field    Few Gram Negative Rods per oil power field        RADIOLOGY:  [ ] Reviewed and interpreted by me   CHIEF COMPLAINT:Patient is a 51y old  Female who presents with a chief complaint of Transfer from [facility] with fever and chills r/o Sepsis (22 Sep 2022 13:55)      HPI:  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 s/p BL Nephrectomy/Single Donor Renal Transplant, Post Transplant DM, HTN, HLD, GERD, MDD, Anxiety presented to [facility] ER on 9/14/2022 with c/o body aches, R flank pain, fevers/chills, nausea/vomiting, diarrhea. Had worsening progressively  diarrhea for which she was switched from myfortic to immuran. Symptoms progressed so she went to the ED. Notes that she felt like she was panting leading up to the hospitalization, which she had initially attributed to her vomiting.    Found to be hypoxic in the ED and placed on 2L NC. Pt reports today she walked to the bathroom without oxygen and desaturated to 82%. Pulmonology consulted for hypoxemia and new oxygen requirement.         (15 Sep 2022 18:18)      PAST MEDICAL & SURGICAL HISTORY:  Hyperlipidemia      Arthropathy NOS - shoulder      Kidney stones      Polycystic kidney disease      GERD (gastroesophageal reflux disease)      Anxiety      CKD (chronic kidney disease) stage 4, GFR 15-29 ml/min  no dialysis      ESRD (end stage renal disease)      Ankle fracture - Left  brumstein barajas procedure 2000      Tubal ligation  1995      S/P cholecystectomy  (2013)      Kidney calculi  cysto/lithotripsy multiple      H/O tubal ligation  (1996)      History of ventral hernia repair      H/O shoulder surgery  left      AV fistula  right forearm extremity AV fistula radiocephalic ( 2018)      Renal transplant recipient      Status post nephrectomy  bilateral          FAMILY HISTORY:  No pertinent family history in first degree relatives        SOCIAL HISTORY:  Smoking: [ ] Never Smoked [ ] Former Smoker (__ packs x ___ years) [ ] Current Smoker  (__ packs x ___ years)  Substance Use: [ ] Never Used [ ] Used ____  EtOH Use:  Marital Status: [ ] Single [ ]  [ ]  [ ]   Sexual History:   Occupation:  Recent Travel:  Country of Birth:  Advance Directives:    Allergies    Demerol HCl (Hives)  Phenergan (Hives; Rash)    Intolerances        HOME MEDICATIONS:  Home Medications:  Admelog 100 units/mL injectable solution:  (15 Sep 2022 03:20)  carvedilol 25 mg oral tablet: 1 tab(s) orally 2 times a day (15 Sep 2022 03:32)  Envarsus XR 1 mg oral tablet, extended release: 1 tab(s) orally once a day (in the morning) (15 Sep 2022 03:18)  Imuran 50 mg oral tablet: 2 tab(s) orally once a day (15 Sep 2022 03:18)  KlonoPIN 1 mg oral tablet: 1 tab(s) orally once a day, As Needed (15 Sep 2022 03:36)  losartan 100 mg oral tablet: 1 tab(s) orally once a day (15 Sep 2022 03:32)  PARoxetine 10 mg oral tablet: 1 tab(s) orally once a day (15 Sep 2022 03:33)  Pepcid 20 mg oral tablet: 1 tab(s) orally once a day (15 Sep 2022 03:32)  pravastatin 20 mg oral tablet: 1 tab(s) orally once a day (15 Sep 2022 03:37)  predniSONE 5 mg oral tablet: 1 tab(s) orally once a day (15 Sep 2022 03:18)  spironolactone 25 mg oral tablet: 1 tab(s) orally once a day (15 Sep 2022 03:37)  Trulicity Pen 0.75 mg/0.5 mL subcutaneous solution: 1 dose(s) subcutaneous once a week (15 Sep 2022 03:36)      REVIEW OF SYSTEMS:  Constitutional: [ ] negative [ ] fevers [ ] chills [ ] weight loss [ ] weight gain  HEENT: [ ] negative [ ] dry eyes [ ] eye irritation [ ] postnasal drip [ ] nasal congestion  CV: [ ] negative  [ ] chest pain [ ] orthopnea [ ] palpitations [ ] murmur  Resp: [ ] negative [ ] cough [ ] shortness of breath [ ] dyspnea [ ] wheezing [ ] sputum [ ] hemoptysis  GI: [ ] negative [ ] nausea [ ] vomiting [ ] diarrhea [ ] constipation [ ] abd pain [ ] dysphagia   : [ ] negative [ ] dysuria [ ] nocturia [ ] hematuria [ ] increased urinary frequency  Musculoskeletal: [ ] negative [ ] back pain [ ] myalgias [ ] arthralgias [ ] fracture  Skin: [ ] negative [ ] rash [ ] itch  Neurological: [ ] negative [ ] headache [ ] dizziness [ ] syncope [ ] weakness [ ] numbness  Psychiatric: [ ] negative [ ] anxiety [ ] depression  Endocrine: [ ] negative [ ] diabetes [ ] thyroid problem  Hematologic/Lymphatic: [ ] negative [ ] anemia [ ] bleeding problem  Allergic/Immunologic: [ ] negative [ ] itchy eyes [ ] nasal discharge [ ] hives [ ] angioedema  [x] All other systems negative  [ ] Unable to assess ROS because ________    OBJECTIVE:  ICU Vital Signs Last 24 Hrs  T(C): 36.5 (22 Sep 2022 17:04), Max: 36.9 (22 Sep 2022 01:00)  T(F): 97.7 (22 Sep 2022 17:04), Max: 98.5 (22 Sep 2022 01:00)  HR: 60 (22 Sep 2022 18:08) (60 - 86)  BP: 123/67 (22 Sep 2022 18:08) (118/68 - 150/72)  BP(mean): --  ABP: --  ABP(mean): --  RR: 18 (22 Sep 2022 18:08) (18 - 18)  SpO2: 97% (22 Sep 2022 18:08) (91% - 97%)    O2 Parameters below as of 22 Sep 2022 18:08  Patient On (Oxygen Delivery Method): nasal cannula  O2 Flow (L/min): 2            09-21 @ 07:01  -  09-22 @ 07:00  --------------------------------------------------------  IN: 960 mL / OUT: 2350 mL / NET: -1390 mL    09-22 @ 07:01  -  09-22 @ 21:48  --------------------------------------------------------  IN: 600 mL / OUT: 2250 mL / NET: -1650 mL      CAPILLARY BLOOD GLUCOSE  86 (22 Sep 2022 19:00)      POCT Blood Glucose.: 123 mg/dL (22 Sep 2022 05:23)      PHYSICAL EXAM:  GENERAL: NAD, well-groomed, well-developed  HEAD:  Atraumatic, Normocephalic  EYES: EOMI, conjunctiva and sclera clear  ENMT: Moist mucous membranes  CHEST/LUNG: Clear to auscultation bilaterally; No rales, rhonchi, wheezing, or rubs  HEART: Regular rate and rhythm; No murmurs, rubs, or gallops  ABDOMEN: Nondistended  VASCULAR: No  cyanosis, or edema  SKIN: No rashes or lesions  NERVOUS SYSTEM:  Alert & Oriented X3, Good concentration    HOSPITAL MEDICATIONS:  Standing Meds:  atorvastatin 10 milliGRAM(s) Oral at bedtime  fluticasone propionate 50 MICROgram(s)/spray Nasal Spray 1 Spray(s) Both Nostrils two times a day  heparin   Injectable 5000 Unit(s) SubCutaneous every 12 hours  influenza   Vaccine 0.5 milliLiter(s) IntraMuscular once  insulin lispro (HumaLOG) Pump 1 Each SubCutaneous Continuous Pump  magnesium oxide 400 milliGRAM(s) Oral three times a day with meals  NIFEdipine XL 60 milliGRAM(s) Oral daily  pantoprazole    Tablet 40 milliGRAM(s) Oral before breakfast  PARoxetine 10 milliGRAM(s) Oral daily  predniSONE   Tablet 5 milliGRAM(s) Oral daily  senna 2 Tablet(s) Oral at bedtime  trimethoprim   80 mG/sulfamethoxazole 400 mG 1 Tablet(s) Oral daily      PRN Meds:  aluminum hydroxide/magnesium hydroxide/simethicone Suspension 30 milliLiter(s) Oral every 4 hours PRN  clonazePAM  Tablet 1 milliGRAM(s) Oral three times a day PRN  HYDROmorphone  Injectable 0.5 milliGRAM(s) IV Push at bedtime PRN  ondansetron Injectable 4 milliGRAM(s) IV Push every 6 hours PRN  traMADol 50 milliGRAM(s) Oral every 6 hours PRN  traMADol 25 milliGRAM(s) Oral every 6 hours PRN      LABS:    09-22    139  |  103  |  26<H>  ----------------------------<  100<H>  4.2   |  26  |  1.13  09-21    139  |  102  |  24<H>  ----------------------------<  113<H>  4.0   |  26  |  1.12  09-20    139  |  104  |  22  ----------------------------<  111<H>  4.6   |  21<L>  |  0.99    Ca    9.4      22 Sep 2022 07:00  Ca    10.0      21 Sep 2022 06:41  Ca    9.2      20 Sep 2022 06:18  Phos  3.9     09-22  Mg     1.7     09-22    TPro  6.6  /  Alb  3.5  /  TBili  0.4  /  DBili  x   /  AST  12  /  ALT  12  /  AlkPhos  54  09-22  TPro  6.8  /  Alb  3.5  /  TBili  0.5  /  DBili  x   /  AST  12  /  ALT  12  /  AlkPhos  53  09-21  TPro  6.1  /  Alb  3.1<L>  /  TBili  0.5  /  DBili  x   /  AST  20  /  ALT  11  /  AlkPhos  50  09-20    Magnesium, Serum: 1.7 mg/dL (09-22-22 @ 07:00)  Magnesium, Serum: 1.7 mg/dL (09-21-22 @ 06:41)  Magnesium, Serum: 1.6 mg/dL (09-20-22 @ 06:18)    Phosphorus Level, Serum: 3.9 mg/dL (09-22-22 @ 07:00)  Phosphorus Level, Serum: 4.5 mg/dL (09-21-22 @ 06:41)  Phosphorus Level, Serum: 3.4 mg/dL (09-20-22 @ 06:18)                                              12.0   13.69 )-----------( 244      ( 22 Sep 2022 07:05 )             36.1                         12.3   10.51 )-----------( 218      ( 21 Sep 2022 06:39 )             36.7                         11.2   8.20  )-----------( 197      ( 20 Sep 2022 06:18 )             33.4     CAPILLARY BLOOD GLUCOSE  86 (22 Sep 2022 19:00)  261 (22 Sep 2022 13:05)  253 (22 Sep 2022 10:02)  153 (21 Sep 2022 22:00)      POCT Blood Glucose.: 123 mg/dL (22 Sep 2022 05:23)        MICROBIOLOGY:     Culture - Sputum (collected 22 Sep 2022 10:57)  Source: .Sputum Sputum cup  Gram Stain (22 Sep 2022 19:27):    No polymorphonuclear leukocytes per low power field    Few Squamous epithelial cells per low power field    Moderate Gram positive cocci in pairs, chains and clusters per oil power    field    Few Gram Negative Rods per oil power field        RADIOLOGY:  [x ] Reviewed and interpreted by me- ggo in upper lobes peripherally, bilateral pleral effusions, interlobular septal thickening and atelectasis.

## 2022-09-22 NOTE — PROGRESS NOTE ADULT - SUBJECTIVE AND OBJECTIVE BOX
Diabetes Follow up note:    Chief complaint: Post txplant DM/insulin pump management.     Interval Hx: FS in 120s this AM. Pt seen at bedside after breakfast. Pt reports fatigue and poor sleep last night, realized while at bedside that she forgot to bolus for breakfast so correction bolus of 5.4 units given at 10:20am. Due for pump site change today. Reviewed CGM/pump history w/pt.     Review of Systems:  General: + fatigue  GI: Tolerating POs. Denies N/V/D/Abd pain  CV: Denies CP/SOB  ENDO: No S&Sx of hypoglycemia    MEDS:  atorvastatin 10 milliGRAM(s) Oral at bedtime  insulin lispro (HumaLOG) Pump 1 Each SubCutaneous Continuous Pump  Basal rate:  12a-6a 1.1  6a-12a 1  TDD 24.6    ISF 15    ICR  12a-6a 4.5  6a-12a 4.2    Target   predniSONE   Tablet 5 milliGRAM(s) Oral daily    trimethoprim   80 mG/sulfamethoxazole 400 mG 1 Tablet(s) Oral daily    Allergies    Demerol HCl (Hives)  Phenergan (Hives; Rash)          PE:  General: Female lying in bed. NAD.   Vital Signs Last 24 Hrs  T(C): 36.9 (22 Sep 2022 08:47), Max: 37.1 (21 Sep 2022 17:00)  T(F): 98.4 (22 Sep 2022 08:47), Max: 98.8 (21 Sep 2022 17:00)  HR: 67 (22 Sep 2022 08:47) (61 - 70)  BP: 131/68 (22 Sep 2022 08:47) (110/58 - 146/67)  BP(mean): --  RR: 18 (22 Sep 2022 08:47) (18 - 18)  SpO2: 91% (22 Sep 2022 08:47) (91% - 96%)    Parameters below as of 22 Sep 2022 08:47  Patient On (Oxygen Delivery Method): room air      Abd: Soft, NT,ND, Pump site c/d/i  Extremities: Warm. R outer arm dexcom in place.   Neuro: A&O X3    LABS:  POCT Blood Glucose.: 123 mg/dL (09-22-22 @ 05:23)  POCT Blood Glucose.: 115 mg/dL (09-21-22 @ 05:22)  POCT Blood Glucose.: 155 mg/dL (09-20-22 @ 18:04)  POCT Blood Glucose.: 134 mg/dL (09-20-22 @ 05:05)                            12.0   13.69 )-----------( 244      ( 22 Sep 2022 07:05 )             36.1       09-22    139  |  103  |  26<H>  ----------------------------<  100<H>  4.2   |  26  |  1.13    eGFR: 59<L>    Ca    9.4      09-22  Mg     1.7     09-22  Phos  3.9     09-22    TPro  6.6  /  Alb  3.5  /  TBili  0.4  /  DBili  x   /  AST  12  /  ALT  12  /  AlkPhos  54  09-22      A1C with Estimated Average Glucose Result: 6.2 % (09-17-22 @ 06:53)          Contact number: [facility]al [phone] or [phone]

## 2022-09-22 NOTE — DIETITIAN INITIAL EVALUATION ADULT - ADD RECOMMEND
Continue regular diet - pt able to count carbohydrates on her own, BGL within reasonable levels, monitor for need for restriction. Provide encouragement with PO intake, menu selections, and assistance with meals as needed. Reinforce nutrition education as needed - RD remains available. Continue to monitor nutritional intake, labs, weights, BM, skin, clinical course.

## 2022-09-22 NOTE — DIETITIAN INITIAL EVALUATION ADULT - REASON
Nutrition focused physical exam deferred at this time per pt preference. No overt fat or muscle losses noted.

## 2022-09-22 NOTE — DIETITIAN INITIAL EVALUATION ADULT - OTHER INFO
-- Wt hx: pt reports UBW 240lbs, states per her MD she had 10lb wt loss x 4 weeks. Wt obtained of 253.3lbs on 9/16, pt undergoing diuresis with most recent wt of 246lbs today 9/22. Pt with no noted edema at this time. Will continue to monitor.  -- HbA1c noted of 6.2% suggesting good glycemic control PTA.

## 2022-09-22 NOTE — DIETITIAN INITIAL EVALUATION ADULT - ORAL INTAKE PTA/DIET HISTORY
PTA pt reports consuming small frequent meals, monitors carbohydrate intake and attempts to count carbohydrates as she has insulin pump with CGM. Reports recent decrease in PO intake 2/2 not feeling well + N/V. Pt also endorses persistent diarrhea PTA. NKFA or intolerances.

## 2022-09-22 NOTE — PROGRESS NOTE ADULT - ASSESSMENT
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 DM controlled (A1C 6.2%). Also h/o HTN, HLD, GERD. Here with sepsis due to UTI. Tolerating POs  with BG mostly at goal 100 to 180mg/dl on CGM except this AM as pt forgot to bolus for breakfast. Reviewed pump setting and hx with pt with no changes made to any settings. Using Auto mode. BG goal (100-180mg/dl).     On insulin pump+ CGM plus Trulicity 0.75mg subq weekly.

## 2022-09-22 NOTE — DIETITIAN INITIAL EVALUATION ADULT - PERTINENT MEDS FT
MEDICATIONS  (STANDING):  atorvastatin 10 milliGRAM(s) Oral at bedtime  fluticasone propionate 50 MICROgram(s)/spray Nasal Spray 1 Spray(s) Both Nostrils two times a day  heparin   Injectable 5000 Unit(s) SubCutaneous every 12 hours  influenza   Vaccine 0.5 milliLiter(s) IntraMuscular once  insulin lispro (HumaLOG) Pump 1 Each SubCutaneous Continuous Pump  NIFEdipine XL 60 milliGRAM(s) Oral daily  pantoprazole    Tablet 40 milliGRAM(s) Oral before breakfast  PARoxetine 10 milliGRAM(s) Oral daily  predniSONE   Tablet 5 milliGRAM(s) Oral daily  senna 2 Tablet(s) Oral at bedtime  tacrolimus ER Tablet (ENVARSUS XR) 1 milliGRAM(s) Oral <User Schedule>  trimethoprim   80 mG/sulfamethoxazole 400 mG 1 Tablet(s) Oral daily    MEDICATIONS  (PRN):  aluminum hydroxide/magnesium hydroxide/simethicone Suspension 30 milliLiter(s) Oral every 4 hours PRN Dyspepsia  clonazePAM  Tablet 1 milliGRAM(s) Oral three times a day PRN anxiety  HYDROmorphone  Injectable 0.5 milliGRAM(s) IV Push at bedtime PRN Severe Pain (7 - 10)  ondansetron Injectable 4 milliGRAM(s) IV Push every 6 hours PRN Nausea and/or Vomiting  traMADol 50 milliGRAM(s) Oral every 6 hours PRN Severe Pain (7 - 10)  traMADol 25 milliGRAM(s) Oral every 6 hours PRN Moderate Pain (4 - 6)

## 2022-09-22 NOTE — DIETITIAN INITIAL EVALUATION ADULT - PERSON TAUGHT/METHOD
Provided education on post transplant nutrition therapy and food safety guidelines for transplant recipients. Discussed importance of thoroughly washing all fresh fruits/vegetables, importance of avoiding uncooked/raw/unpasteurized foods, avoiding pre-made deli/buffet/salad bar meals. Foods recommended as healthy well balanced diet and importance of adequate protein intakes for proper post-surgical healing discussed. Reviewed recommendations to avoid grapefruit, pomegranate and star fruit while taking immunosuppressant medication. All questions answered. Provided nutrition handout: USDA Food Safety for Transplant Recipients booklet./verbal instruction/written material/teach back - (Patient repeats in own words)/patient instructed

## 2022-09-22 NOTE — DIETITIAN INITIAL EVALUATION ADULT - ENERGY INTAKE
Pt states appetite has improved since admission, now with good PO intake >75% of meals. Adequate (%)

## 2022-09-22 NOTE — DIETITIAN INITIAL EVALUATION ADULT - PERTINENT LABORATORY DATA
09-22    139  |  103  |  26<H>  ----------------------------<  100<H>  4.2   |  26  |  1.13    Ca    9.4      22 Sep 2022 07:00  Phos  3.9     09-22  Mg     1.7     09-22    TPro  6.6  /  Alb  3.5  /  TBili  0.4  /  DBili  x   /  AST  12  /  ALT  12  /  AlkPhos  54  09-22  POCT Blood Glucose.: 123 mg/dL (09-22-22 @ 05:23)  A1C with Estimated Average Glucose Result: 6.2 % (09-17-22 @ 06:53)

## 2022-09-23 LAB
ALBUMIN SERPL ELPH-MCNC: 3.6 G/DL — SIGNIFICANT CHANGE UP (ref 3.3–5)
ALP SERPL-CCNC: 50 U/L — SIGNIFICANT CHANGE UP (ref 40–120)
ALT FLD-CCNC: 13 U/L — SIGNIFICANT CHANGE UP (ref 10–45)
ANION GAP SERPL CALC-SCNC: 11 MMOL/L — SIGNIFICANT CHANGE UP (ref 5–17)
AST SERPL-CCNC: 12 U/L — SIGNIFICANT CHANGE UP (ref 10–40)
BASOPHILS # BLD AUTO: 0.07 K/UL — SIGNIFICANT CHANGE UP (ref 0–0.2)
BASOPHILS NFR BLD AUTO: 0.6 % — SIGNIFICANT CHANGE UP (ref 0–2)
BILIRUB SERPL-MCNC: 0.4 MG/DL — SIGNIFICANT CHANGE UP (ref 0.2–1.2)
BUN SERPL-MCNC: 26 MG/DL — HIGH (ref 7–23)
CALCIUM SERPL-MCNC: 9.6 MG/DL — SIGNIFICANT CHANGE UP (ref 8.4–10.5)
CHLORIDE SERPL-SCNC: 101 MMOL/L — SIGNIFICANT CHANGE UP (ref 96–108)
CO2 SERPL-SCNC: 25 MMOL/L — SIGNIFICANT CHANGE UP (ref 22–31)
CREAT SERPL-MCNC: 1.26 MG/DL — SIGNIFICANT CHANGE UP (ref 0.5–1.3)
CRYPTOC AG FLD QL: NEGATIVE — SIGNIFICANT CHANGE UP
EGFR: 52 ML/MIN/1.73M2 — LOW
EOSINOPHIL # BLD AUTO: 0.65 K/UL — HIGH (ref 0–0.5)
EOSINOPHIL NFR BLD AUTO: 5.7 % — SIGNIFICANT CHANGE UP (ref 0–6)
GLUCOSE BLDC GLUCOMTR-MCNC: 123 MG/DL — HIGH (ref 70–99)
GLUCOSE SERPL-MCNC: 111 MG/DL — HIGH (ref 70–99)
HCT VFR BLD CALC: 35.4 % — SIGNIFICANT CHANGE UP (ref 34.5–45)
HGB BLD-MCNC: 12 G/DL — SIGNIFICANT CHANGE UP (ref 11.5–15.5)
IMM GRANULOCYTES NFR BLD AUTO: 1.7 % — HIGH (ref 0–0.9)
LYMPHOCYTES # BLD AUTO: 2.38 K/UL — SIGNIFICANT CHANGE UP (ref 1–3.3)
LYMPHOCYTES # BLD AUTO: 20.9 % — SIGNIFICANT CHANGE UP (ref 13–44)
MAGNESIUM SERPL-MCNC: 1.8 MG/DL — SIGNIFICANT CHANGE UP (ref 1.6–2.6)
MCHC RBC-ENTMCNC: 33.9 GM/DL — SIGNIFICANT CHANGE UP (ref 32–36)
MCHC RBC-ENTMCNC: 34.2 PG — HIGH (ref 27–34)
MCV RBC AUTO: 100.9 FL — HIGH (ref 80–100)
MONOCYTES # BLD AUTO: 0.81 K/UL — SIGNIFICANT CHANGE UP (ref 0–0.9)
MONOCYTES NFR BLD AUTO: 7.1 % — SIGNIFICANT CHANGE UP (ref 2–14)
NEUTROPHILS # BLD AUTO: 7.31 K/UL — SIGNIFICANT CHANGE UP (ref 1.8–7.4)
NEUTROPHILS NFR BLD AUTO: 64 % — SIGNIFICANT CHANGE UP (ref 43–77)
NRBC # BLD: 0 /100 WBCS — SIGNIFICANT CHANGE UP (ref 0–0)
PHOSPHATE SERPL-MCNC: 4 MG/DL — SIGNIFICANT CHANGE UP (ref 2.5–4.5)
PLATELET # BLD AUTO: 224 K/UL — SIGNIFICANT CHANGE UP (ref 150–400)
POTASSIUM SERPL-MCNC: 4.1 MMOL/L — SIGNIFICANT CHANGE UP (ref 3.5–5.3)
POTASSIUM SERPL-SCNC: 4.1 MMOL/L — SIGNIFICANT CHANGE UP (ref 3.5–5.3)
PROT SERPL-MCNC: 6.8 G/DL — SIGNIFICANT CHANGE UP (ref 6–8.3)
RBC # BLD: 3.51 M/UL — LOW (ref 3.8–5.2)
RBC # FLD: 12 % — SIGNIFICANT CHANGE UP (ref 10.3–14.5)
SARS-COV-2 RNA SPEC QL NAA+PROBE: SIGNIFICANT CHANGE UP
SODIUM SERPL-SCNC: 137 MMOL/L — SIGNIFICANT CHANGE UP (ref 135–145)
TACROLIMUS SERPL-MCNC: 3.6 NG/ML — SIGNIFICANT CHANGE UP
WBC # BLD: 11.41 K/UL — HIGH (ref 3.8–10.5)
WBC # FLD AUTO: 11.41 K/UL — HIGH (ref 3.8–10.5)

## 2022-09-23 PROCEDURE — 99233 SBSQ HOSP IP/OBS HIGH 50: CPT

## 2022-09-23 PROCEDURE — 76604 US EXAM CHEST: CPT | Mod: 26,GC

## 2022-09-23 PROCEDURE — 99232 SBSQ HOSP IP/OBS MODERATE 35: CPT | Mod: GC

## 2022-09-23 PROCEDURE — 99233 SBSQ HOSP IP/OBS HIGH 50: CPT | Mod: GC,25

## 2022-09-23 PROCEDURE — 99232 SBSQ HOSP IP/OBS MODERATE 35: CPT

## 2022-09-23 RX ORDER — PIPERACILLIN AND TAZOBACTAM 4; .5 G/20ML; G/20ML
3.38 INJECTION, POWDER, LYOPHILIZED, FOR SOLUTION INTRAVENOUS EVERY 8 HOURS
Refills: 0 | Status: DISCONTINUED | OUTPATIENT
Start: 2022-09-23 | End: 2022-09-24

## 2022-09-23 RX ORDER — TRAMADOL HYDROCHLORIDE 50 MG/1
25 TABLET ORAL EVERY 6 HOURS
Refills: 0 | Status: DISCONTINUED | OUTPATIENT
Start: 2022-09-23 | End: 2022-09-26

## 2022-09-23 RX ORDER — MAGNESIUM SULFATE 500 MG/ML
1 VIAL (ML) INJECTION ONCE
Refills: 0 | Status: COMPLETED | OUTPATIENT
Start: 2022-09-23 | End: 2022-09-23

## 2022-09-23 RX ORDER — PIPERACILLIN AND TAZOBACTAM 4; .5 G/20ML; G/20ML
3.38 INJECTION, POWDER, LYOPHILIZED, FOR SOLUTION INTRAVENOUS ONCE
Refills: 0 | Status: COMPLETED | OUTPATIENT
Start: 2022-09-23 | End: 2022-09-23

## 2022-09-23 RX ORDER — TRAMADOL HYDROCHLORIDE 50 MG/1
50 TABLET ORAL EVERY 6 HOURS
Refills: 0 | Status: DISCONTINUED | OUTPATIENT
Start: 2022-09-23 | End: 2022-09-26

## 2022-09-23 RX ADMIN — TRAMADOL HYDROCHLORIDE 50 MILLIGRAM(S): 50 TABLET ORAL at 23:00

## 2022-09-23 RX ADMIN — MAGNESIUM OXIDE 400 MG ORAL TABLET 400 MILLIGRAM(S): 241.3 TABLET ORAL at 16:57

## 2022-09-23 RX ADMIN — Medication 100 GRAM(S): at 09:42

## 2022-09-23 RX ADMIN — Medication 5 MILLIGRAM(S): at 05:43

## 2022-09-23 RX ADMIN — TRAMADOL HYDROCHLORIDE 50 MILLIGRAM(S): 50 TABLET ORAL at 17:45

## 2022-09-23 RX ADMIN — HEPARIN SODIUM 5000 UNIT(S): 5000 INJECTION INTRAVENOUS; SUBCUTANEOUS at 05:43

## 2022-09-23 RX ADMIN — PANTOPRAZOLE SODIUM 40 MILLIGRAM(S): 20 TABLET, DELAYED RELEASE ORAL at 09:42

## 2022-09-23 RX ADMIN — PIPERACILLIN AND TAZOBACTAM 25 GRAM(S): 4; .5 INJECTION, POWDER, LYOPHILIZED, FOR SOLUTION INTRAVENOUS at 21:46

## 2022-09-23 RX ADMIN — TACROLIMUS 2 MILLIGRAM(S): 5 CAPSULE ORAL at 09:42

## 2022-09-23 RX ADMIN — PIPERACILLIN AND TAZOBACTAM 200 GRAM(S): 4; .5 INJECTION, POWDER, LYOPHILIZED, FOR SOLUTION INTRAVENOUS at 12:49

## 2022-09-23 RX ADMIN — Medication 40 MILLIGRAM(S): at 05:43

## 2022-09-23 RX ADMIN — MAGNESIUM OXIDE 400 MG ORAL TABLET 400 MILLIGRAM(S): 241.3 TABLET ORAL at 12:26

## 2022-09-23 RX ADMIN — HYDROMORPHONE HYDROCHLORIDE 0.5 MILLIGRAM(S): 2 INJECTION INTRAMUSCULAR; INTRAVENOUS; SUBCUTANEOUS at 22:12

## 2022-09-23 RX ADMIN — TRAMADOL HYDROCHLORIDE 50 MILLIGRAM(S): 50 TABLET ORAL at 16:57

## 2022-09-23 RX ADMIN — TRAMADOL HYDROCHLORIDE 50 MILLIGRAM(S): 50 TABLET ORAL at 22:11

## 2022-09-23 RX ADMIN — TRAMADOL HYDROCHLORIDE 25 MILLIGRAM(S): 50 TABLET ORAL at 01:45

## 2022-09-23 RX ADMIN — Medication 1 MILLIGRAM(S): at 16:57

## 2022-09-23 RX ADMIN — Medication 1 SPRAY(S): at 05:44

## 2022-09-23 RX ADMIN — HYDROMORPHONE HYDROCHLORIDE 0.5 MILLIGRAM(S): 2 INJECTION INTRAMUSCULAR; INTRAVENOUS; SUBCUTANEOUS at 23:00

## 2022-09-23 RX ADMIN — Medication 1 MILLIGRAM(S): at 22:12

## 2022-09-23 RX ADMIN — HEPARIN SODIUM 5000 UNIT(S): 5000 INJECTION INTRAVENOUS; SUBCUTANEOUS at 21:45

## 2022-09-23 RX ADMIN — Medication 60 MILLIGRAM(S): at 05:43

## 2022-09-23 RX ADMIN — MAGNESIUM OXIDE 400 MG ORAL TABLET 400 MILLIGRAM(S): 241.3 TABLET ORAL at 09:42

## 2022-09-23 RX ADMIN — ATORVASTATIN CALCIUM 10 MILLIGRAM(S): 80 TABLET, FILM COATED ORAL at 21:45

## 2022-09-23 RX ADMIN — Medication 1 TABLET(S): at 12:26

## 2022-09-23 RX ADMIN — TRAMADOL HYDROCHLORIDE 25 MILLIGRAM(S): 50 TABLET ORAL at 00:41

## 2022-09-23 RX ADMIN — Medication 10 MILLIGRAM(S): at 12:27

## 2022-09-23 RX ADMIN — Medication 1 SPRAY(S): at 21:45

## 2022-09-23 NOTE — PROGRESS NOTE ADULT - SUBJECTIVE AND OBJECTIVE BOX
seen earlier today     Chief Complaint: Type 2 Diabetes Mellitus     INTERVAL HX: pt tolerating po, upon pump review pt provided 2 boluses for breakfast this am, pt doesnt recall second bolus but recalls she didnt notice the bagel on the tray until after she ate frenchtoast syrup/milk/ reddy / yogurt , may have bolused for the bagel after finishing those items but before noticing the bagel . Provided 1st bolus for 47g carbs  at 9:17 am (delivered 12.52 units) and second bolus for 50g carbs &  at 10:26 am  (11.9 units delivered) , IOB at time of visit approx 14 units . per dexcom review bg mostly at goal 100s-180s except 250s after large breakfast this am , 252 at time of visit only one hour after eating bagel. educated pt minibagel can be 25-30g carbs and regular bagels are about 50-60 grams of carbs so she will watch dexcom closely for any rapid BG shift & notify RN to check POC if any changes, RN at bedside also made aware . pt tolerating po , reports working on incentive spirometer, mother at bedside        Review of Systems:  General: As above  Cardiovascular: No chest pain  Respiratory: No SOB  GI: No nausea, vomiting  Endocrine: no  S&Sx of hypoglycemia    Allergies    Demerol HCl (Hives)  Phenergan (Hives; Rash)    Intolerances      MEDICATIONS  (STANDING):  atorvastatin 10 milliGRAM(s) Oral at bedtime  fluticasone propionate 50 MICROgram(s)/spray Nasal Spray 1 Spray(s) Both Nostrils two times a day  furosemide    Tablet 40 milliGRAM(s) Oral daily  heparin   Injectable 5000 Unit(s) SubCutaneous every 12 hours  influenza   Vaccine 0.5 milliLiter(s) IntraMuscular once  insulin lispro (HumaLOG) Pump 1 Each SubCutaneous Continuous Pump  magnesium oxide 400 milliGRAM(s) Oral three times a day with meals  NIFEdipine XL 60 milliGRAM(s) Oral daily  pantoprazole    Tablet 40 milliGRAM(s) Oral before breakfast  PARoxetine 10 milliGRAM(s) Oral daily  piperacillin/tazobactam IVPB.- 3.375 Gram(s) IV Intermittent once  piperacillin/tazobactam IVPB.. 3.375 Gram(s) IV Intermittent every 8 hours  predniSONE   Tablet 5 milliGRAM(s) Oral daily  senna 2 Tablet(s) Oral at bedtime  tacrolimus ER Tablet (ENVARSUS XR) 2 milliGRAM(s) Oral <User Schedule>  trimethoprim   80 mG/sulfamethoxazole 400 mG 1 Tablet(s) Oral daily      atorvastatin   10 milliGRAM(s) Oral (09-22-22 @ 21:04)    predniSONE   Tablet   5 milliGRAM(s) Oral (09-23-22 @ 05:43)        PHYSICAL EXAM:  VITALS: T(C): 36.4 (09-23-22 @ 09:04)  T(F): 97.6 (09-23-22 @ 09:04), Max: 98.7 (09-22-22 @ 21:49)  HR: 68 (09-23-22 @ 09:04) (58 - 86)  BP: 108/56 (09-23-22 @ 09:04) (108/56 - 150/72)  RR:  (18 - 18)  SpO2:  (95% - 97%)  Wt(kg): --  GENERAL: female laying in bed in NAD morbidly obese  pump site RLQ cdi, dexcom back of right arm cdi   Respiratory: Respirations unlabored   Extremities: Warm, no edema  NEURO: Alert , appropriate       LABS:    POCT Blood Glucose.: 123 mg/dL (09-23-22 @ 06:51)  POCT Blood Glucose.: 123 mg/dL (09-22-22 @ 05:23)  POCT Blood Glucose.: 115 mg/dL (09-21-22 @ 05:22)  POCT Blood Glucose.: 155 mg/dL (09-20-22 @ 18:04)                            12.0   11.41 )-----------( 224      ( 23 Sep 2022 06:28 )             35.4       09-23    137  |  101  |  26<H>  ----------------------------<  111<H>  4.1   |  25  |  1.26    Ca    9.6      23 Sep 2022 06:31  Phos  4.0     09-23  Mg     1.8     09-23    TPro  6.8  /  Alb  3.6  /  TBili  0.4  /  DBili  x   /  AST  12  /  ALT  13  /  AlkPhos  50  09-23      eGFR: 52 mL/min/1.73m2 (23 Sep 2022 06:31)        A1C with Estimated Average Glucose Result: 6.2 % (09-17-22 @ 06:53)      Estimated Average Glucose: 131 mg/dL (09-17-22 @ 06:53)                          seen earlier today     Chief Complaint: post transplant dm/ insulin pump mgmt      INTERVAL HX: pt tolerating po, upon pump review pt provided 2 boluses for breakfast this am, pt doesnt recall second bolus but recalls she didnt notice the bagel on the tray until after she ate frenchtoast syrup/milk/ reddy / yogurt , may have bolused for the bagel after finishing those items but before noticing the bagel . Provided 1st bolus for 47g carbs  at 9:17 am (delivered 12.52 units) and second bolus for 50g carbs &  at 10:26 am  (11.9 units delivered) , IOB at time of visit approx 14 units . per dexcom review bg mostly at goal 100s-180s except 250s after large breakfast this am , 252 at time of visit only one hour after eating bagel. educated pt minibagel can be 25-30g carbs and regular bagels are about 50-60 grams of carbs so she will watch dexcom closely for any rapid BG shift & notify RN to check POC if any changes, RN at bedside also made aware . pt tolerating po , reports working on incentive spirometer, mother at bedside        Review of Systems:  General: As above  Cardiovascular: No chest pain  Respiratory: No SOB  GI: No nausea, vomiting  Endocrine: no  S&Sx of hypoglycemia    Allergies    Demerol HCl (Hives)  Phenergan (Hives; Rash)    Intolerances      MEDICATIONS  (STANDING):  atorvastatin 10 milliGRAM(s) Oral at bedtime  fluticasone propionate 50 MICROgram(s)/spray Nasal Spray 1 Spray(s) Both Nostrils two times a day  furosemide    Tablet 40 milliGRAM(s) Oral daily  heparin   Injectable 5000 Unit(s) SubCutaneous every 12 hours  influenza   Vaccine 0.5 milliLiter(s) IntraMuscular once  insulin lispro (HumaLOG) Pump 1 Each SubCutaneous Continuous Pump  magnesium oxide 400 milliGRAM(s) Oral three times a day with meals  NIFEdipine XL 60 milliGRAM(s) Oral daily  pantoprazole    Tablet 40 milliGRAM(s) Oral before breakfast  PARoxetine 10 milliGRAM(s) Oral daily  piperacillin/tazobactam IVPB.- 3.375 Gram(s) IV Intermittent once  piperacillin/tazobactam IVPB.. 3.375 Gram(s) IV Intermittent every 8 hours  predniSONE   Tablet 5 milliGRAM(s) Oral daily  senna 2 Tablet(s) Oral at bedtime  tacrolimus ER Tablet (ENVARSUS XR) 2 milliGRAM(s) Oral <User Schedule>  trimethoprim   80 mG/sulfamethoxazole 400 mG 1 Tablet(s) Oral daily      atorvastatin   10 milliGRAM(s) Oral (09-22-22 @ 21:04)    predniSONE   Tablet   5 milliGRAM(s) Oral (09-23-22 @ 05:43)        PHYSICAL EXAM:  VITALS: T(C): 36.4 (09-23-22 @ 09:04)  T(F): 97.6 (09-23-22 @ 09:04), Max: 98.7 (09-22-22 @ 21:49)  HR: 68 (09-23-22 @ 09:04) (58 - 86)  BP: 108/56 (09-23-22 @ 09:04) (108/56 - 150/72)  RR:  (18 - 18)  SpO2:  (95% - 97%)  Wt(kg): --  GENERAL: female laying in bed in NAD morbidly obese  pump site RLQ cdi, dexcom back of right arm cdi   Respiratory: Respirations unlabored   Extremities: Warm, no edema  NEURO: Alert , appropriate       LABS:    POCT Blood Glucose.: 123 mg/dL (09-23-22 @ 06:51)  POCT Blood Glucose.: 123 mg/dL (09-22-22 @ 05:23)  POCT Blood Glucose.: 115 mg/dL (09-21-22 @ 05:22)  POCT Blood Glucose.: 155 mg/dL (09-20-22 @ 18:04)                            12.0   11.41 )-----------( 224      ( 23 Sep 2022 06:28 )             35.4       09-23    137  |  101  |  26<H>  ----------------------------<  111<H>  4.1   |  25  |  1.26    Ca    9.6      23 Sep 2022 06:31  Phos  4.0     09-23  Mg     1.8     09-23    TPro  6.8  /  Alb  3.6  /  TBili  0.4  /  DBili  x   /  AST  12  /  ALT  13  /  AlkPhos  50  09-23      eGFR: 52 mL/min/1.73m2 (23 Sep 2022 06:31)        A1C with Estimated Average Glucose Result: 6.2 % (09-17-22 @ 06:53)      Estimated Average Glucose: 131 mg/dL (09-17-22 @ 06:53)

## 2022-09-23 NOTE — PROGRESS NOTE ADULT - ASSESSMENT
DIAGNOSIS and IMPRESSION  51F with ESRD 2/2 PCKD s/p BL Nephrectomy/Single Donor Renal Transplant (2018), DM, HTN, HLD, GERD, MDD, Anxiety presented to [facility] ER on 9/14/2022 with complaining body aches, R flank pain, fevers/chills, nausea/vomiting, diarrhea. ID consulted for sepsis.     WORK UP  WBC 12 > 6  Cr 1.33 > 1.21  UA with 6-10 WBC, occasional SQE, small LE, neg nitrite  CTAP with mild perinephric stranding on R transplanted kidney, urinary bladder wall thickening, multiple adnexal cystic lesions with new L ovarian cystic lesions noted    Elevated procalcitonin  Afebrile after 9/15.    Prior UCx with E faecalis (2021), Raissa (2019)    #Diarrhea / Nausea / Vomiting / Rigors  - better  - unclear etiology ?GI and/or ? source, possibly pyelonephritis based on scan and tenderness on exam  - low concerns for ESBL    SOB/leukocytosis   -pna vs fluid  -on zosyn  -suggest short course of abx x 5 days (can switch to Augmentin)  -no fever  -appreciate pulm input   -monitor cbc    Kevon Branham  Attending Physician   Division of Infectious Disease  Office #[phone]  Available on Microsoft Teams also  After 5pm/weekend or no response, call #[phone]

## 2022-09-23 NOTE — PROGRESS NOTE ADULT - SUBJECTIVE AND OBJECTIVE BOX
Transplant Surgery - Multidisciplinary Progress Note  --------------------------------------------------------------  LDRT    4/2/2018    Present:   Patient seen and examined with multidisciplinary team including Transplant Surgeon: Dr. Hassan. Transplant Nephrologist: Dr. Gonzalez, Pharmacist: Addi Moss  [facility] and unit RN during am rounds.  Disciplines not in attendance will be notified of the plan.     HPI: 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 s/p BL Nephrectomy/Single Donor Renal Transplant in 2018, Post Transplant DM, HTN, HLD, GERD, MDD, Anxiety presented to [facility] ER on 9/14/2022 with c/o body aches, R flank pain, fevers/chills, nausea/vomiting, diarrhea. In [facility] ED Tmax 102.2F and leukocytosis. Lactate negative. Cultures sent. Given broad spectrum empiric IV ABX and IVF. Pt with difficulty tolerating PO intake due to N/V. Currently on insulin pump for DM.  Pt transferred to [facility] for further workup of sepsis.     Interval events:  -remains Afebrile, off Cefepime  -overall, symptoms with much improvement since arrival, however still requiring O2 via NC 2L.  -yesterday: increased lasix to 40 QD  -pum: rec diuresis and outpatient sleep study.     Immunosuppression                               Maintenance:   Env per level, Holding Imuran, Pred 5  Ongoing monitoring for signs of rejection.    Potential Discharge date: TBD    Education:  Medications    Plan of care:  See Below        MEDICATIONS  (STANDING):  atorvastatin 10 milliGRAM(s) Oral at bedtime  fluticasone propionate 50 MICROgram(s)/spray Nasal Spray 1 Spray(s) Both Nostrils two times a day  furosemide    Tablet 40 milliGRAM(s) Oral daily  heparin   Injectable 5000 Unit(s) SubCutaneous every 12 hours  influenza   Vaccine 0.5 milliLiter(s) IntraMuscular once  insulin lispro (HumaLOG) Pump 1 Each SubCutaneous Continuous Pump  magnesium oxide 400 milliGRAM(s) Oral three times a day with meals  NIFEdipine XL 60 milliGRAM(s) Oral daily  pantoprazole    Tablet 40 milliGRAM(s) Oral before breakfast  PARoxetine 10 milliGRAM(s) Oral daily  predniSONE   Tablet 5 milliGRAM(s) Oral daily  senna 2 Tablet(s) Oral at bedtime  tacrolimus ER Tablet (ENVARSUS XR) 2 milliGRAM(s) Oral <User Schedule>  trimethoprim   80 mG/sulfamethoxazole 400 mG 1 Tablet(s) Oral daily    MEDICATIONS  (PRN):  aluminum hydroxide/magnesium hydroxide/simethicone Suspension 30 milliLiter(s) Oral every 4 hours PRN Dyspepsia  clonazePAM  Tablet 1 milliGRAM(s) Oral three times a day PRN anxiety  HYDROmorphone  Injectable 0.5 milliGRAM(s) IV Push at bedtime PRN Severe Pain (7 - 10)  ondansetron Injectable 4 milliGRAM(s) IV Push every 6 hours PRN Nausea and/or Vomiting  traMADol 25 milliGRAM(s) Oral every 6 hours PRN Moderate Pain (4 - 6)  traMADol 50 milliGRAM(s) Oral every 6 hours PRN Severe Pain (7 - 10)      PAST MEDICAL & SURGICAL HISTORY:  Hyperlipidemia      Arthropathy NOS - shoulder      Kidney stones      Polycystic kidney disease      GERD (gastroesophageal reflux disease)      Anxiety      CKD (chronic kidney disease) stage 4, GFR 15-29 ml/min  no dialysis      ESRD (end stage renal disease)      Ankle fracture - Left  brumstein barajas procedure 2000      Tubal ligation  1995      S/P cholecystectomy  (2013)      Kidney calculi  cysto/lithotripsy multiple      H/O tubal ligation  (1996)      History of ventral hernia repair      H/O shoulder surgery  left      AV fistula  right forearm extremity AV fistula radiocephalic ( 2018)      Renal transplant recipient      Status post nephrectomy  bilateral          Vital Signs Last 24 Hrs  T(C): 36.4 (23 Sep 2022 09:04), Max: 37.1 (22 Sep 2022 21:49)  T(F): 97.6 (23 Sep 2022 09:04), Max: 98.7 (22 Sep 2022 21:49)  HR: 68 (23 Sep 2022 09:04) (58 - 86)  BP: 108/56 (23 Sep 2022 09:04) (108/56 - 150/72)  BP(mean): --  RR: 18 (23 Sep 2022 09:04) (18 - 18)  SpO2: 96% (23 Sep 2022 09:04) (95% - 97%)    Parameters below as of 23 Sep 2022 09:04  Patient On (Oxygen Delivery Method): nasal cannula  O2 Flow (L/min): 2      I&O's Summary    22 Sep 2022 07:01  -  23 Sep 2022 07:00  --------------------------------------------------------  IN: 840 mL / OUT: 2250 mL / NET: -1410 mL                              12.0   11.41 )-----------( 224      ( 23 Sep 2022 06:28 )             35.4     09-23    137  |  101  |  26<H>  ----------------------------<  111<H>  4.1   |  25  |  1.26    Ca    9.6      23 Sep 2022 06:31  Phos  4.0     09-23  Mg     1.8     09-23    TPro  6.8  /  Alb  3.6  /  TBili  0.4  /  DBili  x   /  AST  12  /  ALT  13  /  AlkPhos  50  09-23    Tacrolimus (), Serum: 3.6 ng/mL (09-23 @ 06:35)        Culture - Sputum (collected 09-22-22 @ 10:57)  Source: .Sputum Sputum cup  Gram Stain (09-22-22 @ 19:27):    No polymorphonuclear leukocytes per low power field    Few Squamous epithelial cells per low power field    Moderate Gram positive cocci in pairs, chains and clusters per oil power    field    Few Gram Negative Rods per oil power field    Culture - Urine (collected 09-18-22 @ 18:18)  Source: Clean Catch Clean Catch (Midstream)  Final Report (09-19-22 @ 16:22):    <10,000 CFU/mL Normal Urogenital Angélica        Review of systems  Gen: No weight changes, fatigue, fevers/chills, weakness, see above  Skin: No rashes  Head/Eyes/Ears/Mouth: No headache; Normal hearing; Normal vision w/o blurriness; No sinus pain/discomfort, sore throat, see above  Respiratory: No dyspnea, cough, wheezing, hemoptysis  CV: No chest pain, PND, orthopnea  GI: denies diarrhea, constipation, nausea, vomiting, melena, hematochezia, see above  : No increased frequency, dysuria, hematuria, nocturia  MSK: No joint pain/swelling; no back pain; no edema  Neuro: No dizziness/lightheadedness, weakness, seizures, numbness, tingling  Heme: No easy bruising or bleeding  Endo: No heat/cold intolerance  Psych: No significant nervousness, anxiety, stress, depression  All other systems were reviewed and are negative, except as noted.      PHYSICAL EXAM:  Constitutional: Well developed / well nourished  Eyes: Anicteric, PERRLA  ENMT: nc/at  Respiratory: CTA B/L, on 2L O2 via NC  Cardiovascular: RRR  Gastrointestinal: Soft, non distended, NT.  well healed incisional scar   Genitourinary: Voiding spontaneously  Extremities: SCD's in place and working bilaterally,  chronic edema ~1+ b/l LE, stable today  Vascular: Palpable dp pulses bilaterally  Neurological: A&O x3  Skin: no rashes, ulcerations or lesions;  Musculoskeletal: Moving all extremities  Psychiatric: Responsive       Transplant Surgery - Multidisciplinary Progress Note  --------------------------------------------------------------  LDRT    4/2/2018    Present:   Patient seen and examined with multidisciplinary team including Transplant Surgeon: Dr. Hassan. Transplant Nephrologist: Dr. Gonzalez, Pharmacist: Addi Moss  [facility] and unit RN during am rounds.  Disciplines not in attendance will be notified of the plan.     HPI: 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 s/p BL Nephrectomy/Single Donor Renal Transplant in 2018, Post Transplant DM, HTN, HLD, GERD, MDD, Anxiety presented to [facility] ER on 9/14/2022 with c/o body aches, R flank pain, fevers/chills, nausea/vomiting, diarrhea. In [facility] ED Tmax 102.2F and leukocytosis. Lactate negative. Cultures sent. Given broad spectrum empiric IV ABX and IVF. Pt with difficulty tolerating PO intake due to N/V. Currently on insulin pump for DM.  Pt transferred to [facility] for further workup of sepsis.     Interval events:  -remains Afebrile, off abx  -overall, symptoms with much improvement since arrival, however still requiring O2 via NC 2L.  -yesterday: increased lasix to 40 QD  -pum: rec diuresis and outpatient sleep study.     Immunosuppression                               Maintenance:   Env per level, Holding Imuran, Pred 5  Ongoing monitoring for signs of rejection.    Potential Discharge date: TBD    Education:  Medications    Plan of care:  See Below      MEDICATIONS  (STANDING):  atorvastatin 10 milliGRAM(s) Oral at bedtime  fluticasone propionate 50 MICROgram(s)/spray Nasal Spray 1 Spray(s) Both Nostrils two times a day  furosemide    Tablet 40 milliGRAM(s) Oral daily  heparin   Injectable 5000 Unit(s) SubCutaneous every 12 hours  influenza   Vaccine 0.5 milliLiter(s) IntraMuscular once  insulin lispro (HumaLOG) Pump 1 Each SubCutaneous Continuous Pump  magnesium oxide 400 milliGRAM(s) Oral three times a day with meals  NIFEdipine XL 60 milliGRAM(s) Oral daily  pantoprazole    Tablet 40 milliGRAM(s) Oral before breakfast  PARoxetine 10 milliGRAM(s) Oral daily  predniSONE   Tablet 5 milliGRAM(s) Oral daily  senna 2 Tablet(s) Oral at bedtime  tacrolimus ER Tablet (ENVARSUS XR) 2 milliGRAM(s) Oral <User Schedule>  trimethoprim   80 mG/sulfamethoxazole 400 mG 1 Tablet(s) Oral daily    MEDICATIONS  (PRN):  aluminum hydroxide/magnesium hydroxide/simethicone Suspension 30 milliLiter(s) Oral every 4 hours PRN Dyspepsia  clonazePAM  Tablet 1 milliGRAM(s) Oral three times a day PRN anxiety  HYDROmorphone  Injectable 0.5 milliGRAM(s) IV Push at bedtime PRN Severe Pain (7 - 10)  ondansetron Injectable 4 milliGRAM(s) IV Push every 6 hours PRN Nausea and/or Vomiting  traMADol 25 milliGRAM(s) Oral every 6 hours PRN Moderate Pain (4 - 6)  traMADol 50 milliGRAM(s) Oral every 6 hours PRN Severe Pain (7 - 10)      PAST MEDICAL & SURGICAL HISTORY:  Hyperlipidemia      Arthropathy NOS - shoulder      Kidney stones      Polycystic kidney disease      GERD (gastroesophageal reflux disease)      Anxiety      CKD (chronic kidney disease) stage 4, GFR 15-29 ml/min  no dialysis      ESRD (end stage renal disease)      Ankle fracture - Left  brumstein barajas procedure 2000      Tubal ligation  1995      S/P cholecystectomy  (2013)      Kidney calculi  cysto/lithotripsy multiple      H/O tubal ligation  (1996)      History of ventral hernia repair      H/O shoulder surgery  left      AV fistula  right forearm extremity AV fistula radiocephalic ( 2018)      Renal transplant recipient      Status post nephrectomy  bilateral          Vital Signs Last 24 Hrs  T(C): 36.4 (23 Sep 2022 09:04), Max: 37.1 (22 Sep 2022 21:49)  T(F): 97.6 (23 Sep 2022 09:04), Max: 98.7 (22 Sep 2022 21:49)  HR: 68 (23 Sep 2022 09:04) (58 - 86)  BP: 108/56 (23 Sep 2022 09:04) (108/56 - 150/72)  BP(mean): --  RR: 18 (23 Sep 2022 09:04) (18 - 18)  SpO2: 96% (23 Sep 2022 09:04) (95% - 97%)    Parameters below as of 23 Sep 2022 09:04  Patient On (Oxygen Delivery Method): nasal cannula  O2 Flow (L/min): 2      I&O's Summary    22 Sep 2022 07:01  -  23 Sep 2022 07:00  --------------------------------------------------------  IN: 840 mL / OUT: 2250 mL / NET: -1410 mL                              12.0   11.41 )-----------( 224      ( 23 Sep 2022 06:28 )             35.4     09-23    137  |  101  |  26<H>  ----------------------------<  111<H>  4.1   |  25  |  1.26    Ca    9.6      23 Sep 2022 06:31  Phos  4.0     09-23  Mg     1.8     09-23    TPro  6.8  /  Alb  3.6  /  TBili  0.4  /  DBili  x   /  AST  12  /  ALT  13  /  AlkPhos  50  09-23    Tacrolimus (), Serum: 3.6 ng/mL (09-23 @ 06:35)        Culture - Sputum (collected 09-22-22 @ 10:57)  Source: .Sputum Sputum cup  Gram Stain (09-22-22 @ 19:27):    No polymorphonuclear leukocytes per low power field    Few Squamous epithelial cells per low power field    Moderate Gram positive cocci in pairs, chains and clusters per oil power    field    Few Gram Negative Rods per oil power field    Culture - Urine (collected 09-18-22 @ 18:18)  Source: Clean Catch Clean Catch (Midstream)  Final Report (09-19-22 @ 16:22):    <10,000 CFU/mL Normal Urogenital Angélica        Review of systems  Gen: No weight changes, fatigue, fevers/chills, weakness, see above  Skin: No rashes  Head/Eyes/Ears/Mouth: No headache; Normal hearing; Normal vision w/o blurriness; No sinus pain/discomfort, sore throat, see above  Respiratory: No dyspnea, cough, wheezing, hemoptysis  CV: No chest pain, PND, orthopnea  GI: denies diarrhea, constipation, nausea, vomiting, melena, hematochezia, see above  : No increased frequency, dysuria, hematuria, nocturia  MSK: No joint pain/swelling; no back pain; no edema  Neuro: No dizziness/lightheadedness, weakness, seizures, numbness, tingling  Heme: No easy bruising or bleeding  Endo: No heat/cold intolerance  Psych: No significant nervousness, anxiety, stress, depression  All other systems were reviewed and are negative, except as noted.      PHYSICAL EXAM:  Constitutional: Well developed / well nourished  Eyes: Anicteric, PERRLA  ENMT: nc/at  Respiratory: CTA B/L, on 2L O2 via NC  Cardiovascular: RRR  Gastrointestinal: Soft, non distended, NT.  well healed incisional scar   Genitourinary: Voiding spontaneously  Extremities: SCD's in place and working bilaterally,  chronic edema ~1+ b/l LE, stable today  Vascular: Palpable dp pulses bilaterally  Neurological: A&O x3  Skin: no rashes, ulcerations or lesions;  Musculoskeletal: Moving all extremities  Psychiatric: Responsive

## 2022-09-23 NOTE — PROGRESS NOTE ADULT - ASSESSMENT
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 DM controlled (A1C 6.2%). Also h/o HTN, HLD, GERD. Here with sepsis due to UTI.   Tolerating POs  with BG mostly at goal 100 to 180mg/dl on CGM except this AM, pt had large breakfast over 2 hours , asked pt to make sure she boluses for breakfast tomorrow and eat everything w/i one hour so endocrine can make changes to settings if necessary.   Reviewed pump setting and hx with pt with no changes made to any settings. Using Auto mode. BG goal (100-180mg/dl).     On insulin pump+ CGM plus Trulicity 0.75mg subq weekly.

## 2022-09-23 NOTE — PROGRESS NOTE ADULT - RS GEN HX ROS MEA POS PC
Patient is a 72 y/o M w/ Hx of gastric ulcer (s/p partial gastrectomy and Bilroth II reconstruction in 1970s), VASILIY (received weekly IV Iron x 5), HTN (not on any meds), s/p appendectomy (1980s) and s/p hydrocele (1990s). He presented to the ED with melena x 4 days w/ assoc. nausea. He was admitted for GIB, GI and surgery consulted. Patient started on Iron and GI ppx, EGD scheduled for today.   Patient is a 70 y/o M w/ Hx of gastric ulcer (s/p partial gastrectomy and Bilroth II reconstruction in 1970s), VASILIY (received weekly IV Iron x 5), HTN (not on any meds), s/p appendectomy (1980s) and s/p hydrocele (1990s). He presented to the ED with melena x 4 days w/ assoc. nausea. He was admitted for GIB, GI and surgery consulted. Patient started on Iron and GI ppx, EGD scheduled for today. Patient tested positive for Hep C, hepatology dr. Curry consulted, Hepatic US and hep panel ordered.   dyspnea

## 2022-09-23 NOTE — PROGRESS NOTE ADULT - SUBJECTIVE AND OBJECTIVE BOX
Pulmonary Consult Follow up     Interval Events:          REVIEW OF SYSTEMS:  [x] All other systems negative except per HPI   [ ] Unable to assess ROS because ________    OBJECTIVE:  ICU Vital Signs Last 24 Hrs  T(C): 36.4 (23 Sep 2022 09:04), Max: 37.1 (22 Sep 2022 21:49)  T(F): 97.6 (23 Sep 2022 09:04), Max: 98.7 (22 Sep 2022 21:49)  HR: 68 (23 Sep 2022 09:04) (58 - 86)  BP: 108/56 (23 Sep 2022 09:04) (108/56 - 150/72)  BP(mean): --  ABP: --  ABP(mean): --  RR: 18 (23 Sep 2022 09:04) (18 - 18)  SpO2: 96% (23 Sep 2022 09:04) (95% - 97%)    O2 Parameters below as of 23 Sep 2022 09:04  Patient On (Oxygen Delivery Method): nasal cannula  O2 Flow (L/min): 2            09-22 @ 07:01  -  09-23 @ 07:00  --------------------------------------------------------  IN: 840 mL / OUT: 2250 mL / NET: -1410 mL        PHYSICAL EXAM:  GENERAL: NAD, well-groomed, well-developed  HEAD:  Atraumatic, Normocephalic  EYES: EOMI, conjunctiva and sclera clear  ENMT: Moist mucous membranes  CHEST/LUNG: Clear to auscultation bilaterally; No rales, rhonchi, wheezing, or rubs  HEART: Regular rate and rhythm; No murmurs, rubs, or gallops  ABDOMEN: Nondistended  VASCULAR: No  cyanosis, or edema  SKIN: No rashes or lesions  NERVOUS SYSTEM:  Alert & Oriented X3, Good concentration    HOSPITAL MEDICATIONS:  MEDICATIONS  (STANDING):  atorvastatin 10 milliGRAM(s) Oral at bedtime  fluticasone propionate 50 MICROgram(s)/spray Nasal Spray 1 Spray(s) Both Nostrils two times a day  furosemide    Tablet 40 milliGRAM(s) Oral daily  heparin   Injectable 5000 Unit(s) SubCutaneous every 12 hours  influenza   Vaccine 0.5 milliLiter(s) IntraMuscular once  insulin lispro (HumaLOG) Pump 1 Each SubCutaneous Continuous Pump  magnesium oxide 400 milliGRAM(s) Oral three times a day with meals  NIFEdipine XL 60 milliGRAM(s) Oral daily  pantoprazole    Tablet 40 milliGRAM(s) Oral before breakfast  PARoxetine 10 milliGRAM(s) Oral daily  predniSONE   Tablet 5 milliGRAM(s) Oral daily  senna 2 Tablet(s) Oral at bedtime  tacrolimus ER Tablet (ENVARSUS XR) 2 milliGRAM(s) Oral <User Schedule>  trimethoprim   80 mG/sulfamethoxazole 400 mG 1 Tablet(s) Oral daily    MEDICATIONS  (PRN):  aluminum hydroxide/magnesium hydroxide/simethicone Suspension 30 milliLiter(s) Oral every 4 hours PRN Dyspepsia  clonazePAM  Tablet 1 milliGRAM(s) Oral three times a day PRN anxiety  HYDROmorphone  Injectable 0.5 milliGRAM(s) IV Push at bedtime PRN Severe Pain (7 - 10)  ondansetron Injectable 4 milliGRAM(s) IV Push every 6 hours PRN Nausea and/or Vomiting  traMADol 25 milliGRAM(s) Oral every 6 hours PRN Moderate Pain (4 - 6)  traMADol 50 milliGRAM(s) Oral every 6 hours PRN Severe Pain (7 - 10)      LABS:  09-23    137  |  101  |  26<H>  ----------------------------<  111<H>  4.1   |  25  |  1.26  09-22    139  |  103  |  26<H>  ----------------------------<  100<H>  4.2   |  26  |  1.13  09-21    139  |  102  |  24<H>  ----------------------------<  113<H>  4.0   |  26  |  1.12    Ca    9.6      23 Sep 2022 06:31  Ca    9.4      22 Sep 2022 07:00  Ca    10.0      21 Sep 2022 06:41  Phos  4.0     09-23  Mg     1.8     09-23    TPro  6.8  /  Alb  3.6  /  TBili  0.4  /  DBili  x   /  AST  12  /  ALT  13  /  AlkPhos  50  09-23  TPro  6.6  /  Alb  3.5  /  TBili  0.4  /  DBili  x   /  AST  12  /  ALT  12  /  AlkPhos  54  09-22  TPro  6.8  /  Alb  3.5  /  TBili  0.5  /  DBili  x   /  AST  12  /  ALT  12  /  AlkPhos  53  09-21    Magnesium, Serum: 1.8 mg/dL (09-23-22 @ 06:31)  Magnesium, Serum: 1.7 mg/dL (09-22-22 @ 07:00)  Magnesium, Serum: 1.7 mg/dL (09-21-22 @ 06:41)    Phosphorus Level, Serum: 4.0 mg/dL (09-23-22 @ 06:31)  Phosphorus Level, Serum: 3.9 mg/dL (09-22-22 @ 07:00)  Phosphorus Level, Serum: 4.5 mg/dL (09-21-22 @ 06:41)                                              12.0   11.41 )-----------( 224      ( 23 Sep 2022 06:28 )             35.4                         12.0   13.69 )-----------( 244      ( 22 Sep 2022 07:05 )             36.1                         12.3   10.51 )-----------( 218      ( 21 Sep 2022 06:39 )             36.7     CAPILLARY BLOOD GLUCOSE  179 (22 Sep 2022 22:30)  86 (22 Sep 2022 19:00)  261 (22 Sep 2022 13:05)      POCT Blood Glucose.: 123 mg/dL (23 Sep 2022 06:51)     Pulmonary Consult Follow up     Interval Events:  Doing well, offers no complaints.    REVIEW OF SYSTEMS:  [x] All other systems negative except per HPI   [ ] Unable to assess ROS because ________    OBJECTIVE:  ICU Vital Signs Last 24 Hrs  T(C): 36.4 (23 Sep 2022 09:04), Max: 37.1 (22 Sep 2022 21:49)  T(F): 97.6 (23 Sep 2022 09:04), Max: 98.7 (22 Sep 2022 21:49)  HR: 68 (23 Sep 2022 09:04) (58 - 86)  BP: 108/56 (23 Sep 2022 09:04) (108/56 - 150/72)  BP(mean): --  ABP: --  ABP(mean): --  RR: 18 (23 Sep 2022 09:04) (18 - 18)  SpO2: 96% (23 Sep 2022 09:04) (95% - 97%)    O2 Parameters below as of 23 Sep 2022 09:04  Patient On (Oxygen Delivery Method): nasal cannula  O2 Flow (L/min): 2            09-22 @ 07:01  -  09-23 @ 07:00  --------------------------------------------------------  IN: 840 mL / OUT: 2250 mL / NET: -1410 mL        PHYSICAL EXAM:  GENERAL: NAD, well-groomed, well-developed  HEAD:  Atraumatic, Normocephalic  EYES: EOMI, conjunctiva and sclera clear  ENMT: Moist mucous membranes  CHEST/LUNG: Bibasilar rales.  HEART: Regular rate and rhythm; No murmurs, rubs, or gallops  ABDOMEN: Nondistended  VASCULAR: No  cyanosis, or edema  SKIN: No rashes or lesions  NERVOUS SYSTEM:  Alert & Oriented X3, Good concentration    HOSPITAL MEDICATIONS:  MEDICATIONS  (STANDING):  atorvastatin 10 milliGRAM(s) Oral at bedtime  fluticasone propionate 50 MICROgram(s)/spray Nasal Spray 1 Spray(s) Both Nostrils two times a day  furosemide    Tablet 40 milliGRAM(s) Oral daily  heparin   Injectable 5000 Unit(s) SubCutaneous every 12 hours  influenza   Vaccine 0.5 milliLiter(s) IntraMuscular once  insulin lispro (HumaLOG) Pump 1 Each SubCutaneous Continuous Pump  magnesium oxide 400 milliGRAM(s) Oral three times a day with meals  NIFEdipine XL 60 milliGRAM(s) Oral daily  pantoprazole    Tablet 40 milliGRAM(s) Oral before breakfast  PARoxetine 10 milliGRAM(s) Oral daily  predniSONE   Tablet 5 milliGRAM(s) Oral daily  senna 2 Tablet(s) Oral at bedtime  tacrolimus ER Tablet (ENVARSUS XR) 2 milliGRAM(s) Oral <User Schedule>  trimethoprim   80 mG/sulfamethoxazole 400 mG 1 Tablet(s) Oral daily    MEDICATIONS  (PRN):  aluminum hydroxide/magnesium hydroxide/simethicone Suspension 30 milliLiter(s) Oral every 4 hours PRN Dyspepsia  clonazePAM  Tablet 1 milliGRAM(s) Oral three times a day PRN anxiety  HYDROmorphone  Injectable 0.5 milliGRAM(s) IV Push at bedtime PRN Severe Pain (7 - 10)  ondansetron Injectable 4 milliGRAM(s) IV Push every 6 hours PRN Nausea and/or Vomiting  traMADol 25 milliGRAM(s) Oral every 6 hours PRN Moderate Pain (4 - 6)  traMADol 50 milliGRAM(s) Oral every 6 hours PRN Severe Pain (7 - 10)      LABS:  09-23    137  |  101  |  26<H>  ----------------------------<  111<H>  4.1   |  25  |  1.26  09-22    139  |  103  |  26<H>  ----------------------------<  100<H>  4.2   |  26  |  1.13  09-21    139  |  102  |  24<H>  ----------------------------<  113<H>  4.0   |  26  |  1.12    Ca    9.6      23 Sep 2022 06:31  Ca    9.4      22 Sep 2022 07:00  Ca    10.0      21 Sep 2022 06:41  Phos  4.0     09-23  Mg     1.8     09-23    TPro  6.8  /  Alb  3.6  /  TBili  0.4  /  DBili  x   /  AST  12  /  ALT  13  /  AlkPhos  50  09-23  TPro  6.6  /  Alb  3.5  /  TBili  0.4  /  DBili  x   /  AST  12  /  ALT  12  /  AlkPhos  54  09-22  TPro  6.8  /  Alb  3.5  /  TBili  0.5  /  DBili  x   /  AST  12  /  ALT  12  /  AlkPhos  53  09-21    Magnesium, Serum: 1.8 mg/dL (09-23-22 @ 06:31)  Magnesium, Serum: 1.7 mg/dL (09-22-22 @ 07:00)  Magnesium, Serum: 1.7 mg/dL (09-21-22 @ 06:41)    Phosphorus Level, Serum: 4.0 mg/dL (09-23-22 @ 06:31)  Phosphorus Level, Serum: 3.9 mg/dL (09-22-22 @ 07:00)  Phosphorus Level, Serum: 4.5 mg/dL (09-21-22 @ 06:41)                                              12.0   11.41 )-----------( 224      ( 23 Sep 2022 06:28 )             35.4                         12.0   13.69 )-----------( 244      ( 22 Sep 2022 07:05 )             36.1                         12.3   10.51 )-----------( 218      ( 21 Sep 2022 06:39 )             36.7     CAPILLARY BLOOD GLUCOSE  179 (22 Sep 2022 22:30)  86 (22 Sep 2022 19:00)  261 (22 Sep 2022 13:05)      POCT Blood Glucose.: 123 mg/dL (23 Sep 2022 06:51)

## 2022-09-23 NOTE — PROGRESS NOTE ADULT - PROBLEM SELECTOR PLAN 2
Home immunosuppressive therapy with Envarsus 1mg daily, Imuran 100mg daily, and prednisone 5mg daily. Currently Imuran on hold due to acute infection.   Continue with Envarsus 2mg (increased for low levels) and prednisone. Monitor tacrolimus levels 30 minutes prior to AM dose.    If any questions, please feel free to contact me     Romaine Ascencio  Nephrology Fellow  [facility] Pager: [phone]

## 2022-09-23 NOTE — PROGRESS NOTE ADULT - ASSESSMENT
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 Imaging with b/l effusions and atelectasis. Suspect her hypoxia might be driven by her atelectasis.       # Hypoxemia  # Bilateral atelectasis and pleural effusions  - Would diurese as able for pleural effusions and LE edema  - Defer to renal team regarding diureses.   - Pt will get bedside lung ultrasound tomorrow to evaluate extent of pleural effusions and atelectasis  - Recommend intensive incentive spirometry  - Early mobilization and out of bed as much as possible      # Needs outpatient ANDRIY Eval   - Pt also notes significant snoring  - Has morning headaches, daytime sleepiness, has fallen asleep during important daytime evens.   - Large body habitus with increased neck circumference  - Concerning for high risk of ANDRIY  - Pt requires a sleep study as an outpatient.  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 Imaging with b/l effusions and atelectasis. Suspect her hypoxia might be driven by her atelectasis with possible superimposed pneumonia as well in s/o immunosupression.     # Hypoxemia  # Bilateral atelectasis and pleural effusions  - Would diurese as able for pleural effusions and LE edema  - Defer to renal team regarding diureses.   - Please start Zosyn x5 days for empiric coverage for possible pneumonia.   - F/u sputum culture if pt able to expectorate.  - Recommend intensive incentive spirometry  - Early mobilization and out of bed as much as possible      # Needs outpatient ANDRIY Eval   - Pt also notes significant snoring  - Has morning headaches, daytime sleepiness, has fallen asleep during important daytime evens.   - Large body habitus with increased neck circumference  - Concerning for high risk of ANDRIY  - Pt requires a sleep study as an outpatient.       Patient should follow with pulmonology within 2 weeks of discharge. Please email [email or web address] and include patient's name, , MRN, telephone number, and discharge diagnosis, and allow 24 hours for scheduling. The office is located at [address] [place] [address], [address]. Number [phone]. Please send emails for both pulmonary appointment and also sleep appointment.

## 2022-09-23 NOTE — CHART NOTE - NSCHARTNOTEFT_GEN_A_CORE
:  Connor Ryan Fellow  Lui Peres Attending    INDICATION:    [ ] Shock    [x] Acute Hypoxic Respiratory failure    [ ] Other:       PROCEDURE:    [ ] LIMITED ECHO    [x] LIMITED CHEST    [ ] LIMITED ABDOMINAL    [ ] OTHER      FINDINGS:  Pulmonary:  Left Lung: Small pleural effusion. Area of possible consolidation with +color doppler  Right Lung: Trace Pleural effusion. B lines at the bases with irregular pleural surface.       INTERPRETATION:  Small pleural effusions that are too small to tap.   Left consolidation may represent atelectasis vs pneumonia  B lines on the right lung may represent edema vs pneumonia.     Images stored on [employer] :  Connor Ryan Fellow      INDICATION:    [ ] Shock    [x] Acute Hypoxic Respiratory failure    [ ] Other:       PROCEDURE:    [ ] LIMITED ECHO    [x] LIMITED CHEST    [ ] LIMITED ABDOMINAL    [ ] OTHER      FINDINGS:  Pulmonary:  Left Lung: Small pleural effusion. Area of possible consolidation with +color doppler  Right Lung: Trace Pleural effusion. B lines at the bases with irregular pleural surface.       INTERPRETATION:  Small pleural effusions that are too small to tap.   Left consolidation may represent atelectasis vs pneumonia  B lines on the right lung may represent edema vs pneumonia.     Images stored on [employer]    Pulmonary    I viewed images and agree with findings as outlined above.

## 2022-09-23 NOTE — PROGRESS NOTE ADULT - ASSESSMENT
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 s/p BL Nephrectomy/Single Donor Renal Transplant 2018, Post Transplant DM, HTN, HLD, GERD,  now transferred for further sepsis workup from [facility]    Infectious w/u s/p DDRT  -good graft function  -cardiac w/u negative  -pan-culture NGTD  -imaging unremarkable  -sputum cx/crypto pending  -pulm following: rec to cont diuresis and outpatient sleep study  -cleared by GYN for adnexal cysts/IUD sources for infection  -seen by ENT for ear pain, now resolved, continue Flonase  -spirometry, wean off NC, Pulmonary consulted  -continue Lasix to 40QD  -PT/OT  -Continue HSQ  -SCDs  -analgesia prn     Immunosuppression:  -Prednisone 5  -Envarsus per level  -Imuran hold in the setting of infection    DM:  -On insulin pump  -Endocrine on board pump management  -Was at home on (Trulicity)  -Diabetic diet    HTN, HLD  -Monitor BP, continue Nifedipine. 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    Infectious w/u s/p DDRT  -good graft function  -cardiac w/u negative  -pan-culture NGTD  -imaging unremarkable  -sputum cx/crypto pending  -pulm following: rec to cont diuresis and outpatient sleep study. Also rec to start zosyn X5 days for possible PNA. Will start today.   -cleared by GYN for adnexal cysts/IUD sources for infection  -seen by ENT for ear pain, now resolved, continue Flonase  -spirometry, wean off NC, Pulmonary consulted  -continue Lasix to 40QD  -PT/OT  -Continue HSQ  -SCDs  -analgesia prn     Immunosuppression:  -Prednisone 5  -Envarsus per level  -Imuran hold in the setting of infection    DM:  -On insulin pump  -Endocrine on board pump management  -Was at home on (Trulicity)  -Diabetic diet    HTN, HLD  -Monitor BP, continue Nifedipine. Coreg discontinued--bradycardia  -continue statin    MDD, Anxiety  -Paroxetine 10mg PO q24  -Klonopin 1mg PO PRN    GERD  -Protonix, Maalox prn

## 2022-09-23 NOTE — PROGRESS NOTE ADULT - SUBJECTIVE AND OBJECTIVE BOX
[facility] DIVISION OF KIDNEY DISEASES AND HYPERTENSION -- FOLLOW UP NOTE  --------------------------------------------------------------------------------    ROLLY MEDEIROS was seen and examined at bedside. Still having shortness of breath and states worsens on ambulation. Continues to require oxygen supplementation. No acute issues noted overnight.     Standing Inpatient Medications  atorvastatin 10 milliGRAM(s) Oral at bedtime  fluticasone propionate 50 MICROgram(s)/spray Nasal Spray 1 Spray(s) Both Nostrils two times a day  furosemide    Tablet 40 milliGRAM(s) Oral daily  heparin   Injectable 5000 Unit(s) SubCutaneous every 12 hours  influenza   Vaccine 0.5 milliLiter(s) IntraMuscular once  insulin lispro (HumaLOG) Pump 1 Each SubCutaneous Continuous Pump  magnesium oxide 400 milliGRAM(s) Oral three times a day with meals  magnesium sulfate  IVPB 1 Gram(s) IV Intermittent once  NIFEdipine XL 60 milliGRAM(s) Oral daily  pantoprazole    Tablet 40 milliGRAM(s) Oral before breakfast  PARoxetine 10 milliGRAM(s) Oral daily  predniSONE   Tablet 5 milliGRAM(s) Oral daily  senna 2 Tablet(s) Oral at bedtime  tacrolimus ER Tablet (ENVARSUS XR) 2 milliGRAM(s) Oral <User Schedule>  trimethoprim   80 mG/sulfamethoxazole 400 mG 1 Tablet(s) Oral daily    PRN Inpatient Medications  aluminum hydroxide/magnesium hydroxide/simethicone Suspension 30 milliLiter(s) Oral every 4 hours PRN  clonazePAM  Tablet 1 milliGRAM(s) Oral three times a day PRN  HYDROmorphone  Injectable 0.5 milliGRAM(s) IV Push at bedtime PRN  ondansetron Injectable 4 milliGRAM(s) IV Push every 6 hours PRN  traMADol 25 milliGRAM(s) Oral every 6 hours PRN  traMADol 50 milliGRAM(s) Oral every 6 hours PRN    ROS: all negative except as mentioned above.     VITALS/PHYSICAL EXAM  --------------------------------------------------------------------------------  T(C): 36.4 (09-23-22 @ 09:04), Max: 37.1 (09-22-22 @ 21:49)  HR: 68 (09-23-22 @ 09:04) (58 - 86)  BP: 108/56 (09-23-22 @ 09:04) (108/56 - 150/72)  RR: 18 (09-23-22 @ 09:04) (18 - 18)  SpO2: 96% (09-23-22 @ 09:04) (95% - 97%)  Wt(kg): --        09-22-22 @ 07:01  -  09-23-22 @ 07:00  --------------------------------------------------------  IN: 840 mL / OUT: 2250 mL / NET: -1410 mL      Physical Exam:  	Gen: NAD   	HEENT: no scleral icterus, moist oral mucosa. No thrush. Supple neck, no JVD  	Pulm: normal respiratory effort, lungs clear to auscultation bilaterally   	CV: regular rate and rhythm, S1 and S2 normal, no murmur   	Abd: tenderness to palpation diffusely                   Transplant non tender, no bruit          Extremities: No edema. Distal pulses 2+ bilaterally           Skin: warm no rash, no cyanosis   	Neuro: Alert and oriented to person, place and time. Normal speech. Normal affect.    LABS/STUDIES  --------------------------------------------------------------------------------              12.0   11.41 >-----------<  224      [09-23-22 @ 06:28]              35.4     137  |  101  |  26  ----------------------------<  111      [09-23-22 @ 06:31]  4.1   |  25  |  1.26        Ca     9.6     [09-23-22 @ 06:31]      Mg     1.8     [09-23-22 @ 06:31]      Phos  4.0     [09-23-22 @ 06:31]    TPro  6.8  /  Alb  3.6  /  TBili  0.4  /  DBili  x   /  AST  12  /  ALT  13  /  AlkPhos  50  [09-23-22 @ 06:31]          Creatinine Trend:  SCr 1.26 [09-23 @ 06:31]  SCr 1.13 [09-22 @ 07:00]  SCr 1.12 [09-21 @ 06:41]  SCr 0.99 [09-20 @ 06:18]  SCr 1.06 [09-19 @ 06:13]    Tacrolimus (), Serum: 2.8 ng/mL (09-22 @ 07:05)  Tacrolimus (), Serum: 3.4 ng/mL (09-20 @ 06:25)  Tacrolimus (), Serum: 4.0 ng/mL (09-19 @ 06:13)  Tacrolimus (), Serum: 5.2 ng/mL (09-18 @ 06:34)              CAPILLARY BLOOD GLUCOSE  179 (22 Sep 2022 22:30)  86 (22 Sep 2022 19:00)  261 (22 Sep 2022 13:05)  253 (22 Sep 2022 10:02)      POCT Blood Glucose.: 123 mg/dL (23 Sep 2022 06:51)      Urinalysis - [09-18-22 @ 18:06]      Color Light Yellow / Appearance Slightly Turbid / SG 1.013 / pH 6.5      Gluc Negative / Ketone Negative  / Bili Negative / Urobili Negative       Blood Moderate / Protein Trace / Leuk Est Large / Nitrite Negative      RBC 6 / WBC 17 / Hyaline 1 / Gran  / Sq Epi  / Non Sq Epi 12 / Bacteria Negative

## 2022-09-23 NOTE — PROGRESS NOTE ADULT - SUBJECTIVE AND OBJECTIVE BOX
ROLLY MEDEIROS 51y [id number]    Patient is a 51y old  Female who presents with a chief complaint of Transfer from [facility] with fever and chills r/o Sepsis (23 Sep 2022 12:54)      Follow Up/CC:  ID following for sob    Interval History/ROS: no fever, restarted on abx     Allergies    Demerol HCl (Hives)  Phenergan (Hives; Rash)    Intolerances        ANTIMICROBIALS:  piperacillin/tazobactam IVPB.- 3.375 once  piperacillin/tazobactam IVPB.. 3.375 every 8 hours  trimethoprim   80 mG/sulfamethoxazole 400 mG 1 daily      MEDICATIONS  (STANDING):  atorvastatin 10 milliGRAM(s) Oral at bedtime  fluticasone propionate 50 MICROgram(s)/spray Nasal Spray 1 Spray(s) Both Nostrils two times a day  furosemide    Tablet 40 milliGRAM(s) Oral daily  heparin   Injectable 5000 Unit(s) SubCutaneous every 12 hours  influenza   Vaccine 0.5 milliLiter(s) IntraMuscular once  insulin lispro (HumaLOG) Pump 1 Each SubCutaneous Continuous Pump  magnesium oxide 400 milliGRAM(s) Oral three times a day with meals  NIFEdipine XL 60 milliGRAM(s) Oral daily  pantoprazole    Tablet 40 milliGRAM(s) Oral before breakfast  PARoxetine 10 milliGRAM(s) Oral daily  piperacillin/tazobactam IVPB.- 3.375 Gram(s) IV Intermittent once  piperacillin/tazobactam IVPB.. 3.375 Gram(s) IV Intermittent every 8 hours  predniSONE   Tablet 5 milliGRAM(s) Oral daily  senna 2 Tablet(s) Oral at bedtime  tacrolimus ER Tablet (ENVARSUS XR) 2 milliGRAM(s) Oral <User Schedule>  trimethoprim   80 mG/sulfamethoxazole 400 mG 1 Tablet(s) Oral daily    MEDICATIONS  (PRN):  aluminum hydroxide/magnesium hydroxide/simethicone Suspension 30 milliLiter(s) Oral every 4 hours PRN Dyspepsia  clonazePAM  Tablet 1 milliGRAM(s) Oral three times a day PRN anxiety  HYDROmorphone  Injectable 0.5 milliGRAM(s) IV Push at bedtime PRN Severe Pain (7 - 10)  ondansetron Injectable 4 milliGRAM(s) IV Push every 6 hours PRN Nausea and/or Vomiting  traMADol 25 milliGRAM(s) Oral every 6 hours PRN Moderate Pain (4 - 6)  traMADol 50 milliGRAM(s) Oral every 6 hours PRN Severe Pain (7 - 10)        Vital Signs Last 24 Hrs  T(C): 36.6 (23 Sep 2022 17:00), Max: 37.1 (22 Sep 2022 21:49)  T(F): 97.9 (23 Sep 2022 17:00), Max: 98.7 (22 Sep 2022 21:49)  HR: 68 (23 Sep 2022 17:00) (58 - 77)  BP: 133/75 (23 Sep 2022 17:00) (108/56 - 144/62)  BP(mean): --  RR: 18 (23 Sep 2022 17:00) (18 - 18)  SpO2: 95% (23 Sep 2022 17:00) (95% - 97%)    Parameters below as of 23 Sep 2022 17:00  Patient On (Oxygen Delivery Method): nasal cannula  O2 Flow (L/min): 2      CBC Full  -  ( 23 Sep 2022 06:28 )  WBC Count : 11.41 K/uL  RBC Count : 3.51 M/uL  Hemoglobin : 12.0 g/dL  Hematocrit : 35.4 %  Platelet Count - Automated : 224 K/uL  Mean Cell Volume : 100.9 fl  Mean Cell Hemoglobin : 34.2 pg  Mean Cell Hemoglobin Concentration : 33.9 gm/dL  Auto Neutrophil # : 7.31 K/uL  Auto Lymphocyte # : 2.38 K/uL  Auto Monocyte # : 0.81 K/uL  Auto Eosinophil # : 0.65 K/uL  Auto Basophil # : 0.07 K/uL  Auto Neutrophil % : 64.0 %  Auto Lymphocyte % : 20.9 %  Auto Monocyte % : 7.1 %  Auto Eosinophil % : 5.7 %  Auto Basophil % : 0.6 %    09-23    137  |  101  |  26<H>  ----------------------------<  111<H>  4.1   |  25  |  1.26    Ca    9.6      23 Sep 2022 06:31  Phos  4.0     09-23  Mg     1.8     09-23    TPro  6.8  /  Alb  3.6  /  TBili  0.4  /  DBili  x   /  AST  12  /  ALT  13  /  AlkPhos  50  09-23    LIVER FUNCTIONS - ( 23 Sep 2022 06:31 )  Alb: 3.6 g/dL / Pro: 6.8 g/dL / ALK PHOS: 50 U/L / ALT: 13 U/L / AST: 12 U/L / GGT: x               MICROBIOLOGY:  .Sputum Sputum cup  09-22-22   Normal Respiratory Angélica present  --    No polymorphonuclear leukocytes per low power field  Few Squamous epithelial cells per low power field  Moderate Gram positive cocci in pairs, chains and clusters per oil power  field  Few Gram Negative Rods per oil power field      Clean Catch Clean Catch (Midstream)  09-18-22   <10,000 CFU/mL Normal Urogenital Angélica  --  --      .Blood Blood  09-15-22   No Growth Final  --  --      .Blood Blood  09-15-22   No Growth Final  --  --      Clean Catch Clean Catch (Midstream)  09-14-22   <10,000 CFU/mL Normal Urogenital Angélica  --  --      .Blood Blood-Peripheral  09-14-22   No Growth Final  --  --      .Blood Blood-Peripheral  09-14-22   No Growth Final  --  --              v            RADIOLOGY     No

## 2022-09-23 NOTE — PROGRESS NOTE ADULT - PROBLEM SELECTOR PLAN 2
- Reviewed settings and hx with pt. Will c/w present insulin pump settings   - Pump infusion site changed 9/22, next change 9/25 ;  has supplies  - dexcom sensor changed 9/18. - Reviewed settings and hx with pt. Will c/w present insulin pump settings   - Pump infusion site changed 9/22, next change 9/25 ;  has supplies  - dexcom sensor changed 9/18.  insulin lispro (HumaLOG) Pump 1 Each SubCutaneous Continuous Pump  Basal rate:  12a-6a 1.1  6a-12a 1  TDD 24.6    ISF 15    ICR  12a-6a 4.5  6a-12a 4.2    Target

## 2022-09-23 NOTE — PROGRESS NOTE ADULT - TIME BILLING
Kidney Recipient with functioning allograft  Creatinine trend noted, stable  DM, HTN, Hyperlipidemia  Respiratory failure, hypoxemia- pulmonary evaluation noted  Reviewed work up for SOB/Fever so far  Reviewed clinical, lab, available imaging data, progress notes  Reviewed immunosuppression, prophylaxis and management of comorbidities  Suggestions  1. Agree with diuresis  2. Continue current immunosuppression , tacrolimus target 4-6 ng/ml  3. Work up for hypoxemia as per pulmonary  4. On discharge needs pulmonary follow up  D/w transplant team  I was present during and reviewed clinical and lab data as well as assessment and plan as documented by the house staff as noted. Please contact if any additional questions with any change in clinical condition or on availability of any additional information or reports. Kidney Recipient with functioning allograft  Creatinine trend noted, stable  DM, HTN, Hyperlipidemia  Respiratory failure, hypoxemia- pulmonary evaluation noted  Reviewed work up for SOB/Fever so far  Reviewed clinical, lab, available imaging data, progress notes  Reviewed immunosuppression, prophylaxis and management of comorbidities  Suggestions  1. Agree with diuresis  2. Continue current immunosuppression , tacrolimus target 4-6 ng/ml; May restart Imuran  3. Work up for hypoxemia as per pulmonary  4. On discharge needs pulmonary follow up  D/w transplant team  I was present during and reviewed clinical and lab data as well as assessment and plan as documented by the house staff as noted. Please contact if any additional questions with any change in clinical condition or on availability of any additional information or reports.

## 2022-09-24 LAB
ALBUMIN SERPL ELPH-MCNC: 4 G/DL — SIGNIFICANT CHANGE UP (ref 3.3–5)
ALP SERPL-CCNC: 52 U/L — SIGNIFICANT CHANGE UP (ref 40–120)
ALT FLD-CCNC: 17 U/L — SIGNIFICANT CHANGE UP (ref 10–45)
ANION GAP SERPL CALC-SCNC: 13 MMOL/L — SIGNIFICANT CHANGE UP (ref 5–17)
AST SERPL-CCNC: 12 U/L — SIGNIFICANT CHANGE UP (ref 10–40)
BASOPHILS # BLD AUTO: 0.1 K/UL — SIGNIFICANT CHANGE UP (ref 0–0.2)
BASOPHILS NFR BLD AUTO: 0.8 % — SIGNIFICANT CHANGE UP (ref 0–2)
BILIRUB SERPL-MCNC: 0.5 MG/DL — SIGNIFICANT CHANGE UP (ref 0.2–1.2)
BUN SERPL-MCNC: 26 MG/DL — HIGH (ref 7–23)
CALCIUM SERPL-MCNC: 10.1 MG/DL — SIGNIFICANT CHANGE UP (ref 8.4–10.5)
CHLORIDE SERPL-SCNC: 100 MMOL/L — SIGNIFICANT CHANGE UP (ref 96–108)
CO2 SERPL-SCNC: 26 MMOL/L — SIGNIFICANT CHANGE UP (ref 22–31)
CREAT SERPL-MCNC: 1.22 MG/DL — SIGNIFICANT CHANGE UP (ref 0.5–1.3)
CULTURE RESULTS: SIGNIFICANT CHANGE UP
EGFR: 54 ML/MIN/1.73M2 — LOW
EOSINOPHIL # BLD AUTO: 0.85 K/UL — HIGH (ref 0–0.5)
EOSINOPHIL NFR BLD AUTO: 6.7 % — HIGH (ref 0–6)
GLUCOSE BLDC GLUCOMTR-MCNC: 115 MG/DL — HIGH (ref 70–99)
GLUCOSE SERPL-MCNC: 100 MG/DL — HIGH (ref 70–99)
HCT VFR BLD CALC: 38.3 % — SIGNIFICANT CHANGE UP (ref 34.5–45)
HGB BLD-MCNC: 12.7 G/DL — SIGNIFICANT CHANGE UP (ref 11.5–15.5)
IMM GRANULOCYTES NFR BLD AUTO: 1.5 % — HIGH (ref 0–0.9)
LYMPHOCYTES # BLD AUTO: 2.94 K/UL — SIGNIFICANT CHANGE UP (ref 1–3.3)
LYMPHOCYTES # BLD AUTO: 23.2 % — SIGNIFICANT CHANGE UP (ref 13–44)
MAGNESIUM SERPL-MCNC: 2 MG/DL — SIGNIFICANT CHANGE UP (ref 1.6–2.6)
MCHC RBC-ENTMCNC: 33.2 GM/DL — SIGNIFICANT CHANGE UP (ref 32–36)
MCHC RBC-ENTMCNC: 33.4 PG — SIGNIFICANT CHANGE UP (ref 27–34)
MCV RBC AUTO: 100.8 FL — HIGH (ref 80–100)
MONOCYTES # BLD AUTO: 0.84 K/UL — SIGNIFICANT CHANGE UP (ref 0–0.9)
MONOCYTES NFR BLD AUTO: 6.6 % — SIGNIFICANT CHANGE UP (ref 2–14)
NEUTROPHILS # BLD AUTO: 7.74 K/UL — HIGH (ref 1.8–7.4)
NEUTROPHILS NFR BLD AUTO: 61.2 % — SIGNIFICANT CHANGE UP (ref 43–77)
NRBC # BLD: 0 /100 WBCS — SIGNIFICANT CHANGE UP (ref 0–0)
PHOSPHATE SERPL-MCNC: 4.3 MG/DL — SIGNIFICANT CHANGE UP (ref 2.5–4.5)
PLATELET # BLD AUTO: 235 K/UL — SIGNIFICANT CHANGE UP (ref 150–400)
POTASSIUM SERPL-MCNC: 4.4 MMOL/L — SIGNIFICANT CHANGE UP (ref 3.5–5.3)
POTASSIUM SERPL-SCNC: 4.4 MMOL/L — SIGNIFICANT CHANGE UP (ref 3.5–5.3)
PROT SERPL-MCNC: 7.4 G/DL — SIGNIFICANT CHANGE UP (ref 6–8.3)
RBC # BLD: 3.8 M/UL — SIGNIFICANT CHANGE UP (ref 3.8–5.2)
RBC # FLD: 12.3 % — SIGNIFICANT CHANGE UP (ref 10.3–14.5)
SODIUM SERPL-SCNC: 139 MMOL/L — SIGNIFICANT CHANGE UP (ref 135–145)
SPECIMEN SOURCE: SIGNIFICANT CHANGE UP
TACROLIMUS SERPL-MCNC: 4.7 NG/ML — SIGNIFICANT CHANGE UP
WBC # BLD: 12.66 K/UL — HIGH (ref 3.8–10.5)
WBC # FLD AUTO: 12.66 K/UL — HIGH (ref 3.8–10.5)

## 2022-09-24 PROCEDURE — 99233 SBSQ HOSP IP/OBS HIGH 50: CPT

## 2022-09-24 RX ORDER — PIPERACILLIN AND TAZOBACTAM 4; .5 G/20ML; G/20ML
3.38 INJECTION, POWDER, LYOPHILIZED, FOR SOLUTION INTRAVENOUS EVERY 8 HOURS
Refills: 0 | Status: DISCONTINUED | OUTPATIENT
Start: 2022-09-24 | End: 2022-09-26

## 2022-09-24 RX ORDER — INSULIN LISPRO 100/ML
1 VIAL (ML) SUBCUTANEOUS
Refills: 0 | Status: DISCONTINUED | OUTPATIENT
Start: 2022-09-24 | End: 2022-09-26

## 2022-09-24 RX ORDER — ALPRAZOLAM 0.25 MG
0.5 TABLET ORAL ONCE
Refills: 0 | Status: DISCONTINUED | OUTPATIENT
Start: 2022-09-24 | End: 2022-09-24

## 2022-09-24 RX ORDER — PIPERACILLIN AND TAZOBACTAM 4; .5 G/20ML; G/20ML
3.38 INJECTION, POWDER, LYOPHILIZED, FOR SOLUTION INTRAVENOUS EVERY 8 HOURS
Refills: 0 | Status: DISCONTINUED | OUTPATIENT
Start: 2022-09-24 | End: 2022-09-24

## 2022-09-24 RX ADMIN — Medication 0.5 MILLIGRAM(S): at 01:52

## 2022-09-24 RX ADMIN — TRAMADOL HYDROCHLORIDE 50 MILLIGRAM(S): 50 TABLET ORAL at 15:00

## 2022-09-24 RX ADMIN — TRAMADOL HYDROCHLORIDE 25 MILLIGRAM(S): 50 TABLET ORAL at 17:05

## 2022-09-24 RX ADMIN — Medication 1 MILLIGRAM(S): at 14:00

## 2022-09-24 RX ADMIN — TACROLIMUS 2 MILLIGRAM(S): 5 CAPSULE ORAL at 08:27

## 2022-09-24 RX ADMIN — PIPERACILLIN AND TAZOBACTAM 25 GRAM(S): 4; .5 INJECTION, POWDER, LYOPHILIZED, FOR SOLUTION INTRAVENOUS at 14:01

## 2022-09-24 RX ADMIN — HYDROMORPHONE HYDROCHLORIDE 0.5 MILLIGRAM(S): 2 INJECTION INTRAMUSCULAR; INTRAVENOUS; SUBCUTANEOUS at 22:56

## 2022-09-24 RX ADMIN — TRAMADOL HYDROCHLORIDE 50 MILLIGRAM(S): 50 TABLET ORAL at 23:34

## 2022-09-24 RX ADMIN — MAGNESIUM OXIDE 400 MG ORAL TABLET 400 MILLIGRAM(S): 241.3 TABLET ORAL at 08:27

## 2022-09-24 RX ADMIN — HEPARIN SODIUM 5000 UNIT(S): 5000 INJECTION INTRAVENOUS; SUBCUTANEOUS at 06:28

## 2022-09-24 RX ADMIN — HEPARIN SODIUM 5000 UNIT(S): 5000 INJECTION INTRAVENOUS; SUBCUTANEOUS at 17:37

## 2022-09-24 RX ADMIN — Medication 1 MILLIGRAM(S): at 22:56

## 2022-09-24 RX ADMIN — TRAMADOL HYDROCHLORIDE 25 MILLIGRAM(S): 50 TABLET ORAL at 16:05

## 2022-09-24 RX ADMIN — Medication 60 MILLIGRAM(S): at 06:27

## 2022-09-24 RX ADMIN — Medication 1 SPRAY(S): at 17:38

## 2022-09-24 RX ADMIN — HYDROMORPHONE HYDROCHLORIDE 0.5 MILLIGRAM(S): 2 INJECTION INTRAMUSCULAR; INTRAVENOUS; SUBCUTANEOUS at 23:34

## 2022-09-24 RX ADMIN — PANTOPRAZOLE SODIUM 40 MILLIGRAM(S): 20 TABLET, DELAYED RELEASE ORAL at 06:27

## 2022-09-24 RX ADMIN — Medication 1 TABLET(S): at 14:01

## 2022-09-24 RX ADMIN — TRAMADOL HYDROCHLORIDE 50 MILLIGRAM(S): 50 TABLET ORAL at 22:56

## 2022-09-24 RX ADMIN — Medication 5 MILLIGRAM(S): at 06:27

## 2022-09-24 RX ADMIN — PIPERACILLIN AND TAZOBACTAM 25 GRAM(S): 4; .5 INJECTION, POWDER, LYOPHILIZED, FOR SOLUTION INTRAVENOUS at 21:50

## 2022-09-24 RX ADMIN — PIPERACILLIN AND TAZOBACTAM 25 GRAM(S): 4; .5 INJECTION, POWDER, LYOPHILIZED, FOR SOLUTION INTRAVENOUS at 06:27

## 2022-09-24 RX ADMIN — Medication 10 MILLIGRAM(S): at 14:01

## 2022-09-24 RX ADMIN — MAGNESIUM OXIDE 400 MG ORAL TABLET 400 MILLIGRAM(S): 241.3 TABLET ORAL at 17:37

## 2022-09-24 RX ADMIN — MAGNESIUM OXIDE 400 MG ORAL TABLET 400 MILLIGRAM(S): 241.3 TABLET ORAL at 14:00

## 2022-09-24 RX ADMIN — Medication 40 MILLIGRAM(S): at 06:27

## 2022-09-24 RX ADMIN — ATORVASTATIN CALCIUM 10 MILLIGRAM(S): 80 TABLET, FILM COATED ORAL at 21:50

## 2022-09-24 RX ADMIN — Medication 1 SPRAY(S): at 06:28

## 2022-09-24 RX ADMIN — TRAMADOL HYDROCHLORIDE 50 MILLIGRAM(S): 50 TABLET ORAL at 14:00

## 2022-09-24 NOTE — PROGRESS NOTE ADULT - NS ATTEND AMEND GEN_ALL_CORE FT
afeb  feeling better    Plan  renal US  TTE  stop cefepine    Immuno plan  FK 4, continue on env 1
still desaturating off nasal prongs    Plan  diurese with further lasix  FK2.8  env, give stat 1mg then 2mg  hold imuran  pred 5
improved sats off O2    P  further diuresis  continue abx per pulm    immuno plan  env per level  hold imuran  pred 5
still desat off O2    P  further diuresis  start IVAbx per pulm    immuno plan  env per level  hold imuran  pred 5

## 2022-09-24 NOTE — PROGRESS NOTE ADULT - SUBJECTIVE AND OBJECTIVE BOX
seen earlier today     Chief Complaint: post transplant DM /pump mgmt    INTERVAL HX: pt reports she ate full breakfast, bg remains elevated after breakfast, from dinner to fasting dexcom values 110s-140, after breakfast BG up to 218 now, adjusted pump at bedside mother at bedside as well. asking if she can take her trulicity while here recommended to wait until discharge to restart trulicity pt amenable. eating large meals for breakfast (95g carbs)       Review of Systems:  General: As above  Cardiovascular: No chest pain  Respiratory: No SOB  GI: No nausea, vomiting  Endocrine: no  S&Sx of hypoglycemia    Allergies    Demerol HCl (Hives)  Phenergan (Hives; Rash)    Intolerances      MEDICATIONS  (STANDING):  atorvastatin 10 milliGRAM(s) Oral at bedtime  fluticasone propionate 50 MICROgram(s)/spray Nasal Spray 1 Spray(s) Both Nostrils two times a day  furosemide    Tablet 40 milliGRAM(s) Oral daily  heparin   Injectable 5000 Unit(s) SubCutaneous every 12 hours  influenza   Vaccine 0.5 milliLiter(s) IntraMuscular once  insulin lispro (HumaLOG) Pump 1 Each SubCutaneous Continuous Pump  magnesium oxide 400 milliGRAM(s) Oral three times a day with meals  NIFEdipine XL 60 milliGRAM(s) Oral daily  pantoprazole    Tablet 40 milliGRAM(s) Oral before breakfast  PARoxetine 10 milliGRAM(s) Oral daily  piperacillin/tazobactam IVPB.. 3.375 Gram(s) IV Intermittent every 8 hours  predniSONE   Tablet 5 milliGRAM(s) Oral daily  senna 2 Tablet(s) Oral at bedtime  tacrolimus ER Tablet (ENVARSUS XR) 2 milliGRAM(s) Oral <User Schedule>  trimethoprim   80 mG/sulfamethoxazole 400 mG 1 Tablet(s) Oral daily      atorvastatin   10 milliGRAM(s) Oral (09-23-22 @ 21:45)    predniSONE   Tablet   5 milliGRAM(s) Oral (09-24-22 @ 06:27)        PHYSICAL EXAM:  VITALS: T(C): 36.6 (09-24-22 @ 13:00)  T(F): 97.9 (09-24-22 @ 13:00), Max: 98.9 (09-23-22 @ 21:00)  HR: 78 (09-24-22 @ 13:00) (53 - 78)  BP: 126/71 (09-24-22 @ 13:00) (124/70 - 144/62)  RR:  (18 - 18)  SpO2:  (92% - 96%)  Wt(kg): --  GENERAL: female laying in bed  in NAD , morbidly obese  pump infusion site right lower abdomen cdi, dexcom right arm cdi  Respiratory: Respirations unlabored   Extremities: Warm, no edema  NEURO: Alert , appropriate       LABS:    POCT Blood Glucose.: 115 mg/dL (09-24-22 @ 06:42)  POCT Blood Glucose.: 123 mg/dL (09-23-22 @ 06:51)  POCT Blood Glucose.: 123 mg/dL (09-22-22 @ 05:23)                            12.7   12.66 )-----------( 235      ( 24 Sep 2022 06:25 )             38.3       09-24    139  |  100  |  26<H>  ----------------------------<  100<H>  4.4   |  26  |  1.22    Ca    10.1      24 Sep 2022 06:25  Phos  4.3     09-24  Mg     2.0     09-24    TPro  7.4  /  Alb  4.0  /  TBili  0.5  /  DBili  x   /  AST  12  /  ALT  17  /  AlkPhos  52  09-24      eGFR: 54 mL/min/1.73m2 (24 Sep 2022 06:25)          A1C with Estimated Average Glucose Result: 6.2 % (09-17-22 @ 06:53)      Estimated Average Glucose: 131 mg/dL (09-17-22 @ 06:53)

## 2022-09-24 NOTE — PROGRESS NOTE ADULT - SUBJECTIVE AND OBJECTIVE BOX
Transplant Surgery - Multidisciplinary Progress Note  --------------------------------------------------------------  LDRT    4/2/2018    Present:   Patient seen and examined with multidisciplinary team including Transplant Surgeon: Dr. Hassan. Transplant Nephrologist: Dr. Gonzalez, [facility] and unit RN during am rounds.  Disciplines not in attendance will be notified of the plan.     HPI: 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 s/p BL Nephrectomy/Single Donor Renal Transplant in 2018, Post Transplant DM, HTN, HLD, GERD, MDD, Anxiety presented to [facility] ER on 9/14/2022 with c/o body aches, R flank pain, fevers/chills, nausea/vomiting, diarrhea. In [facility] ED Tmax 102.2F and leukocytosis. Lactate negative. Cultures sent. Given broad spectrum empiric IV ABX and IVF. Pt with difficulty tolerating PO intake due to N/V. Currently on insulin pump for DM.  Pt transferred to [facility] for further workup of sepsis.     Interval events:  - Off O2 this morning. Ambulating in hallway  - denies SOB  - Started on Zosyn for 5 days by Pulm for possible PNA  - SCr 1.22  UOP 2.6    On Lasix 40mg po qd     Immunosuppression                               Maintenance:   Env per level, Holding Imuran, Pred 5  Ongoing monitoring for signs of rejection.    Potential Discharge date: pending clinical improvement    Education:  Medications    Plan of care:  See Below      MEDICATIONS  (STANDING):  atorvastatin 10 milliGRAM(s) Oral at bedtime  fluticasone propionate 50 MICROgram(s)/spray Nasal Spray 1 Spray(s) Both Nostrils two times a day  furosemide    Tablet 40 milliGRAM(s) Oral daily  heparin   Injectable 5000 Unit(s) SubCutaneous every 12 hours  influenza   Vaccine 0.5 milliLiter(s) IntraMuscular once  insulin lispro (HumaLOG) Pump 1 Each SubCutaneous Continuous Pump  magnesium oxide 400 milliGRAM(s) Oral three times a day with meals  NIFEdipine XL 60 milliGRAM(s) Oral daily  pantoprazole    Tablet 40 milliGRAM(s) Oral before breakfast  PARoxetine 10 milliGRAM(s) Oral daily  piperacillin/tazobactam IVPB.. 3.375 Gram(s) IV Intermittent every 8 hours  predniSONE   Tablet 5 milliGRAM(s) Oral daily  senna 2 Tablet(s) Oral at bedtime  tacrolimus ER Tablet (ENVARSUS XR) 2 milliGRAM(s) Oral <User Schedule>  trimethoprim   80 mG/sulfamethoxazole 400 mG 1 Tablet(s) Oral daily    MEDICATIONS  (PRN):  aluminum hydroxide/magnesium hydroxide/simethicone Suspension 30 milliLiter(s) Oral every 4 hours PRN Dyspepsia  clonazePAM  Tablet 1 milliGRAM(s) Oral three times a day PRN anxiety  HYDROmorphone  Injectable 0.5 milliGRAM(s) IV Push at bedtime PRN Severe Pain (7 - 10)  ondansetron Injectable 4 milliGRAM(s) IV Push every 6 hours PRN Nausea and/or Vomiting  traMADol 25 milliGRAM(s) Oral every 6 hours PRN Moderate Pain (4 - 6)  traMADol 50 milliGRAM(s) Oral every 6 hours PRN Severe Pain (7 - 10)      PAST MEDICAL & SURGICAL HISTORY:  Hyperlipidemia      Arthropathy NOS - shoulder      Kidney stones      Polycystic kidney disease      GERD (gastroesophageal reflux disease)      Anxiety      CKD (chronic kidney disease) stage 4, GFR 15-29 ml/min  no dialysis      ESRD (end stage renal disease)      Ankle fracture - Left  brumstein barajas procedure 2000      Tubal ligation  1995      S/P cholecystectomy  (2013)      Kidney calculi  cysto/lithotripsy multiple      H/O tubal ligation  (1996)      History of ventral hernia repair      H/O shoulder surgery  left      AV fistula  right forearm extremity AV fistula radiocephalic ( 2018)      Renal transplant recipient      Status post nephrectomy  bilateral          Vital Signs Last 24 Hrs  T(C): 36.6 (24 Sep 2022 13:00), Max: 37.2 (23 Sep 2022 21:00)  T(F): 97.9 (24 Sep 2022 13:00), Max: 98.9 (23 Sep 2022 21:00)  HR: 78 (24 Sep 2022 13:00) (53 - 78)  BP: 126/71 (24 Sep 2022 13:00) (124/70 - 136/72)  BP(mean): --  RR: 18 (24 Sep 2022 13:00) (18 - 18)  SpO2: 96% (24 Sep 2022 13:00) (92% - 96%)    Parameters below as of 24 Sep 2022 13:00  Patient On (Oxygen Delivery Method): room air        I&O's Summary    23 Sep 2022 07:01  -  24 Sep 2022 07:00  --------------------------------------------------------  IN: 960 mL / OUT: 2600 mL / NET: -1640 mL    24 Sep 2022 07:01  -  24 Sep 2022 16:51  --------------------------------------------------------  IN: 600 mL / OUT: 800 mL / NET: -200 mL                              12.7   12.66 )-----------( 235      ( 24 Sep 2022 06:25 )             38.3     09-24    139  |  100  |  26<H>  ----------------------------<  100<H>  4.4   |  26  |  1.22    Ca    10.1      24 Sep 2022 06:25  Phos  4.3     09-24  Mg     2.0     09-24    TPro  7.4  /  Alb  4.0  /  TBili  0.5  /  DBili  x   /  AST  12  /  ALT  17  /  AlkPhos  52  09-24    Tacrolimus (), Serum: 4.7 ng/mL (09-24 @ 06:25)        Culture - Sputum (collected 09-22-22 @ 10:57)  Source: .Sputum Sputum cup  Gram Stain (09-22-22 @ 19:27):    No polymorphonuclear leukocytes per low power field    Few Squamous epithelial cells per low power field    Moderate Gram positive cocci in pairs, chains and clusters per oil power    field    Few Gram Negative Rods per oil power field  Preliminary Report (09-23-22 @ 16:53):    Normal Respiratory Angélica present    Culture - Urine (collected 09-18-22 @ 18:18)  Source: Clean Catch Clean Catch (Midstream)  Final Report (09-19-22 @ 16:22):    <10,000 CFU/mL Normal Urogenital Angélica      Review of systems  Gen: No weight changes, fatigue, fevers/chills, weakness, see above  Skin: No rashes  Head/Eyes/Ears/Mouth: No headache; Normal hearing; Normal vision w/o blurriness; No sinus pain/discomfort, sore throat, see above  Respiratory: No dyspnea, cough, wheezing, hemoptysis  CV: No chest pain, PND, orthopnea  GI: denies diarrhea, constipation, nausea, vomiting, melena, hematochezia, see above  : No increased frequency, dysuria, hematuria, nocturia  MSK: No joint pain/swelling; no back pain; no edema  Neuro: No dizziness/lightheadedness, weakness, seizures, numbness, tingling  Heme: No easy bruising or bleeding  Endo: No heat/cold intolerance  Psych: No significant nervousness, anxiety, stress, depression  All other systems were reviewed and are negative, except as noted.      PHYSICAL EXAM:  Constitutional: Well developed / well nourished  Eyes: Anicteric, PERRLA  ENMT: nc/at  Respiratory: CTA B/L  Cardiovascular: RRR  Gastrointestinal: Soft, non distended, NT.  well healed incisional scar   Genitourinary: Voiding spontaneously  Extremities: SCD's in place and working bilaterally,  chronic edema ~1+ b/l LE, stable  Vascular: Palpable dp pulses bilaterally  Neurological: A&O x3  Skin: no rashes, ulcerations or lesions;  Musculoskeletal: Moving all extremities  Psychiatric: Responsive

## 2022-09-24 NOTE — PROGRESS NOTE ADULT - ASSESSMENT
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 s/p BL Nephrectomy/Single Donor Renal Transplant 2018, Post Transplant DM, HTN, HLD, GERD,  now transferred for further sepsis workup from [facility]    Infectious w/u s/p DDRT  -good graft function  -cardiac w/u negative  -pan-culture NGTD  -imaging unremarkable  -sputum cx/crypto pending  -pulm following: rec to cont diuresis and outpatient sleep study. Continue zosyn X5 days for possible PNA. Can change to Augmentin to complete at time of discharge  -cleared by GYN for adnexal cysts/IUD sources for infection  -seen by ENT for ear pain, now resolved, continue Flonase  -spirometry, wean off NC, Pulmonary consulted  -continue Lasix to 40QD  -PT/OT  -Continue HSQ  -SCDs  -analgesia prn     Immunosuppression:  -Prednisone 5  -Envarsus per level  -Imuran hold in the setting of infection    DM:  -On insulin pump  -Endocrine on board pump management  -Was at home on (Trulicity)  -Diabetic diet    HTN, HLD  -Monitor BP, continue Nifedipine. Coreg discontinued--bradycardia  -continue statin    MDD, Anxiety  -Paroxetine 10mg PO q24  -Klonopin 1mg PO PRN    GERD  -Protonix, Maalox prn

## 2022-09-24 NOTE — PROGRESS NOTE ADULT - ASSESSMENT
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 DM controlled (A1C 6.2%). Also h/o HTN, HLD, GERD. Here with sepsis due to UTI.       Diabetes mellitus following renal transplant.   Home regimen On insulin pump+ CGM plus Trulicity 0.75mg subq weekly.   while inpt: Tolerating POs  with BG mostly at goal 100 to 180mg/dl on CGM except after breakfast x3 days>intensified breakfast I:C ratio to 4 ; Control IQ on  - BG Goal 100-180mg/dl    -POC testing TID AC & QHS and Q6H while NPO. Presently refusing> agrees to fasting when BMP is drawn. Please document pt refusal of POC  - C/w insulin pump use while in hospital  - Continue with insulin pump settings as  below  - Please continue to document carb intake and bolus insulin doses in flowsheet  - Noted pt's diet changed to a regular diet instead of consistent carb. Since BGs are mostly at goal can c/w present diet for now. Pt is administering insulin bolus according to carb intake appropriately.    Discharge planning:   - may be dc in next day or so if abx changed to po> if d/c tomorrow please d/w w/ endocrine would ensure lunch BG stable prior to d/c since changed pump settings on 9/24   - Patient will follow up with Dr. Berman as outpatient   - Can be discharged on insulin pump and Trulicity 0.75mg qweek. Pt to discuss with her endocrinologist to increase GLP1RA to therapeutic dose of 1.5mg   -Needs optho f/u.     Problem/Plan - 2:  ·  Problem: Insulin pump status.   ·  Plan: - Reviewed settings and hx with pt.    - Pump infusion site changed 9/22, next change 9/25 ;  has supplies  - dexcom sensor changed 9/18.  - intensified breakfast I:C ratio on 9/24  insulin lispro (HumaLOG) Pump 1 Each SubCutaneous Continuous Pump  Basal rate:  12a-6a 1.1  6a-12a 1  TDD 24.6    ISF 15    ICR  12a-6a 4.5  6a-10a 4  10a-12a 4.2    Target        Problem/Plan - 3:  ·  Problem: Hypertension.   ·  Plan: BP goal 130/80  - Manage per primary team.     Problem/Plan - 4:  ·  Problem: Hyperlipidemia.   ·  Plan: - Continue with atorvastatin 10 mg daily   - Manage as outpatient.     Discussed with patient and primary  team Ct BADILLO   Contact via [employer] Teams during business hours  On evenings and weekends, please call [phone] or page endocrine fellow on call.   Email: [email or web address]   Please note that this patient may be followed by different provider tomorrow.    greater than 50% of the encounter was spent counseling and/or coordination of care.  36 minutes spent on total encounter; The necessity of the time spent during the encounter on this date of service was due to development of plan of care/coordination of care/glycemic control through review of labs, blood glucose values and vital signs. pump changes

## 2022-09-25 LAB
ALBUMIN SERPL ELPH-MCNC: 4.2 G/DL — SIGNIFICANT CHANGE UP (ref 3.3–5)
ALP SERPL-CCNC: 58 U/L — SIGNIFICANT CHANGE UP (ref 40–120)
ALT FLD-CCNC: 18 U/L — SIGNIFICANT CHANGE UP (ref 10–45)
ANION GAP SERPL CALC-SCNC: 16 MMOL/L — SIGNIFICANT CHANGE UP (ref 5–17)
AST SERPL-CCNC: 11 U/L — SIGNIFICANT CHANGE UP (ref 10–40)
BILIRUB SERPL-MCNC: 0.4 MG/DL — SIGNIFICANT CHANGE UP (ref 0.2–1.2)
BUN SERPL-MCNC: 31 MG/DL — HIGH (ref 7–23)
CALCIUM SERPL-MCNC: 10.1 MG/DL — SIGNIFICANT CHANGE UP (ref 8.4–10.5)
CHLORIDE SERPL-SCNC: 99 MMOL/L — SIGNIFICANT CHANGE UP (ref 96–108)
CO2 SERPL-SCNC: 23 MMOL/L — SIGNIFICANT CHANGE UP (ref 22–31)
CREAT SERPL-MCNC: 1.42 MG/DL — HIGH (ref 0.5–1.3)
EGFR: 45 ML/MIN/1.73M2 — LOW
GLUCOSE BLDC GLUCOMTR-MCNC: 135 MG/DL — HIGH (ref 70–99)
GLUCOSE SERPL-MCNC: 121 MG/DL — HIGH (ref 70–99)
HCT VFR BLD CALC: 40 % — SIGNIFICANT CHANGE UP (ref 34.5–45)
HGB BLD-MCNC: 13.2 G/DL — SIGNIFICANT CHANGE UP (ref 11.5–15.5)
MAGNESIUM SERPL-MCNC: 2 MG/DL — SIGNIFICANT CHANGE UP (ref 1.6–2.6)
MCHC RBC-ENTMCNC: 33 GM/DL — SIGNIFICANT CHANGE UP (ref 32–36)
MCHC RBC-ENTMCNC: 33.4 PG — SIGNIFICANT CHANGE UP (ref 27–34)
MCV RBC AUTO: 101.3 FL — HIGH (ref 80–100)
NRBC # BLD: 0 /100 WBCS — SIGNIFICANT CHANGE UP (ref 0–0)
PHOSPHATE SERPL-MCNC: 4.1 MG/DL — SIGNIFICANT CHANGE UP (ref 2.5–4.5)
PLATELET # BLD AUTO: 250 K/UL — SIGNIFICANT CHANGE UP (ref 150–400)
POTASSIUM SERPL-MCNC: 4.1 MMOL/L — SIGNIFICANT CHANGE UP (ref 3.5–5.3)
POTASSIUM SERPL-SCNC: 4.1 MMOL/L — SIGNIFICANT CHANGE UP (ref 3.5–5.3)
PROT SERPL-MCNC: 7.6 G/DL — SIGNIFICANT CHANGE UP (ref 6–8.3)
RBC # BLD: 3.95 M/UL — SIGNIFICANT CHANGE UP (ref 3.8–5.2)
RBC # FLD: 12.2 % — SIGNIFICANT CHANGE UP (ref 10.3–14.5)
SODIUM SERPL-SCNC: 138 MMOL/L — SIGNIFICANT CHANGE UP (ref 135–145)
TACROLIMUS SERPL-MCNC: 5.5 NG/ML — SIGNIFICANT CHANGE UP
WBC # BLD: 12.7 K/UL — HIGH (ref 3.8–10.5)
WBC # FLD AUTO: 12.7 K/UL — HIGH (ref 3.8–10.5)

## 2022-09-25 PROCEDURE — 99233 SBSQ HOSP IP/OBS HIGH 50: CPT

## 2022-09-25 RX ORDER — CLONAZEPAM 1 MG
1 TABLET ORAL THREE TIMES A DAY
Refills: 0 | Status: DISCONTINUED | OUTPATIENT
Start: 2022-09-25 | End: 2022-09-26

## 2022-09-25 RX ORDER — LANOLIN ALCOHOL/MO/W.PET/CERES
3 CREAM (GRAM) TOPICAL AT BEDTIME
Refills: 0 | Status: COMPLETED | OUTPATIENT
Start: 2022-09-25 | End: 2022-09-25

## 2022-09-25 RX ORDER — AZATHIOPRINE 100 MG/1
100 TABLET ORAL ONCE
Refills: 0 | Status: COMPLETED | OUTPATIENT
Start: 2022-09-25 | End: 2022-09-25

## 2022-09-25 RX ORDER — AZATHIOPRINE 100 MG/1
100 TABLET ORAL DAILY
Refills: 0 | Status: DISCONTINUED | OUTPATIENT
Start: 2022-09-26 | End: 2022-09-26

## 2022-09-25 RX ORDER — ACETAMINOPHEN 500 MG
650 TABLET ORAL ONCE
Refills: 0 | Status: COMPLETED | OUTPATIENT
Start: 2022-09-25 | End: 2022-09-25

## 2022-09-25 RX ORDER — DIPHENHYDRAMINE HCL 50 MG
25 CAPSULE ORAL ONCE
Refills: 0 | Status: COMPLETED | OUTPATIENT
Start: 2022-09-25 | End: 2022-09-25

## 2022-09-25 RX ADMIN — Medication 1 SPRAY(S): at 17:37

## 2022-09-25 RX ADMIN — TACROLIMUS 2 MILLIGRAM(S): 5 CAPSULE ORAL at 08:29

## 2022-09-25 RX ADMIN — Medication 1 TABLET(S): at 12:13

## 2022-09-25 RX ADMIN — Medication 1 SPRAY(S): at 05:08

## 2022-09-25 RX ADMIN — Medication 40 MILLIGRAM(S): at 05:10

## 2022-09-25 RX ADMIN — SENNA PLUS 2 TABLET(S): 8.6 TABLET ORAL at 21:08

## 2022-09-25 RX ADMIN — MAGNESIUM OXIDE 400 MG ORAL TABLET 400 MILLIGRAM(S): 241.3 TABLET ORAL at 08:28

## 2022-09-25 RX ADMIN — HEPARIN SODIUM 5000 UNIT(S): 5000 INJECTION INTRAVENOUS; SUBCUTANEOUS at 05:09

## 2022-09-25 RX ADMIN — TRAMADOL HYDROCHLORIDE 25 MILLIGRAM(S): 50 TABLET ORAL at 13:10

## 2022-09-25 RX ADMIN — Medication 3 MILLIGRAM(S): at 22:54

## 2022-09-25 RX ADMIN — HYDROMORPHONE HYDROCHLORIDE 0.5 MILLIGRAM(S): 2 INJECTION INTRAMUSCULAR; INTRAVENOUS; SUBCUTANEOUS at 22:53

## 2022-09-25 RX ADMIN — MAGNESIUM OXIDE 400 MG ORAL TABLET 400 MILLIGRAM(S): 241.3 TABLET ORAL at 17:37

## 2022-09-25 RX ADMIN — HEPARIN SODIUM 5000 UNIT(S): 5000 INJECTION INTRAVENOUS; SUBCUTANEOUS at 17:37

## 2022-09-25 RX ADMIN — Medication 1 MILLIGRAM(S): at 17:36

## 2022-09-25 RX ADMIN — PIPERACILLIN AND TAZOBACTAM 25 GRAM(S): 4; .5 INJECTION, POWDER, LYOPHILIZED, FOR SOLUTION INTRAVENOUS at 13:33

## 2022-09-25 RX ADMIN — Medication 10 MILLIGRAM(S): at 12:13

## 2022-09-25 RX ADMIN — Medication 1 MILLIGRAM(S): at 22:53

## 2022-09-25 RX ADMIN — Medication 650 MILLIGRAM(S): at 00:47

## 2022-09-25 RX ADMIN — TRAMADOL HYDROCHLORIDE 50 MILLIGRAM(S): 50 TABLET ORAL at 10:41

## 2022-09-25 RX ADMIN — TRAMADOL HYDROCHLORIDE 25 MILLIGRAM(S): 50 TABLET ORAL at 12:10

## 2022-09-25 RX ADMIN — PANTOPRAZOLE SODIUM 40 MILLIGRAM(S): 20 TABLET, DELAYED RELEASE ORAL at 05:11

## 2022-09-25 RX ADMIN — MAGNESIUM OXIDE 400 MG ORAL TABLET 400 MILLIGRAM(S): 241.3 TABLET ORAL at 12:11

## 2022-09-25 RX ADMIN — Medication 650 MILLIGRAM(S): at 01:30

## 2022-09-25 RX ADMIN — TRAMADOL HYDROCHLORIDE 25 MILLIGRAM(S): 50 TABLET ORAL at 23:02

## 2022-09-25 RX ADMIN — PIPERACILLIN AND TAZOBACTAM 25 GRAM(S): 4; .5 INJECTION, POWDER, LYOPHILIZED, FOR SOLUTION INTRAVENOUS at 05:10

## 2022-09-25 RX ADMIN — TRAMADOL HYDROCHLORIDE 50 MILLIGRAM(S): 50 TABLET ORAL at 09:41

## 2022-09-25 RX ADMIN — TRAMADOL HYDROCHLORIDE 50 MILLIGRAM(S): 50 TABLET ORAL at 18:35

## 2022-09-25 RX ADMIN — TRAMADOL HYDROCHLORIDE 50 MILLIGRAM(S): 50 TABLET ORAL at 17:36

## 2022-09-25 RX ADMIN — Medication 60 MILLIGRAM(S): at 05:10

## 2022-09-25 RX ADMIN — HYDROMORPHONE HYDROCHLORIDE 0.5 MILLIGRAM(S): 2 INJECTION INTRAMUSCULAR; INTRAVENOUS; SUBCUTANEOUS at 23:53

## 2022-09-25 RX ADMIN — Medication 1 MILLIGRAM(S): at 09:41

## 2022-09-25 RX ADMIN — ATORVASTATIN CALCIUM 10 MILLIGRAM(S): 80 TABLET, FILM COATED ORAL at 21:08

## 2022-09-25 RX ADMIN — AZATHIOPRINE 100 MILLIGRAM(S): 100 TABLET ORAL at 12:10

## 2022-09-25 RX ADMIN — Medication 25 MILLIGRAM(S): at 00:47

## 2022-09-25 RX ADMIN — Medication 5 MILLIGRAM(S): at 05:10

## 2022-09-25 RX ADMIN — PIPERACILLIN AND TAZOBACTAM 25 GRAM(S): 4; .5 INJECTION, POWDER, LYOPHILIZED, FOR SOLUTION INTRAVENOUS at 21:06

## 2022-09-25 NOTE — CONSULT NOTE ADULT - ASSESSMENT
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 s/p BL Nephrectomy/Single Donor Renal Transplant, Post Transplant DM, HTN, HLD, GERD, MDD, Anxiety presented to [facility] ER admitted for further workup of sepsis. Neurology consulted for headaches. Per patient, she began having bilateral headache localized to b/l temples right before admission where she began vomitting numerous times. Patient unable to remember if vomiting or headaches began first. Denies prior hx of such headaches. Describes as squeezing in nature as if there is a crown going over her head. Endorses bilateral ear and eye pain. Has photophobia. Says dilaudid has helped, but that she feels drowsy when she wakes up. Says tramodol does not help too much with headache pain. Patient says she does not sleep well at night and has had plans to be worked up for obstructive sleep apnea. Patient has also expressed left sided neck pain. Denies dizziness, vision changes, numbness, weakness.      Of note, patient saw Dr. 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 MRA brain was recommended to be done in 2024 for interval stability scan.     Impression: Headache, squeezing in nature, localized to b/l temples without associated focal neurological deficits likely tension headache. Can also be cervical discogenic headache. BP appears to be well controlled. Less concerned for PRES.    Recommendations:   [] IV Tylenol   [] Hot packs for neck   [] Encourage good sleep hygiene   [] IV hydration  [] If headache persists, can consider further neuroimaging     Case to be seen by neurology attending

## 2022-09-25 NOTE — PROGRESS NOTE ADULT - ASSESSMENT
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 DM controlled (A1C 6.2%). Also h/o HTN, HLD, GERD. Here with sepsis due to UTI. BG elevated today due to missed bolus this AM. BG values above previous goal as pt has been off GLP-1 for over 2 weeks. Tolerating POs. Discussed dietary modifications for healthy eating. BG goal (100-180mg/dl).

## 2022-09-25 NOTE — CONSULT NOTE ADULT - SUBJECTIVE AND OBJECTIVE BOX
Neurology  Consult Note  09-25-22    Name:  ROLLY MEDEIROS; 51y (1970)    Reason for Admission: Fever due to unspecified condition        Chief Complaint: headaches  HPI:  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 s/p BL Nephrectomy/Single Donor Renal Transplant, Post Transplant DM, HTN, HLD, GERD, MDD, Anxiety presented to [facility] ER admitted for further workup of sepsis. Neurology consulted for headaches. Per patient, she began having bilateral headache localized to b/l temples right before admission where she began vomitting numerous times. Patient unable to remember if vomiting or headaches began first. Denies prior hx of such headaches. Describes as squeezing in nature as if there is a crown going over her head. Endorses bilateral ear and eye pain. Has photophobia. Says dilaudid has helped, but that she feels drowsy when she wakes up. Says tramodol does not help too much with headache pain. Patient says she does not sleep well at night and has had plans to be worked up for obstructive sleep apnea. Patient has also expressed left sided neck pain. Denies vision changes, numbness, weakness.      Of note, patient saw Dr. 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 MRA brain was recommended to be done in 2024 for interval stability scan.     Review of Systems:  As states in HPI.    Demerol HCl (Hives)  Phenergan (Hives; Rash)      PMHx:   Hypertension    Hyperlipidemia    Gout    Arthropathy NOS - shoulder    Kidney stones    Polycystic kidney disease    Obesity    Kidney polycystic disease    Kidney stones    GERD (gastroesophageal reflux disease)    Anxiety    CKD (chronic kidney disease) stage 4, GFR 15-29 ml/min    ESRD (end stage renal disease)      PFHx:   Family history of heart disease (Father)    Family history of hypertension    No pertinent family history in first degree relatives      PSuHx:   Ankle fracture - Left    Tubal ligation    S/P cholecystectomy    Kidney calculi    H/O tubal ligation    History of ventral hernia repair    H/O shoulder surgery    AV fistula    End-stage renal disease (ESRD)    Renal transplant recipient    Status post nephrectomy      Medications:  MEDICATIONS  (STANDING):  atorvastatin 10 milliGRAM(s) Oral at bedtime  fluticasone propionate 50 MICROgram(s)/spray Nasal Spray 1 Spray(s) Both Nostrils two times a day  furosemide    Tablet 40 milliGRAM(s) Oral daily  heparin   Injectable 5000 Unit(s) SubCutaneous every 12 hours  influenza   Vaccine 0.5 milliLiter(s) IntraMuscular once  insulin lispro (HumaLOG) Pump 1 Each SubCutaneous Continuous Pump  magnesium oxide 400 milliGRAM(s) Oral three times a day with meals  NIFEdipine XL 60 milliGRAM(s) Oral daily  pantoprazole    Tablet 40 milliGRAM(s) Oral before breakfast  PARoxetine 10 milliGRAM(s) Oral daily  piperacillin/tazobactam IVPB.. 3.375 Gram(s) IV Intermittent every 8 hours  predniSONE   Tablet 5 milliGRAM(s) Oral daily  senna 2 Tablet(s) Oral at bedtime  tacrolimus ER Tablet (ENVARSUS XR) 2 milliGRAM(s) Oral <User Schedule>  trimethoprim   80 mG/sulfamethoxazole 400 mG 1 Tablet(s) Oral daily    MEDICATIONS  (PRN):  aluminum hydroxide/magnesium hydroxide/simethicone Suspension 30 milliLiter(s) Oral every 4 hours PRN Dyspepsia  clonazePAM  Tablet 1 milliGRAM(s) Oral three times a day PRN anxiety  HYDROmorphone  Injectable 0.5 milliGRAM(s) IV Push at bedtime PRN Severe Pain (7 - 10)  ondansetron Injectable 4 milliGRAM(s) IV Push every 6 hours PRN Nausea and/or Vomiting  traMADol 25 milliGRAM(s) Oral every 6 hours PRN Moderate Pain (4 - 6)  traMADol 50 milliGRAM(s) Oral every 6 hours PRN Severe Pain (7 - 10)    Vitals:  T(C): 36.8 (09-25-22 @ 09:03), Max: 37.1 (09-24-22 @ 21:11)  HR: 88 (09-25-22 @ 09:03) (51 - 88)  BP: 144/69 (09-25-22 @ 09:03) (123/72 - 154/70)  RR: 18 (09-25-22 @ 09:03) (18 - 18)  SpO2: 96% (09-25-22 @ 09:03) (93% - 96%)    Labs:                        13.2   12.70 )-----------( 250      ( 25 Sep 2022 06:44 )             40.0     09-25    138  |  99  |  31<H>  ----------------------------<  121<H>  4.1   |  23  |  1.42<H>    Ca    10.1      25 Sep 2022 06:44  Phos  4.1     09-25  Mg     2.0     09-25    TPro  7.6  /  Alb  4.2  /  TBili  0.4  /  DBili  x   /  AST  11  /  ALT  18  /  AlkPhos  58  09-25    CAPILLARY BLOOD GLUCOSE  146 (24 Sep 2022 17:00)      POCT Blood Glucose.: 135 mg/dL (25 Sep 2022 05:10)    LIVER FUNCTIONS - ( 25 Sep 2022 06:44 )  Alb: 4.2 g/dL / Pro: 7.6 g/dL / ALK PHOS: 58 U/L / ALT: 18 U/L / AST: 11 U/L / GGT: x             Culture - Sputum (collected 22 Sep 2022 10:57)  Source: .Sputum Sputum cup  Gram Stain (22 Sep 2022 19:27):    No polymorphonuclear leukocytes per low power field    Few Squamous epithelial cells per low power field    Moderate Gram positive cocci in pairs, chains and clusters per oil power    field    Few Gram Negative Rods per oil power field  Final Report (24 Sep 2022 19:01):    Normal Respiratory Angélica present    PHYSICAL EXAMINATION:  General: Well-developed, well nourished, in no acute distress.  Eyes: Conjunctiva and sclera clear.  Neurologic:  - Mental Status:  Alert, awake, oriented to person, place, and time; Speech is fluent with intact naming, repetition, and comprehension; Good overall fund of knowledge  - Cranial Nerves II-XII:    II:  Visual fields are full to confrontation; Pupils are equal, round, and reactive to light  III, IV, VI:  Extraocular movements are intact without nystagmus.  V:  Facial sensation is intact in the V1-V3 distribution bilaterally.  VII:  Face is symmetric with normal eye closure and smile  VIII:  Hearing is intact to finger rub.  IX, X:  Uvula is midline and soft palate rises symmetrically  XI:  Head turning and shoulder shrug are intact.  XII:  Tongue protudes in the midline.  - Motor:  Strength is 5/5 throughout.  There is no pronator drift.  Normal muscle bulk and tone throughout.  - Sensory:  Intact throughout to vibration, and joint-position, light touch, pin prick.  - Coordination:  Finger-nose-finger without dysmetria.  Rapid alternating hand movements intact.  - Gait:   due to concern for safety, did not assess.    Radiology:  < from: CT Head No Cont (09.20.22 @ 16:15) >  Normal non-contrast CT of the brain.    < end of copied text >   Neurology  Consult Note  09-25-22    Name:  ROLLY MEDEIROS; 51y (1970)    Reason for Admission: Fever due to unspecified condition        Chief Complaint: headaches  HPI:  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 s/p BL Nephrectomy/Single Donor Renal Transplant, Post Transplant DM, HTN, HLD, GERD, MDD, Anxiety presented to [facility] ER admitted for further workup of sepsis. Neurology consulted for headaches. Per patient, she began having bilateral headache localized to b/l temples right before admission where she began vomitting numerous times. Patient unable to remember if vomiting or headaches began first. Denies prior hx of such headaches. Describes as squeezing in nature as if there is a crown going over her head. Endorses bilateral ear and eye pain. Has photophobia. Says dilaudid has helped, but that she feels drowsy when she wakes up. Says tramodol does not help too much with headache pain. Patient says she does not sleep well at night and has had plans to be worked up for obstructive sleep apnea. Patient has also expressed left sided neck pain. Denies vision changes, numbness, weakness.      Of note, patient saw Dr. 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 MRA brain was recommended to be done in 2024 for interval stability scan.     Review of Systems:  As states in HPI.    Demerol HCl (Hives)  Phenergan (Hives; Rash)      PMHx:   Hypertension    Hyperlipidemia    Gout    Arthropathy NOS - shoulder    Kidney stones    Polycystic kidney disease    Obesity    Kidney polycystic disease    Kidney stones    GERD (gastroesophageal reflux disease)    Anxiety    CKD (chronic kidney disease) stage 4, GFR 15-29 ml/min    ESRD (end stage renal disease)      PFHx:   Family history of heart disease (Father)    Family history of hypertension    No pertinent family history in first degree relatives      PSuHx:   Ankle fracture - Left    Tubal ligation    S/P cholecystectomy    Kidney calculi    H/O tubal ligation    History of ventral hernia repair    H/O shoulder surgery    AV fistula    End-stage renal disease (ESRD)    Renal transplant recipient    Status post nephrectomy    FHx: Non-contributory    SHx: No reports of current tobacco, alcohol, or illicit drug use      Medications:  MEDICATIONS  (STANDING):  atorvastatin 10 milliGRAM(s) Oral at bedtime  fluticasone propionate 50 MICROgram(s)/spray Nasal Spray 1 Spray(s) Both Nostrils two times a day  furosemide    Tablet 40 milliGRAM(s) Oral daily  heparin   Injectable 5000 Unit(s) SubCutaneous every 12 hours  influenza   Vaccine 0.5 milliLiter(s) IntraMuscular once  insulin lispro (HumaLOG) Pump 1 Each SubCutaneous Continuous Pump  magnesium oxide 400 milliGRAM(s) Oral three times a day with meals  NIFEdipine XL 60 milliGRAM(s) Oral daily  pantoprazole    Tablet 40 milliGRAM(s) Oral before breakfast  PARoxetine 10 milliGRAM(s) Oral daily  piperacillin/tazobactam IVPB.. 3.375 Gram(s) IV Intermittent every 8 hours  predniSONE   Tablet 5 milliGRAM(s) Oral daily  senna 2 Tablet(s) Oral at bedtime  tacrolimus ER Tablet (ENVARSUS XR) 2 milliGRAM(s) Oral <User Schedule>  trimethoprim   80 mG/sulfamethoxazole 400 mG 1 Tablet(s) Oral daily    MEDICATIONS  (PRN):  aluminum hydroxide/magnesium hydroxide/simethicone Suspension 30 milliLiter(s) Oral every 4 hours PRN Dyspepsia  clonazePAM  Tablet 1 milliGRAM(s) Oral three times a day PRN anxiety  HYDROmorphone  Injectable 0.5 milliGRAM(s) IV Push at bedtime PRN Severe Pain (7 - 10)  ondansetron Injectable 4 milliGRAM(s) IV Push every 6 hours PRN Nausea and/or Vomiting  traMADol 25 milliGRAM(s) Oral every 6 hours PRN Moderate Pain (4 - 6)  traMADol 50 milliGRAM(s) Oral every 6 hours PRN Severe Pain (7 - 10)    Vitals:  T(C): 36.8 (09-25-22 @ 09:03), Max: 37.1 (09-24-22 @ 21:11)  HR: 88 (09-25-22 @ 09:03) (51 - 88)  BP: 144/69 (09-25-22 @ 09:03) (123/72 - 154/70)  RR: 18 (09-25-22 @ 09:03) (18 - 18)  SpO2: 96% (09-25-22 @ 09:03) (93% - 96%)    Labs:                        13.2   12.70 )-----------( 250      ( 25 Sep 2022 06:44 )             40.0     09-25    138  |  99  |  31<H>  ----------------------------<  121<H>  4.1   |  23  |  1.42<H>    Ca    10.1      25 Sep 2022 06:44  Phos  4.1     09-25  Mg     2.0     09-25    TPro  7.6  /  Alb  4.2  /  TBili  0.4  /  DBili  x   /  AST  11  /  ALT  18  /  AlkPhos  58  09-25    CAPILLARY BLOOD GLUCOSE  146 (24 Sep 2022 17:00)      POCT Blood Glucose.: 135 mg/dL (25 Sep 2022 05:10)    LIVER FUNCTIONS - ( 25 Sep 2022 06:44 )  Alb: 4.2 g/dL / Pro: 7.6 g/dL / ALK PHOS: 58 U/L / ALT: 18 U/L / AST: 11 U/L / GGT: x             Culture - Sputum (collected 22 Sep 2022 10:57)  Source: .Sputum Sputum cup  Gram Stain (22 Sep 2022 19:27):    No polymorphonuclear leukocytes per low power field    Few Squamous epithelial cells per low power field    Moderate Gram positive cocci in pairs, chains and clusters per oil power    field    Few Gram Negative Rods per oil power field  Final Report (24 Sep 2022 19:01):    Normal Respiratory Angélica present    PHYSICAL EXAMINATION:  General: Well-developed, well nourished, in no acute distress.  CV: Peripheral pulses palpable, no edema  Eyes: Conjunctiva and sclera clear. Fundoscopy not well visualized    Neurologic:  - Mental Status:  Alert, awake, oriented to person, place, and time; Speech is fluent with intact naming, repetition, and comprehension; Follows commands. Good overall fund of knowledge. Recent and remote memory, attn/conc intact  - Cranial Nerves II-XII:    II:  Visual fields are full to confrontation; Pupils are equal, round, and reactive to light  III, IV, VI:  Extraocular movements are intact without nystagmus.  V:  Facial sensation is intact in the V1-V3 distribution bilaterally.  VII:  Face is symmetric with normal eye closure and smile  VIII:  Hearing is intact to finger rub.  IX, X:  Uvula is midline and soft palate rises symmetrically  XI:  Head turning and shoulder shrug are intact.  XII:  Tongue protrudes in the midline.  - Motor:  Strength is 5/5 throughout.  There is no pronator drift.  Normal muscle bulk and tone throughout.  - Sensory:  Intact throughout to vibration, and joint-position, light touch, pin prick.  - DTR's: 2+ all and downgoing b/l plantar response  - Coordination:  Finger-nose-finger without dysmetria.  Rapid alternating hand movements intact.  - Gait and station:   due to concern for safety, did not assess.    Radiology:  < from: CT Head No Cont (09.20.22 @ 16:15) >  Normal non-contrast CT of the brain.    < end of copied text >

## 2022-09-25 NOTE — CONSULT NOTE ADULT - CONSULT REQUESTED DATE/TIME
16-Sep-2022 16:26
22-Sep-2022 13:55
25-Sep-2022 10:13
16-Sep-2022
16-Sep-2022 10:42
16-Sep-2022 09:38
16-Sep-2022 11:20

## 2022-09-25 NOTE — PROGRESS NOTE ADULT - SUBJECTIVE AND OBJECTIVE BOX
Diabetes Follow up note:    Chief complaint: DM managed w/insulin pump    Interval Hx: Fasting glucose in 130s this AM. Pt seen at bedside. Pt reports thought she bolused for breakfast but noticed elevated glucose around noon and saw the bolus was never delivered. Pt sipping regular soda occasionally as well. Due for pump site change today.     Review of Systems:  General: no complaints.   GI: Tolerating POs. Denies N/V/D/Abd pain  CV: Denies CP/SOB  ENDO: No S&Sx of hypoglycemia  MEDS:  atorvastatin 10 milliGRAM(s) Oral at bedtime  insulin lispro (HumaLOG) Pump 1 Each SubCutaneous Continuous Pump  Basal rate:  12a-6a 1.1  6a-12a 1  TDD 24.6    ISF 15    ICR  12a-6a 4.5  6a-10a 4  10a-12a 4.2    Target     predniSONE   Tablet 5 milliGRAM(s) Oral daily    piperacillin/tazobactam IVPB.. 3.375 Gram(s) IV Intermittent every 8 hours  trimethoprim   80 mG/sulfamethoxazole 400 mG 1 Tablet(s) Oral daily    Allergies    Demerol HCl (Hives)  Phenergan (Hives; Rash)        PE:  General: Female lying in bed. NAD.   Vital Signs Last 24 Hrs  T(C): 36.7 (25 Sep 2022 14:08), Max: 37.1 (24 Sep 2022 21:11)  T(F): 98 (25 Sep 2022 14:08), Max: 98.7 (24 Sep 2022 21:11)  HR: 82 (25 Sep 2022 14:08) (51 - 88)  BP: 132/77 (25 Sep 2022 14:08) (123/72 - 154/70)  BP(mean): --  RR: 20 (25 Sep 2022 15:21) (18 - 20)  SpO2: 92% (25 Sep 2022 15:21) (92% - 96%)    Parameters below as of 25 Sep 2022 15:21  Patient On (Oxygen Delivery Method): room air      Abd: Soft, NT,ND, pump infusion site right lower abdomen cdi  Extremities: Warm. R upper arm dexcom intact.   Neuro: A&O X3    LABS:  POCT Blood Glucose.: 135 mg/dL (09-25-22 @ 05:10)  POCT Blood Glucose.: 115 mg/dL (09-24-22 @ 06:42)  POCT Blood Glucose.: 123 mg/dL (09-23-22 @ 06:51)                            13.2   12.70 )-----------( 250      ( 25 Sep 2022 06:44 )             40.0       09-25    138  |  99  |  31<H>  ----------------------------<  121<H>  4.1   |  23  |  1.42<H>    eGFR: 45<L>    Ca    10.1      09-25  Mg     2.0     09-25  Phos  4.1     09-25    TPro  7.6  /  Alb  4.2  /  TBili  0.4  /  DBili  x   /  AST  11  /  ALT  18  /  AlkPhos  58  09-25      A1C with Estimated Average Glucose Result: 6.2 % (09-17-22 @ 06:53)          Contact number: oapl [phone] or [phone]

## 2022-09-25 NOTE — CONSULT NOTE ADULT - REASON FOR ADMISSION
Transfer from [facility] with fever and chills r/o Sepsis
Transfer from [facility] with fever and chills r/o Sepsis
Transfer from [facility] with fever and chills r/o Sepsis
Transfer from [facility] with fever and chills r/o Sepsis
Transfer from [facility] with fever and chills r/o Sepsis
Transfer from [facility] with fever and chills r/o Sepsis
Transfer from [facility] with fever and chills r/o Sepsis

## 2022-09-25 NOTE — PROGRESS NOTE ADULT - ASSESSMENT
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 s/p BL Nephrectomy/Single Donor Renal Transplant 2018, Post Transplant DM, HTN, HLD, GERD,  now transferred for further sepsis workup from [facility]    Infectious w/u s/p DDRT  -good graft function  -cardiac w/u negative  -pan-culture NGTD  -imaging unremarkable  -sputum cx/crypto pending  - neuro consult for HAs  -pulm following: rec to cont diuresis and outpatient sleep study. Continue zosyn X5 days for possible PNA. Can change to Augmentin to complete at time of discharge  -cleared by GYN for adnexal cysts/IUD sources for infection  -seen by ENT for ear pain, now resolved, continue Flonase  -spirometry, wean off NC, Pulmonary consulted  -continue Lasix to 40QD  -PT/OT  -Continue HSQ  -SCDs  -analgesia prn     Immunosuppression:  -Prednisone 5  -Envarsus per level  -Imuran 100mg qd    DM:  -On insulin pump  -Endocrine on board pump management  -Was at home on (Trulicity)  -Diabetic diet    HTN, HLD  -Monitor BP, continue Nifedipine. Coreg discontinued--bradycardia  -continue statin    MDD, Anxiety  -Paroxetine 10mg PO q24  -Klonopin 1mg PO PRN    GERD  -Protonix, Maalox prn

## 2022-09-25 NOTE — CONSULT NOTE ADULT - CONSULT REASON
Oxygen requirements
headache
insulin pump
Sepsis
bilateral ear pain and headache
ovarian cyst
renal transplantation

## 2022-09-25 NOTE — PROGRESS NOTE ADULT - SUBJECTIVE AND OBJECTIVE BOX
Transplant Surgery - Multidisciplinary Progress Note  --------------------------------------------------------------  LDRT    4/2/2018    Present:   Patient seen and examined with multidisciplinary team including Transplant Surgeon: Dr. Hassan. Transplant Nephrologist: Dr. [facility] and unit RN during am rounds.  Disciplines not in attendance will be notified of the plan.     HPI: 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 s/p BL Nephrectomy/Single Donor Renal Transplant in 2018, Post Transplant DM, HTN, HLD, GERD, MDD, Anxiety presented to [facility] ER on 9/14/2022 with c/o body aches, R flank pain, fevers/chills, nausea/vomiting, diarrhea. In [facility] ED Tmax 102.2F and leukocytosis. Lactate negative. Cultures sent. Given broad spectrum empiric IV ABX and IVF. Pt with difficulty tolerating PO intake due to N/V. Currently on insulin pump for DM.  Pt transferred to [facility] for further workup of sepsis.     Interval events:  - Off O2 this morning. Ambulating in hallway, sats 92% while ambulating  - On Zosyn for 5 days by Pulm for possible PNA  - SCr 1.42  from 1.22  UOP 1.7    On Lasix 40mg po qd     Immunosuppression                               Maintenance:   Env per level, Holding Imuran, Pred 5  Ongoing monitoring for signs of rejection.    Potential Discharge date: pending clinical improvement    Education:  Medications    Plan of care:  See Below      MEDICATIONS  (STANDING):  atorvastatin 10 milliGRAM(s) Oral at bedtime  fluticasone propionate 50 MICROgram(s)/spray Nasal Spray 1 Spray(s) Both Nostrils two times a day  furosemide    Tablet 40 milliGRAM(s) Oral daily  heparin   Injectable 5000 Unit(s) SubCutaneous every 12 hours  influenza   Vaccine 0.5 milliLiter(s) IntraMuscular once  insulin lispro (HumaLOG) Pump 1 Each SubCutaneous Continuous Pump  magnesium oxide 400 milliGRAM(s) Oral three times a day with meals  NIFEdipine XL 60 milliGRAM(s) Oral daily  pantoprazole    Tablet 40 milliGRAM(s) Oral before breakfast  PARoxetine 10 milliGRAM(s) Oral daily  piperacillin/tazobactam IVPB.. 3.375 Gram(s) IV Intermittent every 8 hours  predniSONE   Tablet 5 milliGRAM(s) Oral daily  senna 2 Tablet(s) Oral at bedtime  tacrolimus ER Tablet (ENVARSUS XR) 2 milliGRAM(s) Oral <User Schedule>  trimethoprim   80 mG/sulfamethoxazole 400 mG 1 Tablet(s) Oral daily    MEDICATIONS  (PRN):  aluminum hydroxide/magnesium hydroxide/simethicone Suspension 30 milliLiter(s) Oral every 4 hours PRN Dyspepsia  clonazePAM  Tablet 1 milliGRAM(s) Oral three times a day PRN anxiety  HYDROmorphone  Injectable 0.5 milliGRAM(s) IV Push at bedtime PRN Severe Pain (7 - 10)  ondansetron Injectable 4 milliGRAM(s) IV Push every 6 hours PRN Nausea and/or Vomiting  traMADol 25 milliGRAM(s) Oral every 6 hours PRN Moderate Pain (4 - 6)  traMADol 50 milliGRAM(s) Oral every 6 hours PRN Severe Pain (7 - 10)      PAST MEDICAL & SURGICAL HISTORY:  Hyperlipidemia      Arthropathy NOS - shoulder      Kidney stones      Polycystic kidney disease      GERD (gastroesophageal reflux disease)      Anxiety      CKD (chronic kidney disease) stage 4, GFR 15-29 ml/min  no dialysis      ESRD (end stage renal disease)      Ankle fracture - Left  brumstein barajas procedure 2000      Tubal ligation  1995      S/P cholecystectomy  (2013)      Kidney calculi  cysto/lithotripsy multiple      H/O tubal ligation  (1996)      History of ventral hernia repair      H/O shoulder surgery  left      AV fistula  right forearm extremity AV fistula radiocephalic ( 2018)      Renal transplant recipient      Status post nephrectomy  bilateral          Vital Signs Last 24 Hrs  T(C): 36.7 (25 Sep 2022 14:08), Max: 37.1 (24 Sep 2022 21:11)  T(F): 98 (25 Sep 2022 14:08), Max: 98.7 (24 Sep 2022 21:11)  HR: 82 (25 Sep 2022 14:08) (51 - 88)  BP: 132/77 (25 Sep 2022 14:08) (123/72 - 154/70)  BP(mean): --  RR: 20 (25 Sep 2022 15:21) (18 - 20)  SpO2: 92% (25 Sep 2022 15:21) (92% - 96%)    Parameters below as of 25 Sep 2022 15:21  Patient On (Oxygen Delivery Method): room air        I&O's Summary    24 Sep 2022 07:01  -  25 Sep 2022 07:00  --------------------------------------------------------  IN: 720 mL / OUT: 1700 mL / NET: -980 mL    25 Sep 2022 07:01  -  25 Sep 2022 16:35  --------------------------------------------------------  IN: 720 mL / OUT: 0 mL / NET: 720 mL                              13.2   12.70 )-----------( 250      ( 25 Sep 2022 06:44 )             40.0     09-25    138  |  99  |  31<H>  ----------------------------<  121<H>  4.1   |  23  |  1.42<H>    Ca    10.1      25 Sep 2022 06:44  Phos  4.1     09-25  Mg     2.0     09-25    TPro  7.6  /  Alb  4.2  /  TBili  0.4  /  DBili  x   /  AST  11  /  ALT  18  /  AlkPhos  58  09-25    Tacrolimus (), Serum: 5.5 ng/mL (09-25 @ 06:44)        Culture - Sputum (collected 09-22-22 @ 10:57)  Source: .Sputum Sputum cup  Gram Stain (09-22-22 @ 19:27):    No polymorphonuclear leukocytes per low power field    Few Squamous epithelial cells per low power field    Moderate Gram positive cocci in pairs, chains and clusters per oil power    field    Few Gram Negative Rods per oil power field  Final Report (09-24-22 @ 19:01):    Normal Respiratory Angélica present    Culture - Urine (collected 09-18-22 @ 18:18)  Source: Clean Catch Clean Catch (Midstream)  Final Report (09-19-22 @ 16:22):    <10,000 CFU/mL Normal Urogenital Angélica        Review of systems  Gen: No weight changes, fatigue, fevers/chills, weakness, see above.  Skin: No rashes  Head/Eyes/Ears/Mouth: +  headache; Normal hearing; Normal vision w/o blurriness; No sinus pain/discomfort, sore throat, see above  Respiratory: No dyspnea, cough, wheezing, hemoptysis  CV: No chest pain, PND, orthopnea  GI: denies diarrhea, constipation, nausea, vomiting, melena, hematochezia, see above  : No increased frequency, dysuria, hematuria, nocturia  MSK: No joint pain/swelling; no back pain; no edema  Neuro: No dizziness/lightheadedness, weakness, seizures, numbness, tingling  Heme: No easy bruising or bleeding  Endo: No heat/cold intolerance  Psych: No significant nervousness, anxiety, stress, depression  All other systems were reviewed and are negative, except as noted.      PHYSICAL EXAM:  Constitutional: Well developed / well nourished  Eyes: Anicteric, PERRLA  ENMT: nc/at  Respiratory: CTA B/L  Cardiovascular: RRR  Gastrointestinal: Soft, non distended, NT.  well healed incisional scar   Genitourinary: Voiding spontaneously  Extremities: SCD's in place and working bilaterally,  chronic edema ~1+ b/l LE, stable  Vascular: Palpable dp pulses bilaterally  Neurological: A&O x3  Skin: no rashes, ulcerations or lesions;  Musculoskeletal: Moving all extremities  Psychiatric: Responsive

## 2022-09-25 NOTE — CONSULT NOTE ADULT - ATTENDING COMMENTS
Pt seen w gyn resident.  Pt admitted w sepsis.  Multiple simple left ovarian cysts see on CT.  Pt has Mirena IUD in place.  Chart was reviewed.  She had sono in July due to irreg bleeding.  Left adnexa was not visualized at time of sono.  Pt denies pelvic pain.  Reports severe nausea/vomiting.  Ovarian cysts are likely incidental finding.  Pt can f/u as outpt for sono to better evaluate.  Plan d/w pt.  All questions answered.
HPI as per resident note, personally verified by me. Patient with fluctuating headache and L neck pain. Denies any focal neurologic deficits with this and no positional component. Headache currently improved at 1/10 in severity.    Neurologic exam as per resident note with additions as below:  Awake and somnolent, AO x3, speech fluent  CN's II-XII intact  Strength 5/5 all    Tacrolimus level 5.5    A/P:  Headache  L cervicaglia  Sepsis  ESRD s/p renal transplant with STEPHENIE (BUN/Cr inc 31/1.49, GFR dec 42)  DM type 2  HTN  B/l cavernous ICA aneurysm    - Etiology for headache unclear as could be migrainous versus tension with cervicogenic component. It has improved with current therapy and no focal neurologic deficits noted. She has stable b/l cavernous ICA aneurysm that would not be causing this. Given renal transplant status with STEPHENIE can also consider PRES but lower likelihood. Also can consider carotid dissection or soft tissue process  - Continue symptomatic headache control as you are doing  - If headache fails to improve can then consider MRI brain w/ and w/o and MRA neck with fat sat to look for above but can hold off for now as she is improving  - Continue to address above medical problems, as you are doing  - Will continue to follow patient peripherally with you, please call with additional questions or concerns
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 She has been hypoxemic requiring NC oxygen while hospitalized.  CT shows interlobular septal thickening in lung bases, bilateral small pleural effusions, ggo in apices and bilobar lower lobe atelectasis.  Clinical picture is consistent with CHF and atelectasis.  Likely has ANDRIY with history of snoring and hypersomnolence and crowded oropharynx on exam.  ABG does not show hypercapnea making OHS less likely.  Would continue to diurese, encourage out of bed, ambulation with oxygen, incentive spirometry.  ANDRIY workup as outpatient.    Agree with plan as outlined above.
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    UA without significant pyuria  No obvious urinary symptoms  ?Primary GI etiology of infection   Agree with empiric antibiotics pending culture data  Doubt need for meropenem; preceding urine isolates from 2021 were susceptible  Can deescalate back to Cefepime  Would await GI PCR and C diff toxin assay if continued diarrhea  Follow up on adenovirus PCR    I will be away over this upcoming weekend. Please contact the Infectious Diseases Office with any further questions or concerns.     Dannie Bhatia M.D.  [facility] Division of Infectious Disease  8AM-5PM Monday - Friday: Available on [employer] Teams  After Hours and Holidays (or if no response on [employer] Teams): Please contact the Infectious Diseases Office at [phone]     The above assessment and plan were discussed with Cristela, Transplant Surgery NP

## 2022-09-25 NOTE — PROGRESS NOTE ADULT - PROBLEM SELECTOR PLAN 2
Reviewed settings and hx with pt. Will c/w present insulin pump settings  -missed bolus this AM so elevated glucose at lunch today. Unable to evaluate pump change from 9/24  - Pump infusion site changed 9/22, next change 9/25 ;  has supplies  - dexcom sensor changed 9/18.  Basal rate: On control IQ  12a-6a 1.1  6a-12a 1  TDD 24.6    ISF 15    ICR  12a-6a 4.5  6a-10a 4  10a-12a 4.2    [employer]

## 2022-09-26 ENCOUNTER — TRANSCRIPTION ENCOUNTER (OUTPATIENT)
Age: 52
End: 2022-09-26

## 2022-09-26 VITALS
TEMPERATURE: 98 F | DIASTOLIC BLOOD PRESSURE: 68 MMHG | RESPIRATION RATE: 18 BRPM | HEART RATE: 72 BPM | SYSTOLIC BLOOD PRESSURE: 123 MMHG | OXYGEN SATURATION: 95 %

## 2022-09-26 DIAGNOSIS — R09.02 HYPOXEMIA: ICD-10-CM

## 2022-09-26 LAB
ALBUMIN SERPL ELPH-MCNC: 3.9 G/DL — SIGNIFICANT CHANGE UP (ref 3.3–5)
ALP SERPL-CCNC: 51 U/L — SIGNIFICANT CHANGE UP (ref 40–120)
ALT FLD-CCNC: 15 U/L — SIGNIFICANT CHANGE UP (ref 10–45)
ANION GAP SERPL CALC-SCNC: 13 MMOL/L — SIGNIFICANT CHANGE UP (ref 5–17)
AST SERPL-CCNC: 11 U/L — SIGNIFICANT CHANGE UP (ref 10–40)
BASOPHILS # BLD AUTO: 0.1 K/UL — SIGNIFICANT CHANGE UP (ref 0–0.2)
BASOPHILS NFR BLD AUTO: 0.7 % — SIGNIFICANT CHANGE UP (ref 0–2)
BILIRUB SERPL-MCNC: 0.5 MG/DL — SIGNIFICANT CHANGE UP (ref 0.2–1.2)
BUN SERPL-MCNC: 31 MG/DL — HIGH (ref 7–23)
CALCIUM SERPL-MCNC: 9.4 MG/DL — SIGNIFICANT CHANGE UP (ref 8.4–10.5)
CHLORIDE SERPL-SCNC: 100 MMOL/L — SIGNIFICANT CHANGE UP (ref 96–108)
CO2 SERPL-SCNC: 23 MMOL/L — SIGNIFICANT CHANGE UP (ref 22–31)
CREAT SERPL-MCNC: 1.49 MG/DL — HIGH (ref 0.5–1.3)
EGFR: 42 ML/MIN/1.73M2 — LOW
EOSINOPHIL # BLD AUTO: 0.72 K/UL — HIGH (ref 0–0.5)
EOSINOPHIL NFR BLD AUTO: 5.3 % — SIGNIFICANT CHANGE UP (ref 0–6)
GLUCOSE BLDC GLUCOMTR-MCNC: 151 MG/DL — HIGH (ref 70–99)
GLUCOSE SERPL-MCNC: 150 MG/DL — HIGH (ref 70–99)
HCT VFR BLD CALC: 38.1 % — SIGNIFICANT CHANGE UP (ref 34.5–45)
HGB BLD-MCNC: 12.7 G/DL — SIGNIFICANT CHANGE UP (ref 11.5–15.5)
IMM GRANULOCYTES NFR BLD AUTO: 0.7 % — SIGNIFICANT CHANGE UP (ref 0–0.9)
LYMPHOCYTES # BLD AUTO: 24.3 % — SIGNIFICANT CHANGE UP (ref 13–44)
LYMPHOCYTES # BLD AUTO: 3.3 K/UL — SIGNIFICANT CHANGE UP (ref 1–3.3)
MAGNESIUM SERPL-MCNC: 2.1 MG/DL — SIGNIFICANT CHANGE UP (ref 1.6–2.6)
MCHC RBC-ENTMCNC: 33.2 PG — SIGNIFICANT CHANGE UP (ref 27–34)
MCHC RBC-ENTMCNC: 33.3 GM/DL — SIGNIFICANT CHANGE UP (ref 32–36)
MCV RBC AUTO: 99.5 FL — SIGNIFICANT CHANGE UP (ref 80–100)
MONOCYTES # BLD AUTO: 0.94 K/UL — HIGH (ref 0–0.9)
MONOCYTES NFR BLD AUTO: 6.9 % — SIGNIFICANT CHANGE UP (ref 2–14)
NEUTROPHILS # BLD AUTO: 8.41 K/UL — HIGH (ref 1.8–7.4)
NEUTROPHILS NFR BLD AUTO: 62.1 % — SIGNIFICANT CHANGE UP (ref 43–77)
NRBC # BLD: 0 /100 WBCS — SIGNIFICANT CHANGE UP (ref 0–0)
PHOSPHATE SERPL-MCNC: 3.5 MG/DL — SIGNIFICANT CHANGE UP (ref 2.5–4.5)
PLATELET # BLD AUTO: 218 K/UL — SIGNIFICANT CHANGE UP (ref 150–400)
POTASSIUM SERPL-MCNC: 4.1 MMOL/L — SIGNIFICANT CHANGE UP (ref 3.5–5.3)
POTASSIUM SERPL-SCNC: 4.1 MMOL/L — SIGNIFICANT CHANGE UP (ref 3.5–5.3)
PROT SERPL-MCNC: 7 G/DL — SIGNIFICANT CHANGE UP (ref 6–8.3)
RBC # BLD: 3.83 M/UL — SIGNIFICANT CHANGE UP (ref 3.8–5.2)
RBC # FLD: 12.2 % — SIGNIFICANT CHANGE UP (ref 10.3–14.5)
SODIUM SERPL-SCNC: 136 MMOL/L — SIGNIFICANT CHANGE UP (ref 135–145)
TACROLIMUS SERPL-MCNC: 4 NG/ML — SIGNIFICANT CHANGE UP
WBC # BLD: 13.57 K/UL — HIGH (ref 3.8–10.5)
WBC # FLD AUTO: 13.57 K/UL — HIGH (ref 3.8–10.5)

## 2022-09-26 PROCEDURE — 71045 X-RAY EXAM CHEST 1 VIEW: CPT

## 2022-09-26 PROCEDURE — 87799 DETECT AGENT NOS DNA QUANT: CPT

## 2022-09-26 PROCEDURE — 0225U NFCT DS DNA&RNA 21 SARSCOV2: CPT

## 2022-09-26 PROCEDURE — 85014 HEMATOCRIT: CPT

## 2022-09-26 PROCEDURE — 85018 HEMOGLOBIN: CPT

## 2022-09-26 PROCEDURE — 82962 GLUCOSE BLOOD TEST: CPT

## 2022-09-26 PROCEDURE — 84100 ASSAY OF PHOSPHORUS: CPT

## 2022-09-26 PROCEDURE — 99232 SBSQ HOSP IP/OBS MODERATE 35: CPT

## 2022-09-26 PROCEDURE — 93005 ELECTROCARDIOGRAM TRACING: CPT

## 2022-09-26 PROCEDURE — 86403 PARTICLE AGGLUT ANTBDY SCRN: CPT

## 2022-09-26 PROCEDURE — 81001 URINALYSIS AUTO W/SCOPE: CPT

## 2022-09-26 PROCEDURE — 82553 CREATINE MB FRACTION: CPT

## 2022-09-26 PROCEDURE — 82435 ASSAY OF BLOOD CHLORIDE: CPT

## 2022-09-26 PROCEDURE — 80197 ASSAY OF TACROLIMUS: CPT

## 2022-09-26 PROCEDURE — 71275 CT ANGIOGRAPHY CHEST: CPT

## 2022-09-26 PROCEDURE — 93306 TTE W/DOPPLER COMPLETE: CPT

## 2022-09-26 PROCEDURE — 84132 ASSAY OF SERUM POTASSIUM: CPT

## 2022-09-26 PROCEDURE — 76776 US EXAM K TRANSPL W/DOPPLER: CPT

## 2022-09-26 PROCEDURE — 87305 ASPERGILLUS AG IA: CPT

## 2022-09-26 PROCEDURE — 83605 ASSAY OF LACTIC ACID: CPT

## 2022-09-26 PROCEDURE — 99223 1ST HOSP IP/OBS HIGH 75: CPT | Mod: GC

## 2022-09-26 PROCEDURE — 82150 ASSAY OF AMYLASE: CPT

## 2022-09-26 PROCEDURE — 82330 ASSAY OF CALCIUM: CPT

## 2022-09-26 PROCEDURE — 84145 PROCALCITONIN (PCT): CPT

## 2022-09-26 PROCEDURE — 86901 BLOOD TYPING SEROLOGIC RH(D): CPT

## 2022-09-26 PROCEDURE — 85025 COMPLETE CBC W/AUTO DIFF WBC: CPT

## 2022-09-26 PROCEDURE — 83036 HEMOGLOBIN GLYCOSYLATED A1C: CPT

## 2022-09-26 PROCEDURE — 87449 NOS EACH ORGANISM AG IA: CPT

## 2022-09-26 PROCEDURE — 99238 HOSP IP/OBS DSCHRG MGMT 30/<: CPT

## 2022-09-26 PROCEDURE — 36415 COLL VENOUS BLD VENIPUNCTURE: CPT

## 2022-09-26 PROCEDURE — 84484 ASSAY OF TROPONIN QUANT: CPT

## 2022-09-26 PROCEDURE — 87086 URINE CULTURE/COLONY COUNT: CPT

## 2022-09-26 PROCEDURE — 82550 ASSAY OF CK (CPK): CPT

## 2022-09-26 PROCEDURE — 87507 IADNA-DNA/RNA PROBE TQ 12-25: CPT

## 2022-09-26 PROCEDURE — 82947 ASSAY GLUCOSE BLOOD QUANT: CPT

## 2022-09-26 PROCEDURE — 80053 COMPREHEN METABOLIC PANEL: CPT

## 2022-09-26 PROCEDURE — 83735 ASSAY OF MAGNESIUM: CPT

## 2022-09-26 PROCEDURE — 70450 CT HEAD/BRAIN W/O DYE: CPT

## 2022-09-26 PROCEDURE — 86850 RBC ANTIBODY SCREEN: CPT

## 2022-09-26 PROCEDURE — U0005: CPT

## 2022-09-26 PROCEDURE — 83690 ASSAY OF LIPASE: CPT

## 2022-09-26 PROCEDURE — 86900 BLOOD TYPING SEROLOGIC ABO: CPT

## 2022-09-26 PROCEDURE — 84295 ASSAY OF SERUM SODIUM: CPT

## 2022-09-26 PROCEDURE — 99232 SBSQ HOSP IP/OBS MODERATE 35: CPT | Mod: GC

## 2022-09-26 PROCEDURE — 87040 BLOOD CULTURE FOR BACTERIA: CPT

## 2022-09-26 PROCEDURE — 94640 AIRWAY INHALATION TREATMENT: CPT

## 2022-09-26 PROCEDURE — U0003: CPT

## 2022-09-26 PROCEDURE — 83615 LACTATE (LD) (LDH) ENZYME: CPT

## 2022-09-26 PROCEDURE — 82803 BLOOD GASES ANY COMBINATION: CPT

## 2022-09-26 PROCEDURE — 87070 CULTURE OTHR SPECIMN AEROBIC: CPT

## 2022-09-26 RX ORDER — INSULIN LISPRO 100/ML
0 VIAL (ML) SUBCUTANEOUS
Qty: 0 | Refills: 0 | DISCHARGE

## 2022-09-26 RX ORDER — NIFEDIPINE 30 MG
1 TABLET, EXTENDED RELEASE 24 HR ORAL
Qty: 30 | Refills: 0
Start: 2022-09-26 | End: 2022-10-25

## 2022-09-26 RX ORDER — PANTOPRAZOLE SODIUM 20 MG/1
1 TABLET, DELAYED RELEASE ORAL
Qty: 30 | Refills: 0
Start: 2022-09-26 | End: 2022-10-25

## 2022-09-26 RX ORDER — SPIRONOLACTONE 25 MG/1
1 TABLET, FILM COATED ORAL
Qty: 0 | Refills: 0 | DISCHARGE

## 2022-09-26 RX ORDER — DULAGLUTIDE 4.5 MG/.5ML
1 INJECTION, SOLUTION SUBCUTANEOUS
Qty: 0 | Refills: 0 | DISCHARGE

## 2022-09-26 RX ORDER — FLUTICASONE PROPIONATE 50 MCG
1 SPRAY, SUSPENSION NASAL
Qty: 1 | Refills: 0
Start: 2022-09-26 | End: 2022-10-25

## 2022-09-26 RX ORDER — LOSARTAN POTASSIUM 100 MG/1
1 TABLET, FILM COATED ORAL
Qty: 0 | Refills: 0 | DISCHARGE

## 2022-09-26 RX ORDER — FUROSEMIDE 40 MG
1 TABLET ORAL
Qty: 30 | Refills: 0
Start: 2022-09-26 | End: 2022-10-25

## 2022-09-26 RX ORDER — TACROLIMUS 5 MG/1
2 CAPSULE ORAL
Qty: 0 | Refills: 0 | DISCHARGE
Start: 2022-09-26

## 2022-09-26 RX ORDER — CARVEDILOL PHOSPHATE 80 MG/1
1 CAPSULE, EXTENDED RELEASE ORAL
Qty: 0 | Refills: 0 | DISCHARGE

## 2022-09-26 RX ORDER — TACROLIMUS 5 MG/1
1 CAPSULE ORAL
Qty: 0 | Refills: 0 | DISCHARGE

## 2022-09-26 RX ADMIN — PIPERACILLIN AND TAZOBACTAM 25 GRAM(S): 4; .5 INJECTION, POWDER, LYOPHILIZED, FOR SOLUTION INTRAVENOUS at 05:24

## 2022-09-26 RX ADMIN — TRAMADOL HYDROCHLORIDE 25 MILLIGRAM(S): 50 TABLET ORAL at 00:05

## 2022-09-26 RX ADMIN — Medication 1 SPRAY(S): at 17:14

## 2022-09-26 RX ADMIN — HEPARIN SODIUM 5000 UNIT(S): 5000 INJECTION INTRAVENOUS; SUBCUTANEOUS at 05:28

## 2022-09-26 RX ADMIN — Medication 1 TABLET(S): at 13:00

## 2022-09-26 RX ADMIN — MAGNESIUM OXIDE 400 MG ORAL TABLET 400 MILLIGRAM(S): 241.3 TABLET ORAL at 13:00

## 2022-09-26 RX ADMIN — Medication 1 MILLIGRAM(S): at 18:12

## 2022-09-26 RX ADMIN — Medication 60 MILLIGRAM(S): at 05:28

## 2022-09-26 RX ADMIN — Medication 5 MILLIGRAM(S): at 05:28

## 2022-09-26 RX ADMIN — Medication 40 MILLIGRAM(S): at 05:28

## 2022-09-26 RX ADMIN — TRAMADOL HYDROCHLORIDE 50 MILLIGRAM(S): 50 TABLET ORAL at 13:45

## 2022-09-26 RX ADMIN — PANTOPRAZOLE SODIUM 40 MILLIGRAM(S): 20 TABLET, DELAYED RELEASE ORAL at 05:28

## 2022-09-26 RX ADMIN — TRAMADOL HYDROCHLORIDE 50 MILLIGRAM(S): 50 TABLET ORAL at 13:03

## 2022-09-26 RX ADMIN — Medication 1 SPRAY(S): at 05:27

## 2022-09-26 RX ADMIN — HEPARIN SODIUM 5000 UNIT(S): 5000 INJECTION INTRAVENOUS; SUBCUTANEOUS at 17:14

## 2022-09-26 RX ADMIN — AZATHIOPRINE 100 MILLIGRAM(S): 100 TABLET ORAL at 12:59

## 2022-09-26 RX ADMIN — Medication 10 MILLIGRAM(S): at 13:00

## 2022-09-26 RX ADMIN — MAGNESIUM OXIDE 400 MG ORAL TABLET 400 MILLIGRAM(S): 241.3 TABLET ORAL at 08:55

## 2022-09-26 RX ADMIN — MAGNESIUM OXIDE 400 MG ORAL TABLET 400 MILLIGRAM(S): 241.3 TABLET ORAL at 17:14

## 2022-09-26 RX ADMIN — PIPERACILLIN AND TAZOBACTAM 25 GRAM(S): 4; .5 INJECTION, POWDER, LYOPHILIZED, FOR SOLUTION INTRAVENOUS at 12:59

## 2022-09-26 RX ADMIN — Medication 1 MILLIGRAM(S): at 09:09

## 2022-09-26 RX ADMIN — TACROLIMUS 2 MILLIGRAM(S): 5 CAPSULE ORAL at 08:55

## 2022-09-26 NOTE — PROGRESS NOTE ADULT - PROVIDER SPECIALTY LIST ADULT
Transplant Surgery
Transplant Surgery
Endocrinology
Endocrinology
Pulmonology
Transplant Nephrology
Transplant Surgery
Endocrinology
Endocrinology
Infectious Disease
Pulmonology
Transplant ID
Transplant Surgery
Transplant Nephrology
Transplant Surgery
Transplant Surgery
Transplant Nephrology
Endocrinology
Transplant Nephrology
Endocrinology
Endocrinology
Transplant Nephrology
Endocrinology
Endocrinology
Infectious Disease
Endocrinology

## 2022-09-26 NOTE — PROGRESS NOTE ADULT - SUBJECTIVE AND OBJECTIVE BOX
Follow Up:      Interval History:    REVIEW OF SYSTEMS  [  ] ROS unobtainable because:    [  ] All other systems negative except as noted below    Constitutional:  [ ] fever [ ] chills  [ ] weight loss  [ ] weakness  Skin:  [ ] rash [ ] phlebitis	  Eyes: [ ] icterus [ ] pain  [ ] discharge	  ENMT: [ ] sore throat  [ ] thrush [ ] ulcers [ ] exudates  Respiratory: [ ] dyspnea [ ] hemoptysis [ ] cough [ ] sputum	  Cardiovascular:  [ ] chest pain [ ] palpitations [ ] edema	  Gastrointestinal:  [ ] nausea [ ] vomiting [ ] diarrhea [ ] constipation [ ] pain	  Genitourinary:  [ ] dysuria [ ] frequency [ ] hematuria [ ] discharge [ ] flank pain  [ ] incontinence  Musculoskeletal:  [ ] myalgias [ ] arthralgias [ ] arthritis  [ ] back pain  Neurological:  [ ] headache [ ] seizures  [ ] confusion/altered mental status    Allergies  Demerol HCl (Hives)  Phenergan (Hives; Rash)        ANTIMICROBIALS:  piperacillin/tazobactam IVPB.. 3.375 every 8 hours  trimethoprim   80 mG/sulfamethoxazole 400 mG 1 daily      OTHER MEDS:  MEDICATIONS  (STANDING):  aluminum hydroxide/magnesium hydroxide/simethicone Suspension 30 every 4 hours PRN  atorvastatin 10 at bedtime  azaTHIOprine 100 daily  clonazePAM  Tablet 1 three times a day PRN  furosemide    Tablet 40 daily  heparin   Injectable 5000 every 12 hours  influenza   Vaccine 0.5 once  insulin lispro (HumaLOG) Pump 1 Continuous Pump  NIFEdipine XL 60 daily  ondansetron Injectable 4 every 6 hours PRN  pantoprazole    Tablet 40 before breakfast  PARoxetine 10 daily  predniSONE   Tablet 5 daily  senna 2 at bedtime  tacrolimus ER Tablet (ENVARSUS XR) 2 <User Schedule>  traMADol 25 every 6 hours PRN  traMADol 50 every 6 hours PRN      Vital Signs Last 24 Hrs  T(C): 36.6 (26 Sep 2022 12:00), Max: 37 (26 Sep 2022 05:00)  T(F): 97.9 (26 Sep 2022 12:00), Max: 98.6 (26 Sep 2022 05:00)  HR: 71 (26 Sep 2022 12:00) (58 - 80)  BP: 142/72 (26 Sep 2022 12:00) (120/77 - 142/72)  BP(mean): --  RR: 18 (26 Sep 2022 12:00) (18 - 18)  SpO2: 94% (26 Sep 2022 16:30) (94% - 96%)    Parameters below as of 26 Sep 2022 16:30  Patient On (Oxygen Delivery Method): room air        PHYSICAL EXAMINATION:  General: Alert and Awake, NAD  HEENT: PERRL, EOMI  Neck: Supple  Cardiac: RRR, No M/R/G  Resp: CTAB, No Wh/Rh/Ra  Abdomen: NBS, NT/ND, No HSM, No rigidity or guarding  MSK: No LE edema. No Calf tenderness  : No pinon  Skin: No rashes or lesions. Skin is warm and dry to the touch.   Neuro: Alert and Awake. CN 2-12 Grossly intact. Moves all four extremities spontaneously.  Psych: Calm, Pleasant, Cooperative                          12.7   13.57 )-----------( 218      ( 26 Sep 2022 06:17 )             38.1       09-26    136  |  100  |  31<H>  ----------------------------<  150<H>  4.1   |  23  |  1.49<H>    Ca    9.4      26 Sep 2022 06:18  Phos  3.5     09-26  Mg     2.1     09-26    TPro  7.0  /  Alb  3.9  /  TBili  0.5  /  DBili  x   /  AST  11  /  ALT  15  /  AlkPhos  51  09-26          MICROBIOLOGY:  v  .Sputum Sputum cup  09-22-22   Normal Respiratory Angélica present  --    No polymorphonuclear leukocytes per low power field  Few Squamous epithelial cells per low power field  Moderate Gram positive cocci in pairs, chains and clusters per oil power  field  Few Gram Negative Rods per oil power field      Clean Catch Clean Catch (Midstream)  09-18-22   <10,000 CFU/mL Normal Urogenital Angélica  --  --      .Blood Blood  09-15-22   No Growth Final  --  --      .Blood Blood  09-15-22   No Growth Final  --  --      Clean Catch Clean Catch (Midstream)  09-14-22   <10,000 CFU/mL Normal Urogenital Angélica  --  --      .Blood Blood-Peripheral  09-14-22   No Growth Final  --  --      .Blood Blood-Peripheral  09-14-22   No Growth Final  --  --                RADIOLOGY:    <The imaging below has been reviewed and visualized by me independently. Findings as detailed in report below> Follow Up:  PNA    Interval History: afebrile. still with some dyspnea with exertion.     REVIEW OF SYSTEMS  [  ] ROS unobtainable because:    [x  ] All other systems negative except as noted below    Constitutional:  [ ] fever [ ] chills  [ ] weight loss  [ ] weakness  Skin:  [ ] rash [ ] phlebitis	  Eyes: [ ] icterus [ ] pain  [ ] discharge	  ENMT: [ ] sore throat  [ ] thrush [ ] ulcers [ ] exudates  Respiratory: [ ] dyspnea [ ] hemoptysis [ ] cough [ ] sputum	  Cardiovascular:  [ ] chest pain [ ] palpitations [ ] edema	  Gastrointestinal:  [ ] nausea [ ] vomiting [ ] diarrhea [ ] constipation [ ] pain	  Genitourinary:  [ ] dysuria [ ] frequency [ ] hematuria [ ] discharge [ ] flank pain  [ ] incontinence  Musculoskeletal:  [ ] myalgias [ ] arthralgias [ ] arthritis  [ ] back pain  Neurological:  [ ] headache [ ] seizures  [ ] confusion/altered mental status    Allergies  Demerol HCl (Hives)  Phenergan (Hives; Rash)        ANTIMICROBIALS:  piperacillin/tazobactam IVPB.. 3.375 every 8 hours  trimethoprim   80 mG/sulfamethoxazole 400 mG 1 daily      OTHER MEDS:  MEDICATIONS  (STANDING):  aluminum hydroxide/magnesium hydroxide/simethicone Suspension 30 every 4 hours PRN  atorvastatin 10 at bedtime  azaTHIOprine 100 daily  clonazePAM  Tablet 1 three times a day PRN  furosemide    Tablet 40 daily  heparin   Injectable 5000 every 12 hours  influenza   Vaccine 0.5 once  insulin lispro (HumaLOG) Pump 1 Continuous Pump  NIFEdipine XL 60 daily  ondansetron Injectable 4 every 6 hours PRN  pantoprazole    Tablet 40 before breakfast  PARoxetine 10 daily  predniSONE   Tablet 5 daily  senna 2 at bedtime  tacrolimus ER Tablet (ENVARSUS XR) 2 <User Schedule>  traMADol 25 every 6 hours PRN  traMADol 50 every 6 hours PRN      Vital Signs Last 24 Hrs  T(C): 36.6 (26 Sep 2022 12:00), Max: 37 (26 Sep 2022 05:00)  T(F): 97.9 (26 Sep 2022 12:00), Max: 98.6 (26 Sep 2022 05:00)  HR: 71 (26 Sep 2022 12:00) (58 - 80)  BP: 142/72 (26 Sep 2022 12:00) (120/77 - 142/72)  BP(mean): --  RR: 18 (26 Sep 2022 12:00) (18 - 18)  SpO2: 94% (26 Sep 2022 16:30) (94% - 96%)    Parameters below as of 26 Sep 2022 16:30  Patient On (Oxygen Delivery Method): room air        PHYSICAL EXAMINATION:  General: Alert and Awake, NAD  HEENT: PERRL, EOMI  Neck: Supple  Cardiac: RRR, No M/R/G  Resp: CTAB, No Wh/Rh/Ra  Abdomen: Mild diffuse abdominal tenderness to palpation. NBS.   MSK: No LE edema. No Calf tenderness  : No pinon  Skin: No rashes or lesions. Skin is warm and dry to the touch.   Neuro: Alert and Awake. CN 2-12 Grossly intact. Moves all four extremities spontaneously.  Psych: Calm, Pleasant, Cooperative                          12.7   13.57 )-----------( 218      ( 26 Sep 2022 06:17 )             38.1       09-26    136  |  100  |  31<H>  ----------------------------<  150<H>  4.1   |  23  |  1.49<H>    Ca    9.4      26 Sep 2022 06:18  Phos  3.5     09-26  Mg     2.1     09-26    TPro  7.0  /  Alb  3.9  /  TBili  0.5  /  DBili  x   /  AST  11  /  ALT  15  /  AlkPhos  51  09-26          MICROBIOLOGY:  v  .Sputum Sputum cup  09-22-22   Normal Respiratory Angélica present  --    No polymorphonuclear leukocytes per low power field  Few Squamous epithelial cells per low power field  Moderate Gram positive cocci in pairs, chains and clusters per oil power  field  Few Gram Negative Rods per oil power field      Clean Catch Clean Catch (Midstream)  09-18-22   <10,000 CFU/mL Normal Urogenital Angélica  --  --      .Blood Blood  09-15-22   No Growth Final  --  --      .Blood Blood  09-15-22   No Growth Final  --  --      Clean Catch Clean Catch (Midstream)  09-14-22   <10,000 CFU/mL Normal Urogenital Angélica  --  --      .Blood Blood-Peripheral  09-14-22   No Growth Final  --  --      .Blood Blood-Peripheral  09-14-22   No Growth Final  --  --                RADIOLOGY:    <The imaging below has been reviewed and visualized by me independently. Findings as detailed in report below>    < from: Xray Chest 1 View- PORTABLE-Urgent (Xray Chest 1 View- PORTABLE-Urgent .) (09.21.22 @ 13:17) >  Impression:  Bibasilar hazyopacities which may represent atelectasis or small   effusions.    < end of copied text >

## 2022-09-26 NOTE — PROGRESS NOTE ADULT - ASSESSMENT
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 DM controlled (A1C 6.2%). Also h/o HTN, HLD, GERD. Here with sepsis due to UTI. On insulin pump, BG values above goal last few days due to decreased effect of GLP-1 and improved PO intake. On control IQ setting on pump-to restart Trulicity upon discharge home. Tolerating POs. BG goal (100-180mg/dl). Reviewed pump/CGM data w/pt at bedside.

## 2022-09-26 NOTE — PROGRESS NOTE ADULT - PROBLEM SELECTOR PLAN 3
BP goal 130/80  - Manage per primary team.
Patient with intermittent episodes of hypoxia with saturation as low as in the 80's. Currently on Lasix 40mg PO daily with good UOP. Pulmonology on board with plans for outpatient follow up for evaluation of ANDRIY/OHS. Currently on Zosyn for presumed PNA, will transition to PO Augmentin on discharge.     If any questions, please feel free to contact me     Romaine Ascencio  Nephrology Fellow  [facility] Pager: [phone]
- BP goal 130/80  - Manage per primary team
Pt. with UA showing some small esterase and blood. Infectious work up has been negative, will stop ABx at this time.       If any questions, please feel free to contact me     Romaine Ascencio  Nephrology Fellow  [facility] Pager: [phone].
- BP goal 130/80  - Manage per primary team
BP goal 130/80  - Manage per primary team.
BP goal 130/80  - Manage per primary team.
- BP goal 130/80  - Manage per primary team
BP goal 130/80  - Manage per primary team.

## 2022-09-26 NOTE — PROGRESS NOTE ADULT - PROBLEM SELECTOR PROBLEM 3
UTI (urinary tract infection)
Hypertension
UTI (urinary tract infection)
Hypertension
UTI (urinary tract infection)
Hypertension
Hypertension
Hypoxia
Hypertension
Hypertension

## 2022-09-26 NOTE — DISCHARGE NOTE PROVIDER - CARE PROVIDER_API CALL
Miko Mcdonough  NEPHROLOGY  [address] [place], [place] [address]  Phone: [phone]  Fax: [phone]  Follow Up Time:

## 2022-09-26 NOTE — PROGRESS NOTE ADULT - SUBJECTIVE AND OBJECTIVE BOX
Transplant Surgery - Multidisciplinary Progress Note  --------------------------------------------------------------  LDRT    4/2/2018    Present:   Patient seen and examined with multidisciplinary team including Transplant Surgeon: Dr. Willis. Transplant Nephrologist: [facility], [facility] and [facility] RN during am rounds.  Disciplines not in attendance will be notified of the plan.     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 In [facility] ED Tmax 102.2F and leukocytosis. Lactate negative. Cultures sent. Given broad spectrum empiric IV ABX and IVF. Pt with difficulty tolerating PO intake due to N/V. Currently on insulin pump for DM.  Pt transferred to [facility] for further workup of sepsis.     Interval events:  - Off O2. Ambulating in hallway. Sats 94% while ambulating  - On Zosyn for 5 days by Pulm for possible PNA  - SCr 1.49  UOP 4.9    On Lasix 40mg po qd     Immunosuppression                               Maintenance:   Env per level,  Imuran, Pred 5  Ongoing monitoring for signs of rejection.    Potential Discharge date: today    Education:  Medications    Plan of care:  See Below      MEDICATIONS  (STANDING):  atorvastatin 10 milliGRAM(s) Oral at bedtime  azaTHIOprine 100 milliGRAM(s) Oral daily  fluticasone propionate 50 MICROgram(s)/spray Nasal Spray 1 Spray(s) Both Nostrils two times a day  furosemide    Tablet 40 milliGRAM(s) Oral daily  heparin   Injectable 5000 Unit(s) SubCutaneous every 12 hours  influenza   Vaccine 0.5 milliLiter(s) IntraMuscular once  insulin lispro (HumaLOG) Pump 1 Each SubCutaneous Continuous Pump  magnesium oxide 400 milliGRAM(s) Oral three times a day with meals  NIFEdipine XL 60 milliGRAM(s) Oral daily  pantoprazole    Tablet 40 milliGRAM(s) Oral before breakfast  PARoxetine 10 milliGRAM(s) Oral daily  piperacillin/tazobactam IVPB.. 3.375 Gram(s) IV Intermittent every 8 hours  predniSONE   Tablet 5 milliGRAM(s) Oral daily  senna 2 Tablet(s) Oral at bedtime  tacrolimus ER Tablet (ENVARSUS XR) 2 milliGRAM(s) Oral <User Schedule>  trimethoprim   80 mG/sulfamethoxazole 400 mG 1 Tablet(s) Oral daily    MEDICATIONS  (PRN):  aluminum hydroxide/magnesium hydroxide/simethicone Suspension 30 milliLiter(s) Oral every 4 hours PRN Dyspepsia  clonazePAM  Tablet 1 milliGRAM(s) Oral three times a day PRN anxiety  ondansetron Injectable 4 milliGRAM(s) IV Push every 6 hours PRN Nausea and/or Vomiting  traMADol 25 milliGRAM(s) Oral every 6 hours PRN Moderate Pain (4 - 6)  traMADol 50 milliGRAM(s) Oral every 6 hours PRN Severe Pain (7 - 10)      PAST MEDICAL & SURGICAL HISTORY:  Hyperlipidemia      Arthropathy NOS - shoulder      Kidney stones      Polycystic kidney disease      GERD (gastroesophageal reflux disease)      Anxiety      CKD (chronic kidney disease) stage 4, GFR 15-29 ml/min  no dialysis      ESRD (end stage renal disease)      [facility] [place] [facility]      Tubal ligation  1995      S/P cholecystectomy  (2013)      Kidney calculi  cysto/lithotripsy multiple      H/O tubal ligation  (1996)      History of ventral hernia repair      H/O shoulder surgery  left      AV fistula  right forearm extremity AV fistula radiocephalic ( 2018)      Renal transplant recipient      Status post nephrectomy  bilateral          Vital Signs Last 24 Hrs  T(C): 36.8 (26 Sep 2022 17:25), Max: 37 (26 Sep 2022 05:00)  T(F): 98.3 (26 Sep 2022 17:25), Max: 98.6 (26 Sep 2022 05:00)  HR: 72 (26 Sep 2022 17:25) (58 - 80)  BP: 123/68 (26 Sep 2022 17:25) (120/77 - 142/72)  BP(mean): --  RR: 18 (26 Sep 2022 17:25) (18 - 18)  SpO2: 95% (26 Sep 2022 17:25) (94% - 96%)    Parameters below as of 26 Sep 2022 17:25  Patient On (Oxygen Delivery Method): room air        I&O's Summary    25 Sep 2022 07:01  -  26 Sep 2022 07:00  --------------------------------------------------------  IN: 1680 mL / OUT: 4900 mL / NET: -3220 mL                              12.7   13.57 )-----------( 218      ( 26 Sep 2022 06:17 )             38.1     09-26    136  |  100  |  31<H>  ----------------------------<  150<H>  4.1   |  23  |  1.49<H>    Ca    9.4      26 Sep 2022 06:18  Phos  3.5     09-26  Mg     2.1     09-26    TPro  7.0  /  Alb  3.9  /  TBili  0.5  /  DBili  x   /  AST  11  /  ALT  15  /  AlkPhos  51  09-26    Tacrolimus (), Serum: 4.0 ng/mL (09-26 @ 06:20)        Culture - Sputum (collected 09-22-22 @ 10:57)  Source: .Sputum Sputum cup  Gram Stain (09-22-22 @ 19:27):    No polymorphonuclear leukocytes per low power field    Few Squamous epithelial cells per low power field    Moderate Gram positive cocci in pairs, chains and clusters per oil power    field    Few Gram Negative Rods per oil power field  Final Report (09-24-22 @ 19:01):    Normal Respiratory Angélica present          Review of systems  Gen: No weight changes, fatigue, fevers/chills, weakness, see above.  Skin: No rashes  Head/Eyes/Ears/Mouth: +  headache; Normal hearing; Normal vision w/o blurriness; No sinus pain/discomfort, sore throat, see above  Respiratory: No dyspnea, cough, wheezing, hemoptysis  CV: No chest pain, PND, orthopnea  GI: denies diarrhea, constipation, nausea, vomiting, melena, hematochezia, see above  : No increased frequency, dysuria, hematuria, nocturia  MSK: No joint pain/swelling; no back pain; no edema  Neuro: No dizziness/lightheadedness, weakness, seizures, numbness, tingling  Heme: No easy bruising or bleeding  Endo: No heat/cold intolerance  Psych: No significant nervousness, anxiety, stress, depression  All other systems were reviewed and are negative, except as noted.      PHYSICAL EXAM:  Constitutional: Well developed / well nourished  Eyes: Anicteric, PERRLA  ENMT: nc/at  Respiratory: CTA B/L  Cardiovascular: RRR  Gastrointestinal: Soft, non distended, NT.  well healed incisional scar   Genitourinary: Voiding spontaneously  Extremities: SCD's in place and working bilaterally,  chronic edema ~1+ b/l LE, stable  Vascular: Palpable dp pulses bilaterally  Neurological: A&O x3  Skin: no rashes, ulcerations or lesions;  Musculoskeletal: Moving all extremities  Psychiatric: Responsive

## 2022-09-26 NOTE — PROGRESS NOTE ADULT - PROBLEM SELECTOR PLAN 2
Home immunosuppressive therapy with Envarsus 1mg daily, Imuran 100mg daily, and prednisone 5mg daily.   Continue with Envarsus 2mg and prednisone. Imuran restarted on 9/25. Monitor tacrolimus levels 30 minutes prior to AM dose.

## 2022-09-26 NOTE — PROGRESS NOTE ADULT - ASSESSMENT
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 Continue zosyn X5 days for possible PNA. Will change to Augmentin to complete last day of antibiotics  -cleared by GYN for adnexal cysts/IUD sources for infection  -seen by ENT for ear pain, now resolved, continue Flonase  -spirometry, wean off NC, Pulmonary consulted  -continue Lasix to 40QD  -PT/OT  -Continue HSQ  -SCDs  -analgesia prn     Immunosuppression:  -Prednisone 5  -Envarsus per level  -Imuran 100mg qd    DM:  -On insulin pump  -Endocrine on board pump management  -Was at home on (Trulicity)  -Diabetic diet    HTN, HLD  -Monitor BP, continue Nifedipine. Coreg discontinued--bradycardia  -continue statin    MDD, Anxiety  -Paroxetine 10mg PO q24  -Klonopin 1mg PO PRN    GERD  -Protonix, Maalox prn    DISPO: DC to home today

## 2022-09-26 NOTE — PROGRESS NOTE ADULT - NSPROGADDITIONALINFOA_GEN_ALL_CORE
-Plan discussed with pt/team. F/u  R neck redness  Contact info: [phone] (24/7). pager [phone]  [email or web address] password [email or web address]  Spent over 35  minutes providing face to face education which was more than 50% of encounter that also included assessing pump/carb counting/ pt/labs/meds and discussing plan of care with primary team.  Adjusting insulin  Discharge plan  Follow up care
28 minutes spent on the development of plan of care/coordination of care/glycemic control through review of labs, blood glucose values and vital signs.
-Plan discussed with pt/team. F/u  R neck redness  Contact info: [phone] (24/7). pager [phone]  [email or web address] password [email or web address]  Spent over 35  minutes providing face to face education which was more than 50% of encounter that also included assessing pt/labs/meds and discussing plan of care with primary team/pt.  Insulin pump  Adjusting insulin as needed  Discharge plan  Follow up care
35 minutes spent on the development of plan of care/coordination of care/glycemic control/pump settings/history through review of labs, blood glucose values and vital signs.
-Plan discussed with pt/team. F/u  R neck redness  Contact info: [phone] (24/7). pager [phone]  [email or web address] password [email or web address]  Spent 30 minutes providing face to face education as well as assessing  pt/labs/meds and discussing plan with primary team  Adjusting insulin  Discharge plan  Follow up care
30 minutes spent on the development of plan of care/coordination of care/glycemic control/pump review through review of labs, blood glucose values and vital signs.
Please call the ID service [phone] with questions or concerns over the weekend.

## 2022-09-26 NOTE — DISCHARGE NOTE PROVIDER - HOSPITAL COURSE
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 In [facility] ED Tmax 102.2F and leukocytosis. Lactate negative. Cultures sent. Given broad spectrum empiric IV ABX and IVF.  Transferred to [facility] for further workup of sepsis.      - Infectious diease consulted.  Cultures and GI PCR negative.  Completed course of empiric cefepime.   - CTA done with concern for PE with increasing O2 requirements. Negative to for PE  - TTE with trace pericardial effusion, EF 75%, otherwise normal  - Diuresed for pulmonary edema  - Pulmonology consulted. Recommended and started on Zosyn x 5 days for possible superimposed PNA.  Recommended outpatient ANDRIY work-up.  - Symptoms improved. O2 was weaned to off. Saturations 94% while ambulating.   - C/O Headaches. Neuro consulted and felt to be secondary to migrainous versus tension.  CT head negative. No other deficits  - Creatinine 1.42 on day of discharge      She was evaluated by the multi-disciplinary team including surgeon, nephrologist, ACP, pharmacist, nutrition, social work, and nursing and deemed stable for discharge with the following plan:     MEDS:   ENV 2mg qd, Imuran 100mg qd, Pred 5  PPX: Bactrim  Augmentin 875mg po x 1 dose 9/27    PLAN:  FU with Dr. TAHIRA Mcdonough in 1 week.  FU with Pulmonology   Provider: Dr. Curiel Pulmonology  Date: 10/6/2022  Time: 2pm   Location: [address] [place] [address], [address].   Phone Number [phone].    Provider: Dr. Lewis - Sleep Medicine  Date: 11/30/2022  Time: 1:30pm  Location: same as above

## 2022-09-26 NOTE — DISCHARGE NOTE PROVIDER - NSDCCPCAREPLAN_GEN_ALL_CORE_FT
PRINCIPAL DISCHARGE DIAGNOSIS  Diagnosis: Pneumonia  Assessment and Plan of Treatment: Cointinue to take your antibiotic as prescribed.  follow-up with Pulmonology as outpatient for further care  Provider: Dr. Curiel Pulmology  Date: 10/6/2022  Time: 2pm   Location: [address] [place] [address], [address].   Phone Number [phone].  Provider: Dr. Lewis - Sleep Medicine  Date: 11/30/2022  Time: 1:30pm  Location: same as above        SECONDARY DISCHARGE DIAGNOSES  Diagnosis: Type 2 diabetes mellitus  Assessment and Plan of Treatment: If you have diabetes, you may need to monitor your blood sugar more frequently after transplant. Uncongrolled blood sugar levels can lead to poor wound healing and other complications. Follow a low carb and low sugar diet. Continue to take all anti-diabetic medications/insulin as prescribed. Follow up with you    Diagnosis: Hypertension  Assessment and Plan of Treatment: Be sure to follow a low salt diet, avoid caffeine, reduce alcohol intake.  If you have been prescribed antihypertensive medications to control your blood pressure, be sure to take them every day as prescribed and do not miss any doses, the medications do not work if they are not taken consistently. Follow up with your Primary Care Doctor and have your Blood Pressure checked regularly.       Diagnosis: Renal transplant recipient  Assessment and Plan of Treatment: Renal txp recipient:   - Avoid heavy lifting aything over 5lbs for six weeks following your surgery date. Do NOT drive a car or operate machinery unless cleared by your transplant surgeon during your follow up visit. Avoid straining, use stool softners as needed. Stop stool softners if diarrhea develops.   - Call transplant clinic if you develop fever, increased abdominal pain, redness/swelling or bleeding around your incision site  - Call transplant clinic if you have difficulty urinating, notice decrease in urine output or blood in urine.   - Follow FOOD SAFETY instructions provided to you in your patient education guide booklet   - Bathing: Shower is allowed, you can let soap and water flow over your incision; do NOT rub the area. Bath is NOT allowed until your incision is healed and you have been cleared by your transplant surgeon.       Diagnosis: Immunosuppressive management encounter following kidney transplant  Assessment and Plan of Treatment: - Transplant recipients are at risk for developing infection because immune system is lowered due to transplant medications.   - Avoid contact with sick people; practice good hand hygiene;   - Take medications as directed by your translant team. Never stop taking medications unless instructed by your transplant physician.  - Do NOT double up medication dose if you missed your dose; call the transplant office for instructions.

## 2022-09-26 NOTE — DISCHARGE NOTE PROVIDER - NSDCMRMEDTOKEN_GEN_ALL_CORE_FT
Admelog 100 units/mL injectable solution: continue insulin pump as directed  amoxicillin-clavulanate 875 mg-125 mg oral tablet: 1 tab(s) orally once a day   fluticasone 50 mcg/inh nasal spray: 1 spray(s) nasal 2 times a day  Imuran 50 mg oral tablet: 2 tab(s) orally once a day  KlonoPIN 1 mg oral tablet: 1 tab(s) orally once a day, As Needed  Lasix 40 mg oral tablet: 1 tab(s) orally once a day   NIFEdipine 60 mg oral tablet, extended release: 1 tab(s) orally once a day  pantoprazole 40 mg oral delayed release tablet: 1 tab(s) orally once a day (before a meal)  PARoxetine 10 mg oral tablet: 1 tab(s) orally once a day  Pepcid 20 mg oral tablet: 1 tab(s) orally once a day  pravastatin 20 mg oral tablet: 1 tab(s) orally once a day  predniSONE 5 mg oral tablet: 1 tab(s) orally once a day  sulfamethoxazole-trimethoprim 400 mg-80 mg oral tablet: 1 tab(s) orally once a day  tacrolimus 1 mg oral tablet, extended release: 2 milligram(s) orally once a day  at 8AM   Admelog 100 units/mL injectable solution: continue insulin pump as directed  amoxicillin-clavulanate 875 mg-125 mg oral tablet: 1 tab(s) orally once a day   fluticasone 50 mcg/inh nasal spray: 1 spray(s) nasal 2 times a day  Imuran 50 mg oral tablet: 2 tab(s) orally once a day  KlonoPIN 1 mg oral tablet: 1 tab(s) orally once a day, As Needed  Lasix 40 mg oral tablet: 1 tab(s) orally once a day   NIFEdipine 60 mg oral tablet, extended release: 1 tab(s) orally once a day  PARoxetine 10 mg oral tablet: 1 tab(s) orally once a day  Pepcid 20 mg oral tablet: 1 tab(s) orally once a day  pravastatin 20 mg oral tablet: 1 tab(s) orally once a day  predniSONE 5 mg oral tablet: 1 tab(s) orally once a day  sulfamethoxazole-trimethoprim 400 mg-80 mg oral tablet: 1 tab(s) orally once a day  tacrolimus 1 mg oral tablet, extended release: 2 milligram(s) orally once a day  at 8AM

## 2022-09-26 NOTE — DISCHARGE NOTE PROVIDER - NSDCFUSCHEDAPPT_GEN_ALL_CORE_FT
Kevon Walter  [facility]  DERM [address]  Scheduled Appointment: 09/29/2022    Ivette Gurrola  [facility]  FAMILYMED [address]  Scheduled Appointment: 09/30/2022    Dallin Berman  [facility]  ENDOCRIN [address]  Scheduled Appointment: 10/03/2022    Miko Mcdonough  [facility]  NEPHRO [address]  Scheduled Appointment: 10/06/2022    Dona Curiel  [facility]  PULMMED [address]  Scheduled Appointment: 10/06/2022    [facility]  ANTEPARTUM [address]  Scheduled Appointment: 10/10/2022    Eugenie Finn  [facility]  OBGYNGEN [address]   Scheduled Appointment: 10/10/2022    Ivette Gurrola  [facility]  FamilyMed [address]  Scheduled Appointment: 10/13/2022    Suhail Lewis  [facility]  PULMMED [address]  Scheduled Appointment: 11/30/2022    Jeff Vuong  [facility]  GASTRO [address]  Scheduled Appointment: 12/06/2022

## 2022-09-26 NOTE — PROGRESS NOTE ADULT - ATTENDING COMMENTS
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 She has been hypoxemic requiring NC oxygen while hospitalized.  CT shows interlobular septal thickening in lung bases, bilateral small pleural effusions, ggo in apices and bilobar lower lobe atelectasis.  Clinical picture is consistent with CHF and atelectasis.  Likely has ANDRIY with history of snoring and hypersomnolence and crowded oropharynx on exam.  ABG does not show hypercapnea making OHS less likely.    Ultrasound examination today showed atelectasis and small improving pleural effusions.  WBC is rising. She has received 3 days of antibiotics.  Would actually give additional 4-5 day course to cover pneumonia, consider Zosyn then switching to augmenting on discharge.   Would continue to diurese, encourage out of bed, ambulation with oxygen, incentive spirometry.  ANDRIY workup as outpatient.    Agree with plan as outlined above.
51 yr old woman with ESRD due to PKD s/p bilateral nephrectomy, s/p LRD transplant in 2018 on immunosuppression currently being treated for pneumonia with zosyn.   IS: Envarsus 2mg po daily, azathioprine 100mg po daily (resumed yesterday), prednisone 5mg daily   Has ongoing desaturation to 80's while sleeping. Would likely need home oxygen +/- CPAP.

## 2022-09-26 NOTE — DISCHARGE NOTE NURSING/CASE MANAGEMENT/SOCIAL WORK - NSDCFUADDAPPT_GEN_ALL_CORE_FT
Follow-up in the Transplant Clinic in 1 week with Dr. TAHIRA Mcdonough.   Phone: [phone]    PULMONOLOGY Appointments  Provider: Dr. Curiel Pulmonology  Date: 10/6/2022  Time: 2pm   Location: [address] [place] [address].   Phone Number [phone].    Provider: Dr. Lewis - Sleep Medicine  Date: 11/30/2022  Time: 1:30pm  Location: same as above

## 2022-09-26 NOTE — PROGRESS NOTE ADULT - SUBJECTIVE AND OBJECTIVE BOX
[facility] DIVISION OF KIDNEY DISEASES AND HYPERTENSION -- FOLLOW UP NOTE  --------------------------------------------------------------------------------      ROLLY MEDEIROS was seen and examined at bedside. She continues to report episodes of hypoxia, worse during the night. She denied any chest pain, nausea, vomiting, or abdominal pain. She denied any cough or congestion.       Standing Inpatient Medications  atorvastatin 10 milliGRAM(s) Oral at bedtime  azaTHIOprine 100 milliGRAM(s) Oral daily  fluticasone propionate 50 MICROgram(s)/spray Nasal Spray 1 Spray(s) Both Nostrils two times a day  furosemide    Tablet 40 milliGRAM(s) Oral daily  heparin   Injectable 5000 Unit(s) SubCutaneous every 12 hours  influenza   Vaccine 0.5 milliLiter(s) IntraMuscular once  insulin lispro (HumaLOG) Pump 1 Each SubCutaneous Continuous Pump  magnesium oxide 400 milliGRAM(s) Oral three times a day with meals  NIFEdipine XL 60 milliGRAM(s) Oral daily  pantoprazole    Tablet 40 milliGRAM(s) Oral before breakfast  PARoxetine 10 milliGRAM(s) Oral daily  piperacillin/tazobactam IVPB.. 3.375 Gram(s) IV Intermittent every 8 hours  predniSONE   Tablet 5 milliGRAM(s) Oral daily  senna 2 Tablet(s) Oral at bedtime  tacrolimus ER Tablet (ENVARSUS XR) 2 milliGRAM(s) Oral <User Schedule>  trimethoprim   80 mG/sulfamethoxazole 400 mG 1 Tablet(s) Oral daily    PRN Inpatient Medications  aluminum hydroxide/magnesium hydroxide/simethicone Suspension 30 milliLiter(s) Oral every 4 hours PRN  clonazePAM  Tablet 1 milliGRAM(s) Oral three times a day PRN  ondansetron Injectable 4 milliGRAM(s) IV Push every 6 hours PRN  traMADol 25 milliGRAM(s) Oral every 6 hours PRN  traMADol 50 milliGRAM(s) Oral every 6 hours PRN    ROS: all negative except as mentioned above.     VITALS/PHYSICAL EXAM  --------------------------------------------------------------------------------  T(C): 36.9 (09-26-22 @ 08:00), Max: 37 (09-26-22 @ 05:00)  HR: 80 (09-26-22 @ 08:00) (58 - 82)  BP: 123/65 (09-26-22 @ 08:00) (120/77 - 147/73)  RR: 18 (09-26-22 @ 08:00) (17 - 20)  SpO2: 94% (09-26-22 @ 08:00) (92% - 96%)  Wt(kg): --        09-25-22 @ 07:01  -  09-26-22 @ 07:00  --------------------------------------------------------  IN: 1680 mL / OUT: 4900 mL / NET: -3220 mL      Physical Exam:  	Gen: NAD   	HEENT: no scleral icterus, moist oral mucosa. No thrush. Supple neck, no JVD  	Pulm: normal respiratory effort, lungs clear to auscultation bilaterally   	CV: regular rate and rhythm, S1 and S2 normal, no murmur   	Abd: tenderness to palpation diffusely                   Transplant non tender, no bruit          Extremities: No edema. Distal pulses 2+ bilaterally           Skin: warm no rash, no cyanosis   	Neuro: Alert and oriented to person, place and time. Normal speech. Normal affect.    LABS/STUDIES  --------------------------------------------------------------------------------              12.7   13.57 >-----------<  218      [09-26-22 @ 06:17]              38.1     136  |  100  |  31  ----------------------------<  150      [09-26-22 @ 06:18]  4.1   |  23  |  1.49        Ca     9.4     [09-26-22 @ 06:18]      Mg     2.1     [09-26-22 @ 06:18]      Phos  3.5     [09-26-22 @ 06:18]    TPro  7.0  /  Alb  3.9  /  TBili  0.5  /  DBili  x   /  AST  11  /  ALT  15  /  AlkPhos  51  [09-26-22 @ 06:18]          Creatinine Trend:  SCr 1.49 [09-26 @ 06:18]  SCr 1.42 [09-25 @ 06:44]  SCr 1.22 [09-24 @ 06:25]  SCr 1.26 [09-23 @ 06:31]  SCr 1.13 [09-22 @ 07:00]    Tacrolimus (), Serum: 4.0 ng/mL (09-26 @ 06:20)  Tacrolimus (), Serum: 5.5 ng/mL (09-25 @ 06:44)  Tacrolimus (), Serum: 4.7 ng/mL (09-24 @ 06:25)  Tacrolimus (), Serum: 3.6 ng/mL (09-23 @ 06:35)              CAPILLARY BLOOD GLUCOSE  158 (25 Sep 2022 17:00)  299 (25 Sep 2022 14:08)      POCT Blood Glucose.: 151 mg/dL (26 Sep 2022 06:06)      Urinalysis - [09-18-22 @ 18:06]      Color Light Yellow / Appearance Slightly Turbid / SG 1.013 / pH 6.5      Gluc Negative / Ketone Negative  / Bili Negative / Urobili Negative       Blood Moderate / Protein Trace / Leuk Est Large / Nitrite Negative      RBC 6 / WBC 17 / Hyaline 1 / Gran  / Sq Epi  / Non Sq Epi 12 / Bacteria Negative

## 2022-09-26 NOTE — PROGRESS NOTE ADULT - SUBJECTIVE AND OBJECTIVE BOX
Pulmonary Consult Follow up     Interval Events:  Doing well, offers no complaints.    Now off of oxygen. Ambulates and only desaturates to 92%.     Notes some nocturnal hypoxemia.    REVIEW OF SYSTEMS:  [x] All other systems negative except per HPI   [ ] Unable to assess ROS because ________    OBJECTIVE:  ICU Vital Signs Last 24 Hrs  T(C): 36.4 (23 Sep 2022 09:04), Max: 37.1 (22 Sep 2022 21:49)  T(F): 97.6 (23 Sep 2022 09:04), Max: 98.7 (22 Sep 2022 21:49)  HR: 68 (23 Sep 2022 09:04) (58 - 86)  BP: 108/56 (23 Sep 2022 09:04) (108/56 - 150/72)  BP(mean): --  ABP: --  ABP(mean): --  RR: 18 (23 Sep 2022 09:04) (18 - 18)  SpO2: 96% (23 Sep 2022 09:04) (95% - 97%)    O2 Parameters below as of 23 Sep 2022 09:04  Patient On (Oxygen Delivery Method): nasal cannula  O2 Flow (L/min): 2            09-22 @ 07:01  -  09-23 @ 07:00  --------------------------------------------------------  IN: 840 mL / OUT: 2250 mL / NET: -1410 mL        PHYSICAL EXAM:  GENERAL: NAD, well-groomed, well-developed  HEAD:  Atraumatic, Normocephalic  EYES: EOMI, conjunctiva and sclera clear  ENMT: Moist mucous membranes  CHEST/LUNG: Clear to auscultation.  HEART: Regular rate and rhythm; No murmurs, rubs, or gallops  ABDOMEN: Nondistended  VASCULAR: No  cyanosis, or edema  SKIN: No rashes or lesions  NERVOUS SYSTEM:  Alert & Oriented X3, Good concentration    HOSPITAL MEDICATIONS:  MEDICATIONS  (STANDING):  atorvastatin 10 milliGRAM(s) Oral at bedtime  fluticasone propionate 50 MICROgram(s)/spray Nasal Spray 1 Spray(s) Both Nostrils two times a day  furosemide    Tablet 40 milliGRAM(s) Oral daily  heparin   Injectable 5000 Unit(s) SubCutaneous every 12 hours  influenza   Vaccine 0.5 milliLiter(s) IntraMuscular once  insulin lispro (HumaLOG) Pump 1 Each SubCutaneous Continuous Pump  magnesium oxide 400 milliGRAM(s) Oral three times a day with meals  NIFEdipine XL 60 milliGRAM(s) Oral daily  pantoprazole    Tablet 40 milliGRAM(s) Oral before breakfast  PARoxetine 10 milliGRAM(s) Oral daily  predniSONE   Tablet 5 milliGRAM(s) Oral daily  senna 2 Tablet(s) Oral at bedtime  tacrolimus ER Tablet (ENVARSUS XR) 2 milliGRAM(s) Oral <User Schedule>  trimethoprim   80 mG/sulfamethoxazole 400 mG 1 Tablet(s) Oral daily    MEDICATIONS  (PRN):  aluminum hydroxide/magnesium hydroxide/simethicone Suspension 30 milliLiter(s) Oral every 4 hours PRN Dyspepsia  clonazePAM  Tablet 1 milliGRAM(s) Oral three times a day PRN anxiety  HYDROmorphone  Injectable 0.5 milliGRAM(s) IV Push at bedtime PRN Severe Pain (7 - 10)  ondansetron Injectable 4 milliGRAM(s) IV Push every 6 hours PRN Nausea and/or Vomiting  traMADol 25 milliGRAM(s) Oral every 6 hours PRN Moderate Pain (4 - 6)  traMADol 50 milliGRAM(s) Oral every 6 hours PRN Severe Pain (7 - 10)      LABS:  09-23    137  |  101  |  26<H>  ----------------------------<  111<H>  4.1   |  25  |  1.26  09-22    139  |  103  |  26<H>  ----------------------------<  100<H>  4.2   |  26  |  1.13  09-21    139  |  102  |  24<H>  ----------------------------<  113<H>  4.0   |  26  |  1.12    Ca    9.6      23 Sep 2022 06:31  Ca    9.4      22 Sep 2022 07:00  Ca    10.0      21 Sep 2022 06:41  Phos  4.0     09-23  Mg     1.8     09-23    TPro  6.8  /  Alb  3.6  /  TBili  0.4  /  DBili  x   /  AST  12  /  ALT  13  /  AlkPhos  50  09-23  TPro  6.6  /  Alb  3.5  /  TBili  0.4  /  DBili  x   /  AST  12  /  ALT  12  /  AlkPhos  54  09-22  TPro  6.8  /  Alb  3.5  /  TBili  0.5  /  DBili  x   /  AST  12  /  ALT  12  /  AlkPhos  53  09-21    Magnesium, Serum: 1.8 mg/dL (09-23-22 @ 06:31)  Magnesium, Serum: 1.7 mg/dL (09-22-22 @ 07:00)  Magnesium, Serum: 1.7 mg/dL (09-21-22 @ 06:41)    Phosphorus Level, Serum: 4.0 mg/dL (09-23-22 @ 06:31)  Phosphorus Level, Serum: 3.9 mg/dL (09-22-22 @ 07:00)  Phosphorus Level, Serum: 4.5 mg/dL (09-21-22 @ 06:41)                                              12.0   11.41 )-----------( 224      ( 23 Sep 2022 06:28 )             35.4                         12.0   13.69 )-----------( 244      ( 22 Sep 2022 07:05 )             36.1                         12.3   10.51 )-----------( 218      ( 21 Sep 2022 06:39 )             36.7     CAPILLARY BLOOD GLUCOSE  179 (22 Sep 2022 22:30)  86 (22 Sep 2022 19:00)  261 (22 Sep 2022 13:05)      POCT Blood Glucose.: 123 mg/dL (23 Sep 2022 06:51)

## 2022-09-26 NOTE — DISCHARGE NOTE PROVIDER - NSDCFUADDAPPT_GEN_ALL_CORE_FT
Follow-up in the Transplant Clinic in 1 week with Dr. TAHIRA Mcdonough.   Phone: [phone]    PULMONOLOGY Appointments  Provider: Dr. Curiel Pulmonology  Date: 10/6/2022  Time: 2pm   Location: [address] [place] [address].   Phone Number [phone].    Provider: Dr. Lewis - Sleep Medicine  Date: 11/30/2022  Time: 1:30pm  Location: same as above

## 2022-09-26 NOTE — PROGRESS NOTE ADULT - NUTRITIONAL ASSESSMENT
Diet, Regular (09-17-22 @ 22:31) [Active]
Diet, Regular (09-17-22 @ 22:31) [Active]  Needs RD routine consult
Diet, Regular (09-17-22 @ 22:31) [Active]
Diet, Regular (09-17-22 @ 22:31) [Active]  Needs RD routine consult
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 Uses T slim and DEXCOM with Control IQ (Admelog).       1.  T2DM on insulin pump  - Most recent Hemoglobin A1C 6.2  - Current FS ranges from 110-130 per patient's pump   - Current inpatient regimen: Tslim   - Current diet: CHO  - Please monitor blood glucose values TID AC & QHS while eating regular meals and Q6H while NPO  - Blood glucose goals pre-meal less than 140 mg/dL and random blood glucose less than 180 mg/dL  - Recommendations:  - Continue with insulin pump, setting verified   - Glucose per pump appears to be at goal 100-180  - I discussed with the patient to allow finger stick ideally should be four times per day, but we should be checking at least once per day to make sure her sugar is at goal. Patient says she has DEXCOM in place and will let us know if her glucose is not. I advised that in case DEXCOM fail to work, we would need to document her finger stick   - For now continue with insulin pump   - She has changed infusion site yesterday, has one more set of supplies, she will ask her family to her more supplies    Discharge planning:   - Patient will follow up with Dr. Berman as outpatient   - Can be discharged on insulin pump    2. HTN  - BP goal 130/80  - Manage per primary team     3. HLD  - Continue with atorvastatin 10 mg daily   - Manage as outpatient     4. Chronic steroid use  - On prednisone 5 mg daily     Thank you for the consult. Will continue to monitor. Contact via pager or [employer] Teams during business hours. For follow up questions, discharge recommendations, or new consults please call answering service at [phone] (weekdays), [phone] (nights/weekends). For nonurgent matters, please email [email or web address] or [email or web address]. Patient can follow up at discharge with her endocrinologist Dr. Berman.     High risk patient with high decision making.     Kristin Ricardo MD  Department of Endocrinology, Diabetes and metabolism   Pager [phone]
Diet, Regular (09-17-22 @ 22:31) [Active]
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 Uses T slim and DEXCOM with Control IQ (Admelog).
Diet, Regular (09-17-22 @ 22:31) [Active]

## 2022-09-26 NOTE — DISCHARGE NOTE NURSING/CASE MANAGEMENT/SOCIAL WORK - PATIENT PORTAL LINK FT
You can access the FollowMyHealth Patient Portal offered by [facility] by registering at the following website: [email or web address]. By joining [employer]’s FollowMyHealth portal, you will also be able to view your health information using other applications (apps) compatible with our system.

## 2022-09-26 NOTE — PROGRESS NOTE ADULT - PROBLEM SELECTOR PLAN 1
Pt. with history of PCKD s/p bilateral nephrectomies, s/p LDRT in 4/2018 presenting with nausea, vomiting and diarrhea. Baseline renal function with SCr ~1mg/dL. Most recently SCr mildly elevated to 1.49mg/dL. CT imaging with no evidence of hydronephrosis or renal calculi. UA reviewed. Monitor labs and urine output. Avoid NSAIDs, ACEI/ARBS, RCA and nephrotoxins. Dose medications as per eGFR.
-test BG AC/HS Presently refusing> agrees to fasting when BMP is drawn. Please document pt refusal of POC  - C/w insulin pump use while in hospital  - Please continue to document carb intake and bolus insulin doses in flowsheet  - Noted pt's diet changed to a regular diet instead of consistent carb. Since BGs are mostly at goal can c/w present diet for now. Pt is administering insulin bolus according to carb intake appropriately.    Discharge planning:   - Patient will follow up with Dr. Berman as outpatient   - Can be discharged on insulin pump and Trulicity 0.75mg qweek. Pt to discuss with her endocrinologist to increase GLP1RA to therapeutic dose of 1.5mg   -Needs optho f/u.    -
Pt. with history of PCKD s/p bilateral nephrectomies, s/p LDRT in 4/2018 presenting with nausea, vomiting and diarrhea. Baseline renal function with SCr ~1mg/dL. Currently renal function at baseline. CT imaging demonstrating some perinephric stranding, which could be in setting of UTI. No evidence of hydronephrosis or renal calculi. Monitor labs and urine output. Avoid NSAIDs, ACEI/ARBS, RCA and nephrotoxins. Dose medications as per eGFR.
- POC testing TID AC & QHS and Q6H while NPO. Presently refusing> agrees to fasting when BMP is drawn. Please document pt refusal of POC  - C/w insulin pump use while in hospital  - Continue with insulin pump settings as above   - Please document carb intake and bolus insulin doses in flowsheet  - Noted pt's diet changed to a regular diet instead of consistent carb. Since BGs are at goal can c/w present diet for now. Pt is administering insulin bolus according to carb intake appropriately.    Discharge planning:   - Patient will follow up with Dr. Berman as outpatient   - Can be discharged on insulin pump and Trulicity 0.75mg qweek. Pt to discuss with her endocrinologist to increase GLP1RA to therpaeutic dose of 1.5mg   -Needs optho f/u
Pt. with history of PCKD s/p bilateral nephrectomies, s/p LDRT in 4/2018 presenting with nausea, vomiting and diarrhea. Baseline renal function with SCr ~1mg/dL. Currently renal function at baseline. CT imaging with no evidence of hydronephrosis or renal calculi.   Continues to require oxygen at this time, continue with lasix 40mg daily. Appreciate pulmonology recommendations. Monitor labs and urine output. Avoid NSAIDs, ACEI/ARBS, RCA and nephrotoxins. Dose medications as per eGFR.
- POC testing TID AC & QHS and Q6H while NPO. Presently refusing> agrees to fasting when BMP is drawn. Please document pt refusal of POC  - C/w insulin pump use while in hospital  - Continue with insulin pump settings as above   - Please continue to document carb intake and bolus insulin doses in flowsheet  - Noted pt's diet changed to a regular diet instead of consistent carb. Since BGs are at goal can c/w present diet for now. Pt is administering insulin bolus according to carb intake appropriately.    Discharge planning:   - Patient will follow up with Dr. Berman as outpatient   - Can be discharged on insulin pump and Trulicity 0.75mg qweek. Pt to discuss with her endocrinologist to increase GLP1RA to therapeutic dose of 1.5mg   -Needs optho f/u
test BG AC/HS Presently refusing> agrees to fasting when BMP is drawn. Please document pt refusal of POC  - C/w insulin pump use while in hospital  - Please continue to document carb intake and bolus insulin doses in flowsheet  - Noted pt's diet changed to a regular diet instead of consistent carb. Since BGs are mostly at goal can c/w present diet for now. Pt is administering insulin bolus according to carb intake appropriately.    Discharge planning:   - Patient will follow up with Dr. Berman as outpatient on 10/3  - Can be discharged on insulin pump and Trulicity 0.75mg qweekly. Pt to restart Trulicity once home. Pt to discuss with her endocrinologist to increase GLP1RA to therapeutic dose of 1.5mg   -Needs optho f/u.
-POC testing TID AC & QHS and Q6H while NPO. Presently refusing> agrees to fasting when BMP is drawn. Please document pt refusal of POC  - C/w insulin pump use while in hospital  - Continue with insulin pump settings as above   - Please continue to document carb intake and bolus insulin doses in flowsheet  - Noted pt's diet changed to a regular diet instead of consistent carb. Since BGs are mostly at goal can c/w present diet for now. Pt is administering insulin bolus according to carb intake appropriately.    Discharge planning:   - Patient will follow up with Dr. Berman as outpatient   - Can be discharged on insulin pump and Trulicity 0.75mg qweek. Pt to discuss with her endocrinologist to increase GLP1RA to therapeutic dose of 1.5mg   -Needs optho f/u.
Pt. with history of PCKD s/p bilateral nephrectomies, s/p LDRT in 4/2018 presenting with nausea, vomiting and diarrhea. Baseline renal function with SCr ~1mg/dL. Currently renal function at baseline. CT imaging demonstrating some perinephric stranding, which could be in setting of UTI. No evidence of hydronephrosis or renal calculi. Monitor labs and urine output. Avoid NSAIDs, ACEI/ARBS, RCA and nephrotoxins. Dose medications as per eGFR.
-POC testing TID AC & QHS and Q6H while NPO. Presently refusing> agrees to fasting when BMP is drawn. Please document pt refusal of POC  - C/w insulin pump use while in hospital  - Continue with insulin pump settings as above   - Please continue to document carb intake and bolus insulin doses in flowsheet  - Noted pt's diet changed to a regular diet instead of consistent carb. Since BGs are at goal can c/w present diet for now. Pt is administering insulin bolus according to carb intake appropriately.    Discharge planning:   - Patient will follow up with Dr. Berman as outpatient   - Can be discharged on insulin pump and Trulicity 0.75mg qweek. Pt to discuss with her endocrinologist to increase GLP1RA to therapeutic dose of 1.5mg   -Needs optho f/u.
Pt. with history of PCKD s/p bilateral nephrectomies, s/p LDRT in 4/2018 presenting with nausea, vomiting and diarrhea. Baseline renal function with SCr ~1mg/dL. Currently renal function at baseline. CT imaging demonstrating some perinephric stranding, which could be in setting of UTI. No evidence of hydronephrosis or renal calculi.   Continues to require oxyegn at this time, will increase dose of lasix to 40mg daily. Will also attain pulm consult. Monitor labs and urine output. Avoid NSAIDs, ACEI/ARBS, RCA and nephrotoxins. Dose medications as per eGFR.
Pt. with history of PCKD s/p bilateral nephrectomies, s/p LDRT in 4/2018 presenting with nausea, vomiting and diarrhea. Baseline renal function with SCr ~1mg/dL. Currently renal function at baseline. CT imaging demonstrating some perinephric stranding, which could be in setting of UTI. No evidence of hydronephrosis or renal calculi. Monitor labs and urine output. Avoid NSAIDs, ACEI/ARBS, RCA and nephrotoxins. Dose medications as per eGFR.
- POC testing TID AC & QHS and Q6H while NPO. Presently refusing> agrees to fasting when BMP is drawn. Please document pt refusal of POC  - C/w insulin pump use while in hospital  - Continue with insulin pump settings as above   - Please continue to document carb intake and bolus insulin doses in flowsheet  - Noted pt's diet changed to a regular diet instead of consistent carb. Since BGs are at goal can c/w present diet for now. Pt is administering insulin bolus according to carb intake appropriately.    Discharge planning:   - Patient will follow up with Dr. Berman as outpatient   - Can be discharged on insulin pump and Trulicity 0.75mg qweek. Pt to discuss with her endocrinologist to increase GLP1RA to therapeutic dose of 1.5mg   -Needs optho f/u

## 2022-09-26 NOTE — PROGRESS NOTE ADULT - ASSESSMENT
51F with ESRD 2/2 PCKD s/p BL Nephrectomy/Single Donor Renal Transplant (2018), DM, HTN, HLD, GERD, MDD, Anxiety presented to [facility] ER on 9/14/2022 with complaining body aches, R flank pain, fevers/chills, nausea/vomiting, diarrhea. ID consulted for sepsis.     WORK UP  WBC 12 > 6  Cr 1.33 > 1.21  UA with 6-10 WBC, occasional SQE, small LE, neg nitrite  CTAP with mild perinephric stranding on R transplanted kidney, urinary bladder wall thickening, multiple adnexal cystic lesions with new L ovarian cystic lesions noted    Elevated procalcitonin  Afebrile after 9/15.    Prior UCx with E faecalis (2021), E coli (2019)    #Diarrhea / Nausea / Vomiting / Rigors  - better  - unclear etiology ?GI and/or ? source, possibly pyelonephritis based on scan and tenderness on exam  - low concerns for ESBL    #SOB/leukocytosis   -pna vs fluid  -on zosyn  -suggest short course of abx x 5 days (can switch to Augmentin) - complete after doses tomorrow  -no fever  -appreciate pulm input   -monitor cbc    I will follow the patient as needed. Please feel free to contact me with any further questions or concerns.    Dannie Bhatia M.D.  [facility] Division of Infectious Disease  8AM-5PM Monday - Friday: Available on [employer] Teams  After Hours and Holidays (or if no response on [employer] Teams): Please contact the Infectious Diseases Office at [phone]

## 2022-09-26 NOTE — PROGRESS NOTE ADULT - PROBLEM SELECTOR PROBLEM 2
Immunosuppressive management encounter following kidney transplant
Insulin pump status
Immunosuppressive management encounter following kidney transplant
Insulin pump status
Immunosuppressive management encounter following kidney transplant
Immunosuppressive management encounter following kidney transplant
Insulin pump status

## 2022-09-26 NOTE — CHART NOTE - NSCHARTNOTEFT_GEN_A_CORE
Patient has been issued a pulmonology appointment & a sleep appointment as below:     Provider: Dr. Curiel Pulmology  Date: 10/6/2022  Time: 2pm   Location: [address] [place] [address], [place] [address].   Phone Number [phone].    Provider: Dr. Lewis - Sleep Medicine  Date: 11/30/2022  Time: 1:30pm  Location: same as above    Please notify the patient of the appointment and include this information on discharge paperwork.

## 2022-09-26 NOTE — PROGRESS NOTE ADULT - ASSESSMENT
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 Imaging with b/l effusions and atelectasis. Suspect her hypoxia might be driven by her atelectasis with possible superimposed pneumonia as well in s/o immunosupression.     # Hypoxemia  # Bilateral atelectasis and pleural effusions  - Would diurese as able for pleural effusions and LE edema  - Defer to renal team regarding diureses.   - Continue Zosyn for a full course of 5days for empiric coverage for possible pneumonia.   - F/u sputum culture if pt able to expectorate.  - Recommend intensive incentive spirometry  - Early mobilization and out of bed as much as possible      # Needs outpatient ANDRIY Eval   # Nocturnal hypoxemia  - Pt also notes significant snoring  - Has morning headaches, daytime sleepiness, has fallen asleep during important daytime evens.   - Large body habitus with increased neck circumference  - Concerning for high risk of ANDRIY  - Pt requires a sleep study as an outpatient. Unable to perform sleep study as an inpatient.  - Nocturnal hypoxemia is likely due to pt's ANDRIY.     Pt has appointments for both pulmonary and sleep medicine. Please see separate appointment note with information.  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 Imaging with b/l effusions and atelectasis. Suspect her hypoxia might be driven by her atelectasis with possible superimposed pneumonia as well in s/o immunosupression.     # Hypoxemia  # Bilateral atelectasis and pleural effusions  - Would diurese as able for pleural effusions and LE edema  - Defer to renal team regarding diureses.   - Continue Zosyn for a full course of 5days for empiric coverage for possible pneumonia.   - F/u sputum culture if pt able to expectorate.  - Recommend intensive incentive spirometry  - Early mobilization and out of bed as much as possible      # Needs outpatient ANDRIY Eval   # Nocturnal hypoxemia  - Pt also notes significant snoring  - Has morning headaches, daytime sleepiness, has fallen asleep during important daytime evens.   - Large body habitus with increased neck circumference  - Concerning for high risk of ANDRIY  - Pt requires a sleep study as an outpatient. Unable to perform sleep study as an inpatient.  - Nocturnal hypoxemia is likely due to pt's ANDRIY.     Pt has appointments for both pulmonary and sleep medicine. Please see separate appointment note with information.     Pulmonary will sign off. Please call back with any questions

## 2022-09-26 NOTE — DISCHARGE NOTE NURSING/CASE MANAGEMENT/SOCIAL WORK - NSDCPEFALRISK_GEN_ALL_CORE
For information on Fall & Injury Prevention, visit: [email or web address] OR  [email or web address] OR  https://www.cdc.gov/steadi/patient.html

## 2022-09-26 NOTE — PROGRESS NOTE ADULT - PROBLEM SELECTOR PLAN 2
Reviewed settings and hx with pt. Will c/w present insulin pump settings. Overall glycemic control will improve once pt back on GLP-1.   - Pump infusion site changed 9/25, next change 9/28 ;  has supplies  - dexcom sensor changed 9/18.  Basal rate: On control IQ  12a-6a 1.1  6a-12a 1  TDD 24.6    ISF 15    ICR  12a-6a 4.5  6a-10a 4  10a-12a 4.2    [employer] .  -can be discharged on current pump settings w/close outpt follow up

## 2022-09-26 NOTE — PROGRESS NOTE ADULT - REASON FOR ADMISSION
Transfer from [facility] with fever and chills r/o Sepsis
Transfer from [facility] with fever and chills r/o Sepsis
Transfer from [facility] with fever and chills r/o Sepsis
Transfer from [facility] with fever and chills r/o Sepsis
Transfer from [facility] with fever and chills r/o Sepsis
Transfer from [facility] with fever and chills r/o Sepsis
Transfer from [facility] with fever and chills r/o Sepsis
Transfer from [facility] with fever and chills r/o Sepsis
Transfer from [facility] with fever and chills r/o Sepsis
Transfer from [facility] with fever and chills r/o Sepsis
Transfer from [facility] with fever and chills r/o Sepsis
Transfer from [facility] with fever and chills r/o Sepsis
Transfer from [facility] with fever and chills r/o Sepsis
Transfer from [facility] with fever and chills r/o Sepsis
Transfer from [facility] with fever and chills r/o Sepsis
Transfer from [facility] with fever and chills r/o Sepsis
Transfer from [facility] with fever and chills r/o Sepsis
Transfer from [facility] with fever and chills r/o Sepsis
Transfer from [facility] with fever and chills r/o Sepsis
Transfer from [facility] with fever and chills r/o Sepsis
Transfer from [facility] with fever and chills r/o Sepsis
Transfer from [facility] with fever and chills r/o Sepsis
Transfer from [facility] with fever and chills r/o Sepsis
Transfer from [facility] with fever and chills r/o Sepsis
Transfer from [facility] with fever and chills r/o Sepsis
Transfer from [facility] with fever and chills r/o Sepsis
Transfer from [facility] with fever and chills r/o Sepsis
Transfer from [facility] with fever and chills r/o Sepsis
Transfer from [facility] with fever and chills r/o Sepsis
Transfer from [facility] with fever and chills r/o Sepsis
Transfer from [facility] with fever and chills r/o Sepsis
Transfer from [facility] with fever and chills r/o Sepsis
Transfer from [facility] with fever and chills r/o Sepsis
Transfer from [facility] with fever and chills r/o Sepsis

## 2022-09-26 NOTE — PROGRESS NOTE ADULT - PROBLEM SELECTOR PROBLEM 1
Diabetes mellitus following renal transplant
Renal transplant recipient
Diabetes mellitus following renal transplant
Type 2 diabetes mellitus
Renal transplant recipient
Renal transplant recipient
Diabetes mellitus following renal transplant
Renal transplant recipient
Type 2 diabetes mellitus
Renal transplant recipient
Type 2 diabetes mellitus
Diabetes mellitus following renal transplant
Type 2 diabetes mellitus
Renal transplant recipient
Type 2 diabetes mellitus
Diabetes mellitus following renal transplant

## 2022-09-26 NOTE — PROGRESS NOTE ADULT - SUBJECTIVE AND OBJECTIVE BOX
Diabetes Follow up note:    Chief complaint: Post-txplant DM w/insulin pump    Interval Hx: FS in 150s this AM. Pt seen at bedside. Reports tolerating POs w/improved appetite. Was hungry during the night so had a snack and bolused appropriately. Her glucose values have been running higher than usual the past 2 days. Reviewed pump and CGM history of past 24 hours. Pt hoping she will be discharged today. Changed pump site yesterday. Reviewed T-connect sudheer on phone and discussed updating software to have bolus option on phone.     Review of Systems:  General: denies pain  GI: Tolerating POs. Denies N/V/D/Abd pain  CV: Denies CP/SOB  ENDO: No S&Sx of hypoglycemia    MEDS:  atorvastatin 10 milliGRAM(s) Oral at bedtime  insulin lispro (HumaLOG) Pump 1 Each SubCutaneous Continuous Pump  Basal rate:  12a-6a 1.1  6a-12a 1  TDD 24.6    ISF 15    ICR  12a-6a 4.5  6a-10a 4  10a-12a 4.2    Target   predniSONE   Tablet 5 milliGRAM(s) Oral daily    piperacillin/tazobactam IVPB.. 3.375 Gram(s) IV Intermittent every 8 hours  trimethoprim   80 mG/sulfamethoxazole 400 mG 1 Tablet(s) Oral daily    Allergies    Demerol HCl (Hives)  Phenergan (Hives; Rash)        PE:  General: Female lying in bed. NAD>   Vital Signs Last 24 Hrs  T(C): 36.6 (26 Sep 2022 12:00), Max: 37 (26 Sep 2022 05:00)  T(F): 97.9 (26 Sep 2022 12:00), Max: 98.6 (26 Sep 2022 05:00)  HR: 71 (26 Sep 2022 12:00) (58 - 81)  BP: 142/72 (26 Sep 2022 12:00) (120/77 - 147/73)  BP(mean): --  RR: 18 (26 Sep 2022 12:00) (17 - 20)  SpO2: 94% (26 Sep 2022 12:00) (92% - 96%)    Parameters below as of 26 Sep 2022 12:00  Patient On (Oxygen Delivery Method): room air      Abd: Soft, NT,ND, R side t-slim in place.   Extremities: Warm. R upper arm dexcom intact.  Neuro: A&O X3    LABS:  POCT Blood Glucose.: 151 mg/dL (09-26-22 @ 06:06)  POCT Blood Glucose.: 135 mg/dL (09-25-22 @ 05:10)  POCT Blood Glucose.: 115 mg/dL (09-24-22 @ 06:42)                            12.7   13.57 )-----------( 218      ( 26 Sep 2022 06:17 )             38.1       09-26    136  |  100  |  31<H>  ----------------------------<  150<H>  4.1   |  23  |  1.49<H>    eGFR: 42<L>    Ca    9.4      09-26  Mg     2.1     09-26  Phos  3.5     09-26    TPro  7.0  /  Alb  3.9  /  TBili  0.5  /  DBili  x   /  AST  11  /  ALT  15  /  AlkPhos  51  09-26      A1C with Estimated Average Glucose Result: 6.2 % (09-17-22 @ 06:53)          Contact number: opal [phone] or [phone]

## 2022-09-26 NOTE — PROGRESS NOTE ADULT - PROBLEM SELECTOR PLAN 4
Continue with atorvastatin 10 mg daily   - Manage as outpatient.     Can be reached via TEAMS/pager: [phone]   office:  [phone] (M-F 9a-5pm)               [phone] (nights/weekends)   [email or web address] password [email or web address]
- Continue with atorvastatin 10 mg daily   - Manage as outpatient.      Can be reached via TEAMS/pager: [phone]   office:  [phone] (M-F 9a-5pm)               [phone] (nights/weekends)   [email or web address] password [email or web address]
- Continue with atorvastatin 10 mg daily   - Manage as outpatient
- Continue with atorvastatin 10 mg daily   - Manage as outpatient.      Discussed with patient and RN at bedside   Contact via Microsoft Teams during business hours  On evenings and weekends, please call [phone] or page endocrine fellow on call.   Email: [email or web address]   Please note that this patient may be followed by different provider tomorrow.    greater than 50% of the encounter was spent counseling and/or coordination of care.  36 minutes spent on total encounter; The necessity of the time spent during the encounter on this date of service was due to development of plan of care/coordination of care/glycemic control through review of labs, blood glucose values and vital signs.
- Continue with atorvastatin 10 mg daily   - Manage as outpatient
Continue with atorvastatin 10 mg daily   - Manage as outpatient.       Can be reached via TEAMS/pager: [phone]   office:  [phone] (M-F 9a-5pm)               [phone] (nights/weekends)   [email or web address] password [email or web address]
- Continue with atorvastatin 10 mg daily   - Manage as outpatient

## 2022-09-27 ENCOUNTER — NON-APPOINTMENT (OUTPATIENT)
Age: 52
End: 2022-09-27

## 2022-09-29 ENCOUNTER — APPOINTMENT (OUTPATIENT)
Dept: DERMATOLOGY | Facility: CLINIC | Age: 52
End: 2022-09-29

## 2022-09-29 PROCEDURE — 99213 OFFICE O/P EST LOW 20 MIN: CPT

## 2022-09-30 ENCOUNTER — APPOINTMENT (OUTPATIENT)
Dept: FAMILY MEDICINE | Facility: CLINIC | Age: 52
End: 2022-09-30

## 2022-09-30 VITALS
BODY MASS INDEX: 36.37 KG/M2 | OXYGEN SATURATION: 97 % | HEIGHT: 68 IN | SYSTOLIC BLOOD PRESSURE: 160 MMHG | HEART RATE: 100 BPM | WEIGHT: 240 LBS | TEMPERATURE: 98.8 F | RESPIRATION RATE: 18 BRPM | DIASTOLIC BLOOD PRESSURE: 74 MMHG

## 2022-09-30 DIAGNOSIS — J18.9 PNEUMONIA, UNSPECIFIED ORGANISM: ICD-10-CM

## 2022-09-30 PROCEDURE — 99496 TRANSJ CARE MGMT HIGH F2F 7D: CPT

## 2022-09-30 RX ORDER — SPIRONOLACTONE 25 MG/1
25 TABLET ORAL DAILY
Qty: 90 | Refills: 3 | Status: DISCONTINUED | COMMUNITY
Start: 2022-01-19 | End: 2022-09-30

## 2022-09-30 RX ORDER — DULAGLUTIDE 0.75 MG/.5ML
0.75 INJECTION, SOLUTION SUBCUTANEOUS
Qty: 12 | Refills: 3 | Status: DISCONTINUED | COMMUNITY
Start: 2022-01-14 | End: 2022-09-30

## 2022-09-30 RX ORDER — PAROXETINE HYDROCHLORIDE 10 MG/1
10 TABLET, FILM COATED ORAL DAILY
Qty: 90 | Refills: 2 | Status: DISCONTINUED | COMMUNITY
Start: 2021-07-13 | End: 2022-09-30

## 2022-09-30 RX ORDER — LOSARTAN POTASSIUM 100 MG/1
100 TABLET, FILM COATED ORAL
Qty: 90 | Refills: 1 | Status: DISCONTINUED | COMMUNITY
Start: 2021-12-13 | End: 2022-09-30

## 2022-09-30 NOTE — PHYSICAL EXAM
[No Acute Distress] : no acute distress [Well Nourished] : well nourished [Well Developed] : well developed [Well-Appearing] : well-appearing [Normal Sclera/Conjunctiva] : normal sclera/conjunctiva [PERRL] : pupils equal round and reactive to light [EOMI] : extraocular movements intact [Normal Outer Ear/Nose] : the outer ears and nose were normal in appearance [Normal Oropharynx] : the oropharynx was normal [No JVD] : no jugular venous distention [No Lymphadenopathy] : no lymphadenopathy [Supple] : supple [Thyroid Normal, No Nodules] : the thyroid was normal and there were no nodules present [No Respiratory Distress] : no respiratory distress  [No Accessory Muscle Use] : no accessory muscle use [Clear to Auscultation] : lungs were clear to auscultation bilaterally [Normal Rate] : normal rate  [Regular Rhythm] : with a regular rhythm [Normal S1, S2] : normal S1 and S2 [No Murmur] : no murmur heard [No Carotid Bruits] : no carotid bruits [No Abdominal Bruit] : a ~M bruit was not heard ~T in the abdomen [No Varicosities] : no varicosities [Pedal Pulses Present] : the pedal pulses are present [No Edema] : there was no peripheral edema [No Palpable Aorta] : no palpable aorta [No Extremity Clubbing/Cyanosis] : no extremity clubbing/cyanosis [Soft] : abdomen soft [Non Tender] : non-tender [Non-distended] : non-distended [No Masses] : no abdominal mass palpated [No HSM] : no HSM [Normal Bowel Sounds] : normal bowel sounds [Normal Posterior Cervical Nodes] : no posterior cervical lymphadenopathy [Normal Anterior Cervical Nodes] : no anterior cervical lymphadenopathy [No CVA Tenderness] : no CVA  tenderness [No Spinal Tenderness] : no spinal tenderness [No Joint Swelling] : no joint swelling [Grossly Normal Strength/Tone] : grossly normal strength/tone [No Rash] : no rash [Coordination Grossly Intact] : coordination grossly intact [No Focal Deficits] : no focal deficits [Normal Gait] : normal gait [Deep Tendon Reflexes (DTR)] : deep tendon reflexes were 2+ and symmetric [Normal Affect] : the affect was normal [Normal Insight/Judgement] : insight and judgment were intact [05241 - High Complexity requires an extensive number of possible diagnoses and/or the management options, extensive complexity of the medical data (tests, etc.) to be reviewed, and a high risk of significant complications, morbidity, and/or mortality as w] : High Complexity

## 2022-09-30 NOTE — HISTORY OF PRESENT ILLNESS
[Post-hospitalization from ___ Hospital] : Post-hospitalization from [unfilled] Hospital [Admitted on: ___] : The patient was admitted on [unfilled] [Discharged on ___] : discharged on [unfilled] [Discharge Summary] : discharge summary [Pertinent Labs] : pertinent labs [Radiology Findings] : radiology findings [Discharge Med List] : discharge medication list [Med Reconciliation] : medication reconciliation has been completed [Patient Contacted By: ____] : and contacted by [unfilled] [FreeTextEntry2] : 51 woman with PMHX ESRD 2/2 PCKD s/p BL Nephrectomy/Single Donor Renal Transplant 2018, Post Transplant DM, HTN, HLD, GERD, MDD, Anxiety presented to [facility] ER on 9/14/2022 with c/o body aches, R flank pain, fevers/chills, \par nausea/vomiting, diarrhea. In [facility] ED Tmax 102.2F and leukocytosis. Lactate negative. Cultures sent. Given broad spectrum empiric IV ABX and IVF. \par Transferred to [facility] for further workup of sepsis. - Infectious disease consulted.  Cultures and GI PCR negative.  Completed course of empiric cefepime. \par - CTA done with concern for PE with increasing O2 requirements. Negative to for PE \par - TTE with trace pericardial effusion, EF 75%, otherwise normal \par - Diuresed for pulmonary edema \par - Pulmonology consulted. Recommended and started on Zosyn x 5 days for possible superimposed PNA.  Recommended outpatient ANDRIY work-up. \par - Symptoms improved. O2 was weaned to off. Saturations 94% while ambulating. \par - C/O Headaches. Neuro consulted and felt to be secondary to migrainous versus tension.  CT head negative. No other deficits \par - Creatinine 1.42 on day of discharge.\par She was evaluated by the multi-disciplinary team including surgeon, nephrologist, ACP, pharmacist, nutrition, social work, and nursing and deemed stable for discharge.\par She schedule appointments to f/up with Pulmonology, nephrology, Sleep medicine.\par She reports her anxiety symptoms are not well control with current meds.\par

## 2022-09-30 NOTE — ASSESSMENT
[FreeTextEntry1] : PRINCIPAL DISCHARGE DIAGNOSIS \par #  Pneumonia \par -Completed antibiotic  \par - follow-up with Pulmonology as outpatient for further care \par Provider: Dr. Curiel Pulmology \par Date: 10/6/2022 \par Time: 2pm \par Location: [address] [place] [address], [address]. \par Phone Number [phone]. \par Provider: Dr. Lewis - Sleep Medicine \par Date: 11/30/2022 \par Time: 1:30pm \par Location: same as above \par \par #  Type 2 diabetes mellitus \par -F/up with endo, Dr Berman \par \par # Hypertension \par -Continue Nifedipine.\par -reassume Carvedilol\par -F/up with cardiology\par \par # Renal transplant recipient \par -F/up with nephrology and  transplant surgeon. \par \par # Imunosuppressive  \par - Take medications as directed by your translant team. Never stop taking \par medications unless instructed by your transplant physician. \par \par # general Anxiety:\par -D/C Paxil.\par -Start lexapro.\par -Klonopin prn.\par \par # Pt will have blood done next week with nephrology.\par # F/up in 1 month for CPE.\par

## 2022-10-03 ENCOUNTER — APPOINTMENT (OUTPATIENT)
Dept: ENDOCRINOLOGY | Facility: CLINIC | Age: 52
End: 2022-10-03

## 2022-10-03 VITALS
BODY MASS INDEX: 36.98 KG/M2 | HEART RATE: 88 BPM | OXYGEN SATURATION: 97 % | HEIGHT: 68 IN | DIASTOLIC BLOOD PRESSURE: 64 MMHG | WEIGHT: 244 LBS | SYSTOLIC BLOOD PRESSURE: 138 MMHG

## 2022-10-03 LAB — GLUCOSE BLDC GLUCOMTR-MCNC: 174

## 2022-10-03 PROCEDURE — 99214 OFFICE O/P EST MOD 30 MIN: CPT | Mod: 25

## 2022-10-03 PROCEDURE — 95251 CONT GLUC MNTR ANALYSIS I&R: CPT

## 2022-10-03 NOTE — HISTORY OF PRESENT ILLNESS
[Continuous Glucose Monitoring] : Continuous Glucose Monitoring: Yes [Dexcom] : Dexcom [FreeTextEntry1] : Type: NODAT\par Severity: moderate\par Duration: January 2020\par Onset: s/p kidney transplant\par Associated symptoms: polyuria, polydipsia\par \par Modifying factors-\par Current meds for glycemic control:\par Tandem Control IQ with Humalog\par Reviewed CGMS. Avg  with 66% of values in range and 34% high. None low. Frequent auto bolus delivery after meals.\par \par On glimepiride in the past, d/c'ed once started on CSII. Trial of Ozempic resulting in nausea/vomiting. Tolerating trulicity without increase in adverse effects. Has chronic nausea and diarrhea; gi workup unrevealing.\par s/p hospitalization for pneumonia/sepsis. Used pump while in hospital. Pt. 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

## 2022-10-03 NOTE — ASSESSMENT
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 Glycemic control improving control with combination of pump therapy and trulicity. Trial of mounjaro instead of trulicity for better weight loss iimpact\par Hospital records reviewed\par \par -Glucose sensor/pump necessity: This patient with diabetes performs four or more glucose checks per day utilizing a home blood glucose monitor since diagnosis in January 2020. The patient is treated with insulin via three or more injections daily. Over the past 6 months, this patient has required frequent adjustments to their insulin treatment on the basis of therapeutic continuous glucose monitoring results. In addition, the patient has been to our office for an evaluation of their diabetes control within the past six months, and attends a comprehensive diabetic education program, most recently in November 2020. \par

## 2022-10-05 ENCOUNTER — RX RENEWAL (OUTPATIENT)
Age: 52
End: 2022-10-05

## 2022-10-06 ENCOUNTER — APPOINTMENT (OUTPATIENT)
Dept: NEPHROLOGY | Facility: CLINIC | Age: 52
End: 2022-10-06

## 2022-10-06 ENCOUNTER — APPOINTMENT (OUTPATIENT)
Dept: PULMONOLOGY | Facility: CLINIC | Age: 52
End: 2022-10-06

## 2022-10-06 VITALS
TEMPERATURE: 98.1 F | HEART RATE: 71 BPM | DIASTOLIC BLOOD PRESSURE: 72 MMHG | BODY MASS INDEX: 37.59 KG/M2 | RESPIRATION RATE: 18 BRPM | WEIGHT: 248 LBS | OXYGEN SATURATION: 95 % | SYSTOLIC BLOOD PRESSURE: 146 MMHG | HEIGHT: 68 IN

## 2022-10-06 VITALS
BODY MASS INDEX: 36.37 KG/M2 | RESPIRATION RATE: 16 BRPM | WEIGHT: 240 LBS | TEMPERATURE: 97.7 F | HEIGHT: 68 IN | HEART RATE: 81 BPM | SYSTOLIC BLOOD PRESSURE: 109 MMHG | DIASTOLIC BLOOD PRESSURE: 70 MMHG

## 2022-10-06 DIAGNOSIS — G47.10 HYPERSOMNIA, UNSPECIFIED: ICD-10-CM

## 2022-10-06 DIAGNOSIS — R06.00 DYSPNEA, UNSPECIFIED: ICD-10-CM

## 2022-10-06 PROCEDURE — 99214 OFFICE O/P EST MOD 30 MIN: CPT

## 2022-10-07 ENCOUNTER — NON-APPOINTMENT (OUTPATIENT)
Age: 52
End: 2022-10-07

## 2022-10-07 LAB
ALBUMIN SERPL ELPH-MCNC: 4.4 G/DL
ALP BLD-CCNC: 46 U/L
ALT SERPL-CCNC: 12 U/L
ANION GAP SERPL CALC-SCNC: 12 MMOL/L
APPEARANCE: CLEAR
AST SERPL-CCNC: 10 U/L
BACTERIA: NEGATIVE
BASOPHILS # BLD AUTO: 0.07 K/UL
BASOPHILS NFR BLD AUTO: 0.6 %
BILIRUB SERPL-MCNC: 0.4 MG/DL
BILIRUBIN URINE: NEGATIVE
BLOOD URINE: NEGATIVE
BUN SERPL-MCNC: 20 MG/DL
CALCIUM SERPL-MCNC: 10.1 MG/DL
CALCIUM SERPL-MCNC: 10.1 MG/DL
CHLORIDE SERPL-SCNC: 103 MMOL/L
CMV DNA SPEC QL NAA+PROBE: NOT DETECTED IU/ML
CMVPCR LOG: NOT DETECTED LOG10IU/ML
CO2 SERPL-SCNC: 25 MMOL/L
COLOR: NORMAL
CREAT SERPL-MCNC: 1.28 MG/DL
CREAT SPEC-SCNC: 39 MG/DL
CREAT/PROT UR: 0.1 RATIO
EGFR: 51 ML/MIN/1.73M2
EOSINOPHIL # BLD AUTO: 0.47 K/UL
EOSINOPHIL NFR BLD AUTO: 4.1 %
GLUCOSE QUALITATIVE U: NEGATIVE
GLUCOSE SERPL-MCNC: 151 MG/DL
HCT VFR BLD CALC: 39 %
HGB BLD-MCNC: 13.1 G/DL
HYALINE CASTS: 0 /LPF
IMM GRANULOCYTES NFR BLD AUTO: 0.5 %
KETONES URINE: NEGATIVE
LEUKOCYTE ESTERASE URINE: NEGATIVE
LYMPHOCYTES # BLD AUTO: 1.69 K/UL
LYMPHOCYTES NFR BLD AUTO: 14.7 %
MAGNESIUM SERPL-MCNC: 1.7 MG/DL
MAN DIFF?: NORMAL
MCHC RBC-ENTMCNC: 33.6 GM/DL
MCHC RBC-ENTMCNC: 33.9 PG
MCV RBC AUTO: 101 FL
MICROSCOPIC-UA: NORMAL
MONOCYTES # BLD AUTO: 0.66 K/UL
MONOCYTES NFR BLD AUTO: 5.7 %
NEUTROPHILS # BLD AUTO: 8.53 K/UL
NEUTROPHILS NFR BLD AUTO: 74.4 %
NITRITE URINE: NEGATIVE
PARATHYROID HORMONE INTACT: 44 PG/ML
PH URINE: 6
PHOSPHATE SERPL-MCNC: 3.8 MG/DL
PLATELET # BLD AUTO: 206 K/UL
POTASSIUM SERPL-SCNC: 4.3 MMOL/L
PROT SERPL-MCNC: 7 G/DL
PROT UR-MCNC: 4 MG/DL
PROTEIN URINE: NEGATIVE
RBC # BLD: 3.86 M/UL
RBC # FLD: 13.2 %
RED BLOOD CELLS URINE: 2 /HPF
SODIUM SERPL-SCNC: 140 MMOL/L
SPECIFIC GRAVITY URINE: 1.01
SQUAMOUS EPITHELIAL CELLS: 6 /HPF
TACROLIMUS SERPL-MCNC: 7 NG/ML
URATE SERPL-MCNC: 8.6 MG/DL
UROBILINOGEN URINE: NORMAL
WBC # FLD AUTO: 11.48 K/UL
WHITE BLOOD CELLS URINE: 0 /HPF

## 2022-10-07 NOTE — HISTORY OF PRESENT ILLNESS
[FreeTextEntry1] : Ms. Vanegas is a 47 y.o female with a history of PCKD s/p LURT from ex  and bilateral native nephrectomies on 4/2/18.\par Course was complicated with slow function assumed to be early ACR (low tacro levels) treated with solumedrol 750mgs.  Pt also had Cdiff treated with vancomycin.   \par \par Had 24 hour urine which revealed high uric acid, phosphate and low citrate.  She is now on allopurinol.  \par \par Developed Covid on December 21st 2021and received Regeneron MAB.   [de-identified] : Pt had prolonged hospitalization from 9/15-9/26/22 with diarrhea, hypoxia, headache, muscle pains and mild STEPHENIE.  Required oxygen, had CTA negative for PE, had echo which was ok.  Prolonged w/u did not reveal a cause, bacterial cultures negative, fungal and viral studies negative.  Pt was on oxygen then eventually weaned off.  Today not on oxygen but still has dyspnea on exertion, some fatigue, some headache and right flank pain.  CT was negative in hospital.

## 2022-10-07 NOTE — PHYSICAL EXAM
[No Acute Distress] : no acute distress [III] : Mallampati Class: III [Normal Appearance] : normal appearance [Normal Rate/Rhythm] : normal rate/rhythm [Normal S1, S2] : normal s1, s2 [No Murmurs] : no murmurs [No Resp Distress] : no resp distress [Clear to Auscultation Bilaterally] : clear to auscultation bilaterally [No Clubbing] : no clubbing [No Cyanosis] : no cyanosis [No Edema] : no edema [Oriented x3] : oriented x3 [Normal Affect] : normal affect [TextBox_44] : increased neck size

## 2022-10-07 NOTE — ASSESSMENT
[FreeTextEntry1] : 51 year old woman with histroy of renal transplant maintained on immunosuppressives, hypertension, DMII, obesity recently admitted with sepsis, found to have bibasilar opacities on Chest CT.  She has signs and symptoms that are very suggestive of ANDRIY and had hypercapnea in the hospital.  Currently recovering, but still very sleepy and experiencing exertional dyspnea which is multifactorial.\par \par Plan:\par 1- in lab PSG ordered with transcutaneous CO2 monitoring\par 2- SEeing Dr. Sorenson one of our sleep specialists sfor evaluation in a few weeks\par 3- complete PFT\par 4- F/U chest CT in November to document resolution of opacities\par F/U with me after above.

## 2022-10-07 NOTE — HISTORY OF PRESENT ILLNESS
[TextBox_4] : 51 woman with PMHX ESRD 2/2 PCKD s/p BL Nephrectomy/Single Donor Renal Transplant 2018, Post Transplant DM, HTN, HLD, GERD, MDD, Anxiety presented to [facility] ER on 9/14/2022 with c/o body aches, R flank pain, fevers/chills, nausea/vomiting, diarrhea. In [facility] ED Tmax 102.2F and leukocytosis. Lactate negative. Cultures sent. Given broad spectrum empiric IV ABX and IVF. \par Transferred to [facility] for further workup of sepsis. \par \par - Infectious diease consulted.  Cultures and GI PCR negative.  Completed course \par of empiric cefepime. - CTA done with concern for PE with increasing O2 requirements. Negative for PE but showed bibasilar opacities and small bilateral pleural effusions.  This was personally reviewed by me.  Cr was mildly elevated at the time which may have accounted for effusions and we thought she might have diastolic dysfunction. \par \par - TTE with trace pericardial effusion, EF 75%, otherwise normal \par - Diuresed for pulmonary edema \par \par We saw her in the hospital - she was started on Zosyn x 5 days for possible superimposed PNA.  Recommended outpatient ANDRIY work-up. \par - Symptoms improved. O2 was weaned to off. Saturations 94% while ambulating. \par - C/O Headaches. Neuro consulted and felt to be secondary to migrainous versus \par tension.  CT head negative. No other deficits \par - Creatinine 1.42 on day of discharge \par \par \par Currently still experiencing exertional dyspnea and difficulty while wearing the mask.  Did not have this issue prehospitalization.\par \par In the hospital had bibasilar pneumonia, cough is improved minimally productive. Denies fever.  Has HA uses icepack over her head. \par \par CT Chest showed bibasilar opacities but also bilateral pleural effusions.  Cr. 1.51\par \par She is very sleepy and snores.  Scored 19 on the [facility].  \par

## 2022-10-07 NOTE — ASSESSMENT
[FreeTextEntry1] : 1.  Renal transplant - also with b/l native nephrectomies.  Early on likely ACR treated with solumedrol 750mgs x3.  Creatinine recently increased in setting of hospitalization - discharged with creatinine of 1.4.  Will repeat labs today.  \par 2.  Immunosuppression - target tacro 5-7, imuran 100mgs daily and pred 2.5mgs daily for NODAT and weight gain.  Change from myfortic to imuran 100mgs daily for diarrhea.   Stopped allopurinol over a month ago. \par 3.  HTN-  BP controlled.  Losartan held in hospital due to STEPHENIE.  REassess today. \par 4.  Transaminitis - Pt saw Dr. Norton - Has fatty liver/ WATKINS.  Stable. \par 5.  Diarrhea - w/u was negative in hospital.  Has now resolved. \par 6.  NODAT - Now on insulin.  Sugars improving.  Following with endocrinology.  \par 7. Brain aneurysm - stable.  following with neurosurg.  \par 9. Health maintenance - discussed need for annual skin cancer screening, vaccinations and GYN care.  \par 10.  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  Native kidneys were removed.  No stones in transplant and UA normal.  Will hold allopurinol and repeat 24 hour urine in the future.  \par 11.  Covid19 - pt has recovered.  \par 12.  Edema - mainly right leg, no DVT on sonogram.  May be due to nifedipine. \par 13.  Viral pneumonnia - no clear etiology found.  She will f/u with pulmonary today.  Still does not feel quite normal, has dyspnea on exertion, weakness and difficulty concentrating. \par \par f/u in 1 month to reassess

## 2022-10-10 ENCOUNTER — APPOINTMENT (OUTPATIENT)
Dept: ANTEPARTUM | Facility: CLINIC | Age: 52
End: 2022-10-10

## 2022-10-10 ENCOUNTER — APPOINTMENT (OUTPATIENT)
Dept: OBGYN | Facility: CLINIC | Age: 52
End: 2022-10-10

## 2022-10-10 ENCOUNTER — ASOB RESULT (OUTPATIENT)
Age: 52
End: 2022-10-10

## 2022-10-10 VITALS
TEMPERATURE: 97.6 F | BODY MASS INDEX: 36.37 KG/M2 | DIASTOLIC BLOOD PRESSURE: 64 MMHG | WEIGHT: 240 LBS | SYSTOLIC BLOOD PRESSURE: 124 MMHG | HEIGHT: 68 IN

## 2022-10-10 DIAGNOSIS — N83.209 UNSPECIFIED OVARIAN CYST, UNSPECIFIED SIDE: ICD-10-CM

## 2022-10-10 PROCEDURE — 99213 OFFICE O/P EST LOW 20 MIN: CPT

## 2022-10-10 PROCEDURE — 76830 TRANSVAGINAL US NON-OB: CPT

## 2022-10-11 ENCOUNTER — NON-APPOINTMENT (OUTPATIENT)
Age: 52
End: 2022-10-11

## 2022-10-11 ENCOUNTER — APPOINTMENT (OUTPATIENT)
Dept: CARDIOLOGY | Facility: CLINIC | Age: 52
End: 2022-10-11

## 2022-10-11 VITALS
BODY MASS INDEX: 37.89 KG/M2 | DIASTOLIC BLOOD PRESSURE: 68 MMHG | SYSTOLIC BLOOD PRESSURE: 130 MMHG | OXYGEN SATURATION: 93 % | RESPIRATION RATE: 16 BRPM | HEART RATE: 75 BPM | HEIGHT: 68 IN | TEMPERATURE: 97.7 F | WEIGHT: 250 LBS

## 2022-10-11 DIAGNOSIS — N39.0 URINARY TRACT INFECTION, SITE NOT SPECIFIED: ICD-10-CM

## 2022-10-11 DIAGNOSIS — Z86.39 PERSONAL HISTORY OF OTHER ENDOCRINE, NUTRITIONAL AND METABOLIC DISEASE: ICD-10-CM

## 2022-10-11 DIAGNOSIS — Z94.0 KIDNEY TRANSPLANT STATUS: ICD-10-CM

## 2022-10-11 DIAGNOSIS — A41.9 SEPSIS, UNSPECIFIED ORGANISM: ICD-10-CM

## 2022-10-11 PROCEDURE — 99205 OFFICE O/P NEW HI 60 MIN: CPT

## 2022-10-11 PROCEDURE — 93000 ELECTROCARDIOGRAM COMPLETE: CPT

## 2022-10-11 RX ORDER — NIFEDIPINE 60 MG/1
60 TABLET, EXTENDED RELEASE ORAL DAILY
Qty: 90 | Refills: 3 | Status: DISCONTINUED | COMMUNITY
Start: 2022-10-07 | End: 2022-10-11

## 2022-10-11 NOTE — HISTORY OF PRESENT ILLNESS
[FreeTextEntry1] : bradycardia and hypertension and kidney transplant\par \par \par This is a 51 year old woman with history of Diabetes Mellitus , on insulin, kidney transplant, 2018, hypertension ,   here for .post hospital discharge.\par She had kidney trasnplant she had Adult polycystic kidney disease,in 2017 and she had sepsis, and kidney infection that lead to kidney injury, and she had CKD and ESRD needing hemodialyses , and she had a traplsnt in 2018,. \par After kidney trasnplant she had Diabetes Mellitus developed.  \par last month, sept 14th , nausea nad vomiting and diarrhea.  and UTi and sepsis and she was in [facility] . and she was transferred to [facility] for treatment of sepsis.  and she had pum edema, and bradycardia. and pericardial effusion. and she had stopped her beta blocker . and now that she istachcyardic it was restaerted. \par it took a while to wean off the oxygen. \par Currently dyspnea on exertion . when she ambulates.  sometimes she gets chest pain . throug her.    she is still seeing a pulmnoliogist. \par \par No somking. no alcojhol. no drugs. \par \par famyl hsitory \par Mother : Sick a sinuy and pacemker .\par father:  myocardial infarction . \par \par \par \par \par

## 2022-10-11 NOTE — REASON FOR VISIT
[Symptom and Test Evaluation] : symptom and test evaluation [FreeTextEntry1] : bradycardia and hypertension and kidney transplant

## 2022-10-11 NOTE — DISCUSSION/SUMMARY
[Patient] : the patient [Risks] : risks [Benefits] : benefits [Alternatives] : alternatives [With Me] : with me [___ Month(s)] : in [unfilled] month(s) [EKG obtained to assist in diagnosis and management of assessed problem(s)] : EKG obtained to assist in diagnosis and management of assessed problem(s) [FreeTextEntry1] : This is a 51 year old woman with history of Diabetes Mellitus , on insulin, kidney transplant, 2018, hypertension ,   here for .post hospital discharge.\par \par 1) hypertension :  ct coreg,. hydralazine. off losartan.  will stay off ARb for now. \par 2)  dyslipidemia : ct statins. \par 3)  kidney transplant: on lasix  f/u with nephrology. \par next visit we may do a coronary artery disease evaluation.

## 2022-10-12 LAB — BKV DNA SPEC QL NAA+PROBE: NOT DETECTED IU/ML

## 2022-10-13 ENCOUNTER — APPOINTMENT (OUTPATIENT)
Dept: FAMILY MEDICINE | Facility: CLINIC | Age: 52
End: 2022-10-13

## 2022-10-13 VITALS
TEMPERATURE: 98 F | RESPIRATION RATE: 16 BRPM | SYSTOLIC BLOOD PRESSURE: 138 MMHG | HEIGHT: 68 IN | DIASTOLIC BLOOD PRESSURE: 68 MMHG | BODY MASS INDEX: 37.89 KG/M2 | WEIGHT: 250 LBS | HEART RATE: 78 BPM | OXYGEN SATURATION: 94 %

## 2022-10-13 PROBLEM — Z13.31 SCREENING FOR DEPRESSION: Status: RESOLVED | Noted: 2019-04-17 | Resolved: 2022-10-13

## 2022-10-13 PROCEDURE — 36415 COLL VENOUS BLD VENIPUNCTURE: CPT

## 2022-10-13 PROCEDURE — 99396 PREV VISIT EST AGE 40-64: CPT | Mod: 25

## 2022-10-13 NOTE — ASSESSMENT
[FreeTextEntry1] : Pt here for CPE:\par \par HCM:\par -Lipid profile and HbA1c:\par -mammo: 2022\par -Gyn: 2022\par -Colonoscopy: 2022\par -Immunizationsl up to date. /Hold for flu shot\par \par \par # Pneumonia \par -RESOLVED.\par -Admitted to [facility] and transfer to [facility] 09/14/22\par -F/up with Pulmonology.\par -To repeat CT chest in Nov 2022\par \par # Type 2 diabetes mellitus \par -F/up with endo, Dr Berman \par \par # Hypertension \par -Continue Nifedipine.\par -reassume Carvedilol\par -F/up with cardiology\par \par # Renal transplant recipient \par -F/up with nephrology and transplant surgeon. \par \par # Imunosuppressive \par - Take medications as directed by your translant team. Never stop taking \par medications unless instructed by your transplant physician. \par \par # general Anxiety:\par -D/C Paxil.\par -Continue [id number] mg.\par -F/up in 3-4 weeks for dose assesment\par -Klonopin prn.\par \par

## 2022-10-13 NOTE — HEALTH RISK ASSESSMENT
[Good] : ~his/her~  mood as  good [Never] : Never [No] : In the past 12 months have you used drugs other than those required for medical reasons? No [0] : 1) Little interest or pleasure doing things: Not at all (0) [Patient reported mammogram was normal] : Patient reported mammogram was normal [Patient reported PAP Smear was normal] : Patient reported PAP Smear was normal [Patient declined bone density test] : Patient declined bone density test [Patient reported colonoscopy was normal] : Patient reported colonoscopy was normal [HIV test declined] : HIV test declined [Hepatitis C test declined] : Hepatitis C test declined [Alone] : lives alone [Employed] : employed [] :  [Significant Other] : lives with significant other [Fully functional (bathing, dressing, toileting, transferring, walking, feeding)] : Fully functional (bathing, dressing, toileting, transferring, walking, feeding) [Fully functional (using the telephone, shopping, preparing meals, housekeeping, doing laundry, using] : Fully functional and needs no help or supervision to perform IADLs (using the telephone, shopping, preparing meals, housekeeping, doing laundry, using transportation, managing medications and managing finances) [Smoke Detector] : smoke detector [Safety elements used in home] : safety elements used in home [Seat Belt] :  uses seat belt [With Patient/Caregiver] : , with patient/caregiver [Designated Healthcare Proxy] : Designated healthcare proxy [Name: ___] : Health Care Proxy's Name: [unfilled]  [Relationship: ___] : Relationship: [unfilled] [Reports changes in hearing] : Reports no changes in hearing [Reports changes in vision] : Reports no changes in vision [Reports changes in dental health] : Reports no changes in dental health [TB Exposure] : is not being exposed to tuberculosis [MammogramDate] : 07/22 [MammogramComments] : BIRAD 2 [PapSmearDate] : 10/22 [PapSmearComments] : Gyn: Dr ALVARADO [ColonoscopyDate] : 04/22 [ColonoscopyComments] : GI: Dr Jeff EASTMAN [de-identified] : On leave of [employer] until 11/2022 [FreeTextEntry2] : 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

## 2022-10-13 NOTE — HISTORY OF PRESENT ILLNESS
[FreeTextEntry1] : Annual physical [de-identified] : 51 woman with PMHX ESRD 2/2 PCKD s/p BL Nephrectomy/Single Donor Renal Transplant 2018, Post Transplant DM, HTN, HLD, GERD, MDD, Anxiety presented today for CPE.\par Admitted at [facility] 9/14/2022 and transfer to [facility] with Sepsis/ PNA.\par Seen by cardiology, Pulmonology, nephrology, Endocrinology.\par She reports not feeling well since 1-2 days.\par She is currently in leave of absence from work after recent hospitalization.\par

## 2022-10-14 NOTE — HISTORY OF PRESENT ILLNESS
[LMP unknown] : LMP unknown [N] : Patient reports normal menses [IUD] : has an intrauterine device [Y] : Positive pregnancy history [unknown] : Patient is unsure of the date of her LMP [Menarche Age: ____] : age at menarche was [unfilled] [No] : Patient does not have concerns regarding sex [Currently Active] : currently active [FreeTextEntry1] : ROLLY 50 yo  LMP unknown is here today for a sonogram follow up. She is doing well.\par \par Transvaginal sonogram results reviewed with patient. Results showed: homogenous uterus. Endometrial lining measures 4.9 mm with IUD seen in situ with 3D. RT ovary appears wnl. LT ovary has a clear cyst 2.8 cm. Multiple LT clear para ovary cyst (vs hydrosalpinx). 1.8 cm, 3.1 cm, 2.2 cm, and 1.6 cm. No free fluid noted. \par  [Mammogramdate] : 07/29/22 [TextBox_19] : BR2 [BreastSonogramDate] : 07/29/22 [TextBox_25] : BR2 [PapSmeardate] : 07/15/22 [TextBox_31] : NEG [HPVDate] : 07/15/22 [TextBox_78] : NEG [de-identified] : MIRENA [PGHxTotal] : 2 [[facility] Hospital[facility]Full[facility]] : 2 [[facility] Hospital[facility]Living] : 2

## 2022-10-14 NOTE — END OF VISIT
[FreeTextEntry3] : I, Sorin Kong solely acted as scribe for Dr. Eugenie Finn on 10/10/2022 \par All medical entries made by the Scribe were at my, Dr. Finn’s, direction and personally\par dictated by me on 10/10/2022 . I have reviewed the chart and agree that the record\par accurately reflects my personal performance of the history, physical exam, assessment and plan. I\par have also personally directed, reviewed, and agreed with the chart.

## 2022-10-14 NOTE — DISCUSSION/SUMMARY
[FreeTextEntry1] : Previous sono results reviewed in conjunction with today's sono findings. LT ovary was not seen previously, but now showed four LT clear ovarian cysts. Repeat imaging recommended in three months. Prescription for a transvaginal sonogram given.\par \par F/u in three months for pelvic sono or as needed.

## 2022-10-17 LAB
CHOLEST SERPL-MCNC: 217 MG/DL
HDLC SERPL-MCNC: 33 MG/DL
LDLC SERPL CALC-MCNC: NORMAL MG/DL
NONHDLC SERPL-MCNC: 184 MG/DL
TRIGL SERPL-MCNC: 513 MG/DL

## 2022-10-24 ENCOUNTER — APPOINTMENT (OUTPATIENT)
Dept: UROGYNECOLOGY | Facility: CLINIC | Age: 52
End: 2022-10-24

## 2022-10-24 VITALS
WEIGHT: 250 LBS | SYSTOLIC BLOOD PRESSURE: 122 MMHG | DIASTOLIC BLOOD PRESSURE: 70 MMHG | BODY MASS INDEX: 37.89 KG/M2 | HEIGHT: 68 IN

## 2022-10-24 DIAGNOSIS — N32.81 OVERACTIVE BLADDER: ICD-10-CM

## 2022-10-24 DIAGNOSIS — N39.46 MIXED INCONTINENCE: ICD-10-CM

## 2022-10-24 PROCEDURE — 99204 OFFICE O/P NEW MOD 45 MIN: CPT

## 2022-10-24 PROCEDURE — 81003 URINALYSIS AUTO W/O SCOPE: CPT | Mod: QW

## 2022-10-24 NOTE — DISCUSSION/SUMMARY
[FreeTextEntry1] : The patient was counseled regarding the pathophysiology of the above conditions. A total of approximately 60 minutes was spent on this visit, greater than 50% of which was spent on counseling. She was also counseled regarding the risks, benefits, indications, and alternatives of further evaluations studies, as well as various management options. She was given verbal and written information/education on pelvic floor muscle exercises, avoidance of dietary bladder irritants, and other strategies to improve bladder control. She was counseled regarding treatment options for stress urinary incontinence including pelvic floor PT, pessary placement and surgical management with midurethral sling. AUGS interactive tool was used to explain normal anatomy as well as alteration in urethral support associated with stress urinary incontinence. Midurethral sling placement as well as urethral bulking was discussed. Pharmacologic management of overactive bladder and urgency incontinence was discussed. After a detailed discussion, following management plan was outlined:\par 1. Trial of behavioral modification, bladder retraining and pelvic floor muscle therapy. \par 2. UA with micro and urine culture was ordered to exclude UTI.\par 3.  Night time fluid restriction and other strategies to decrease nocturia were reviewed.  She is scheduled to undergo a screening for sleep apnea\par 4. Discussed importance of maintaining optimal glycemic control in for bladder health.\par 5.  Urodynamic testing.  Due to her history of renal transplant, she is leaning towards a trial of pessary before considering mid urethral sling procedure for stress incontinence. \par 6.  Follow-up after urodynamic testing\par

## 2022-10-24 NOTE — REVIEW OF SYSTEMS
Pt states she woke with her left  shoulder hurting and reached into a cabinet and had a sharp pain across her back, since then she has not been able to take a deep breath. Onset was this morning.
[All Other ROS] : all other reviewed systems are negative

## 2022-10-24 NOTE — PHYSICAL EXAM
[Chaperone Present] : A chaperone was present in the examining room during all aspects of the physical examination [FreeTextEntry1] : General: Not in acute distress, alert and oriented x3.\par Neck: Supple. No lymphadenopathy. \par Skin: Color, texture, and turgor are normal. No acute rashes or lesions\par Abdomen: Soft, nontender, and nondistended. No obvious hepatosplenomegaly. No obvious hernias. A well-healed long midline vertical skin incision.\par Pelvic Exam: Normal external female genitalia. Saddle sensory exam S2 to S4 is intact. Perineal reflexes not visualized. Urethra is hypermobile without prolapse, exudates, or lesions. Cough stress test is POSITIVE. Post void residual was checked with I/O cath and was 40 cc of clear urine.  Wide genital hiatus.-appearing vaginal epithelium. No vaginal blood or discharge. Cervix without abnormal lesions. Bimanual exam reveals a small uterus in normal positioning. No adnexal masses or tenderness. Levator ani contraction is 2/5.  All vaginal compartments are well supported\par \par

## 2022-10-24 NOTE — HISTORY OF PRESENT ILLNESS
[FreeTextEntry1] : Patient is a 51-year-old para 2 ( x2) who was referred by Dr. Mcdonough (her transplant nephrologist) for evaluation and management of urinary incontinence. Patient's past medical history is significant for renal transplant (liver donar) by Dr. Bower in 2018 for polycystic kidney disease. \par Patient reports leaking urine with cough/ sneeze for "quite a while" but its getting worse over the past several months. She is now experiencing severe sudden onset of urgency and has urinary and fecal accidents while talking to people . She is wearing maxi pads 24 hrs most of the days. \par Nocturia: 2-3 times per night. She is getting tested for sleep apnea in December\par Daytime frequency: q 2 hrs\par Daily fluid intake: water, tea occasionally, soda occasionally\par Bowel symptoms: hx of rectal urgency while she was having chronic diarrhea while on Myfordic. She is no longer taking Myfordic and is not having as much rectal urgency or diarrhea\par \par GYN Hx: LMP: Has Mirena IUD since 2019, gets intermittent spotting. Hx of heavy bleeding-s/p D&C in 2019, had abnormal of abnormal pap in 2019-had colposcopy.  Pap smear from 2022 was normal . sees Dr. Finn\par Past medical surgery: Diabetes- insulin pump, obesity, hyperlipidemia, hypertension, brain aneurysm, chronic kidney disease, liver disease (WATKINS), chronic steroid use, hx of c-diff\par Past surgical history: Cholecystectomy in  at [facility], ventral hernia repair with mesh in  at [facility], renal transplant with bilateral native nephrectomies in 2018 by Dr. Geller and Dr. Sharonda Pate, ? hx of vesicoureteral reflux/ sepsis at 5-6 month age,  ankle and shoulder surgeries

## 2022-10-25 LAB
APPEARANCE: CLEAR
BACTERIA: NEGATIVE
BILIRUBIN URINE: NEGATIVE
BLOOD URINE: NEGATIVE
COLOR: YELLOW
GLUCOSE QUALITATIVE U: NEGATIVE
HYALINE CASTS: 8 /LPF
KETONES URINE: NEGATIVE
LEUKOCYTE ESTERASE URINE: NEGATIVE
MICROSCOPIC-UA: NORMAL
NITRITE URINE: NEGATIVE
PH URINE: 6
PROTEIN URINE: NEGATIVE
RED BLOOD CELLS URINE: 0 /HPF
SPECIFIC GRAVITY URINE: 1.01
SQUAMOUS EPITHELIAL CELLS: 2 /HPF
UROBILINOGEN URINE: NORMAL
WHITE BLOOD CELLS URINE: 0 /HPF

## 2022-10-26 LAB — BACTERIA UR CULT: NORMAL

## 2022-11-01 ENCOUNTER — NON-APPOINTMENT (OUTPATIENT)
Age: 52
End: 2022-11-01

## 2022-11-02 ENCOUNTER — OUTPATIENT (OUTPATIENT)
Dept: OUTPATIENT SERVICES | Facility: HOSPITAL | Age: 52
LOS: 1 days | End: 2022-11-02
Payer: COMMERCIAL

## 2022-11-02 ENCOUNTER — APPOINTMENT (OUTPATIENT)
Dept: CT IMAGING | Facility: CLINIC | Age: 52
End: 2022-11-02

## 2022-11-02 DIAGNOSIS — Z94.0 KIDNEY TRANSPLANT STATUS: Chronic | ICD-10-CM

## 2022-11-02 DIAGNOSIS — Z98.51 TUBAL LIGATION STATUS: Chronic | ICD-10-CM

## 2022-11-02 DIAGNOSIS — R06.00 DYSPNEA, UNSPECIFIED: ICD-10-CM

## 2022-11-02 DIAGNOSIS — Z98.890 OTHER SPECIFIED POSTPROCEDURAL STATES: Chronic | ICD-10-CM

## 2022-11-02 DIAGNOSIS — Z90.5 ACQUIRED ABSENCE OF KIDNEY: Chronic | ICD-10-CM

## 2022-11-02 DIAGNOSIS — N20.0 CALCULUS OF KIDNEY: Chronic | ICD-10-CM

## 2022-11-02 DIAGNOSIS — I77.0 ARTERIOVENOUS FISTULA, ACQUIRED: Chronic | ICD-10-CM

## 2022-11-02 DIAGNOSIS — Z90.49 ACQUIRED ABSENCE OF OTHER SPECIFIED PARTS OF DIGESTIVE TRACT: Chronic | ICD-10-CM

## 2022-11-02 PROCEDURE — 71250 CT THORAX DX C-: CPT | Mod: 26

## 2022-11-02 PROCEDURE — 71250 CT THORAX DX C-: CPT

## 2022-11-04 ENCOUNTER — APPOINTMENT (OUTPATIENT)
Dept: PULMONOLOGY | Facility: CLINIC | Age: 52
End: 2022-11-04

## 2022-11-04 PROCEDURE — 94729 DIFFUSING CAPACITY: CPT

## 2022-11-04 PROCEDURE — 94726 PLETHYSMOGRAPHY LUNG VOLUMES: CPT

## 2022-11-04 PROCEDURE — 94060 EVALUATION OF WHEEZING: CPT

## 2022-11-10 ENCOUNTER — APPOINTMENT (OUTPATIENT)
Dept: NEPHROLOGY | Facility: CLINIC | Age: 52
End: 2022-11-10

## 2022-11-10 VITALS
HEART RATE: 76 BPM | HEIGHT: 68 IN | RESPIRATION RATE: 16 BRPM | OXYGEN SATURATION: 96 % | SYSTOLIC BLOOD PRESSURE: 148 MMHG | DIASTOLIC BLOOD PRESSURE: 76 MMHG | TEMPERATURE: 97.9 F | WEIGHT: 244 LBS | BODY MASS INDEX: 36.98 KG/M2

## 2022-11-10 PROCEDURE — 99214 OFFICE O/P EST MOD 30 MIN: CPT

## 2022-11-10 NOTE — ASSESSMENT
[FreeTextEntry1] : 1.  Renal transplant - also with b/l native nephrectomies.  Early on likely ACR treated with solumedrol 750mgs x3.  Creatinine recently increased in setting of hospitalization - discharged with creatinine of 1.4.  last creatinine 1.3.  Will repeat labs today.  \par 2.  Immunosuppression - target tacro 5-7, imuran 100mgs daily and pred 5mgs daily for NODAT and weight gain.  \par 3.  HTN-  BP controlled.  Losartan held in hospital due to STEPHENIE.  \par 4.  Transaminitis - Pt saw Dr. Norton - Has fatty liver/ WATKINS.  Stable. \par 5.  Diarrhea - w/u was negative in hospital.  Has now resolved. \par 6.  NODAT - Now on insulin.  Sugars improving.  Following with endocrinology.  \par 7. Brain aneurysm - stable.  following with neurosurg.  \par 9. Health maintenance - discussed need for annual skin cancer screening, vaccinations and GYN care.  \par 10.  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  Native kidneys were removed.  No stones in transplant and UA normal.  Will hold allopurinol and repeat 24 hour urine in the future.  \par 11.  Covid19 - pt has recovered.  \par 12.  Edema - mainly right leg, no DVT on sonogram.  May be due to nifedipine. \par 13.  Viral pneumonia - no clear etiology found.  She is going for sleep study and PFT's.  Had CT chest revealing potential thoracic vertebral lesion.  Will check MRI.  She still does not feel quite normal, has dyspnea on exertion, weakness and difficulty concentrating.  Will see her again in 1 month to reassess.  If no improvement will send her to rheumatology.  \par \par f/u in 1 month to reassess

## 2022-11-10 NOTE — HISTORY OF PRESENT ILLNESS
[FreeTextEntry1] : Ms. Vanegas is a 47 y.o female with a history of PCKD s/p LURT from ex  and bilateral native nephrectomies on 4/2/18.\par Course was complicated with slow function assumed to be early ACR (low tacro levels) treated with solumedrol 750mgs.  Pt also had Cdiff treated with vancomycin.   \par \par Had 24 hour urine which revealed high uric acid, phosphate and low citrate.  She is now on allopurinol.  \par \par Developed Covid on December 21st 2021and received Regeneron MAB.  \par \par Pt had prolonged hospitalization from 9/15-9/26/22 with diarrhea, hypoxia, headache, muscle pains and mild STEPHENIE.  Required oxygen, had CTA negative for PE, had echo which was ok.  Prolonged w/u did not reveal a cause, bacterial cultures negative, fungal and viral studies negative.  Pt was on oxygen then eventually weaned off.   [de-identified] : Pt feels better but still has dyspnea with exertion.  Also still feels somewhat disoriented.  Has joint aches as well. DIarrhea has resolved.  She has seen pulmonary.

## 2022-11-11 DIAGNOSIS — J44.9 CHRONIC OBSTRUCTIVE PULMONARY DISEASE, UNSPECIFIED: ICD-10-CM

## 2022-11-13 ENCOUNTER — NON-APPOINTMENT (OUTPATIENT)
Age: 52
End: 2022-11-13

## 2022-11-13 LAB
ALBUMIN SERPL ELPH-MCNC: 4.7 G/DL
ALP BLD-CCNC: 58 U/L
ALT SERPL-CCNC: 14 U/L
ANION GAP SERPL CALC-SCNC: 12 MMOL/L
APPEARANCE: ABNORMAL
AST SERPL-CCNC: 15 U/L
BACTERIA: ABNORMAL
BASOPHILS # BLD AUTO: 0.07 K/UL
BASOPHILS NFR BLD AUTO: 0.6 %
BILIRUB SERPL-MCNC: 0.7 MG/DL
BILIRUBIN URINE: NEGATIVE
BLOOD URINE: NEGATIVE
BUN SERPL-MCNC: 22 MG/DL
CALCIUM SERPL-MCNC: 10.1 MG/DL
CALCIUM SERPL-MCNC: 10.1 MG/DL
CHLORIDE SERPL-SCNC: 100 MMOL/L
CMV DNA SPEC QL NAA+PROBE: NOT DETECTED IU/ML
CMVPCR LOG: NOT DETECTED LOG10IU/ML
CO2 SERPL-SCNC: 26 MMOL/L
COLOR: YELLOW
CREAT SERPL-MCNC: 1.42 MG/DL
CREAT SPEC-SCNC: 287 MG/DL
CREAT/PROT UR: 0.1 RATIO
EGFR: 45 ML/MIN/1.73M2
EOSINOPHIL # BLD AUTO: 0.29 K/UL
EOSINOPHIL NFR BLD AUTO: 2.6 %
GLUCOSE QUALITATIVE U: NEGATIVE
GLUCOSE SERPL-MCNC: 126 MG/DL
HCT VFR BLD CALC: 43.8 %
HGB BLD-MCNC: 14.7 G/DL
HYALINE CASTS: 4 /LPF
IMM GRANULOCYTES NFR BLD AUTO: 0.4 %
KETONES URINE: NEGATIVE
LDH SERPL-CCNC: 186 U/L
LEUKOCYTE ESTERASE URINE: NEGATIVE
LYMPHOCYTES # BLD AUTO: 2.24 K/UL
LYMPHOCYTES NFR BLD AUTO: 20.4 %
MAGNESIUM SERPL-MCNC: 1.8 MG/DL
MAN DIFF?: NORMAL
MCHC RBC-ENTMCNC: 33.6 GM/DL
MCHC RBC-ENTMCNC: 33.6 PG
MCV RBC AUTO: 100.2 FL
MICROSCOPIC-UA: NORMAL
MONOCYTES # BLD AUTO: 0.74 K/UL
MONOCYTES NFR BLD AUTO: 6.7 %
NEUTROPHILS # BLD AUTO: 7.6 K/UL
NEUTROPHILS NFR BLD AUTO: 69.3 %
NITRITE URINE: NEGATIVE
PARATHYROID HORMONE INTACT: 46 PG/ML
PH URINE: 6
PHOSPHATE SERPL-MCNC: 3 MG/DL
PLATELET # BLD AUTO: 216 K/UL
POTASSIUM SERPL-SCNC: 4.3 MMOL/L
PROT SERPL-MCNC: 7.1 G/DL
PROT UR-MCNC: 18 MG/DL
PROTEIN URINE: ABNORMAL
RBC # BLD: 4.37 M/UL
RBC # FLD: 14.3 %
RED BLOOD CELLS URINE: 2 /HPF
SODIUM SERPL-SCNC: 137 MMOL/L
SPECIFIC GRAVITY URINE: 1.03
SQUAMOUS EPITHELIAL CELLS: >27 /HPF
TACROLIMUS SERPL-MCNC: 4.4 NG/ML
URINE COMMENTS: NORMAL
UROBILINOGEN URINE: ABNORMAL
WBC # FLD AUTO: 10.98 K/UL
WHITE BLOOD CELLS URINE: 3 /HPF

## 2022-11-15 ENCOUNTER — APPOINTMENT (OUTPATIENT)
Dept: FAMILY MEDICINE | Facility: CLINIC | Age: 52
End: 2022-11-15

## 2022-11-15 LAB — BKV DNA SPEC QL NAA+PROBE: NOT DETECTED IU/ML

## 2022-11-15 PROCEDURE — 99212 OFFICE O/P EST SF 10 MIN: CPT | Mod: 95

## 2022-11-15 NOTE — ASSESSMENT
[FreeTextEntry1] : HCM:\par -mammo: 2022\par -Gyn: 2022\par -Colonoscopy: 2022\par -Immunizationsl up to date. /Hold for flu shot\par \par \par # Pneumonia \par -RESOLVED.\par -Admitted to [facility] and transfer to [facility] 09/14/22\par -F/up with Pulmonology.\par - CT chest in Nov 2022>left 3rd vertebral body lesion> pending MRI tomorrow\par \par # Type 2 diabetes mellitus \par -F/up with Dr Cullen potts \par -On Mounjaro\par \par # Hypertension \par -Continue Nifedipine.\par -reassume Carvedilol\par -F/up with cardiology\par \par # Renal transplant recipient \par -F/up with nephrology and transplant surgeon. \par \par # Imunosuppressive \par - Take medications as directed by your translant team. Never stop taking \par medications unless instructed by your transplant physician. \par \par # general Anxiety:\par -D/C Paxil.\par -Increase  [id number] mg.to 20mg\par -F/up in 3-4 weeks for dose assessment\par -Klonopin prn.\par \par  \par

## 2022-11-15 NOTE — COUNSELING
[Fall prevention counseling provided] : Fall prevention counseling provided [Adequate lighting] : Adequate lighting [No throw rugs] : No throw rugs [Behavioral health counseling provided] : Behavioral health counseling provided [None] : None [Good understanding] : Patient has a good understanding of lifestyle changes and steps needed to achieve self management goal

## 2022-11-15 NOTE — HEALTH RISK ASSESSMENT
[Never] : Never [No] : In the past 12 months have you used drugs other than those required for medical reasons? No [de-identified] : Nephrology, cardiology, GYN, ENDO

## 2022-11-15 NOTE — HISTORY OF PRESENT ILLNESS
[Home] : at home, [unfilled] , at the time of the visit. [Medical Office: ([facility])___] : at the medical office located in  [Verbal consent obtained from patient] : the patient, [unfilled] [FreeTextEntry1] : med refills [de-identified] : 51 woman with PMHX ESRD 2/2 PCKD s/p BL Nephrectomy/Single Donor Renal Transplant 2018, Post Transplant DM, HTN, HLD, GERD, MDD, Anxiety.\par Admitted at [facility] 9/14/2022 and transfer to [facility] with Sepsis/ PNA.\par Seen by cardiology, Pulmonology, nephrology, Endocrinology.\par She reports not feeling well since hospitalization with dyspnea, and fatigue.\par She is currently in leave of absence from work after recent hospitalization with Viral PNA>

## 2022-11-16 ENCOUNTER — APPOINTMENT (OUTPATIENT)
Dept: MRI IMAGING | Facility: CLINIC | Age: 52
End: 2022-11-16

## 2022-11-16 ENCOUNTER — OUTPATIENT (OUTPATIENT)
Dept: OUTPATIENT SERVICES | Facility: HOSPITAL | Age: 52
LOS: 1 days | End: 2022-11-16
Payer: COMMERCIAL

## 2022-11-16 DIAGNOSIS — Z90.49 ACQUIRED ABSENCE OF OTHER SPECIFIED PARTS OF DIGESTIVE TRACT: Chronic | ICD-10-CM

## 2022-11-16 DIAGNOSIS — Z94.0 KIDNEY TRANSPLANT STATUS: Chronic | ICD-10-CM

## 2022-11-16 DIAGNOSIS — I77.0 ARTERIOVENOUS FISTULA, ACQUIRED: Chronic | ICD-10-CM

## 2022-11-16 DIAGNOSIS — N20.0 CALCULUS OF KIDNEY: Chronic | ICD-10-CM

## 2022-11-16 DIAGNOSIS — Z98.890 OTHER SPECIFIED POSTPROCEDURAL STATES: Chronic | ICD-10-CM

## 2022-11-16 DIAGNOSIS — Z90.5 ACQUIRED ABSENCE OF KIDNEY: Chronic | ICD-10-CM

## 2022-11-16 DIAGNOSIS — Z00.8 ENCOUNTER FOR OTHER GENERAL EXAMINATION: ICD-10-CM

## 2022-11-16 DIAGNOSIS — Z98.51 TUBAL LIGATION STATUS: Chronic | ICD-10-CM

## 2022-11-16 DIAGNOSIS — Z94.0 KIDNEY TRANSPLANT STATUS: ICD-10-CM

## 2022-11-16 PROCEDURE — 72146 MRI CHEST SPINE W/O DYE: CPT

## 2022-11-16 PROCEDURE — 72146 MRI CHEST SPINE W/O DYE: CPT | Mod: 26

## 2022-11-17 ENCOUNTER — NON-APPOINTMENT (OUTPATIENT)
Age: 52
End: 2022-11-17

## 2022-11-18 ENCOUNTER — APPOINTMENT (OUTPATIENT)
Dept: MRI IMAGING | Facility: CLINIC | Age: 52
End: 2022-11-18

## 2022-11-18 NOTE — DISCHARGE NOTE ADULT - PHYSICIAN SECTION COMPLETE
Render In Strict Bullet Format?: No
Plan: Recommended seeing an oculoplastic surgeon for surgical removal of it grows bothersome.
Detail Level: Zone
Yes

## 2022-11-20 ENCOUNTER — APPOINTMENT (OUTPATIENT)
Dept: MRI IMAGING | Facility: CLINIC | Age: 52
End: 2022-11-20

## 2022-11-21 ENCOUNTER — APPOINTMENT (OUTPATIENT)
Dept: MRI IMAGING | Facility: CLINIC | Age: 52
End: 2022-11-21

## 2022-11-21 DIAGNOSIS — R91.8 OTHER NONSPECIFIC ABNORMAL FINDING OF LUNG FIELD: ICD-10-CM

## 2022-11-23 ENCOUNTER — APPOINTMENT (OUTPATIENT)
Dept: THORACIC SURGERY | Facility: CLINIC | Age: 52
End: 2022-11-23

## 2022-11-23 VITALS
RESPIRATION RATE: 16 BRPM | WEIGHT: 244 LBS | OXYGEN SATURATION: 94 % | DIASTOLIC BLOOD PRESSURE: 75 MMHG | SYSTOLIC BLOOD PRESSURE: 143 MMHG | HEART RATE: 85 BPM | HEIGHT: 68 IN | BODY MASS INDEX: 36.98 KG/M2

## 2022-11-23 DIAGNOSIS — M79.9 SOFT TISSUE DISORDER, UNSPECIFIED: ICD-10-CM

## 2022-11-23 PROCEDURE — 99205 OFFICE O/P NEW HI 60 MIN: CPT

## 2022-11-25 NOTE — PHYSICAL EXAM
[Fully active, able to carry on all pre-disease performance without restriction] : Status 0 - Fully active, able to carry on all pre-disease performance without restriction [General Appearance - Alert] : alert [General Appearance - In No Acute Distress] : in no acute distress [Sclera] : the sclera and conjunctiva were normal [PERRL With Normal Accommodation] : pupils were equal in size, round, and reactive to light [Extraocular Movements] : extraocular movements were intact [Outer Ear] : the ears and nose were normal in appearance [Oropharynx] : the oropharynx was normal [Neck Appearance] : the appearance of the neck was normal [Neck Cervical Mass (___cm)] : no neck mass was observed [Jugular Venous Distention Increased] : there was no jugular-venous distention [Thyroid Diffuse Enlargement] : the thyroid was not enlarged [Thyroid Nodule] : there were no palpable thyroid nodules [Auscultation Breath Sounds / Voice Sounds] : lungs were clear to auscultation bilaterally [Heart Rate And Rhythm] : heart rate was normal and rhythm regular [Heart Sounds] : normal S1 and S2 [Heart Sounds Gallop] : no gallops [Murmurs] : no murmurs [Heart Sounds Pericardial Friction Rub] : no pericardial rub [Examination Of The Chest] : the chest was normal in appearance [Chest Visual Inspection Thoracic Asymmetry] : no chest asymmetry [Diminished Respiratory Excursion] : normal chest expansion [2+] : left 2+ [No Abnormalities] : the abdominal aorta was not enlarged and no bruit was heard [Breast Appearance] : normal in appearance [Breast Palpation Mass] : no palpable masses [Bowel Sounds] : normal bowel sounds [Abdomen Soft] : soft [Abdomen Tenderness] : non-tender [Abdomen Mass (___ Cm)] : no abdominal mass palpated [Cervical Lymph Nodes Enlarged Posterior Bilaterally] : posterior cervical [Cervical Lymph Nodes Enlarged Anterior Bilaterally] : anterior cervical [Supraclavicular Lymph Nodes Enlarged Bilaterally] : supraclavicular [Axillary Lymph Nodes Enlarged Bilaterally] : axillary [Femoral Lymph Nodes Enlarged Bilaterally] : femoral [Inguinal Lymph Nodes Enlarged Bilaterally] : inguinal [No CVA Tenderness] : no ~M costovertebral angle tenderness [No Spinal Tenderness] : no spinal tenderness [Abnormal Walk] : normal gait [Nail Clubbing] : no clubbing  or cyanosis of the fingernails [Musculoskeletal - Swelling] : no joint swelling seen [Motor Tone] : muscle strength and tone were normal [Skin Color & Pigmentation] : normal skin color and pigmentation [Skin Turgor] : normal skin turgor [] : no rash [Deep Tendon Reflexes (DTR)] : deep tendon reflexes were 2+ and symmetric [No Focal Deficits] : no focal deficits [Sensation] : the sensory exam was normal to light touch and pinprick [Oriented To Time, Place, And Person] : oriented to person, place, and time [Impaired Insight] : insight and judgment were intact [Affect] : the affect was normal [Right Carotid Bruit] : no bruit heard over the right carotid [Left Carotid Bruit] : no bruit heard over the left carotid [Right Femoral Bruit] : no bruit heard over the right femoral artery [Left Femoral Bruit] : no bruit heard over the left femoral artery [FreeTextEntry1] : deferred

## 2022-11-25 NOTE — HISTORY OF PRESENT ILLNESS
[FreeTextEntry1] : Ms. ROLLY MEDEIROS, 51 year old female, former smoker (hx of 20 years, quit in 2018), w/ hx of ESRD 2/2 PCKD s/p BL Nephrectomy/Single Donor Renal Transplant 2018 (f/u with Dr. Miko Mcdonough), Post Transplant DM2 (f/u with Dr. Berman), HTN, HLD, GERD, MDD, and anxiety. Recently admitted to [facility] (9/14/2022) and transferred to [facility] with sepsis/ PNA. CT Chest revealed left 3rd vertebral body lesion.\par \par CT Chest on 09/17/2022:\par - Peribronchial thickening. Bibasilar proximal opacification. Multifocal patchy peripheral opacities in the bilateral upper lobes and apices. \par - Small bilateral pleural effusions, new compared to 9/15/2022. Interlobular septal thickening.\par - Left upper 2.6 x 1.7 cm to the left of the third thoracic vertebral body (image 82, series 6).\par \par CT Chest on 11/02/2022:\par - Interval resolution of interlobular septal thickening, bilateral pleural effusions, and bibasilar parenchymal opacification.\par - Bilateral upper lobe consolidative and patchy opacities identified on 9/17/2022 have resolved.\par - Right upper lobe 2.2 x 0.9 cm ground-glass nodular is identified (image 56, series 3). \par - Right apical ground-glass nodular opacity measuring 1.5 x 1 cm (image 42, series 3) is identified. It is difficult to determine if these nodular opacities were present on the prior exam.\par - Emphysema\par \par PFTs on 11/04/2022: FVC 3.69, 911%; FEV1 2.37, 74%; DLCO 20.3, 84%\par \par MR Thoracic Spine on 11/16/2022:\par - Soft tissue mass left lateral to the T3 vertebral body as imaging properties suggestive of a cyst, measures approximately 2.4 cm in height x 1.3 cm transverse x 1.9 cm  AP. The imaging features are likely benign, possibly incidental epithelial cyst versus conceivably cystic schwannoma. \par - Mild mid and lower thoracic degenerative disc disease is not associated with significant canal compromise\par \par Patient is here today for CT Sx consultation, referred by Dr. Miko Mcdonough for paravertebral soft tissue mass. Overall, she reports to be feeling well. Denies any chest pain, shortness of breath, cough, or hemoptysis.

## 2022-11-25 NOTE — ASSESSMENT
[FreeTextEntry1] : Ms. ROLLY MEDEIROS, 51 year old female, former smoker (hx of 20 years, quit in 2018), w/ hx of ESRD 2/2 PCKD s/p BL Nephrectomy/Single Donor Renal Transplant 2018 (f/u with Dr. Miko Mcdonough), Post Transplant DM2 (f/u with Dr. Berman), HTN, HLD, GERD, MDD, and anxiety. Recently admitted to [facility] (9/14/2022) and transferred to [facility] with sepsis/ PNA. CT Chest revealed left 3rd vertebral body lesion.\par \par I have independently reviewed the medical records and imaging at the time of this office consultation. CT and MRI reviewed with patient, appears to look more cystic. I recommend return to office in 6 months with repeat MRI thoracic spine without contrast. Continue follow up with pulm, neph, and PCP.\par No indication for surgery at present time.\par \par Recommendations reviewed with patient during this office visit, and all questions answered; Patient instructed on the importance of follow up and verbalizes understanding. \par \par \par I, MANDY Norton, personally performed the evaluation and management (E/M) services for this established patient. That E/M includes conducting the examination, assessing all new/exacerbated conditions, and establishing a new plan of care. Today, my ACP, James Plaza/ Martha Natarajan, were here to observe my evaluation and management services for this new problem/exacerbated condition to be followed going forward.\par \par

## 2022-11-25 NOTE — CONSULT LETTER
[Dear  ___] : Dear  [unfilled], [Courtesy Letter:] : I had the pleasure of seeing your patient, [unfilled], in my office today. [( Thank you for referring [unfilled] for consultation for _____ )] : Thank you for referring [unfilled] for consultation for [unfilled] [Please see my note below.] : Please see my note below. [Consult Closing:] : Thank you very much for allowing me to participate in the care of this patient.  If you have any questions, please do not hesitate to contact me. [Sincerely,] : Sincerely, [FreeTextEntry2] : Dr. Miko Mcdonough (neph transplant/ ref) [FreeTextEntry3] : Ronny James MD, FACS\par , Division of Thoracic Surgery\par [facility]\par Thoracic Surgery\par [facility]\par Department of Cardiovascular & Thoracic Surgery\par \par [employer] [employer] [facility] [employer] [facility]

## 2022-11-25 NOTE — DATA REVIEWED
[FreeTextEntry1] : I have independently reviewed the following: \par CT Chest on 09/17/2022\par CT Chest on 11/02/2022\par MR Thoracic Spine on 11/16/2022

## 2022-11-30 ENCOUNTER — APPOINTMENT (OUTPATIENT)
Dept: PULMONOLOGY | Facility: CLINIC | Age: 52
End: 2022-11-30

## 2022-11-30 VITALS
HEART RATE: 70 BPM | BODY MASS INDEX: 36.68 KG/M2 | SYSTOLIC BLOOD PRESSURE: 117 MMHG | WEIGHT: 242 LBS | RESPIRATION RATE: 16 BRPM | TEMPERATURE: 97.8 F | DIASTOLIC BLOOD PRESSURE: 74 MMHG | HEIGHT: 68 IN

## 2022-11-30 DIAGNOSIS — F51.3 SLEEPWALKING [SOMNAMBULISM]: ICD-10-CM

## 2022-11-30 DIAGNOSIS — R06.83 SNORING: ICD-10-CM

## 2022-11-30 PROCEDURE — 99214 OFFICE O/P EST MOD 30 MIN: CPT

## 2022-11-30 NOTE — REVIEW OF SYSTEMS
[Recent Wt Gain (___ Lbs)] : ~T recent [unfilled] lb weight gain [Dry Eyes] : dry eyes [Sore Throat] : sore throat [Dry Mouth] : dry mouth [Cough] : cough [SOB on Exertion] : sob on exertion [Chest Discomfort] : chest discomfort [Edema] : edema [Palpitations] : palpitations [Nausea] : nausea [Vomiting] : vomiting [Nocturia] : nocturia [Chronic Pain] : chronic pain [Headache] : headache [Numbness] : numbness [Depression] : depression [Anxiety] : anxiety [Diabetes] : diabetes [Obesity] : obesity [Negative] : Allergy/Immunology [Recent Wt Loss (___ Lbs)] : ~T no recent weight loss [Diarrhea] : no diarrhea [Panic Attacks] : no panic attacks [TextBox_3] : No menopause

## 2022-11-30 NOTE — ASSESSMENT
[FreeTextEntry1] : This is a 51-year-old female with significant past medical history of COPD, end-stage renal disease secondary to polycystic kidney disease status post renal transplant who comes in for an evaluation for possible sleep disordered breathing.\par \par #Sleep disordered breathing–patient has a high pretest probability for having obstructive sleep apnea.  She has loud snoring, witnessed apnea, nonrestorative sleep, frequent nocturnal awakenings, morning headaches, and excessive daytime sleepiness.  She also has risk factors for nocturnal hypoventilation.  She was sent by Dr. Curiel for an in lab polysomnography with transcutaneous CO2.  However, after discussing with the patient she is hesitant in going to the sleep lab multiple times.  Therefore I will convert her in lab polysomnography to split study with transcutaneous CO2.  My hope is that we can rectify her sleep disordered breathing with CPAP.  She was told that if she requires bilevel she will need to come for another study.  Though she is hesitant, she agrees to this plan.\par \par #Non-REM parasomnias–patient does indicate that she has a history of sleepwalking and sleep talking.  She has noticed multiple items in her house which has been moved around without her recollection.  Given the high pretest probability for sleep disordered breathing, this is likely a precipitating factor for her non-REM parasomnias.  Therefore will evaluate for parasomnias on her split study.\par \par Patient will follow up with me after she finishes her sleep study.

## 2022-11-30 NOTE — REASON FOR VISIT
Mallampati: Class II - soft palate, uvula, fauces visible. ASA: Class 3 - patient with severe systemic disease. [Follow-Up: _____] : a [unfilled] follow-up visit

## 2022-11-30 NOTE — PHYSICAL EXAM
[No Acute Distress] : no acute distress [Enlarged Base of the Tongue] : enlarged base of the tongue [III] : Mallampati Class: III [Normal Appearance] : normal appearance [No Neck Mass] : no neck mass [Normal Rate/Rhythm] : normal rate/rhythm [Normal S1, S2] : normal s1, s2 [No Resp Distress] : no resp distress [Clear to Auscultation Bilaterally] : clear to auscultation bilaterally [No Clubbing] : no clubbing [No Cyanosis] : no cyanosis [2+ Pitting] : 2+ pitting [Oriented x3] : oriented x3 [Normal Mood] : normal mood [Normal Affect] : normal affect

## 2022-11-30 NOTE — HISTORY OF PRESENT ILLNESS
[Former] : former [>= 20 pack years] : >= 20 pack years [Never] : never [Awakes Unrefreshed] : awakes unrefreshed [Awakes with Dry Mouth] : awakes with dry mouth [Awakes with Headache] : awakes with headache [Frequent Nocturnal Awakening] : frequent nocturnal awakening [Nonrestorative Sleep] : nonrestorative sleep [Tired while Driving] : tired while driving [Unusual Sleep Behavior] : unusual sleep behavior [Hypersomnolence] : hypersomnolence [Snoring] : snoring [TextBox_4] : This is a 51-year-old female with significant past medical history of COPD and end-stage renal disease secondary to polycystic kidney disease status post renal transplant, who comes in to [facility] care.  She was referred by Dr. Curiel. \par \par Patient indicates that she was told to follow-up with a sleep specialist as there is a high probability that she has sleep apnea.  Patient has been told for significant amount of time that she is a loud snorer and possible witnessed apnea.  Patient indicates that she feels sleepy throughout the day and does not feel as though she gets restorative sleep.  She does complain of excessive daytime sleepiness.\par \par She recently was admitted to the hospital in September 2022 for sepsis secondary to pneumonia.  Since that time her symptoms have gotten progressively worse and she does not feel back to baseline.  Given that she is on disability at this time, she would like to undergo a sleep study to determine if this is something that she needs to address\par ______________________________________________________________________________________ \par EPWORTH SLEEPINESS SCALE \par How likely are you to doze off or fall asleep in the situations described below, in contrast to feeling just tired? \par This refers to your usual way of life in recent times. \par Even if you haven't done some of these things recently, try to work out how they would have affected you. \par Use the following scale to choose one most appropriate number for each situation. \par \par 0 = Never would doze \par 1 = Slight chance of dozing \par 2 = Moderate chance of dozing \par 3 = High chance of dozing \par \par  \par 3........................................Sitting and reading \par 3........................................Watching TV \par 2........................................Sitting inactive in a public place (eg a theatre or a meeting) \par 2........................................As a passenger in a car for an hour without a break \par 3........................................Lying down to rest in the afternoon when circumstances permit \par 0........................................Sitting and talking to someone \par 1........................................Sitting quietly after lunch without alcohol \par 1........................................In a car, while stopped for a few minutes in traffic \par 15........................................TOTAL SCORE \par ______________________________________________________________________________________ \par \par  [Difficulty Initiating Sleep] : does not have difficulty initiating sleep [Witnessed Apneas] : no witnessed apneas [DIS] : does not have difficulty initiating sleep [DMS] : does not have difficulty maintaining sleep [Cataplexy] : no cataplexy [Sleep Paralysis] : no sleep paralysis [Hypnagogic Hallucinations] : no hypnagogic hallucinations [Hypnopompic Hallucinations] : no hypnopompic hallucinations [Lower Extremity Discomfort] : does not have lower extremity discomfort [Irresistible urge to move legs] : does not have irresistible urge to move legs [LE discomfort relieved by movement] : denies lower extremity discomfort relieved by movement [Late day/ Evening symptoms] : does not have late day/ evening symptoms [Sleep Disturbances due to LE symptoms] : denies sleep disturbances due to lower extremity symptoms [TextBox_77] : 11pm [TextBox_79] : 6:00am [TextBox_81] : less than 5 min [TextBox_89] : 3 [ESS] : 15

## 2022-12-06 ENCOUNTER — APPOINTMENT (OUTPATIENT)
Dept: GASTROENTEROLOGY | Facility: CLINIC | Age: 52
End: 2022-12-06

## 2022-12-08 ENCOUNTER — APPOINTMENT (OUTPATIENT)
Dept: PULMONOLOGY | Facility: CLINIC | Age: 52
End: 2022-12-08

## 2022-12-09 ENCOUNTER — RX RENEWAL (OUTPATIENT)
Age: 52
End: 2022-12-09

## 2022-12-12 ENCOUNTER — APPOINTMENT (OUTPATIENT)
Dept: NEPHROLOGY | Facility: CLINIC | Age: 52
End: 2022-12-12

## 2022-12-12 ENCOUNTER — RX RENEWAL (OUTPATIENT)
Age: 52
End: 2022-12-12

## 2022-12-12 VITALS
RESPIRATION RATE: 18 BRPM | SYSTOLIC BLOOD PRESSURE: 158 MMHG | WEIGHT: 234 LBS | OXYGEN SATURATION: 96 % | HEIGHT: 68 IN | DIASTOLIC BLOOD PRESSURE: 84 MMHG | HEART RATE: 70 BPM | TEMPERATURE: 98 F | BODY MASS INDEX: 35.46 KG/M2

## 2022-12-12 PROCEDURE — 99214 OFFICE O/P EST MOD 30 MIN: CPT

## 2022-12-12 RX ORDER — FUROSEMIDE 40 MG/1
40 TABLET ORAL DAILY
Qty: 90 | Refills: 3 | Status: DISCONTINUED | COMMUNITY
Start: 2022-10-06 | End: 2022-12-12

## 2022-12-12 NOTE — ASSESSMENT
[FreeTextEntry1] : 1.  Renal transplant - also with b/l native nephrectomies.  Early on likely ACR treated with solumedrol 750mgs x3.  Creatinine stable at 1.4. \par 2.  Immunosuppression - target tacro 5-7, imuran 100mgs daily and pred 5mgs daily for NODAT and weight gain.  \par 3.  HTN-  BP controlled.  Losartan held in hospital due to STEPHENIE.  \par 4.  Transaminitis - Pt saw Dr. Norton - Has fatty liver/ WATKINS.  Stable. \par 5.  Diarrhea - w/u was negative in hospital.  Has now resolved on imuran. \par 6.  NODAT - Now on insulin.  Sugars improving.  Following with endocrinology.  \par 7. Brain aneurysm - stable.  following with neurosurg.  \par 9. Health maintenance - discussed need for annual skin cancer screening, vaccinations and GYN care.  \par 10.  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  Native kidneys were removed.  No stones in transplant and UA normal.  Will hold allopurinol and repeat 24 hour urine in the future.  \par 11.  Dyspnea/ weakness/ fatigue - no clear etiology found.  Appears dehydrated today - stop lasix.  She is going for sleep study and PFT's.  May also need w/u for CAD - has seen cardiology.  She still does not feel quite normal, has dyspnea on exertion, weakness and difficulty concentrating.  Will see her again in 1 month to reassess.\par 12.  Thoracic lesion - Had CT chest revealing potential thoracic vertebral lesion.  She has seen Dr. James.  \par \par f/u in 1 month to reassess

## 2022-12-12 NOTE — HISTORY OF PRESENT ILLNESS
[FreeTextEntry1] : Ms. Vanegas is a 47 y.o female with a history of PCKD s/p LURT from ex  and bilateral native nephrectomies on 4/2/18.\par Course was complicated with slow function assumed to be early ACR (low tacro levels) treated with solumedrol 750mgs.  Pt also had Cdiff treated with vancomycin.   \par \par Had 24 hour urine which revealed high uric acid, phosphate and low citrate.  She is now on allopurinol.  \par \par Developed Covid on December 21st 2021and received Regeneron MAB.  \par \par Pt had prolonged hospitalization from 9/15-9/26/22 with diarrhea, hypoxia, headache, muscle pains and mild STEPHENIE.  Required oxygen, had CTA negative for PE, had echo which was ok.  Prolonged w/u did not reveal a cause, bacterial cultures negative, fungal and viral studies negative.  Pt was on oxygen then eventually weaned off.   [de-identified] : Pt feels better but still has dyspnea with exertion.  Also still feels disoriented.  Has joint aches as well. She has seen cardiology, pulmonary, sleep medicine.  Has dry mouth, skin feels dry and tires very quickly.

## 2022-12-12 NOTE — PHYSICAL EXAM
[General Appearance - Alert] : alert [General Appearance - Well Nourished] : well nourished [Sclera] : the sclera and conjunctiva were normal [Extraocular Movements] : extraocular movements were intact [Outer Ear] : the ears and nose were normal in appearance [Neck Appearance] : the appearance of the neck was normal [Neck Cervical Mass (___cm)] : no neck mass was observed [Jugular Venous Distention Increased] : there was no jugular-venous distention [Respiration, Rhythm And Depth] : normal respiratory rhythm and effort [Exaggerated Use Of Accessory Muscles For Inspiration] : no accessory muscle use [Auscultation Breath Sounds / Voice Sounds] : lungs were clear to auscultation bilaterally [Heart Rate And Rhythm] : heart rate was normal and rhythm regular [Heart Sounds Gallop] : no gallops [Heart Sounds] : normal S1 and S2 [Murmurs] : no murmurs [Heart Sounds Pericardial Friction Rub] : no pericardial rub [Bowel Sounds] : normal bowel sounds [Abdomen Soft] : soft [Abdomen Tenderness] : non-tender [Abdomen Mass (___ Cm)] : no abdominal mass palpated [Cervical Lymph Nodes Enlarged Posterior Bilaterally] : posterior cervical [Cervical Lymph Nodes Enlarged Anterior Bilaterally] : anterior cervical [Supraclavicular Lymph Nodes Enlarged Bilaterally] : supraclavicular [Axillary Lymph Nodes Enlarged Bilaterally] : axillary [No CVA Tenderness] : no ~M costovertebral angle tenderness [Abnormal Walk] : normal gait [Nail Clubbing] : no clubbing  or cyanosis of the fingernails [Musculoskeletal - Swelling] : no joint swelling seen [Skin Color & Pigmentation] : normal skin color and pigmentation [Skin Turgor] : normal skin turgor [] : no rash [Skin Lesions] : no skin lesions [Cranial Nerves] : cranial nerves 2-12 were intact [Oriented To Time, Place, And Person] : oriented to person, place, and time [Affect] : the affect was normal [Impaired Insight] : insight and judgment were intact [Mood] : the mood was normal [Edema] : there was no peripheral edema

## 2022-12-13 ENCOUNTER — NON-APPOINTMENT (OUTPATIENT)
Age: 52
End: 2022-12-13

## 2022-12-13 LAB
ALBUMIN SERPL ELPH-MCNC: 4.4 G/DL
ALP BLD-CCNC: 59 U/L
ALT SERPL-CCNC: 12 U/L
ANION GAP SERPL CALC-SCNC: 12 MMOL/L
AST SERPL-CCNC: 12 U/L
BASOPHILS # BLD AUTO: 0.06 K/UL
BASOPHILS NFR BLD AUTO: 0.5 %
BILIRUB SERPL-MCNC: 0.4 MG/DL
BUN SERPL-MCNC: 16 MG/DL
CALCIUM SERPL-MCNC: 10.2 MG/DL
CALCIUM SERPL-MCNC: 10.2 MG/DL
CHLORIDE SERPL-SCNC: 102 MMOL/L
CMV DNA SPEC QL NAA+PROBE: NOT DETECTED IU/ML
CMVPCR LOG: NOT DETECTED LOG10IU/ML
CO2 SERPL-SCNC: 27 MMOL/L
CREAT SERPL-MCNC: 1.49 MG/DL
CREAT SPEC-SCNC: 130 MG/DL
CREAT/PROT UR: 0.1 RATIO
EGFR: 42 ML/MIN/1.73M2
EOSINOPHIL # BLD AUTO: 0.3 K/UL
EOSINOPHIL NFR BLD AUTO: 2.5 %
GLUCOSE SERPL-MCNC: 80 MG/DL
HCT VFR BLD CALC: 43.9 %
HGB BLD-MCNC: 15 G/DL
IMM GRANULOCYTES NFR BLD AUTO: 0.3 %
LYMPHOCYTES # BLD AUTO: 2.17 K/UL
LYMPHOCYTES NFR BLD AUTO: 18.3 %
MAGNESIUM SERPL-MCNC: 2.1 MG/DL
MAN DIFF?: NORMAL
MCHC RBC-ENTMCNC: 33.9 PG
MCHC RBC-ENTMCNC: 34.2 GM/DL
MCV RBC AUTO: 99.3 FL
MONOCYTES # BLD AUTO: 0.88 K/UL
MONOCYTES NFR BLD AUTO: 7.4 %
NEUTROPHILS # BLD AUTO: 8.38 K/UL
NEUTROPHILS NFR BLD AUTO: 71 %
PARATHYROID HORMONE INTACT: 44 PG/ML
PHOSPHATE SERPL-MCNC: 3.6 MG/DL
PLATELET # BLD AUTO: 249 K/UL
POTASSIUM SERPL-SCNC: 4.5 MMOL/L
PROT SERPL-MCNC: 6.9 G/DL
PROT UR-MCNC: 7 MG/DL
RBC # BLD: 4.42 M/UL
RBC # FLD: 14.3 %
SODIUM SERPL-SCNC: 141 MMOL/L
TACROLIMUS SERPL-MCNC: 4.8 NG/ML
WBC # FLD AUTO: 11.83 K/UL

## 2022-12-14 LAB
APPEARANCE: ABNORMAL
BACTERIA: NEGATIVE
BILIRUBIN URINE: NEGATIVE
BKV DNA SPEC QL NAA+PROBE: NOT DETECTED IU/ML
BLOOD URINE: NEGATIVE
COLOR: YELLOW
GLUCOSE QUALITATIVE U: NEGATIVE
HYALINE CASTS: 3 /LPF
KETONES URINE: NEGATIVE
LEUKOCYTE ESTERASE URINE: NEGATIVE
MICROSCOPIC-UA: NORMAL
NITRITE URINE: NEGATIVE
PH URINE: 6
PROTEIN URINE: NEGATIVE
RED BLOOD CELLS URINE: 3 /HPF
SPECIFIC GRAVITY URINE: 1.01
SQUAMOUS EPITHELIAL CELLS: 15 /HPF
UROBILINOGEN URINE: NORMAL
WHITE BLOOD CELLS URINE: 2 /HPF

## 2022-12-14 RX ORDER — FAMOTIDINE 20 MG/1
20 TABLET, FILM COATED ORAL
Qty: 90 | Refills: 3 | Status: DISCONTINUED | COMMUNITY
Start: 2019-05-09 | End: 2022-12-14

## 2022-12-19 ENCOUNTER — OUTPATIENT (OUTPATIENT)
Dept: OUTPATIENT SERVICES | Facility: HOSPITAL | Age: 52
LOS: 1 days | End: 2022-12-19
Payer: COMMERCIAL

## 2022-12-19 ENCOUNTER — APPOINTMENT (OUTPATIENT)
Dept: SLEEP CENTER | Facility: CLINIC | Age: 52
End: 2022-12-19

## 2022-12-19 DIAGNOSIS — Z90.49 ACQUIRED ABSENCE OF OTHER SPECIFIED PARTS OF DIGESTIVE TRACT: Chronic | ICD-10-CM

## 2022-12-19 DIAGNOSIS — N20.0 CALCULUS OF KIDNEY: Chronic | ICD-10-CM

## 2022-12-19 DIAGNOSIS — Z98.51 TUBAL LIGATION STATUS: Chronic | ICD-10-CM

## 2022-12-19 DIAGNOSIS — Z94.0 KIDNEY TRANSPLANT STATUS: Chronic | ICD-10-CM

## 2022-12-19 DIAGNOSIS — Z98.890 OTHER SPECIFIED POSTPROCEDURAL STATES: Chronic | ICD-10-CM

## 2022-12-19 DIAGNOSIS — Z90.5 ACQUIRED ABSENCE OF KIDNEY: Chronic | ICD-10-CM

## 2022-12-19 DIAGNOSIS — I77.0 ARTERIOVENOUS FISTULA, ACQUIRED: Chronic | ICD-10-CM

## 2022-12-19 PROCEDURE — 95811 POLYSOM 6/>YRS CPAP 4/> PARM: CPT

## 2022-12-19 PROCEDURE — 95811 POLYSOM 6/>YRS CPAP 4/> PARM: CPT | Mod: 26

## 2022-12-20 RX ORDER — TIRZEPATIDE 2.5 MG/.5ML
2.5 INJECTION, SOLUTION SUBCUTANEOUS
Qty: 2 | Refills: 0 | Status: DISCONTINUED | COMMUNITY
Start: 2022-10-03 | End: 2022-12-20

## 2022-12-27 ENCOUNTER — RX RENEWAL (OUTPATIENT)
Age: 52
End: 2022-12-27

## 2023-01-06 ENCOUNTER — NON-APPOINTMENT (OUTPATIENT)
Age: 53
End: 2023-01-06

## 2023-01-09 ENCOUNTER — APPOINTMENT (OUTPATIENT)
Dept: ENDOCRINOLOGY | Facility: CLINIC | Age: 53
End: 2023-01-09
Payer: COMMERCIAL

## 2023-01-09 ENCOUNTER — APPOINTMENT (OUTPATIENT)
Dept: GASTROENTEROLOGY | Facility: CLINIC | Age: 53
End: 2023-01-09

## 2023-01-09 PROCEDURE — G0108 DIAB MANAGE TRN  PER INDIV: CPT

## 2023-01-10 ENCOUNTER — APPOINTMENT (OUTPATIENT)
Dept: ANTEPARTUM | Facility: CLINIC | Age: 53
End: 2023-01-10
Payer: COMMERCIAL

## 2023-01-10 ENCOUNTER — APPOINTMENT (OUTPATIENT)
Dept: OBGYN | Facility: CLINIC | Age: 53
End: 2023-01-10
Payer: COMMERCIAL

## 2023-01-10 ENCOUNTER — ASOB RESULT (OUTPATIENT)
Age: 53
End: 2023-01-10

## 2023-01-10 VITALS
WEIGHT: 234 LBS | DIASTOLIC BLOOD PRESSURE: 76 MMHG | HEIGHT: 68 IN | BODY MASS INDEX: 35.46 KG/M2 | SYSTOLIC BLOOD PRESSURE: 120 MMHG

## 2023-01-10 DIAGNOSIS — Z87.42 PERSONAL HISTORY OF OTHER DISEASES OF THE FEMALE GENITAL TRACT: ICD-10-CM

## 2023-01-10 DIAGNOSIS — N92.0 EXCESSIVE AND FREQUENT MENSTRUATION WITH REGULAR CYCLE: ICD-10-CM

## 2023-01-10 PROCEDURE — 76830 TRANSVAGINAL US NON-OB: CPT

## 2023-01-10 PROCEDURE — 99213 OFFICE O/P EST LOW 20 MIN: CPT

## 2023-01-12 PROBLEM — Z87.42 HISTORY OF POSTMENOPAUSAL BLEEDING: Status: RESOLVED | Noted: 2022-07-15 | Resolved: 2023-01-12

## 2023-01-12 PROBLEM — N92.0 SPOTTING: Status: RESOLVED | Noted: 2022-07-25 | Resolved: 2023-01-12

## 2023-01-12 NOTE — END OF VISIT
[FreeTextEntry3] : I, Sorin Kong solely acted as scribe for Dr. Eugenie Finn on 01/10/2023 \par All medical entries made by the Scribe were at my, Dr. Finn’s, direction and personally\par dictated by me on 01/10/2023 . I have reviewed the chart and agree that the record\par accurately reflects my personal performance of the history, physical exam, assessment and plan. I\par have also personally directed, reviewed, and agreed with the chart.

## 2023-01-12 NOTE — DISCUSSION/SUMMARY
[FreeTextEntry1] : Previous sono results reviewed in conjunction with today's sono findings. Lt ovary has two simple cysts 2.7 x 1.5 x 2.6 cm and 2.7 x 2.3 x 1.7 cm. Area of multiple simple cysts (vs Hydrosalpinx), unchanged from previous sono. Possible new paraovarian cyst seen posterior to uterus/left adnexa measuring 2.9 x 2.5 x 2.8 cm. Recommended repeat imaging in four months. Prescription for a transvaginal sonogram given. \par \par Patient aware that if she bleeds again she will need to come in for an EMB. \par \par F/u for pelvic sono or as needed.

## 2023-01-12 NOTE — HISTORY OF PRESENT ILLNESS
[N] : Patient reports normal menses [IUD] : has an intrauterine device [Tubal Occlusion] : has had a tubal occlusion [Y] : Patient is sexually active [Menarche Age: ____] : age at menarche was [unfilled] [No] : Patient does not have concerns regarding sex [Currently Active] : currently active [Mammogramdate] : 07/29/22 [TextBox_19] : BR 2  [BreastSonogramDate] : 07/29/22 [TextBox_25] : BR 2  [PapSmeardate] : 07/15/22 [TextBox_31] : NEG  [HPVDate] : 07/15/22 [TextBox_78] : NEG  [LMPDate] : 12/26/22 [de-identified] : MIGUEL 2019 [PGHxTotal] : 2 [[facility] : 2 [FreeTextEntry1] : 12/26/22

## 2023-01-16 ENCOUNTER — TRANSCRIPTION ENCOUNTER (OUTPATIENT)
Age: 53
End: 2023-01-16

## 2023-01-18 ENCOUNTER — APPOINTMENT (OUTPATIENT)
Dept: FAMILY MEDICINE | Facility: CLINIC | Age: 53
End: 2023-01-18
Payer: COMMERCIAL

## 2023-01-18 VITALS
HEART RATE: 100 BPM | DIASTOLIC BLOOD PRESSURE: 66 MMHG | BODY MASS INDEX: 34.71 KG/M2 | WEIGHT: 229 LBS | HEIGHT: 68 IN | SYSTOLIC BLOOD PRESSURE: 130 MMHG | OXYGEN SATURATION: 98 %

## 2023-01-18 PROCEDURE — 90662 IIV NO PRSV INCREASED AG IM: CPT

## 2023-01-18 PROCEDURE — 90471 IMMUNIZATION ADMIN: CPT

## 2023-01-18 NOTE — HISTORY OF PRESENT ILLNESS
[FreeTextEntry1] : PT HERE TODAY FOR HIGH FLU VACCINE.  [de-identified] : PT WITH MHX OF KIDNEY TRANSPLANT. ASYMPTOMATIC

## 2023-01-18 NOTE — ADDENDUM
[FreeTextEntry1] : HIGH FLUE VACCINE ADMINISTERED TODAY ON RIGHT DELTOID, VIS PROVIDED. PT ROSALINA.UNDERSTANDING.ROSAURA PÉREZ RN.

## 2023-01-19 ENCOUNTER — APPOINTMENT (OUTPATIENT)
Dept: NEPHROLOGY | Facility: CLINIC | Age: 53
End: 2023-01-19
Payer: COMMERCIAL

## 2023-01-19 VITALS
DIASTOLIC BLOOD PRESSURE: 88 MMHG | SYSTOLIC BLOOD PRESSURE: 158 MMHG | WEIGHT: 231 LBS | OXYGEN SATURATION: 97 % | BODY MASS INDEX: 35.01 KG/M2 | RESPIRATION RATE: 18 BRPM | HEIGHT: 68 IN | HEART RATE: 78 BPM

## 2023-01-19 PROCEDURE — 99214 OFFICE O/P EST MOD 30 MIN: CPT

## 2023-01-19 NOTE — HISTORY OF PRESENT ILLNESS
[FreeTextEntry1] : Ms. Vanegas is a 47 y.o female with a history of PCKD s/p LURT from ex  and bilateral native nephrectomies on 4/2/18.\par Course was complicated with slow function assumed to be early ACR (low tacro levels) treated with solumedrol 750mgs.  Pt also had Cdiff treated with vancomycin.   \par \par Had 24 hour urine which revealed high uric acid, phosphate and low citrate.  She is now on allopurinol.  \par \par Developed Covid on December 21st 2021and received Regeneron MAB.  \par \par Pt had prolonged hospitalization from 9/15-9/26/22 with diarrhea, hypoxia, headache, muscle pains and mild STEPHENIE.  Required oxygen, had CTA negative for PE, had echo which was ok.  Prolonged w/u did not reveal a cause, bacterial cultures negative, fungal and viral studies negative.  Pt was on oxygen then eventually weaned off.   [de-identified] : Pt feels better but still has dyspnea with exertion and fatigue.  Also still feels like she has brain fog.  Has joint aches as well. She has seen cardiology, pulmonary, sleep medicine.  Has dry mouth, skin feels dry and tires very quickly.

## 2023-01-19 NOTE — PHYSICAL EXAM
[General Appearance - Alert] : alert [General Appearance - Well Nourished] : well nourished [Sclera] : the sclera and conjunctiva were normal [Extraocular Movements] : extraocular movements were intact [Outer Ear] : the ears and nose were normal in appearance [Neck Appearance] : the appearance of the neck was normal [Neck Cervical Mass (___cm)] : no neck mass was observed [Jugular Venous Distention Increased] : there was no jugular-venous distention [Respiration, Rhythm And Depth] : normal respiratory rhythm and effort [Exaggerated Use Of Accessory Muscles For Inspiration] : no accessory muscle use [Auscultation Breath Sounds / Voice Sounds] : lungs were clear to auscultation bilaterally [Heart Rate And Rhythm] : heart rate was normal and rhythm regular [Heart Sounds] : normal S1 and S2 [Heart Sounds Gallop] : no gallops [Murmurs] : no murmurs [Heart Sounds Pericardial Friction Rub] : no pericardial rub [Edema] : there was no peripheral edema [Bowel Sounds] : normal bowel sounds [Abdomen Soft] : soft [Abdomen Tenderness] : non-tender [Abdomen Mass (___ Cm)] : no abdominal mass palpated [Cervical Lymph Nodes Enlarged Posterior Bilaterally] : posterior cervical [Cervical Lymph Nodes Enlarged Anterior Bilaterally] : anterior cervical [Supraclavicular Lymph Nodes Enlarged Bilaterally] : supraclavicular [Axillary Lymph Nodes Enlarged Bilaterally] : axillary [No CVA Tenderness] : no ~M costovertebral angle tenderness [Abnormal Walk] : normal gait [Nail Clubbing] : no clubbing  or cyanosis of the fingernails [Musculoskeletal - Swelling] : no joint swelling seen [Skin Color & Pigmentation] : normal skin color and pigmentation [Skin Turgor] : normal skin turgor [] : no rash [Skin Lesions] : no skin lesions [Cranial Nerves] : cranial nerves 2-12 were intact [Oriented To Time, Place, And Person] : oriented to person, place, and time [Impaired Insight] : insight and judgment were intact [Affect] : the affect was normal [Mood] : the mood was normal

## 2023-01-19 NOTE — ASSESSMENT
[FreeTextEntry1] : 1.  Renal transplant - also with b/l native nephrectomies.  Early on likely ACR treated with solumedrol 750mgs x3.  Creatinine stable at 1.4. \par 2.  Immunosuppression - target tacro 5-7, imuran 100mgs daily and pred 5mgs daily for NODAT and weight gain.  Will consider decreasing imuran to 50mgs as her symptoms started soon after imuran introduced.  However doubt related to imuran as she is not anemic.   \par 3.  HTN-  BP controlled.  Losartan held in hospital due to STEPHENIE.  \par 4.  Transaminitis - Pt saw Dr. Norton - Has fatty liver/ WATKINS.  Stable. \par 5.  Diarrhea - w/u was negative in hospital.  Has now resolved off of MMF.   \par 6.  NODAT - Now on insulin but recently held.  Sugars improving.  Following with endocrinology.  \par 7. Brain aneurysm - stable.  following with neurosurg.  \par 9. Health maintenance - discussed need for annual skin cancer screening, vaccinations and GYN care.  \par 10.  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  Native kidneys were removed.  No stones in transplant and UA normal. \par 11.  Dyspnea/ weakness/ fatigue - no clear etiology found. Getting better and no clear issues therefore ok to return to work.  Will send to rheumatology - ? post viral fibromyalgia?  \par 12.  Thoracic lesion - Had CT chest revealing potential thoracic vertebral lesion.  She has seen Dr. James.  \par \par f/u in 1 month to reassess\par Send to rheumatology, ? fibromyalgia.

## 2023-01-20 ENCOUNTER — NON-APPOINTMENT (OUTPATIENT)
Age: 53
End: 2023-01-20

## 2023-01-20 LAB
ALBUMIN SERPL ELPH-MCNC: 4.4 G/DL
ALP BLD-CCNC: 61 U/L
ALT SERPL-CCNC: 13 U/L
ANION GAP SERPL CALC-SCNC: 12 MMOL/L
APPEARANCE: ABNORMAL
AST SERPL-CCNC: 12 U/L
BACTERIA: NEGATIVE
BASOPHILS # BLD AUTO: 0.07 K/UL
BASOPHILS NFR BLD AUTO: 0.6 %
BILIRUB SERPL-MCNC: 0.6 MG/DL
BILIRUBIN URINE: NEGATIVE
BKV DNA SPEC QL NAA+PROBE: NOT DETECTED IU/ML
BLOOD URINE: ABNORMAL
BUN SERPL-MCNC: 23 MG/DL
CALCIUM SERPL-MCNC: 10.1 MG/DL
CALCIUM SERPL-MCNC: 10.1 MG/DL
CHLORIDE SERPL-SCNC: 100 MMOL/L
CMV DNA SPEC QL NAA+PROBE: NOT DETECTED IU/ML
CMVPCR LOG: NOT DETECTED LOG10IU/ML
CO2 SERPL-SCNC: 29 MMOL/L
COLOR: YELLOW
CREAT SERPL-MCNC: 1.38 MG/DL
CREAT SPEC-SCNC: 204 MG/DL
CREAT/PROT UR: 0.1 RATIO
EGFR: 46 ML/MIN/1.73M2
EOSINOPHIL # BLD AUTO: 0.26 K/UL
EOSINOPHIL NFR BLD AUTO: 2.2 %
ESTIMATED AVERAGE GLUCOSE: 120 MG/DL
GLUCOSE QUALITATIVE U: NEGATIVE
GLUCOSE SERPL-MCNC: 109 MG/DL
HBA1C MFR BLD HPLC: 5.8 %
HCT VFR BLD CALC: 48.2 %
HGB BLD-MCNC: 16.2 G/DL
HYALINE CASTS: 1 /LPF
IMM GRANULOCYTES NFR BLD AUTO: 0.5 %
KETONES URINE: NEGATIVE
LDH SERPL-CCNC: 175 U/L
LEUKOCYTE ESTERASE URINE: NEGATIVE
LYMPHOCYTES # BLD AUTO: 2.08 K/UL
LYMPHOCYTES NFR BLD AUTO: 18 %
MAGNESIUM SERPL-MCNC: 1.9 MG/DL
MAN DIFF?: NORMAL
MCHC RBC-ENTMCNC: 33.5 PG
MCHC RBC-ENTMCNC: 33.6 GM/DL
MCV RBC AUTO: 99.8 FL
MICROSCOPIC-UA: NORMAL
MONOCYTES # BLD AUTO: 0.68 K/UL
MONOCYTES NFR BLD AUTO: 5.9 %
NEUTROPHILS # BLD AUTO: 8.43 K/UL
NEUTROPHILS NFR BLD AUTO: 72.8 %
NITRITE URINE: NEGATIVE
PARATHYROID HORMONE INTACT: 43 PG/ML
PH URINE: 6
PHOSPHATE SERPL-MCNC: 2.4 MG/DL
PLATELET # BLD AUTO: 221 K/UL
POTASSIUM SERPL-SCNC: 4.1 MMOL/L
PROT SERPL-MCNC: 7.1 G/DL
PROT UR-MCNC: 12 MG/DL
PROTEIN URINE: NORMAL
RBC # BLD: 4.83 M/UL
RBC # FLD: 13.9 %
RED BLOOD CELLS URINE: 18 /HPF
SODIUM SERPL-SCNC: 141 MMOL/L
SPECIFIC GRAVITY URINE: 1.02
SQUAMOUS EPITHELIAL CELLS: 5 /HPF
TACROLIMUS SERPL-MCNC: 6.2 NG/ML
URATE SERPL-MCNC: 7.9 MG/DL
URINE COMMENTS: NORMAL
UROBILINOGEN URINE: NORMAL
WBC # FLD AUTO: 11.58 K/UL
WHITE BLOOD CELLS URINE: 2 /HPF

## 2023-01-24 NOTE — PROCEDURE NOTE - NSASSISTBY_GEN_A_CORE
Case: [id number] Date/Time: 01/24/23 1015    Procedures:       COLONOSCOPY (Anus)      GASTROSCOPY (Esophagus)    Anesthesia type: General    Pre-op diagnosis: Hematochezia    Location: [facility] ROOM 26 / SURGERY SAME DAY [facility]    Surgeons: Amy Zarate M.D.          Relevant Problems   ANESTHESIA   (positive) Sleep apnea      CARDIAC   (positive) AF (atrial fibrillation) (HCC)   (positive) Chronic combined systolic and diastolic congestive heart failure (HCC)   (positive) Coronary artery disease involving native coronary artery of native heart without angina pectoris, s/p 1V CABG 1992   (positive) Essential hypertension   (positive) Moderate to severe pulmonary hypertension (HCC)   (positive) Severe mitral regurgitation      GI   (positive) Gastroesophageal reflux disease without esophagitis         (positive) Hepatic steatosis   (positive) Liver cirrhosis secondary to GONZALEZ (nonalcoholic steatohepatitis) (HCC)   (positive) Stage 3b chronic kidney disease (HCC)      ENDO   (positive) Hypothyroidism   (positive) Postsurgical hypothyroidism       Physical Exam    Airway   Mallampati: II  TM distance: >3 FB  Neck ROM: full       Cardiovascular   Rhythm: irregular  Rate: normal     Dental - normal exam  (+) upper dentures, lower dentures           Pulmonary   Breath sounds clear to auscultation     Abdominal - normal exam     Neurological              Anesthesia Plan    ASA 3   ASA physical status 3 criteria: CAD/stents (> 3 months)    Plan - general and MAC       Airway plan will be natural airway          Induction: intravenous      Pertinent diagnostic labs and testing reviewed    Informed Consent:    Anesthetic plan and risks discussed with patient.    Use of blood products discussed with: whom consented to blood products.         
Alba Blackmon/PA
Myself

## 2023-01-30 DIAGNOSIS — G47.33 OBSTRUCTIVE SLEEP APNEA (ADULT) (PEDIATRIC): ICD-10-CM

## 2023-02-14 ENCOUNTER — APPOINTMENT (OUTPATIENT)
Dept: RHEUMATOLOGY | Facility: CLINIC | Age: 53
End: 2023-02-14
Payer: COMMERCIAL

## 2023-02-14 VITALS
RESPIRATION RATE: 18 BRPM | DIASTOLIC BLOOD PRESSURE: 69 MMHG | TEMPERATURE: 96.7 F | HEART RATE: 75 BPM | OXYGEN SATURATION: 94 % | SYSTOLIC BLOOD PRESSURE: 115 MMHG

## 2023-02-14 DIAGNOSIS — M25.50 PAIN IN UNSPECIFIED JOINT: ICD-10-CM

## 2023-02-14 DIAGNOSIS — R53.82 CHRONIC FATIGUE, UNSPECIFIED: ICD-10-CM

## 2023-02-14 PROCEDURE — 99204 OFFICE O/P NEW MOD 45 MIN: CPT

## 2023-02-14 NOTE — ASSESSMENT
[FreeTextEntry1] : 53 y/o female referred to rheumatology for joint pains and fatigue. \par Pt is s/p renal transplant in 4/2018 for PCKD. Pt is on allopurinol for high uric acid. \par Pt was hospitalized in 9/2022 for hypoxia, diarrhea, muscle pain, pericardial effusion without clear causes identified, possibly viral.\par Since then, pt has been fatigued, joint aches (worst in hips, hands, neck). Pt still has some SOB, chronic nausea. 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 Given pt did not have significant symptoms prior to the hospitalization, pt's symptoms are likely related to long term sequelae of the hospitalization. Low concern for underlying autoimmune diseases based on symptoms, exam.\par Discussed that these symptoms, just like post-COVID syndrome, chronic pain syndrome do not have diagnostic labwork or specific treatments. As long as diseases are ruled out, pt should be treated symptomatically while treating comorbidities such as ANDRIY and seeing if her symptoms improve with time.\par \par - Obtain labwork to evaluate for signs of inflammatory arthritis\par - Patient advised on stress reduction, sleep hygiene, physical activity and exercise (including yoga and Enoch-Chi), treatment of (mood disorders, IBS, migraines), cognitive behavioral therapy.\par - Continue follow up with sleep medicine for ANDRIY treatment\par - Will contact pt with results of above workup. If unremarkable, pt does not need to follow up regularly with rheumatology. RTC PRN.\par \par

## 2023-02-15 ENCOUNTER — NON-APPOINTMENT (OUTPATIENT)
Age: 53
End: 2023-02-15

## 2023-02-15 LAB
ALBUMIN SERPL ELPH-MCNC: 4.4 G/DL
ALP BLD-CCNC: 62 U/L
ALT SERPL-CCNC: 17 U/L
ANION GAP SERPL CALC-SCNC: 14 MMOL/L
AST SERPL-CCNC: 19 U/L
BASOPHILS # BLD AUTO: 0.04 K/UL
BASOPHILS NFR BLD AUTO: 0.4 %
BILIRUB SERPL-MCNC: 0.8 MG/DL
BUN SERPL-MCNC: 12 MG/DL
C3 SERPL-MCNC: 127 MG/DL
C4 SERPL-MCNC: 23 MG/DL
CALCIUM SERPL-MCNC: 9.8 MG/DL
CHLORIDE SERPL-SCNC: 105 MMOL/L
CO2 SERPL-SCNC: 25 MMOL/L
CREAT SERPL-MCNC: 1.21 MG/DL
CRP SERPL-MCNC: <3 MG/L
DSDNA AB SER-ACNC: <12 IU/ML
EGFR: 54 ML/MIN/1.73M2
EOSINOPHIL # BLD AUTO: 0.26 K/UL
EOSINOPHIL NFR BLD AUTO: 2.4 %
ERYTHROCYTE [SEDIMENTATION RATE] IN BLOOD BY WESTERGREN METHOD: 18 MM/HR
GLUCOSE SERPL-MCNC: 120 MG/DL
HCT VFR BLD CALC: 46.7 %
HGB BLD-MCNC: 16.1 G/DL
IMM GRANULOCYTES NFR BLD AUTO: 0.3 %
LYMPHOCYTES # BLD AUTO: 1.9 K/UL
LYMPHOCYTES NFR BLD AUTO: 17.2 %
MAN DIFF?: NORMAL
MCHC RBC-ENTMCNC: 34.5 GM/DL
MCHC RBC-ENTMCNC: 35.1 PG
MCV RBC AUTO: 101.7 FL
MONOCYTES # BLD AUTO: 0.71 K/UL
MONOCYTES NFR BLD AUTO: 6.4 %
NEUTROPHILS # BLD AUTO: 8.09 K/UL
NEUTROPHILS NFR BLD AUTO: 73.3 %
PLATELET # BLD AUTO: 217 K/UL
POTASSIUM SERPL-SCNC: 3.9 MMOL/L
PROT SERPL-MCNC: 6.7 G/DL
RBC # BLD: 4.59 M/UL
RBC # FLD: 13.9 %
RHEUMATOID FACT SER QL: 14 IU/ML
SODIUM SERPL-SCNC: 144 MMOL/L
THYROGLOB AB SERPL-ACNC: <20 IU/ML
THYROPEROXIDASE AB SERPL IA-ACNC: <10 IU/ML
WBC # FLD AUTO: 11.03 K/UL

## 2023-02-16 ENCOUNTER — NON-APPOINTMENT (OUTPATIENT)
Age: 53
End: 2023-02-16

## 2023-02-16 LAB
CCP AB SER IA-ACNC: <8 UNITS
ENA RNP AB SER IA-ACNC: 0.7 AL
ENA SM AB SER IA-ACNC: <0.2 AL
ENA SS-A AB SER IA-ACNC: <0.2 AL
ENA SS-B AB SER IA-ACNC: <0.2 AL
RF+CCP IGG SER-IMP: NEGATIVE

## 2023-02-21 ENCOUNTER — TRANSCRIPTION ENCOUNTER (OUTPATIENT)
Age: 53
End: 2023-02-21

## 2023-02-21 ENCOUNTER — NON-APPOINTMENT (OUTPATIENT)
Age: 53
End: 2023-02-21

## 2023-02-21 LAB
ANA PAT FLD IF-IMP: ABNORMAL
ANA SER IF-ACNC: ABNORMAL

## 2023-02-22 ENCOUNTER — APPOINTMENT (OUTPATIENT)
Dept: OBGYN | Facility: CLINIC | Age: 53
End: 2023-02-22
Payer: COMMERCIAL

## 2023-02-22 VITALS
HEIGHT: 68 IN | WEIGHT: 222 LBS | SYSTOLIC BLOOD PRESSURE: 124 MMHG | DIASTOLIC BLOOD PRESSURE: 80 MMHG | BODY MASS INDEX: 33.65 KG/M2

## 2023-02-22 DIAGNOSIS — Z23 ENCOUNTER FOR IMMUNIZATION: ICD-10-CM

## 2023-02-22 DIAGNOSIS — Z09 ENCOUNTER FOR FOLLOW-UP EXAMINATION AFTER COMPLETED TREATMENT FOR CONDITIONS OTHER THAN MALIGNANT NEOPLASM: ICD-10-CM

## 2023-02-22 DIAGNOSIS — Z11.52 ENCOUNTER FOR SCREENING FOR COVID-19: ICD-10-CM

## 2023-02-22 LAB
HCG UR QL: NEGATIVE
QUALITY CONTROL: YES

## 2023-02-22 PROCEDURE — 81025 URINE PREGNANCY TEST: CPT

## 2023-02-22 PROCEDURE — 58558Z: CUSTOM

## 2023-02-22 PROCEDURE — A4550 SURGICAL TRAYS: CPT

## 2023-02-22 PROCEDURE — 36415 COLL VENOUS BLD VENIPUNCTURE: CPT

## 2023-02-22 NOTE — HISTORY OF PRESENT ILLNESS
[N] : Patient reports normal menses [IUD] : has an intrauterine device [Tubal Occlusion] : has had a tubal occlusion [Y] : Patient is sexually active [Menarche Age: ____] : age at menarche was [unfilled] [No] : Patient does not have concerns regarding sex [Currently Active] : currently active [LMP unknown] : LMP unknown [unknown] : Patient is unsure of the date of her LMP [FreeTextEntry1] : \par 51 yo here for a hysteroscopy with biopsy secondary to abnormal bleeding. The bleeding is mostly after she has sex. [Mammogramdate] : 07/29/22 [TextBox_19] : BR 2  [BreastSonogramDate] : 07/29/22 [TextBox_25] : BR 2  [PapSmeardate] : 07/15/22 [TextBox_31] : NEG  [HPVDate] : 07/15/22 [TextBox_78] : NEG  [de-identified] : MIGUEL 2019 [PGHxTotal] : 2 [[facility] 2 [[facility]] : 2

## 2023-02-22 NOTE — PLAN
[FreeTextEntry1] : Recheck hormone panel today.\par \par Consider removal of IUD if FSH c/w menopause.\par \par IF EMB benign, consider vagifem for vaginal atrophy. Will call with results.\par \par Repeat pelvic sono schedule for May to F/U on cysts.

## 2023-02-27 LAB
BASOPHILS # BLD AUTO: 0.07 K/UL
BASOPHILS NFR BLD AUTO: 0.7 %
EOSINOPHIL # BLD AUTO: 0.24 K/UL
EOSINOPHIL NFR BLD AUTO: 2.5 %
FSH SERPL-MCNC: 35.2 IU/L
HCT VFR BLD CALC: 45.3 %
HGB BLD-MCNC: 15.4 G/DL
IMM GRANULOCYTES NFR BLD AUTO: 0.2 %
LH SERPL-ACNC: 22.1 IU/L
LYMPHOCYTES # BLD AUTO: 1.3 K/UL
LYMPHOCYTES NFR BLD AUTO: 13.8 %
MAN DIFF?: NORMAL
MCHC RBC-ENTMCNC: 34 GM/DL
MCHC RBC-ENTMCNC: 35.4 PG
MCV RBC AUTO: 104.1 FL
MONOCYTES # BLD AUTO: 0.72 K/UL
MONOCYTES NFR BLD AUTO: 7.6 %
NEUTROPHILS # BLD AUTO: 7.09 K/UL
NEUTROPHILS NFR BLD AUTO: 75.2 %
PLATELET # BLD AUTO: 202 K/UL
PROLACTIN SERPL-MCNC: 11.7 NG/ML
RBC # BLD: 4.35 M/UL
RBC # FLD: 13.9 %
TSH SERPL-ACNC: 1.66 UIU/ML
WBC # FLD AUTO: 9.44 K/UL

## 2023-02-28 ENCOUNTER — TRANSCRIPTION ENCOUNTER (OUTPATIENT)
Age: 53
End: 2023-02-28

## 2023-02-28 ENCOUNTER — APPOINTMENT (OUTPATIENT)
Dept: ORTHOPEDIC SURGERY | Facility: CLINIC | Age: 53
End: 2023-02-28
Payer: OTHER MISCELLANEOUS

## 2023-02-28 ENCOUNTER — NON-APPOINTMENT (OUTPATIENT)
Age: 53
End: 2023-02-28

## 2023-02-28 VITALS — BODY MASS INDEX: 33.65 KG/M2 | WEIGHT: 222 LBS | HEIGHT: 68 IN

## 2023-02-28 PROCEDURE — 73503 X-RAY EXAM HIP UNI 4/> VIEWS: CPT | Mod: RT

## 2023-02-28 PROCEDURE — 99072 ADDL SUPL MATRL&STAF TM PHE: CPT

## 2023-02-28 PROCEDURE — 99214 OFFICE O/P EST MOD 30 MIN: CPT

## 2023-02-28 PROCEDURE — 72100 X-RAY EXAM L-S SPINE 2/3 VWS: CPT

## 2023-02-28 NOTE — IMAGING
[No bony abnormalities] : No bony abnormalities [Right] : right hip with pelvis [There are no fractures, subluxations or dislocations. No significant abnormalities are seen] : There are no fractures, subluxations or dislocations. No significant abnormalities are seen [de-identified] : RIGHT HIP\par (-) Groin tenderness\par + Greater troch bursa tenderness\par + Buttock tenderness \par + Right sided lumbar pain \par Loss of ROM\par + Resisted SLR\par Pain with hip extension \par + Impingement \par NVI\par Non-antalgic gait w/o assistance

## 2023-02-28 NOTE — ASSESSMENT
[FreeTextEntry1] : 2/28/23: Acute lumbar radic and hip strain. No sig abnormalities noted on today's XR. Unable to take NSAIDs due to hx of kidney transplant. Discussed PT/HEP, massage, ice, and briefly CSI. She will inquire if she can increase her steroids. Cyclobenzaprine rx. OOW until next follow up in 2 weeks. Will consider MRI if symptoms fail to improve or worsen

## 2023-02-28 NOTE — HISTORY OF PRESENT ILLNESS
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 She felt immediate pain. States there has been some improvement since the initial incident. Admits to pain radiating down the right leg. Denies N/T, weakness, b/b incontinence. Tylenol, massage, ice, rest, and TEN unit has been somewhat beneficial. Has been OOW since the incident. hx of kidney transplant (avoids NSAIDs). She was initially seen at [facility] for this issue and had R hip XR taken (no available access today). [FreeTextEntry3] : 2/24/23

## 2023-03-02 ENCOUNTER — APPOINTMENT (OUTPATIENT)
Dept: NEPHROLOGY | Facility: CLINIC | Age: 53
End: 2023-03-02
Payer: COMMERCIAL

## 2023-03-02 VITALS
DIASTOLIC BLOOD PRESSURE: 74 MMHG | OXYGEN SATURATION: 95 % | TEMPERATURE: 97.8 F | HEIGHT: 68 IN | BODY MASS INDEX: 33.19 KG/M2 | HEART RATE: 59 BPM | SYSTOLIC BLOOD PRESSURE: 138 MMHG | WEIGHT: 219 LBS | RESPIRATION RATE: 18 BRPM

## 2023-03-02 PROCEDURE — 99214 OFFICE O/P EST MOD 30 MIN: CPT

## 2023-03-02 NOTE — ASSESSMENT
[FreeTextEntry1] : 1.  Renal transplant - also with b/l native nephrectomies.  Early on likely ACR treated with solumedrol 750mgs x3.  Creatinine stable at 1.4. \par 2.  Immunosuppression - target tacro 5-7, imuran 100mgs daily and pred 5mgs daily for NODAT and weight gain.  Will decrease imuran to 50mgs as her symptoms started soon after imuran introduced.  However doubt related to imuran as she is not anemic.   No MMF or myfortic due to diarrhea. \par 3.  HTN-  BP controlled.  Losartan held in hospital due to STEPHENIE.  \par 4.  Transaminitis - Pt saw Dr. Norton - Has fatty liver/ WATKINS.  Stable. \par 5.  Diarrhea - w/u was negative in hospital.  Has now resolved off of MMF.   \par 6.  NODAT - Now on insulin but recently held.  Sugars improving.  Following with endocrinology.  \par 7. Brain aneurysm - stable.  Has seen neurosurg.  Last MRA in 2020.  \par 9. Health maintenance - discussed need for annual skin cancer screening, vaccinations and GYN care.  \par 10.  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  Native kidneys were removed.  No stones in transplant and UA normal. \par 11.  Dyspnea/ weakness/ fatigue - no clear etiology found. Getting better and no clear issues therefore ok to return to work.  Will send to rheumatology - they suspect post viral myalgia.   \par 12.  Thoracic lesion - Had CT chest revealing potential thoracic vertebral lesion.  She has seen Dr. James.  \par \par f/u in 3 months.

## 2023-03-02 NOTE — HISTORY OF PRESENT ILLNESS
[FreeTextEntry1] : Ms. Vanegas is a 47 y.o female with a history of PCKD s/p LURT from ex  and bilateral native nephrectomies on 4/2/18.\par Course was complicated with slow function assumed to be early ACR (low tacro levels) treated with solumedrol 750mgs.  Pt also had Cdiff treated with vancomycin.   \par \par Had 24 hour urine which revealed high uric acid, phosphate and low citrate.  She is now on allopurinol.  \par \par Developed Covid on December 21st 2021and received Regeneron MAB.  \par \par Pt had prolonged hospitalization from 9/15-9/26/22 with diarrhea, hypoxia, headache, muscle pains and mild STEPHENIE.  Required oxygen, had CTA negative for PE, had echo which was ok.  Prolonged w/u did not reveal a cause, bacterial cultures negative, fungal and viral studies negative.  Pt was on oxygen then eventually weaned off.   [de-identified] : Back to work but still does not feel great.  Also hurt her hip. Saw rheumatology - w/u unremarkable.  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

## 2023-03-03 ENCOUNTER — NON-APPOINTMENT (OUTPATIENT)
Age: 53
End: 2023-03-03

## 2023-03-03 LAB
ALBUMIN SERPL ELPH-MCNC: 4.4 G/DL
ALP BLD-CCNC: 60 U/L
ALT SERPL-CCNC: 13 U/L
ANION GAP SERPL CALC-SCNC: 13 MMOL/L
APPEARANCE: CLEAR
AST SERPL-CCNC: 12 U/L
BACTERIA: NEGATIVE
BASOPHILS # BLD AUTO: 0.08 K/UL
BASOPHILS NFR BLD AUTO: 0.8 %
BILIRUB SERPL-MCNC: 0.5 MG/DL
BILIRUBIN URINE: ABNORMAL
BLOOD URINE: NEGATIVE
BUN SERPL-MCNC: 20 MG/DL
CALCIUM OXALATE CRYSTALS: NEGATIVE
CALCIUM SERPL-MCNC: 10.2 MG/DL
CALCIUM SERPL-MCNC: 10.2 MG/DL
CHLORIDE SERPL-SCNC: 102 MMOL/L
CMV DNA SPEC QL NAA+PROBE: NOT DETECTED IU/ML
CMVPCR LOG: NOT DETECTED LOG10IU/ML
CO2 SERPL-SCNC: 27 MMOL/L
COLOR: YELLOW
CREAT SERPL-MCNC: 1.32 MG/DL
EGFR: 49 ML/MIN/1.73M2
EOSINOPHIL # BLD AUTO: 0.3 K/UL
EOSINOPHIL NFR BLD AUTO: 2.9 %
GLUCOSE QUALITATIVE U: NEGATIVE
GLUCOSE SERPL-MCNC: 120 MG/DL
GRANULAR CASTS: 0 /LPF
HCT VFR BLD CALC: 45.4 %
HGB BLD-MCNC: 15.5 G/DL
HYALINE CASTS: 0 /LPF
IMM GRANULOCYTES NFR BLD AUTO: 0.4 %
KETONES URINE: NORMAL
LEUKOCYTE ESTERASE URINE: NEGATIVE
LYMPHOCYTES # BLD AUTO: 2.54 K/UL
LYMPHOCYTES NFR BLD AUTO: 25 %
MAN DIFF?: NORMAL
MCHC RBC-ENTMCNC: 34.1 GM/DL
MCHC RBC-ENTMCNC: 34.4 PG
MCV RBC AUTO: 100.9 FL
MICROSCOPIC-UA: NORMAL
MONOCYTES # BLD AUTO: 0.66 K/UL
MONOCYTES NFR BLD AUTO: 6.5 %
NEUTROPHILS # BLD AUTO: 6.55 K/UL
NEUTROPHILS NFR BLD AUTO: 64.4 %
NITRITE URINE: NEGATIVE
PARATHYROID HORMONE INTACT: 64 PG/ML
PH URINE: 6
PLATELET # BLD AUTO: 229 K/UL
POTASSIUM SERPL-SCNC: 4.7 MMOL/L
PROT SERPL-MCNC: 7 G/DL
PROTEIN URINE: NORMAL
RBC # BLD: 4.5 M/UL
RBC # FLD: 13.6 %
RED BLOOD CELLS URINE: 7 /HPF
SODIUM SERPL-SCNC: 141 MMOL/L
SPECIFIC GRAVITY URINE: 1.02
SQUAMOUS EPITHELIAL CELLS: 5 /HPF
TACROLIMUS SERPL-MCNC: 5.2 NG/ML
TRIPLE PHOSPHATE CRYSTALS: NEGATIVE
URIC ACID CRYSTALS: NEGATIVE
URINE COMMENTS: NORMAL
UROBILINOGEN URINE: NORMAL
WBC # FLD AUTO: 10.17 K/UL
WHITE BLOOD CELLS URINE: 5 /HPF

## 2023-03-06 ENCOUNTER — NON-APPOINTMENT (OUTPATIENT)
Age: 53
End: 2023-03-06

## 2023-03-06 LAB
BKV DNA SPEC QL NAA+PROBE: NOT DETECTED IU/ML
CORE LAB BIOPSY: NORMAL

## 2023-03-16 ENCOUNTER — FORM ENCOUNTER (OUTPATIENT)
Age: 53
End: 2023-03-16

## 2023-03-17 ENCOUNTER — APPOINTMENT (OUTPATIENT)
Dept: ORTHOPEDIC SURGERY | Facility: CLINIC | Age: 53
End: 2023-03-17
Payer: OTHER MISCELLANEOUS

## 2023-03-17 VITALS — WEIGHT: 219 LBS | BODY MASS INDEX: 33.19 KG/M2 | HEIGHT: 68 IN

## 2023-03-17 PROCEDURE — 99072 ADDL SUPL MATRL&STAF TM PHE: CPT

## 2023-03-17 PROCEDURE — 99213 OFFICE O/P EST LOW 20 MIN: CPT

## 2023-03-17 NOTE — ASSESSMENT
[FreeTextEntry1] : Previous doc:\par 2/28/23: Acute lumbar radic and hip strain. No sig abnormalities noted on today's XR. Unable to take NSAIDs due to hx of kidney transplant. Discussed PT/HEP, massage, ice, and briefly CSI. She will inquire if she can increase her steroids. Cyclobenzaprine rx. OOW until next follow up in 2 weeks. Will consider MRI if symptoms fail to improve or worsen\par \par 3/17/23: Less low back pain, more hip pain today - will cont PT and get MRI right hip eval labral tear, effusion.

## 2023-03-17 NOTE — IMAGING
[de-identified] : RIGHT HIP\par (-) Groin tenderness\par + Greater troch bursa tenderness\par + Buttock tenderness \par + Right sided lumbar pain \par Loss of ROM\par + Resisted SLR\par Pain with hip extension \par + Impingement \par NVI\par Non-antalgic gait w/o assistance

## 2023-03-17 NOTE — HISTORY OF PRESENT ILLNESS
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 She felt immediate pain. States there has been some improvement since the initial incident. Admits to pain radiating down the right leg. Denies N/T, weakness, b/b incontinence. Tylenol, massage, ice, rest, and TEN unit has been somewhat beneficial. Has been OOW since the incident. hx of kidney transplant (avoids NSAIDs). She was initially seen at [facility] for this issue and had R hip XR taken (no available access today). [FreeTextEntry3] : 2/24/23

## 2023-03-17 NOTE — DISCUSSION/SUMMARY
[de-identified] : The patient was advised of the diagnosis.  The natural history of the pathology was explained in full to the patient in layman's terms. All questions were answered.  The risks and benefits of surgical and non-surgical treatment alternatives were explained in full to the patient.\par

## 2023-03-19 ENCOUNTER — FORM ENCOUNTER (OUTPATIENT)
Age: 53
End: 2023-03-19

## 2023-03-20 ENCOUNTER — APPOINTMENT (OUTPATIENT)
Dept: OPHTHALMOLOGY | Facility: CLINIC | Age: 53
End: 2023-03-20

## 2023-03-21 ENCOUNTER — NON-APPOINTMENT (OUTPATIENT)
Age: 53
End: 2023-03-21

## 2023-03-21 ENCOUNTER — APPOINTMENT (OUTPATIENT)
Dept: OPHTHALMOLOGY | Facility: CLINIC | Age: 53
End: 2023-03-21
Payer: COMMERCIAL

## 2023-03-21 PROCEDURE — 92014 COMPRE OPH EXAM EST PT 1/>: CPT

## 2023-03-22 ENCOUNTER — RX RENEWAL (OUTPATIENT)
Age: 53
End: 2023-03-22

## 2023-03-27 ENCOUNTER — OUTPATIENT (OUTPATIENT)
Dept: OUTPATIENT SERVICES | Facility: HOSPITAL | Age: 53
LOS: 1 days | End: 2023-03-27
Payer: COMMERCIAL

## 2023-03-27 ENCOUNTER — RX RENEWAL (OUTPATIENT)
Age: 53
End: 2023-03-27

## 2023-03-27 ENCOUNTER — APPOINTMENT (OUTPATIENT)
Dept: SLEEP CENTER | Facility: CLINIC | Age: 53
End: 2023-03-27
Payer: COMMERCIAL

## 2023-03-27 DIAGNOSIS — I77.0 ARTERIOVENOUS FISTULA, ACQUIRED: Chronic | ICD-10-CM

## 2023-03-27 DIAGNOSIS — Z98.890 OTHER SPECIFIED POSTPROCEDURAL STATES: Chronic | ICD-10-CM

## 2023-03-27 DIAGNOSIS — Z90.49 ACQUIRED ABSENCE OF OTHER SPECIFIED PARTS OF DIGESTIVE TRACT: Chronic | ICD-10-CM

## 2023-03-27 DIAGNOSIS — Z90.5 ACQUIRED ABSENCE OF KIDNEY: Chronic | ICD-10-CM

## 2023-03-27 DIAGNOSIS — N20.0 CALCULUS OF KIDNEY: Chronic | ICD-10-CM

## 2023-03-27 DIAGNOSIS — Z94.0 KIDNEY TRANSPLANT STATUS: Chronic | ICD-10-CM

## 2023-03-27 DIAGNOSIS — Z98.51 TUBAL LIGATION STATUS: Chronic | ICD-10-CM

## 2023-03-27 PROCEDURE — 95811 POLYSOM 6/>YRS CPAP 4/> PARM: CPT | Mod: 26

## 2023-03-27 PROCEDURE — 95811 POLYSOM 6/>YRS CPAP 4/> PARM: CPT

## 2023-03-28 ENCOUNTER — APPOINTMENT (OUTPATIENT)
Dept: MRI IMAGING | Facility: CLINIC | Age: 53
End: 2023-03-28
Payer: OTHER MISCELLANEOUS

## 2023-03-28 ENCOUNTER — APPOINTMENT (OUTPATIENT)
Dept: MRI IMAGING | Facility: CLINIC | Age: 53
End: 2023-03-28

## 2023-03-28 ENCOUNTER — OUTPATIENT (OUTPATIENT)
Dept: OUTPATIENT SERVICES | Facility: HOSPITAL | Age: 53
LOS: 1 days | End: 2023-03-28
Payer: COMMERCIAL

## 2023-03-28 ENCOUNTER — RESULT REVIEW (OUTPATIENT)
Age: 53
End: 2023-03-28

## 2023-03-28 DIAGNOSIS — Z90.5 ACQUIRED ABSENCE OF KIDNEY: Chronic | ICD-10-CM

## 2023-03-28 DIAGNOSIS — M54.16 RADICULOPATHY, LUMBAR REGION: ICD-10-CM

## 2023-03-28 DIAGNOSIS — Z94.0 KIDNEY TRANSPLANT STATUS: Chronic | ICD-10-CM

## 2023-03-28 PROCEDURE — 72146 MRI CHEST SPINE W/O DYE: CPT

## 2023-03-28 PROCEDURE — 72146 MRI CHEST SPINE W/O DYE: CPT | Mod: 26

## 2023-03-28 PROCEDURE — 73721 MRI JNT OF LWR EXTRE W/O DYE: CPT | Mod: 26,RT

## 2023-03-28 PROCEDURE — 73721 MRI JNT OF LWR EXTRE W/O DYE: CPT

## 2023-03-29 ENCOUNTER — APPOINTMENT (OUTPATIENT)
Dept: CARDIOLOGY | Facility: CLINIC | Age: 53
End: 2023-03-29

## 2023-03-31 ENCOUNTER — RESULT CHARGE (OUTPATIENT)
Age: 53
End: 2023-03-31

## 2023-03-31 ENCOUNTER — APPOINTMENT (OUTPATIENT)
Dept: UROGYNECOLOGY | Facility: CLINIC | Age: 53
End: 2023-03-31
Payer: COMMERCIAL

## 2023-03-31 VITALS
SYSTOLIC BLOOD PRESSURE: 150 MMHG | DIASTOLIC BLOOD PRESSURE: 85 MMHG | WEIGHT: 217 LBS | HEIGHT: 69 IN | BODY MASS INDEX: 32.14 KG/M2

## 2023-03-31 DIAGNOSIS — R31.9 HEMATURIA, UNSPECIFIED: ICD-10-CM

## 2023-03-31 LAB
BILIRUB UR QL STRIP: NEGATIVE
CLARITY UR: CLEAR
COLLECTION METHOD: NORMAL
GLUCOSE UR-MCNC: NEGATIVE
HCG UR QL: 0.2 EU/DL
HGB UR QL STRIP.AUTO: NORMAL
KETONES UR-MCNC: NEGATIVE
LEUKOCYTE ESTERASE UR QL STRIP: NEGATIVE
NITRITE UR QL STRIP: NEGATIVE
PH UR STRIP: 6.5
PROT UR STRIP-MCNC: NEGATIVE
SP GR UR STRIP: 1.01

## 2023-03-31 PROCEDURE — 99213 OFFICE O/P EST LOW 20 MIN: CPT | Mod: 25

## 2023-03-31 PROCEDURE — 51701 INSERT BLADDER CATHETER: CPT | Mod: 59

## 2023-03-31 PROCEDURE — 81003 URINALYSIS AUTO W/O SCOPE: CPT | Mod: QW

## 2023-03-31 NOTE — HISTORY OF PRESENT ILLNESS
[Cystocele (Obstetric)] : no [Vaginal Wall Prolapse] : no [Rectal Prolapse] : no [Unable To Restrain Bowel Movement] : severe [Hematuria] : no [x3+] : nocturia three or more  times a night [Feelings Of Urinary Urgency] : no [Pain During Urination (Dysuria)] : no [Urinary Tract Infection] : moderate [Constipation Obstructed Defecation] : no [] : no [Incomplete Emptying Of Stool] : no [Pelvic Pain] : no [Vaginal Pain] : no [FreeTextEntry1] : \par Angeline presents for eval of PAOLA, she is using impressa and feels like it helps, she reports she is tired of using pads, she reports PAOLA/UUI, she reports frequency, urgency, nocturia 3-4 times, reports she is on chronic supression for infections, she reports she was told she has blood in the urine, no pelvic pain, no bulge, she is sexually active, at times it is uncomfortable \par \par \par she has an extensive medical history\par including renal transplant, b/l nephrectomies\par h/o polycystic kidney disease \par h/o DM- reports good control (NODAT), uses dexcom\par she is using maxi pad\par h/o sleep apnea \par \par \par has had multiple cystoscopies and stones

## 2023-03-31 NOTE — OB HISTORY
[Vaginal ___] : [unfilled] vaginal delivery(s) [unknown] : the patient is unsure of the date of her LMP [Last Pap Smear ___] : date of last pap smear was on [unfilled] [Abnormal Pap Smear] : normal pap smear [Taking Estrogens] : is not taking estrogen replacement [Abnormal Bleeding] : without bleeding [Sexually Active] : is not sexually active

## 2023-03-31 NOTE — DISCUSSION/SUMMARY
[FreeTextEntry1] : Angeline presents with projectile empty CST, small cystocele, no apical descent. Extensive medical history.  We discussed management options including observation, pelvic floor exercises with or without physical therapy, pessary, impressa, medications including imipramine and surgical management including urethral bulking agents, fascial sling, rosen and midurethral sling with mesh.\par \par IUGA PAOLA/PFE given to her.\par \par []  considering urethral bulking \par [] UDS

## 2023-03-31 NOTE — PHYSICAL EXAM
[Chaperone Present] : A chaperone was present in the examining room during all aspects of the physical examination [No Acute Distress] : in no acute distress [Well developed] : well developed [Well Nourished] : ~L well nourished [Oriented x3] : oriented to person, place, and time [Normal Memory] : ~T memory was ~L unimpaired [Normal Mood/Affect] : mood and affect are normal [No Edema] : ~T edema was not present [Warm and Dry] : was warm and dry to touch [Normal Gait] : gait was normal [Vulvar Atrophy] : vulvar atrophy [Labia Majora] : were normal [Labia Minora] : were normal [Normal Appearance] : general appearance was normal [No Bleeding] : there was no active vaginal bleeding [2] : 2 [Aa ____] : Aa [unfilled] [Ba ____] : Ba [unfilled] [C ____] : C [unfilled] [GH ____] : GH [unfilled] [PB ____] : PB [unfilled] [TVL ____] : TVL  [unfilled] [Ap ____] : Ap [unfilled] [Bp ____] : Bp [unfilled] [D ____] : D [unfilled] [Soft] :  the cervix was soft [Uterine Adnexae] : were not tender and not enlarged [Normal] : no abnormalities [Post Void Residual ____ml] : post void residual was [unfilled] ml [Cough] : no cough [Tenderness] : ~T no ~M abdominal tenderness observed [Distended] : not distended [Inguinal LAD] : no adenopathy was noted in the inguinal lymph nodes [FreeTextEntry3] : projectile empty CST, hypermobility

## 2023-03-31 NOTE — PROCEDURE
[Straight Catheterization] : insertion of a straight catheter [Urinary Frequency] : urinary frequency [Patient] : the patient [___ Fr Straight Tip] : a [unfilled] in Filipino straight tip catheter [None] : there were no complications with the catheter insertion [Clear] : clear [No Complications] : no complications [Tolerated Well] : the patient tolerated the procedure well [Post procedure instructions and information given] : Post procedure instructions and information were given and reviewed with patient. [0] : 0

## 2023-04-03 ENCOUNTER — APPOINTMENT (OUTPATIENT)
Dept: NEPHROLOGY | Facility: CLINIC | Age: 53
End: 2023-04-03

## 2023-04-03 DIAGNOSIS — N39.0 URINARY TRACT INFECTION, SITE NOT SPECIFIED: ICD-10-CM

## 2023-04-03 LAB
APPEARANCE: ABNORMAL
BACTERIA: NEGATIVE
BILIRUBIN URINE: NEGATIVE
BLOOD URINE: NEGATIVE
CALCIUM OXALATE CRYSTALS: ABNORMAL
COLOR: YELLOW
GLUCOSE QUALITATIVE U: NEGATIVE
HYALINE CASTS: 2 /LPF
KETONES URINE: NEGATIVE
LEUKOCYTE ESTERASE URINE: NEGATIVE
MICROSCOPIC-UA: NORMAL
NITRITE URINE: NEGATIVE
PH URINE: 6.5
PROTEIN URINE: NORMAL
RED BLOOD CELLS URINE: 3 /HPF
SPECIFIC GRAVITY URINE: 1.01
SQUAMOUS EPITHELIAL CELLS: 4 /HPF
UROBILINOGEN URINE: NORMAL
WHITE BLOOD CELLS URINE: 1 /HPF

## 2023-04-05 ENCOUNTER — APPOINTMENT (OUTPATIENT)
Dept: FAMILY MEDICINE | Facility: CLINIC | Age: 53
End: 2023-04-05
Payer: COMMERCIAL

## 2023-04-05 VITALS
SYSTOLIC BLOOD PRESSURE: 144 MMHG | TEMPERATURE: 97.2 F | WEIGHT: 210 LBS | DIASTOLIC BLOOD PRESSURE: 84 MMHG | RESPIRATION RATE: 14 BRPM | BODY MASS INDEX: 31.1 KG/M2 | OXYGEN SATURATION: 98 % | HEART RATE: 80 BPM | HEIGHT: 69 IN

## 2023-04-05 DIAGNOSIS — F41.1 GENERALIZED ANXIETY DISORDER: ICD-10-CM

## 2023-04-05 PROCEDURE — 99213 OFFICE O/P EST LOW 20 MIN: CPT

## 2023-04-05 RX ORDER — NITROFURANTOIN (MONOHYDRATE/MACROCRYSTALS) 25; 75 MG/1; MG/1
100 CAPSULE ORAL TWICE DAILY
Qty: 10 | Refills: 0 | Status: DISCONTINUED | COMMUNITY
Start: 2023-04-03 | End: 2023-04-05

## 2023-04-05 NOTE — HISTORY OF PRESENT ILLNESS
[FreeTextEntry1] : med refills [de-identified] : 52 woman with PMHX ESRD 2/2 PCKD s/p BL Nephrectomy/Single Donor Renal Transplant 2018, Post Transplant DM, HTN, HLD, GERD, MDD, Anxiety.\par Admitted at [facility] 9/14/2022 and transfer to [facility] with Sepsis/ PNA.\par Seen by cardiology, Pulmonology, nephrology, Endocrinology. and now URO\par Here for med refill

## 2023-04-05 NOTE — HEALTH RISK ASSESSMENT
[No] : In the past 12 months have you used drugs other than those required for medical reasons? No [Never] : Never [de-identified] : Nephrology, cardiology, GYN, ENDO

## 2023-04-05 NOTE — ASSESSMENT
[FreeTextEntry1] : HCM:\par -mammo: 2022\par -Gyn: 2022\par -Colonoscopy: 2022\par -Immunizationsl up to date. /Hold for flu shot\par \par \par # Pneumonia / ANDRIY\par -RESOLVED.\par -Admitted to [facility] and transfer to [facility] 09/14/22\par -F/up with Pulmonology.\par - CT chest in Nov 2022>left 3rd vertebral body lesion> pending MRI tomorrow\par -pending CPAP\par \par # Type 2 diabetes mellitus \par -F/up with Dr Cullen potts \par -On Mounjaro\par \par # Hypertension \par -Continue Nifedipine.\par -reassume Carvedilol\par -F/up with cardiology\par \par # Renal transplant recipient \par -F/up with nephrology and transplant surgeon. \par \par # Immunosuppressive \par - Take medications as directed by your transplant team. Never stop taking \par medications unless instructed by your transplant physician. \par \par # general Anxiety:\par -D/C Paxil.\par -Increase  lexapro 20mg\par -F/up in 3-4 weeks for dose assessment\par -Klonopin prn.\par \par # urinary Incontinence:\par -F/up with Urogyn\par \par  \par

## 2023-04-10 ENCOUNTER — APPOINTMENT (OUTPATIENT)
Dept: ENDOCRINOLOGY | Facility: CLINIC | Age: 53
End: 2023-04-10

## 2023-04-11 ENCOUNTER — TRANSCRIPTION ENCOUNTER (OUTPATIENT)
Age: 53
End: 2023-04-11

## 2023-04-12 DIAGNOSIS — G47.33 OBSTRUCTIVE SLEEP APNEA (ADULT) (PEDIATRIC): ICD-10-CM

## 2023-04-21 ENCOUNTER — NON-APPOINTMENT (OUTPATIENT)
Age: 53
End: 2023-04-21

## 2023-04-21 ENCOUNTER — APPOINTMENT (OUTPATIENT)
Dept: ORTHOPEDIC SURGERY | Facility: CLINIC | Age: 53
End: 2023-04-21
Payer: OTHER MISCELLANEOUS

## 2023-04-21 VITALS — BODY MASS INDEX: 31.1 KG/M2 | WEIGHT: 210 LBS | HEIGHT: 69 IN

## 2023-04-21 PROCEDURE — 99213 OFFICE O/P EST LOW 20 MIN: CPT | Mod: ACP

## 2023-04-21 NOTE — HISTORY OF PRESENT ILLNESS
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 She felt immediate pain. States there has been some improvement since the initial incident. Admits to pain radiating down the right leg. Denies N/T, weakness, b/b incontinence. Tylenol, massage, ice, rest, and TEN unit has been somewhat beneficial. Has been OOW since the incident. hx of kidney transplant (avoids NSAIDs). She was initially seen at [facility] for this issue and had R hip XR taken (no available access today).\par 3/17/23: Some improvement with PT but still pain.\par  [FreeTextEntry3] : 2/24/23  [FreeTextEntry5] : Angeline is a 52 year F, Here for MRI Results. would like to clearance to go back to work.

## 2023-04-21 NOTE — ASSESSMENT
[FreeTextEntry1] : Previous doc:\par 2/28/23: Acute lumbar radic and hip strain. No sig abnormalities noted on today's XR. Unable to take NSAIDs due to hx of kidney transplant. Discussed PT/HEP, massage, ice, and briefly CSI. She will inquire if she can increase her steroids. Cyclobenzaprine rx. OOW until next follow up in 2 weeks. Will consider MRI if symptoms fail to improve or worsen\par 3/17/23: Less low back pain, more hip pain today - will cont PT and get MRI right hip eval labral tear, effusion.\par \par 4/21 overall helpin with PT - MRI confirms muscle strain and mechanism of falling back is consistent with MRI findings \par may retrun to work with periods rest from standing

## 2023-04-21 NOTE — IMAGING
[de-identified] : RIGHT HIP\par (-) Groin tenderness\par + Greater troch bursa tenderness\par + Buttock tenderness \par + Right sided lumbar pain \par Loss of ROM\par + Resisted SLR\par Pain with hip extension \par + Impingement \par NVI\par Non-antalgic gait w/o assistance \par \par MRI  L hip \par Psoas and min muscle strain and small labral tear

## 2023-04-21 NOTE — DISCUSSION/SUMMARY
[de-identified] : The patient was advised of the diagnosis.  The natural history of the pathology was explained in full to the patient in layman's terms. All questions were answered.  The risks and benefits of surgical and non-surgical treatment alternatives were explained in full to the patient.\par

## 2023-04-25 ENCOUNTER — APPOINTMENT (OUTPATIENT)
Dept: PULMONOLOGY | Facility: CLINIC | Age: 53
End: 2023-04-25
Payer: COMMERCIAL

## 2023-04-25 PROCEDURE — 99213 OFFICE O/P EST LOW 20 MIN: CPT | Mod: 95

## 2023-04-25 NOTE — REASON FOR VISIT
[Home] : at home, [unfilled] , at the time of the visit. [Medical Office: ([facility])___] : at the medical office located in  [Follow-Up] : a follow-up visit [Sleep Apnea] : sleep apnea

## 2023-04-25 NOTE — HISTORY OF PRESENT ILLNESS
[Former] : former [>= 20 pack years] : >= 20 pack years [Never] : never [Obstructive Sleep Apnea] : obstructive sleep apnea [Awakes Unrefreshed] : awakes unrefreshed [Awakes with Dry Mouth] : awakes with dry mouth [Awakes with Headache] : awakes with headache [Frequent Nocturnal Awakening] : frequent nocturnal awakening [Nonrestorative Sleep] : nonrestorative sleep [Snoring] : snoring [Tired while Driving] : tired while driving [Unusual Sleep Behavior] : unusual sleep behavior [Hypersomnolence] : hypersomnolence [Lab] : lab [CPAP:] : CPAP [Nasal mask] : nasal mask [TextBox_4] : This is a 52-year-old female with significant past medical history of COPD and end-stage renal disease secondary to polycystic kidney disease status post renal transplant, who comes in for follow-up.  She recently underwent an in lab PAP titration and is here to review the results.  She indicates that she felt better using CPAP would like to know how she can continue using this.\par \par Of note:\par \par Patient indicates that she was told to follow-up with a sleep specialist as there is a high probability that she has sleep apnea.  Patient has been told for significant amount of time that she is a loud snorer and possible witnessed apnea.  Patient indicates that she feels sleepy throughout the day and does not feel as though she gets restorative sleep.  She does complain of excessive daytime sleepiness.\par \par She recently was admitted to the hospital in September 2022 for sepsis secondary to pneumonia.  Since that time her symptoms have gotten progressively worse and she does not feel back to baseline.  Given that she is on disability at this time, she would like to undergo a sleep study to determine if this is something that she needs to address\par ______________________________________________________________________________________ \par EPWORTH SLEEPINESS SCALE \par How likely are you to doze off or fall asleep in the situations described below, in contrast to feeling just tired? \par This refers to your usual way of life in recent times. \par Even if you haven't done some of these things recently, try to work out how they would have affected you. \par Use the following scale to choose one most appropriate number for each situation. \par \par 0 = Never would doze \par 1 = Slight chance of dozing \par 2 = Moderate chance of dozing \par 3 = High chance of dozing \par \par  \par 3........................................Sitting and reading \par 3........................................Watching TV \par 2........................................Sitting inactive in a public place (eg a theatre or a meeting) \par 2........................................As a passenger in a car for an hour without a break \par 3........................................Lying down to rest in the afternoon when circumstances permit \par 0........................................Sitting and talking to someone \par 1........................................Sitting quietly after lunch without alcohol \par 1........................................In a car, while stopped for a few minutes in traffic \par 15........................................TOTAL SCORE \par ______________________________________________________________________________________ \par \par  [Difficulty Initiating Sleep] : does not have difficulty initiating sleep [Witnessed Apneas] : no witnessed apneas [DIS] : does not have difficulty initiating sleep [DMS] : does not have difficulty maintaining sleep [Cataplexy] : no cataplexy [Sleep Paralysis] : no sleep paralysis [Hypnagogic Hallucinations] : no hypnagogic hallucinations [Hypnopompic Hallucinations] : no hypnopompic hallucinations [Lower Extremity Discomfort] : does not have lower extremity discomfort [Irresistible urge to move legs] : does not have irresistible urge to move legs [LE discomfort relieved by movement] : denies lower extremity discomfort relieved by movement [Late day/ Evening symptoms] : does not have late day/ evening symptoms [Sleep Disturbances due to LE symptoms] : denies sleep disturbances due to lower extremity symptoms [TextBox_77] : 11pm [TextBox_79] : 6:00am [TextBox_81] : less than 5 min [TextBox_89] : 3 [TextBox_100] : 12/2022 [TextBox_108] : 8.6 [TextBox_112] : 83.7 [TextBox_116] : 85 [TextBox_104] : 03/2023 [TextBox_131] : 10 cmH2O [TextBox_160] : Res Med P-10 Medium [TextBox_162] : 04/2023 [ESS] : 15

## 2023-04-25 NOTE — ASSESSMENT
[FreeTextEntry1] : This is a 52-year-old female with significant past medical history of COPD, end-stage renal disease secondary to polycystic kidney disease status post renal transplant who comes in for a follow-up.\par \par #Obstructive sleep apnea–patient was found to have mild obstructive sleep apnea but with significant oxygen desaturation.  She underwent an in lab PAP titration and was found to have an optimal CPAP pressure of 10 cm H2O.  She indicates that she felt better after the use of CPAP.  Therefore we will prescribe CPAP of 10 cm H2O.  After 3 months if she is still compliant, we will do an overnight oximetry test with CPAP to ensure that her oxygen continues to be improved with the use of CPAP.\par \par #Non-REM parasomnias–patient indicates that she has non-REM parasomnias but this was not seen on any of the sleep studies.  We will reassess at next visit when she is using CPAP to see if this has improved.\par \par She is agreeable for treatment. Equipment will be ordered through the DME provider. The compliance criteria were discussed and she was instructed to follow up with us within 2-3 months receiving the equipment.\par \par We will order an overnight oximetry test at the next visit to ensure improvement of oxygen saturation with the use of CPAP.

## 2023-04-27 ENCOUNTER — NON-APPOINTMENT (OUTPATIENT)
Age: 53
End: 2023-04-27

## 2023-04-27 ENCOUNTER — APPOINTMENT (OUTPATIENT)
Dept: UROGYNECOLOGY | Facility: CLINIC | Age: 53
End: 2023-04-27
Payer: COMMERCIAL

## 2023-04-27 PROCEDURE — 51729 CYSTOMETROGRAM W/VP&UP: CPT

## 2023-04-27 PROCEDURE — 51784 ANAL/URINARY MUSCLE STUDY: CPT

## 2023-04-27 PROCEDURE — 51797 INTRAABDOMINAL PRESSURE TEST: CPT

## 2023-04-27 PROCEDURE — 51741 ELECTRO-UROFLOWMETRY FIRST: CPT

## 2023-04-28 ENCOUNTER — RX RENEWAL (OUTPATIENT)
Age: 53
End: 2023-04-28

## 2023-05-04 ENCOUNTER — ASOB RESULT (OUTPATIENT)
Age: 53
End: 2023-05-04

## 2023-05-04 ENCOUNTER — APPOINTMENT (OUTPATIENT)
Dept: ANTEPARTUM | Facility: CLINIC | Age: 53
End: 2023-05-04
Payer: COMMERCIAL

## 2023-05-04 ENCOUNTER — APPOINTMENT (OUTPATIENT)
Dept: OBGYN | Facility: CLINIC | Age: 53
End: 2023-05-04
Payer: COMMERCIAL

## 2023-05-04 VITALS
SYSTOLIC BLOOD PRESSURE: 128 MMHG | DIASTOLIC BLOOD PRESSURE: 80 MMHG | WEIGHT: 210 LBS | HEIGHT: 69 IN | BODY MASS INDEX: 31.1 KG/M2

## 2023-05-04 DIAGNOSIS — Z87.898 PERSONAL HISTORY OF OTHER SPECIFIED CONDITIONS: ICD-10-CM

## 2023-05-04 DIAGNOSIS — N93.0 POSTCOITAL AND CONTACT BLEEDING: ICD-10-CM

## 2023-05-04 DIAGNOSIS — R79.89 OTHER SPECIFIED ABNORMAL FINDINGS OF BLOOD CHEMISTRY: ICD-10-CM

## 2023-05-04 PROCEDURE — 76830 TRANSVAGINAL US NON-OB: CPT

## 2023-05-04 PROCEDURE — 99213 OFFICE O/P EST LOW 20 MIN: CPT

## 2023-05-06 PROBLEM — R79.89 ELEVATED PROLACTIN LEVEL: Status: RESOLVED | Noted: 2022-07-19 | Resolved: 2023-05-06

## 2023-05-06 PROBLEM — Z87.898 HISTORY OF DYSURIA: Status: RESOLVED | Noted: 2023-03-31 | Resolved: 2023-05-06

## 2023-05-06 PROBLEM — N93.0 POSTCOITAL BLEEDING: Status: RESOLVED | Noted: 2023-02-22 | Resolved: 2023-05-06

## 2023-05-06 NOTE — HISTORY OF PRESENT ILLNESS
[LMP unknown] : LMP unknown [N] : Patient reports normal menses [IUD] : has an intrauterine device [Y] : Positive pregnancy history [unknown] : Patient is unsure of the date of her LMP [Menarche Age: ____] : age at menarche was [unfilled] [No] : Patient does not have concerns regarding sex [FreeTextEntry1] : ROLLY 51 yo  LMP unknown is here today for a sonogram f/u.\par \par She is very happy that given her recent weight loss she has not required her insulin pump in 3 months.\par \par Limited transvaginal and transabdominal sonogram findings reviewed, results are as follows:\par Anteverted uterus with the endometrium measuring 5.9 mm and the IUD seen insitu. The RT ovary appears wnl. The LT ovary presents with a simple cyst measuring 2.8 cm. An irregular cystic structures noted left adnexa, lateral to the ovary probably tubal in origin measuring 6.3 cm x 3.4 cm. No free fluid. Right pelvic kidney noted.\par  [Mammogramdate] : 07/29/22 [TextBox_19] : BR2 [BreastSonogramDate] : 07/29/22 [TextBox_25] : BR2 [PapSmeardate] : 07/15/22 [TextBox_31] : NEG [HPVDate] : 07/15/22 [TextBox_78] : NEG [de-identified] : MIRENA [PGHxTotal] : 2 [[facility] : 2 [[facility]] : 2

## 2023-05-06 NOTE — DISCUSSION/SUMMARY
[FreeTextEntry1] : Discussed sonogram results with patient in conjunction with previous findings. Discussed irregular cystic structure noted left adnexa, lateral to the LT ovary tubal in origin measuring 6.3 x 3.4 cm and LT small simple ovarian cyst. Questionable hydrosalpinx. Difficult to fully assess given her complicated surgical history.\par \par Prescription for pelvic MRI given secondary to sonogram findings.\par \par F/u annually and as needed.

## 2023-05-06 NOTE — END OF VISIT
[FreeTextEntry3] : I, Romulo Padilla, solely acted as scribe for Dr. Eugenie Finn on 05/04/23. All medical entries made by the Scribe were at my, Dr. Finn's, direction and personally dictated by me on 05/04/23. I have reviewed the chart and agree that the record accurately reflects my personal performance of the history, physical exam, assessment and plan. I have also personally directed, reviewed, and agreed with the chart.

## 2023-05-09 ENCOUNTER — APPOINTMENT (OUTPATIENT)
Dept: CARDIOLOGY | Facility: CLINIC | Age: 53
End: 2023-05-09
Payer: COMMERCIAL

## 2023-05-09 ENCOUNTER — NON-APPOINTMENT (OUTPATIENT)
Age: 53
End: 2023-05-09

## 2023-05-09 VITALS
BODY MASS INDEX: 30.36 KG/M2 | SYSTOLIC BLOOD PRESSURE: 118 MMHG | HEART RATE: 60 BPM | WEIGHT: 205 LBS | DIASTOLIC BLOOD PRESSURE: 70 MMHG | OXYGEN SATURATION: 97 % | HEIGHT: 69 IN

## 2023-05-09 DIAGNOSIS — Z00.00 ENCOUNTER FOR GENERAL ADULT MEDICAL EXAMINATION W/OUT ABNORMAL FINDINGS: ICD-10-CM

## 2023-05-09 DIAGNOSIS — I72.0 ANEURYSM OF CAROTID ARTERY: ICD-10-CM

## 2023-05-09 PROCEDURE — 99215 OFFICE O/P EST HI 40 MIN: CPT

## 2023-05-09 PROCEDURE — 93000 ELECTROCARDIOGRAM COMPLETE: CPT

## 2023-05-09 NOTE — DISCUSSION/SUMMARY
[Patient] : the patient [Risks] : risks [Benefits] : benefits [Alternatives] : alternatives [With Me] : with me [___ Year(s)] : in [unfilled] year(s) [EKG obtained to assist in diagnosis and management of assessed problem(s)] : EKG obtained to assist in diagnosis and management of assessed problem(s) [FreeTextEntry1] : This is a 52 year old woman with history of Diabetes Mellitus , on insulin, kidney transplant, 2018, hypertension ,   here for .post hospital discharge.\par \par 1) hypertension :  ct coreg,. hydralazine. off losartan.  \par 2)  dyslipidemia : ct statins. \par 3)  kidney transplant: on lasix  f/u with nephrology. \par  4) coronary artery disease evaluation: screening: Nuclear stress test . \par 5) dyslipidemia :  High TGs.  repeat Lipid profile.

## 2023-05-09 NOTE — HISTORY OF PRESENT ILLNESS
[FreeTextEntry1] : bradycardia and hypertension and kidney transplant\par \par HPI for today: :  she had pneumonia in sept.  and she is taking CPAP.  she did have palpitaitons and she still feels intermitten palpitioatons. no chest pain . + dyspnea on exertion \par she was in hospital for pnuemonia. now improved.  \par \par \par \par old note: This is a 51 year old woman with history of Diabetes Mellitus , on insulin, kidney transplant, 2018, hypertension ,   here for .post hospital discharge.\par She had kidney trasnplant she had Adult polycystic kidney disease,in 2017 and she had sepsis, and kidney infection that lead to kidney injury, and she had CKD and ESRD needing hemodialyses , and she had a traplsnt in 2018,. \par After kidney trasnplant she had Diabetes Mellitus developed.  \par last month, sept 14th , nausea nad vomiting and diarrhea.  and UTi and sepsis and she was in [facility] . and she was transferred to [place] for treatment of sepsis.  and she had pum edema, and bradycardia. and pericardial effusion. and she had stopped her beta blocker . and now that she istachcyardic it was restaerted. \par it took a while to wean off the oxygen. \par Currently dyspnea on exertion . when she ambulates.  sometimes she gets chest pain . throug her.    she is still seeing a pulmnoliogist. \par \par No somking. no alcojhol. no drugs. \par \par famyl hsitory \par Mother : Sick a sinuy and pacemker .\par father:  myocardial infarction . \par \par \par \par \par

## 2023-05-09 NOTE — CARDIOLOGY SUMMARY
[de-identified] : 5 9 2023 Sinus  Bradycardia \par -Poor R-wave progression -nonspecifi\par \par 10 11 2022  Sinus  Rhythm \par WITHIN NORMAL LIMITS [de-identified] : Ct cehst: Minimal coroanry caclifications in LAD

## 2023-05-15 ENCOUNTER — APPOINTMENT (OUTPATIENT)
Dept: ORTHOPEDIC SURGERY | Facility: CLINIC | Age: 53
End: 2023-05-15

## 2023-05-19 ENCOUNTER — APPOINTMENT (OUTPATIENT)
Dept: CARDIOLOGY | Facility: CLINIC | Age: 53
End: 2023-05-19
Payer: COMMERCIAL

## 2023-05-19 PROCEDURE — 78452 HT MUSCLE IMAGE SPECT MULT: CPT

## 2023-05-19 PROCEDURE — A9500: CPT

## 2023-05-19 PROCEDURE — 93015 CV STRESS TEST SUPVJ I&R: CPT

## 2023-05-24 ENCOUNTER — TRANSCRIPTION ENCOUNTER (OUTPATIENT)
Age: 53
End: 2023-05-24

## 2023-06-12 ENCOUNTER — APPOINTMENT (OUTPATIENT)
Dept: CT IMAGING | Facility: CLINIC | Age: 53
End: 2023-06-12
Payer: COMMERCIAL

## 2023-06-12 ENCOUNTER — OUTPATIENT (OUTPATIENT)
Dept: OUTPATIENT SERVICES | Facility: HOSPITAL | Age: 53
LOS: 1 days | End: 2023-06-12
Payer: COMMERCIAL

## 2023-06-12 ENCOUNTER — APPOINTMENT (OUTPATIENT)
Dept: MRI IMAGING | Facility: CLINIC | Age: 53
End: 2023-06-12
Payer: COMMERCIAL

## 2023-06-12 DIAGNOSIS — Z98.890 OTHER SPECIFIED POSTPROCEDURAL STATES: Chronic | ICD-10-CM

## 2023-06-12 DIAGNOSIS — Z94.0 KIDNEY TRANSPLANT STATUS: Chronic | ICD-10-CM

## 2023-06-12 DIAGNOSIS — Z90.49 ACQUIRED ABSENCE OF OTHER SPECIFIED PARTS OF DIGESTIVE TRACT: Chronic | ICD-10-CM

## 2023-06-12 DIAGNOSIS — I77.0 ARTERIOVENOUS FISTULA, ACQUIRED: Chronic | ICD-10-CM

## 2023-06-12 DIAGNOSIS — N70.11 CHRONIC SALPINGITIS: ICD-10-CM

## 2023-06-12 DIAGNOSIS — N20.0 CALCULUS OF KIDNEY: Chronic | ICD-10-CM

## 2023-06-12 DIAGNOSIS — Z98.51 TUBAL LIGATION STATUS: Chronic | ICD-10-CM

## 2023-06-12 DIAGNOSIS — Z90.5 ACQUIRED ABSENCE OF KIDNEY: Chronic | ICD-10-CM

## 2023-06-12 PROCEDURE — 72197 MRI PELVIS W/O & W/DYE: CPT

## 2023-06-12 PROCEDURE — 71250 CT THORAX DX C-: CPT | Mod: 26

## 2023-06-12 PROCEDURE — 72197 MRI PELVIS W/O & W/DYE: CPT | Mod: 26

## 2023-06-12 PROCEDURE — A9585: CPT

## 2023-06-12 PROCEDURE — 71250 CT THORAX DX C-: CPT

## 2023-06-19 ENCOUNTER — APPOINTMENT (OUTPATIENT)
Dept: ORTHOPEDIC SURGERY | Facility: CLINIC | Age: 53
End: 2023-06-19
Payer: OTHER MISCELLANEOUS

## 2023-06-19 VITALS — WEIGHT: 200 LBS | HEIGHT: 68 IN | BODY MASS INDEX: 30.31 KG/M2

## 2023-06-19 PROCEDURE — 99214 OFFICE O/P EST MOD 30 MIN: CPT

## 2023-06-19 NOTE — HISTORY OF PRESENT ILLNESS
[Work related] : work related [4] : 4 [1] : 2 [Dull/Aching] : dull/aching [Shooting] : shooting [Constant] : constant [Rest] : rest [Light duty] : Work status: light duty [de-identified] : 6/19/23: Worsening pain since back to work 2 weeks ago.  Lateral hip, buttock, down right leg.  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 She felt immediate pain. States there has been some improvement since the initial incident. Admits to pain radiating down the right leg. Denies N/T, weakness, b/b incontinence. Tylenol, massage, ice, rest, and TEN unit has been somewhat beneficial. Has been OOW since the incident. hx of kidney transplant (avoids NSAIDs). She was initially seen at [facility] for this issue and had R hip XR taken (no available access today).\par 3/17/23: Some improvement with PT but still pain.\par 4/21/23 she is overall feeling better - MRI review  [] : no [FreeTextEntry1] : right hip  [FreeTextEntry3] : 2/24/23  [FreeTextEntry7] : right leg  [de-identified] : prolonged walking/ activity  [de-identified] : Pt retuned back to work 2 weeks ago, states since going back pain has incresed

## 2023-06-19 NOTE — WORK
[Partial] : partial [Does not reveal pre-existing condition(s) that may affect treatment/prognosis] : does not reveal pre-existing condition(s) that may affect treatment/prognosis [N/A] : : Not Applicable [Patient] : patient [No Rx restrictions] : No Rx restrictions. [I provided the services listed above] :  I provided the services listed above. [FreeTextEntry1] : fair

## 2023-06-19 NOTE — DISCUSSION/SUMMARY
[de-identified] : The patient was advised of the diagnosis.  The natural history of the pathology was explained in full to the patient in layman's terms. All questions were answered.  The risks and benefits of surgical and non-surgical treatment alternatives were explained in full to the patient.\par

## 2023-06-19 NOTE — IMAGING
[de-identified] : RIGHT HIP\par (-) Groin tenderness\par + Greater troch bursa tenderness\par + Buttock tenderness \par + Right sided lumbar pain \par NVI

## 2023-06-19 NOTE — ASSESSMENT
[FreeTextEntry1] : Previous doc:\par 2/28/23: Acute lumbar radic and hip strain. No sig abnormalities noted on today's XR. Unable to take NSAIDs due to hx of kidney transplant. Discussed PT/HEP, massage, ice, and briefly CSI. She will inquire if she can increase her steroids. Cyclobenzaprine rx. OOW until next follow up in 2 weeks. Will consider MRI if symptoms fail to improve or worsen\par 3/17/23: Less low back pain, more hip pain today - will cont PT and get MRI right hip eval labral tear, effusion.\par 4/21 overall helpin with PT - MRI confirms muscle strain and mechanism of falling back is consistent with MRI findings \par may retrun to work with periods rest from standing \par \par 6/19/23: Worsening pain - seems more Lspine radic more than hip.  On steroids for prior transplant, cannot take NSAIDs.  Has not had auth for PT lately - will get PT for Lspine and if no improvement will get MRI Lspine eval HNP.

## 2023-06-22 ENCOUNTER — APPOINTMENT (OUTPATIENT)
Dept: NEPHROLOGY | Facility: CLINIC | Age: 53
End: 2023-06-22

## 2023-06-23 ENCOUNTER — NON-APPOINTMENT (OUTPATIENT)
Age: 53
End: 2023-06-23

## 2023-06-26 ENCOUNTER — NON-APPOINTMENT (OUTPATIENT)
Age: 53
End: 2023-06-26

## 2023-06-29 ENCOUNTER — RESULT CHARGE (OUTPATIENT)
Age: 53
End: 2023-06-29

## 2023-06-29 ENCOUNTER — APPOINTMENT (OUTPATIENT)
Dept: UROGYNECOLOGY | Facility: CLINIC | Age: 53
End: 2023-06-29
Payer: COMMERCIAL

## 2023-06-29 DIAGNOSIS — N36.42 INTRINSIC SPHINCTER DEFICIENCY (ISD): ICD-10-CM

## 2023-06-29 LAB
BILIRUB UR QL STRIP: NORMAL
CLARITY UR: CLEAR
COLLECTION METHOD: NORMAL
GLUCOSE UR-MCNC: NORMAL
HCG UR QL: 0.2 EU/DL
HGB UR QL STRIP.AUTO: NORMAL
KETONES UR-MCNC: NORMAL
LEUKOCYTE ESTERASE UR QL STRIP: NORMAL
NITRITE UR QL STRIP: NORMAL
PH UR STRIP: 0.2
PROT UR STRIP-MCNC: NORMAL
SP GR UR STRIP: 1.02

## 2023-06-29 PROCEDURE — L8606: CPT

## 2023-06-29 PROCEDURE — 81003 URINALYSIS AUTO W/O SCOPE: CPT | Mod: QW

## 2023-06-29 PROCEDURE — 51715 ENDOSCOPIC INJECTION/IMPLANT: CPT

## 2023-06-30 ENCOUNTER — RX RENEWAL (OUTPATIENT)
Age: 53
End: 2023-06-30

## 2023-07-08 ENCOUNTER — LABORATORY RESULT (OUTPATIENT)
Age: 53
End: 2023-07-08

## 2023-07-10 ENCOUNTER — NON-APPOINTMENT (OUTPATIENT)
Age: 53
End: 2023-07-10

## 2023-07-10 LAB
ALBUMIN SERPL ELPH-MCNC: 4.5 G/DL
ALP BLD-CCNC: 67 U/L
ALT SERPL-CCNC: 15 U/L
ANION GAP SERPL CALC-SCNC: 14 MMOL/L
APPEARANCE: CLEAR
AST SERPL-CCNC: 14 U/L
BACTERIA: ABNORMAL /HPF
BILIRUB SERPL-MCNC: 0.4 MG/DL
BILIRUBIN URINE: NEGATIVE
BLOOD URINE: NEGATIVE
BUN SERPL-MCNC: 20 MG/DL
CALCIUM SERPL-MCNC: 10 MG/DL
CALCIUM SERPL-MCNC: 10 MG/DL
CAST: 0 /LPF
CHLORIDE SERPL-SCNC: 105 MMOL/L
CMV DNA SPEC QL NAA+PROBE: NOT DETECTED IU/ML
CMVPCR LOG: NOT DETECTED LOG10IU/ML
CO2 SERPL-SCNC: 25 MMOL/L
COLOR: YELLOW
CREAT SERPL-MCNC: 1.23 MG/DL
CREAT SPEC-SCNC: 51 MG/DL
CREAT/PROT UR: 0.1 RATIO
EGFR: 53 ML/MIN/1.73M2
EPITHELIAL CELLS: 2 /HPF
ESTIMATED AVERAGE GLUCOSE: 117 MG/DL
GLUCOSE QUALITATIVE U: NEGATIVE MG/DL
GLUCOSE SERPL-MCNC: 89 MG/DL
HBA1C MFR BLD HPLC: 5.7 %
KETONES URINE: NEGATIVE MG/DL
LEUKOCYTE ESTERASE URINE: ABNORMAL
MAGNESIUM SERPL-MCNC: 2 MG/DL
MICROSCOPIC-UA: NORMAL
NITRITE URINE: NEGATIVE
PARATHYROID HORMONE INTACT: 50 PG/ML
PH URINE: 6
PHOSPHATE SERPL-MCNC: 4.3 MG/DL
POTASSIUM SERPL-SCNC: 3.7 MMOL/L
PROT SERPL-MCNC: 6.8 G/DL
PROT UR-MCNC: 4 MG/DL
PROTEIN URINE: NEGATIVE MG/DL
RED BLOOD CELLS URINE: 1 /HPF
SODIUM SERPL-SCNC: 144 MMOL/L
SPECIFIC GRAVITY URINE: 1.01
TACROLIMUS SERPL-MCNC: 4.6 NG/ML
URATE SERPL-MCNC: 7.6 MG/DL
UROBILINOGEN URINE: 0.2 MG/DL
WHITE BLOOD CELLS URINE: 1 /HPF

## 2023-07-11 LAB — BKV DNA SPEC QL NAA+PROBE: NOT DETECTED IU/ML

## 2023-07-17 ENCOUNTER — APPOINTMENT (OUTPATIENT)
Dept: OBGYN | Facility: CLINIC | Age: 53
End: 2023-07-17
Payer: COMMERCIAL

## 2023-07-17 ENCOUNTER — APPOINTMENT (OUTPATIENT)
Dept: ORTHOPEDIC SURGERY | Facility: CLINIC | Age: 53
End: 2023-07-17
Payer: OTHER MISCELLANEOUS

## 2023-07-17 VITALS — HEIGHT: 68 IN | WEIGHT: 200 LBS | BODY MASS INDEX: 30.31 KG/M2

## 2023-07-17 VITALS
SYSTOLIC BLOOD PRESSURE: 130 MMHG | BODY MASS INDEX: 30.31 KG/M2 | HEIGHT: 68 IN | WEIGHT: 200 LBS | DIASTOLIC BLOOD PRESSURE: 70 MMHG

## 2023-07-17 DIAGNOSIS — Z13.820 ENCOUNTER FOR SCREENING FOR OSTEOPOROSIS: ICD-10-CM

## 2023-07-17 DIAGNOSIS — R15.1 FECAL SMEARING: ICD-10-CM

## 2023-07-17 DIAGNOSIS — R92.2 INCONCLUSIVE MAMMOGRAM: ICD-10-CM

## 2023-07-17 DIAGNOSIS — S76.011A STRAIN OF MUSCLE, FASCIA AND TENDON OF RIGHT HIP, INITIAL ENCOUNTER: ICD-10-CM

## 2023-07-17 DIAGNOSIS — B37.0 CANDIDAL ESOPHAGITIS: ICD-10-CM

## 2023-07-17 DIAGNOSIS — Z12.31 ENCOUNTER FOR SCREENING MAMMOGRAM FOR MALIGNANT NEOPLASM OF BREAST: ICD-10-CM

## 2023-07-17 DIAGNOSIS — R87.810 ATYPICAL SQUAMOUS CELLS OF UNDETERMINED SIGNIFICANCE ON CYTOLOGIC SMEAR OF CERVIX (ASC-US): ICD-10-CM

## 2023-07-17 DIAGNOSIS — B37.81 CANDIDAL ESOPHAGITIS: ICD-10-CM

## 2023-07-17 DIAGNOSIS — N95.1 MENOPAUSAL AND FEMALE CLIMACTERIC STATES: ICD-10-CM

## 2023-07-17 DIAGNOSIS — R87.610 ATYPICAL SQUAMOUS CELLS OF UNDETERMINED SIGNIFICANCE ON CYTOLOGIC SMEAR OF CERVIX (ASC-US): ICD-10-CM

## 2023-07-17 DIAGNOSIS — Z91.89 OTHER SPECIFIED PERSONAL RISK FACTORS, NOT ELSEWHERE CLASSIFIED: ICD-10-CM

## 2023-07-17 DIAGNOSIS — S73.191A OTHER SPRAIN OF RIGHT HIP, INITIAL ENCOUNTER: ICD-10-CM

## 2023-07-17 DIAGNOSIS — Z87.42 PERSONAL HISTORY OF OTHER DISEASES OF THE FEMALE GENITAL TRACT: ICD-10-CM

## 2023-07-17 DIAGNOSIS — Z87.898 PERSONAL HISTORY OF OTHER SPECIFIED CONDITIONS: ICD-10-CM

## 2023-07-17 DIAGNOSIS — R19.7 DIARRHEA, UNSPECIFIED: ICD-10-CM

## 2023-07-17 PROCEDURE — 99396 PREV VISIT EST AGE 40-64: CPT

## 2023-07-17 PROCEDURE — 36415 COLL VENOUS BLD VENIPUNCTURE: CPT

## 2023-07-17 PROCEDURE — 99215 OFFICE O/P EST HI 40 MIN: CPT | Mod: 25

## 2023-07-17 PROCEDURE — 82270 OCCULT BLOOD FECES: CPT

## 2023-07-17 PROCEDURE — 99213 OFFICE O/P EST LOW 20 MIN: CPT

## 2023-07-17 RX ORDER — CYCLOBENZAPRINE HYDROCHLORIDE 5 MG/1
5 TABLET, FILM COATED ORAL 3 TIMES DAILY
Qty: 60 | Refills: 0 | Status: COMPLETED | COMMUNITY
Start: 2023-02-28 | End: 2023-07-17

## 2023-07-17 RX ORDER — UMECLIDINIUM BROMIDE AND VILANTEROL TRIFENATATE 62.5; 25 UG/1; UG/1
62.5-25 POWDER RESPIRATORY (INHALATION)
Qty: 60 | Refills: 0 | Status: COMPLETED | COMMUNITY
Start: 2022-11-11 | End: 2023-07-17

## 2023-07-17 RX ORDER — SULFAMETHOXAZOLE AND TRIMETHOPRIM 800; 160 MG/1; MG/1
800-160 TABLET ORAL TWICE DAILY
Qty: 6 | Refills: 0 | Status: COMPLETED | COMMUNITY
Start: 2023-04-05 | End: 2023-07-17

## 2023-07-17 RX ORDER — INSULIN LISPRO 100 [IU]/ML
100 INJECTION, SOLUTION INTRAVENOUS; SUBCUTANEOUS
Qty: 140 | Refills: 2 | Status: COMPLETED | COMMUNITY
Start: 2021-02-15 | End: 2023-07-17

## 2023-07-17 NOTE — ASSESSMENT
[FreeTextEntry1] : Previous doc:\par 2/28/23: Acute lumbar radic and hip strain. No sig abnormalities noted on today's XR. Unable to take NSAIDs due to hx of kidney transplant. Discussed PT/HEP, massage, ice, and briefly CSI. She will inquire if she can increase her steroids. Cyclobenzaprine rx. OOW until next follow up in 2 weeks. Will consider MRI if symptoms fail to improve or worsen\par 3/17/23: Less low back pain, more hip pain today - will cont PT and get MRI right hip eval labral tear, effusion.\par 4/21 overall helpin with PT - MRI confirms muscle strain and mechanism of falling back is consistent with MRI findings \par may retrun to work with periods rest from standing \par 6/19/23: Worsening pain - seems more Lspine radic more than hip.  On steroids for prior transplant, cannot take NSAIDs.  Has not had auth for PT lately - will get PT for Lspine and if no improvement will get MRI Lspine eval HNP.\par \par 7/17/23: No change - MRI is pending and has not been able to start PT yet due to scheduling issues - will f/u after MRI is done.

## 2023-07-17 NOTE — HISTORY OF PRESENT ILLNESS
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 She felt immediate pain. States there has been some improvement since the initial incident. Admits to pain radiating down the right leg. Denies N/T, weakness, b/b incontinence. Tylenol, massage, ice, rest, and TEN unit has been somewhat beneficial. Has been OOW since the incident. hx of kidney transplant (avoids NSAIDs). She was initially seen at [facility] for this issue and had R hip XR taken (no available access today).\par 3/17/23: Some improvement with PT but still pain.\par 4/21/23 she is overall feeling better - MRI review \par 6/19/23: Worsening pain since back to work 2 weeks ago.  Lateral hip, buttock, down right leg.  Tingling in toes at times. [FreeTextEntry1] : rt hip [FreeTextEntry3] : 2/24/23 [FreeTextEntry5] : should be  done MRI  on 07/24

## 2023-07-17 NOTE — DISCUSSION/SUMMARY
[de-identified] : The patient was advised of the diagnosis.  The natural history of the pathology was explained in full to the patient in layman's terms. All questions were answered.  The risks and benefits of surgical and non-surgical treatment alternatives were explained in full to the patient.\par

## 2023-07-17 NOTE — IMAGING
[de-identified] : RIGHT HIP\par (-) Groin tenderness\par + Greater troch bursa tenderness\par + Buttock tenderness \par + Right sided lumbar pain \par NVI

## 2023-07-18 ENCOUNTER — TRANSCRIPTION ENCOUNTER (OUTPATIENT)
Age: 53
End: 2023-07-18

## 2023-07-18 LAB
DATE COLLECTED: NORMAL
ESTRADIOL SERPL-MCNC: <5 PG/ML
FSH SERPL-MCNC: 51.8 IU/L
HEMOCCULT SP1 STL QL: NEGATIVE
LH SERPL-ACNC: 46.9 IU/L
PROLACTIN SERPL-MCNC: 7.1 NG/ML
QUALITY CONTROL: YES
TSH SERPL-ACNC: 1.97 UIU/ML

## 2023-07-19 LAB — HPV HIGH+LOW RISK DNA PNL CVX: NOT DETECTED

## 2023-07-20 NOTE — HISTORY OF PRESENT ILLNESS
[N] : Patient reports normal menses [IUD] : has an intrauterine device [Tubal Occlusion] : has had a tubal occlusion [Y] : Positive pregnancy history [Menarche Age: ____] : age at menarche was [unfilled] [No] : Patient does not have concerns regarding sex [Currently Active] : currently active [FreeTextEntry1] : \par 51 yo here for an annual exam.\par \par She reports frequent spotting. She has not gone a year without a period. She has an IUD in place. [Mammogramdate] : 07/29/22 [TextBox_19] : BR 2  [BreastSonogramDate] : 07/29/22 [TextBox_25] : BR 2  [PapSmeardate] : 07/15/22 [TextBox_31] : NEG  [HPVDate] : 07/15/22 [TextBox_78] : NEG  [de-identified] : Mirena [PGHxTotal] : 2 [[facility]] : 2 [[facility]] : 2

## 2023-07-20 NOTE — PHYSICAL EXAM
[Labia Majora] : normal [Labia Minora] : normal [Atrophy] : atrophy [Dry Mucosa] : dry mucosa [Moderate] : There was moderate vaginal bleeding [IUD String] : an IUD string was noted [Uterine Adnexae] : normal [No Tenderness] : no tenderness [Appropriately responsive] : appropriately responsive [Alert] : alert [No Acute Distress] : no acute distress [Soft] : soft [Non-tender] : non-tender [Non-distended] : non-distended [Oriented x3] : oriented x3 [Examination Of The Breasts] : a normal appearance [Normal] : normal [No Masses] : no breast masses were palpable [FreeTextEntry4] : Dark blood in vault [FreeTextEntry9] : Criselda negative

## 2023-07-20 NOTE — PLAN
[FreeTextEntry1] : F/U pap.\par \par Given prior mammo with TC score over 20 will order mammo sono, breast MRI. Will also order breast specialist consult.\par \par Differential diagnosis of dysfunctional uterine bleeding discussed at length including structural, hormonal, and malignant causes. \par 1. Hormone panel was drawn today to assess for hormonal causes. We will review lab results at her next visit. \par 2. A pelvic ultrasound was ordered as well. \par 3. We also discussed the need for hysteroscopy with biopsy to R/O structural and malignant causes. The procedure was discussed in detail. She will premedicate with motrin as there is cramping associated with this procedure.\par 4. Will likely remove IUD at time of procedure if repeat FSH elevated.\par \par We also discussed, pelvic MRI results. Will repeat MRI in 3 months per radiologist request.\par \par Bone density ordered given chronic steroid use from kidney transplant.\par \par Guiac negative.

## 2023-07-24 ENCOUNTER — RESULT REVIEW (OUTPATIENT)
Age: 53
End: 2023-07-24

## 2023-07-24 ENCOUNTER — APPOINTMENT (OUTPATIENT)
Dept: MRI IMAGING | Facility: CLINIC | Age: 53
End: 2023-07-24
Payer: OTHER MISCELLANEOUS

## 2023-07-24 ENCOUNTER — OUTPATIENT (OUTPATIENT)
Dept: OUTPATIENT SERVICES | Facility: HOSPITAL | Age: 53
LOS: 1 days | End: 2023-07-24
Payer: COMMERCIAL

## 2023-07-24 DIAGNOSIS — Z94.0 KIDNEY TRANSPLANT STATUS: Chronic | ICD-10-CM

## 2023-07-24 DIAGNOSIS — Z90.49 ACQUIRED ABSENCE OF OTHER SPECIFIED PARTS OF DIGESTIVE TRACT: Chronic | ICD-10-CM

## 2023-07-24 DIAGNOSIS — I77.0 ARTERIOVENOUS FISTULA, ACQUIRED: Chronic | ICD-10-CM

## 2023-07-24 DIAGNOSIS — Z90.5 ACQUIRED ABSENCE OF KIDNEY: Chronic | ICD-10-CM

## 2023-07-24 DIAGNOSIS — Z00.8 ENCOUNTER FOR OTHER GENERAL EXAMINATION: ICD-10-CM

## 2023-07-24 DIAGNOSIS — N20.0 CALCULUS OF KIDNEY: Chronic | ICD-10-CM

## 2023-07-24 DIAGNOSIS — Z98.51 TUBAL LIGATION STATUS: Chronic | ICD-10-CM

## 2023-07-24 DIAGNOSIS — Z98.890 OTHER SPECIFIED POSTPROCEDURAL STATES: Chronic | ICD-10-CM

## 2023-07-24 PROCEDURE — 72148 MRI LUMBAR SPINE W/O DYE: CPT

## 2023-07-24 PROCEDURE — 72148 MRI LUMBAR SPINE W/O DYE: CPT | Mod: 26

## 2023-07-25 LAB — CYTOLOGY CVX/VAG DOC THIN PREP: NORMAL

## 2023-07-31 LAB
APO B SERPL-MCNC: 99 MG/DL
APO LP(A) SERPL-MCNC: 13.1 NMOL/L
CHOLEST SERPL-MCNC: 172 MG/DL
HDLC SERPL-MCNC: 31 MG/DL
LDLC SERPL CALC-MCNC: 76 MG/DL
NONHDLC SERPL-MCNC: 141 MG/DL
TRIGL SERPL-MCNC: 329 MG/DL

## 2023-08-03 ENCOUNTER — NON-APPOINTMENT (OUTPATIENT)
Age: 53
End: 2023-08-03

## 2023-08-03 ENCOUNTER — RX RENEWAL (OUTPATIENT)
Age: 53
End: 2023-08-03

## 2023-08-03 NOTE — ED ADULT TRIAGE NOTE - PRO INTERPRETER NEED 2
Patient is a 29-year-old white male who has no significant past medical history presenting to the emergency department here at [facility] complaining of left lower back pain x1 week which began while asleep without any twisting pulling pushing lifting in the history.  Over the past day or 2 pain seems to be moving slightly to the left lower quadrant.  No nausea vomiting diarrhea fever chills dysuria hematuria.  No bowel or bladder problems.  No fevers chills.  This case will require extensive evaluation as well as labs CT imaging and medications.
English

## 2023-08-05 ENCOUNTER — RESULT REVIEW (OUTPATIENT)
Age: 53
End: 2023-08-05

## 2023-08-05 ENCOUNTER — APPOINTMENT (OUTPATIENT)
Dept: MAMMOGRAPHY | Facility: CLINIC | Age: 53
End: 2023-08-05
Payer: COMMERCIAL

## 2023-08-05 ENCOUNTER — APPOINTMENT (OUTPATIENT)
Dept: ULTRASOUND IMAGING | Facility: CLINIC | Age: 53
End: 2023-08-05
Payer: COMMERCIAL

## 2023-08-05 ENCOUNTER — OUTPATIENT (OUTPATIENT)
Dept: OUTPATIENT SERVICES | Facility: HOSPITAL | Age: 53
LOS: 1 days | End: 2023-08-05
Payer: COMMERCIAL

## 2023-08-05 DIAGNOSIS — N20.0 CALCULUS OF KIDNEY: Chronic | ICD-10-CM

## 2023-08-05 DIAGNOSIS — Z90.49 ACQUIRED ABSENCE OF OTHER SPECIFIED PARTS OF DIGESTIVE TRACT: Chronic | ICD-10-CM

## 2023-08-05 DIAGNOSIS — Z98.51 TUBAL LIGATION STATUS: Chronic | ICD-10-CM

## 2023-08-05 DIAGNOSIS — Z98.890 OTHER SPECIFIED POSTPROCEDURAL STATES: Chronic | ICD-10-CM

## 2023-08-05 DIAGNOSIS — Z00.8 ENCOUNTER FOR OTHER GENERAL EXAMINATION: ICD-10-CM

## 2023-08-05 DIAGNOSIS — Z90.5 ACQUIRED ABSENCE OF KIDNEY: Chronic | ICD-10-CM

## 2023-08-05 DIAGNOSIS — I77.0 ARTERIOVENOUS FISTULA, ACQUIRED: Chronic | ICD-10-CM

## 2023-08-05 DIAGNOSIS — Z94.0 KIDNEY TRANSPLANT STATUS: Chronic | ICD-10-CM

## 2023-08-05 PROCEDURE — 77067 SCR MAMMO BI INCL CAD: CPT

## 2023-08-05 PROCEDURE — 77067 SCR MAMMO BI INCL CAD: CPT | Mod: 26

## 2023-08-05 PROCEDURE — 76641 ULTRASOUND BREAST COMPLETE: CPT

## 2023-08-05 PROCEDURE — 77063 BREAST TOMOSYNTHESIS BI: CPT | Mod: 26

## 2023-08-05 PROCEDURE — 77063 BREAST TOMOSYNTHESIS BI: CPT

## 2023-08-05 PROCEDURE — 76641 ULTRASOUND BREAST COMPLETE: CPT | Mod: 26,50

## 2023-08-11 ENCOUNTER — TRANSCRIPTION ENCOUNTER (OUTPATIENT)
Age: 53
End: 2023-08-11

## 2023-08-21 ENCOUNTER — APPOINTMENT (OUTPATIENT)
Dept: ORTHOPEDIC SURGERY | Facility: CLINIC | Age: 53
End: 2023-08-21
Payer: OTHER MISCELLANEOUS

## 2023-08-21 ENCOUNTER — APPOINTMENT (OUTPATIENT)
Dept: PULMONOLOGY | Facility: CLINIC | Age: 53
End: 2023-08-21
Payer: COMMERCIAL

## 2023-08-21 ENCOUNTER — TRANSCRIPTION ENCOUNTER (OUTPATIENT)
Age: 53
End: 2023-08-21

## 2023-08-21 VITALS
RESPIRATION RATE: 16 BRPM | TEMPERATURE: 98 F | OXYGEN SATURATION: 94 % | HEART RATE: 84 BPM | HEIGHT: 68 IN | WEIGHT: 197 LBS | BODY MASS INDEX: 29.86 KG/M2 | DIASTOLIC BLOOD PRESSURE: 67 MMHG | SYSTOLIC BLOOD PRESSURE: 111 MMHG

## 2023-08-21 VITALS — BODY MASS INDEX: 28.64 KG/M2 | WEIGHT: 189 LBS | HEIGHT: 68 IN

## 2023-08-21 DIAGNOSIS — G47.33 OBSTRUCTIVE SLEEP APNEA (ADULT) (PEDIATRIC): ICD-10-CM

## 2023-08-21 DIAGNOSIS — M54.16 RADICULOPATHY, LUMBAR REGION: ICD-10-CM

## 2023-08-21 PROCEDURE — 94660 CPAP INITIATION&MGMT: CPT

## 2023-08-21 PROCEDURE — 99213 OFFICE O/P EST LOW 20 MIN: CPT

## 2023-08-21 RX ORDER — CLONAZEPAM 1 MG/1
1 TABLET ORAL
Qty: 90 | Refills: 0 | Status: ACTIVE | COMMUNITY
Start: 2018-04-12 | End: 1900-01-01

## 2023-08-21 NOTE — HISTORY OF PRESENT ILLNESS
[Work related] : work related [4] : 4 [1] : 2 [Dull/Aching] : dull/aching [Shooting] : shooting [Constant] : constant [Rest] : rest [Light duty] : Work status: light duty [de-identified] : 8/21/23: L-spine F/U no Sig changes.   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 She felt immediate pain. States there has been some improvement since the initial incident. Admits to pain radiating down the right leg. Denies N/T, weakness, b/b incontinence. Tylenol, massage, ice, rest, and TEN unit has been somewhat beneficial. Has been OOW since the incident. hx of kidney transplant (avoids NSAIDs). She was initially seen at [facility] for this issue and had R hip XR taken (no available access today). 3/17/23: Some improvement with PT but still pain. 4/21/23 she is overall feeling better - MRI review  6/19/23: Worsening pain since back to work 2 weeks ago.  Lateral hip, buttock, down right leg.  Tingling in toes at times. [] : no [FreeTextEntry1] : right hip  [FreeTextEntry3] : 2/24/23  [FreeTextEntry7] : right leg  [de-identified] : prolonged walking/ activity  [de-identified] : Pt retuned back to work 2 weeks ago, states since going back pain has incresed

## 2023-08-21 NOTE — ASSESSMENT
[FreeTextEntry1] : Previous doc: 2/28/23: Acute lumbar radic and hip strain. No sig abnormalities noted on today's XR. Unable to take NSAIDs due to hx of kidney transplant. Discussed PT/HEP, massage, ice, and briefly CSI. She will inquire if she can increase her steroids. Cyclobenzaprine rx. OOW until next follow up in 2 weeks. Will consider MRI if symptoms fail to improve or worsen 3/17/23: Less low back pain, more hip pain today - will cont PT and get MRI right hip eval labral tear, effusion. 4/21 overall helpin with PT - MRI confirms muscle strain and mechanism of falling back is consistent with MRI findings  may retrun to work with periods rest from standing  6/19/23: Worsening pain - seems more Lspine radic more than hip.  On steroids for prior transplant, cannot take NSAIDs.  Has not had auth for PT lately - will get PT for Lspine and if no improvement will get MRI Lspine eval HNP.  8/21/23: Overall she has gotten mildly better, still some pain with activities. Change in work hours and type of work has helped decrease pain- now working full duty. Will return if pain worsens. Referred to [facility] for shoulder pain

## 2023-08-21 NOTE — IMAGING
[de-identified] : RIGHT HIP (-) Groin tenderness + Greater troch bursa tenderness + Buttock tenderness  + Right sided lumbar pain  NVI

## 2023-08-21 NOTE — PROCEDURE
[FreeTextEntry1] : Patient presents for mask fitting due to pressure intolerance. She reports pain in her ears after using the machine, which took several days to subside after she stopped using it. She currently uses a Resmed Airfit P10 nasal pillow. Patient was educated on differences between pillow, nasal and full face mask interfaces. She is currently on CPAP [id number]. She has not tried any other masks.  Patient was fitted today with a Resmed Airfit N30 Small-Wide nasal mask and F+P Shikha Full Small-Medium ffm. Both masks appeared to have a good fit and patient seemed to tolerate them well. She was instructed on how to properly don/doff and adjust both masks as well as connect them to her machine. Patient will try both masks and follow up. She states she has not used the machine since the initial pressure intolerance issue and has been waiting for this mask fitting appointment. She is concerned about non-compliance. She was advised to contact [facility] and notify them of the issues she has been experiencing.

## 2023-08-21 NOTE — DISCUSSION/SUMMARY
[de-identified] : The patient was advised of the diagnosis.  The natural history of the pathology was explained in full to the patient in layman's terms. All questions were answered.  The risks and benefits of surgical and non-surgical treatment alternatives were explained in full to the patient.  Entered by Alida Coffman acting as a scribe.

## 2023-08-25 ENCOUNTER — TRANSCRIPTION ENCOUNTER (OUTPATIENT)
Age: 53
End: 2023-08-25

## 2023-09-11 ENCOUNTER — APPOINTMENT (OUTPATIENT)
Dept: OBGYN | Facility: CLINIC | Age: 53
End: 2023-09-11
Payer: COMMERCIAL

## 2023-09-11 ENCOUNTER — ASOB RESULT (OUTPATIENT)
Age: 53
End: 2023-09-11

## 2023-09-11 ENCOUNTER — APPOINTMENT (OUTPATIENT)
Dept: ANTEPARTUM | Facility: CLINIC | Age: 53
End: 2023-09-11
Payer: COMMERCIAL

## 2023-09-11 VITALS
WEIGHT: 198 LBS | SYSTOLIC BLOOD PRESSURE: 110 MMHG | HEIGHT: 68 IN | BODY MASS INDEX: 30.01 KG/M2 | DIASTOLIC BLOOD PRESSURE: 62 MMHG

## 2023-09-11 DIAGNOSIS — N95.0 POSTMENOPAUSAL BLEEDING: ICD-10-CM

## 2023-09-11 DIAGNOSIS — Z30.431 ENCOUNTER FOR ROUTINE CHECKING OF INTRAUTERINE CONTRACEPTIVE DEVICE: ICD-10-CM

## 2023-09-11 DIAGNOSIS — N94.10 UNSPECIFIED DYSPAREUNIA: ICD-10-CM

## 2023-09-11 DIAGNOSIS — Z12.11 ENCOUNTER FOR SCREENING FOR MALIGNANT NEOPLASM OF COLON: ICD-10-CM

## 2023-09-11 DIAGNOSIS — N83.209 UNSPECIFIED OVARIAN CYST, UNSPECIFIED SIDE: ICD-10-CM

## 2023-09-11 DIAGNOSIS — Z01.411 ENCOUNTER FOR GYNECOLOGICAL EXAMINATION (GENERAL) (ROUTINE) WITH ABNORMAL FINDINGS: ICD-10-CM

## 2023-09-11 DIAGNOSIS — N70.11 CHRONIC SALPINGITIS: ICD-10-CM

## 2023-09-11 LAB
HCG UR QL: NEGATIVE
QUALITY CONTROL: YES

## 2023-09-11 PROCEDURE — 76830 TRANSVAGINAL US NON-OB: CPT

## 2023-09-11 PROCEDURE — 58301 REMOVE INTRAUTERINE DEVICE: CPT

## 2023-09-11 PROCEDURE — 58558Z: CUSTOM

## 2023-09-11 PROCEDURE — 81025 URINE PREGNANCY TEST: CPT

## 2023-09-15 NOTE — HISTORY OF PRESENT ILLNESS
[FreeTextEntry1] : 53 y/o female referred to rheumatology for joint pains and fatigue. \par Pt is s/p renal transplant in 4/2018 for PCKD. Pt is on allopurinol for high uric acid. \par Pt was hospitalized in 9/2022 for hypoxia, diarrhea, muscle pain, pericardial effusion without clear causes identified, possibly viral.\par Since then, pt has been fatigued, joint aches (worst in hips, hands, neck). Pt still has some SOB, chronic nausea. 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

## 2023-09-19 ENCOUNTER — TRANSCRIPTION ENCOUNTER (OUTPATIENT)
Age: 53
End: 2023-09-19

## 2023-09-20 LAB — CORE LAB BIOPSY: NORMAL

## 2023-09-22 ENCOUNTER — APPOINTMENT (OUTPATIENT)
Dept: PULMONOLOGY | Facility: CLINIC | Age: 53
End: 2023-09-22

## 2023-09-28 ENCOUNTER — NON-APPOINTMENT (OUTPATIENT)
Age: 53
End: 2023-09-28

## 2023-09-29 ENCOUNTER — APPOINTMENT (OUTPATIENT)
Dept: DERMATOLOGY | Facility: CLINIC | Age: 53
End: 2023-09-29

## 2023-10-15 ENCOUNTER — RX RENEWAL (OUTPATIENT)
Age: 53
End: 2023-10-15

## 2023-10-24 ENCOUNTER — TRANSCRIPTION ENCOUNTER (OUTPATIENT)
Age: 53
End: 2023-10-24

## 2023-10-25 NOTE — ASSESSMENT
[FreeTextEntry1] : Patient is a 51-year-old multipara with history of polycystic cystic kidneys s/p bilateral nephrectomies and renal transplant now presenting with mixed urinary incontinence (stress greater than urge), overactive bladder and nocturia. On examination, there is evidence of urethral hypermobility with a positive reports empty bladder supine cough stress test, normal postvoid residual, and good levator ani awareness/contraction. She reports history of recurrent UTI and use of prolonged oral antibiotics but no recent positive culture was noted. Other potential contributing factors include high BMI, intake of anti-hypertensive, diabetes and possible sleep apnea.\par \par 
Attending with

## 2023-10-26 ENCOUNTER — NON-APPOINTMENT (OUTPATIENT)
Age: 53
End: 2023-10-26

## 2023-10-27 ENCOUNTER — RX RENEWAL (OUTPATIENT)
Age: 53
End: 2023-10-27

## 2023-11-01 ENCOUNTER — APPOINTMENT (OUTPATIENT)
Dept: ENDOCRINOLOGY | Facility: CLINIC | Age: 53
End: 2023-11-01
Payer: COMMERCIAL

## 2023-11-01 VITALS
HEIGHT: 68 IN | OXYGEN SATURATION: 97 % | HEART RATE: 65 BPM | DIASTOLIC BLOOD PRESSURE: 74 MMHG | WEIGHT: 192 LBS | SYSTOLIC BLOOD PRESSURE: 112 MMHG | BODY MASS INDEX: 29.1 KG/M2

## 2023-11-01 DIAGNOSIS — E78.5 HYPERLIPIDEMIA, UNSPECIFIED: ICD-10-CM

## 2023-11-01 DIAGNOSIS — E11.9 TYPE 2 DIABETES MELLITUS W/OUT COMPLICATIONS: ICD-10-CM

## 2023-11-01 LAB — GLUCOSE BLDC GLUCOMTR-MCNC: 143

## 2023-11-01 PROCEDURE — 82962 GLUCOSE BLOOD TEST: CPT

## 2023-11-01 PROCEDURE — 99214 OFFICE O/P EST MOD 30 MIN: CPT | Mod: 25

## 2023-11-08 ENCOUNTER — RX RENEWAL (OUTPATIENT)
Age: 53
End: 2023-11-08

## 2023-11-24 NOTE — PROGRESS NOTE ADULT - NS ATTEND OPT1 GEN_ALL_CORE
I attest my time as attending is greater than 50% of the total combined time spent on qualifying patient care activities by the PA/NP and attending.
None

## 2023-11-27 ENCOUNTER — LABORATORY RESULT (OUTPATIENT)
Age: 53
End: 2023-11-27

## 2023-11-29 ENCOUNTER — APPOINTMENT (OUTPATIENT)
Dept: ENDOCRINOLOGY | Facility: CLINIC | Age: 53
End: 2023-11-29
Payer: COMMERCIAL

## 2023-11-29 ENCOUNTER — APPOINTMENT (OUTPATIENT)
Dept: NEPHROLOGY | Facility: CLINIC | Age: 53
End: 2023-11-29
Payer: COMMERCIAL

## 2023-11-29 VITALS
BODY MASS INDEX: 29.25 KG/M2 | HEART RATE: 59 BPM | OXYGEN SATURATION: 98 % | DIASTOLIC BLOOD PRESSURE: 80 MMHG | WEIGHT: 193 LBS | SYSTOLIC BLOOD PRESSURE: 120 MMHG | HEIGHT: 68 IN

## 2023-11-29 VITALS
TEMPERATURE: 97.8 F | SYSTOLIC BLOOD PRESSURE: 160 MMHG | HEIGHT: 68 IN | OXYGEN SATURATION: 100 % | HEART RATE: 64 BPM | WEIGHT: 193 LBS | BODY MASS INDEX: 29.25 KG/M2 | DIASTOLIC BLOOD PRESSURE: 79 MMHG

## 2023-11-29 DIAGNOSIS — E13.9 OTHER SPECIFIED DIABETES MELLITUS W/OUT COMPLICATIONS: ICD-10-CM

## 2023-11-29 DIAGNOSIS — E55.9 VITAMIN D DEFICIENCY, UNSPECIFIED: ICD-10-CM

## 2023-11-29 DIAGNOSIS — I10 ESSENTIAL (PRIMARY) HYPERTENSION: ICD-10-CM

## 2023-11-29 LAB — GLUCOSE BLDC GLUCOMTR-MCNC: 91

## 2023-11-29 PROCEDURE — 99214 OFFICE O/P EST MOD 30 MIN: CPT

## 2023-11-29 PROCEDURE — 82962 GLUCOSE BLOOD TEST: CPT

## 2023-11-29 PROCEDURE — 99214 OFFICE O/P EST MOD 30 MIN: CPT | Mod: 25

## 2023-11-29 RX ORDER — ESCITALOPRAM OXALATE 20 MG/1
20 TABLET ORAL
Qty: 90 | Refills: 3 | Status: ACTIVE | COMMUNITY
Start: 2022-09-30 | End: 1900-01-01

## 2023-11-29 RX ORDER — PRAVASTATIN SODIUM 20 MG/1
20 TABLET ORAL
Qty: 90 | Refills: 3 | Status: ACTIVE | COMMUNITY
Start: 2021-01-01 | End: 1900-01-01

## 2023-11-29 RX ORDER — BLOOD-GLUCOSE TRANSMITTER
EACH MISCELLANEOUS
Qty: 1 | Refills: 1 | Status: ACTIVE | COMMUNITY
Start: 2022-03-21 | End: 1900-01-01

## 2023-11-29 RX ORDER — PREDNISONE 5 MG/1
5 TABLET ORAL
Qty: 90 | Refills: 3 | Status: ACTIVE | COMMUNITY
Start: 2018-05-16 | End: 1900-01-01

## 2023-11-29 RX ORDER — NIFEDIPINE 60 MG/1
60 TABLET, EXTENDED RELEASE ORAL DAILY
Qty: 90 | Refills: 3 | Status: ACTIVE | COMMUNITY
Start: 2022-09-30 | End: 1900-01-01

## 2023-11-29 RX ORDER — BLOOD-GLUCOSE SENSOR
EACH MISCELLANEOUS
Qty: 3 | Refills: 1 | Status: ACTIVE | COMMUNITY
Start: 2022-03-21 | End: 1900-01-01

## 2023-11-29 RX ORDER — ONDANSETRON 4 MG/1
4 TABLET, ORALLY DISINTEGRATING ORAL 3 TIMES DAILY
Qty: 30 | Refills: 1 | Status: DISCONTINUED | COMMUNITY
Start: 2020-04-30 | End: 2023-11-29

## 2023-11-30 RX ORDER — TIRZEPATIDE 7.5 MG/.5ML
7.5 INJECTION, SOLUTION SUBCUTANEOUS
Qty: 3 | Refills: 3 | Status: ACTIVE | COMMUNITY
Start: 2022-10-03

## 2023-12-06 RX ORDER — PANTOPRAZOLE 40 MG/1
40 TABLET, DELAYED RELEASE ORAL DAILY
Qty: 90 | Refills: 3 | Status: ACTIVE | COMMUNITY
Start: 2022-10-07 | End: 1900-01-01

## 2023-12-08 ENCOUNTER — RX RENEWAL (OUTPATIENT)
Age: 53
End: 2023-12-08

## 2023-12-08 RX ORDER — EZETIMIBE 10 MG/1
10 TABLET ORAL
Qty: 90 | Refills: 0 | Status: ACTIVE | COMMUNITY
Start: 2022-10-17 | End: 1900-01-01

## 2023-12-10 NOTE — DISCHARGE NOTE ADULT - NS MD DC PLAN IMMU FLU REF OTH
Patient Name: Mathew Crabtree  : 1945    MRN: [id number]                              Today's Date: 12/10/2023       Admit Date: 2023    Visit Dx:     ICD-10-CM ICD-9-CM   1. COVID-19  U07.1 079.89   2. Hypoxia  R09.02 799.02   3. Fatigue, unspecified type  R53.83 780.79   4. Hypercapnia  R06.89 786.09     Patient Active Problem List   Diagnosis    Acute hypercapnic respiratory failure    COVID-19 virus detected    Elevated brain natriuretic peptide (BNP) level    FRAN (acute kidney injury)    Sepsis    Type 2 diabetes mellitus    Anemia    Acute hypoxemic respiratory failure due to COVID-19     Past Medical History:   Diagnosis Date    Diabetes mellitus     Hypertension     Ischemia of small intestine      History reviewed. No pertinent surgical history.   General Information       Row Name 12/10/23 1111          OT Time and Intention    Document Type evaluation  -JR     Mode of Treatment occupational therapy  -JR       Row Name 12/10/23 1111          General Information    Patient Profile Reviewed yes  -JR     Prior Level of Function independent:;gait;transfer;bed mobility;ADL's  Recently underwent abdominal surgery and was at [facility] and completing mobility with RWx at [facility]. Independent with ADL's and mobility prior to that surgery  -JR     Existing Precautions/Restrictions fall;other (see comments)  airborne and contact  -JR     Barriers to Rehab medically complex;previous functional deficit  -JR       Row Name 12/10/23 1111          Living Environment    People in Home friend(s)  -JR       Row Name 12/10/23 1111          Home Main Entrance    Number of Stairs, Main Entrance none  -JR       Row Name 12/10/23 1111          Stairs Within Home, Primary    Number of Stairs, Within Home, Primary none  -JR       Row Name 12/10/23 1111          Cognition    Orientation Status (Cognition) oriented x 3  -JR       Row Name 12/10/23 1111          Safety Issues, Functional Mobility    Safety Issues  Affecting Function (Mobility) awareness of need for assistance;insight into deficits/self-awareness  -     Impairments Affecting Function (Mobility) balance;endurance/activity tolerance;shortness of breath;strength;pain  -               User Key  (r) = Recorded By, (t) = Taken By, (c) = Cosigned By      Initials Name Provider Type    Maddi Victoria OT Occupational Therapist                     Mobility/ADL's       Row Name 12/10/23 1114          Bed Mobility    Bed Mobility supine-sit  -JR     Supine-Sit Murray (Bed Mobility) minimum assist (75% patient effort);verbal cues  -JR     Bed Mobility, Safety Issues decreased use of arms for pushing/pulling;decreased use of legs for bridging/pushing;impaired trunk control for bed mobility  -JR     Assistive Device (Bed Mobility) bed rails;head of bed elevated  -JR     Comment, (Bed Mobility) Pt required verbal cues and increased time for supine to sit on EOB. Pt able to initiate moving LE's to EOB and required assist to bring trunk into sitting. Pt reported using a leg  to assist with LE's in and out of bed. Issued leg .  -       Row Name 12/10/23 1114          Transfers    Transfers bed-chair transfer  -       Row Name 12/10/23 [facility]          Bed-Chair Transfer    Bed-Chair Murray (Transfers) minimum assist (75% patient effort);verbal cues  -JR     Assistive Device (Bed-Chair Transfers) other (see comments)  B UE support  -       Row Name 12/10/23 1114          Activities of Daily Living    BADL Assessment/Intervention upper body dressing  -       Row Name 12/10/23 [facility]          Upper Body Dressing Assessment/Training    [place]ray Level (Upper Body Dressing) don;front opening garment;maximum assist (25% patient effort);verbal cues  -JR     Position (Upper Body Dressing) edge of bed sitting  -               User Key  (r) = Recorded By, (t) = Taken By, (c) = Cosigned By      Initials Name Provider Type    Maddi Victoria,  OT Occupational Therapist                   Obj/Interventions       Row Name 12/10/23 1116          Sensory Assessment (Somatosensory)    Sensory Assessment (Somatosensory) sensation intact  -       Row Name 12/10/23 1116          Range of Motion Comprehensive    General Range of Motion no range of motion deficits identified  -       Row Name 12/10/23 1116          Strength Comprehensive (MMT)    Comment, General Manual Muscle Testing (MMT) Assessment Deferrred formal MMT due to recent abdominal surgery. B UE WFL for ADL's.  -       Row Name 12/10/23 1116          Balance    Balance Assessment sitting static balance;standing dynamic balance  -     Static Sitting Balance contact guard  -     Dynamic Standing Balance minimal assist;2-person assist;verbal cues  -     Position/Device Used, Standing Balance supported  -               User Key  (r) = Recorded By, (t) = Taken By, (c) = Cosigned By      Initials Name Provider Type    JR Maddi Lazo, OT Occupational Therapist                   Goals/Plan       Row Name 12/10/23 1119          Bed Mobility Goal 1 (OT)    Activity/Assistive Device (Bed Mobility Goal 1, OT) bed mobility activities, all;leg   -     [place] Level/Cues Needed (Bed Mobility Goal 1, OT) modified independence  -     Time Frame (Bed Mobility Goal 1, OT) long term goal (LTG);by discharge  -     Progress/Outcomes (Bed Mobility Goal 1, OT) new goal  -       Row Name 12/10/23 1119          Transfer Goal 1 (OT)    Activity/Assistive Device (Transfer Goal 1, OT) transfers, all;walker, rolling  -     [place] Level/Cues Needed (Transfer Goal 1, OT) contact guard required;verbal cues required  -     Time Frame (Transfer Goal 1, OT) long term goal (LTG);by discharge  -     Progress/Outcome (Transfer Goal 1, OT) new goal  -       Row Name 12/10/23 1119          Dressing Goal 1 (OT)    Activity/Device (Dressing Goal 1, OT) lower body dressing;other (see comments)   with AE PRN  -JR     [place]/Cues Needed (Dressing Goal 1, OT) minimum assist (75% or more patient effort);verbal cues required  -JR     Time Frame (Dressing Goal 1, OT) long term goal (LTG);by discharge  -JR     Progress/Outcome (Dressing Goal 1, OT) new goal  -       Row Name 12/10/23 1112          Toileting Goal 1 (OT)    Activity/Device (Toileting Goal 1, OT) toileting skills, all  -JR     [place] Level/Cues Needed (Toileting Goal 1, OT) minimum assist (75% or more patient effort);verbal cues required  -JR     Time Frame (Toileting Goal 1, OT) long term goal (LTG);by discharge  -JR     Progress/Outcome (Toileting Goal 1, OT) new [facility]  -       Row Name 12/10/23 1118          Therapy Assessment/Plan (OT)    Planned Therapy Interventions (OT) activity tolerance training;adaptive equipment training;BADL retraining;functional balance retraining;occupation/activity based interventions;ROM/therapeutic exercise;strengthening exercise;transfer/mobility retraining;patient/caregiver education/training  -               User Key  (r) = Recorded By, (t) = Taken By, (c) = Cosigned By      Initials Name Provider Type    Maddi Victoria, OT Occupational Therapist                   Clinical Impression       Row Name 12/10/23 0210          Pain Assessment    Pretreatment Pain Rating 0/10 - no pain  -     Posttreatment Pain Rating 0/10 - no pain  -     Additional Documentation Pain Scale: Word Pre/Post-Treatment (Group)  -       Row Name 12/10/23 5869          Plan of Care Review    Plan of Care Reviewed With patient  -JR     Outcome Evaluation OT initial eval and expanded chart review completed. Pt presents with multiple comorbidities and decreased strength, balance and activity tolerance limiting independence with ADL's and mobility from recent baseline. Recommend continued skilled OT services and transfer to SNF at d/c if deemed medically necessary to ensure safe transition to home.  -JR Loza  Name 12/10/23 1117          Therapy Assessment/Plan (OT)    Patient/Family Therapy Goal Statement (OT) go home  -JR     Rehab Potential (OT) good, to achieve stated therapy goals  -JR     Criteria for Skilled Therapeutic Interventions Met (OT) yes;meets criteria;skilled treatment is necessary  -JR     Therapy Frequency (OT) daily  -JR       Row Name 12/10/23 1117          Therapy Plan Review/Discharge Plan (OT)    Anticipated Discharge Disposition (OT) skilled nursing facility  -JR       Row Name 12/10/23 1117          Vital Signs    Pre Systolic BP Rehab 145  -JR     Pre Treatment Diastolic BP 63  -JR     Post Systolic BP Rehab 150  -JR     Post Treatment Diastolic BP 68  -JR     Pretreatment Heart Rate (beats/min) 79  -JR     Posttreatment Heart Rate (beats/min) 78  -JR     Pre SpO2 (%) 96  -JR     O2 Delivery Pre Treatment supplemental O2  -JR     Post SpO2 (%) 95  -JR     O2 Delivery Post Treatment supplemental O2  -JR     Pre Patient Position Supine  -JR     Intra Patient Position Standing  -JR     Post Patient Position Sitting  -JR       Row Name 12/10/23 1117          Positioning and Restraints    Pre-Treatment Position in bed  -JR     Post Treatment Position chair  -JR     In Chair notified nsg;call light within reach;reclined;encouraged to call for assist;exit alarm on;waffle cushion  -JR               User Key  (r) = Recorded By, (t) = Taken By, (c) = Cosigned By      Initials Name Provider Type    JR Maddi Lazo, OT Occupational Therapist                   Outcome Measures       Row Name 12/10/23 1120          How much help from another is currently needed...    Putting on and taking off regular lower body clothing? 1  -JR     Bathing (including washing, rinsing, and drying) 2  -JR     Toileting (which includes using toilet bed pan or urinal) 2  -JR     Putting on and taking off regular upper body clothing 2  -JR     Taking care of personal grooming (such as brushing teeth) 3  -JR     Eating meals 3   -     AM-[facility] 6 Clicks Score (OT) 13  -JR       Row Name 12/10/23 0957          How much help from another person do you currently need...    Turning from your back to your side while in flat bed without using bedrails? 3  -ES     Moving from lying on back to sitting on the side of a flat bed without bedrails? 3  -ES     Moving to and from a bed to a chair (including a wheelchair)? 3  -ES     Standing up from a chair using your arms (e.g., wheelchair, bedside chair)? 3  -ES     Climbing 3-5 steps with a railing? 2  -ES     To walk in hospital room? 2  -ES     AM-PAC 6 Clicks Score (PT) 16  -ES     Highest Level of Mobility Goal 5 --> Static standing  -ES       Row Name 12/10/23 1120 12/10/23 0957       Functional Assessment    Outcome Measure Options AM-PAC 6 Clicks Daily Activity (OT)  - AM-[facility] 6 Clicks Basic Mobility (PT)  -ES              User Key  (r) = Recorded By, (t) = Taken By, (c) = Cosigned By      Initials Name Provider Type    Maddi Victoria, OT Occupational Therapist    ES Kristina Adam, PT Physical Therapist                    Occupational Therapy Education       Title: PT OT SLP Therapies (In Progress)       Topic: Occupational Therapy (In Progress)       Point: ADL training (In Progress)       Description:   Instruct learner(s) on proper safety adaptation and remediation techniques during self care or transfers.   Instruct in proper use of assistive devices.                  Learning Progress Summary             Patient Acceptance, E, NR by  at 12/10/2023 0825    Comment: Educated pt regarding role of therapy and ongoing treatment plan                         Point: Home exercise program (Not Started)       Description:   Instruct learner(s) on appropriate technique for monitoring, assisting and/or progressing therapeutic exercises/activities.                  Learner Progress:  Not documented in this visit.              Point: Precautions (Not Started)       Description:   Instruct  learner(s) on prescribed precautions during self-care and functional transfers.                  Learner Progress:  Not documented in this visit.              Point: Body mechanics (Not Started)       Description:   Instruct learner(s) on proper positioning and spine alignment during self-care, functional mobility activities and/or exercises.                  Learner Progress:  Not documented in this visit.                              User Key       Initials Effective Dates Name Provider Type Discipline     02/03/23 -  Maddi Lazo, OT Occupational Therapist OT                  OT Recommendation and Plan  Planned Therapy Interventions (OT): activity tolerance training, adaptive equipment training, BADL retraining, functional balance retraining, occupation/activity based interventions, ROM/therapeutic exercise, strengthening exercise, transfer/mobility retraining, patient/caregiver education/training  Therapy Frequency (OT): daily  Plan of Care Review  Plan of Care Reviewed With: patient  Outcome Evaluation: OT initial eval and expanded chart review completed. Pt presents with multiple comorbidities and decreased strength, balance and activity tolerance limiting independence with ADL's and mobility from recent baseline. Recommend continued skilled OT services and transfer to SNF at d/c if deemed medically necessary to ensure safe transition to home.     Time Calculation:   Evaluation Complexity (OT)  Review Occupational Profile/Medical/Therapy History Complexity: expanded/moderate complexity  Assessment, Occupational Performance/Identification of Deficit Complexity: 3-5 performance deficits  Clinical Decision Making Complexity (OT): detailed assessment/moderate complexity  Overall Complexity of Evaluation (OT): moderate complexity     Time Calculation- OT       Row Name 12/10/23 1122 12/10/23 0958          Time Calculation- OT    OT Start Time 0825 - --     OT Received On 12/10/23  - --     OT Goal  Re-Cert Due Date 12/20/23  -JR --        Timed Charges    18707 - Gait Training Minutes  -- 8  -ES        Untimed Charges    OT Eval/Re-eval Minutes 46  -JR --        Total Minutes    Timed Charges Total Minutes -- 8  -ES     Untimed Charges Total Minutes 46  -JR --      Total Minutes 46  -JR 8  -ES               User Key  (r) = Recorded By, (t) = Taken By, (c) = Cosigned By      Initials Name Provider Type     Maddi Lazo OT Occupational Therapist    ES Kristina Adam PT Physical Therapist                  Therapy Charges for Today       Code Description Service Date Service Provider Modifiers Qty    [id number]  OT EVAL MOD COMPLEXITY 4 12/10/2023 Maddi Lazo OT GO 1                 Maddi Lazo OT  12/10/2023   Refused

## 2023-12-14 RX ORDER — AZATHIOPRINE 50 1/1
50 TABLET ORAL DAILY
Qty: 180 | Refills: 3 | Status: ACTIVE | COMMUNITY
Start: 2022-09-01 | End: 1900-01-01

## 2023-12-14 RX ORDER — CARVEDILOL 25 MG/1
25 TABLET, FILM COATED ORAL
Qty: 180 | Refills: 3 | Status: ACTIVE | COMMUNITY
Start: 2018-07-24 | End: 1900-01-01

## 2023-12-15 NOTE — DISCHARGE NOTE ADULT - PAIN PRESENT
[FreeTextEntry1] : Follow-up of bilateral hand discomfort [de-identified] : Patient presents for follow-up of bilateral hand discomfort. 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 Reports noncompliance with home treatments-vitamin B6 100 mg twice daily, alpha lipoic acid 600 mg twice daily and nighttime wrist splint wearing. Recently evaluated by neurology and underwent NSC/EMG studies of both upper extremities which revealed mild right carpal tunnel syndrome and normal study on the left. Presents for completion of short-term disability forms. No

## 2023-12-22 RX ORDER — FUROSEMIDE 40 MG/1
40 TABLET ORAL DAILY
Qty: 90 | Refills: 3 | Status: ACTIVE | COMMUNITY
Start: 1900-01-01 | End: 1900-01-01

## 2023-12-22 RX ORDER — TACROLIMUS 1 MG/1
1 TABLET, EXTENDED RELEASE ORAL DAILY
Qty: 180 | Refills: 3 | Status: ACTIVE | COMMUNITY
Start: 2018-11-27 | End: 1900-01-01

## 2024-01-15 NOTE — ASSESSMENT
[FreeTextEntry1] : NODAT< in excellent control on mounjaro 7.5 monotherapy. Can always take correction doses of insulin PRN. Vitmain D high; hold supplement bone nedisty screening needed, as pt. kira-menopausal and on chronic prednisone, may need alendronate  Glucose Sensor Necessity:  This patient with diabetes (dx: E11.65) is using a dexcom CGM. The patient is treated with insulin up to three injections daily.  This patient requires frequent adjustments to her insulin treatment on the basis of therapeutic continuous glucose monitoring results by  adjusting fixed doses.  In addition, the patient has been to our office for an evaluation of her diabetes control within the past 6 months.

## 2024-01-15 NOTE — HISTORY OF PRESENT ILLNESS
[FreeTextEntry1] : Type: NODAT Severity: moderate Duration: January 2020 Onset: s/p kidney transplant Associated symptoms: polyuria, polydipsia  Modifying factors- Current meds for glycemic control: Tandem Control IQ with Humalog previously   On glimepiride in the past, d/c'ed once started on CSII. Trial of Ozempic resulting in nausea/vomiting. Tolerating trulicity without increase in adverse effects. Has chronic nausea and diarrhea; gi workup unrevealing. 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

## 2024-02-14 ENCOUNTER — APPOINTMENT (OUTPATIENT)
Dept: DERMATOLOGY | Facility: CLINIC | Age: 54
End: 2024-02-14
Payer: COMMERCIAL

## 2024-02-14 PROCEDURE — 17110 DESTRUCTION B9 LES UP TO 14: CPT

## 2024-02-14 PROCEDURE — 11900 INJECT SKIN LESIONS </W 7: CPT | Mod: 59

## 2024-02-14 PROCEDURE — 99214 OFFICE O/P EST MOD 30 MIN: CPT | Mod: 25

## 2024-02-15 ENCOUNTER — APPOINTMENT (OUTPATIENT)
Dept: NEPHROLOGY | Facility: CLINIC | Age: 54
End: 2024-02-15

## 2024-03-14 NOTE — H&P PST ADULT - NS PRO FEM  PAP SMEARS 3YRS
Include Location In Plan?: Yes Hide Include Location In Plan Question?: No Detail Level: Zone Detail Level: Generalized yes

## 2024-04-24 ENCOUNTER — APPOINTMENT (OUTPATIENT)
Dept: RADIOLOGY | Facility: CLINIC | Age: 54
End: 2024-04-24
Payer: COMMERCIAL

## 2024-04-24 ENCOUNTER — RESULT REVIEW (OUTPATIENT)
Age: 54
End: 2024-04-24

## 2024-04-24 ENCOUNTER — APPOINTMENT (OUTPATIENT)
Dept: FAMILY MEDICINE | Facility: CLINIC | Age: 54
End: 2024-04-24
Payer: COMMERCIAL

## 2024-04-24 VITALS
DIASTOLIC BLOOD PRESSURE: 80 MMHG | BODY MASS INDEX: 26.83 KG/M2 | WEIGHT: 177 LBS | RESPIRATION RATE: 14 BRPM | OXYGEN SATURATION: 99 % | SYSTOLIC BLOOD PRESSURE: 140 MMHG | HEART RATE: 70 BPM | HEIGHT: 68 IN

## 2024-04-24 DIAGNOSIS — Z79.899 OTHER LONG TERM (CURRENT) DRUG THERAPY: ICD-10-CM

## 2024-04-24 DIAGNOSIS — Z94.0 KIDNEY TRANSPLANT STATUS: ICD-10-CM

## 2024-04-24 DIAGNOSIS — Z91.81 HISTORY OF FALLING: ICD-10-CM

## 2024-04-24 DIAGNOSIS — M25.511 PAIN IN RIGHT SHOULDER: ICD-10-CM

## 2024-04-24 DIAGNOSIS — M25.551 PAIN IN RIGHT HIP: ICD-10-CM

## 2024-04-24 DIAGNOSIS — D84.9 IMMUNODEFICIENCY, UNSPECIFIED: ICD-10-CM

## 2024-04-24 PROCEDURE — 73502 X-RAY EXAM HIP UNI 2-3 VIEWS: CPT | Mod: RT

## 2024-04-24 PROCEDURE — 73030 X-RAY EXAM OF SHOULDER: CPT | Mod: RT

## 2024-04-24 PROCEDURE — 99214 OFFICE O/P EST MOD 30 MIN: CPT

## 2024-04-24 RX ORDER — TRAMADOL HYDROCHLORIDE 50 MG/1
50 TABLET, COATED ORAL
Qty: 30 | Refills: 0 | Status: ACTIVE | COMMUNITY
Start: 2024-04-24 | End: 1900-01-01

## 2024-04-24 RX ORDER — SULFAMETHOXAZOLE AND TRIMETHOPRIM 400; 80 MG/1; MG/1
400-80 TABLET ORAL
Qty: 90 | Refills: 3 | Status: DISCONTINUED | COMMUNITY
Start: 2022-10-07 | End: 2024-04-24

## 2024-04-24 NOTE — HISTORY OF PRESENT ILLNESS
[FreeTextEntry8] : Pt is here post fall 2 days ago. She states was getting out of her car and dog oull her out with leash, falling on her Right side of body. She is now c/o Pain in RT hip and RT shoulder. She is able to walk , but reports pain. She states was doing sessions with chiropractor before fall for cervical pain and Rt shoulder pain, which increase after fall. Medical hx significant for PCKD s/p transplant in 01/2022; DM, HTN, GERD, Anxiety. She is now on Mounjaro and lost 70 lbs.

## 2024-04-24 NOTE — ASSESSMENT
[FreeTextEntry1] :  52 y/o F with medical hx significant for Kidney trasnplant in 2022, on Immunosupresants, HTN, DM, Anxiety, here post fall with Rt shoulder and RT hip pain: -Xray order. -Tramadol 50 mg prn pain -Orthopedic referral.

## 2024-04-24 NOTE — PHYSICAL EXAM
[Normal] : no acute distress, well nourished, well developed and well-appearing [de-identified] : RT shoulder tenderness and Rt hip pain with rotation.

## 2024-04-25 ENCOUNTER — APPOINTMENT (OUTPATIENT)
Dept: ORTHOPEDIC SURGERY | Facility: CLINIC | Age: 54
End: 2024-04-25
Payer: COMMERCIAL

## 2024-04-25 VITALS
SYSTOLIC BLOOD PRESSURE: 140 MMHG | HEART RATE: 70 BPM | BODY MASS INDEX: 26.83 KG/M2 | WEIGHT: 177 LBS | DIASTOLIC BLOOD PRESSURE: 80 MMHG | HEIGHT: 68 IN

## 2024-04-25 DIAGNOSIS — M75.90 BURSITIS OF UNSPECIFIED SHOULDER: ICD-10-CM

## 2024-04-25 DIAGNOSIS — M25.511 PAIN IN RIGHT SHOULDER: ICD-10-CM

## 2024-04-25 DIAGNOSIS — M75.50 BURSITIS OF UNSPECIFIED SHOULDER: ICD-10-CM

## 2024-04-25 PROCEDURE — 99215 OFFICE O/P EST HI 40 MIN: CPT | Mod: 25

## 2024-04-25 PROCEDURE — 20610 DRAIN/INJ JOINT/BURSA W/O US: CPT | Mod: RT

## 2024-04-25 NOTE — PHYSICAL EXAM
[de-identified] : Examination of the [right] shoulder reveals equal active and passive motion as compared to the contralateral side   There is a positive Speed, positive Majo, positive Rangel, tenderness with anterior shoulder compression [de-identified] : [3] views of [right] shoulder were reviewed today in my office from separate provider and were seen by me and discussed with the patient. These [show findings consistent with AC arthropathy and grossly preserved glenohumeral joint space] There is no obvious fracture

## 2024-04-25 NOTE — ASSESSMENT
[FreeTextEntry1] : ASSESSMENT: The patient comes in today with chronic exacerbated right shoulder pain.  She describes difficulty with overhead activities.  The symptoms are affecting her ADLs.  Of note she mentions that she suffered a fall few days prior to today's visit, however clinical examination and imaging are reassuring for no obvious fracture. She mentions she had left shoulder surgery in the past as well.  Symptoms today are consistent with right shoulder tendinopathy, bursitis, impingement, weakness.  Treatment modalities were discussed, the patient elects for an injection.  I also recommend physical therapy for her condition.   The patient was adequately and thoroughly informed of my assessment of their current condition(s).  - This may diminish bodily function for the extremity.  We discussed prognosis, treatment modalities including operative and nonoperative options for the above diagnostic assessment. As always, 2nd opinion is always provided as an option. For this, when accessible, I was able to review other physicians note(s) including reviewing other tests, imaging results as well as personally view these results for my own interpretation.      [Right] SUBACROMIAL SHOULDER INJECTION     Indication:  subacromial bursitis/impingement, pain     Risk, benefits and alternatives were discussed with the patient. Potential complications include bleeding and infection. Alcohol was used to prep the area. Ethyl chloride spray was used as a topical anesthetic.  Using sterile technique, the injection needle was then directed from a standard posterior approach parallel to and inferior to the acromion.  A 21g needle was used to inject 5 mL of 1% Lidocaine and 2 mL Kenalog (10mg). No significant resistance was encountered. A bandage was applied. The patient tolerated the procedure well. Patient instructed to avoid strenuous activity for 2 day(s). Specifically counseled regarding the signs and symptoms of potential infection and instructed to present promptly to clinic or hospital if such signs and symptoms arise.  The patient was adequately and thoroughly informed of my assessment of their current condition(s).   DISCUSSION: 1.  Right shoulder injection as above.  Activity modifications.  PT prescription provided.  Follow-up in 3 months. 2. [x] 3. [x]

## 2024-04-25 NOTE — HISTORY OF PRESENT ILLNESS
[FreeTextEntry1] : Angeline is a 53-year-old female who presents today with chronic exacerbated right shoulder pain.  She describes difficulty with overhead activities.  The symptoms are affecting her ADLs.  Of note she mentions that she suffered a fall few days prior to today's visit. She mentions she had left shoulder surgery in the past as well.

## 2024-05-14 ENCOUNTER — APPOINTMENT (OUTPATIENT)
Dept: CARDIOLOGY | Facility: CLINIC | Age: 54
End: 2024-05-14

## 2024-05-22 ENCOUNTER — APPOINTMENT (OUTPATIENT)
Dept: ENDOCRINOLOGY | Facility: CLINIC | Age: 54
End: 2024-05-22

## 2024-05-31 NOTE — PHYSICAL THERAPY INITIAL EVALUATION ADULT - SITTING BALANCE: STATIC
[General Appearance - Alert] : alert [General Appearance - In No Acute Distress] : in no acute distress [Memory Recent] : recent memory was not impaired [Memory Remote] : remote memory was not impaired [Cranial Nerves Optic (II)] : visual acuity intact bilaterally,  visual fields full to confrontation, pupils equal round and reactive to light [Cranial Nerves Oculomotor (III)] : extraocular motion intact [Cranial Nerves Trigeminal (V)] : facial sensation intact symmetrically [Cranial Nerves Facial (VII)] : face symmetrical [Cranial Nerves Vestibulocochlear (VIII)] : hearing was intact bilaterally [Cranial Nerves Glossopharyngeal (IX)] : tongue and palate midline [Cranial Nerves Accessory (XI - Cranial And Spinal)] : head turning and shoulder shrug symmetric [Cranial Nerves Hypoglossal (XII)] : there was no tongue deviation with protrusion [Motor Tone] : muscle tone was normal in all four extremities [Motor Strength] : muscle strength was normal in all four extremities [Sensation Tactile Decrease] : light touch was intact [2+] : Brachioradialis left 2+ normal balance

## 2024-07-13 ENCOUNTER — APPOINTMENT (OUTPATIENT)
Dept: RADIOLOGY | Facility: CLINIC | Age: 54
End: 2024-07-13
Payer: COMMERCIAL

## 2024-07-13 ENCOUNTER — APPOINTMENT (OUTPATIENT)
Dept: CT IMAGING | Facility: CLINIC | Age: 54
End: 2024-07-13
Payer: COMMERCIAL

## 2024-07-13 ENCOUNTER — OUTPATIENT (OUTPATIENT)
Dept: OUTPATIENT SERVICES | Facility: HOSPITAL | Age: 54
LOS: 1 days | End: 2024-07-13
Payer: COMMERCIAL

## 2024-07-13 DIAGNOSIS — R91.8 OTHER NONSPECIFIC ABNORMAL FINDING OF LUNG FIELD: ICD-10-CM

## 2024-07-13 DIAGNOSIS — I77.0 ARTERIOVENOUS FISTULA, ACQUIRED: Chronic | ICD-10-CM

## 2024-07-13 DIAGNOSIS — Z90.5 ACQUIRED ABSENCE OF KIDNEY: Chronic | ICD-10-CM

## 2024-07-13 DIAGNOSIS — E11.9 TYPE 2 DIABETES MELLITUS WITHOUT COMPLICATIONS: ICD-10-CM

## 2024-07-13 PROCEDURE — 71250 CT THORAX DX C-: CPT | Mod: 26

## 2024-07-13 PROCEDURE — 77085 DXA BONE DENSITY AXL VRT FX: CPT | Mod: 26

## 2024-07-13 PROCEDURE — 77085 DXA BONE DENSITY AXL VRT FX: CPT

## 2024-07-13 PROCEDURE — 71250 CT THORAX DX C-: CPT

## 2024-07-19 ENCOUNTER — APPOINTMENT (OUTPATIENT)
Dept: FAMILY MEDICINE | Facility: CLINIC | Age: 54
End: 2024-07-19
Payer: COMMERCIAL

## 2024-07-19 VITALS
RESPIRATION RATE: 14 BRPM | HEART RATE: 74 BPM | BODY MASS INDEX: 26.22 KG/M2 | WEIGHT: 173 LBS | SYSTOLIC BLOOD PRESSURE: 132 MMHG | DIASTOLIC BLOOD PRESSURE: 84 MMHG | HEIGHT: 68 IN | OXYGEN SATURATION: 98 %

## 2024-07-19 DIAGNOSIS — Z94.0 KIDNEY TRANSPLANT STATUS: ICD-10-CM

## 2024-07-19 DIAGNOSIS — E13.9 OTHER SPECIFIED DIABETES MELLITUS W/OUT COMPLICATIONS: ICD-10-CM

## 2024-07-19 DIAGNOSIS — F41.1 GENERALIZED ANXIETY DISORDER: ICD-10-CM

## 2024-07-19 DIAGNOSIS — R13.10 DYSPHAGIA, UNSPECIFIED: ICD-10-CM

## 2024-07-19 DIAGNOSIS — E11.9 TYPE 2 DIABETES MELLITUS W/OUT COMPLICATIONS: ICD-10-CM

## 2024-07-19 PROCEDURE — 83036 HEMOGLOBIN GLYCOSYLATED A1C: CPT | Mod: QW

## 2024-07-19 PROCEDURE — 99214 OFFICE O/P EST MOD 30 MIN: CPT

## 2024-07-19 PROCEDURE — G2211 COMPLEX E/M VISIT ADD ON: CPT | Mod: NC

## 2024-07-19 RX ORDER — TACROLIMUS 0.75 MG/1
0.75 TABLET, EXTENDED RELEASE ORAL DAILY
Qty: 60 | Refills: 5 | Status: ACTIVE | COMMUNITY
Start: 2024-07-18 | End: 1900-01-01

## 2024-07-20 ENCOUNTER — APPOINTMENT (OUTPATIENT)
Dept: ANTEPARTUM | Facility: CLINIC | Age: 54
End: 2024-07-20
Payer: COMMERCIAL

## 2024-07-20 ENCOUNTER — ASOB RESULT (OUTPATIENT)
Age: 54
End: 2024-07-20

## 2024-07-20 PROCEDURE — 76856 US EXAM PELVIC COMPLETE: CPT | Mod: 59

## 2024-07-20 PROCEDURE — 76830 TRANSVAGINAL US NON-OB: CPT

## 2024-07-22 ENCOUNTER — NON-APPOINTMENT (OUTPATIENT)
Age: 54
End: 2024-07-22

## 2024-07-22 ENCOUNTER — APPOINTMENT (OUTPATIENT)
Dept: OPHTHALMOLOGY | Facility: CLINIC | Age: 54
End: 2024-07-22
Payer: COMMERCIAL

## 2024-07-22 ENCOUNTER — APPOINTMENT (OUTPATIENT)
Dept: OBGYN | Facility: CLINIC | Age: 54
End: 2024-07-22
Payer: COMMERCIAL

## 2024-07-22 VITALS
BODY MASS INDEX: 27.89 KG/M2 | HEIGHT: 68 IN | SYSTOLIC BLOOD PRESSURE: 116 MMHG | WEIGHT: 184 LBS | DIASTOLIC BLOOD PRESSURE: 72 MMHG

## 2024-07-22 DIAGNOSIS — Z12.31 ENCOUNTER FOR SCREENING MAMMOGRAM FOR MALIGNANT NEOPLASM OF BREAST: ICD-10-CM

## 2024-07-22 DIAGNOSIS — Z13.820 ENCOUNTER FOR SCREENING FOR OSTEOPOROSIS: ICD-10-CM

## 2024-07-22 DIAGNOSIS — N70.11 CHRONIC SALPINGITIS: ICD-10-CM

## 2024-07-22 DIAGNOSIS — Z01.419 ENCOUNTER FOR GYNECOLOGICAL EXAMINATION (GENERAL) (ROUTINE) W/OUT ABNORMAL FINDINGS: ICD-10-CM

## 2024-07-22 DIAGNOSIS — N83.209 UNSPECIFIED OVARIAN CYST, UNSPECIFIED SIDE: ICD-10-CM

## 2024-07-22 DIAGNOSIS — Z01.411 ENCOUNTER FOR GYNECOLOGICAL EXAMINATION (GENERAL) (ROUTINE) WITH ABNORMAL FINDINGS: ICD-10-CM

## 2024-07-22 DIAGNOSIS — Z30.431 ENCOUNTER FOR ROUTINE CHECKING OF INTRAUTERINE CONTRACEPTIVE DEVICE: ICD-10-CM

## 2024-07-22 DIAGNOSIS — Z87.42 PERSONAL HISTORY OF OTHER DISEASES OF THE FEMALE GENITAL TRACT: ICD-10-CM

## 2024-07-22 DIAGNOSIS — R92.30 DENSE BREASTS, UNSPECIFIED: ICD-10-CM

## 2024-07-22 PROCEDURE — 92134 CPTRZ OPH DX IMG PST SGM RTA: CPT

## 2024-07-22 PROCEDURE — 99396 PREV VISIT EST AGE 40-64: CPT

## 2024-07-22 PROCEDURE — 92014 COMPRE OPH EXAM EST PT 1/>: CPT

## 2024-07-22 PROCEDURE — 99459 PELVIC EXAMINATION: CPT

## 2024-07-22 PROCEDURE — 99213 OFFICE O/P EST LOW 20 MIN: CPT | Mod: 25

## 2024-07-24 ENCOUNTER — INPATIENT (INPATIENT)
Facility: HOSPITAL | Age: 54
LOS: 3 days | Discharge: HOME | DRG: 690 | End: 2024-07-28
Attending: STUDENT IN AN ORGANIZED HEALTH CARE EDUCATION/TRAINING PROGRAM | Admitting: STUDENT IN AN ORGANIZED HEALTH CARE EDUCATION/TRAINING PROGRAM
Payer: COMMERCIAL

## 2024-07-24 ENCOUNTER — APPOINTMENT (OUTPATIENT)
Dept: ENDOCRINOLOGY | Facility: CLINIC | Age: 54
End: 2024-07-24

## 2024-07-24 VITALS
TEMPERATURE: 100 F | WEIGHT: 177.03 LBS | RESPIRATION RATE: 18 BRPM | SYSTOLIC BLOOD PRESSURE: 148 MMHG | HEIGHT: 69 IN | OXYGEN SATURATION: 98 % | DIASTOLIC BLOOD PRESSURE: 85 MMHG | HEART RATE: 66 BPM

## 2024-07-24 DIAGNOSIS — Z94.0 KIDNEY TRANSPLANT STATUS: Chronic | ICD-10-CM

## 2024-07-24 DIAGNOSIS — Z90.49 ACQUIRED ABSENCE OF OTHER SPECIFIED PARTS OF DIGESTIVE TRACT: Chronic | ICD-10-CM

## 2024-07-24 DIAGNOSIS — Z98.890 OTHER SPECIFIED POSTPROCEDURAL STATES: Chronic | ICD-10-CM

## 2024-07-24 DIAGNOSIS — Z98.51 TUBAL LIGATION STATUS: Chronic | ICD-10-CM

## 2024-07-24 DIAGNOSIS — N20.0 CALCULUS OF KIDNEY: Chronic | ICD-10-CM

## 2024-07-24 DIAGNOSIS — I77.0 ARTERIOVENOUS FISTULA, ACQUIRED: Chronic | ICD-10-CM

## 2024-07-24 DIAGNOSIS — Z90.5 ACQUIRED ABSENCE OF KIDNEY: Chronic | ICD-10-CM

## 2024-07-24 LAB — HPV HIGH+LOW RISK DNA PNL CVX: NOT DETECTED

## 2024-07-24 PROCEDURE — 99285 EMERGENCY DEPT VISIT HI MDM: CPT

## 2024-07-24 NOTE — ED ADULT TRIAGE NOTE - GLASGOW COMA SCALE: EYE OPENING, MLM
Quality 130: Documentation Of Current Medications In The Medical Record: Documentation of a medical reason(s) for not documenting, updating, or reviewing the patientâs current medications list (e.g., patient is in an urgent or emergent medical situation) Detail Level: Simple (E4) spontaneous

## 2024-07-25 ENCOUNTER — APPOINTMENT (OUTPATIENT)
Dept: ORTHOPEDIC SURGERY | Facility: CLINIC | Age: 54
End: 2024-07-25

## 2024-07-25 DIAGNOSIS — N12 TUBULO-INTERSTITIAL NEPHRITIS, NOT SPECIFIED AS ACUTE OR CHRONIC: ICD-10-CM

## 2024-07-25 LAB
ALBUMIN SERPL ELPH-MCNC: 3.6 G/DL — SIGNIFICANT CHANGE UP (ref 3.3–5.2)
ALP SERPL-CCNC: 69 U/L — SIGNIFICANT CHANGE UP (ref 40–120)
ALT FLD-CCNC: 10 U/L — SIGNIFICANT CHANGE UP
ANION GAP SERPL CALC-SCNC: 16 MMOL/L — SIGNIFICANT CHANGE UP (ref 5–17)
APPEARANCE UR: CLEAR — SIGNIFICANT CHANGE UP
APTT BLD: 25.3 SEC — SIGNIFICANT CHANGE UP (ref 24.5–35.6)
AST SERPL-CCNC: 15 U/L — SIGNIFICANT CHANGE UP
BACTERIA # UR AUTO: NEGATIVE /HPF — SIGNIFICANT CHANGE UP
BASE EXCESS BLDV CALC-SCNC: 0.3 MMOL/L — SIGNIFICANT CHANGE UP (ref -2–3)
BASOPHILS # BLD AUTO: 0.03 K/UL — SIGNIFICANT CHANGE UP (ref 0–0.2)
BASOPHILS NFR BLD AUTO: 0.4 % — SIGNIFICANT CHANGE UP (ref 0–2)
BILIRUB SERPL-MCNC: 0.7 MG/DL — SIGNIFICANT CHANGE UP (ref 0.4–2)
BILIRUB UR-MCNC: NEGATIVE — SIGNIFICANT CHANGE UP
BUN SERPL-MCNC: 12.7 MG/DL — SIGNIFICANT CHANGE UP (ref 8–20)
CA-I SERPL-SCNC: 1.09 MMOL/L — LOW (ref 1.15–1.33)
CALCIUM SERPL-MCNC: 9.3 MG/DL — SIGNIFICANT CHANGE UP (ref 8.4–10.5)
CAST: 0 /LPF — SIGNIFICANT CHANGE UP (ref 0–4)
CHLORIDE BLDV-SCNC: 103 MMOL/L — SIGNIFICANT CHANGE UP (ref 96–108)
CHLORIDE SERPL-SCNC: 101 MMOL/L — SIGNIFICANT CHANGE UP (ref 96–108)
CO2 SERPL-SCNC: 23 MMOL/L — SIGNIFICANT CHANGE UP (ref 22–29)
COLOR SPEC: YELLOW — SIGNIFICANT CHANGE UP
CREAT SERPL-MCNC: 1.04 MG/DL — SIGNIFICANT CHANGE UP (ref 0.5–1.3)
DIFF PNL FLD: NEGATIVE — SIGNIFICANT CHANGE UP
EGFR: 64 ML/MIN/1.73M2 — SIGNIFICANT CHANGE UP
EOSINOPHIL # BLD AUTO: 0.02 K/UL — SIGNIFICANT CHANGE UP (ref 0–0.5)
EOSINOPHIL NFR BLD AUTO: 0.3 % — SIGNIFICANT CHANGE UP (ref 0–6)
FLUAV AG NPH QL: SIGNIFICANT CHANGE UP
FLUBV AG NPH QL: SIGNIFICANT CHANGE UP
GAS PNL BLDV: 133 MMOL/L — LOW (ref 136–145)
GAS PNL BLDV: SIGNIFICANT CHANGE UP
GAS PNL BLDV: SIGNIFICANT CHANGE UP
GLUCOSE BLDV-MCNC: 91 MG/DL — SIGNIFICANT CHANGE UP (ref 70–99)
GLUCOSE SERPL-MCNC: 87 MG/DL — SIGNIFICANT CHANGE UP (ref 70–99)
GLUCOSE UR QL: NEGATIVE MG/DL — SIGNIFICANT CHANGE UP
HCO3 BLDV-SCNC: 24 MMOL/L — SIGNIFICANT CHANGE UP (ref 22–29)
HCT VFR BLD CALC: 43 % — SIGNIFICANT CHANGE UP (ref 34.5–45)
HCT VFR BLDA CALC: 47 % — SIGNIFICANT CHANGE UP
HGB BLD CALC-MCNC: 15.7 G/DL — SIGNIFICANT CHANGE UP (ref 11.7–16.1)
HGB BLD-MCNC: 15 G/DL — SIGNIFICANT CHANGE UP (ref 11.5–15.5)
IMM GRANULOCYTES NFR BLD AUTO: 0.4 % — SIGNIFICANT CHANGE UP (ref 0–0.9)
INR BLD: 1.04 RATIO — SIGNIFICANT CHANGE UP (ref 0.85–1.18)
KETONES UR-MCNC: NEGATIVE MG/DL — SIGNIFICANT CHANGE UP
LACTATE BLDV-MCNC: 1.2 MMOL/L — SIGNIFICANT CHANGE UP (ref 0.5–2)
LEUKOCYTE ESTERASE UR-ACNC: ABNORMAL
LYMPHOCYTES # BLD AUTO: 0.83 K/UL — LOW (ref 1–3.3)
LYMPHOCYTES # BLD AUTO: 10.6 % — LOW (ref 13–44)
MCHC RBC-ENTMCNC: 33.9 PG — SIGNIFICANT CHANGE UP (ref 27–34)
MCHC RBC-ENTMCNC: 34.9 GM/DL — SIGNIFICANT CHANGE UP (ref 32–36)
MCV RBC AUTO: 97.1 FL — SIGNIFICANT CHANGE UP (ref 80–100)
MONOCYTES # BLD AUTO: 0.59 K/UL — SIGNIFICANT CHANGE UP (ref 0–0.9)
MONOCYTES NFR BLD AUTO: 7.6 % — SIGNIFICANT CHANGE UP (ref 2–14)
NEUTROPHILS # BLD AUTO: 6.3 K/UL — SIGNIFICANT CHANGE UP (ref 1.8–7.4)
NEUTROPHILS NFR BLD AUTO: 80.7 % — HIGH (ref 43–77)
NITRITE UR-MCNC: NEGATIVE — SIGNIFICANT CHANGE UP
PCO2 BLDV: 31 MMHG — LOW (ref 39–42)
PH BLDV: 7.49 — HIGH (ref 7.32–7.43)
PH UR: 7 — SIGNIFICANT CHANGE UP (ref 5–8)
PLATELET # BLD AUTO: 109 K/UL — LOW (ref 150–400)
PO2 BLDV: 124 MMHG — HIGH (ref 25–45)
POTASSIUM BLDV-SCNC: 3.9 MMOL/L — SIGNIFICANT CHANGE UP (ref 3.5–5.1)
POTASSIUM SERPL-MCNC: 3.9 MMOL/L — SIGNIFICANT CHANGE UP (ref 3.5–5.3)
POTASSIUM SERPL-SCNC: 3.9 MMOL/L — SIGNIFICANT CHANGE UP (ref 3.5–5.3)
PROT SERPL-MCNC: 6.3 G/DL — LOW (ref 6.6–8.7)
PROT UR-MCNC: NEGATIVE MG/DL — SIGNIFICANT CHANGE UP
PROTHROM AB SERPL-ACNC: 11.5 SEC — SIGNIFICANT CHANGE UP (ref 9.5–13)
RBC # BLD: 4.43 M/UL — SIGNIFICANT CHANGE UP (ref 3.8–5.2)
RBC # FLD: 13.2 % — SIGNIFICANT CHANGE UP (ref 10.3–14.5)
RBC CASTS # UR COMP ASSIST: 4 /HPF — SIGNIFICANT CHANGE UP (ref 0–4)
RSV RNA NPH QL NAA+NON-PROBE: SIGNIFICANT CHANGE UP
SAO2 % BLDV: 100 % — SIGNIFICANT CHANGE UP
SARS-COV-2 RNA SPEC QL NAA+PROBE: SIGNIFICANT CHANGE UP
SODIUM SERPL-SCNC: 140 MMOL/L — SIGNIFICANT CHANGE UP (ref 135–145)
SP GR SPEC: 1.01 — SIGNIFICANT CHANGE UP (ref 1–1.03)
SQUAMOUS # UR AUTO: 0 /HPF — SIGNIFICANT CHANGE UP (ref 0–5)
UROBILINOGEN FLD QL: 1 MG/DL — SIGNIFICANT CHANGE UP (ref 0.2–1)
WBC # BLD: 7.8 K/UL — SIGNIFICANT CHANGE UP (ref 3.8–10.5)
WBC # FLD AUTO: 7.8 K/UL — SIGNIFICANT CHANGE UP (ref 3.8–10.5)
WBC UR QL: 32 /HPF — HIGH (ref 0–5)

## 2024-07-25 PROCEDURE — 99223 1ST HOSP IP/OBS HIGH 75: CPT

## 2024-07-25 PROCEDURE — 70450 CT HEAD/BRAIN W/O DYE: CPT | Mod: 26,MC

## 2024-07-25 PROCEDURE — 74176 CT ABD & PELVIS W/O CONTRAST: CPT | Mod: 26,MC

## 2024-07-25 PROCEDURE — 71250 CT THORAX DX C-: CPT | Mod: 26,MC

## 2024-07-25 PROCEDURE — 71045 X-RAY EXAM CHEST 1 VIEW: CPT | Mod: 26

## 2024-07-25 RX ORDER — DEXTROSE 4 G
15 TABLET,CHEWABLE ORAL ONCE
Refills: 0 | Status: DISCONTINUED | OUTPATIENT
Start: 2024-07-25 | End: 2024-07-28

## 2024-07-25 RX ORDER — METOCLOPRAMIDE HCL 5 MG/ML
10 VIAL (ML) INJECTION ONCE
Refills: 0 | Status: COMPLETED | OUTPATIENT
Start: 2024-07-25 | End: 2024-07-25

## 2024-07-25 RX ORDER — CYANOCOBALAMIN/FOLIC AC/VIT B6 2-2.5-25MG
1 TABLET ORAL
Refills: 0 | DISCHARGE

## 2024-07-25 RX ORDER — ACETAMINOPHEN 500 MG
650 TABLET ORAL EVERY 6 HOURS
Refills: 0 | Status: DISCONTINUED | OUTPATIENT
Start: 2024-07-25 | End: 2024-07-28

## 2024-07-25 RX ORDER — ESCITALOPRAM OXALATE 20 MG
1 TABLET ORAL
Refills: 0 | DISCHARGE

## 2024-07-25 RX ORDER — ESCITALOPRAM OXALATE 20 MG
20 TABLET ORAL DAILY
Refills: 0 | Status: DISCONTINUED | OUTPATIENT
Start: 2024-07-25 | End: 2024-07-28

## 2024-07-25 RX ORDER — DEXTROSE MONOHYDRATE, SODIUM CHLORIDE, SODIUM LACTATE, CALCIUM CHLORIDE, MAGNESIUM CHLORIDE 1.5; 538; 448; 18.4; 5.08 G/100ML; MG/100ML; MG/100ML; MG/100ML; MG/100ML
1000 SOLUTION INTRAPERITONEAL
Refills: 0 | Status: DISCONTINUED | OUTPATIENT
Start: 2024-07-25 | End: 2024-07-28

## 2024-07-25 RX ORDER — VANCOMYCIN HYDROCHLORIDE 5 G/100ML
1000 INJECTION, POWDER, LYOPHILIZED, FOR SOLUTION INTRAVENOUS ONCE
Refills: 0 | Status: COMPLETED | OUTPATIENT
Start: 2024-07-25 | End: 2024-07-25

## 2024-07-25 RX ORDER — GLUCAGON INJECTION, SOLUTION 0.5 MG/.1ML
1 INJECTION, SOLUTION SUBCUTANEOUS ONCE
Refills: 0 | Status: DISCONTINUED | OUTPATIENT
Start: 2024-07-25 | End: 2024-07-28

## 2024-07-25 RX ORDER — NIFEDIPINE 20 MG/1
60 CAPSULE, LIQUID FILLED ORAL ONCE
Refills: 0 | Status: COMPLETED | OUTPATIENT
Start: 2024-07-25 | End: 2024-07-25

## 2024-07-25 RX ORDER — FUROSEMIDE 10 MG/ML
40 INJECTION, SOLUTION INTRAVENOUS ONCE
Refills: 0 | Status: COMPLETED | OUTPATIENT
Start: 2024-07-25 | End: 2024-07-25

## 2024-07-25 RX ORDER — INSULIN LISPRO 100/ML
VIAL (ML) SUBCUTANEOUS
Refills: 0 | Status: DISCONTINUED | OUTPATIENT
Start: 2024-07-25 | End: 2024-07-28

## 2024-07-25 RX ORDER — CARVEDILOL PHOSPHATE 80 MG
25 CAPSULE,EXTENDED RELEASE MULTIPHASE 24HR ORAL ONCE
Refills: 0 | Status: COMPLETED | OUTPATIENT
Start: 2024-07-25 | End: 2024-07-25

## 2024-07-25 RX ORDER — TACROLIMUS 1 MG/1
2 CAPSULE ORAL
Refills: 0 | Status: DISCONTINUED | OUTPATIENT
Start: 2024-07-25 | End: 2024-07-28

## 2024-07-25 RX ORDER — TIRZEPATIDE 2.5 MG/.5ML
7.5 INJECTION, SOLUTION SUBCUTANEOUS
Refills: 0 | DISCHARGE

## 2024-07-25 RX ORDER — IPRATROPIUM BROMIDE AND ALBUTEROL SULFATE 2.5; .5 MG/3ML; MG/3ML
3 SOLUTION RESPIRATORY (INHALATION) EVERY 6 HOURS
Refills: 0 | Status: DISCONTINUED | OUTPATIENT
Start: 2024-07-25 | End: 2024-07-28

## 2024-07-25 RX ORDER — ACETAMINOPHEN 500 MG
1000 TABLET ORAL ONCE
Refills: 0 | Status: COMPLETED | OUTPATIENT
Start: 2024-07-25 | End: 2024-07-25

## 2024-07-25 RX ORDER — DEXTROSE MONOHYDRATE, SODIUM CHLORIDE, SODIUM LACTATE, CALCIUM CHLORIDE, MAGNESIUM CHLORIDE 1.5; 538; 448; 18.4; 5.08 G/100ML; MG/100ML; MG/100ML; MG/100ML; MG/100ML
2500 SOLUTION INTRAPERITONEAL ONCE
Refills: 0 | Status: COMPLETED | OUTPATIENT
Start: 2024-07-25 | End: 2024-07-25

## 2024-07-25 RX ORDER — MAGNESIUM, ALUMINUM HYDROXIDE 200-225/5
30 SUSPENSION, ORAL (FINAL DOSE FORM) ORAL EVERY 4 HOURS
Refills: 0 | Status: DISCONTINUED | OUTPATIENT
Start: 2024-07-25 | End: 2024-07-28

## 2024-07-25 RX ORDER — TACROLIMUS 1 MG/1
2 CAPSULE ORAL ONCE
Refills: 0 | Status: COMPLETED | OUTPATIENT
Start: 2024-07-25 | End: 2024-07-25

## 2024-07-25 RX ORDER — CARVEDILOL PHOSPHATE 80 MG
25 CAPSULE,EXTENDED RELEASE MULTIPHASE 24HR ORAL EVERY 12 HOURS
Refills: 0 | Status: DISCONTINUED | OUTPATIENT
Start: 2024-07-25 | End: 2024-07-28

## 2024-07-25 RX ORDER — PREDNISONE 10 MG/1
5 TABLET ORAL DAILY
Refills: 0 | Status: DISCONTINUED | OUTPATIENT
Start: 2024-07-26 | End: 2024-07-28

## 2024-07-25 RX ORDER — VITAMIN E (DL,TOCOPHERYL ACET) 180 MG
400 CAPSULE ORAL DAILY
Refills: 0 | Status: DISCONTINUED | OUTPATIENT
Start: 2024-07-25 | End: 2024-07-28

## 2024-07-25 RX ORDER — AZATHIOPRINE 100 MG/1
50 TABLET ORAL ONCE
Refills: 0 | Status: COMPLETED | OUTPATIENT
Start: 2024-07-25 | End: 2024-07-25

## 2024-07-25 RX ORDER — DEXTROSE 4 G
12.5 TABLET,CHEWABLE ORAL ONCE
Refills: 0 | Status: DISCONTINUED | OUTPATIENT
Start: 2024-07-25 | End: 2024-07-28

## 2024-07-25 RX ORDER — SULFAMETHOXAZOLE AND TRIMETHOPRIM 400; 80 MG/1; MG/1
1 TABLET ORAL DAILY
Refills: 0 | Status: DISCONTINUED | OUTPATIENT
Start: 2024-07-25 | End: 2024-07-28

## 2024-07-25 RX ORDER — VITAMIN E (DL,TOCOPHERYL ACET) 180 MG
1 CAPSULE ORAL
Refills: 0 | DISCHARGE

## 2024-07-25 RX ORDER — EZETIMIBE 10 MG/1
1 TABLET ORAL
Refills: 0 | DISCHARGE

## 2024-07-25 RX ORDER — HEPARIN SODIUM 1000 [USP'U]/ML
5000 INJECTION, SOLUTION INTRAVENOUS; SUBCUTANEOUS EVERY 8 HOURS
Refills: 0 | Status: DISCONTINUED | OUTPATIENT
Start: 2024-07-25 | End: 2024-07-28

## 2024-07-25 RX ORDER — SULFAMETHOXAZOLE AND TRIMETHOPRIM 400; 80 MG/1; MG/1
1 TABLET ORAL ONCE
Refills: 0 | Status: COMPLETED | OUTPATIENT
Start: 2024-07-25 | End: 2024-07-25

## 2024-07-25 RX ORDER — CARVEDILOL PHOSPHATE 80 MG
1 CAPSULE,EXTENDED RELEASE MULTIPHASE 24HR ORAL
Refills: 0 | DISCHARGE

## 2024-07-25 RX ORDER — ONDANSETRON HCL/PF 4 MG/2 ML
4 VIAL (ML) INJECTION ONCE
Refills: 0 | Status: COMPLETED | OUTPATIENT
Start: 2024-07-25 | End: 2024-07-25

## 2024-07-25 RX ORDER — PREDNISONE 10 MG/1
5 TABLET ORAL ONCE
Refills: 0 | Status: COMPLETED | OUTPATIENT
Start: 2024-07-25 | End: 2024-07-25

## 2024-07-25 RX ORDER — CYANOCOBALAMIN/FOLIC AC/VIT B6 2-2.5-25MG
1 TABLET ORAL DAILY
Refills: 0 | Status: DISCONTINUED | OUTPATIENT
Start: 2024-07-25 | End: 2024-07-28

## 2024-07-25 RX ORDER — CEFEPIME HYDROCHLORIDE 1 G/1
1000 INJECTION, POWDER, FOR SOLUTION INTRAMUSCULAR; INTRAVENOUS ONCE
Refills: 0 | Status: DISCONTINUED | OUTPATIENT
Start: 2024-07-25 | End: 2024-07-25

## 2024-07-25 RX ORDER — DEXTROSE 4 G
25 TABLET,CHEWABLE ORAL ONCE
Refills: 0 | Status: DISCONTINUED | OUTPATIENT
Start: 2024-07-25 | End: 2024-07-28

## 2024-07-25 RX ORDER — ONDANSETRON HCL/PF 4 MG/2 ML
4 VIAL (ML) INJECTION EVERY 8 HOURS
Refills: 0 | Status: DISCONTINUED | OUTPATIENT
Start: 2024-07-25 | End: 2024-07-28

## 2024-07-25 RX ORDER — NIFEDIPINE 20 MG/1
60 CAPSULE, LIQUID FILLED ORAL DAILY
Refills: 0 | Status: DISCONTINUED | OUTPATIENT
Start: 2024-07-25 | End: 2024-07-28

## 2024-07-25 RX ORDER — ATORVASTATIN CALCIUM 40 MG/1
10 TABLET, FILM COATED ORAL AT BEDTIME
Refills: 0 | Status: DISCONTINUED | OUTPATIENT
Start: 2024-07-25 | End: 2024-07-28

## 2024-07-25 RX ORDER — ESCITALOPRAM OXALATE 20 MG
20 TABLET ORAL ONCE
Refills: 0 | Status: COMPLETED | OUTPATIENT
Start: 2024-07-25 | End: 2024-07-25

## 2024-07-25 RX ORDER — FUROSEMIDE 10 MG/ML
40 INJECTION, SOLUTION INTRAVENOUS DAILY
Refills: 0 | Status: DISCONTINUED | OUTPATIENT
Start: 2024-07-25 | End: 2024-07-28

## 2024-07-25 RX ORDER — MELATONIN 3 MG
3 TABLET ORAL AT BEDTIME
Refills: 0 | Status: DISCONTINUED | OUTPATIENT
Start: 2024-07-25 | End: 2024-07-28

## 2024-07-25 RX ORDER — CEFEPIME HYDROCHLORIDE 1 G/1
1000 INJECTION, POWDER, FOR SOLUTION INTRAMUSCULAR; INTRAVENOUS ONCE
Refills: 0 | Status: COMPLETED | OUTPATIENT
Start: 2024-07-25 | End: 2024-07-25

## 2024-07-25 RX ADMIN — Medication 1000 MILLIGRAM(S): at 12:03

## 2024-07-25 RX ADMIN — Medication 400 MILLIGRAM(S): at 19:33

## 2024-07-25 RX ADMIN — Medication 4 MILLIGRAM(S): at 01:12

## 2024-07-25 RX ADMIN — FUROSEMIDE 40 MILLIGRAM(S): 10 INJECTION, SOLUTION INTRAVENOUS at 11:45

## 2024-07-25 RX ADMIN — Medication 1000 MILLIGRAM(S): at 20:29

## 2024-07-25 RX ADMIN — CEFEPIME HYDROCHLORIDE 1000 MILLIGRAM(S): 1 INJECTION, POWDER, FOR SOLUTION INTRAMUSCULAR; INTRAVENOUS at 01:25

## 2024-07-25 RX ADMIN — DEXTROSE MONOHYDRATE, SODIUM CHLORIDE, SODIUM LACTATE, CALCIUM CHLORIDE, MAGNESIUM CHLORIDE 2500 MILLILITER(S): 1.5; 538; 448; 18.4; 5.08 SOLUTION INTRAPERITONEAL at 01:11

## 2024-07-25 RX ADMIN — Medication 400 MILLIGRAM(S): at 11:33

## 2024-07-25 RX ADMIN — AZATHIOPRINE 50 MILLIGRAM(S): 100 TABLET ORAL at 11:34

## 2024-07-25 RX ADMIN — Medication 1000 MILLIGRAM(S): at 15:16

## 2024-07-25 RX ADMIN — Medication 400 MILLIGRAM(S): at 01:16

## 2024-07-25 RX ADMIN — VANCOMYCIN HYDROCHLORIDE 250 MILLIGRAM(S): 5 INJECTION, POWDER, LYOPHILIZED, FOR SOLUTION INTRAVENOUS at 01:30

## 2024-07-25 RX ADMIN — Medication 25 MILLIGRAM(S): at 11:33

## 2024-07-25 RX ADMIN — Medication 20 MILLIGRAM(S): at 11:34

## 2024-07-25 RX ADMIN — Medication 10 MILLIGRAM(S): at 19:33

## 2024-07-25 RX ADMIN — TACROLIMUS 2 MILLIGRAM(S): 1 CAPSULE ORAL at 11:34

## 2024-07-25 RX ADMIN — PREDNISONE 5 MILLIGRAM(S): 10 TABLET ORAL at 11:34

## 2024-07-25 RX ADMIN — Medication 1000 MILLIGRAM(S): at 01:46

## 2024-07-25 RX ADMIN — Medication 10 MILLIGRAM(S): at 09:00

## 2024-07-25 RX ADMIN — NIFEDIPINE 60 MILLIGRAM(S): 20 CAPSULE, LIQUID FILLED ORAL at 11:34

## 2024-07-25 RX ADMIN — SULFAMETHOXAZOLE AND TRIMETHOPRIM 1 TABLET(S): 400; 80 TABLET ORAL at 11:34

## 2024-07-25 NOTE — ED ADULT NURSE REASSESSMENT NOTE - NS ED NURSE REASSESS COMMENT FT1
PT is alert and oriented, with a patent and self maintained airway, with non labored breathing. PT complaining of HA, Meds administered. neg neuro deficits.

## 2024-07-25 NOTE — ED ADULT NURSE NOTE - NSFALLUNIVINTERV_ED_ALL_ED
Bed/Stretcher in lowest position, wheels locked, appropriate side rails in place/Call bell, personal items and telephone in reach/Instruct patient to call for assistance before getting out of bed/chair/stretcher/Non-slip footwear applied when patient is off stretcher/[place] to call system/Physically safe environment - no spills, clutter or unnecessary equipment/Purposeful proactive rounding/Room/bathroom lighting operational, light cord in reach

## 2024-07-25 NOTE — CONSULT NOTE ADULT - ASSESSMENT
53y  Female with h/o ESRD due to PCKD s/p transplant on 2018 on immunosuppressants, DM, HLD, HLD and GERD, anxiety / depression. Patient presents with generalized malaise with associated body ache. Patient reporting fever, headache and chills, Fever to 101F with associated nausea, vomiting since 1 day prior to presentation. In the ER patient is afebrile, no leukocytosis. Was given Cefepime in the ER. Started on Ceftriaxone. ID input requested.       Pyelonephritis  Fever  Renal transplant  Immunosuppressed due to medical condition       - Blood cultures pending  - RVP/COVID 19 PCR 7/25 negative   - Urine Cx pending  - CT C/A/P reporting mild perinephric stranding of the transplant kidney, nonspecific  - UA 7/25 with some pyuria   - Continue Ceftriaxone   - Hold off on PICC/Midline for now unless needed for reasons other than infectious diseases  - Follow up cultures  - Trend Fever  - Trend WBC      Thank you for allowing me to participate in the care of your patient.   Will Follow    Discussed treatment plan with: Dr Merlos

## 2024-07-25 NOTE — ED PROVIDER NOTE - ATTENDING CONTRIBUTION TO CARE
I, Demario Mata, performed a face to face bedside interview with this patient regarding history of present illness, and completed an independent physical examination. I personally made/approved the management plan and take responsibility for the patient management. I have communicated the patient’s plan of care and disposition with the resident  53 year old female with PMH PCKD s/p renal transplant in 2018 presents with fever.  Gen: NAD, well appearing  CV: RRR  Pul: CTA b/l  Abd: Soft, non-distended, non-tender  Neuro: no focal deficits  Pt with pyelonephritis involving the transplanted kidney, spoke with transplant surgery Dr. garcía, they do not think the pt needs ot be transferred bu advise admission for IV Abx

## 2024-07-25 NOTE — ED ADULT NURSE REASSESSMENT NOTE - NS ED NURSE REASSESS COMMENT FT1
Pt resting comfortably, denies HA, dizziness, SOB, N/V/D, CP, palpitations fevers chills at this time. Pt AOx4, speaking coherently, respirations even and unlabored on RA, skin warm and dry. Report given to OVI Sorto and transported to CDU 5R. A 2 RN skin check has been performed on admission to CDU 5R with RN witness Vangie Shi RN.  A pressure injury was not identified.  Documentation in the assessment to support findings.

## 2024-07-25 NOTE — ED PROVIDER NOTE - OBJECTIVE STATEMENT
53 woman with PMHX ESRD 2/2 PCKD s/p BL Nephrectomy/Single Donor Renal Transplant 2018, Post Transplant DM, HTN, HLD, GERD, MDD, Anxiety presented to [facility] ER on 9/14/2022 with c/o body aches, headaches, fevers/chills, nausea/vomiting that all began today, Temp max 101, took tylenol at home.

## 2024-07-25 NOTE — H&P ADULT - NSHPREVIEWOFSYSTEMS_GEN_ALL_CORE
REVIEW OF SYSTEMS:    CONSTITUTIONAL: No weakness, + fevers or chills  EYES: No vertigo or throat pain  ENT: No visual changes, eye pain  MOUTH: moist benoit mucosal, no mouth ulcers  NECK: No pain or stiffness  RESPIRATORY: No cough, wheezing, hemoptysis; No shortness of breath  CARDIOVASCULAR: No chest pain or palpitations  GASTROINTESTINAL: No abdominal or epigastric pain. + nausea, vomiting. No diarrhea or constipation. No melena or hematochezia.  GENITOURINARY: No dysuria, frequency or hematuria  NEUROLOGICAL: No numbness or weakness  SKIN: No itching, rashes  PSYCH: No anxiety or depression

## 2024-07-25 NOTE — H&P ADULT - NSHPPHYSICALEXAM_GEN_ALL_CORE
VITALS:   T(C): 37.2 (07-25-24 @ 07:39), Max: 37.8 (07-24-24 @ 20:46)  HR: 52 (07-25-24 @ 07:39) (52 - 159)  BP: 161/67 (07-25-24 @ 07:39) (125/77 - 161/67)  RR: 19 (07-25-24 @ 07:39) (18 - 22)  SpO2: 96% (07-25-24 @ 07:39) (96% - 98%)    GENERAL: NAD, lying in bed comfortably  HEAD:  Atraumatic, Normocephalic  CHEST/LUNG: Clear to auscultation bilaterally; mild exp wheezes in the LLL  HEART: Regular rate and rhythm; No LE edema  ABDOMEN: BSx4; Soft, no CVA tenderness  NERVOUS SYSTEM:  A&Ox3, no focal deficits   SKIN: No rashes or lesions  PSYCH: Normal affect, euthymic mood

## 2024-07-25 NOTE — ED PROVIDER NOTE - PHYSICAL EXAMINATION
General: Awake, alert, lying in bed in NAD  HEENT: Normocephalic, atraumatic. No scleral icterus or conjunctival injection. EOMI. Moist mucous membranes. Oropharynx clear.   Neck:. Soft and supple.  Cardiac: RRR, Peripheral pulses 2+ and symmetric. No LE edema.  Resp: Lungs CTAB. No accessory muscle use  Abd: Soft, non-tender, non-distended. No guarding, rebound, or rigidity.  Back: Spine midline and non-tender.   Skin: warm to touch, No rashes, abrasions, or lacerations.  Neuro: AO x 4. Moves all extremities symmetrically. Motor strength and sensation grossly intact.  Psych: Appropriate mood and affect General: Awake, alert, lying in bed in NAD  HEENT: Normocephalic, atraumatic. No scleral icterus or conjunctival injection. EOMI. Moist mucous membranes. Oropharynx clear.   Neck:. Soft and supple.  Cardiac: RRR, Peripheral pulses 2+ and symmetric. No LE edema.  Resp: Lungs CTAB. No accessory muscle use  Abd: Soft, rlq TTP, non-distended. No guarding, rebound, or rigidity.  Back: Spine midline and non-tender.   Skin: warm to touch, No rashes, abrasions, or lacerations.  Neuro: AO x 4. Moves all extremities symmetrically. Motor strength and sensation grossly intact.  Psych: Appropriate mood and affect

## 2024-07-25 NOTE — ED ADULT NURSE NOTE - IS THE PATIENT ABLE TO BE SCREENED?
Yes [FreeTextEntry1] : Gastroenterology  diarrhea - check CBC and CMP and stool studies - Rx given for Ciprofloxacin 500 mg BID patient will not start antibiotics until she brings stool samples to laboratory - increase hydration of water and electrolyte containing drink, continue with bland diet, may use Imodium tablet daily to slow down GI tract - patient to call for any worsening symptoms.

## 2024-07-25 NOTE — CONSULT NOTE ADULT - SUBJECTIVE AND OBJECTIVE BOX
[facility]  INFECTIOUS DISEASES at [place] / [place] / [place]  =======================================================                              Korey Castellanos MD                              Professor Emeritus:  Dr Thomas Willett MD            Diplomates American Board of Internal Medicine & Infectious Diseases                                   Tel  [phone] Fax [phone]                                  Hospital Consult line:  [phone]  =======================================================      MRN-[id number]  ROLLY MEDEIROS    CC: Patient is a 53y old  Female who presents with a chief complaint of Pyelo (25 Jul 2024 13:27)      53y  Female with h/o ESRD due to PCKD s/p transplant on 2018 on immunosuppressants, DM, HLD, HLD and GERD, anxiety / depression. Patient presents with generalized malaise with associated body ache. Patient reporting fever, headache and chills, Fever to 101F with associated nausea, vomiting since 1 day prior to presentation. In the ER patient is afebrile, no leukocytosis. Was given Cefepime in the ER. Started on Ceftriaxone. ID input requested.       Past Medical & Surgical Hx:  Hyperlipidemia  Arthropathy NOS - shoulder  Kidney stones  Polycystic kidney disease  GERD (gastroesophageal reflux disease)  Anxiety  CKD (chronic kidney disease) stage 4, GFR 15-29 ml/min  no dialysis  ESRD (end stage renal disease)  Ankle fracture - Left brumstein barajas procedure 2000  Tubal ligation  1995  S/P cholecystectomy  (2013)  Kidney calculi  cysto/lithotripsy multiple  H/O tubal ligation  (1996)  History of ventral hernia repair  H/O shoulder surgery left  AV fistula right forearm extremity AV fistula radiocephalic ( 2018)  Renal transplant recipient  Status post nephrectomy  bilateral      Social Hx:  Former smoker       FAMILY HISTORY:  No pertinent family history in first degree relatives      Allergies  Demerol HCl (Hives)  Phenergan (Hives; Rash)       REVIEW OF SYSTEMS:  CONSTITUTIONAL:  + Fever + chills  HEENT:  No diplopia or blurred vision.  No earache, sore throat or runny nose.  CARDIOVASCULAR:  No chest pain  RESPIRATORY:  No cough, shortness of breath  GASTROINTESTINAL:  + nausea. No vomiting. + diarrhea.  GENITOURINARY:  No dysuria, frequency or urgency. No Blood in urine  MUSCULOSKELETAL:  no joint aches, no muscle pain  SKIN:  No change in skin, hair or nails.  NEUROLOGIC:  No Headaches      Physical Exam:  GEN: NAD  HEENT: normocephalic and atraumatic. EOMI. PERRL.    NECK: Supple.   LUNGS: CTA B/L.  HEART: RRR  ABDOMEN: Soft, NT, ND.  +BS.    : No CVA tenderness  EXTREMITIES: Without  edema.  MSK: No joint swelling  NEUROLOGIC: No Focal Deficits   SKIN: No rash      Height (cm): 175.3 (07-24 @ 20:46)  Weight (kg): 80.3 (07-24 @ 20:46)  BMI (kg/m2): 26.1 (07-24 @ 20:46)  BSA (m2): 1.96 (07-24 @ 20:46)      Vitals:  T(F): 98.3 (25 Jul 2024 15:35), Max: 100.1 (24 Jul 2024 20:46)  HR: 52 (25 Jul 2024 15:35)  BP: 99/51 (25 Jul 2024 15:35)  RR: 19 (25 Jul 2024 15:35)  SpO2: 96% (25 Jul 2024 15:35) (95% - 98%)  temp max in last 48H T(F): , Max: 100.1 (07-24-24 @ 20:46)      Current Antibiotics:  cefTRIAXone Injectable. 1000 milliGRAM(s) IV Push every 24 hours  trimethoprim   80 mG/sulfamethoxazole 400 mG 1 Tablet(s) Oral daily    Other medications:  atorvastatin 10 milliGRAM(s) Oral at bedtime  carvedilol 25 milliGRAM(s) Oral every 12 hours  cholecalciferol 1000 Unit(s) Oral daily  dextrose 5%. 1000 milliLiter(s) IV Continuous <Continuous>  dextrose 5%. 1000 milliLiter(s) IV Continuous <Continuous>  dextrose 50% Injectable 25 Gram(s) IV Push once  dextrose 50% Injectable 12.5 Gram(s) IV Push once  dextrose 50% Injectable 25 Gram(s) IV Push once  escitalopram 20 milliGRAM(s) Oral daily  furosemide    Tablet 40 milliGRAM(s) Oral daily  glucagon  Injectable 1 milliGRAM(s) IntraMuscular once  heparin   Injectable 5000 Unit(s) SubCutaneous every 8 hours  insulin lispro (ADMELOG) corrective regimen sliding scale   SubCutaneous Before meals and at bedtime  multivitamin 1 Tablet(s) Oral daily  NIFEdipine XL 60 milliGRAM(s) Oral daily  vitamin E 400 International Unit(s) Oral daily                            15.0   7.80  )-----------( 109      ( 25 Jul 2024 01:05 )             43.0     07-25    140  |  101  |  12.7  ----------------------------<  87  3.9   |  23.0  |  1.04    Ca    9.3      25 Jul 2024 01:05    TPro  6.3<L>  /  Alb  3.6  /  TBili  0.7  /  DBili  x   /  AST  15  /  ALT  10  /  AlkPhos  69  07-25    RECENT CULTURES:    WBC Count: 7.80 K/uL (07-25-24 @ 01:05)    Creatinine: 1.04 mg/dL (07-25-24 @ 01:05)    SARS-CoV-2 Result: NotDetec (07-25-24 @ 01:10)    Urinalysis + Microscopic Examination (07.25.24 @ 08:55)    pH Urine: 7.0   Urine Appearance: Clear   Color: Yellow   Specific Gravity: 1.013   Protein, Urine: Negative mg/dL   Glucose Qualitative, Urine: Negative mg/dL   Ketone - Urine: Negative mg/dL   Blood, Urine: Negative   Bilirubin: Negative   Urobilinogen: 1.0 mg/dL   Leukocyte Esterase Concentration: Small   Nitrite: Negative   White Blood Cell - Urine: 32 /HPF   Red Blood Cell - Urine: 4 /HPF   Bacteria: Negative /HPF   Cast: 0 /LPF   Epithelial Cells: 0 /HPF            < from: CT Abdomen and Pelvis No Cont (07.25.24 @ 06:06) >  ACC: [id number] EXAM:  CT ABDOMEN AND PELVIS   ORDERED BY: JUICE RODRÍGUEZ     ACC: [id number] EXAM:  CT CHEST   ORDERED BY: JUICE RODRÍGUEZ     PROCEDURE DATE:  07/25/2024      INTERPRETATION:  CLINICAL INFORMATION: Sepsis    TECHNIQUE: CTimaging of the chest, abdomen, and pelvis was obtained   without IV contrast. MIP images were also obtained.    COMPARISON: 7/13/2024    FINDINGS:    CHEST:  LUNGS, PLEURA AND LARGE AIRWAYS: Patent central airways. There are patchy   ground glass opacities in both lungs which are unchanged from the prior   study dated 7/13/2024.  VESSELS: Normal aortic caliber.  HEART: Heart size is normal. No pericardial effusion. Coronary artery   vascular calcification.  MEDIASTINUM AND AMBROSE: No lymphadenopathy.  CHEST WALL AND LOWER NECK: Within normal limits.    ABDOMEN AND PELVIS:  LIVER: Hepatic cysts and subcentimeter hypodense lesions that are too   small to characterize.  BILE DUCTS: Normal caliber.  GALLBLADDER: Surgically absent.  SPLEEN: Within normal limits.  PANCREAS: Within normal limits.  ADRENALS: Within normal limits.  KIDNEYS/URETERS: Bilateral kidneys have been removed. There is a right   pelvic transplanted kidney. There is mild surrounding perinephric   stranding of the right pelvickidney. There is no hydronephrosis.    BLADDER: Wall thickening of the urinary bladder with perivesicular   inflammatory change.  REPRODUCTIVE ORGANS: Within normal limits.    BOWEL: No bowel obstruction. Scattered colonic diverticula. Normal   appendix.  PERITONEUM: No ascites.  VESSELS:  Normal aortic caliber.  RETROPERITONEUM: No lymphadenopathy.  ABDOMINAL WALL: [place] ventral hernia containing unobstructed small   bowel loops.  BONES: Degenerative changes.    IMPRESSION:    Patchy ground glass opacities in both lungs which are unchanged from the   prior study dated 7/13/2024.    Post bilateral nephrectomy changes, with right pelvic transplant kidney   noted. There is mild perinephric stranding of the transplant kidney,   nonspecific. Wall thickening of the urinary bladder with perivesicular   inflammatory change. Correlation with urinalysis suggested to evaluate   for urinary tract infection.    --- End of Report ---  < end of copied text >

## 2024-07-25 NOTE — ED ADULT NURSE NOTE - NS ED NURSE RECORD ANOTHER HT AND WT
January 29, 2024         Patient: Abdelrahman Gao   YOB: 2020   Date of Visit: 1/29/2024           To Whom it May Concern:    Abdelrahman Gao was seen in my clinic on 1/29/2024. He may return to school on 01/30/2024.    If you have any questions or concerns, please don't hesitate to call.        Sincerely,           MEGAN Jaimes  Electronically Signed      Yes

## 2024-07-25 NOTE — PATIENT PROFILE ADULT - FALL HARM RISK - UNIVERSAL INTERVENTIONS
Bed in lowest position, wheels locked, appropriate side rails in place/Call bell, personal items and telephone in reach/Instruct patient to call for assistance before getting out of bed or chair/Non-slip footwear when patient is out of bed/[place] to call system/Physically safe environment - no spills, clutter or unnecessary equipment/Purposeful Proactive Rounding/Room/bathroom lighting operational, light cord in reach

## 2024-07-25 NOTE — H&P ADULT - HISTORY OF PRESENT ILLNESS
52 yo F PMHx ESRD 2/2 PCKD s/p transplant on 2018, DM, HLD, HLD and GERD, anxiety / depression p/w generalized malaise with associated body ache. Also endorsing to fever, headache and chills with associated n/v. Tmax 101, took Tylenol with minimal in symptoms. Otherwise denies CP, SOB. Denies urinary symptoms.   ED vitals Tmax 100.1F, UA+Leuk esterase and WBC. Admitted for further care.

## 2024-07-25 NOTE — ED ADULT NURSE REASSESSMENT NOTE - NS ED NURSE REASSESS COMMENT FT1
Report received from OVI Mclean and assumed care of PT. PT is alert and oriented, with a patent and self maintained airway, with non labored breathing. PT denies CP, SOB, Fevers or chills. PT complaining of CHOW and nausea, MD Patterson made aware and meds requested.

## 2024-07-25 NOTE — H&P ADULT - ASSESSMENT
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     #UTI  #H/o PCKD s/p transplant  CT A/P result noted above, ?pyelo with perinephric stranding -- appears chronic, present on CT from 2022  prior UCx with Klebesiella, pansensitive  will start empiric CTX  f/u BCx and UCx  ID consulted, pending recs  messaged transplant nephrologist Dr. Mcdonough about immunosuppressive meds to hold (prednisone, tacrolimus, azathioprine), pending response  resume bactrim     #DM  hold home meds  ISS, accu checks, A1C, CC    #HTN  #HLD  c/w home Nifedipine, Lasix, Coreg  c/w statin    #GERD  PPI    #Anxiety / depression  c/w Lexapro     DVT ppx: HSQ  Diet: CC  Dispo: pending culture data    54 yo F PMHx ESRD 2/2 PCKD s/p transplant on 2018, DM, HLD, HLD and GERD, anxiety / depression p/w generalized malaise, admitted with concern for UTI and ?pyelo of transplanted kidney.     #UTI  #H/o PCKD s/p transplant  CT A/P result noted above, ?pyelo with perinephric stranding -- appears chronic, present on CT from 2022  prior UCx with Klebesiella, pansensitive  will start empiric CTX  f/u BCx and UCx  ID consulted, pending recs  pt sees transplant nephrologist Dr. Mcdonough, pending Dr. Mcdonough's response about need to hold home prednisone, tacrolimus, azathioprine  check tacrolimus levels  resume bactrim     #DM  hold home meds  ISS, accu checks, A1C, CC    #HTN  #HLD  c/w home Nifedipine, Lasix, Coreg  c/w statin    #GERD  PPI    #Anxiety / depression  c/w Lexapro     DVT ppx: HSQ  Diet: CC  Dispo: pending culture data    54 yo F PMHx ESRD 2/2 PCKD s/p transplant on 2018, DM, HLD, HLD and GERD, anxiety / depression p/w generalized malaise, admitted with concern for UTI and ?pyelo of transplanted kidney.     #UTI  #H/o PCKD s/p transplant  CT A/P result noted above, ?pyelo with perinephric stranding -- appears chronic, present on CT from 2022  prior UCx with Klebesiella, pansensitive  will start empiric CTX  f/u BCx and UCx  ID consulted, pending recs  pt sees transplant nephrologist Dr. Mcdonough, d/w Dr. Mcdonough's to continue home prednisone, tacrolimus. Hold azathioprine while admitted  check tacrolimus levels  resume bactrim     #DM  hold home meds  ISS, accu checks, A1C, CC    #HTN  #HLD  c/w home Nifedipine, Lasix, Coreg  c/w statin    #GERD  PPI    #Anxiety / depression  c/w Lexapro     DVT ppx: HSQ  Diet: CC  Dispo: pending culture data    52 yo F PMHx ESRD 2/2 PCKD s/p transplant on 2018, DM, HLD, HLD and GERD, anxiety / depression p/w generalized malaise, admitted with concern for UTI and ?pyelo of transplanted kidney.     #UTI  #H/o PCKD s/p transplant  CT A/P result noted above, ?pyelo with perinephric stranding -- appears chronic, present on CT from 2022  prior UCx with Klebesiella, pansensitive  will start empiric CTX  f/u BCx and UCx  ID consulted, pending recs  pt sees transplant nephrologist Dr. Miko Mcdonough, d/w Dr. Mcdonough's, to continue home prednisone, tacrolimus. Hold azathioprine while admitted  check tacrolimus levels  resume bactrim     #DM  hold home meds  ISS, accu checks, A1C, CC    #HTN  #HLD  c/w home Nifedipine, Lasix, Coreg  c/w statin    #GERD  PPI    #Anxiety / depression  c/w Lexapro     DVT ppx: HSQ  Diet: CC  Dispo: pending culture data

## 2024-07-25 NOTE — ED PROVIDER NOTE - PROGRESS NOTE DETAILS
Celso: Notified Transplant Kidney Patient Call ACP-RN Anselmo Colon (Amion) of patient being worked up in [facility] ED for sepsis.

## 2024-07-25 NOTE — ED PROVIDER NOTE - CLINICAL SUMMARY MEDICAL DECISION MAKING FREE TEXT BOX
51 woman with PMHX ESRD 2/2 PCKD s/p BL Nephrectomy/Single Donor Renal Transplant 2018, Post Transplant DM, HTN, HLD, GERD, MDD, Anxiety presented to [facility] ER on 9/14/2022 with c/o body aches, fevers/chills, nausea/vomiting, headache.    Sepsis workup. 53 woman with PMHX ESRD 2/2 PCKD s/p BL Nephrectomy/Single Donor Renal Transplant 2018, Post Transplant DM, HTN, HLD, GERD, MDD, Anxiety presented to [facility] ER on 9/14/2022 with c/o body aches, headaches, fevers/chills, nausea/vomiting and abdominal discomfort. RLQ TTP on exam. Sepsis bundle initiated given presumed infection and immunocompromised state though patient did not technically meet SIRS criteria on arrival. Empiric Abx, IVF, UA, discussion with transplant team. patient signed out to dr gallardo pending UAr and final discussion with transplant team to see if patient should be transferred to [facility]. Audra Wise MD Attending Physician-

## 2024-07-25 NOTE — H&P ADULT - NSHPLABSRESULTS_GEN_ALL_CORE
15.0   7.80  )-----------( 109      ( 2024 01:05 )             43.0       07-    140  |  101  |  12.7  ----------------------------<  87  3.9   |  23.0  |  1.04    Ca    9.3      2024 01:05    TPro  6.3<L>  /  Alb  3.6  /  TBili  0.7  /  DBili  x   /  AST  15  /  ALT  10  /  AlkPhos  69  07-25              Urinalysis Basic - ( 2024 08:55 )    Color: Yellow / Appearance: Clear / S.013 / pH: x  Gluc: x / Ketone: Negative mg/dL  / Bili: Negative / Urobili: 1.0 mg/dL   Blood: x / Protein: Negative mg/dL / Nitrite: Negative   Leuk Esterase: Small / RBC: 4 /HPF / WBC 32 /HPF   Sq Epi: x / Non Sq Epi: 0 /HPF / Bacteria: Negative /HPF        PT/INR - ( 2024 01:05 )   PT: 11.5 sec;   INR: 1.04 ratio         PTT - ( 2024 01:05 )  PTT:25.3 sec    Lactate Trend            CAPILLARY BLOOD GLUCOSE            < from: CT Abdomen and Pelvis No Cont (24 @ 06:06) >    IMPRESSION:    Patchy ground glass opacities in both lungs which are unchanged from the   prior study dated 2024.    Post bilateral nephrectomy changes, with right pelvic transplant kidney   noted. There is mild perinephric stranding of the transplant kidney,   nonspecific. Wall thickening of the urinary bladder with perivesicular   inflammatory change. Correlation with urinalysis suggested to evaluate   for urinary tract infection.    < end of copied text >

## 2024-07-26 PROBLEM — Z30.431 IUD CHECK UP: Status: RESOLVED | Noted: 2023-09-11 | Resolved: 2024-07-26

## 2024-07-26 PROBLEM — Z13.820 OSTEOPOROSIS SCREENING: Status: RESOLVED | Noted: 2023-07-17 | Resolved: 2024-07-26

## 2024-07-26 PROBLEM — N70.11 HYDROSALPINX: Status: RESOLVED | Noted: 2023-05-04 | Resolved: 2024-07-26

## 2024-07-26 PROBLEM — Z87.42 HISTORY OF POSTMENOPAUSAL BLEEDING: Status: RESOLVED | Noted: 2023-07-17 | Resolved: 2024-07-26

## 2024-07-26 LAB
A1C WITH ESTIMATED AVERAGE GLUCOSE RESULT: 5.2 % — SIGNIFICANT CHANGE UP (ref 4–5.6)
ANION GAP SERPL CALC-SCNC: 12 MMOL/L — SIGNIFICANT CHANGE UP (ref 5–17)
BUN SERPL-MCNC: 12.5 MG/DL — SIGNIFICANT CHANGE UP (ref 8–20)
C DIFF BY PCR RESULT: SIGNIFICANT CHANGE UP
CALCIUM SERPL-MCNC: 9.4 MG/DL — SIGNIFICANT CHANGE UP (ref 8.4–10.5)
CHLORIDE SERPL-SCNC: 103 MMOL/L — SIGNIFICANT CHANGE UP (ref 96–108)
CO2 SERPL-SCNC: 28 MMOL/L — SIGNIFICANT CHANGE UP (ref 22–29)
CREAT SERPL-MCNC: 0.91 MG/DL — SIGNIFICANT CHANGE UP (ref 0.5–1.3)
CULTURE RESULTS: NO GROWTH — SIGNIFICANT CHANGE UP
CYTOLOGY CVX/VAG DOC THIN PREP: NORMAL
EGFR: 75 ML/MIN/1.73M2 — SIGNIFICANT CHANGE UP
ESTIMATED AVERAGE GLUCOSE: 103 MG/DL — SIGNIFICANT CHANGE UP (ref 68–114)
GLUCOSE SERPL-MCNC: 65 MG/DL — LOW (ref 70–99)
HCT VFR BLD CALC: 45.9 % — HIGH (ref 34.5–45)
HGB BLD-MCNC: 16 G/DL — HIGH (ref 11.5–15.5)
MCHC RBC-ENTMCNC: 34.5 PG — HIGH (ref 27–34)
MCHC RBC-ENTMCNC: 34.9 GM/DL — SIGNIFICANT CHANGE UP (ref 32–36)
MCV RBC AUTO: 98.9 FL — SIGNIFICANT CHANGE UP (ref 80–100)
PLATELET # BLD AUTO: 117 K/UL — LOW (ref 150–400)
POTASSIUM SERPL-MCNC: 3.8 MMOL/L — SIGNIFICANT CHANGE UP (ref 3.5–5.3)
POTASSIUM SERPL-SCNC: 3.8 MMOL/L — SIGNIFICANT CHANGE UP (ref 3.5–5.3)
RBC # BLD: 4.64 M/UL — SIGNIFICANT CHANGE UP (ref 3.8–5.2)
RBC # FLD: 13.2 % — SIGNIFICANT CHANGE UP (ref 10.3–14.5)
SODIUM SERPL-SCNC: 143 MMOL/L — SIGNIFICANT CHANGE UP (ref 135–145)
SPECIMEN SOURCE: SIGNIFICANT CHANGE UP
TACROLIMUS SERPL-MCNC: 4.4 NG/ML — SIGNIFICANT CHANGE UP
WBC # BLD: 6.19 K/UL — SIGNIFICANT CHANGE UP (ref 3.8–10.5)
WBC # FLD AUTO: 6.19 K/UL — SIGNIFICANT CHANGE UP (ref 3.8–10.5)

## 2024-07-26 PROCEDURE — 99232 SBSQ HOSP IP/OBS MODERATE 35: CPT

## 2024-07-26 PROCEDURE — 99233 SBSQ HOSP IP/OBS HIGH 50: CPT

## 2024-07-26 RX ORDER — AZATHIOPRINE 100 MG/1
50 TABLET ORAL EVERY 12 HOURS
Refills: 0 | Status: DISCONTINUED | OUTPATIENT
Start: 2024-07-26 | End: 2024-07-28

## 2024-07-26 RX ORDER — OXYCODONE AND ACETAMINOPHEN 10; 325 MG/1; MG/1
1 TABLET ORAL ONCE
Refills: 0 | Status: DISCONTINUED | OUTPATIENT
Start: 2024-07-26 | End: 2024-07-26

## 2024-07-26 RX ADMIN — SULFAMETHOXAZOLE AND TRIMETHOPRIM 1 TABLET(S): 400; 80 TABLET ORAL at 13:13

## 2024-07-26 RX ADMIN — Medication 1 TABLET(S): at 13:13

## 2024-07-26 RX ADMIN — NIFEDIPINE 60 MILLIGRAM(S): 20 CAPSULE, LIQUID FILLED ORAL at 06:03

## 2024-07-26 RX ADMIN — Medication 650 MILLIGRAM(S): at 06:51

## 2024-07-26 RX ADMIN — Medication 1000 MILLIGRAM(S): at 15:56

## 2024-07-26 RX ADMIN — Medication 400 INTERNATIONAL UNIT(S): at 13:16

## 2024-07-26 RX ADMIN — FUROSEMIDE 40 MILLIGRAM(S): 10 INJECTION, SOLUTION INTRAVENOUS at 06:03

## 2024-07-26 RX ADMIN — AZATHIOPRINE 50 MILLIGRAM(S): 100 TABLET ORAL at 21:22

## 2024-07-26 RX ADMIN — Medication 1000 UNIT(S): at 13:12

## 2024-07-26 RX ADMIN — TACROLIMUS 2 MILLIGRAM(S): 1 CAPSULE ORAL at 08:50

## 2024-07-26 RX ADMIN — OXYCODONE AND ACETAMINOPHEN 1 TABLET(S): 10; 325 TABLET ORAL at 16:24

## 2024-07-26 RX ADMIN — OXYCODONE AND ACETAMINOPHEN 1 TABLET(S): 10; 325 TABLET ORAL at 15:56

## 2024-07-26 RX ADMIN — PREDNISONE 5 MILLIGRAM(S): 10 TABLET ORAL at 06:02

## 2024-07-26 RX ADMIN — Medication 20 MILLIGRAM(S): at 13:13

## 2024-07-26 NOTE — HISTORY OF PRESENT ILLNESS
[N] : Patient reports normal menses [IUD] : has an intrauterine device [Y] : Positive pregnancy history [de-identified] : MIRENA [Menarche Age: ____] : age at menarche was [unfilled] [No] : Patient does not have concerns regarding sex [Currently Active] : currently active [LMP unknown] : LMP unknown [unknown] : Patient is unsure of the date of her LMP [FreeTextEntry1] :  52 yo here for an annual exam and sono results. She denies vaginal complaints with sex. [Mammogramdate] : 08/05/2023 [TextBox_19] : BR2 [BreastSonogramDate] : 08/05/2023 [TextBox_25] : BR2 [PapSmeardate] : 07/17/2023 [TextBox_31] : NEG [BoneDensityDate] : 07/13/2024 [TextBox_37] : OSTEOPENIA [HPVDate] : 07/17/2023 [TextBox_78] : NEG [PGHxTotal] : 2 [[facility]] : 2 [[facility]] : 2

## 2024-07-26 NOTE — PROGRESS NOTE ADULT - TIME BILLING
This includes chart review, patient assessment, discussion with Dr Caldwell. Antibiotic dosing and management with clinical pharmacy.

## 2024-07-26 NOTE — HISTORY OF PRESENT ILLNESS
[N] : Patient reports normal menses [IUD] : has an intrauterine device [Y] : Positive pregnancy history [de-identified] : MIRENA [Menarche Age: ____] : age at menarche was [unfilled] [No] : Patient does not have concerns regarding sex [Currently Active] : currently active [LMP unknown] : LMP unknown [unknown] : Patient is unsure of the date of her LMP [FreeTextEntry1] :  54 yo here for an annual exam and sono results. She denies vaginal complaints with sex. [Mammogramdate] : 08/05/2023 [TextBox_19] : BR2 [BreastSonogramDate] : 08/05/2023 [TextBox_25] : BR2 [PapSmeardate] : 07/17/2023 [TextBox_31] : NEG [BoneDensityDate] : 07/13/2024 [TextBox_37] : OSTEOPENIA [HPVDate] : 07/17/2023 [TextBox_78] : NEG [PGHxTotal] : 2 [[facility]] : 2 [[facility]] : 2

## 2024-07-26 NOTE — PHYSICAL EXAM
[Chaperone Present] : A chaperone was present in the examining room during all aspects of the physical examination [11550] : A chaperone was present during the pelvic exam. [Appropriately responsive] : appropriately responsive [Alert] : alert [No Acute Distress] : no acute distress [Soft] : soft [Non-tender] : non-tender [Non-distended] : non-distended [Oriented x3] : oriented x3 [Examination Of The Breasts] : a normal appearance [No Masses] : no breast masses were palpable [Labia Majora] : normal [Labia Minora] : normal [Atrophy] : atrophy [Dry Mucosa] : dry mucosa [Normal] : normal [Uterine Adnexae] : non-palpable

## 2024-07-26 NOTE — PLAN
[FreeTextEntry1] : F/U pap, mammo and sono.  We reviewed ultrasound results. She has a thin endometrium and small simple cyst. Will repeat sono annually.

## 2024-07-27 LAB — GI PCR PANEL: SIGNIFICANT CHANGE UP

## 2024-07-27 PROCEDURE — 99232 SBSQ HOSP IP/OBS MODERATE 35: CPT

## 2024-07-27 RX ADMIN — Medication 400 INTERNATIONAL UNIT(S): at 13:00

## 2024-07-27 RX ADMIN — SULFAMETHOXAZOLE AND TRIMETHOPRIM 1 TABLET(S): 400; 80 TABLET ORAL at 12:59

## 2024-07-27 RX ADMIN — AZATHIOPRINE 50 MILLIGRAM(S): 100 TABLET ORAL at 21:12

## 2024-07-27 RX ADMIN — AZATHIOPRINE 50 MILLIGRAM(S): 100 TABLET ORAL at 08:17

## 2024-07-27 RX ADMIN — PREDNISONE 5 MILLIGRAM(S): 10 TABLET ORAL at 05:57

## 2024-07-27 RX ADMIN — FUROSEMIDE 40 MILLIGRAM(S): 10 INJECTION, SOLUTION INTRAVENOUS at 05:58

## 2024-07-27 RX ADMIN — Medication 1000 UNIT(S): at 13:00

## 2024-07-27 RX ADMIN — NIFEDIPINE 60 MILLIGRAM(S): 20 CAPSULE, LIQUID FILLED ORAL at 05:57

## 2024-07-27 RX ADMIN — TACROLIMUS 2 MILLIGRAM(S): 1 CAPSULE ORAL at 08:09

## 2024-07-27 RX ADMIN — Medication 1 TABLET(S): at 13:00

## 2024-07-27 RX ADMIN — Medication 1000 MILLIGRAM(S): at 14:34

## 2024-07-27 RX ADMIN — Medication 20 MILLIGRAM(S): at 12:59

## 2024-07-27 NOTE — PROGRESS NOTE ADULT - SUBJECTIVE AND OBJECTIVE BOX
Chief complaint: UTI    Patient seen and examined at bedside. No acute overnight events reported. No fever, chills, cough, nausea or vomiting. Reports 7 episodes of diarrhea today.     Vital Signs Last 24 Hrs  T(F): 98.7 (26 Jul 2024 07:36), Max: 98.8 (25 Jul 2024 15:10)  HR: 50 (26 Jul 2024 07:36) (44 - 65)  BP: 118/69 (26 Jul 2024 07:36) (99/51 - 161/79)  RR: 18 (26 Jul 2024 07:36) (18 - 19)  SpO2: 96% (26 Jul 2024 07:36) (95% - 98%)    Physical Exam:  Constitutional: alert and oriented, in no acute distress   Neck: Soft and supple  Respiratory: Clear to auscultation bilaterally, no wheezes or crackles  Cardiovascular: Regular rate and rhyhtm, no murmurs, gallops, rubs  Gastrointestinal: Soft, non-tender to palpation, +bs  Vascular: 2+ peripheral pulses  Neurological: A/O x 3, no focal neurological deficits  Musculoskeletal: 5/5 strength b/l upper and lower extremities, no lower extremity edema bilaterally    Labs:                        16.0   6.19  )-----------( 117      ( 26 Jul 2024 04:00 )             45.9   07-26    143  |  103  |  12.5  ----------------------------<  65<L>  3.8   |  28.0  |  0.91    Ca    9.4      26 Jul 2024 04:00    TPro  6.3<L>  /  Alb  3.6  /  TBili  0.7  /  DBili  x   /  AST  15  /  ALT  10  /  AlkPhos  69  07-25  
Patient is a 53y old  Female who presents with a chief complaint of Pyelo (26 Jul 2024 10:16)      INTERVAL History of Present Illness/OVERNIGHT EVENTS: diarrhea  GI PCR pending    MEDICATIONS  (STANDING):  atorvastatin 10 milliGRAM(s) Oral at bedtime  azaTHIOprine 50 milliGRAM(s) Oral every 12 hours  carvedilol 25 milliGRAM(s) Oral every 12 hours  cefTRIAXone Injectable. 1000 milliGRAM(s) IV Push every 24 hours  cholecalciferol 1000 Unit(s) Oral daily  dextrose 5%. 1000 milliLiter(s) (50 mL/Hr) IV Continuous <Continuous>  dextrose 5%. 1000 milliLiter(s) (100 mL/Hr) IV Continuous <Continuous>  dextrose 50% Injectable 25 Gram(s) IV Push once  dextrose 50% Injectable 25 Gram(s) IV Push once  dextrose 50% Injectable 12.5 Gram(s) IV Push once  escitalopram 20 milliGRAM(s) Oral daily  furosemide    Tablet 40 milliGRAM(s) Oral daily  glucagon  Injectable 1 milliGRAM(s) IntraMuscular once  heparin   Injectable 5000 Unit(s) SubCutaneous every 8 hours  insulin lispro (ADMELOG) corrective regimen sliding scale   SubCutaneous Before meals and at bedtime  multivitamin 1 Tablet(s) Oral daily  NIFEdipine XL 60 milliGRAM(s) Oral daily  predniSONE   Tablet 5 milliGRAM(s) Oral daily  tacrolimus ER Tablet (ENVARSUS XR) 2 milliGRAM(s) Oral <User Schedule>  trimethoprim   80 mG/sulfamethoxazole 400 mG 1 Tablet(s) Oral daily  vitamin E 400 International Unit(s) Oral daily    MEDICATIONS  (PRN):  acetaminophen     Tablet .. 650 milliGRAM(s) Oral every 6 hours PRN Temp greater or equal to 38C (100.4F), Mild Pain (1 - 3)  albuterol/ipratropium for Nebulization 3 milliLiter(s) Nebulizer every 6 hours PRN Shortness of Breath and/or Wheezing  aluminum hydroxide/magnesium hydroxide/simethicone Suspension 30 milliLiter(s) Oral every 4 hours PRN Dyspepsia  dextrose Oral Gel 15 Gram(s) Oral once PRN Blood Glucose LESS THAN 70 milliGRAM(s)/deciliter  melatonin 3 milliGRAM(s) Oral at bedtime PRN Insomnia  ondansetron Injectable 4 milliGRAM(s) IV Push every 8 hours PRN Nausea and/or Vomiting      Allergies    Demerol HCl (Hives)  Phenergan (Hives; Rash)    Intolerances        REVIEW OF SYSTEMS:  Other systems currently negative unless otherwise specified above.    Vital Signs Last 24 Hrs  T(C): 36.8 (27 Jul 2024 09:06), Max: 36.9 (26 Jul 2024 15:31)  T(F): 98.3 (27 Jul 2024 09:06), Max: 98.5 (26 Jul 2024 15:31)  HR: 50 (27 Jul 2024 09:06) (50 - 61)  BP: 149/86 (27 Jul 2024 09:06) (118/68 - 180/79)  BP(mean): --  RR: 18 (27 Jul 2024 09:06) (18 - 18)  SpO2: 98% (27 Jul 2024 09:06) (96% - 99%)    Parameters below as of 27 Jul 2024 09:06  Patient On (Oxygen Delivery Method): room air            PHYSICAL EXAM:  GENERAL: No apparent distress, appears stated age  EYES: Conjunctiva and sclera clear, no discharge  ENMT: Moist mucous membranes, no nasal discharge  CHEST/LUNG: Clear to auscultation bilaterally, no wheeze or rales  HEART: Regular rhythm, no rubs or gallops  ABDOMEN: Soft, RLQ kidney tx  EXTREMITIES:  No clubbing, cyanosis or edema  SKIN: No rash, no new discoloration      LABS:      Ca    9.4        26 Jul 2024 04:00        Urinalysis Basic - ( 26 Jul 2024 04:00 )    Color: x / Appearance: x / SG: x / pH: x  Gluc: 65 mg/dL / Ketone: x  / Bili: x / Urobili: x   Blood: x / Protein: x / Nitrite: x   Leuk Esterase: x / RBC: x / WBC x   Sq Epi: x / Non Sq Epi: x / Bacteria: x      CAPILLARY BLOOD GLUCOSE            RADIOLOGY & ADDITIONAL TESTS:      Images reviewed personally    Consultant Notes Reviewed and Care Discussed with relevant Consultants.
[facility]  INFECTIOUS DISEASES at [place] / [place] / [place]  =======================================================                              Korey Castellanos MD                              Professor Emeritus:  Dr Thomas Willett MD            Diplomates American Board of Internal Medicine & Infectious Diseases                                   Tel  [phone] Fax [phone]                                  Hospital Consult line:  [phone]  =======================================================      ROLLY MEDEIROS [id number]    Follow up:      Allergies:  Demerol HCl (Hives)  Phenergan (Hives; Rash)           REVIEW OF SYSTEMS:  CONSTITUTIONAL:  No Fever or chills  HEENT:   No diplopia or blurred vision.  No earache, sore throat or runny nose.  CARDIOVASCULAR:  No Chest Pain  RESPIRATORY:  No cough, shortness of breath  GASTROINTESTINAL:  No nausea, vomiting or diarrhea.  GENITOURINARY:  No dysuria, frequency or urgency. No Blood in urine  MUSCULOSKELETAL:  no joint aches, no muscle pain  SKIN:  No change in skin, hair or nails.  NEUROLOGIC:  No Headaches, seizures   PSYCHIATRIC:  No disorder of thought or mood.  ENDOCRINE:  No heat or cold intolerance  HEMATOLOGICAL:  No easy bruising or bleeding.       Physical Exam:  GEN: NAD  HEENT: normocephalic and atraumatic. EOMI. PERRL.    NECK: Supple.   LUNGS: CTA B/L.  HEART: RRR  ABDOMEN: Soft, NT, ND.  +BS.    : No CVA tenderness  EXTREMITIES: Without  edema.  MSK: No joint swelling  NEUROLOGIC: No Focal Deficits   PSYCHIATRIC: Appropriate affect .  SKIN: No rash      Vitals:  T(F): 99 (26 Jul 2024 11:09), Max: 99 (26 Jul 2024 11:09)  HR: 64 (26 Jul 2024 11:09)  BP: 97/61 (26 Jul 2024 11:09)  RR: 18 (26 Jul 2024 11:09)  SpO2: 99% (26 Jul 2024 11:09) (95% - 99%)  temp max in last 48H T(F): , Max: 100.1 (07-24-24 @ 20:46)    Current Antibiotics:  cefTRIAXone Injectable. 1000 milliGRAM(s) IV Push every 24 hours  trimethoprim   80 mG/sulfamethoxazole 400 mG 1 Tablet(s) Oral daily    Other medications:  atorvastatin 10 milliGRAM(s) Oral at bedtime  carvedilol 25 milliGRAM(s) Oral every 12 hours  cholecalciferol 1000 Unit(s) Oral daily  dextrose 5%. 1000 milliLiter(s) IV Continuous <Continuous>  dextrose 5%. 1000 milliLiter(s) IV Continuous <Continuous>  dextrose 50% Injectable 25 Gram(s) IV Push once  dextrose 50% Injectable 25 Gram(s) IV Push once  dextrose 50% Injectable 12.5 Gram(s) IV Push once  escitalopram 20 milliGRAM(s) Oral daily  furosemide    Tablet 40 milliGRAM(s) Oral daily  glucagon  Injectable 1 milliGRAM(s) IntraMuscular once  heparin   Injectable 5000 Unit(s) SubCutaneous every 8 hours  insulin lispro (ADMELOG) corrective regimen sliding scale   SubCutaneous Before meals and at bedtime  multivitamin 1 Tablet(s) Oral daily  NIFEdipine XL 60 milliGRAM(s) Oral daily  predniSONE   Tablet 5 milliGRAM(s) Oral daily  tacrolimus ER Tablet (ENVARSUS XR) 2 milliGRAM(s) Oral <User Schedule>  vitamin E 400 International Unit(s) Oral daily                            16.0   6.19  )-----------( 117      ( 26 Jul 2024 04:00 )             45.9     07-26    143  |  103  |  12.5  ----------------------------<  65<L>  3.8   |  28.0  |  0.91    Ca    9.4      26 Jul 2024 04:00    TPro  6.3<L>  /  Alb  3.6  /  TBili  0.7  /  DBili  x   /  AST  15  /  ALT  10  /  AlkPhos  69  07-25    RECENT CULTURES:  07-25 @ 01:20 .Blood Blood-Peripheral     No growth at 24 hours    07-25 @ 01:05 .Blood Blood-Peripheral     No growth at 24 hours      WBC Count: 6.19 K/uL (07-26-24 @ 04:00)  WBC Count: 7.80 K/uL (07-25-24 @ 01:05)    Creatinine: 0.91 mg/dL (07-26-24 @ 04:00)  Creatinine: 1.04 mg/dL (07-25-24 @ 01:05)    SARS-CoV-2 Result: NotDetec (07-25-24 @ 01:10)

## 2024-07-27 NOTE — PROGRESS NOTE ADULT - ASSESSMENT
53y  Female with h/o ESRD due to PCKD s/p transplant on 2018 on immunosuppressants, DM, HLD, HLD and GERD, anxiety / depression. Patient presents with generalized malaise with associated body ache. Patient reporting fever, headache and chills, Fever to 101F with associated nausea, vomiting since 1 day prior to presentation. In the ER patient is afebrile, no leukocytosis. Was given Cefepime in the ER. Started on Ceftriaxone. ID input requested.       Pyelonephritis  Fever  Renal transplant  Immunosuppressed due to medical condition   Diarrhea      - Blood cultures no growth   - RVP/COVID 19 PCR 7/25 negative   - Urine Cx pending  - GI PCR ordered  - CT C/A/P reporting mild perinephric stranding of the transplant kidney, nonspecific  - UA 7/25 with some pyuria   - Continue Ceftriaxone   - Hold off on PICC/Midline for now unless needed for reasons other than infectious diseases  - Follow up cultures  - Trend Fever  - Trend WBC      Will Follow    Discussed treatment plan with: Dr Caldwell    
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     #UTI  #H/o PCKD s/p transplant  - CT A/P result noted above, ?pyelo with perinephric stranding -- appears chronic, present on CT from 2022  - Prior UCx with Klebsiella, pansensitive  - Abx per ID  - Cultures NGTD ?received dose of abx prior   - Continue Tacrolimus 2g daily    #Diarrhea  - f/up GI PCR and stool culture    #DM  - A1c 5.2  - FS with ISS    #HTN/HLD  - Lipitor 10mg nightly  - Coreg 25mg q1h2  - Lasix 40mg daily  - Nifedipine 60mg daily    #Anxiety/depression  - Lexapro 20mg daily    DVT ppx  - Heparin SQ    Dispo: pending resolution of diarrhea and stool studies.  d/w ID  
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     #UTI  #H/o PCKD s/p transplant  - CT A/P result noted above, ?pyelo with perinephric stranding -- appears chronic, present on CT from 2022  - Prior UCx with Klebesiella, pansensitive  - Continue Ceftriaxone 1g daily  - ID recs appreciated  - Blood cultures negative  - Follow urine culture in process  - Continue Tacrolimus 2g daily    #Diarrhea  - Patient reports having 7 episodes this morning  - Stool culture ordered  - C. Diff PCR ordered    #DM  - A1c 5.2  - FS with ISS    #HTN/HLD  - Lipitor 10mg nightly  - Coreg 25mg q1h2  - Lasix 40mg daily  - Nifedipine 60mg daily    #Anxiety/depression  - Lexapro 20mg daily    DVT ppx  - Heparin SQ    Dispo: pending culture data

## 2024-07-28 VITALS
TEMPERATURE: 98 F | DIASTOLIC BLOOD PRESSURE: 72 MMHG | RESPIRATION RATE: 18 BRPM | HEART RATE: 57 BPM | SYSTOLIC BLOOD PRESSURE: 128 MMHG | OXYGEN SATURATION: 98 %

## 2024-07-28 LAB
ALBUMIN SERPL ELPH-MCNC: 3.4 G/DL — SIGNIFICANT CHANGE UP (ref 3.3–5.2)
ALP SERPL-CCNC: 61 U/L — SIGNIFICANT CHANGE UP (ref 40–120)
ALT FLD-CCNC: 10 U/L — SIGNIFICANT CHANGE UP
ANION GAP SERPL CALC-SCNC: 13 MMOL/L — SIGNIFICANT CHANGE UP (ref 5–17)
AST SERPL-CCNC: 12 U/L — SIGNIFICANT CHANGE UP
BASOPHILS # BLD AUTO: 0.05 K/UL — SIGNIFICANT CHANGE UP (ref 0–0.2)
BASOPHILS NFR BLD AUTO: 0.7 % — SIGNIFICANT CHANGE UP (ref 0–2)
BILIRUB SERPL-MCNC: 0.4 MG/DL — SIGNIFICANT CHANGE UP (ref 0.4–2)
BUN SERPL-MCNC: 21.9 MG/DL — HIGH (ref 8–20)
CALCIUM SERPL-MCNC: 9 MG/DL — SIGNIFICANT CHANGE UP (ref 8.4–10.5)
CHLORIDE SERPL-SCNC: 106 MMOL/L — SIGNIFICANT CHANGE UP (ref 96–108)
CO2 SERPL-SCNC: 24 MMOL/L — SIGNIFICANT CHANGE UP (ref 22–29)
CREAT SERPL-MCNC: 0.92 MG/DL — SIGNIFICANT CHANGE UP (ref 0.5–1.3)
CULTURE RESULTS: SIGNIFICANT CHANGE UP
EGFR: 74 ML/MIN/1.73M2 — SIGNIFICANT CHANGE UP
EOSINOPHIL # BLD AUTO: 0.19 K/UL — SIGNIFICANT CHANGE UP (ref 0–0.5)
EOSINOPHIL NFR BLD AUTO: 2.8 % — SIGNIFICANT CHANGE UP (ref 0–6)
GLUCOSE SERPL-MCNC: 88 MG/DL — SIGNIFICANT CHANGE UP (ref 70–99)
HCT VFR BLD CALC: 41.5 % — SIGNIFICANT CHANGE UP (ref 34.5–45)
HGB BLD-MCNC: 14.6 G/DL — SIGNIFICANT CHANGE UP (ref 11.5–15.5)
IMM GRANULOCYTES NFR BLD AUTO: 0.3 % — SIGNIFICANT CHANGE UP (ref 0–0.9)
LYMPHOCYTES # BLD AUTO: 1.55 K/UL — SIGNIFICANT CHANGE UP (ref 1–3.3)
LYMPHOCYTES # BLD AUTO: 22.7 % — SIGNIFICANT CHANGE UP (ref 13–44)
MCHC RBC-ENTMCNC: 34 PG — SIGNIFICANT CHANGE UP (ref 27–34)
MCHC RBC-ENTMCNC: 35.2 GM/DL — SIGNIFICANT CHANGE UP (ref 32–36)
MCV RBC AUTO: 96.7 FL — SIGNIFICANT CHANGE UP (ref 80–100)
MONOCYTES # BLD AUTO: 0.58 K/UL — SIGNIFICANT CHANGE UP (ref 0–0.9)
MONOCYTES NFR BLD AUTO: 8.5 % — SIGNIFICANT CHANGE UP (ref 2–14)
NEUTROPHILS # BLD AUTO: 4.45 K/UL — SIGNIFICANT CHANGE UP (ref 1.8–7.4)
NEUTROPHILS NFR BLD AUTO: 65 % — SIGNIFICANT CHANGE UP (ref 43–77)
PLATELET # BLD AUTO: 154 K/UL — SIGNIFICANT CHANGE UP (ref 150–400)
POTASSIUM SERPL-MCNC: 3.8 MMOL/L — SIGNIFICANT CHANGE UP (ref 3.5–5.3)
POTASSIUM SERPL-SCNC: 3.8 MMOL/L — SIGNIFICANT CHANGE UP (ref 3.5–5.3)
PROT SERPL-MCNC: 6.2 G/DL — LOW (ref 6.6–8.7)
RBC # BLD: 4.29 M/UL — SIGNIFICANT CHANGE UP (ref 3.8–5.2)
RBC # FLD: 13.1 % — SIGNIFICANT CHANGE UP (ref 10.3–14.5)
SODIUM SERPL-SCNC: 143 MMOL/L — SIGNIFICANT CHANGE UP (ref 135–145)
SPECIMEN SOURCE: SIGNIFICANT CHANGE UP
WBC # BLD: 6.84 K/UL — SIGNIFICANT CHANGE UP (ref 3.8–10.5)
WBC # FLD AUTO: 6.84 K/UL — SIGNIFICANT CHANGE UP (ref 3.8–10.5)

## 2024-07-28 PROCEDURE — 87086 URINE CULTURE/COLONY COUNT: CPT

## 2024-07-28 PROCEDURE — 80053 COMPREHEN METABOLIC PANEL: CPT

## 2024-07-28 PROCEDURE — 96375 TX/PRO/DX INJ NEW DRUG ADDON: CPT

## 2024-07-28 PROCEDURE — 83605 ASSAY OF LACTIC ACID: CPT

## 2024-07-28 PROCEDURE — 71250 CT THORAX DX C-: CPT | Mod: MC

## 2024-07-28 PROCEDURE — 80197 ASSAY OF TACROLIMUS: CPT

## 2024-07-28 PROCEDURE — 82330 ASSAY OF CALCIUM: CPT

## 2024-07-28 PROCEDURE — 85018 HEMOGLOBIN: CPT

## 2024-07-28 PROCEDURE — 82803 BLOOD GASES ANY COMBINATION: CPT

## 2024-07-28 PROCEDURE — 87046 STOOL CULTR AEROBIC BACT EA: CPT

## 2024-07-28 PROCEDURE — 74176 CT ABD & PELVIS W/O CONTRAST: CPT | Mod: MC

## 2024-07-28 PROCEDURE — 36415 COLL VENOUS BLD VENIPUNCTURE: CPT

## 2024-07-28 PROCEDURE — 85610 PROTHROMBIN TIME: CPT

## 2024-07-28 PROCEDURE — 87507 IADNA-DNA/RNA PROBE TQ 12-25: CPT

## 2024-07-28 PROCEDURE — 82947 ASSAY GLUCOSE BLOOD QUANT: CPT

## 2024-07-28 PROCEDURE — 83036 HEMOGLOBIN GLYCOSYLATED A1C: CPT

## 2024-07-28 PROCEDURE — 85730 THROMBOPLASTIN TIME PARTIAL: CPT

## 2024-07-28 PROCEDURE — 80048 BASIC METABOLIC PNL TOTAL CA: CPT

## 2024-07-28 PROCEDURE — 99239 HOSP IP/OBS DSCHRG MGMT >30: CPT

## 2024-07-28 PROCEDURE — 85025 COMPLETE CBC W/AUTO DIFF WBC: CPT

## 2024-07-28 PROCEDURE — 85014 HEMATOCRIT: CPT

## 2024-07-28 PROCEDURE — 93005 ELECTROCARDIOGRAM TRACING: CPT

## 2024-07-28 PROCEDURE — 71045 X-RAY EXAM CHEST 1 VIEW: CPT

## 2024-07-28 PROCEDURE — 87040 BLOOD CULTURE FOR BACTERIA: CPT

## 2024-07-28 PROCEDURE — 87637 SARSCOV2&INF A&B&RSV AMP PRB: CPT

## 2024-07-28 PROCEDURE — 84132 ASSAY OF SERUM POTASSIUM: CPT

## 2024-07-28 PROCEDURE — 85027 COMPLETE CBC AUTOMATED: CPT

## 2024-07-28 PROCEDURE — 81001 URINALYSIS AUTO W/SCOPE: CPT

## 2024-07-28 PROCEDURE — 87493 C DIFF AMPLIFIED PROBE: CPT

## 2024-07-28 PROCEDURE — 87045 FECES CULTURE AEROBIC BACT: CPT

## 2024-07-28 PROCEDURE — 96374 THER/PROPH/DIAG INJ IV PUSH: CPT

## 2024-07-28 PROCEDURE — 82435 ASSAY OF BLOOD CHLORIDE: CPT

## 2024-07-28 PROCEDURE — 70450 CT HEAD/BRAIN W/O DYE: CPT | Mod: MC

## 2024-07-28 PROCEDURE — 99285 EMERGENCY DEPT VISIT HI MDM: CPT | Mod: 25

## 2024-07-28 PROCEDURE — 84295 ASSAY OF SERUM SODIUM: CPT

## 2024-07-28 RX ORDER — CEFPODOXIME PROXETIL 100 MG
1 TABLET ORAL
Qty: 20 | Refills: 0
Start: 2024-07-28 | End: 2024-08-06

## 2024-07-28 RX ADMIN — Medication 3 MILLIGRAM(S): at 00:16

## 2024-07-28 RX ADMIN — Medication 400 INTERNATIONAL UNIT(S): at 11:36

## 2024-07-28 RX ADMIN — NIFEDIPINE 60 MILLIGRAM(S): 20 CAPSULE, LIQUID FILLED ORAL at 06:59

## 2024-07-28 RX ADMIN — Medication 1000 MILLIGRAM(S): at 15:08

## 2024-07-28 RX ADMIN — Medication 1 TABLET(S): at 11:37

## 2024-07-28 RX ADMIN — AZATHIOPRINE 50 MILLIGRAM(S): 100 TABLET ORAL at 08:22

## 2024-07-28 RX ADMIN — TACROLIMUS 2 MILLIGRAM(S): 1 CAPSULE ORAL at 08:30

## 2024-07-28 RX ADMIN — Medication 20 MILLIGRAM(S): at 11:37

## 2024-07-28 RX ADMIN — FUROSEMIDE 40 MILLIGRAM(S): 10 INJECTION, SOLUTION INTRAVENOUS at 06:58

## 2024-07-28 RX ADMIN — Medication 1000 UNIT(S): at 11:37

## 2024-07-28 RX ADMIN — PREDNISONE 5 MILLIGRAM(S): 10 TABLET ORAL at 06:58

## 2024-07-28 RX ADMIN — Medication 650 MILLIGRAM(S): at 11:36

## 2024-07-28 RX ADMIN — SULFAMETHOXAZOLE AND TRIMETHOPRIM 1 TABLET(S): 400; 80 TABLET ORAL at 11:37

## 2024-07-28 NOTE — CHART NOTE - NSCHARTNOTEFT_GEN_A_CORE
Ms. Angeline Vanegas was my patient at [facility]. She was admitted from 7/25/24 to 7/28/24. She may return to work on Wednesday 7/31/24 with no restrictions.
Notified by RN for patient with mild to moderate headache, requesting percocet   Patient otherwise asymptomatic, VSS, and NAD   Ordered percocet x1 stat  Continue with any changes in patient status

## 2024-07-28 NOTE — DISCHARGE NOTE PROVIDER - HOSPITAL COURSE
52 yo F PMHx ESRD 2/2 PCKD s/p transplant on 2018, DM, HLD, HLD and GERD, anxiety / depression p/w generalized malaise, admitted for UTI and Pyelonephritis. The pt was started on Ceftriaxone. Urine cultures were obtained and were found to be negative but this was in part due to the pt getting Ceftriaxone prior to giving a urine sample for the culture. The patient also endorsed diarrhea. C diff was negative, GI PCR was negative, and today the patient's diarrhea resolved. Infectious disease was consulted and recommended Vantin 200mg bid x 10 days on discharge. The patient was monitored serially, clinically improved, and ultimately deemed stable for discharge with instructions to f/u with PCP outpatient.

## 2024-07-28 NOTE — DISCHARGE NOTE PROVIDER - NSDCMRMEDTOKEN_GEN_ALL_CORE_FT
cefpodoxime 200 mg oral tablet: 1 tab(s) orally 2 times a day  cholecalciferol: 1 tab(s) once a day  Coreg 25 mg oral tablet: 1 tab(s) orally 2 times a day  Imuran 50 mg oral tablet: 2 tab(s) orally once a day  Lasix 40 mg oral tablet: 1 tab(s) orally once a day   Lexapro 20 mg oral tablet: 1 tab(s) orally once a day  Mounjaro 7.5 mg/0.5 mL subcutaneous solution: 7.5 milligram(s) subcutaneously once a week  multivitamin: 1 tab(s) orally once a day  NIFEdipine 60 mg oral tablet, extended release: 1 tab(s) orally once a day  pravastatin 20 mg oral tablet: 1 tab(s) orally once a day  predniSONE 5 mg oral tablet: 1 tab(s) orally once a day  sulfamethoxazole-trimethoprim 400 mg-80 mg oral tablet: 1 tab(s) orally once a day  tacrolimus 1 mg oral tablet, extended release: 2 milligram(s) orally once a day  at 8AM  vitamin E: 1 tab(s) once a day  Zetia 10 mg oral tablet: 1 tab(s) orally once a day

## 2024-07-28 NOTE — DISCHARGE NOTE NURSING/CASE MANAGEMENT/SOCIAL WORK - NSDCPEFALRISK_GEN_ALL_CORE
For information on Fall & Injury Prevention, visit: [email or web address] OR  [email or web address] OR  https://www.cdc.gov/steadi/patient.html

## 2024-07-28 NOTE — DISCHARGE NOTE PROVIDER - NSDCCPCAREPLAN_GEN_ALL_CORE_FT
PRINCIPAL DISCHARGE DIAGNOSIS  Diagnosis: Pyelonephritis  Assessment and Plan of Treatment: Plan:  Please take Vantin (Cefpodoxime) 200mg PO twice daily for 10 days.  Please followup with your PCP within 1 week.

## 2024-07-28 NOTE — DISCHARGE NOTE NURSING/CASE MANAGEMENT/SOCIAL WORK - PATIENT PORTAL LINK FT
You can access the FollowMyHealth Patient Portal offered by [facility] by registering at the following website: [email or web address]. By joining [employer]’s FollowMyHealth portal, you will also be able to view your health information using other applications (apps) compatible with our system.

## 2024-07-29 ENCOUNTER — NON-APPOINTMENT (OUTPATIENT)
Age: 54
End: 2024-07-29

## 2024-07-30 LAB
CULTURE RESULTS: SIGNIFICANT CHANGE UP
CULTURE RESULTS: SIGNIFICANT CHANGE UP
SPECIMEN SOURCE: SIGNIFICANT CHANGE UP
SPECIMEN SOURCE: SIGNIFICANT CHANGE UP

## 2024-08-19 ENCOUNTER — APPOINTMENT (OUTPATIENT)
Dept: FAMILY MEDICINE | Facility: CLINIC | Age: 54
End: 2024-08-19
Payer: COMMERCIAL

## 2024-08-19 DIAGNOSIS — Z09 ENCOUNTER FOR FOLLOW-UP EXAMINATION AFTER COMPLETED TREATMENT FOR CONDITIONS OTHER THAN MALIGNANT NEOPLASM: ICD-10-CM

## 2024-08-19 DIAGNOSIS — E13.9 OTHER SPECIFIED DIABETES MELLITUS W/OUT COMPLICATIONS: ICD-10-CM

## 2024-08-19 DIAGNOSIS — N12 TUBULO-INTERSTITIAL NEPHRITIS, NOT SPECIFIED AS ACUTE OR CHRONIC: ICD-10-CM

## 2024-08-19 PROCEDURE — 99442: CPT | Mod: 93

## 2024-08-28 ENCOUNTER — TRANSCRIPTION ENCOUNTER (OUTPATIENT)
Age: 54
End: 2024-08-28

## 2024-10-04 ENCOUNTER — LABORATORY RESULT (OUTPATIENT)
Age: 54
End: 2024-10-04

## 2024-10-07 DIAGNOSIS — N39.0 URINARY TRACT INFECTION, SITE NOT SPECIFIED: ICD-10-CM

## 2024-10-07 RX ORDER — CEPHALEXIN 500 MG/1
500 CAPSULE ORAL
Qty: 14 | Refills: 0 | Status: ACTIVE | COMMUNITY
Start: 2024-10-07 | End: 1900-01-01

## 2024-10-08 ENCOUNTER — TRANSCRIPTION ENCOUNTER (OUTPATIENT)
Age: 54
End: 2024-10-08

## 2024-10-08 ENCOUNTER — NON-APPOINTMENT (OUTPATIENT)
Age: 54
End: 2024-10-08

## 2024-10-11 ENCOUNTER — TRANSCRIPTION ENCOUNTER (OUTPATIENT)
Age: 54
End: 2024-10-11

## 2024-10-14 LAB — BACTERIA UR CULT: NORMAL

## 2024-10-15 ENCOUNTER — RX RENEWAL (OUTPATIENT)
Age: 54
End: 2024-10-15

## 2024-10-17 ENCOUNTER — TRANSCRIPTION ENCOUNTER (OUTPATIENT)
Age: 54
End: 2024-10-17

## 2024-10-18 ENCOUNTER — RX RENEWAL (OUTPATIENT)
Age: 54
End: 2024-10-18

## 2024-10-21 ENCOUNTER — TRANSCRIPTION ENCOUNTER (OUTPATIENT)
Age: 54
End: 2024-10-21

## 2024-10-22 ENCOUNTER — RX RENEWAL (OUTPATIENT)
Age: 54
End: 2024-10-22

## 2024-10-28 ENCOUNTER — APPOINTMENT (OUTPATIENT)
Dept: FAMILY MEDICINE | Facility: CLINIC | Age: 54
End: 2024-10-28

## 2024-10-30 ENCOUNTER — RX RENEWAL (OUTPATIENT)
Age: 54
End: 2024-10-30

## 2024-11-01 ENCOUNTER — APPOINTMENT (OUTPATIENT)
Dept: ENDOCRINOLOGY | Facility: CLINIC | Age: 54
End: 2024-11-01

## 2024-11-01 VITALS
HEART RATE: 55 BPM | DIASTOLIC BLOOD PRESSURE: 80 MMHG | SYSTOLIC BLOOD PRESSURE: 104 MMHG | OXYGEN SATURATION: 98 % | WEIGHT: 169 LBS | BODY MASS INDEX: 25.61 KG/M2 | HEIGHT: 68 IN

## 2024-11-01 DIAGNOSIS — E13.9 OTHER SPECIFIED DIABETES MELLITUS W/OUT COMPLICATIONS: ICD-10-CM

## 2024-11-01 DIAGNOSIS — E78.5 HYPERLIPIDEMIA, UNSPECIFIED: ICD-10-CM

## 2024-11-01 LAB — GLUCOSE BLDC GLUCOMTR-MCNC: 101

## 2024-11-01 PROCEDURE — 95251 CONT GLUC MNTR ANALYSIS I&R: CPT

## 2024-11-01 PROCEDURE — 82962 GLUCOSE BLOOD TEST: CPT

## 2024-11-01 PROCEDURE — G2211 COMPLEX E/M VISIT ADD ON: CPT | Mod: NC

## 2024-11-01 PROCEDURE — 99214 OFFICE O/P EST MOD 30 MIN: CPT

## 2024-11-04 ENCOUNTER — RX RENEWAL (OUTPATIENT)
Age: 54
End: 2024-11-04

## 2024-11-04 LAB
25(OH)D3 SERPL-MCNC: 104 NG/ML
CHOLEST SERPL-MCNC: 189 MG/DL
CREAT SPEC-SCNC: 235 MG/DL
HDLC SERPL-MCNC: 35 MG/DL
LDLC SERPL CALC-MCNC: 109 MG/DL
MICROALBUMIN 24H UR DL<=1MG/L-MCNC: 2.4 MG/DL
MICROALBUMIN/CREAT 24H UR-RTO: 10 MG/G
NONHDLC SERPL-MCNC: 154 MG/DL
TRIGL SERPL-MCNC: 262 MG/DL

## 2024-11-05 ENCOUNTER — APPOINTMENT (OUTPATIENT)
Dept: NEPHROLOGY | Facility: CLINIC | Age: 54
End: 2024-11-05
Payer: COMMERCIAL

## 2024-11-05 VITALS
SYSTOLIC BLOOD PRESSURE: 119 MMHG | BODY MASS INDEX: 25.46 KG/M2 | HEIGHT: 68 IN | TEMPERATURE: 98.2 F | OXYGEN SATURATION: 98 % | DIASTOLIC BLOOD PRESSURE: 71 MMHG | WEIGHT: 168 LBS | HEART RATE: 64 BPM

## 2024-11-05 DIAGNOSIS — Z94.0 KIDNEY TRANSPLANT STATUS: ICD-10-CM

## 2024-11-05 DIAGNOSIS — Z79.899 OTHER LONG TERM (CURRENT) DRUG THERAPY: ICD-10-CM

## 2024-11-05 DIAGNOSIS — I10 ESSENTIAL (PRIMARY) HYPERTENSION: ICD-10-CM

## 2024-11-05 LAB
ALBUMIN SERPL ELPH-MCNC: 4.1 G/DL
ALBUMIN SERPL ELPH-MCNC: 4.4 G/DL
ALP BLD-CCNC: 75 U/L
ALP BLD-CCNC: 78 U/L
ALT SERPL-CCNC: 12 U/L
ALT SERPL-CCNC: 8 U/L
ANION GAP SERPL CALC-SCNC: 14 MMOL/L
ANION GAP SERPL CALC-SCNC: 15 MMOL/L
APPEARANCE: CLEAR
AST SERPL-CCNC: 12 U/L
AST SERPL-CCNC: 14 U/L
BACTERIA UR CULT: NORMAL
BACTERIA: NEGATIVE /HPF
BILIRUB SERPL-MCNC: 0.8 MG/DL
BILIRUB SERPL-MCNC: 1.2 MG/DL
BILIRUBIN URINE: NEGATIVE
BKV DNA SPEC QL NAA+PROBE: NOT DETECTED IU/ML
BLOOD URINE: NEGATIVE
BUN SERPL-MCNC: 15 MG/DL
BUN SERPL-MCNC: 16 MG/DL
CALCIUM OXALATE CRYSTALS: PRESENT
CALCIUM SERPL-MCNC: 10.2 MG/DL
CALCIUM SERPL-MCNC: 9.8 MG/DL
CAST: 0 /LPF
CHLORIDE SERPL-SCNC: 104 MMOL/L
CHLORIDE SERPL-SCNC: 105 MMOL/L
CMV DNA SPEC QL NAA+PROBE: NOT DETECTED IU/ML
CMVPCR LOG: NOT DETECTED LOG10IU/ML
CO2 SERPL-SCNC: 23 MMOL/L
CO2 SERPL-SCNC: 25 MMOL/L
COLOR: NORMAL
CREAT SERPL-MCNC: 1.12 MG/DL
CREAT SERPL-MCNC: 1.25 MG/DL
CREAT SPEC-SCNC: 246 MG/DL
CREAT/PROT UR: 0.1 RATIO
EGFR: 52 ML/MIN/1.73M2
EGFR: 59 ML/MIN/1.73M2
EPITHELIAL CELLS: 1 /HPF
GLUCOSE QUALITATIVE U: NEGATIVE MG/DL
GLUCOSE SERPL-MCNC: 70 MG/DL
GLUCOSE SERPL-MCNC: 79 MG/DL
HCT VFR BLD CALC: 49.4 %
HGB BLD-MCNC: 16.5 G/DL
KETONES URINE: NEGATIVE MG/DL
LDH SERPL-CCNC: 185 U/L
LEUKOCYTE ESTERASE URINE: ABNORMAL
MAGNESIUM SERPL-MCNC: 1.9 MG/DL
MCHC RBC-ENTMCNC: 33.4 G/DL
MCHC RBC-ENTMCNC: 34 PG
MCV RBC AUTO: 101.9 FL
MICROSCOPIC-UA: NORMAL
NITRITE URINE: NEGATIVE
PH URINE: 6
PHOSPHATE SERPL-MCNC: 3.6 MG/DL
PLATELET # BLD AUTO: 229 K/UL
POTASSIUM SERPL-SCNC: 3.7 MMOL/L
POTASSIUM SERPL-SCNC: 4.2 MMOL/L
PROT SERPL-MCNC: 6.9 G/DL
PROT SERPL-MCNC: 7 G/DL
PROT UR-MCNC: 21 MG/DL
PROTEIN URINE: NORMAL MG/DL
RBC # BLD: 4.85 M/UL
RBC # FLD: 13.6 %
RED BLOOD CELLS URINE: NORMAL /HPF
REVIEW: NORMAL
SODIUM SERPL-SCNC: 143 MMOL/L
SODIUM SERPL-SCNC: 144 MMOL/L
SPECIFIC GRAVITY URINE: 1.02
TACROLIMUS SERPL-MCNC: 3.2 NG/ML
TACROLIMUS SERPL-MCNC: <2 NG/ML
URATE SERPL-MCNC: 6.8 MG/DL
UROBILINOGEN URINE: 0.2 MG/DL
WBC # FLD AUTO: 10.28 K/UL
WHITE BLOOD CELLS URINE: 2 /HPF

## 2024-11-05 PROCEDURE — 99214 OFFICE O/P EST MOD 30 MIN: CPT

## 2024-11-07 ENCOUNTER — APPOINTMENT (OUTPATIENT)
Dept: CT IMAGING | Facility: CLINIC | Age: 54
End: 2024-11-07
Payer: COMMERCIAL

## 2024-11-07 LAB
CDIFF BY PCR: NOT DETECTED
GI PCR PANEL: NOT DETECTED

## 2024-11-07 PROCEDURE — 74176 CT ABD & PELVIS W/O CONTRAST: CPT

## 2024-11-08 ENCOUNTER — NON-APPOINTMENT (OUTPATIENT)
Age: 54
End: 2024-11-08

## 2024-11-11 LAB — BACTERIA STL CULT: NORMAL

## 2024-11-13 ENCOUNTER — RX RENEWAL (OUTPATIENT)
Age: 54
End: 2024-11-13

## 2024-11-14 ENCOUNTER — APPOINTMENT (OUTPATIENT)
Dept: TRANSPLANT | Facility: CLINIC | Age: 54
End: 2024-11-14
Payer: COMMERCIAL

## 2024-11-14 DIAGNOSIS — K43.9 VENTRAL HERNIA W/OUT OBSTRUCTION OR GANGRENE: ICD-10-CM

## 2024-11-14 PROCEDURE — 99214 OFFICE O/P EST MOD 30 MIN: CPT

## 2025-02-03 ENCOUNTER — APPOINTMENT (OUTPATIENT)
Dept: ENDOCRINOLOGY | Facility: CLINIC | Age: 55
End: 2025-02-03
Payer: COMMERCIAL

## 2025-02-03 VITALS
SYSTOLIC BLOOD PRESSURE: 118 MMHG | BODY MASS INDEX: 25.91 KG/M2 | WEIGHT: 171 LBS | DIASTOLIC BLOOD PRESSURE: 64 MMHG | HEART RATE: 62 BPM | HEIGHT: 68 IN | OXYGEN SATURATION: 98 %

## 2025-02-03 DIAGNOSIS — E13.9 OTHER SPECIFIED DIABETES MELLITUS W/OUT COMPLICATIONS: ICD-10-CM

## 2025-02-03 DIAGNOSIS — I10 ESSENTIAL (PRIMARY) HYPERTENSION: ICD-10-CM

## 2025-02-03 LAB — GLUCOSE BLDC GLUCOMTR-MCNC: 96

## 2025-02-03 PROCEDURE — 95251 CONT GLUC MNTR ANALYSIS I&R: CPT

## 2025-02-03 PROCEDURE — 82962 GLUCOSE BLOOD TEST: CPT

## 2025-02-03 PROCEDURE — G2211 COMPLEX E/M VISIT ADD ON: CPT | Mod: NC

## 2025-02-03 PROCEDURE — 99214 OFFICE O/P EST MOD 30 MIN: CPT

## 2025-02-03 RX ORDER — TIRZEPATIDE 5 MG/.5ML
5 INJECTION, SOLUTION SUBCUTANEOUS
Qty: 3 | Refills: 0 | Status: ACTIVE | COMMUNITY
Start: 2025-02-03 | End: 1900-01-01

## 2025-02-04 ENCOUNTER — TRANSCRIPTION ENCOUNTER (OUTPATIENT)
Age: 55
End: 2025-02-04

## 2025-02-05 LAB
25(OH)D3 SERPL-MCNC: 67.2 NG/ML
ALBUMIN SERPL ELPH-MCNC: 4.3 G/DL
ALP BLD-CCNC: 77 U/L
ALT SERPL-CCNC: 7 U/L
ANION GAP SERPL CALC-SCNC: 13 MMOL/L
AST SERPL-CCNC: 11 U/L
BILIRUB SERPL-MCNC: 0.4 MG/DL
BUN SERPL-MCNC: 20 MG/DL
CALCIUM SERPL-MCNC: 9.6 MG/DL
CHLORIDE SERPL-SCNC: 104 MMOL/L
CHOLEST SERPL-MCNC: 156 MG/DL
CO2 SERPL-SCNC: 26 MMOL/L
CREAT SERPL-MCNC: 1.05 MG/DL
CREAT SPEC-SCNC: 180 MG/DL
EGFR: 63 ML/MIN/1.73M2
ESTIMATED AVERAGE GLUCOSE: 108 MG/DL
GLUCOSE SERPL-MCNC: 86 MG/DL
HBA1C MFR BLD HPLC: 5.4 %
HCT VFR BLD CALC: 44 %
HDLC SERPL-MCNC: 38 MG/DL
HGB BLD-MCNC: 15 G/DL
LDLC SERPL CALC-MCNC: 83 MG/DL
MCHC RBC-ENTMCNC: 34.1 G/DL
MCHC RBC-ENTMCNC: 34.2 PG
MCV RBC AUTO: 100.2 FL
MICROALBUMIN 24H UR DL<=1MG/L-MCNC: <1.2 MG/DL
MICROALBUMIN/CREAT 24H UR-RTO: NORMAL MG/G
NONHDLC SERPL-MCNC: 118 MG/DL
PLATELET # BLD AUTO: 197 K/UL
POTASSIUM SERPL-SCNC: 3.9 MMOL/L
PROT SERPL-MCNC: 6.6 G/DL
RBC # BLD: 4.39 M/UL
RBC # FLD: 13.1 %
SODIUM SERPL-SCNC: 142 MMOL/L
TRIGL SERPL-MCNC: 209 MG/DL
TSH SERPL-ACNC: 2.15 UIU/ML
VIT B12 SERPL-MCNC: 1401 PG/ML
WBC # FLD AUTO: 12.29 K/UL

## 2025-02-10 ENCOUNTER — APPOINTMENT (OUTPATIENT)
Dept: TRANSPLANT | Facility: CLINIC | Age: 55
End: 2025-02-10

## 2025-02-18 ENCOUNTER — APPOINTMENT (OUTPATIENT)
Dept: DERMATOLOGY | Facility: CLINIC | Age: 55
End: 2025-02-18

## 2025-02-25 ENCOUNTER — APPOINTMENT (OUTPATIENT)
Dept: SURGERY | Facility: CLINIC | Age: 55
End: 2025-02-25
Payer: COMMERCIAL

## 2025-02-25 ENCOUNTER — NON-APPOINTMENT (OUTPATIENT)
Age: 55
End: 2025-02-25

## 2025-02-25 VITALS
DIASTOLIC BLOOD PRESSURE: 74 MMHG | RESPIRATION RATE: 16 BRPM | TEMPERATURE: 97.2 F | HEIGHT: 68 IN | WEIGHT: 171 LBS | BODY MASS INDEX: 25.91 KG/M2 | OXYGEN SATURATION: 99 % | HEART RATE: 67 BPM | SYSTOLIC BLOOD PRESSURE: 116 MMHG

## 2025-02-25 DIAGNOSIS — K43.2 INCISIONAL HERNIA W/OUT OBSTRUCTION OR GANGRENE: ICD-10-CM

## 2025-02-25 PROCEDURE — 99215 OFFICE O/P EST HI 40 MIN: CPT

## 2025-03-04 ENCOUNTER — APPOINTMENT (OUTPATIENT)
Dept: SURGERY | Facility: CLINIC | Age: 55
End: 2025-03-04

## 2025-03-12 ENCOUNTER — OUTPATIENT (OUTPATIENT)
Dept: OUTPATIENT SERVICES | Facility: HOSPITAL | Age: 55
LOS: 1 days | End: 2025-03-12
Payer: COMMERCIAL

## 2025-03-12 ENCOUNTER — APPOINTMENT (OUTPATIENT)
Dept: ULTRASOUND IMAGING | Facility: CLINIC | Age: 55
End: 2025-03-12
Payer: COMMERCIAL

## 2025-03-12 DIAGNOSIS — E13.9 OTHER SPECIFIED DIABETES MELLITUS WITHOUT COMPLICATIONS: ICD-10-CM

## 2025-03-12 DIAGNOSIS — I10 ESSENTIAL (PRIMARY) HYPERTENSION: ICD-10-CM

## 2025-03-12 DIAGNOSIS — Z98.890 OTHER SPECIFIED POSTPROCEDURAL STATES: Chronic | ICD-10-CM

## 2025-03-12 DIAGNOSIS — Z94.0 KIDNEY TRANSPLANT STATUS: Chronic | ICD-10-CM

## 2025-03-12 DIAGNOSIS — Z98.51 TUBAL LIGATION STATUS: Chronic | ICD-10-CM

## 2025-03-12 DIAGNOSIS — Z90.5 ACQUIRED ABSENCE OF KIDNEY: Chronic | ICD-10-CM

## 2025-03-12 DIAGNOSIS — E11.9 TYPE 2 DIABETES MELLITUS WITHOUT COMPLICATIONS: ICD-10-CM

## 2025-03-12 DIAGNOSIS — E78.5 HYPERLIPIDEMIA, UNSPECIFIED: ICD-10-CM

## 2025-03-12 PROCEDURE — 76536 US EXAM OF HEAD AND NECK: CPT

## 2025-03-12 PROCEDURE — 76536 US EXAM OF HEAD AND NECK: CPT | Mod: 26

## 2025-03-26 ENCOUNTER — OUTPATIENT (OUTPATIENT)
Dept: OUTPATIENT SERVICES | Facility: HOSPITAL | Age: 55
LOS: 1 days | End: 2025-03-26
Payer: COMMERCIAL

## 2025-03-26 ENCOUNTER — RESULT REVIEW (OUTPATIENT)
Age: 55
End: 2025-03-26

## 2025-03-26 ENCOUNTER — APPOINTMENT (OUTPATIENT)
Dept: ULTRASOUND IMAGING | Facility: HOSPITAL | Age: 55
End: 2025-03-26

## 2025-03-26 VITALS
TEMPERATURE: 98 F | SYSTOLIC BLOOD PRESSURE: 130 MMHG | HEART RATE: 62 BPM | OXYGEN SATURATION: 97 % | RESPIRATION RATE: 17 BRPM | DIASTOLIC BLOOD PRESSURE: 69 MMHG

## 2025-03-26 DIAGNOSIS — I77.0 ARTERIOVENOUS FISTULA, ACQUIRED: Chronic | ICD-10-CM

## 2025-03-26 DIAGNOSIS — N20.0 CALCULUS OF KIDNEY: Chronic | ICD-10-CM

## 2025-03-26 DIAGNOSIS — Z98.890 OTHER SPECIFIED POSTPROCEDURAL STATES: Chronic | ICD-10-CM

## 2025-03-26 DIAGNOSIS — Z94.0 KIDNEY TRANSPLANT STATUS: Chronic | ICD-10-CM

## 2025-03-26 DIAGNOSIS — Z98.51 TUBAL LIGATION STATUS: Chronic | ICD-10-CM

## 2025-03-26 DIAGNOSIS — Z90.5 ACQUIRED ABSENCE OF KIDNEY: Chronic | ICD-10-CM

## 2025-03-26 DIAGNOSIS — Z90.49 ACQUIRED ABSENCE OF OTHER SPECIFIED PARTS OF DIGESTIVE TRACT: Chronic | ICD-10-CM

## 2025-03-26 DIAGNOSIS — Z00.00 ENCOUNTER FOR GENERAL ADULT MEDICAL EXAMINATION WITHOUT ABNORMAL FINDINGS: ICD-10-CM

## 2025-03-26 PROCEDURE — 76942 ECHO GUIDE FOR BIOPSY: CPT

## 2025-03-26 PROCEDURE — 64646 CHEMODENERV TRUNK MUSC 1-5: CPT

## 2025-03-26 PROCEDURE — 76942 ECHO GUIDE FOR BIOPSY: CPT | Mod: 26

## 2025-03-26 PROCEDURE — 22999 UNLISTED PX ABDOMEN MUSCSKEL: CPT

## 2025-03-26 NOTE — ASU PATIENT PROFILE, ADULT - FALL HARM RISK - UNIVERSAL INTERVENTIONS
Bed in lowest position, wheels locked, appropriate side rails in place/Call bell, personal items and telephone in reach/Instruct patient to call for assistance before getting out of bed or chair/Non-slip footwear when patient is out of bed/[place] to call system/Physically safe environment - no spills, clutter or unnecessary equipment/Purposeful Proactive Rounding/Room/bathroom lighting operational, light cord in reach

## 2025-03-26 NOTE — ASU PATIENT PROFILE, ADULT - TOBACCO USE
Current every day smoker [Chest Discomfort] : chest discomfort [Under Stress] : under stress [Negative] : Heme/Lymph [FreeTextEntry5] : see HPI

## 2025-03-31 ENCOUNTER — APPOINTMENT (OUTPATIENT)
Dept: FAMILY MEDICINE | Facility: CLINIC | Age: 55
End: 2025-03-31

## 2025-04-08 ENCOUNTER — OUTPATIENT (OUTPATIENT)
Dept: OUTPATIENT SERVICES | Facility: HOSPITAL | Age: 55
LOS: 1 days | End: 2025-04-08
Payer: COMMERCIAL

## 2025-04-08 VITALS
RESPIRATION RATE: 16 BRPM | TEMPERATURE: 99 F | DIASTOLIC BLOOD PRESSURE: 76 MMHG | HEIGHT: 70 IN | WEIGHT: 182.1 LBS | OXYGEN SATURATION: 98 % | HEART RATE: 76 BPM | SYSTOLIC BLOOD PRESSURE: 116 MMHG

## 2025-04-08 DIAGNOSIS — Z90.49 ACQUIRED ABSENCE OF OTHER SPECIFIED PARTS OF DIGESTIVE TRACT: Chronic | ICD-10-CM

## 2025-04-08 DIAGNOSIS — Z98.51 TUBAL LIGATION STATUS: Chronic | ICD-10-CM

## 2025-04-08 DIAGNOSIS — Z98.890 OTHER SPECIFIED POSTPROCEDURAL STATES: Chronic | ICD-10-CM

## 2025-04-08 DIAGNOSIS — I77.0 ARTERIOVENOUS FISTULA, ACQUIRED: Chronic | ICD-10-CM

## 2025-04-08 DIAGNOSIS — E11.9 TYPE 2 DIABETES MELLITUS WITHOUT COMPLICATIONS: ICD-10-CM

## 2025-04-08 DIAGNOSIS — Z94.0 KIDNEY TRANSPLANT STATUS: Chronic | ICD-10-CM

## 2025-04-08 DIAGNOSIS — K43.2 INCISIONAL HERNIA WITHOUT OBSTRUCTION OR GANGRENE: ICD-10-CM

## 2025-04-08 DIAGNOSIS — Z90.5 ACQUIRED ABSENCE OF KIDNEY: Chronic | ICD-10-CM

## 2025-04-08 DIAGNOSIS — N20.0 CALCULUS OF KIDNEY: Chronic | ICD-10-CM

## 2025-04-08 DIAGNOSIS — Z94.0 KIDNEY TRANSPLANT STATUS: ICD-10-CM

## 2025-04-08 DIAGNOSIS — I10 ESSENTIAL (PRIMARY) HYPERTENSION: ICD-10-CM

## 2025-04-08 LAB
A1C WITH ESTIMATED AVERAGE GLUCOSE RESULT: 5.2 % — SIGNIFICANT CHANGE UP (ref 4–5.6)
ALBUMIN SERPL ELPH-MCNC: 4.2 G/DL — SIGNIFICANT CHANGE UP (ref 3.3–5)
ALP SERPL-CCNC: 80 U/L — SIGNIFICANT CHANGE UP (ref 40–120)
ALT FLD-CCNC: 7 U/L — LOW (ref 10–45)
ANION GAP SERPL CALC-SCNC: 13 MMOL/L — SIGNIFICANT CHANGE UP (ref 5–17)
AST SERPL-CCNC: 8 U/L — LOW (ref 10–40)
BILIRUB SERPL-MCNC: 0.4 MG/DL — SIGNIFICANT CHANGE UP (ref 0.2–1.2)
BLD GP AB SCN SERPL QL: NEGATIVE — SIGNIFICANT CHANGE UP
BUN SERPL-MCNC: 22 MG/DL — SIGNIFICANT CHANGE UP (ref 7–23)
CALCIUM SERPL-MCNC: 9.8 MG/DL — SIGNIFICANT CHANGE UP (ref 8.4–10.5)
CHLORIDE SERPL-SCNC: 104 MMOL/L — SIGNIFICANT CHANGE UP (ref 96–108)
CO2 SERPL-SCNC: 26 MMOL/L — SIGNIFICANT CHANGE UP (ref 22–31)
CREAT SERPL-MCNC: 1.32 MG/DL — HIGH (ref 0.5–1.3)
EGFR: 48 ML/MIN/1.73M2 — LOW
EGFR: 48 ML/MIN/1.73M2 — LOW
ESTIMATED AVERAGE GLUCOSE: 103 MG/DL — SIGNIFICANT CHANGE UP (ref 68–114)
GLUCOSE SERPL-MCNC: 80 MG/DL — SIGNIFICANT CHANGE UP (ref 70–99)
HCT VFR BLD CALC: 44.8 % — SIGNIFICANT CHANGE UP (ref 34.5–45)
HGB BLD-MCNC: 16 G/DL — HIGH (ref 11.5–15.5)
MCHC RBC-ENTMCNC: 34.6 PG — HIGH (ref 27–34)
MCHC RBC-ENTMCNC: 35.7 G/DL — SIGNIFICANT CHANGE UP (ref 32–36)
MCV RBC AUTO: 97 FL — SIGNIFICANT CHANGE UP (ref 80–100)
NRBC BLD AUTO-RTO: 0 /100 WBCS — SIGNIFICANT CHANGE UP (ref 0–0)
PLATELET # BLD AUTO: 179 K/UL — SIGNIFICANT CHANGE UP (ref 150–400)
POTASSIUM SERPL-MCNC: 3.7 MMOL/L — SIGNIFICANT CHANGE UP (ref 3.5–5.3)
POTASSIUM SERPL-SCNC: 3.7 MMOL/L — SIGNIFICANT CHANGE UP (ref 3.5–5.3)
PROT SERPL-MCNC: 6.8 G/DL — SIGNIFICANT CHANGE UP (ref 6–8.3)
RBC # BLD: 4.62 M/UL — SIGNIFICANT CHANGE UP (ref 3.8–5.2)
RBC # FLD: 12.4 % — SIGNIFICANT CHANGE UP (ref 10.3–14.5)
RH IG SCN BLD-IMP: POSITIVE — SIGNIFICANT CHANGE UP
SODIUM SERPL-SCNC: 143 MMOL/L — SIGNIFICANT CHANGE UP (ref 135–145)
WBC # BLD: 11.67 K/UL — HIGH (ref 3.8–10.5)
WBC # FLD AUTO: 11.67 K/UL — HIGH (ref 3.8–10.5)

## 2025-04-08 PROCEDURE — 87640 STAPH A DNA AMP PROBE: CPT

## 2025-04-08 PROCEDURE — 86900 BLOOD TYPING SEROLOGIC ABO: CPT

## 2025-04-08 PROCEDURE — 83036 HEMOGLOBIN GLYCOSYLATED A1C: CPT

## 2025-04-08 PROCEDURE — 87641 MR-STAPH DNA AMP PROBE: CPT

## 2025-04-08 PROCEDURE — G0463: CPT

## 2025-04-08 PROCEDURE — 86901 BLOOD TYPING SEROLOGIC RH(D): CPT

## 2025-04-08 PROCEDURE — 80053 COMPREHEN METABOLIC PANEL: CPT

## 2025-04-08 PROCEDURE — 86850 RBC ANTIBODY SCREEN: CPT

## 2025-04-08 PROCEDURE — 85027 COMPLETE CBC AUTOMATED: CPT

## 2025-04-08 RX ORDER — LIDOCAINE HCL/PF 10 MG/ML
0.2 VIAL (ML) INJECTION ONCE
Refills: 0 | Status: DISCONTINUED | OUTPATIENT
Start: 2025-04-29 | End: 2025-04-29

## 2025-04-08 RX ORDER — CELECOXIB 50 MG/1
400 CAPSULE ORAL ONCE
Refills: 0 | Status: COMPLETED | OUTPATIENT
Start: 2025-04-29 | End: 2025-04-29

## 2025-04-08 RX ORDER — CEFAZOLIN SODIUM IN 0.9 % NACL 3 G/100 ML
2000 INTRAVENOUS SOLUTION, PIGGYBACK (ML) INTRAVENOUS ONCE
Refills: 0 | Status: COMPLETED | OUTPATIENT
Start: 2025-04-29 | End: 2025-04-29

## 2025-04-08 NOTE — H&P PST ADULT - ATTENDING COMMENTS
Patient with large incisional hernia w hx prior umbilical hernia repair with mesh, also history renal transplant.  To OR today for robotic incisional hernia repair with mesh, possible myofascial release, possible open.  Risks, benefits, alternatives discussed with patient who agrees to proceed.  Written informed consent obtained.    Olga Reyes MD

## 2025-04-08 NOTE — H&P PST ADULT - PROBLEM SELECTOR PLAN 1
Robotic Incisional Hernia Repair with Mesh on 4/29/25  Pre-op instructions, including Chlorhexidine soap and ERP hydration protocol provided - all questions answered  CBC, CMP, HgA1c, T&S, MRSA/MSSA done at PST

## 2025-04-08 NOTE — H&P PST ADULT - OTHER CARE PROVIDERS
Dr. Herson Hernandez, cardiology, [phone];  Dr. Dallin Berman, endocrinology, [phone]. Dr. Sharonda Crouch, nephrology

## 2025-04-08 NOTE — H&P PST ADULT - HISTORY OF PRESENT ILLNESS
55 yo female, PMH HTN, HLD, T2DM, ventral  hernia with mesh 2008, ESRD s/p right AVF, s/p bilateral nephrectomy and right renal  transplant surgery 2018, now periumbilical bulge re-occurred with symptoms of nausea, vomiting diarrhea,   Angeline is a 55 y/o female here for initial consultation regarding incisional hernia.  mounjaro - tolerating for a year 80 lbs.,  55 yo female, PMH HTN, HLD, T2DM on Mounjaro - tolerating well (has lost 80 lbs. in one year), ventral  hernia with mesh 2008, polycystic renal disease, ESRD s/p right AVF, s/p bilateral nephrectomy and right renal  transplant surgery 2018, now periumbilical bulge re-occurred with symptoms of nausea, vomiting diarrhea. Pt. presents to PST for scheduled robotic Incisional Hernia Repair with Mesh on 4/29/25. Denies recent fever, chills, chest pain, SOB, changes in bowel/urinary habits or recent exposure to COVID-19 infection. Pt. denies clotting or bleeding disorders.

## 2025-04-08 NOTE — H&P PST ADULT - PROBLEM SELECTOR PROBLEM 4
T2DM (type 2 diabetes mellitus) Dupixent Counseling: I discussed with the patient the risks of dupilumab including but not limited to eye infection and irritation, cold sores, injection site reactions, worsening of asthma, allergic reactions and increased risk of parasitic infection.  Live vaccines should be avoided while taking dupilumab. Dupilumab will also interact with certain medications such as warfarin and cyclosporine. The patient understands that monitoring is required and they must alert us or the primary physician if symptoms of infection or other concerning signs are noted.

## 2025-04-08 NOTE — H&P PST ADULT - ASSESSMENT
Activity: Walks as a  at [employer] 5 days a week - walks all day, walks the dogs, all house chores    DASI scale: 8.25    Mallampati: 2    Dentition: Denies loose teeth/dentures Activity: Works as a  at [employer] 5 days a week - walks all day, walks the dogs daily, all house chores    DASI scale: 8.25    Mallampati: 2    Dentition: Denies loose teeth/dentures

## 2025-04-09 LAB
MRSA PCR RESULT.: SIGNIFICANT CHANGE UP
S AUREUS DNA NOSE QL NAA+PROBE: SIGNIFICANT CHANGE UP

## 2025-04-17 ENCOUNTER — APPOINTMENT (OUTPATIENT)
Dept: CARDIOLOGY | Facility: CLINIC | Age: 55
End: 2025-04-17
Payer: COMMERCIAL

## 2025-04-17 ENCOUNTER — APPOINTMENT (OUTPATIENT)
Dept: FAMILY MEDICINE | Facility: CLINIC | Age: 55
End: 2025-04-17
Payer: COMMERCIAL

## 2025-04-17 ENCOUNTER — NON-APPOINTMENT (OUTPATIENT)
Age: 55
End: 2025-04-17

## 2025-04-17 VITALS
SYSTOLIC BLOOD PRESSURE: 108 MMHG | DIASTOLIC BLOOD PRESSURE: 62 MMHG | OXYGEN SATURATION: 96 % | WEIGHT: 169 LBS | HEART RATE: 76 BPM | HEIGHT: 68 IN | BODY MASS INDEX: 25.61 KG/M2

## 2025-04-17 VITALS
WEIGHT: 166 LBS | OXYGEN SATURATION: 97 % | DIASTOLIC BLOOD PRESSURE: 80 MMHG | TEMPERATURE: 97.2 F | HEIGHT: 68 IN | HEART RATE: 54 BPM | BODY MASS INDEX: 25.16 KG/M2 | RESPIRATION RATE: 14 BRPM | SYSTOLIC BLOOD PRESSURE: 140 MMHG

## 2025-04-17 DIAGNOSIS — Z94.0 KIDNEY TRANSPLANT STATUS: ICD-10-CM

## 2025-04-17 DIAGNOSIS — Z01.818 ENCOUNTER FOR OTHER PREPROCEDURAL EXAMINATION: ICD-10-CM

## 2025-04-17 DIAGNOSIS — K43.9 VENTRAL HERNIA W/OUT OBSTRUCTION OR GANGRENE: ICD-10-CM

## 2025-04-17 DIAGNOSIS — I10 ESSENTIAL (PRIMARY) HYPERTENSION: ICD-10-CM

## 2025-04-17 DIAGNOSIS — Z01.810 ENCOUNTER FOR PREPROCEDURAL CARDIOVASCULAR EXAMINATION: ICD-10-CM

## 2025-04-17 PROCEDURE — 93000 ELECTROCARDIOGRAM COMPLETE: CPT

## 2025-04-17 PROCEDURE — 99214 OFFICE O/P EST MOD 30 MIN: CPT

## 2025-04-17 PROCEDURE — G2211 COMPLEX E/M VISIT ADD ON: CPT | Mod: NC

## 2025-04-17 PROCEDURE — 99214 OFFICE O/P EST MOD 30 MIN: CPT | Mod: 25

## 2025-04-18 NOTE — ED PROVIDER NOTE - CADM POA URETHRAL CATHETER
PER TELEMETRY MONITOR TECH PT'S HR AS HIGH 'S ON TELEMETRY MONITORING.  DURING THIS TIME PT WAS SHIVERING.  AT 0257 PT HR WAS 55 SINUS TONA.  ON CALL PROVIDER (NG) NOTIFIED BY THIS NURSE (RM).  NO NEW ORDERS GIVEN.     No

## 2025-04-22 ENCOUNTER — RX RENEWAL (OUTPATIENT)
Age: 55
End: 2025-04-22

## 2025-04-23 ENCOUNTER — RESULT REVIEW (OUTPATIENT)
Age: 55
End: 2025-04-23

## 2025-04-23 ENCOUNTER — OUTPATIENT (OUTPATIENT)
Dept: OUTPATIENT SERVICES | Facility: HOSPITAL | Age: 55
LOS: 1 days | End: 2025-04-23
Payer: COMMERCIAL

## 2025-04-23 DIAGNOSIS — Z98.890 OTHER SPECIFIED POSTPROCEDURAL STATES: Chronic | ICD-10-CM

## 2025-04-23 DIAGNOSIS — Z94.0 KIDNEY TRANSPLANT STATUS: ICD-10-CM

## 2025-04-23 DIAGNOSIS — Z90.5 ACQUIRED ABSENCE OF KIDNEY: Chronic | ICD-10-CM

## 2025-04-23 DIAGNOSIS — Z94.0 KIDNEY TRANSPLANT STATUS: Chronic | ICD-10-CM

## 2025-04-23 DIAGNOSIS — N20.0 CALCULUS OF KIDNEY: Chronic | ICD-10-CM

## 2025-04-23 DIAGNOSIS — Z98.51 TUBAL LIGATION STATUS: Chronic | ICD-10-CM

## 2025-04-23 DIAGNOSIS — Z01.818 ENCOUNTER FOR OTHER PREPROCEDURAL EXAMINATION: ICD-10-CM

## 2025-04-23 DIAGNOSIS — I77.0 ARTERIOVENOUS FISTULA, ACQUIRED: Chronic | ICD-10-CM

## 2025-04-23 DIAGNOSIS — Z90.49 ACQUIRED ABSENCE OF OTHER SPECIFIED PARTS OF DIGESTIVE TRACT: Chronic | ICD-10-CM

## 2025-04-23 PROCEDURE — 93306 TTE W/DOPPLER COMPLETE: CPT

## 2025-04-23 PROCEDURE — 93306 TTE W/DOPPLER COMPLETE: CPT | Mod: 26

## 2025-04-25 NOTE — CHART NOTE - NSCHARTNOTESELECT_GEN_ALL_CORE
Patient was admitted hospital 4/21/25  Discharged from hospital 4/22/25    [facility] dx: cerebral aneurysm    Dr. Long-do you want TCM follow up or just have patient follow up as directed with specialist?       [facility]

## 2025-04-29 ENCOUNTER — INPATIENT (INPATIENT)
Facility: HOSPITAL | Age: 55
LOS: 1 days | Discharge: HOME | DRG: 395 | End: 2025-05-01
Attending: TRANSPLANT SURGERY | Admitting: TRANSPLANT SURGERY
Payer: COMMERCIAL

## 2025-04-29 ENCOUNTER — APPOINTMENT (OUTPATIENT)
Dept: SURGERY | Facility: HOSPITAL | Age: 55
End: 2025-04-29
Payer: COMMERCIAL

## 2025-04-29 ENCOUNTER — TRANSCRIPTION ENCOUNTER (OUTPATIENT)
Age: 55
End: 2025-04-29

## 2025-04-29 VITALS
TEMPERATURE: 98 F | DIASTOLIC BLOOD PRESSURE: 72 MMHG | SYSTOLIC BLOOD PRESSURE: 116 MMHG | HEIGHT: 70 IN | WEIGHT: 182.1 LBS | RESPIRATION RATE: 17 BRPM | OXYGEN SATURATION: 97 % | HEART RATE: 54 BPM

## 2025-04-29 DIAGNOSIS — K43.2 INCISIONAL HERNIA WITHOUT OBSTRUCTION OR GANGRENE: ICD-10-CM

## 2025-04-29 DIAGNOSIS — I77.0 ARTERIOVENOUS FISTULA, ACQUIRED: Chronic | ICD-10-CM

## 2025-04-29 DIAGNOSIS — Z98.51 TUBAL LIGATION STATUS: Chronic | ICD-10-CM

## 2025-04-29 DIAGNOSIS — Z98.890 OTHER SPECIFIED POSTPROCEDURAL STATES: Chronic | ICD-10-CM

## 2025-04-29 DIAGNOSIS — Z90.5 ACQUIRED ABSENCE OF KIDNEY: Chronic | ICD-10-CM

## 2025-04-29 DIAGNOSIS — N20.0 CALCULUS OF KIDNEY: Chronic | ICD-10-CM

## 2025-04-29 DIAGNOSIS — Z90.49 ACQUIRED ABSENCE OF OTHER SPECIFIED PARTS OF DIGESTIVE TRACT: Chronic | ICD-10-CM

## 2025-04-29 DIAGNOSIS — Z94.0 KIDNEY TRANSPLANT STATUS: Chronic | ICD-10-CM

## 2025-04-29 LAB
GLUCOSE BLDC GLUCOMTR-MCNC: 110 MG/DL — HIGH (ref 70–99)
GLUCOSE BLDC GLUCOMTR-MCNC: 150 MG/DL — HIGH (ref 70–99)
GLUCOSE BLDC GLUCOMTR-MCNC: 151 MG/DL — HIGH (ref 70–99)

## 2025-04-29 PROCEDURE — 15734 MUSCLE-SKIN GRAFT TRUNK: CPT | Mod: 59

## 2025-04-29 PROCEDURE — 15734 MUSCLE-SKIN GRAFT TRUNK: CPT | Mod: 82

## 2025-04-29 PROCEDURE — 49618 RPR AA HRN RCR > 10 NCR/STRN: CPT

## 2025-04-29 PROCEDURE — 15734 MUSCLE-SKIN GRAFT TRUNK: CPT | Mod: 82,59

## 2025-04-29 PROCEDURE — 49618 RPR AA HRN RCR > 10 NCR/STRN: CPT | Mod: 82

## 2025-04-29 PROCEDURE — 15734 MUSCLE-SKIN GRAFT TRUNK: CPT

## 2025-04-29 DEVICE — IMPLANTABLE DEVICE: Type: IMPLANTABLE DEVICE | Status: FUNCTIONAL

## 2025-04-29 DEVICE — CLIP APPLIER COVIDIEN ENDOCLIP III 5MM: Type: IMPLANTABLE DEVICE | Status: FUNCTIONAL

## 2025-04-29 RX ORDER — FENTANYL CITRATE-0.9 % NACL/PF 100MCG/2ML
25 SYRINGE (ML) INTRAVENOUS
Refills: 0 | Status: DISCONTINUED | OUTPATIENT
Start: 2025-04-29 | End: 2025-04-29

## 2025-04-29 RX ORDER — SODIUM CHLORIDE 9 G/1000ML
1000 INJECTION, SOLUTION INTRAVENOUS
Refills: 0 | Status: DISCONTINUED | OUTPATIENT
Start: 2025-04-29 | End: 2025-05-01

## 2025-04-29 RX ORDER — TACROLIMUS 0.5 MG/1
3 CAPSULE ORAL DAILY
Refills: 0 | Status: DISCONTINUED | OUTPATIENT
Start: 2025-04-29 | End: 2025-05-01

## 2025-04-29 RX ORDER — DEXTROSE 50 % IN WATER 50 %
15 SYRINGE (ML) INTRAVENOUS ONCE
Refills: 0 | Status: DISCONTINUED | OUTPATIENT
Start: 2025-04-29 | End: 2025-05-01

## 2025-04-29 RX ORDER — PREDNISONE 20 MG/1
5 TABLET ORAL EVERY 24 HOURS
Refills: 0 | Status: DISCONTINUED | OUTPATIENT
Start: 2025-04-29 | End: 2025-05-01

## 2025-04-29 RX ORDER — ATORVASTATIN CALCIUM 80 MG/1
10 TABLET, FILM COATED ORAL AT BEDTIME
Refills: 0 | Status: DISCONTINUED | OUTPATIENT
Start: 2025-04-29 | End: 2025-05-01

## 2025-04-29 RX ORDER — NIFEDIPINE 30 MG
60 TABLET, EXTENDED RELEASE 24 HR ORAL EVERY 24 HOURS
Refills: 0 | Status: DISCONTINUED | OUTPATIENT
Start: 2025-04-29 | End: 2025-05-01

## 2025-04-29 RX ORDER — B1/B2/B3/B5/B6/B12/VIT C/FOLIC 500-0.5 MG
1 TABLET ORAL
Refills: 0 | DISCHARGE

## 2025-04-29 RX ORDER — ACETAMINOPHEN 500 MG/5ML
750 LIQUID (ML) ORAL EVERY 6 HOURS
Refills: 0 | Status: COMPLETED | OUTPATIENT
Start: 2025-04-29 | End: 2025-04-30

## 2025-04-29 RX ORDER — NALOXONE HYDROCHLORIDE 0.4 MG/ML
0.1 INJECTION, SOLUTION INTRAMUSCULAR; INTRAVENOUS; SUBCUTANEOUS
Refills: 0 | Status: DISCONTINUED | OUTPATIENT
Start: 2025-04-29 | End: 2025-04-30

## 2025-04-29 RX ORDER — DEXTROSE 50 % IN WATER 50 %
25 SYRINGE (ML) INTRAVENOUS ONCE
Refills: 0 | Status: DISCONTINUED | OUTPATIENT
Start: 2025-04-29 | End: 2025-05-01

## 2025-04-29 RX ORDER — EZETIMIBE 10 MG/1
1 TABLET ORAL
Refills: 0 | DISCHARGE

## 2025-04-29 RX ORDER — VITAMIN E (DL,TOCOPHERYL ACET) 22.5 MG/ML
2 DROPS ORAL
Refills: 0 | DISCHARGE

## 2025-04-29 RX ORDER — OXYCODONE HYDROCHLORIDE 30 MG/1
5 TABLET ORAL
Refills: 0 | Status: DISCONTINUED | OUTPATIENT
Start: 2025-04-29 | End: 2025-04-29

## 2025-04-29 RX ORDER — NALBUPHINE HYDROCHLORIDE 10 MG/ML
2.5 INJECTION INTRAMUSCULAR; INTRAVENOUS; SUBCUTANEOUS EVERY 6 HOURS
Refills: 0 | Status: DISCONTINUED | OUTPATIENT
Start: 2025-04-29 | End: 2025-04-30

## 2025-04-29 RX ORDER — CARVEDILOL 3.12 MG/1
25 TABLET, FILM COATED ORAL EVERY 12 HOURS
Refills: 0 | Status: DISCONTINUED | OUTPATIENT
Start: 2025-04-29 | End: 2025-05-01

## 2025-04-29 RX ORDER — DEXTROSE 50 % IN WATER 50 %
12.5 SYRINGE (ML) INTRAVENOUS ONCE
Refills: 0 | Status: DISCONTINUED | OUTPATIENT
Start: 2025-04-29 | End: 2025-05-01

## 2025-04-29 RX ORDER — CLONAZEPAM 0.5 MG/1
1 TABLET ORAL
Refills: 0 | DISCHARGE

## 2025-04-29 RX ORDER — AZATHIOPRINE 50 MG/1
50 TABLET ORAL EVERY 24 HOURS
Refills: 0 | Status: DISCONTINUED | OUTPATIENT
Start: 2025-04-29 | End: 2025-05-01

## 2025-04-29 RX ORDER — HEPARIN SODIUM 1000 [USP'U]/ML
5000 INJECTION INTRAVENOUS; SUBCUTANEOUS EVERY 8 HOURS
Refills: 0 | Status: DISCONTINUED | OUTPATIENT
Start: 2025-04-29 | End: 2025-05-01

## 2025-04-29 RX ORDER — ONDANSETRON HCL/PF 4 MG/2 ML
4 VIAL (ML) INJECTION ONCE
Refills: 0 | Status: DISCONTINUED | OUTPATIENT
Start: 2025-04-29 | End: 2025-04-29

## 2025-04-29 RX ORDER — OXYCODONE HYDROCHLORIDE 30 MG/1
10 TABLET ORAL EVERY 4 HOURS
Refills: 0 | Status: DISCONTINUED | OUTPATIENT
Start: 2025-04-29 | End: 2025-04-30

## 2025-04-29 RX ORDER — ONDANSETRON HCL/PF 4 MG/2 ML
4 VIAL (ML) INJECTION EVERY 6 HOURS
Refills: 0 | Status: DISCONTINUED | OUTPATIENT
Start: 2025-04-29 | End: 2025-04-30

## 2025-04-29 RX ORDER — SODIUM CHLORIDE 9 G/1000ML
1000 INJECTION, SOLUTION INTRAVENOUS
Refills: 0 | Status: DISCONTINUED | OUTPATIENT
Start: 2025-04-29 | End: 2025-04-29

## 2025-04-29 RX ORDER — GLUCAGON 3 MG/1
1 POWDER NASAL ONCE
Refills: 0 | Status: DISCONTINUED | OUTPATIENT
Start: 2025-04-29 | End: 2025-05-01

## 2025-04-29 RX ORDER — TIRZEPATIDE 7.5 MG/.5ML
5 INJECTION, SOLUTION SUBCUTANEOUS
Refills: 0 | DISCHARGE

## 2025-04-29 RX ORDER — INSULIN LISPRO 100 U/ML
INJECTION, SOLUTION INTRAVENOUS; SUBCUTANEOUS
Refills: 0 | Status: DISCONTINUED | OUTPATIENT
Start: 2025-04-29 | End: 2025-05-01

## 2025-04-29 RX ORDER — OXYCODONE HYDROCHLORIDE 30 MG/1
5 TABLET ORAL EVERY 4 HOURS
Refills: 0 | Status: DISCONTINUED | OUTPATIENT
Start: 2025-04-29 | End: 2025-04-30

## 2025-04-29 RX ORDER — ESCITALOPRAM OXALATE 20 MG/1
20 TABLET ORAL DAILY
Refills: 0 | Status: DISCONTINUED | OUTPATIENT
Start: 2025-04-29 | End: 2025-05-01

## 2025-04-29 RX ORDER — AZATHIOPRINE 50 MG/1
1 TABLET ORAL
Refills: 0 | DISCHARGE

## 2025-04-29 RX ADMIN — ATORVASTATIN CALCIUM 10 MILLIGRAM(S): 80 TABLET, FILM COATED ORAL at 22:26

## 2025-04-29 RX ADMIN — HEPARIN SODIUM 5000 UNIT(S): 1000 INJECTION INTRAVENOUS; SUBCUTANEOUS at 22:26

## 2025-04-29 RX ADMIN — AZATHIOPRINE 50 MILLIGRAM(S): 50 TABLET ORAL at 22:27

## 2025-04-29 NOTE — CHART NOTE - NSCHARTNOTEFT_GEN_A_CORE
Patient seen and examined  Patient s/p Robotic trans abdominal bilateral transverse abdominus release (TAR).  Patient overall reports feeling very well  Denies CP, SOB, N, V, LH, dizziness, fevers, chills  No flatus No BM yet  Pain controlled on current regimen  Abdominal binder is helping, just some distension  Awake alert responsive  Abdomen soft, nondistended, appropriate TTP, no rebound or guarding, no obvious masses or signs of hematoma  : Pinon in place with light yellow urine  Port sites c/d/i x 6  Breathing machine on      PLAN  -Monitor abdominal exam and bowel function  -Pain control  -Strict I and Os  -Monitor incisions   -monitor abdominal binder  -transplant care appreicated  -monitor pinon

## 2025-04-29 NOTE — BRIEF OPERATIVE NOTE - ANTIBIOTIC PROTOCOL
Followed protocol Normal vision: sees adequately in most situations; can see medication labels, newsprint

## 2025-04-29 NOTE — BRIEF OPERATIVE NOTE - OPERATION/FINDINGS
Large ventral hernia defects (x2).  Robotic trans abdominal bilateral transverse abdominus release (TAR).  A large Ovitex LPR mesh was placed after closure of the anterior and posterior rectus sheath ([id number]).

## 2025-04-29 NOTE — PATIENT PROFILE ADULT - FUNCTIONAL ASSESSMENT - DAILY ACTIVITY 1.
Mat from [facility] called requesting refill for patient medication Metformin 500 mg sent to Pharmacy
4 = No assist / stand by assistance

## 2025-04-30 ENCOUNTER — TRANSCRIPTION ENCOUNTER (OUTPATIENT)
Age: 55
End: 2025-04-30

## 2025-04-30 LAB
ALBUMIN SERPL ELPH-MCNC: 3.6 G/DL — SIGNIFICANT CHANGE UP (ref 3.3–5)
ALP SERPL-CCNC: 67 U/L — SIGNIFICANT CHANGE UP (ref 40–120)
ALT FLD-CCNC: 13 U/L — SIGNIFICANT CHANGE UP (ref 10–45)
ANION GAP SERPL CALC-SCNC: 12 MMOL/L — SIGNIFICANT CHANGE UP (ref 5–17)
AST SERPL-CCNC: 35 U/L — SIGNIFICANT CHANGE UP (ref 10–40)
BASOPHILS # BLD AUTO: 0.04 K/UL — SIGNIFICANT CHANGE UP (ref 0–0.2)
BASOPHILS NFR BLD AUTO: 0.3 % — SIGNIFICANT CHANGE UP (ref 0–2)
BILIRUB SERPL-MCNC: 0.6 MG/DL — SIGNIFICANT CHANGE UP (ref 0.2–1.2)
BUN SERPL-MCNC: 16 MG/DL — SIGNIFICANT CHANGE UP (ref 7–23)
CALCIUM SERPL-MCNC: 9 MG/DL — SIGNIFICANT CHANGE UP (ref 8.4–10.5)
CHLORIDE SERPL-SCNC: 103 MMOL/L — SIGNIFICANT CHANGE UP (ref 96–108)
CO2 SERPL-SCNC: 27 MMOL/L — SIGNIFICANT CHANGE UP (ref 22–31)
CREAT SERPL-MCNC: 1.09 MG/DL — SIGNIFICANT CHANGE UP (ref 0.5–1.3)
EGFR: 60 ML/MIN/1.73M2 — SIGNIFICANT CHANGE UP
EGFR: 60 ML/MIN/1.73M2 — SIGNIFICANT CHANGE UP
EOSINOPHIL # BLD AUTO: 0.03 K/UL — SIGNIFICANT CHANGE UP (ref 0–0.5)
EOSINOPHIL NFR BLD AUTO: 0.2 % — SIGNIFICANT CHANGE UP (ref 0–6)
GLUCOSE BLDC GLUCOMTR-MCNC: 109 MG/DL — HIGH (ref 70–99)
GLUCOSE BLDC GLUCOMTR-MCNC: 145 MG/DL — HIGH (ref 70–99)
GLUCOSE SERPL-MCNC: 97 MG/DL — SIGNIFICANT CHANGE UP (ref 70–99)
GLUCOSE SERPL-MCNC: 98 MG/DL — SIGNIFICANT CHANGE UP (ref 70–99)
HCT VFR BLD CALC: 40.9 % — SIGNIFICANT CHANGE UP (ref 34.5–45)
HGB BLD-MCNC: 14.2 G/DL — SIGNIFICANT CHANGE UP (ref 11.5–15.5)
IMM GRANULOCYTES NFR BLD AUTO: 0.5 % — SIGNIFICANT CHANGE UP (ref 0–0.9)
LYMPHOCYTES # BLD AUTO: 1.37 K/UL — SIGNIFICANT CHANGE UP (ref 1–3.3)
LYMPHOCYTES # BLD AUTO: 10.9 % — LOW (ref 13–44)
MAGNESIUM SERPL-MCNC: 1.9 MG/DL — SIGNIFICANT CHANGE UP (ref 1.6–2.6)
MCHC RBC-ENTMCNC: 34.2 PG — HIGH (ref 27–34)
MCHC RBC-ENTMCNC: 34.7 G/DL — SIGNIFICANT CHANGE UP (ref 32–36)
MCV RBC AUTO: 98.6 FL — SIGNIFICANT CHANGE UP (ref 80–100)
MONOCYTES # BLD AUTO: 0.7 K/UL — SIGNIFICANT CHANGE UP (ref 0–0.9)
MONOCYTES NFR BLD AUTO: 5.6 % — SIGNIFICANT CHANGE UP (ref 2–14)
NEUTROPHILS # BLD AUTO: 10.4 K/UL — HIGH (ref 1.8–7.4)
NEUTROPHILS NFR BLD AUTO: 82.5 % — HIGH (ref 43–77)
NRBC BLD AUTO-RTO: 0 /100 WBCS — SIGNIFICANT CHANGE UP (ref 0–0)
PHOSPHATE SERPL-MCNC: 2.5 MG/DL — SIGNIFICANT CHANGE UP (ref 2.5–4.5)
PLATELET # BLD AUTO: 148 K/UL — LOW (ref 150–400)
POTASSIUM SERPL-MCNC: 3.6 MMOL/L — SIGNIFICANT CHANGE UP (ref 3.5–5.3)
POTASSIUM SERPL-SCNC: 3.6 MMOL/L — SIGNIFICANT CHANGE UP (ref 3.5–5.3)
PROT SERPL-MCNC: 5.9 G/DL — LOW (ref 6–8.3)
RBC # BLD: 4.15 M/UL — SIGNIFICANT CHANGE UP (ref 3.8–5.2)
RBC # FLD: 12.7 % — SIGNIFICANT CHANGE UP (ref 10.3–14.5)
SODIUM SERPL-SCNC: 142 MMOL/L — SIGNIFICANT CHANGE UP (ref 135–145)
TACROLIMUS SERPL-MCNC: 6.4 NG/ML — SIGNIFICANT CHANGE UP
WBC # BLD: 12.6 K/UL — HIGH (ref 3.8–10.5)
WBC # FLD AUTO: 12.6 K/UL — HIGH (ref 3.8–10.5)

## 2025-04-30 PROCEDURE — 71045 X-RAY EXAM CHEST 1 VIEW: CPT | Mod: 26

## 2025-04-30 RX ORDER — TRAMADOL HYDROCHLORIDE 50 MG/1
50 TABLET, FILM COATED ORAL EVERY 8 HOURS
Refills: 0 | Status: DISCONTINUED | OUTPATIENT
Start: 2025-04-30 | End: 2025-05-01

## 2025-04-30 RX ORDER — ONDANSETRON HCL/PF 4 MG/2 ML
4 VIAL (ML) INJECTION ONCE
Refills: 0 | Status: COMPLETED | OUTPATIENT
Start: 2025-04-30 | End: 2025-04-30

## 2025-04-30 RX ORDER — ACETAMINOPHEN 500 MG/5ML
750 LIQUID (ML) ORAL ONCE
Refills: 0 | Status: COMPLETED | OUTPATIENT
Start: 2025-04-30 | End: 2025-04-30

## 2025-04-30 RX ADMIN — TRAMADOL HYDROCHLORIDE 50 MILLIGRAM(S): 50 TABLET, FILM COATED ORAL at 11:30

## 2025-04-30 RX ADMIN — OXYCODONE HYDROCHLORIDE 5 MILLIGRAM(S): 30 TABLET ORAL at 06:59

## 2025-04-30 RX ADMIN — PREDNISONE 5 MILLIGRAM(S): 20 TABLET ORAL at 06:23

## 2025-04-30 RX ADMIN — Medication 750 MILLIGRAM(S): at 13:30

## 2025-04-30 RX ADMIN — Medication 1 APPLICATION(S): at 13:04

## 2025-04-30 RX ADMIN — Medication 400 MILLIGRAM(S): at 23:08

## 2025-04-30 RX ADMIN — TRAMADOL HYDROCHLORIDE 50 MILLIGRAM(S): 50 TABLET, FILM COATED ORAL at 10:43

## 2025-04-30 RX ADMIN — Medication 750 MILLIGRAM(S): at 06:07

## 2025-04-30 RX ADMIN — HEPARIN SODIUM 5000 UNIT(S): 1000 INJECTION INTRAVENOUS; SUBCUTANEOUS at 12:56

## 2025-04-30 RX ADMIN — Medication 750 MILLIGRAM(S): at 01:50

## 2025-04-30 RX ADMIN — CARVEDILOL 25 MILLIGRAM(S): 3.12 TABLET, FILM COATED ORAL at 18:22

## 2025-04-30 RX ADMIN — TACROLIMUS 3 MILLIGRAM(S): 0.5 CAPSULE ORAL at 08:24

## 2025-04-30 RX ADMIN — AZATHIOPRINE 50 MILLIGRAM(S): 50 TABLET ORAL at 21:30

## 2025-04-30 RX ADMIN — Medication 40 MILLIGRAM(S): at 06:23

## 2025-04-30 RX ADMIN — ESCITALOPRAM OXALATE 20 MILLIGRAM(S): 20 TABLET ORAL at 12:56

## 2025-04-30 RX ADMIN — Medication 300 MILLIGRAM(S): at 00:50

## 2025-04-30 RX ADMIN — ATORVASTATIN CALCIUM 10 MILLIGRAM(S): 80 TABLET, FILM COATED ORAL at 21:30

## 2025-04-30 RX ADMIN — OXYCODONE HYDROCHLORIDE 5 MILLIGRAM(S): 30 TABLET ORAL at 06:27

## 2025-04-30 RX ADMIN — Medication 4 MILLIGRAM(S): at 18:20

## 2025-04-30 RX ADMIN — HEPARIN SODIUM 5000 UNIT(S): 1000 INJECTION INTRAVENOUS; SUBCUTANEOUS at 06:23

## 2025-04-30 RX ADMIN — HEPARIN SODIUM 5000 UNIT(S): 1000 INJECTION INTRAVENOUS; SUBCUTANEOUS at 21:30

## 2025-04-30 RX ADMIN — TRAMADOL HYDROCHLORIDE 50 MILLIGRAM(S): 50 TABLET, FILM COATED ORAL at 19:00

## 2025-04-30 RX ADMIN — Medication 300 MILLIGRAM(S): at 13:00

## 2025-04-30 RX ADMIN — Medication 300 MILLIGRAM(S): at 05:26

## 2025-04-30 RX ADMIN — TRAMADOL HYDROCHLORIDE 50 MILLIGRAM(S): 50 TABLET, FILM COATED ORAL at 18:20

## 2025-04-30 NOTE — PROVIDER CONTACT NOTE (OTHER) - ASSESSMENT
A&Ox4 pt states that she does not do her fingerstick at home. She has a dexcom monitor and wishes not to check her sugar with hospital glucometer.

## 2025-04-30 NOTE — DISCHARGE NOTE NURSING/CASE MANAGEMENT/SOCIAL WORK - NSDCPEFALRISK_GEN_ALL_CORE
For information on Fall & Injury Prevention, visit: [email or web address] OR  [email or web address] OR  https://www.cdc.gov/steadi/patient.html

## 2025-04-30 NOTE — DISCHARGE NOTE NURSING/CASE MANAGEMENT/SOCIAL WORK - PATIENT PORTAL LINK FT
You can access the FollowMyHealth Patient Portal offered by [facility] by registering at the following website: [email or web address]. By joining "[employer]’s FollowMyHealth portal, you will also be able to view your health information using other applications (apps) compatible with our system.

## 2025-04-30 NOTE — PROGRESS NOTE ADULT - ASSESSMENT
55 yo female, PMH HTN, HLD, T2DM on Mounjaro - tolerating well (has lost 80 lbs. in one year), ventral  hernia with mesh 2008, polycystic renal disease, ESRD s/p right AVF, s/p bilateral nephrectomy and right renal  transplant surgery 2018, now periumbilical bulge re-occurred with symptoms of nausea, vomiting diarrhea. Pt. presents for scheduled robotic Incisional Hernia Repair with Mesh on 4/29/25.     [] s/p Hernia repair w/ TAR  - ADAT  - daily labs  - monitor Is and Os, d/c pinon  - bariatric surgery care appreciated    [] s/p DDRT 2018  - Envarsus by level, pred 5

## 2025-04-30 NOTE — PROGRESS NOTE ADULT - ASSESSMENT
54F Hx HTN, HLD, T2DM on Mounjaro, Ventral Hernia repair 2008, Polycystic kidney disease, ESRD s/p R AVF, s/p b/l nephrectomy & R Renal Tx 2018, now s/p robo TAR on 4/29.     PLAN  -Monitor abdominal exam and bowel function  -Pain control  -Strict I and Os  -Monitor incisions   -monitor abdominal binder  -transplant care appreicated  -monitor pinon.   54F Hx HTN, HLD, T2DM on Mounjaro, Ventral Hernia repair 2008, Polycystic kidney disease, ESRD s/p R AVF, s/p b/l nephrectomy & R Renal Tx 2018, now s/p robo TAR on 4/29.     PLAN  -Monitor abdominal exam and bowel function  -Pain control  -Strict I and Os  -Monitor incisions   -monitor abdominal binder  -transplant care appreicated  -monitor pinon   -No Bariatric surgery contraindications to discharge

## 2025-04-30 NOTE — PROVIDER CONTACT NOTE (OTHER) - ACTION/TREATMENT ORDERED:
Provider Tico stated that nephrology was going to evaluate her. Continue to educate patient on diabetes and diabetes management.

## 2025-04-30 NOTE — DISCHARGE NOTE NURSING/CASE MANAGEMENT/SOCIAL WORK - FINANCIAL ASSISTANCE
[facility] provides services at a reduced cost to those who are determined to be eligible through [facility]’s financial assistance program. Information regarding [facility]’s financial assistance program can be found by going to [email or web address] or by calling [phone].

## 2025-05-01 VITALS
HEART RATE: 56 BPM | SYSTOLIC BLOOD PRESSURE: 118 MMHG | TEMPERATURE: 98 F | DIASTOLIC BLOOD PRESSURE: 71 MMHG | RESPIRATION RATE: 18 BRPM | OXYGEN SATURATION: 93 %

## 2025-05-01 LAB
ALBUMIN SERPL ELPH-MCNC: 3.2 G/DL — LOW (ref 3.3–5)
ALP SERPL-CCNC: 65 U/L — SIGNIFICANT CHANGE UP (ref 40–120)
ALT FLD-CCNC: 8 U/L — LOW (ref 10–45)
ANION GAP SERPL CALC-SCNC: 14 MMOL/L — SIGNIFICANT CHANGE UP (ref 5–17)
AST SERPL-CCNC: 35 U/L — SIGNIFICANT CHANGE UP (ref 10–40)
BASOPHILS # BLD AUTO: 0.04 K/UL — SIGNIFICANT CHANGE UP (ref 0–0.2)
BASOPHILS NFR BLD AUTO: 0.4 % — SIGNIFICANT CHANGE UP (ref 0–2)
BILIRUB SERPL-MCNC: 1.2 MG/DL — SIGNIFICANT CHANGE UP (ref 0.2–1.2)
BLD GP AB SCN SERPL QL: NEGATIVE — SIGNIFICANT CHANGE UP
BUN SERPL-MCNC: 14 MG/DL — SIGNIFICANT CHANGE UP (ref 7–23)
CALCIUM SERPL-MCNC: 9 MG/DL — SIGNIFICANT CHANGE UP (ref 8.4–10.5)
CHLORIDE SERPL-SCNC: 104 MMOL/L — SIGNIFICANT CHANGE UP (ref 96–108)
CO2 SERPL-SCNC: 24 MMOL/L — SIGNIFICANT CHANGE UP (ref 22–31)
CREAT SERPL-MCNC: 1.06 MG/DL — SIGNIFICANT CHANGE UP (ref 0.5–1.3)
EGFR: 62 ML/MIN/1.73M2 — SIGNIFICANT CHANGE UP
EGFR: 62 ML/MIN/1.73M2 — SIGNIFICANT CHANGE UP
EOSINOPHIL # BLD AUTO: 0.05 K/UL — SIGNIFICANT CHANGE UP (ref 0–0.5)
EOSINOPHIL NFR BLD AUTO: 0.5 % — SIGNIFICANT CHANGE UP (ref 0–6)
GLUCOSE SERPL-MCNC: 87 MG/DL — SIGNIFICANT CHANGE UP (ref 70–99)
HCT VFR BLD CALC: 39.4 % — SIGNIFICANT CHANGE UP (ref 34.5–45)
HGB BLD-MCNC: 13.5 G/DL — SIGNIFICANT CHANGE UP (ref 11.5–15.5)
IMM GRANULOCYTES NFR BLD AUTO: 0.4 % — SIGNIFICANT CHANGE UP (ref 0–0.9)
LYMPHOCYTES # BLD AUTO: 1.31 K/UL — SIGNIFICANT CHANGE UP (ref 1–3.3)
LYMPHOCYTES # BLD AUTO: 12.4 % — LOW (ref 13–44)
MAGNESIUM SERPL-MCNC: 1.8 MG/DL — SIGNIFICANT CHANGE UP (ref 1.6–2.6)
MCHC RBC-ENTMCNC: 34.1 PG — HIGH (ref 27–34)
MCHC RBC-ENTMCNC: 34.3 G/DL — SIGNIFICANT CHANGE UP (ref 32–36)
MCV RBC AUTO: 99.5 FL — SIGNIFICANT CHANGE UP (ref 80–100)
MONOCYTES # BLD AUTO: 0.61 K/UL — SIGNIFICANT CHANGE UP (ref 0–0.9)
MONOCYTES NFR BLD AUTO: 5.8 % — SIGNIFICANT CHANGE UP (ref 2–14)
MRSA PCR RESULT.: SIGNIFICANT CHANGE UP
NEUTROPHILS # BLD AUTO: 8.5 K/UL — HIGH (ref 1.8–7.4)
NEUTROPHILS NFR BLD AUTO: 80.5 % — HIGH (ref 43–77)
NRBC BLD AUTO-RTO: 0 /100 WBCS — SIGNIFICANT CHANGE UP (ref 0–0)
PHOSPHATE SERPL-MCNC: 1.8 MG/DL — LOW (ref 2.5–4.5)
PLATELET # BLD AUTO: 121 K/UL — LOW (ref 150–400)
POTASSIUM SERPL-MCNC: 3.3 MMOL/L — LOW (ref 3.5–5.3)
POTASSIUM SERPL-SCNC: 3.3 MMOL/L — LOW (ref 3.5–5.3)
PROT SERPL-MCNC: 5.7 G/DL — LOW (ref 6–8.3)
RBC # BLD: 3.96 M/UL — SIGNIFICANT CHANGE UP (ref 3.8–5.2)
RBC # FLD: 12.6 % — SIGNIFICANT CHANGE UP (ref 10.3–14.5)
RH IG SCN BLD-IMP: POSITIVE — SIGNIFICANT CHANGE UP
S AUREUS DNA NOSE QL NAA+PROBE: SIGNIFICANT CHANGE UP
SODIUM SERPL-SCNC: 142 MMOL/L — SIGNIFICANT CHANGE UP (ref 135–145)
TACROLIMUS SERPL-MCNC: 7.2 NG/ML — SIGNIFICANT CHANGE UP
WBC # BLD: 10.55 K/UL — HIGH (ref 3.8–10.5)
WBC # FLD AUTO: 10.55 K/UL — HIGH (ref 3.8–10.5)

## 2025-05-01 PROCEDURE — 84100 ASSAY OF PHOSPHORUS: CPT

## 2025-05-01 PROCEDURE — 82962 GLUCOSE BLOOD TEST: CPT

## 2025-05-01 PROCEDURE — C1781: CPT

## 2025-05-01 PROCEDURE — C1889: CPT

## 2025-05-01 PROCEDURE — 87640 STAPH A DNA AMP PROBE: CPT

## 2025-05-01 PROCEDURE — 80197 ASSAY OF TACROLIMUS: CPT

## 2025-05-01 PROCEDURE — 86901 BLOOD TYPING SEROLOGIC RH(D): CPT

## 2025-05-01 PROCEDURE — S2900: CPT

## 2025-05-01 PROCEDURE — 80053 COMPREHEN METABOLIC PANEL: CPT

## 2025-05-01 PROCEDURE — 71045 X-RAY EXAM CHEST 1 VIEW: CPT

## 2025-05-01 PROCEDURE — 87641 MR-STAPH DNA AMP PROBE: CPT

## 2025-05-01 PROCEDURE — C9399: CPT

## 2025-05-01 PROCEDURE — 85025 COMPLETE CBC W/AUTO DIFF WBC: CPT

## 2025-05-01 PROCEDURE — 86850 RBC ANTIBODY SCREEN: CPT

## 2025-05-01 PROCEDURE — 86900 BLOOD TYPING SEROLOGIC ABO: CPT

## 2025-05-01 PROCEDURE — 82947 ASSAY GLUCOSE BLOOD QUANT: CPT

## 2025-05-01 PROCEDURE — 83735 ASSAY OF MAGNESIUM: CPT

## 2025-05-01 RX ORDER — PREDNISONE 20 MG/1
1 TABLET ORAL
Refills: 0 | DISCHARGE

## 2025-05-01 RX ORDER — OXYCODONE HYDROCHLORIDE 30 MG/1
1 TABLET ORAL
Qty: 20 | Refills: 0
Start: 2025-05-01 | End: 2025-05-05

## 2025-05-01 RX ORDER — PREDNISONE 20 MG/1
1 TABLET ORAL
Qty: 0 | Refills: 0 | DISCHARGE
Start: 2025-05-01

## 2025-05-01 RX ORDER — ACETAMINOPHEN 500 MG/5ML
2 LIQUID (ML) ORAL
Qty: 0 | Refills: 0 | DISCHARGE
Start: 2025-05-01

## 2025-05-01 RX ORDER — CARVEDILOL 3.12 MG/1
1 TABLET, FILM COATED ORAL
Refills: 0 | DISCHARGE

## 2025-05-01 RX ORDER — TRAMADOL HYDROCHLORIDE 50 MG/1
25 TABLET, FILM COATED ORAL EVERY 6 HOURS
Refills: 0 | Status: DISCONTINUED | OUTPATIENT
Start: 2025-05-01 | End: 2025-05-01

## 2025-05-01 RX ORDER — NIFEDIPINE 30 MG
1 TABLET, EXTENDED RELEASE 24 HR ORAL
Refills: 0 | DISCHARGE

## 2025-05-01 RX ORDER — ACETAMINOPHEN 500 MG/5ML
650 LIQUID (ML) ORAL EVERY 8 HOURS
Refills: 0 | Status: DISCONTINUED | OUTPATIENT
Start: 2025-05-01 | End: 2025-05-01

## 2025-05-01 RX ORDER — NIFEDIPINE 30 MG
1 TABLET, EXTENDED RELEASE 24 HR ORAL
Qty: 0 | Refills: 0 | DISCHARGE
Start: 2025-05-01

## 2025-05-01 RX ORDER — ESCITALOPRAM OXALATE 20 MG/1
1 TABLET ORAL
Qty: 0 | Refills: 0 | DISCHARGE
Start: 2025-05-01

## 2025-05-01 RX ORDER — TACROLIMUS 0.5 MG/1
3 CAPSULE ORAL
Qty: 0 | Refills: 0 | DISCHARGE

## 2025-05-01 RX ORDER — OXYCODONE HYDROCHLORIDE 30 MG/1
5 TABLET ORAL
Refills: 0 | Status: DISCONTINUED | OUTPATIENT
Start: 2025-05-01 | End: 2025-05-01

## 2025-05-01 RX ORDER — ESCITALOPRAM OXALATE 20 MG/1
1 TABLET ORAL
Refills: 0 | DISCHARGE

## 2025-05-01 RX ORDER — OXYCODONE HYDROCHLORIDE 30 MG/1
10 TABLET ORAL EVERY 4 HOURS
Refills: 0 | Status: DISCONTINUED | OUTPATIENT
Start: 2025-05-01 | End: 2025-05-01

## 2025-05-01 RX ORDER — CARVEDILOL 3.12 MG/1
1 TABLET, FILM COATED ORAL
Qty: 0 | Refills: 0 | DISCHARGE
Start: 2025-05-01

## 2025-05-01 RX ORDER — FUROSEMIDE 10 MG/ML
1 INJECTION INTRAMUSCULAR; INTRAVENOUS
Refills: 0 | DISCHARGE

## 2025-05-01 RX ADMIN — TRAMADOL HYDROCHLORIDE 50 MILLIGRAM(S): 50 TABLET, FILM COATED ORAL at 05:39

## 2025-05-01 RX ADMIN — HEPARIN SODIUM 5000 UNIT(S): 1000 INJECTION INTRAVENOUS; SUBCUTANEOUS at 05:39

## 2025-05-01 RX ADMIN — Medication 1 APPLICATION(S): at 12:51

## 2025-05-01 RX ADMIN — TACROLIMUS 3 MILLIGRAM(S): 0.5 CAPSULE ORAL at 08:28

## 2025-05-01 RX ADMIN — TRAMADOL HYDROCHLORIDE 50 MILLIGRAM(S): 50 TABLET, FILM COATED ORAL at 12:50

## 2025-05-01 RX ADMIN — CARVEDILOL 25 MILLIGRAM(S): 3.12 TABLET, FILM COATED ORAL at 05:39

## 2025-05-01 RX ADMIN — ESCITALOPRAM OXALATE 20 MILLIGRAM(S): 20 TABLET ORAL at 12:41

## 2025-05-01 RX ADMIN — TRAMADOL HYDROCHLORIDE 25 MILLIGRAM(S): 50 TABLET, FILM COATED ORAL at 15:57

## 2025-05-01 RX ADMIN — HEPARIN SODIUM 5000 UNIT(S): 1000 INJECTION INTRAVENOUS; SUBCUTANEOUS at 12:41

## 2025-05-01 RX ADMIN — TRAMADOL HYDROCHLORIDE 25 MILLIGRAM(S): 50 TABLET, FILM COATED ORAL at 17:30

## 2025-05-01 RX ADMIN — OXYCODONE HYDROCHLORIDE 10 MILLIGRAM(S): 30 TABLET ORAL at 16:14

## 2025-05-01 RX ADMIN — Medication 40 MILLIGRAM(S): at 05:39

## 2025-05-01 RX ADMIN — PREDNISONE 5 MILLIGRAM(S): 20 TABLET ORAL at 05:39

## 2025-05-01 RX ADMIN — OXYCODONE HYDROCHLORIDE 10 MILLIGRAM(S): 30 TABLET ORAL at 17:30

## 2025-05-01 RX ADMIN — TRAMADOL HYDROCHLORIDE 50 MILLIGRAM(S): 50 TABLET, FILM COATED ORAL at 13:35

## 2025-05-01 NOTE — DISCHARGE NOTE PROVIDER - NSDCCPCAREPLAN_GEN_ALL_CORE_FT
PRINCIPAL DISCHARGE DIAGNOSIS  Diagnosis: S/P hernia repair  Assessment and Plan of Treatment: Please refrain from straining and lifting for the next few week to allow appropriate time to heal.   Please report to ED if you develop nausea vomiting, increased adominal pain, chest pain, or difficulty breathing.         SECONDARY DISCHARGE DIAGNOSES  Diagnosis: S/P kidney transplant  Assessment and Plan of Treatment:   Immunosupression  - Transplant recipients are at risk for developing infection because immune system is lowered due to transplant medications.   - Avoid contact with sick people; practice good hand hygiene;   - Take medications as directed by your translant team. Never stop taking medications unless instructed by your transplant physician.  - Do NOT double up medication dose if you missed your dose; call the transplant office for instructions.

## 2025-05-01 NOTE — PHYSICAL THERAPY INITIAL EVALUATION ADULT - ADDITIONAL COMMENTS
Pt lives in a split level house with 4 steps to enter then 5+5+6 to each level. Lives alone. Patient was independent with all ADLs and IADLs prior to admission. Ambulates independently

## 2025-05-01 NOTE — PHYSICAL THERAPY INITIAL EVALUATION ADULT - FOLLOWS COMMANDS/ANSWERS QUESTIONS, REHAB EVAL
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    Urethritis       Other    Insomnia    Diarrhea      Other Visit Diagnoses     Chemotherapy-induced diarrhea        Relevant Medications    loperamide (IMODIUM) 2 mg capsule        Plan:  1) Urethritis s/p radiation 2/2 to treatment of urinary bladder malignancy   Still having burning and is scheduled to follow with Urology, Nephrology, and Oncology within the next 2 months  -Patient to follow-up with specialists  -Patient refuses opioids for pain control  Continue with non-controlled substance management of pain  2) Advanced Care planning  -Patient and significant other filled out Five wishes   -states they did not bring it today, will plan to bring it at next visit with us    3) Insomnia  -Patient states insomnia is improving  Follows PCP for medications for insomnia and he is responding   -Continue to follow PCP for insomnia management at their request as they have been following chronically for this  4) Diarrhea  Acute diarrhea from radiation treatment yesterday (8/26/2022)  -Will send in Imodium to control diarrhea   -Stay hydrated! Use as directed  5) Follow-up  -RTC in 3 months after patient has had follow-up visits with consultants and scheduled cystoscopy  Medications adjusted this encounter:  Requested Prescriptions     Signed Prescriptions Disp Refills    loperamide (IMODIUM) 2 mg capsule 90 capsule 0     Sig: Take 1 capsule (2 mg total) by mouth 2 (two) times a day as needed for diarrhea "[employer] por cynthia si tiene diarrea     No orders of the defined types were placed in this encounter      Medications Discontinued During This Encounter   Medication Reason    loperamide (IMODIUM) 2 mg capsule Reorder         Hannah Duarte was seen today for symptoms and planning cares related to above illnesses  I have reviewed the patient's controlled substance dispensing history in the Prescription Drug Monitoring Program in compliance with the [facility] regulations before prescribing any controlled substances  They are invited to continue to follow with us  If there are questions or concerns, please contact us through our clinic/answering service 24 hours a day, seven days a week  Billy Chin DO  [facility]  [phone]      Visit Information    Accompanied By: Significant other    Source of History: Self, Significant other    History Limitations: None    Contacts:Contact Information:  Name: Tomeka Like - number on file    History of Present Illness      Nakita Krause is a 76 y o  male who presents in follow up of symptoms related to urethritis s/p radiation to bladder, insomnia, and advanced care planning  Pertinent issues include: symptom management, assessment of goals of care, advance care planning    Patient seen and examined today  States he still has burning with urination but no blood or other systemic signs of infection (fevers, chills, flank pain)  He states his sleeping issues have improved and he continues to follow his PCP who has been managing this chronically  He states he has upcoming follow-up appointments with Urology, Nephrology, and Radiation oncology with a planned cystoscopy in November to determine treatment results   -Patient states he does not want any opioids for pain control  He states he does have diarrhea which is associated after radiation treatments  He had his last radiation treatment yesterday and felt fatigued today with diarrhea  He is requesting loperamide to help with his acute diarrhea  I agreed with sending this in  Patient did have 5 wishes completed, but did not bring them in today  States he will bring them in at the next office visit    Plan is for 3 month follow-up for which he is agreeable  Previous Note:  Mr Meeta Núñez is here for follow-up of his burning with urination  States he has completed the antibiotics but has noted no overall change with the dysuria  States no pain at any other times and it does not affect quality of life  States he did not feel any improvement with pyridium  He did see his PCP yesterday and was told about his symptoms likely related with his radiation (in which I was in agreement and reinforced the possibility of why he still has ongoing symptoms)  I did discuss that since he has his antibiotics and he does not have any signs or symptoms of infection at this time, that this is at least reassuring  I did discuss continuing following Urology for any further recommendations at this time and patient states agreement      We did touch upon Advanced care planning during this visit  Patient and significant other (of 37 years with 1 child from the relationship) state they were working on the paperwork for his living will  Patient states he wants Estuardo Gale to be his medical decision maker should he lose competency  He states he has 5 other children from previous relationship and will plan to contact them to inform them that Estuardo Gale would be in fact the medical decision maker for the patient  Past medical, surgical, social, and family histories are reviewed and pertinent updates are made  Review of Systems   Constitutional: Positive for malaise/fatigue  Negative for chills, decreased appetite, fever, weight gain and weight loss  Cardiovascular: Negative for chest pain and leg swelling  Respiratory: Negative for cough and shortness of breath  Musculoskeletal: Negative for joint pain, joint swelling and myalgias  Gastrointestinal: Positive for diarrhea (associated with radiation treatments - but resolves with imodium)  Negative for abdominal pain, bowel incontinence, constipation, dysphagia and hematochezia  Genitourinary: Positive for dysuria  Negative for bladder incontinence, flank pain and hematuria  Psychiatric/Behavioral: The patient has insomnia (improved)  Vital Signs    /60 (BP Location: Left arm, Cuff Size: Standard)   Pulse 81   Temp (!) 97 3 °F (36 3 °C) (Temporal)   Wt 56 7 kg (125 lb)   SpO2 99%   BMI 22 14 kg/m²     Physical Exam and Objective Data  Physical Exam  Vitals reviewed  Constitutional:       General: He is not in acute distress  Appearance: He is not toxic-appearing or diaphoretic  HENT:      Head: Normocephalic and atraumatic  Nose: No congestion  Eyes:      General: No scleral icterus  Right eye: No discharge  Left eye: No discharge  Conjunctiva/sclera: Conjunctivae normal    Cardiovascular:      Rate and Rhythm: Normal rate  Pulmonary:      Effort: Pulmonary effort is normal  No respiratory distress  Breath sounds: No wheezing  Abdominal:      General: Abdomen is flat  There is no distension  Musculoskeletal:         General: No swelling  Right lower leg: No edema  Left lower leg: No edema  Skin:     General: Skin is dry  Coloration: Skin is not jaundiced or pale  Findings: Bruising (right forearm ecchymotic spot) present  Neurological:      General: No focal deficit present  Mental Status: He is alert and oriented to person, place, and time  Mental status is at baseline  Psychiatric:         Mood and Affect: Mood normal          Behavior: Behavior normal          Thought Content: Thought content normal          Judgment: Judgment normal        Radiology and Laboratory:    15 minutes was spent face to face with Xena Ribera and his significant other, Ryan Pizarro with greater than 50% of the time spent in counseling or coordination of care including discussions of instructions for disease self management, treatment instructions, follow up requirements, patient and family counseling/involvement in care and symptom management    All of the patient's or agent's questions were answered during this discussion  100% of the time

## 2025-05-01 NOTE — PROGRESS NOTE ADULT - ASSESSMENT
54F Hx HTN, HLD, T2DM on Mounjaro, Ventral Hernia repair 2008, Polycystic kidney disease, ESRD s/p R AVF, s/p b/l nephrectomy & R Renal Tx 2018, now s/p robo TAR on 4/29.     PLAN  -Monitor abdominal exam and bowel function  -Pain control  -Strict I and Os  -Monitor incisions   -monitor abdominal binder  -transplant care appreicated  -pinon out   -No Bariatric surgery contraindications to discharge     Green Surgery

## 2025-05-01 NOTE — PROGRESS NOTE ADULT - SUBJECTIVE AND OBJECTIVE BOX
Day 1 of Anesthesia Pain Management Service    SUBJECTIVE:  Pain Scale Score:          [X] Refer to charted pain scores    THERAPY: Received PF neuraxial morphine as per pain service nurse's note    OBJECTIVE:    Sedation:        	[X] Alert	[ ] Drowsy	[ ] Arousable      [ ] Asleep       [ ] Unresponsive    Side Effects:	[X] None	[ ] Nausea	[ ] Vomiting         [ ] Pruritus  		[ ] Weakness            [ ] Numbness	          [ ] Other:    ASSESSMENT/ PLAN  [X] Patient transitioned to prn analgesics  [X] Pain management per primary service, pain service to sign off   [X]Documentation and Verification of current medications
Day 1 of Anesthesia Pain Management Service    SUBJECTIVE: Doing ok  Pain Scale Score:          [X] Refer to charted pain scores    THERAPY:    s/p    150 mcg PF morphine on 4\29\25       MEDICATIONS  (STANDING):  acetaminophen   IVPB .. 750 milliGRAM(s) IV Intermittent every 6 hours  atorvastatin 10 milliGRAM(s) Oral at bedtime  azaTHIOprine 50 milliGRAM(s) Oral every 24 hours  carvedilol 25 milliGRAM(s) Oral every 12 hours  dextrose 5%. 1000 milliLiter(s) (50 mL/Hr) IV Continuous <Continuous>  dextrose 5%. 1000 milliLiter(s) (100 mL/Hr) IV Continuous <Continuous>  dextrose 50% Injectable 25 Gram(s) IV Push once  dextrose 50% Injectable 12.5 Gram(s) IV Push once  dextrose 50% Injectable 25 Gram(s) IV Push once  escitalopram 20 milliGRAM(s) Oral daily  glucagon  Injectable 1 milliGRAM(s) IntraMuscular once  heparin   Injectable 5000 Unit(s) SubCutaneous every 8 hours  insulin lispro (ADMELOG) corrective regimen sliding scale   SubCutaneous three times a day before meals  morphine PF Spinal 0.15 milliGRAM(s) IntraThecal. once  NIFEdipine XL 60 milliGRAM(s) Oral every 24 hours  pantoprazole    Tablet 40 milliGRAM(s) Oral before breakfast  predniSONE   Tablet 5 milliGRAM(s) Oral every 24 hours  tacrolimus ER Tablet (ENVARSUS XR) 3 milliGRAM(s) Oral daily    MEDICATIONS  (PRN):  dextrose Oral Gel 15 Gram(s) Oral once PRN Blood Glucose LESS THAN 70 milliGRAM(s)/deciliter  nalbuphine Injectable 2.5 milliGRAM(s) IV Push every 6 hours PRN Pruritus  naloxone Injectable 0.1 milliGRAM(s) IV Push every 3 minutes PRN For ANY of the following changes in patient status:  A. RR LESS THAN 10 breaths per minute, B. Oxygen saturation LESS THAN 90%, C. Sedation score of 6  ondansetron Injectable 4 milliGRAM(s) IV Push every 6 hours PRN Nausea  traMADol 50 milliGRAM(s) Oral every 8 hours PRN Severe Pain (7 - 10)      OBJECTIVE:    Sedation:        	[X] Alert	 [ ] Drowsy	[ ] Arousable      [ ] Asleep       [ ] Unresponsive    Side Effects:	[X] None 	[ ] Nausea	[ ] Vomiting         [ ] Pruritus  		[ ] Weakness            [ ] Numbness	          [ ] Other:    Vital Signs Last 24 Hrs  T(C): 37.1 (30 Apr 2025 10:31), Max: 37.1 (30 Apr 2025 10:31)  T(F): 98.8 (30 Apr 2025 10:31), Max: 98.8 (30 Apr 2025 10:31)  HR: 64 (30 Apr 2025 10:31) (50 - 71)  BP: 122/67 (30 Apr 2025 10:31) (110/56 - 137/66)  BP(mean): 94 (29 Apr 2025 19:30) (77 - 95)  RR: 18 (30 Apr 2025 10:31) (16 - 18)  SpO2: 94% (30 Apr 2025 10:31) (90% - 100%)    Parameters below as of 30 Apr 2025 10:31  Patient On (Oxygen Delivery Method): nasal cannula  O2 Flow (L/min): 2      ASSESSMENT/ PLAN  [X] Patient transitioned to prn analgesics  [X] Pain management per primary service, pain service to sign off   [X]Documentation and Verification of current medications
Green Team Surgery Daily Progress Note    Overnight Events:  No acute events     Subjective:   Patient seen at bedside this AM.     Objective:  Vital Signs  T(C): 36.9 (04-30 @ 00:46), Max: 37 (04-29 @ 20:15)  HR: 50 (04-30 @ 00:46) (50 - 71)  BP: 121/73 (04-30 @ 00:46) (110/56 - 137/66)  RR: 18 (04-30 @ 00:46) (16 - 18)  SpO2: 94% (04-30 @ 00:46) (92% - 100%)  04-29-25 @ 07:01  -  04-30-25 @ 05:35  --------------------------------------------------------  IN:  Total IN: 0 mL    OUT:    Indwelling Catheter - Urethral (mL): 800 mL  Total OUT: 800 mL    Total NET: -800 mL          Physical Exam:    Labs:        CAPILLARY BLOOD GLUCOSE      POCT Blood Glucose.: 151 mg/dL (29 Apr 2025 22:25)  POCT Blood Glucose.: 150 mg/dL (29 Apr 2025 16:02)  POCT Blood Glucose.: 110 mg/dL (29 Apr 2025 08:01)      Medications:   MEDICATIONS  (STANDING):  acetaminophen   IVPB .. 750 milliGRAM(s) IV Intermittent every 6 hours  atorvastatin 10 milliGRAM(s) Oral at bedtime  azaTHIOprine 50 milliGRAM(s) Oral every 24 hours  carvedilol 25 milliGRAM(s) Oral every 12 hours  dextrose 5%. 1000 milliLiter(s) (50 mL/Hr) IV Continuous <Continuous>  dextrose 5%. 1000 milliLiter(s) (100 mL/Hr) IV Continuous <Continuous>  dextrose 50% Injectable 25 Gram(s) IV Push once  dextrose 50% Injectable 12.5 Gram(s) IV Push once  dextrose 50% Injectable 25 Gram(s) IV Push once  escitalopram 20 milliGRAM(s) Oral daily  glucagon  Injectable 1 milliGRAM(s) IntraMuscular once  heparin   Injectable 5000 Unit(s) SubCutaneous every 8 hours  insulin lispro (ADMELOG) corrective regimen sliding scale   SubCutaneous three times a day before meals  morphine PF Spinal 0.15 milliGRAM(s) IntraThecal. once  NIFEdipine XL 60 milliGRAM(s) Oral every 24 hours  pantoprazole    Tablet 40 milliGRAM(s) Oral before breakfast  predniSONE   Tablet 5 milliGRAM(s) Oral every 24 hours  tacrolimus ER Tablet (ENVARSUS XR) 3 milliGRAM(s) Oral daily    MEDICATIONS  (PRN):  dextrose Oral Gel 15 Gram(s) Oral once PRN Blood Glucose LESS THAN 70 milliGRAM(s)/deciliter  nalbuphine Injectable 2.5 milliGRAM(s) IV Push every 6 hours PRN Pruritus  naloxone Injectable 0.1 milliGRAM(s) IV Push every 3 minutes PRN For ANY of the following changes in patient status:  A. RR LESS THAN 10 breaths per minute, B. Oxygen saturation LESS THAN 90%, C. Sedation score of 6  ondansetron Injectable 4 milliGRAM(s) IV Push every 6 hours PRN Nausea  oxyCODONE    IR 5 milliGRAM(s) Oral every 4 hours PRN Moderate Pain (4 - 6)  oxyCODONE    IR 10 milliGRAM(s) Oral every 4 hours PRN Severe Pain (7 - 10)        Physical Exam:  Awake alert responsive  Abdomen soft, nondistended, appropriate TTP, no rebound or guarding, no obvious masses or signs of hematoma  : Roberts in place with light yellow urine  Port sites c/d/i x 6
Green Team Surgery Daily Progress Note    Overnight Events:  Stayed overnight.       Subjective:   Patient seen at bedside this AM.     Objective:  Vital Signs  T(C): 36.9 (05-01 @ 01:19), Max: 37.1 (04-30 @ 10:31)  HR: 64 (05-01 @ 01:19) (58 - 70)  BP: 132/68 (05-01 @ 01:19) (113/68 - 146/76)  RR: 18 (05-01 @ 01:19) (18 - 18)  SpO2: 96% (05-01 @ 01:19) (91% - 96%)  04-29-25 @ 07:01  -  04-30-25 @ 07:00  --------------------------------------------------------  IN:  Total IN: 0 mL    OUT:    Indwelling Catheter - Urethral (mL): 1275 mL  Total OUT: 1275 mL    Total NET: -1275 mL      04-30-25 @ 07:01  -  05-01-25 @ 05:37  --------------------------------------------------------  IN:  Total IN: 0 mL    OUT:    Voided (mL): 500 mL  Total OUT: 500 mL    Total NET: -500 mL      Physical Exam:  Awake alert responsive  Abdomen soft, nondistended, appropriate TTP, no rebound or guarding, no obvious masses or signs of hematoma  Port sites c/d/i x 6    Labs:                        14.2   12.60 )-----------( 148      ( 30 Apr 2025 08:56 )             40.9   04-30    x   |  x   |  x   ----------------------------<  97  x    |  x   |  x     Ca    9.0      30 Apr 2025 08:56  Phos  2.5     04-30  Mg     1.9     04-30    TPro  5.9[L]  /  Alb  3.6  /  TBili  0.6  /  DBili  x   /  AST  35  /  ALT  13  /  AlkPhos  67  04-30    CAPILLARY BLOOD GLUCOSE      POCT Blood Glucose.: 109 mg/dL (30 Apr 2025 08:05)      Medications:   MEDICATIONS  (STANDING):  atorvastatin 10 milliGRAM(s) Oral at bedtime  azaTHIOprine 50 milliGRAM(s) Oral every 24 hours  carvedilol 25 milliGRAM(s) Oral every 12 hours  chlorhexidine 2% Cloths 1 Application(s) Topical <User Schedule>  dextrose 5%. 1000 milliLiter(s) (50 mL/Hr) IV Continuous <Continuous>  dextrose 5%. 1000 milliLiter(s) (100 mL/Hr) IV Continuous <Continuous>  dextrose 50% Injectable 25 Gram(s) IV Push once  dextrose 50% Injectable 12.5 Gram(s) IV Push once  dextrose 50% Injectable 25 Gram(s) IV Push once  escitalopram 20 milliGRAM(s) Oral daily  glucagon  Injectable 1 milliGRAM(s) IntraMuscular once  heparin   Injectable 5000 Unit(s) SubCutaneous every 8 hours  insulin lispro (ADMELOG) corrective regimen sliding scale   SubCutaneous three times a day before meals  NIFEdipine XL 60 milliGRAM(s) Oral every 24 hours  pantoprazole    Tablet 40 milliGRAM(s) Oral before breakfast  predniSONE   Tablet 5 milliGRAM(s) Oral every 24 hours  tacrolimus ER Tablet (ENVARSUS XR) 3 milliGRAM(s) Oral daily    MEDICATIONS  (PRN):  dextrose Oral Gel 15 Gram(s) Oral once PRN Blood Glucose LESS THAN 70 milliGRAM(s)/deciliter  traMADol 50 milliGRAM(s) Oral every 8 hours PRN Severe Pain (7 - 10)        
Transplant Surgery - Multidisciplinary Rounds  --------------------------------------------------------------       s/p Robotic incisional hernia repair    POD#1    Present:   Patient seen and examined with multidisciplinary Transplant team including Surgeon Dr. Ricks, Nephrologist Dr. Gonzalez, fellow Dr. Taveras, [facility],  Pharmacist, Nutritionist,  and bedside RN during AM rounds.   Disciplines not in attendance will be notified of the plan.     HPI: 53 yo female, PMH HTN, HLD, T2DM on Mounjaro - tolerating well (has lost 80 lbs. in one year), ventral  hernia with mesh 2008, polycystic renal disease, ESRD s/p right AVF, s/p bilateral nephrectomy and right renal  transplant surgery 2018, now periumbilical bulge re-occurred with symptoms of nausea, vomiting diarrhea. Pt. presents for scheduled robotic Incisional Hernia Repair with Mesh on 4/29/25.     Interval Events:  - s/p Hernia repair w/ TAR  - inc WOB on 3L       Immunosupression:  Maintenance: FK by level, pred 5  Ongoing monitoring for signs of rejection     Potential Discharge date: TBD  Education:  Medications  Plan of care:  See Below    MEDICATIONS  (STANDING):  acetaminophen   IVPB .. 750 milliGRAM(s) IV Intermittent every 6 hours  atorvastatin 10 milliGRAM(s) Oral at bedtime  azaTHIOprine 50 milliGRAM(s) Oral every 24 hours  carvedilol 25 milliGRAM(s) Oral every 12 hours  chlorhexidine 2% Cloths 1 Application(s) Topical <User Schedule>  dextrose 5%. 1000 milliLiter(s) (50 mL/Hr) IV Continuous <Continuous>  dextrose 5%. 1000 milliLiter(s) (100 mL/Hr) IV Continuous <Continuous>  dextrose 50% Injectable 25 Gram(s) IV Push once  dextrose 50% Injectable 12.5 Gram(s) IV Push once  dextrose 50% Injectable 25 Gram(s) IV Push once  escitalopram 20 milliGRAM(s) Oral daily  glucagon  Injectable 1 milliGRAM(s) IntraMuscular once  heparin   Injectable 5000 Unit(s) SubCutaneous every 8 hours  insulin lispro (ADMELOG) corrective regimen sliding scale   SubCutaneous three times a day before meals  NIFEdipine XL 60 milliGRAM(s) Oral every 24 hours  pantoprazole    Tablet 40 milliGRAM(s) Oral before breakfast  predniSONE   Tablet 5 milliGRAM(s) Oral every 24 hours  tacrolimus ER Tablet (ENVARSUS XR) 3 milliGRAM(s) Oral daily    MEDICATIONS  (PRN):  dextrose Oral Gel 15 Gram(s) Oral once PRN Blood Glucose LESS THAN 70 milliGRAM(s)/deciliter  traMADol 50 milliGRAM(s) Oral every 8 hours PRN Severe Pain (7 - 10)      PAST MEDICAL & SURGICAL HISTORY:  Hyperlipidemia      Arthropathy NOS - shoulder      Kidney stones      Polycystic kidney disease      GERD (gastroesophageal reflux disease)      Anxiety      CKD (chronic kidney disease) stage 4, GFR 15-29 ml/min  no dialysis      ESRD (end stage renal disease)      Ankle fracture - Left  brumstein barajas procedure 2000      Tubal ligation  1995      S/P cholecystectomy  (2013)      Kidney calculi  cysto/lithotripsy multiple      H/O tubal ligation  (1996)      History of ventral hernia repair      H/O shoulder surgery  left      AV fistula  right forearm extremity AV fistula radiocephalic ( 2018)      Renal transplant recipient      Status post nephrectomy  bilateral          Vital Signs Last 24 Hrs  T(C): 36.8 (30 Apr 2025 13:29), Max: 37.1 (30 Apr 2025 10:31)  T(F): 98.2 (30 Apr 2025 13:29), Max: 98.8 (30 Apr 2025 10:31)  HR: 66 (30 Apr 2025 13:29) (50 - 71)  BP: 120/69 (30 Apr 2025 13:29) (110/56 - 137/66)  BP(mean): 94 (29 Apr 2025 19:30) (77 - 95)  RR: 18 (30 Apr 2025 13:29) (16 - 18)  SpO2: 93% (30 Apr 2025 13:29) (90% - 100%)    Parameters below as of 30 Apr 2025 13:29  Patient On (Oxygen Delivery Method): nasal cannula  O2 Flow (L/min): 2      I&O's Summary    29 Apr 2025 07:01  -  30 Apr 2025 07:00  --------------------------------------------------------  IN: 625 mL / OUT: 1275 mL / NET: -650 mL                              14.2   12.60 )-----------( 148      ( 30 Apr 2025 08:56 )             40.9     04-30    x   |  x   |  x   ----------------------------<  97  x    |  x   |  x     Ca    9.0      30 Apr 2025 08:56  Phos  2.5     04-30  Mg     1.9     04-30    TPro  5.9[L]  /  Alb  3.6  /  TBili  0.6  /  DBili  x   /  AST  35  /  ALT  13  /  AlkPhos  67  04-30    Tacrolimus (), Serum: 6.4 ng/mL (04-30 @ 08:56)      Review of systems  Gen: No weight changes, fatigue, fevers/chills, weakness  Skin: No rashes  Head/Eyes/Ears/Mouth: No headache; Normal hearing; Normal vision w/o blurriness; No sinus pain/discomfort, sore throat  Respiratory: mild dyspnea, denies cough, wheezing, hemoptysis  CV: No chest pain, PND, orthopnea  GI: C/O mild abdominal pain at surgical site. no diarrhea, constipation, nausea, vomiting, melena, hematochezia  : No increased frequency, dysuria, hematuria, nocturia  MSK: No joint pain/swelling; no back pain; no edema  Neuro: No dizziness/lightheadedness, weakness, seizures, numbness, tingling  Heme: No easy bruising or bleeding  Endo: No heat/cold intolerance  Psych: No significant nervousness, anxiety, stress, depression  All other systems were reviewed and are negative, except as noted.      PHYSICAL EXAM:  Constitutional: Well developed / well nourished  Eyes:  PERRLA  ENMT: nc/at, no thrush  Neck: supple,   Respiratory: CTA B/L  Cardiovascular: RRR  Gastrointestinal: Soft abdomen, ND, appropriate incisional TTP  Genitourinary: Voiding spontaneously  Extremities: SCD's in place and working bilaterally  Vascular: Palpable dp pulses bilaterally.   Neurological: A&O x3  Skin: no rashes, ulcerations, lesions  Musculoskeletal: Moving all extremities  Psychiatric: Responsive    
Transplant Surgery - Multidisciplinary Rounds  --------------------------------------------------------------       s/p Robotic incisional hernia repair    POD#2    Present:   Patient seen and examined with multidisciplinary Transplant team including Surgeon Dr. Ricks, [facility],  Pharmacist, Nutritionist,  and bedside RN during AM rounds.   Disciplines not in attendance will be notified of the plan.     HPI: 53 yo female, PMH HTN, HLD, T2DM on Mounjaro - tolerating well (has lost 80 lbs. in one year), ventral  hernia with mesh 2008, polycystic renal disease, ESRD s/p right AVF, s/p bilateral nephrectomy and right renal  transplant surgery 2018, now periumbilical bulge re-occurred with symptoms of nausea, vomiting diarrhea. Pt. presents for scheduled robotic Incisional Hernia Repair with Mesh on 4/29/25.     Interval Events:  - s/p Hernia repair w/ TAR  - C/O pain despite pain management PRN      Immunosupression:  Maintenance: FK by level, pred 5  Ongoing monitoring for signs of rejection     Potential Discharge date: TBD  Education:  Medications  Plan of care:  See Below     MEDICATIONS  (STANDING):  acetaminophen     Tablet .. 650 milliGRAM(s) Oral every 8 hours  atorvastatin 10 milliGRAM(s) Oral at bedtime  azaTHIOprine 50 milliGRAM(s) Oral every 24 hours  carvedilol 25 milliGRAM(s) Oral every 12 hours  chlorhexidine 2% Cloths 1 Application(s) Topical <User Schedule>  dextrose 5%. 1000 milliLiter(s) (50 mL/Hr) IV Continuous <Continuous>  dextrose 5%. 1000 milliLiter(s) (100 mL/Hr) IV Continuous <Continuous>  dextrose 50% Injectable 25 Gram(s) IV Push once  dextrose 50% Injectable 12.5 Gram(s) IV Push once  dextrose 50% Injectable 25 Gram(s) IV Push once  escitalopram 20 milliGRAM(s) Oral daily  glucagon  Injectable 1 milliGRAM(s) IntraMuscular once  heparin   Injectable 5000 Unit(s) SubCutaneous every 8 hours  insulin lispro (ADMELOG) corrective regimen sliding scale   SubCutaneous three times a day before meals  NIFEdipine XL 60 milliGRAM(s) Oral every 24 hours  pantoprazole    Tablet 40 milliGRAM(s) Oral before breakfast  predniSONE   Tablet 5 milliGRAM(s) Oral every 24 hours  tacrolimus ER Tablet (ENVARSUS XR) 3 milliGRAM(s) Oral daily    MEDICATIONS  (PRN):  dextrose Oral Gel 15 Gram(s) Oral once PRN Blood Glucose LESS THAN 70 milliGRAM(s)/deciliter  oxyCODONE    IR 5 milliGRAM(s) Oral every 3 hours PRN Mild Pain (1 - 3)  oxyCODONE    IR 10 milliGRAM(s) Oral every 4 hours PRN Severe Pain (7 - 10)      PAST MEDICAL & SURGICAL HISTORY:  Hyperlipidemia      Arthropathy NOS - shoulder      Kidney stones      Polycystic kidney disease      GERD (gastroesophageal reflux disease)      Anxiety      CKD (chronic kidney disease) stage 4, GFR 15-29 ml/min  no dialysis      ESRD (end stage renal disease)      Ankle fracture - Left  brumstein barajas procedure 2000      Tubal ligation  1995      S/P cholecystectomy  (2013)      Kidney calculi  cysto/lithotripsy multiple      H/O tubal ligation  (1996)      History of ventral hernia repair      H/O shoulder surgery  left      AV fistula  right forearm extremity AV fistula radiocephalic ( 2018)      Renal transplant recipient      Status post nephrectomy  bilateral          Vital Signs Last 24 Hrs  T(C): 36.8 (01 May 2025 12:57), Max: 37.2 (01 May 2025 05:00)  T(F): 98.3 (01 May 2025 12:57), Max: 99 (01 May 2025 05:00)  HR: 56 (01 May 2025 12:57) (56 - 70)  BP: 126/65 (01 May 2025 12:57) (117/56 - 159/66)  BP(mean): --  RR: 18 (01 May 2025 12:57) (18 - 18)  SpO2: 93% (01 May 2025 12:57) (91% - 96%)    Parameters below as of 01 May 2025 12:57  Patient On (Oxygen Delivery Method): room air        I&O's Summary    30 Apr 2025 07:01  -  01 May 2025 07:00  --------------------------------------------------------  IN: 1040 mL / OUT: 500 mL / NET: 540 mL    01 May 2025 07:01  -  01 May 2025 16:43  --------------------------------------------------------  IN: 960 mL / OUT: 300 mL / NET: 660 mL                              13.5   10.55 )-----------( 121      ( 01 May 2025 07:17 )             39.4     05-01    142  |  104  |  14  ----------------------------<  87  3.3[L]   |  24  |  1.06    Ca    9.0      01 May 2025 07:17  Phos  1.8     05-01  Mg     1.8     05-01    TPro  5.7[L]  /  Alb  3.2[L]  /  TBili  1.2  /  DBili  x   /  AST  35  /  ALT  8[L]  /  AlkPhos  65  05-01    Tacrolimus (), Serum: 7.2 ng/mL (05-01 @ 07:17)                  Review of systems  Gen: No weight changes, fatigue, fevers/chills, weakness  Skin: No rashes  Head/Eyes/Ears/Mouth: No headache; Normal hearing; Normal vision w/o blurriness; No sinus pain/discomfort, sore throat  Respiratory: mild dyspnea, denies cough, wheezing, hemoptysis  CV: No chest pain, PND, orthopnea  GI: C/O mild abdominal pain at surgical site. no diarrhea, constipation, nausea, vomiting, melena, hematochezia  : No increased frequency, dysuria, hematuria, nocturia  MSK: No joint pain/swelling; no back pain; no edema  Neuro: No dizziness/lightheadedness, weakness, seizures, numbness, tingling  Heme: No easy bruising or bleeding  Endo: No heat/cold intolerance  Psych: No significant nervousness, anxiety, stress, depression  All other systems were reviewed and are negative, except as noted.      PHYSICAL EXAM:  Constitutional: Well developed / well nourished  Eyes:  PERRLA  ENMT: nc/at, no thrush  Neck: supple,   Respiratory: CTA B/L  Cardiovascular: RRR  Gastrointestinal: Soft abdomen, ND, appropriate incisional TTP  Genitourinary: Voiding spontaneously  Extremities: SCD's in place and working bilaterally  Vascular: Palpable dp pulses bilaterally.   Neurological: A&O x3  Skin: no rashes, ulcerations, lesions  Musculoskeletal: Moving all extremities  Psychiatric: Responsive

## 2025-05-01 NOTE — DISCHARGE NOTE PROVIDER - NSDCMRMEDTOKEN_GEN_ALL_CORE_FT
azaTHIOprine 50 mg oral tablet: 1 tab(s) orally once a day  carvedilol 25 mg oral tablet: 1 tab(s) orally 2 times a day  clonazePAM 1 mg oral tablet: 1 tab(s) orally 3 times a day  Envarsus XR 1 mg oral tablet, extended release: 1 tab(s) orally 3 times a day  escitalopram 20 mg oral tablet: 1 tab(s) orally once a day  furosemide 40 mg oral tablet: 1 tab(s) orally once a day  Mounjaro 5 mg/0.5 mL subcutaneous solution: 5 milligram(s) subcutaneously once a week on Saturdays  Multiple Vitamins oral tablet: 1 tab(s) orally once a day  NIFEdipine 60 mg oral tablet, extended release: 1 tab(s) orally once a day  pantoprazole 40 mg oral delayed release tablet: 1 tab(s) orally once a day  pravastatin 40 mg oral tablet: 1 tab(s) orally once a day (at bedtime)  predniSONE 5 mg oral tablet: 1 tab(s) orally once a day  vitamin E: 2 tab(s) orally once a day  Zetia 10 mg oral tablet: 1 tab(s) orally once a day   acetaminophen 325 mg oral tablet: 2 tab(s) orally every 8 hours  azaTHIOprine 50 mg oral tablet: 1 tab(s) orally once a day  carvedilol 25 mg oral tablet: 1 tab(s) orally every 12 hours  clonazePAM 1 mg oral tablet: 1 tab(s) orally 3 times a day  Envarsus XR 1 mg oral tablet, extended release: 3 tab(s) orally once a day  escitalopram 20 mg oral tablet: 1 tab(s) orally once a day  Mounjaro 5 mg/0.5 mL subcutaneous solution: 5 milligram(s) subcutaneously once a week on Saturdays  Multiple Vitamins oral tablet: 1 tab(s) orally once a day  NIFEdipine 60 mg oral tablet, extended release: 1 tab(s) orally every 24 hours  oxyCODONE 5 mg oral tablet: 1 tab(s) orally every 6 hours as needed for Mild Pain (1 - 3) MDD: 4  pantoprazole 40 mg oral delayed release tablet: 1 tab(s) orally once a day (before a meal)  pravastatin 40 mg oral tablet: 1 tab(s) orally once a day (at bedtime)  predniSONE 5 mg oral tablet: 1 tab(s) orally every 24 hours  vitamin E: 2 tab(s) orally once a day  Zetia 10 mg oral tablet: 1 tab(s) orally once a day

## 2025-05-01 NOTE — DISCHARGE NOTE PROVIDER - HOSPITAL COURSE
54F PMH HTN, HLD, T2DM on Mounjaro,  polycystic renal disease, ESRD s/p bilateral nephrectomy and right renal transplant in 2018, ventral  hernia with mesh 2008 now periumbilical bulge re-occurred with symptoms of nausea, vomiting diarrhea now s/p robotic Incisional Hernia Repair with Mesh on 4/29/25. No intra-op complications, patient progressed well and was deemed safe for discharge home.     Follow up --- 54F PMH HTN, HLD, T2DM on Mounjaro,  polycystic renal disease, ESRD s/p bilateral nephrectomy and right renal transplant in 2018, ventral  hernia with mesh 2008 now periumbilical bulge re-occurred with symptoms of nausea, vomiting diarrhea now s/p robotic Incisional Hernia Repair with Mesh on 4/29/25. No intra-op complications, patient progressed well and was deemed safe for discharge home.     Follow up ---     Can follow up at Bariatric Surgery clinic w/ Dr. Reyes or Luz in 2 weeks. Information provided in "Follow Up" section.    54F PMH HTN, HLD, T2DM on Mounjaro,  polycystic renal disease, ESRD s/p bilateral nephrectomy and right renal transplant in 2018, ventral  hernia with mesh 2008 now periumbilical bulge re-occurred with symptoms of nausea, vomiting diarrhea now s/p robotic Incisional Hernia Repair with Mesh on 4/29/25. No intra-op complications, patient progressed well and was deemed safe for discharge home.     Follow up next week your PCP post hospitalization    Can follow up at Bariatric Surgery clinic w/ Dr. Reyes or Luz in 2 weeks.

## 2025-05-01 NOTE — PROGRESS NOTE ADULT - ATTENDING COMMENTS
Nausea yesterday with regular diet, so didn't eat much and elected to stay. + flatus and small BM. Increased pain overnight into morning. Passed TOV. Abd still soft, steri strips in place, no drainage.  Labs and vitals reviewed.    - try reg diet again today  - pain control as needed  - cont excellent care per transplant  - possibly home today if tolerating diet, pain controlled  - cont abd binder  - dvt ppx    Olga Reyes MD
Late entry, patient seen AM 4/30.  Feeling less pain than expected.  No nausea this AM, tolerating clear liquids without issues.  No flatus/BM.  Abd soft, appropriately tender.  Dressings c/d/i.  Labs and vitals reviewed.    - appreciate excellent care per transplant team  - ADAT  - dc pinon  - pain control as needed  - OOB as tolerated  - cont abdominal binder  - DVT ppx    Olga Reyes MD

## 2025-05-01 NOTE — PHYSICAL THERAPY INITIAL EVALUATION ADULT - PERTINENT HX OF CURRENT PROBLEM, REHAB EVAL
53 yo female, PMH HTN, HLD, T2DM on Mounjaro - tolerating well (has lost 80 lbs. in one year), ventral  hernia with mesh 2008, polycystic renal disease, ESRD s/p right AVF, s/p bilateral nephrectomy and right renal  transplant surgery 2018, now periumbilical bulge re-occurred with symptoms of nausea, vomiting diarrhea. Pt. presents for scheduled robotic Incisional Hernia Repair with Mesh on 4/29/25.

## 2025-05-01 NOTE — DISCHARGE NOTE PROVIDER - CARE PROVIDER_API CALL
Olga Reyes Marie  Surgery  [address] [place] [address], [facility] [address]  [place] [address]  Phone: [phone]  Fax: [phone]  Follow Up Time: 2 weeks

## 2025-05-01 NOTE — DISCHARGE NOTE PROVIDER - NSDCFUSCHEDAPPT_GEN_ALL_CORE_FT
Miko Mcdonough  [facility] Physician [facility]  NEPHRO [address]  Scheduled Appointment: 05/12/2025    Princess Alonso  [facility] Physician [facility]  ENDOCRIN [address]  Scheduled Appointment: 05/12/2025    Demario Shaver  [facility] Physician [facility]  OPHTHALM [address]  Scheduled Appointment: 07/24/2025    [facility]  ANTEPARTUM [address]  Scheduled Appointment: 07/24/2025    Eugenie Finn  [facility] Physician [facility]  OBGYNGEN [address]   Scheduled Appointment: 07/24/2025

## 2025-05-01 NOTE — DISCHARGE NOTE PROVIDER - NSDCFUADDAPPT_GEN_ALL_CORE_FT
Follow up next week your PCP post hospitalization    Can follow up at Bariatric Surgery clinic w/ Dr. Reyes or Luz in 2 weeks.

## 2025-05-01 NOTE — PROGRESS NOTE ADULT - ASSESSMENT
Pt from [facility]. VSS and pt oriented to room. 2 ml air released from TR band. No bleeding or swelling noted. Extremity warm to touch with full sensation and strong pulse. Denies pain or any other needs. Call light in reach and bed alarm engaged.    53 yo female, PMH HTN, HLD, T2DM on Mounjaro - tolerating well (has lost 80 lbs. in one year), ventral  hernia with mesh 2008, polycystic renal disease, ESRD s/p right AVF, s/p bilateral nephrectomy and right renal  transplant surgery 2018, now periumbilical bulge re-occurred with symptoms of nausea, vomiting diarrhea. Pt. presents for scheduled robotic Incisional Hernia Repair with Mesh on 4/29/25.     [] s/p Hernia repair w/ TAR  - ADAT  - daily labs  - monitor Is and Os   - bariatric surgery care appreciated cleared for DC  - Change pain medications to oxycodone PRN  - Once pain managed can be DC home later today    [] s/p DDRT 2018  - Envarsus by level, pred 5

## 2025-05-12 ENCOUNTER — APPOINTMENT (OUTPATIENT)
Dept: ENDOCRINOLOGY | Facility: CLINIC | Age: 55
End: 2025-05-12

## 2025-05-12 ENCOUNTER — APPOINTMENT (OUTPATIENT)
Dept: NEPHROLOGY | Facility: CLINIC | Age: 55
End: 2025-05-12

## 2025-05-13 ENCOUNTER — APPOINTMENT (OUTPATIENT)
Dept: SURGERY | Facility: CLINIC | Age: 55
End: 2025-05-13
Payer: COMMERCIAL

## 2025-05-13 ENCOUNTER — APPOINTMENT (OUTPATIENT)
Dept: TRANSPLANT | Facility: CLINIC | Age: 55
End: 2025-05-13

## 2025-05-13 VITALS
BODY MASS INDEX: 25.61 KG/M2 | DIASTOLIC BLOOD PRESSURE: 81 MMHG | OXYGEN SATURATION: 99 % | TEMPERATURE: 98 F | HEIGHT: 68 IN | WEIGHT: 169 LBS | SYSTOLIC BLOOD PRESSURE: 130 MMHG | RESPIRATION RATE: 16 BRPM | HEART RATE: 55 BPM

## 2025-05-13 DIAGNOSIS — K43.2 INCISIONAL HERNIA W/OUT OBSTRUCTION OR GANGRENE: ICD-10-CM

## 2025-05-13 PROCEDURE — 99024 POSTOP FOLLOW-UP VISIT: CPT

## 2025-05-14 ENCOUNTER — NON-APPOINTMENT (OUTPATIENT)
Age: 55
End: 2025-05-14

## 2025-05-15 LAB
ALBUMIN SERPL ELPH-MCNC: 3.9 G/DL
ALP BLD-CCNC: 79 U/L
ALT SERPL-CCNC: 15 U/L
ANION GAP SERPL CALC-SCNC: 15 MMOL/L
APPEARANCE: CLEAR
AST SERPL-CCNC: 16 U/L
BASOPHILS # BLD AUTO: 0.08 K/UL
BASOPHILS NFR BLD AUTO: 0.7 %
BILIRUB SERPL-MCNC: 0.4 MG/DL
BILIRUBIN URINE: NEGATIVE
BKV DNA SPEC QL NAA+PROBE: NOT DETECTED IU/ML
BLOOD URINE: NEGATIVE
BUN SERPL-MCNC: 17 MG/DL
CALCIUM SERPL-MCNC: 9.7 MG/DL
CALCIUM SERPL-MCNC: 9.7 MG/DL
CHLORIDE SERPL-SCNC: 104 MMOL/L
CMV DNA SPEC QL NAA+PROBE: NOT DETECTED IU/ML
CMVPCR LOG: NOT DETECTED LOG10IU/ML
CO2 SERPL-SCNC: 25 MMOL/L
COLOR: YELLOW
CREAT SERPL-MCNC: 1.2 MG/DL
CREAT SPEC-SCNC: 293 MG/DL
CREAT/PROT UR: 0.1 RATIO
EGFRCR SERPLBLD CKD-EPI 2021: 54 ML/MIN/1.73M2
EOSINOPHIL # BLD AUTO: 0.3 K/UL
EOSINOPHIL NFR BLD AUTO: 2.4 %
GLUCOSE QUALITATIVE U: NEGATIVE
GLUCOSE SERPL-MCNC: 85 MG/DL
HCT VFR BLD CALC: 42.2 %
HGB BLD-MCNC: 14.1 G/DL
IMM GRANULOCYTES NFR BLD AUTO: 0.5 %
KETONES URINE: NEGATIVE
LEUKOCYTE ESTERASE URINE: NEGATIVE
LYMPHOCYTES # BLD AUTO: 2.29 K/UL
LYMPHOCYTES NFR BLD AUTO: 18.7 %
MAGNESIUM SERPL-MCNC: 1.8 MG/DL
MAN DIFF?: NORMAL
MCHC RBC-ENTMCNC: 33.4 G/DL
MCHC RBC-ENTMCNC: 33.7 PG
MCV RBC AUTO: 101 FL
MONOCYTES # BLD AUTO: 0.67 K/UL
MONOCYTES NFR BLD AUTO: 5.5 %
NEUTROPHILS # BLD AUTO: 8.87 K/UL
NEUTROPHILS NFR BLD AUTO: 72.2 %
NITRITE URINE: NEGATIVE
PARATHYROID HORMONE INTACT: 80 PG/ML
PH URINE: 6
PHOSPHATE SERPL-MCNC: 3.6 MG/DL
PLATELET # BLD AUTO: 311 K/UL
POTASSIUM SERPL-SCNC: 4.5 MMOL/L
PROT SERPL-MCNC: 6.2 G/DL
PROT UR-MCNC: 30 MG/DL
PROTEIN URINE: NORMAL
RBC # BLD: 4.18 M/UL
RBC # FLD: 12.6 %
SODIUM SERPL-SCNC: 144 MMOL/L
SPECIFIC GRAVITY URINE: 1.02
TACROLIMUS SERPL-MCNC: 5.6 NG/ML
UROBILINOGEN URINE: NORMAL
WBC # FLD AUTO: 12.27 K/UL

## 2025-06-09 ENCOUNTER — APPOINTMENT (OUTPATIENT)
Dept: ENDOCRINOLOGY | Facility: CLINIC | Age: 55
End: 2025-06-09

## 2025-06-11 NOTE — CONSULT NOTE ADULT - PROBLEM SELECTOR PROBLEM 3
Disorder of vocal cord
UTI (urinary tract infection)
Hypertension
Photo Preface (Leave Blank If You Do Not Want): Photographs were obtained today
Detail Level: Zone

## 2025-06-30 ENCOUNTER — APPOINTMENT (OUTPATIENT)
Dept: TRANSPLANT | Facility: CLINIC | Age: 55
End: 2025-06-30

## 2025-07-02 ENCOUNTER — APPOINTMENT (OUTPATIENT)
Dept: NEPHROLOGY | Facility: CLINIC | Age: 55
End: 2025-07-02
Payer: COMMERCIAL

## 2025-07-02 VITALS
RESPIRATION RATE: 16 BRPM | WEIGHT: 152 LBS | HEIGHT: 68 IN | OXYGEN SATURATION: 99 % | TEMPERATURE: 98.7 F | HEART RATE: 55 BPM | DIASTOLIC BLOOD PRESSURE: 95 MMHG | SYSTOLIC BLOOD PRESSURE: 161 MMHG | BODY MASS INDEX: 23.04 KG/M2

## 2025-07-02 PROCEDURE — 99214 OFFICE O/P EST MOD 30 MIN: CPT

## 2025-07-03 LAB
ALBUMIN SERPL ELPH-MCNC: 4.2 G/DL
ALP BLD-CCNC: 86 U/L
ALT SERPL-CCNC: 11 U/L
ANION GAP SERPL CALC-SCNC: 15 MMOL/L
APPEARANCE: ABNORMAL
AST SERPL-CCNC: 15 U/L
BACTERIA: ABNORMAL /HPF
BASOPHILS # BLD AUTO: 0.04 K/UL
BASOPHILS NFR BLD AUTO: 0.4 %
BILIRUB SERPL-MCNC: 0.6 MG/DL
BILIRUBIN URINE: NEGATIVE
BKV DNA SPEC QL NAA+PROBE: NOT DETECTED IU/ML
BLOOD URINE: NEGATIVE
BUN SERPL-MCNC: 20 MG/DL
CALCIUM SERPL-MCNC: 9.8 MG/DL
CALCIUM SERPL-MCNC: 9.8 MG/DL
CAST: 0 /LPF
CHLORIDE SERPL-SCNC: 103 MMOL/L
CMV DNA SPEC QL NAA+PROBE: NOT DETECTED IU/ML
CMVPCR LOG: NOT DETECTED LOG10IU/ML
CO2 SERPL-SCNC: 25 MMOL/L
COLOR: YELLOW
CREAT SERPL-MCNC: 1.24 MG/DL
CREAT SPEC-SCNC: 151 MG/DL
CREAT/PROT UR: 0.1 RATIO
EGFRCR SERPLBLD CKD-EPI 2021: 52 ML/MIN/1.73M2
EOSINOPHIL # BLD AUTO: 0.15 K/UL
EOSINOPHIL NFR BLD AUTO: 1.5 %
EPITHELIAL CELLS: 9 /HPF
GLUCOSE QUALITATIVE U: NEGATIVE MG/DL
GLUCOSE SERPL-MCNC: 85 MG/DL
HCT VFR BLD CALC: 41.7 %
HGB BLD-MCNC: 14.4 G/DL
IMM GRANULOCYTES NFR BLD AUTO: 0.3 %
KETONES URINE: NEGATIVE MG/DL
LEUKOCYTE ESTERASE URINE: ABNORMAL
LYMPHOCYTES # BLD AUTO: 2.73 K/UL
LYMPHOCYTES NFR BLD AUTO: 27.4 %
MAGNESIUM SERPL-MCNC: 1.8 MG/DL
MAN DIFF?: NORMAL
MCHC RBC-ENTMCNC: 33.3 PG
MCHC RBC-ENTMCNC: 34.5 G/DL
MCV RBC AUTO: 96.3 FL
MICROSCOPIC-UA: NORMAL
MONOCYTES # BLD AUTO: 0.56 K/UL
MONOCYTES NFR BLD AUTO: 5.6 %
NEUTROPHILS # BLD AUTO: 6.44 K/UL
NEUTROPHILS NFR BLD AUTO: 64.8 %
NITRITE URINE: NEGATIVE
PARATHYROID HORMONE INTACT: 76 PG/ML
PH URINE: 6
PHOSPHATE SERPL-MCNC: 3.8 MG/DL
PLATELET # BLD AUTO: 234 K/UL
POTASSIUM SERPL-SCNC: 3.6 MMOL/L
PROT SERPL-MCNC: 6.6 G/DL
PROT UR-MCNC: 12 MG/DL
PROTEIN URINE: NEGATIVE MG/DL
RBC # BLD: 4.33 M/UL
RBC # FLD: 13.2 %
RED BLOOD CELLS URINE: 0 /HPF
REVIEW: NORMAL
SODIUM SERPL-SCNC: 144 MMOL/L
SPECIFIC GRAVITY URINE: 1.02
TACROLIMUS SERPL-MCNC: 5.1 NG/ML
UROBILINOGEN URINE: 0.2 MG/DL
WBC # FLD AUTO: 9.95 K/UL
WHITE BLOOD CELLS URINE: 6 /HPF

## 2025-07-24 ENCOUNTER — APPOINTMENT (OUTPATIENT)
Dept: ANTEPARTUM | Facility: CLINIC | Age: 55
End: 2025-07-24
Payer: COMMERCIAL

## 2025-07-24 ENCOUNTER — ASOB RESULT (OUTPATIENT)
Age: 55
End: 2025-07-24

## 2025-07-24 ENCOUNTER — APPOINTMENT (OUTPATIENT)
Dept: OBGYN | Facility: CLINIC | Age: 55
End: 2025-07-24
Payer: COMMERCIAL

## 2025-07-24 VITALS
WEIGHT: 152 LBS | SYSTOLIC BLOOD PRESSURE: 124 MMHG | BODY MASS INDEX: 23.04 KG/M2 | HEIGHT: 68 IN | DIASTOLIC BLOOD PRESSURE: 72 MMHG

## 2025-07-24 DIAGNOSIS — Z87.42 PERSONAL HISTORY OF OTHER DISEASES OF THE FEMALE GENITAL TRACT: ICD-10-CM

## 2025-07-24 DIAGNOSIS — Z01.818 ENCOUNTER FOR OTHER PREPROCEDURAL EXAMINATION: ICD-10-CM

## 2025-07-24 DIAGNOSIS — Z01.419 ENCOUNTER FOR GYNECOLOGICAL EXAMINATION (GENERAL) (ROUTINE) W/OUT ABNORMAL FINDINGS: ICD-10-CM

## 2025-07-24 DIAGNOSIS — Z01.411 ENCOUNTER FOR GYNECOLOGICAL EXAMINATION (GENERAL) (ROUTINE) WITH ABNORMAL FINDINGS: ICD-10-CM

## 2025-07-24 DIAGNOSIS — Z91.89 OTHER SPECIFIED PERSONAL RISK FACTORS, NOT ELSEWHERE CLASSIFIED: ICD-10-CM

## 2025-07-24 DIAGNOSIS — R92.30 DENSE BREASTS, UNSPECIFIED: ICD-10-CM

## 2025-07-24 DIAGNOSIS — Z09 ENCOUNTER FOR FOLLOW-UP EXAMINATION AFTER COMPLETED TREATMENT FOR CONDITIONS OTHER THAN MALIGNANT NEOPLASM: ICD-10-CM

## 2025-07-24 DIAGNOSIS — Z01.810 ENCOUNTER FOR PREPROCEDURAL CARDIOVASCULAR EXAMINATION: ICD-10-CM

## 2025-07-24 DIAGNOSIS — Z12.31 ENCOUNTER FOR SCREENING MAMMOGRAM FOR MALIGNANT NEOPLASM OF BREAST: ICD-10-CM

## 2025-07-24 DIAGNOSIS — N83.209 UNSPECIFIED OVARIAN CYST, UNSPECIFIED SIDE: ICD-10-CM

## 2025-07-24 PROCEDURE — 99213 OFFICE O/P EST LOW 20 MIN: CPT | Mod: 25

## 2025-07-24 PROCEDURE — 99459 PELVIC EXAMINATION: CPT

## 2025-07-24 PROCEDURE — 76856 US EXAM PELVIC COMPLETE: CPT | Mod: 59

## 2025-07-24 PROCEDURE — 99396 PREV VISIT EST AGE 40-64: CPT

## 2025-07-24 PROCEDURE — 76830 TRANSVAGINAL US NON-OB: CPT

## 2025-07-29 PROBLEM — Z01.419 ENCOUNTER FOR WELL WOMAN EXAM WITH ROUTINE GYNECOLOGICAL EXAM: Status: RESOLVED | Noted: 2024-07-22 | Resolved: 2025-07-29

## 2025-07-29 PROBLEM — Z87.42 HISTORY OF DYSPAREUNIA IN FEMALE: Status: RESOLVED | Noted: 2023-03-31 | Resolved: 2025-07-29

## 2025-07-31 ENCOUNTER — APPOINTMENT (OUTPATIENT)
Dept: OPHTHALMOLOGY | Facility: CLINIC | Age: 55
End: 2025-07-31
Payer: COMMERCIAL

## 2025-07-31 ENCOUNTER — NON-APPOINTMENT (OUTPATIENT)
Age: 55
End: 2025-07-31

## 2025-07-31 PROCEDURE — 92014 COMPRE OPH EXAM EST PT 1/>: CPT

## 2025-07-31 PROCEDURE — 92134 CPTRZ OPH DX IMG PST SGM RTA: CPT

## 2025-08-15 ENCOUNTER — APPOINTMENT (OUTPATIENT)
Dept: ENDOCRINOLOGY | Facility: CLINIC | Age: 55
End: 2025-08-15

## (undated) DEVICE — DRSG TEGADERM 6 X 8"

## (undated) DEVICE — XI SEAL UNIVERSIAL 5-12MM

## (undated) DEVICE — LUBRICATING JELLY HR ONE SHOT 3G

## (undated) DEVICE — SOL IRR POUR NS 0.9% 500ML

## (undated) DEVICE — BITE BLOCK ADULT 20 X 27MM (GREEN)

## (undated) DEVICE — TUBING STRYKEFLOW II SUCTION / IRRIGATOR

## (undated) DEVICE — XI ARM FORCEP FENESTRATED BIPOLAR 8MM

## (undated) DEVICE — PACK IV START WITH CHG

## (undated) DEVICE — VENODYNE/SCD SLEEVE CALF MEDIUM

## (undated) DEVICE — DRSG 2X2

## (undated) DEVICE — XI DRAPE COLUMN

## (undated) DEVICE — DRSG BANDAID 0.75X3"

## (undated) DEVICE — DENTURE CUP PINK

## (undated) DEVICE — PACK ADVANCED LAPAROSCOPIC NS

## (undated) DEVICE — BASIN EMESIS 10IN GRADUATED MAUVE

## (undated) DEVICE — TUBING MEDI-VAC W MAXIGRIP CONNECTORS 1/4"X6'

## (undated) DEVICE — WARMING BLANKET UPPER ADULT

## (undated) DEVICE — BLADE SCALPEL SAFETYLOCK #11

## (undated) DEVICE — MEDICATION LABELS W MARKER

## (undated) DEVICE — CONTAINER FORMALIN 80ML YELLOW

## (undated) DEVICE — TUBING SUCTION NONCONDUCTIVE 6MM X 12FT

## (undated) DEVICE — XI DRAPE ARM

## (undated) DEVICE — SUT VLOC PBT 0 9" GS-21 BLUE

## (undated) DEVICE — GLV 7 PROTEXIS (WHITE)

## (undated) DEVICE — POSITIONER PURPLE ARM ONE STEP (LARGE)

## (undated) DEVICE — XI OBTURATOR OPTICAL BLADELESS 8MM

## (undated) DEVICE — BIOPSY FORCEP RADIAL JAW 4 STANDARD WITH NEEDLE

## (undated) DEVICE — GLV 7 PROTEXIS (BLUE)

## (undated) DEVICE — SUT POLYSORB 0 30" GU-45

## (undated) DEVICE — FOLEY TRAY 16FR 5CC LTX UMETER CLOSED

## (undated) DEVICE — WARMING BLANKET LOWER ADULT

## (undated) DEVICE — POSITIONER FOAM EGG CRATE ULNAR 2PCS (PINK)

## (undated) DEVICE — TUBING IV SET GRAVITY 3Y 100" MACRO

## (undated) DEVICE — DRSG CURITY GAUZE SPONGE 4 X 4" 12-PLY NON-STERILE

## (undated) DEVICE — BLADE SCALPEL SAFETYLOCK #15

## (undated) DEVICE — DRAPE 3/4 SHEET 52X76"

## (undated) DEVICE — ELECTRO LUBE ANTI-STICK SOLUTION FOR CAUTERY TIP

## (undated) DEVICE — PREP CHLORAPREP HI-LITE ORANGE 26ML

## (undated) DEVICE — UNDERPAD LINEN SAVER 17 X 24"

## (undated) DEVICE — DRAIN PENROSE .5" X 18" LATEX

## (undated) DEVICE — DRAPE MAYO STAND 30"

## (undated) DEVICE — TUBING OLYMPUS INSUFFLATION

## (undated) DEVICE — GOWN TRIMAX LG

## (undated) DEVICE — LUBRICATING JELLY ONESHOT 1.25OZ

## (undated) DEVICE — MARKING PEN W RULER

## (undated) DEVICE — BIOPSY FORCEP COLD DISP

## (undated) DEVICE — CLAMP BX HOT RAD JAW 3

## (undated) DEVICE — STAPLER SKIN VISI-STAT 35 WIDE

## (undated) DEVICE — XI ARM FORCEP CADIERE 8MM

## (undated) DEVICE — VISITEC 4X4

## (undated) DEVICE — SOL IRR POUR H2O 250ML

## (undated) DEVICE — FOLEY HOLDER STATLOCK 2 WAY ADULT

## (undated) DEVICE — LAP PAD 18 X 18"

## (undated) DEVICE — DRSG OPSITE 13.75 X 4"

## (undated) DEVICE — ELCTR BOVIE PENCIL SMOKE EVACUATION

## (undated) DEVICE — DRAPE TOWEL BLUE 17" X 24"

## (undated) DEVICE — LINE BREATHE SAMPLNG

## (undated) DEVICE — ELCTR GROUNDING PAD ADULT COVIDIEN

## (undated) DEVICE — XI SCISSOR TIP COVER

## (undated) DEVICE — SUT BIOSYN 4-0 18" P-12

## (undated) DEVICE — SALIVA EJECTOR (BLUE)

## (undated) DEVICE — DRAPE LIGHT HANDLE COVER (BLUE)

## (undated) DEVICE — DRAPE INSTRUMENT POUCH 6.75" X 11"

## (undated) DEVICE — DRAPE BACK TABLE COVER HEAVY DUTY 60"

## (undated) DEVICE — D HELP - CLEARVIEW CLEARIFY SYSTEM

## (undated) DEVICE — SUT VLOC 180 3-0 12" V-20 GREEN

## (undated) DEVICE — TUBING INSUFFLATION LAP FILTER 10FT

## (undated) DEVICE — XI ARM NEEDLE DRIVER SUTURECUT MEGA 8MM

## (undated) DEVICE — CATH IV SAFE BC 22G X 1" (BLUE)

## (undated) DEVICE — SPECIMEN CONTAINER 100ML

## (undated) DEVICE — DRSG STERISTRIPS 0.5 X 4"

## (undated) DEVICE — XI ARM FORCEP PROGRASP 8MM

## (undated) DEVICE — GOWN LG

## (undated) DEVICE — FACESHIELD FULL VISOR

## (undated) DEVICE — DRAPE 1/2 SHEET 40X57"

## (undated) DEVICE — ELCTR ECG CONDUCTIVE ADHESIVE